# Patient Record
Sex: FEMALE | Race: WHITE | NOT HISPANIC OR LATINO
[De-identification: names, ages, dates, MRNs, and addresses within clinical notes are randomized per-mention and may not be internally consistent; named-entity substitution may affect disease eponyms.]

---

## 2021-03-16 ENCOUNTER — APPOINTMENT (OUTPATIENT)
Dept: FAMILY MEDICINE | Facility: CLINIC | Age: 71
End: 2021-03-16
Payer: MEDICARE

## 2021-03-16 ENCOUNTER — NON-APPOINTMENT (OUTPATIENT)
Age: 71
End: 2021-03-16

## 2021-03-16 VITALS
TEMPERATURE: 97.6 F | HEART RATE: 83 BPM | WEIGHT: 223 LBS | DIASTOLIC BLOOD PRESSURE: 72 MMHG | SYSTOLIC BLOOD PRESSURE: 130 MMHG | RESPIRATION RATE: 16 BRPM | BODY MASS INDEX: 38.07 KG/M2 | OXYGEN SATURATION: 98 % | HEIGHT: 64 IN

## 2021-03-16 PROCEDURE — 36415 COLL VENOUS BLD VENIPUNCTURE: CPT

## 2021-03-16 PROCEDURE — G0439: CPT

## 2021-03-16 PROCEDURE — G0438: CPT

## 2021-03-16 RX ORDER — CETIRIZINE HYDROCHLORIDE 10 MG/1
10 CAPSULE, LIQUID FILLED ORAL
Refills: 0 | Status: ACTIVE | COMMUNITY
Start: 2021-03-16

## 2021-03-17 ENCOUNTER — TRANSCRIPTION ENCOUNTER (OUTPATIENT)
Age: 71
End: 2021-03-17

## 2021-03-17 LAB
25(OH)D3 SERPL-MCNC: 38.6 NG/ML
ALBUMIN SERPL ELPH-MCNC: 4.2 G/DL
ALP BLD-CCNC: 149 U/L
ALT SERPL-CCNC: 15 U/L
ANION GAP SERPL CALC-SCNC: 12 MMOL/L
AST SERPL-CCNC: 19 U/L
BASOPHILS # BLD AUTO: 0.04 K/UL
BASOPHILS NFR BLD AUTO: 0.6 %
BILIRUB SERPL-MCNC: <0.2 MG/DL
BUN SERPL-MCNC: 51 MG/DL
CALCIUM SERPL-MCNC: 8.5 MG/DL
CHLORIDE SERPL-SCNC: 115 MMOL/L
CHOLEST SERPL-MCNC: 267 MG/DL
CO2 SERPL-SCNC: 16 MMOL/L
CREAT SERPL-MCNC: 2.37 MG/DL
CREAT SPEC-SCNC: 63 MG/DL
EOSINOPHIL # BLD AUTO: 0.23 K/UL
EOSINOPHIL NFR BLD AUTO: 3.4 %
ESTIMATED AVERAGE GLUCOSE: 137 MG/DL
GLUCOSE SERPL-MCNC: 190 MG/DL
HBA1C MFR BLD HPLC: 6.4 %
HCT VFR BLD CALC: 30.4 %
HDLC SERPL-MCNC: 43 MG/DL
HGB BLD-MCNC: 9.5 G/DL
IMM GRANULOCYTES NFR BLD AUTO: 0.6 %
LDLC SERPL CALC-MCNC: 171 MG/DL
LYMPHOCYTES # BLD AUTO: 1.69 K/UL
LYMPHOCYTES NFR BLD AUTO: 24.9 %
MAN DIFF?: NORMAL
MCHC RBC-ENTMCNC: 31.3 GM/DL
MCHC RBC-ENTMCNC: 31.8 PG
MCV RBC AUTO: 101.7 FL
MICROALBUMIN 24H UR DL<=1MG/L-MCNC: 74.2 MG/DL
MICROALBUMIN/CREAT 24H UR-RTO: 1181 MG/G
MONOCYTES # BLD AUTO: 0.49 K/UL
MONOCYTES NFR BLD AUTO: 7.2 %
NEUTROPHILS # BLD AUTO: 4.29 K/UL
NEUTROPHILS NFR BLD AUTO: 63.3 %
NONHDLC SERPL-MCNC: 224 MG/DL
PLATELET # BLD AUTO: 276 K/UL
POTASSIUM SERPL-SCNC: 5.2 MMOL/L
PROT SERPL-MCNC: 6.5 G/DL
RBC # BLD: 2.99 M/UL
RBC # FLD: 14.1 %
SODIUM SERPL-SCNC: 143 MMOL/L
TRIGL SERPL-MCNC: 265 MG/DL
TSH SERPL-ACNC: 2.97 UIU/ML
WBC # FLD AUTO: 6.78 K/UL

## 2021-04-07 ENCOUNTER — RX RENEWAL (OUTPATIENT)
Age: 71
End: 2021-04-07

## 2021-04-19 ENCOUNTER — TRANSCRIPTION ENCOUNTER (OUTPATIENT)
Age: 71
End: 2021-04-19

## 2021-06-09 ENCOUNTER — TRANSCRIPTION ENCOUNTER (OUTPATIENT)
Age: 71
End: 2021-06-09

## 2021-07-08 ENCOUNTER — APPOINTMENT (OUTPATIENT)
Dept: FAMILY MEDICINE | Facility: CLINIC | Age: 71
End: 2021-07-08
Payer: MEDICARE

## 2021-07-08 VITALS
SYSTOLIC BLOOD PRESSURE: 134 MMHG | OXYGEN SATURATION: 97 % | WEIGHT: 224 LBS | HEIGHT: 64 IN | BODY MASS INDEX: 38.24 KG/M2 | DIASTOLIC BLOOD PRESSURE: 70 MMHG | HEART RATE: 71 BPM

## 2021-07-08 PROCEDURE — 36415 COLL VENOUS BLD VENIPUNCTURE: CPT

## 2021-07-08 PROCEDURE — 99214 OFFICE O/P EST MOD 30 MIN: CPT | Mod: 25

## 2021-07-09 LAB
ALBUMIN SERPL ELPH-MCNC: 4.2 G/DL
ALP BLD-CCNC: 150 U/L
ALT SERPL-CCNC: 14 U/L
ANION GAP SERPL CALC-SCNC: 16 MMOL/L
AST SERPL-CCNC: 22 U/L
BASOPHILS # BLD AUTO: 0.04 K/UL
BASOPHILS NFR BLD AUTO: 0.6 %
BILIRUB SERPL-MCNC: <0.2 MG/DL
BUN SERPL-MCNC: 58 MG/DL
CALCIUM SERPL-MCNC: 8.9 MG/DL
CHLORIDE SERPL-SCNC: 110 MMOL/L
CHOLEST SERPL-MCNC: 271 MG/DL
CO2 SERPL-SCNC: 13 MMOL/L
CREAT SERPL-MCNC: 2.71 MG/DL
EOSINOPHIL # BLD AUTO: 0.19 K/UL
EOSINOPHIL NFR BLD AUTO: 2.6 %
ESTIMATED AVERAGE GLUCOSE: 146 MG/DL
GLUCOSE SERPL-MCNC: 200 MG/DL
HBA1C MFR BLD HPLC: 6.7 %
HCT VFR BLD CALC: 28.5 %
HDLC SERPL-MCNC: 36 MG/DL
HGB BLD-MCNC: 8.8 G/DL
IMM GRANULOCYTES NFR BLD AUTO: 0.7 %
LDLC SERPL CALC-MCNC: NORMAL MG/DL
LYMPHOCYTES # BLD AUTO: 1.94 K/UL
LYMPHOCYTES NFR BLD AUTO: 27.1 %
MAN DIFF?: NORMAL
MCHC RBC-ENTMCNC: 30.9 GM/DL
MCHC RBC-ENTMCNC: 31.4 PG
MCV RBC AUTO: 101.8 FL
MONOCYTES # BLD AUTO: 0.51 K/UL
MONOCYTES NFR BLD AUTO: 7.1 %
NEUTROPHILS # BLD AUTO: 4.44 K/UL
NEUTROPHILS NFR BLD AUTO: 61.9 %
NONHDLC SERPL-MCNC: 236 MG/DL
PLATELET # BLD AUTO: 281 K/UL
POTASSIUM SERPL-SCNC: 5 MMOL/L
PROT SERPL-MCNC: 6.4 G/DL
RBC # BLD: 2.8 M/UL
RBC # FLD: 14.6 %
SODIUM SERPL-SCNC: 139 MMOL/L
TRIGL SERPL-MCNC: 421 MG/DL
TSH SERPL-ACNC: 1.33 UIU/ML
WBC # FLD AUTO: 7.17 K/UL

## 2021-07-18 ENCOUNTER — RX RENEWAL (OUTPATIENT)
Age: 71
End: 2021-07-18

## 2021-08-02 ENCOUNTER — TRANSCRIPTION ENCOUNTER (OUTPATIENT)
Age: 71
End: 2021-08-02

## 2021-09-11 ENCOUNTER — TRANSCRIPTION ENCOUNTER (OUTPATIENT)
Age: 71
End: 2021-09-11

## 2021-09-23 ENCOUNTER — TRANSCRIPTION ENCOUNTER (OUTPATIENT)
Age: 71
End: 2021-09-23

## 2021-10-14 ENCOUNTER — APPOINTMENT (OUTPATIENT)
Dept: FAMILY MEDICINE | Facility: CLINIC | Age: 71
End: 2021-10-14
Payer: MEDICARE

## 2021-10-14 VITALS
OXYGEN SATURATION: 98 % | SYSTOLIC BLOOD PRESSURE: 128 MMHG | WEIGHT: 220 LBS | DIASTOLIC BLOOD PRESSURE: 80 MMHG | HEART RATE: 75 BPM | HEIGHT: 64 IN | TEMPERATURE: 98 F | BODY MASS INDEX: 37.56 KG/M2

## 2021-10-14 PROCEDURE — 99214 OFFICE O/P EST MOD 30 MIN: CPT | Mod: 25

## 2021-11-22 ENCOUNTER — APPOINTMENT (OUTPATIENT)
Dept: FAMILY MEDICINE | Facility: CLINIC | Age: 71
End: 2021-11-22
Payer: MEDICARE

## 2021-11-22 VITALS
HEART RATE: 86 BPM | OXYGEN SATURATION: 93 % | WEIGHT: 220 LBS | BODY MASS INDEX: 37.56 KG/M2 | TEMPERATURE: 98.3 F | HEIGHT: 64 IN | DIASTOLIC BLOOD PRESSURE: 80 MMHG | SYSTOLIC BLOOD PRESSURE: 132 MMHG

## 2021-11-22 DIAGNOSIS — L89.90 PRESSURE ULCER OF UNSPECIFIED SITE, UNSPECIFIED STAGE: ICD-10-CM

## 2021-11-22 DIAGNOSIS — Z23 ENCOUNTER FOR IMMUNIZATION: ICD-10-CM

## 2021-11-22 DIAGNOSIS — Z09 ENCOUNTER FOR FOLLOW-UP EXAMINATION AFTER COMPLETED TREATMENT FOR CONDITIONS OTHER THAN MALIGNANT NEOPLASM: ICD-10-CM

## 2021-11-22 PROCEDURE — 99495 TRANSJ CARE MGMT MOD F2F 14D: CPT | Mod: 25

## 2021-11-22 PROCEDURE — G0009: CPT

## 2021-11-22 PROCEDURE — 90732 PPSV23 VACC 2 YRS+ SUBQ/IM: CPT

## 2021-11-30 ENCOUNTER — TRANSCRIPTION ENCOUNTER (OUTPATIENT)
Age: 71
End: 2021-11-30

## 2021-12-02 ENCOUNTER — RX RENEWAL (OUTPATIENT)
Age: 71
End: 2021-12-02

## 2022-02-15 ENCOUNTER — APPOINTMENT (OUTPATIENT)
Dept: FAMILY MEDICINE | Facility: CLINIC | Age: 72
End: 2022-02-15
Payer: MEDICARE

## 2022-02-15 VITALS
TEMPERATURE: 98.1 F | OXYGEN SATURATION: 97 % | DIASTOLIC BLOOD PRESSURE: 80 MMHG | WEIGHT: 220 LBS | BODY MASS INDEX: 37.56 KG/M2 | SYSTOLIC BLOOD PRESSURE: 130 MMHG | HEIGHT: 64 IN | HEART RATE: 71 BPM

## 2022-02-15 PROCEDURE — 99214 OFFICE O/P EST MOD 30 MIN: CPT | Mod: 25

## 2022-02-15 PROCEDURE — 36415 COLL VENOUS BLD VENIPUNCTURE: CPT

## 2022-02-16 LAB
ALBUMIN SERPL ELPH-MCNC: 4.6 G/DL
ALP BLD-CCNC: 105 U/L
ALT SERPL-CCNC: 11 U/L
ANION GAP SERPL CALC-SCNC: 17 MMOL/L
AST SERPL-CCNC: 14 U/L
BASOPHILS # BLD AUTO: 0.05 K/UL
BASOPHILS NFR BLD AUTO: 0.6 %
BILIRUB SERPL-MCNC: 0.2 MG/DL
BUN SERPL-MCNC: 71 MG/DL
CALCIUM SERPL-MCNC: 10 MG/DL
CHLORIDE SERPL-SCNC: 113 MMOL/L
CHOLEST SERPL-MCNC: 332 MG/DL
CO2 SERPL-SCNC: 13 MMOL/L
CREAT SERPL-MCNC: 3.31 MG/DL
EOSINOPHIL # BLD AUTO: 0.27 K/UL
EOSINOPHIL NFR BLD AUTO: 3.5 %
ESTIMATED AVERAGE GLUCOSE: 134 MG/DL
GLUCOSE SERPL-MCNC: 145 MG/DL
HBA1C MFR BLD HPLC: 6.3 %
HCT VFR BLD CALC: 29.3 %
HDLC SERPL-MCNC: 41 MG/DL
HGB BLD-MCNC: 9 G/DL
IMM GRANULOCYTES NFR BLD AUTO: 1.2 %
LDLC SERPL CALC-MCNC: 239 MG/DL
LYMPHOCYTES # BLD AUTO: 1.91 K/UL
LYMPHOCYTES NFR BLD AUTO: 24.6 %
MAN DIFF?: NORMAL
MCHC RBC-ENTMCNC: 30.7 GM/DL
MCHC RBC-ENTMCNC: 31 PG
MCV RBC AUTO: 101 FL
MONOCYTES # BLD AUTO: 0.65 K/UL
MONOCYTES NFR BLD AUTO: 8.4 %
NEUTROPHILS # BLD AUTO: 4.8 K/UL
NEUTROPHILS NFR BLD AUTO: 61.7 %
NONHDLC SERPL-MCNC: 292 MG/DL
PLATELET # BLD AUTO: 307 K/UL
POTASSIUM SERPL-SCNC: 5.2 MMOL/L
PROT SERPL-MCNC: 7.2 G/DL
RBC # BLD: 2.9 M/UL
RBC # FLD: 16.9 %
SODIUM SERPL-SCNC: 143 MMOL/L
TRIGL SERPL-MCNC: 262 MG/DL
TSH SERPL-ACNC: 1.53 UIU/ML
WBC # FLD AUTO: 7.77 K/UL

## 2022-05-01 ENCOUNTER — APPOINTMENT (OUTPATIENT)
Dept: FAMILY MEDICINE | Facility: CLINIC | Age: 72
End: 2022-05-01
Payer: MEDICARE

## 2022-05-01 VITALS
WEIGHT: 220 LBS | HEART RATE: 94 BPM | DIASTOLIC BLOOD PRESSURE: 80 MMHG | BODY MASS INDEX: 37.56 KG/M2 | HEIGHT: 64 IN | TEMPERATURE: 98 F | OXYGEN SATURATION: 96 % | RESPIRATION RATE: 16 BRPM | SYSTOLIC BLOOD PRESSURE: 118 MMHG

## 2022-05-01 PROCEDURE — 99213 OFFICE O/P EST LOW 20 MIN: CPT

## 2022-05-24 ENCOUNTER — APPOINTMENT (OUTPATIENT)
Dept: FAMILY MEDICINE | Facility: CLINIC | Age: 72
End: 2022-05-24
Payer: MEDICARE

## 2022-05-24 VITALS
SYSTOLIC BLOOD PRESSURE: 115 MMHG | HEART RATE: 83 BPM | OXYGEN SATURATION: 97 % | BODY MASS INDEX: 41.54 KG/M2 | DIASTOLIC BLOOD PRESSURE: 72 MMHG | WEIGHT: 220 LBS | HEIGHT: 61 IN | TEMPERATURE: 97.8 F

## 2022-05-24 PROCEDURE — 99214 OFFICE O/P EST MOD 30 MIN: CPT | Mod: 25

## 2022-05-24 PROCEDURE — 36415 COLL VENOUS BLD VENIPUNCTURE: CPT

## 2022-05-25 LAB
ALBUMIN SERPL ELPH-MCNC: 4.1 G/DL
ALP BLD-CCNC: 102 U/L
ALT SERPL-CCNC: 10 U/L
ANION GAP SERPL CALC-SCNC: 18 MMOL/L
AST SERPL-CCNC: 13 U/L
BASOPHILS # BLD AUTO: 0.05 K/UL
BASOPHILS NFR BLD AUTO: 0.7 %
BILIRUB SERPL-MCNC: <0.2 MG/DL
BUN SERPL-MCNC: 57 MG/DL
CALCIUM SERPL-MCNC: 8.4 MG/DL
CHLORIDE SERPL-SCNC: 115 MMOL/L
CHOLEST SERPL-MCNC: 285 MG/DL
CO2 SERPL-SCNC: 12 MMOL/L
CREAT SERPL-MCNC: 2.98 MG/DL
EGFR: 16 ML/MIN/1.73M2
EOSINOPHIL # BLD AUTO: 0.32 K/UL
EOSINOPHIL NFR BLD AUTO: 4.3 %
ESTIMATED AVERAGE GLUCOSE: 94 MG/DL
GLUCOSE SERPL-MCNC: 144 MG/DL
HBA1C MFR BLD HPLC: 4.9 %
HCT VFR BLD CALC: 26.4 %
HDLC SERPL-MCNC: 32 MG/DL
HGB BLD-MCNC: 7.5 G/DL
IMM GRANULOCYTES NFR BLD AUTO: 1.1 %
LDLC SERPL CALC-MCNC: 210 MG/DL
LYMPHOCYTES # BLD AUTO: 2.14 K/UL
LYMPHOCYTES NFR BLD AUTO: 28.7 %
MAN DIFF?: NORMAL
MCHC RBC-ENTMCNC: 28.4 GM/DL
MCHC RBC-ENTMCNC: 32.1 PG
MCV RBC AUTO: 112.8 FL
MONOCYTES # BLD AUTO: 0.66 K/UL
MONOCYTES NFR BLD AUTO: 8.9 %
NEUTROPHILS # BLD AUTO: 4.2 K/UL
NEUTROPHILS NFR BLD AUTO: 56.3 %
NONHDLC SERPL-MCNC: 252 MG/DL
PLATELET # BLD AUTO: 326 K/UL
POTASSIUM SERPL-SCNC: 5.3 MMOL/L
PROT SERPL-MCNC: 6.4 G/DL
RBC # BLD: 2.34 M/UL
RBC # FLD: 15.8 %
SODIUM SERPL-SCNC: 145 MMOL/L
TRIGL SERPL-MCNC: 211 MG/DL
TSH SERPL-ACNC: 1.12 UIU/ML
WBC # FLD AUTO: 7.45 K/UL

## 2022-05-27 ENCOUNTER — RX RENEWAL (OUTPATIENT)
Age: 72
End: 2022-05-27

## 2022-06-09 ENCOUNTER — RX RENEWAL (OUTPATIENT)
Age: 72
End: 2022-06-09

## 2022-06-25 ENCOUNTER — TRANSCRIPTION ENCOUNTER (OUTPATIENT)
Age: 72
End: 2022-06-25

## 2022-09-08 ENCOUNTER — RX RENEWAL (OUTPATIENT)
Age: 72
End: 2022-09-08

## 2022-09-23 ENCOUNTER — RX RENEWAL (OUTPATIENT)
Age: 72
End: 2022-09-23

## 2022-10-06 ENCOUNTER — LABORATORY RESULT (OUTPATIENT)
Age: 72
End: 2022-10-06

## 2022-10-06 ENCOUNTER — APPOINTMENT (OUTPATIENT)
Dept: FAMILY MEDICINE | Facility: CLINIC | Age: 72
End: 2022-10-06

## 2022-10-06 ENCOUNTER — TRANSCRIPTION ENCOUNTER (OUTPATIENT)
Age: 72
End: 2022-10-06

## 2022-10-06 VITALS
DIASTOLIC BLOOD PRESSURE: 70 MMHG | HEIGHT: 61 IN | WEIGHT: 200 LBS | HEART RATE: 77 BPM | OXYGEN SATURATION: 96 % | BODY MASS INDEX: 37.76 KG/M2 | TEMPERATURE: 98.2 F | SYSTOLIC BLOOD PRESSURE: 122 MMHG

## 2022-10-06 PROCEDURE — 36415 COLL VENOUS BLD VENIPUNCTURE: CPT

## 2022-10-06 PROCEDURE — 99214 OFFICE O/P EST MOD 30 MIN: CPT | Mod: 25

## 2022-10-07 LAB
ALBUMIN SERPL ELPH-MCNC: 4.1 G/DL
ALP BLD-CCNC: 122 U/L
ALT SERPL-CCNC: 16 U/L
ANION GAP SERPL CALC-SCNC: 21 MMOL/L
AST SERPL-CCNC: 18 U/L
BASOPHILS # BLD AUTO: 0.03 K/UL
BASOPHILS NFR BLD AUTO: 0.4 %
BILIRUB SERPL-MCNC: <0.2 MG/DL
BUN SERPL-MCNC: 77 MG/DL
CALCIUM SERPL-MCNC: 8.4 MG/DL
CHLORIDE SERPL-SCNC: 111 MMOL/L
CHOLEST SERPL-MCNC: 206 MG/DL
CO2 SERPL-SCNC: 9 MMOL/L
CREAT SERPL-MCNC: 3.53 MG/DL
EGFR: 13 ML/MIN/1.73M2
EOSINOPHIL # BLD AUTO: 0.03 K/UL
EOSINOPHIL NFR BLD AUTO: 0.4 %
ESTIMATED AVERAGE GLUCOSE: 114 MG/DL
GLUCOSE SERPL-MCNC: 156 MG/DL
HBA1C MFR BLD HPLC: 5.6 %
HCT VFR BLD CALC: 30.9 %
HDLC SERPL-MCNC: 27 MG/DL
HGB BLD-MCNC: 9.4 G/DL
IMM GRANULOCYTES NFR BLD AUTO: 0.5 %
LDLC SERPL CALC-MCNC: NORMAL MG/DL
LYMPHOCYTES # BLD AUTO: 1.55 K/UL
LYMPHOCYTES NFR BLD AUTO: 18.7 %
MAN DIFF?: NORMAL
MCHC RBC-ENTMCNC: 30.4 GM/DL
MCHC RBC-ENTMCNC: 32.9 PG
MCV RBC AUTO: 108 FL
MONOCYTES # BLD AUTO: 0.74 K/UL
MONOCYTES NFR BLD AUTO: 8.9 %
NEUTROPHILS # BLD AUTO: 5.91 K/UL
NEUTROPHILS NFR BLD AUTO: 71.1 %
NONHDLC SERPL-MCNC: 180 MG/DL
PLATELET # BLD AUTO: 193 K/UL
POTASSIUM SERPL-SCNC: 4.8 MMOL/L
PROT SERPL-MCNC: 6.4 G/DL
RBC # BLD: 2.86 M/UL
RBC # FLD: 13.7 %
SODIUM SERPL-SCNC: 141 MMOL/L
TRIGL SERPL-MCNC: 464 MG/DL
TSH SERPL-ACNC: 0.95 UIU/ML
WBC # FLD AUTO: 8.3 K/UL

## 2022-10-09 DIAGNOSIS — J98.01 ACUTE BRONCHOSPASM: ICD-10-CM

## 2022-10-14 ENCOUNTER — INPATIENT (INPATIENT)
Facility: HOSPITAL | Age: 72
LOS: 4 days | Discharge: ANOTHER HOSPITAL | End: 2022-10-19
Attending: HOSPITALIST | Admitting: HOSPITALIST

## 2022-10-14 VITALS
TEMPERATURE: 98 F | DIASTOLIC BLOOD PRESSURE: 83 MMHG | RESPIRATION RATE: 17 BRPM | HEART RATE: 79 BPM | WEIGHT: 214.95 LBS | HEIGHT: 62 IN | OXYGEN SATURATION: 99 % | SYSTOLIC BLOOD PRESSURE: 143 MMHG

## 2022-10-14 LAB
ALBUMIN SERPL ELPH-MCNC: 2.5 G/DL — LOW (ref 3.3–5)
ALP SERPL-CCNC: 103 U/L — SIGNIFICANT CHANGE UP (ref 40–120)
ALT FLD-CCNC: 17 U/L — SIGNIFICANT CHANGE UP (ref 12–78)
ANION GAP SERPL CALC-SCNC: 20 MMOL/L — HIGH (ref 5–17)
ANION GAP SERPL CALC-SCNC: 22 MMOL/L — HIGH (ref 5–17)
ANISOCYTOSIS BLD QL: SLIGHT — SIGNIFICANT CHANGE UP
APPEARANCE UR: CLEAR — SIGNIFICANT CHANGE UP
APTT BLD: 27.8 SEC — SIGNIFICANT CHANGE UP (ref 27.5–35.5)
AST SERPL-CCNC: 15 U/L — SIGNIFICANT CHANGE UP (ref 15–37)
BACTERIA # UR AUTO: ABNORMAL
BASOPHILS # BLD AUTO: 0 K/UL — SIGNIFICANT CHANGE UP (ref 0–0.2)
BASOPHILS NFR BLD AUTO: 0 % — SIGNIFICANT CHANGE UP (ref 0–2)
BILIRUB SERPL-MCNC: 0.6 MG/DL — SIGNIFICANT CHANGE UP (ref 0.2–1.2)
BILIRUB UR-MCNC: NEGATIVE — SIGNIFICANT CHANGE UP
BUN SERPL-MCNC: 162 MG/DL — HIGH (ref 7–23)
BUN SERPL-MCNC: 164 MG/DL — HIGH (ref 7–23)
CALCIUM SERPL-MCNC: 6.8 MG/DL — LOW (ref 8.5–10.1)
CALCIUM SERPL-MCNC: 7.7 MG/DL — LOW (ref 8.5–10.1)
CHLORIDE SERPL-SCNC: 109 MMOL/L — HIGH (ref 96–108)
CHLORIDE SERPL-SCNC: 109 MMOL/L — HIGH (ref 96–108)
CO2 SERPL-SCNC: 6 MMOL/L — CRITICAL LOW (ref 22–31)
CO2 SERPL-SCNC: 9 MMOL/L — CRITICAL LOW (ref 22–31)
COLOR SPEC: YELLOW — SIGNIFICANT CHANGE UP
CREAT SERPL-MCNC: 7.94 MG/DL — HIGH (ref 0.5–1.3)
CREAT SERPL-MCNC: 8.17 MG/DL — HIGH (ref 0.5–1.3)
D DIMER BLD IA.RAPID-MCNC: 2492 NG/ML DDU — HIGH
DIFF PNL FLD: ABNORMAL
EGFR: 5 ML/MIN/1.73M2 — LOW
EGFR: 5 ML/MIN/1.73M2 — LOW
EOSINOPHIL # BLD AUTO: 0 K/UL — SIGNIFICANT CHANGE UP (ref 0–0.5)
EOSINOPHIL NFR BLD AUTO: 0 % — SIGNIFICANT CHANGE UP (ref 0–6)
EPI CELLS # UR: SIGNIFICANT CHANGE UP
GAS PNL BLDA: SIGNIFICANT CHANGE UP
GLUCOSE BLDC GLUCOMTR-MCNC: 202 MG/DL — HIGH (ref 70–99)
GLUCOSE SERPL-MCNC: 117 MG/DL — HIGH (ref 70–99)
GLUCOSE SERPL-MCNC: 162 MG/DL — HIGH (ref 70–99)
GLUCOSE UR QL: NEGATIVE MG/DL — SIGNIFICANT CHANGE UP
HCT VFR BLD CALC: 28 % — LOW (ref 34.5–45)
HGB BLD-MCNC: 8.8 G/DL — LOW (ref 11.5–15.5)
INR BLD: 1.15 RATIO — SIGNIFICANT CHANGE UP (ref 0.88–1.16)
KETONES UR-MCNC: NEGATIVE — SIGNIFICANT CHANGE UP
LEUKOCYTE ESTERASE UR-ACNC: ABNORMAL
LYMPHOCYTES # BLD AUTO: 1.76 K/UL — SIGNIFICANT CHANGE UP (ref 1–3.3)
LYMPHOCYTES # BLD AUTO: 9 % — LOW (ref 13–44)
MACROCYTES BLD QL: SLIGHT — SIGNIFICANT CHANGE UP
MANUAL SMEAR VERIFICATION: SIGNIFICANT CHANGE UP
MCHC RBC-ENTMCNC: 31.4 G/DL — LOW (ref 32–36)
MCHC RBC-ENTMCNC: 32.5 PG — SIGNIFICANT CHANGE UP (ref 27–34)
MCV RBC AUTO: 103.3 FL — HIGH (ref 80–100)
METAMYELOCYTES # FLD: 1 % — HIGH (ref 0–0)
MONOCYTES # BLD AUTO: 1.56 K/UL — HIGH (ref 0–0.9)
MONOCYTES NFR BLD AUTO: 8 % — SIGNIFICANT CHANGE UP (ref 2–14)
NEUTROPHILS # BLD AUTO: 15.99 K/UL — HIGH (ref 1.8–7.4)
NEUTROPHILS NFR BLD AUTO: 76 % — SIGNIFICANT CHANGE UP (ref 43–77)
NEUTS BAND # BLD: 6 % — SIGNIFICANT CHANGE UP (ref 0–8)
NITRITE UR-MCNC: NEGATIVE — SIGNIFICANT CHANGE UP
NRBC # BLD: 0 /100 — SIGNIFICANT CHANGE UP (ref 0–0)
NRBC # BLD: SIGNIFICANT CHANGE UP /100 WBCS (ref 0–0)
NT-PROBNP SERPL-SCNC: HIGH PG/ML (ref 0–125)
PH UR: 5 — SIGNIFICANT CHANGE UP (ref 5–8)
PLAT MORPH BLD: NORMAL — SIGNIFICANT CHANGE UP
PLATELET # BLD AUTO: 327 K/UL — SIGNIFICANT CHANGE UP (ref 150–400)
PLATELET CLUMP BLD QL SMEAR: ABNORMAL
POTASSIUM SERPL-MCNC: 6.2 MMOL/L — CRITICAL HIGH (ref 3.5–5.3)
POTASSIUM SERPL-MCNC: 6.5 MMOL/L — CRITICAL HIGH (ref 3.5–5.3)
POTASSIUM SERPL-SCNC: 6.2 MMOL/L — CRITICAL HIGH (ref 3.5–5.3)
POTASSIUM SERPL-SCNC: 6.5 MMOL/L — CRITICAL HIGH (ref 3.5–5.3)
PROT SERPL-MCNC: 7 GM/DL — SIGNIFICANT CHANGE UP (ref 6–8.3)
PROT UR-MCNC: 100 MG/DL
PROTHROM AB SERPL-ACNC: 13.7 SEC — HIGH (ref 10.5–13.4)
RAPID RVP RESULT: DETECTED
RBC # BLD: 2.71 M/UL — LOW (ref 3.8–5.2)
RBC # FLD: 13.8 % — SIGNIFICANT CHANGE UP (ref 10.3–14.5)
RBC BLD AUTO: SIGNIFICANT CHANGE UP
RBC CASTS # UR COMP ASSIST: ABNORMAL /HPF (ref 0–4)
SARS-COV-2 RNA SPEC QL NAA+PROBE: DETECTED
SODIUM SERPL-SCNC: 137 MMOL/L — SIGNIFICANT CHANGE UP (ref 135–145)
SODIUM SERPL-SCNC: 138 MMOL/L — SIGNIFICANT CHANGE UP (ref 135–145)
SP GR SPEC: 1.01 — SIGNIFICANT CHANGE UP (ref 1.01–1.02)
TROPONIN I, HIGH SENSITIVITY RESULT: 36.3 NG/L — SIGNIFICANT CHANGE UP
UROBILINOGEN FLD QL: NEGATIVE MG/DL — SIGNIFICANT CHANGE UP
WBC # BLD: 19.5 K/UL — HIGH (ref 3.8–10.5)
WBC # FLD AUTO: 19.5 K/UL — HIGH (ref 3.8–10.5)
WBC UR QL: SIGNIFICANT CHANGE UP

## 2022-10-14 PROCEDURE — 74176 CT ABD & PELVIS W/O CONTRAST: CPT | Mod: 26,MG

## 2022-10-14 PROCEDURE — 93010 ELECTROCARDIOGRAM REPORT: CPT

## 2022-10-14 PROCEDURE — 71045 X-RAY EXAM CHEST 1 VIEW: CPT | Mod: 26

## 2022-10-14 PROCEDURE — 70450 CT HEAD/BRAIN W/O DYE: CPT | Mod: 26,MA

## 2022-10-14 PROCEDURE — 71275 CT ANGIOGRAPHY CHEST: CPT | Mod: 26,MA

## 2022-10-14 PROCEDURE — 99285 EMERGENCY DEPT VISIT HI MDM: CPT | Mod: CS

## 2022-10-14 PROCEDURE — G1004: CPT

## 2022-10-14 RX ORDER — DESMOPRESSIN ACETATE 0.1 MG/1
30 TABLET ORAL ONCE
Refills: 0 | Status: DISCONTINUED | OUTPATIENT
Start: 2022-10-14 | End: 2022-10-14

## 2022-10-14 RX ORDER — SODIUM CHLORIDE 9 MG/ML
1000 INJECTION INTRAMUSCULAR; INTRAVENOUS; SUBCUTANEOUS ONCE
Refills: 0 | Status: DISCONTINUED | OUTPATIENT
Start: 2022-10-14 | End: 2022-10-14

## 2022-10-14 RX ORDER — HEPARIN SODIUM 5000 [USP'U]/ML
5000 INJECTION INTRAVENOUS; SUBCUTANEOUS EVERY 12 HOURS
Refills: 0 | Status: DISCONTINUED | OUTPATIENT
Start: 2022-10-15 | End: 2022-10-15

## 2022-10-14 RX ORDER — DESMOPRESSIN ACETATE 0.1 MG/1
30 TABLET ORAL ONCE
Refills: 0 | Status: COMPLETED | OUTPATIENT
Start: 2022-10-14 | End: 2022-10-14

## 2022-10-14 RX ORDER — SODIUM ZIRCONIUM CYCLOSILICATE 10 G/10G
10 POWDER, FOR SUSPENSION ORAL ONCE
Refills: 0 | Status: COMPLETED | OUTPATIENT
Start: 2022-10-14 | End: 2022-10-14

## 2022-10-14 RX ORDER — SODIUM CHLORIDE 9 MG/ML
1000 INJECTION, SOLUTION INTRAVENOUS ONCE
Refills: 0 | Status: COMPLETED | OUTPATIENT
Start: 2022-10-14 | End: 2022-10-14

## 2022-10-14 RX ORDER — SODIUM BICARBONATE 1 MEQ/ML
0.23 SYRINGE (ML) INTRAVENOUS
Qty: 150 | Refills: 0 | Status: DISCONTINUED | OUTPATIENT
Start: 2022-10-14 | End: 2022-10-15

## 2022-10-14 RX ORDER — DEXTROSE 50 % IN WATER 50 %
50 SYRINGE (ML) INTRAVENOUS ONCE
Refills: 0 | Status: COMPLETED | OUTPATIENT
Start: 2022-10-14 | End: 2022-10-14

## 2022-10-14 RX ORDER — CHLORHEXIDINE GLUCONATE 213 G/1000ML
1 SOLUTION TOPICAL
Refills: 0 | Status: DISCONTINUED | OUTPATIENT
Start: 2022-10-14 | End: 2022-10-19

## 2022-10-14 RX ORDER — CALCIUM GLUCONATE 100 MG/ML
2 VIAL (ML) INTRAVENOUS ONCE
Refills: 0 | Status: COMPLETED | OUTPATIENT
Start: 2022-10-14 | End: 2022-10-14

## 2022-10-14 RX ORDER — SODIUM CHLORIDE 9 MG/ML
500 INJECTION, SOLUTION INTRAVENOUS ONCE
Refills: 0 | Status: COMPLETED | OUTPATIENT
Start: 2022-10-14 | End: 2022-10-14

## 2022-10-14 RX ORDER — INSULIN HUMAN 100 [IU]/ML
10 INJECTION, SOLUTION SUBCUTANEOUS ONCE
Refills: 0 | Status: COMPLETED | OUTPATIENT
Start: 2022-10-14 | End: 2022-10-14

## 2022-10-14 RX ORDER — FUROSEMIDE 40 MG
40 TABLET ORAL ONCE
Refills: 0 | Status: COMPLETED | OUTPATIENT
Start: 2022-10-14 | End: 2022-10-14

## 2022-10-14 RX ORDER — SODIUM BICARBONATE 1 MEQ/ML
50 SYRINGE (ML) INTRAVENOUS ONCE
Refills: 0 | Status: COMPLETED | OUTPATIENT
Start: 2022-10-14 | End: 2022-10-14

## 2022-10-14 RX ADMIN — Medication 200 GRAM(S): at 19:30

## 2022-10-14 RX ADMIN — Medication 50 MILLIEQUIVALENT(S): at 21:09

## 2022-10-14 RX ADMIN — Medication 75 MEQ/KG/HR: at 22:05

## 2022-10-14 RX ADMIN — SODIUM CHLORIDE 1000 MILLILITER(S): 9 INJECTION, SOLUTION INTRAVENOUS at 21:09

## 2022-10-14 RX ADMIN — SODIUM ZIRCONIUM CYCLOSILICATE 10 GRAM(S): 10 POWDER, FOR SUSPENSION ORAL at 19:31

## 2022-10-14 RX ADMIN — INSULIN HUMAN 10 UNIT(S): 100 INJECTION, SOLUTION SUBCUTANEOUS at 19:09

## 2022-10-14 RX ADMIN — DESMOPRESSIN ACETATE 230 MICROGRAM(S): 0.1 TABLET ORAL at 22:29

## 2022-10-14 RX ADMIN — CHLORHEXIDINE GLUCONATE 1 APPLICATION(S): 213 SOLUTION TOPICAL at 22:10

## 2022-10-14 RX ADMIN — Medication 40 MILLIGRAM(S): at 18:05

## 2022-10-14 RX ADMIN — Medication 50 MILLILITER(S): at 19:09

## 2022-10-14 RX ADMIN — SODIUM CHLORIDE 1000 MILLILITER(S): 9 INJECTION, SOLUTION INTRAVENOUS at 23:41

## 2022-10-14 RX ADMIN — Medication 75 MEQ/KG/HR: at 21:51

## 2022-10-14 NOTE — PATIENT PROFILE ADULT - FALL HARM RISK - HARM RISK INTERVENTIONS

## 2022-10-14 NOTE — ED PROVIDER NOTE - OBJECTIVE STATEMENT
72-year-old female with history of hypertension hyperlipidemia diabetes presenting to the ED with multiple medical complaints.  Patient is poor historian, history provided by  at bedside.   states patient has been short of breath intermittently for the past year, denies any associated chest pain.  Reports that patient was weak earlier today uses walker at baseline and stumbled to the ground.  Denies hitting head, no anticoagulation no history of PE or DVT.  Patient also reports intermittent nosebleeds throughout the year, no recent nosebleed noted.

## 2022-10-14 NOTE — ED ADULT NURSE NOTE - ED STAT RN HANDOFF DETAILS
handoff report given to SILVERIO French in ICU. RN made aware of pts current condition/lab values/reason for admission. pt is AOx3 with periods of confusion, pt is resting in stretcher at this time. pts IVs are patent and intact, no redness/swelling/warmth noted at the site. pt has IV fluids running as ordered. pt is on cardiac monitor. pt has ritter catheter in place. rounding and safety checks completed. any issues endorsed to oncoming RN for followup.

## 2022-10-14 NOTE — ED PROVIDER NOTE - PHYSICAL EXAMINATION
VITAL SIGNS: I have reviewed nursing notes and confirm.  CONSTITUTIONAL: well-appearing, non-toxic  SKIN: Warm dry, normal skin turgor  HEAD: NCAT  EYES: EOMI, PERRLA, no scleral icterus  ENT: Moist mucous membranes, normal pharynx   NECK: Supple; non tender. Full ROM.   CARD: RRR, no murmurs, rubs or gallops  RESP: clear to ausculation b/l.  No rales, rhonchi, or wheezing.  ABD: soft, + BS, non-tender, non-distended, no rebound or guarding. No CVA tenderness  EXT: Full ROM, no bony tenderness, no pedal edema, no calf tenderness  NEURO: normal motor. normal sensory. CN II-XII intact. Cerebellar testing normal.   PSYCH: Cooperative, appropriate.

## 2022-10-14 NOTE — CONSULT NOTE ADULT - SUBJECTIVE AND OBJECTIVE BOX
HPI:  Patient is a 72y old  Female who presents with a chief complaint of progressive dyspnea over past week, hemoptysis since yest. Found to be in severe MAR associated with hyperK, AGMA.   Has not felt well since Dx of PNA a year ago. Has seen a nephrologist in [facility] but does not recall his name; nor has she been informed about etiology and severity of her renal dz.   No recent NSAIDs, angiography.   BP stable at presentation.   Bah inserted producing modest amount of straw yellow urine.   Of note, had CT angio of chest at 17:29.   Tested POS for COVID 19.         PAST MEDICAL & SURGICAL HISTORY:  HTN, DM, ? CKD        Allergies:  No Known Allergies        Daily Height in cm: 157.48 (14 Oct 2022 12:27)    Daily     Drug Dosing Weight  Height (cm): 157.5 (14 Oct 2022 12:27)  Weight (kg): 97.5 (14 Oct 2022 12:27)  BMI (kg/m2): 39.3 (14 Oct 2022 12:27)  BSA (m2): 1.97 (14 Oct 2022 12:27)      REVIEW OF SYSTEMS:    SOB  Cough  MAR  HyperK  Acidosis.           I&O's Detail        PHYSICAL EXAM:    GENERAL: tachypneic at rest.   CHEST/LUNG: dec BS  HEART: Regular rate and rhythm. No murmurs, rubs, or gallops  ABDOMEN: Soft, Nontender, Nondistended. POS BS  EXTREMITIES:  no edema  LABS:  CBC Full  -  ( 14 Oct 2022 12:45 )  WBC Count : 19.50 K/uL  RBC Count : 2.71 M/uL  Hemoglobin : 8.8 g/dL  Hematocrit : 28.0 %  Platelet Count - Automated : 327 K/uL  Mean Cell Volume : 103.3 fl  Mean Cell Hemoglobin : 32.5 pg  Mean Cell Hemoglobin Concentration : 31.4 g/dL  Auto Neutrophil # : 15.99 K/uL  Auto Lymphocyte # : 1.76 K/uL  Auto Monocyte # : 1.56 K/uL  Auto Eosinophil # : 0.00 K/uL  Auto Basophil # : 0.00 K/uL  Auto Neutrophil % : 76.0 %  Auto Lymphocyte % : 9.0 %  Auto Monocyte % : 8.0 %  Auto Eosinophil % : 0.0 %  Auto Basophil % : 0.0 %    10-14    137  |  109<H>  |  164<H>  ----------------------------<  162<H>  6.5<HH>   |  6<LL>  |  7.94<H>    Ca    6.8<L>      14 Oct 2022 07:20    TPro  7.0  /  Alb  2.5<L>  /  TBili  0.6  /  DBili  x   /  AST  15  /  ALT  17  /  AlkPhos  103  10-14    CAPILLARY BLOOD GLUCOSE      POCT Blood Glucose.: 202 mg/dL (14 Oct 2022 12:45)    PT/INR - ( 14 Oct 2022 12:45 )   PT: 13.7 sec;   INR: 1.15 ratio         PTT - ( 14 Oct 2022 12:45 )  PTT:27.8 sec        Impression:  * MAR -- broad differential including pulm-renal vasculitis, COVID ATN, obstruction.    * Mod to severe hyperK, severe AGMA due to above  * COVID 19  * S/p CT angio of chest on 10/14. At high risk of contrast ATN    Recommendations:   Obtain CT A/P without contrast to exclude obstruction  STAT HD tonight to Rxed to control gross metabolic derangements.   Extensive GN w/u requested  SPEP, UPEP    Thank you!

## 2022-10-14 NOTE — ED PROVIDER NOTE - NS ED ROS FT
Constitutional: See HPI.  Eyes: No visual changes, eye pain or discharge. No Photophobia  ENMT: No neck pain or stiffness. No limited ROM  Cardiac: see hpi  Respiratory: No cough or respiratory distress. .  GI: No nausea, vomiting, diarrhea or abdominal pain.  : No dysuria, frequency or burning. No Discharge  MS: No myalgia, muscle weakness, joint pain or back pain.  Neuro: No headache or weakness. No LOC.  Skin: No skin rash.  Except as documented in the HPI, all other systems are negative.

## 2022-10-14 NOTE — ED ADULT TRIAGE NOTE - CHIEF COMPLAINT QUOTE
BIBA from home, patient had episode of epistaxis to b/l nares x today. as per standing and held herself with walking and fell to her bottom, did not strike head as per . b/l nares crusted, no active bleeding in triage. AAOX3 in triage

## 2022-10-14 NOTE — H&P ADULT - NSHPLABSRESULTS_GEN_ALL_CORE
8.8    19.50 )-----------( 327      ( 14 Oct 2022 12:45 )             28.0     10-14    137  |  109<H>  |  164<H>  ----------------------------<  162<H>  6.5<HH>   |  6<LL>  |  7.94<H>    Ca    6.8<L>      14 Oct 2022 07:20    TPro  7.0  /  Alb  2.5<L>  /  TBili  0.6  /  DBili  x   /  AST  15  /  ALT  17  /  AlkPhos  103  10-14    Blood Gas Profile - Arterial (10.14.22 @ 20:50)    pH, Arterial: 7.18    pCO2, Arterial: 18    pO2, Arterial: 116 mmHg    HCO3, Arterial: 7    Base Excess, Arterial: -19.7 mmol/L    Oxygen Saturation, Arterial: 96.0 %    Total CO2, Arterial: 7 mmol/L    FIO2, Arterial: .21

## 2022-10-14 NOTE — H&P ADULT - HISTORY OF PRESENT ILLNESS
71yo F with PMH of DM, HTN, HLD, presents to ED with worsening dyspnea. Patient is poor historian, history provided by  at bedside.   states patient has been short of breath intermittently for the past year, denies any associated chest pain.  Reports that patient was weak earlier today uses walker at baseline and stumbled to the ground.  Denies hitting head, no anticoagulation no history of PE or DVT.  Patient also reports intermittent nosebleeds throughout the year, no recent nosebleed noted.  In ED pt found to be in severe MAR with hyperkalemia to 6.5 and AGMA with pH 7.18, bicarb 7.   ICU consulted for urgent HD.

## 2022-10-14 NOTE — H&P ADULT - ASSESSMENT
71yo F with PMH of DM, HTN, HLD, presents to ED with progressively worsening dyspnea. In ED pt found to be in severe MAR with hyperkalemia to 6.5 and AGMA with pH 7.18, bicarb 7.  Also found to be Covid+. Will admit to ICU for urgent hemodialysis.     -Neuro: Alert & oriented to person, place and situation.    -Cardio:  -Resp:  -GI:   -Renal:  -ID:  -Heme:  -Endocrine:  -Derm:  -:  -Lines/Drains:  -Dispo: 73yo F with PMH of DM, HTN, HLD, presents to ED with progressively worsening dyspnea. In ED pt found to be in severe MAR with hyperkalemia to 6.5 and AGMA with pH 7.18, bicarb 7.  Also found to be Covid+. Will admit to ICU for urgent hemodialysis.     -Neuro: Alert & oriented to person, place and situation.    -Cardio: Normotensive, tachycardic. Appears to be dehydrated. Maintain MAP>65.   -Resp: Dyspnea likely respiratory compensation for metabolic acidosis in setting of renal failure. Covid+, no  hypoxia; saturating well on RA, paO2 116, pCO2 18. Should improve with bicarb gtt and correction of acidosis, plan for HD. Will hold off on dexamethasone for now in the absence of hypoxia. Monitor pulse ox.   -GI: No active issues.  -Renal: Severe MAR with hyperkalemia and AGMA. Unclear etiology, ? related to covid. No obstruction/hydro seen on CT imaging. Given temporizing agents for hyperK in ED; nephrology consulted, plan for urgent HD tonight once access is placed. F/u labs post-HD.   -ID: Covid+. Afebrile, elevated WBC 19k. No hypoxia. Will monitor off abx for now. Hold off on dexamethasone given no hypoxia; will hold off on remdesivir given unstable renal function. Monitor temp curve, WBC count. F/u blood cx, ferritin, LDH, procalcitonin.   -Heme: Recent episodes of epistaxis. Possibly uremic bleeding. Will give one dose DDAVP prior to shiley insertion. Trend H/H  -Endocrine: Reported hx of DM. F/u A1C, monitor FS qac & hs.   -Dispo: Admit to ICU for urgent HD    Seen and plan discussed with ICU attending Dr. Mejia.  71yo F with PMH of DM, HTN, HLD, presents to ED with progressively worsening dyspnea. In ED pt found to be in severe MAR with hyperkalemia to 6.5 and AGMA with pH 7.18, bicarb 7.  Also found to be Covid+. Will admit to ICU for urgent hemodialysis.     -Neuro: Confused, oriented to person. Likely uremic encephalopathy, should improve with HD.   -Cardio: Normotensive, tachycardic. Appears to be dehydrated. Maintain MAP>65.   -Resp: Dyspnea likely respiratory compensation for metabolic acidosis in setting of renal failure. Covid+, no  hypoxia; saturating well on RA, paO2 116, pCO2 18. Should improve with bicarb gtt and correction of acidosis, plan for HD. Will hold off on dexamethasone for now in the absence of hypoxia. Monitor pulse ox.   -GI: No active issues.  -Renal: Severe MAR with hyperkalemia and AGMA. Unclear etiology, ? related to covid. No obstruction/hydro seen on CT imaging. Given temporizing agents for hyperK in ED; nephrology consulted, plan for urgent HD tonight once access is placed. F/u labs post-HD.   -ID: Covid+. Afebrile, elevated WBC 19k. No hypoxia. Will monitor off abx for now. Hold off on dexamethasone given no hypoxia; will hold off on remdesivir given unstable renal function. Monitor temp curve, WBC count. F/u blood cx, ferritin, LDH, procalcitonin.   -Heme: Recent episodes of epistaxis. Possibly uremic bleeding. Will give one dose DDAVP prior to shiley insertion. Trend H/H  -Endocrine: Reported hx of DM. F/u A1C, monitor FS qac & hs.   -Dispo: Admit to ICU for urgent HD    Seen and plan discussed with ICU attending Dr. Mejia.

## 2022-10-14 NOTE — H&P ADULT - NSHPPHYSICALEXAM_GEN_ALL_CORE
PHYSICAL EXAM  GENERAL: Pt lying in stretcher in mild respiratory distress, obese.  HEENT: NCAT, PERRL, EOMI, dry mucous membranes. Neck supple, FROM, no JVD.   NERVOUS SYSTEM:  AAOx3, good concentration, moving all four extremities, no focal neuro deficits; strength & sensation intact in b/l upper & lower extremities.  CHEST/LUNG: Increased respiratory rate and WOB. CTABL, no w/r/r.  HEART: +S1S2, tachycardic, regular rhythm, no m/r/g  ABDOMEN: Soft, nontender, nondistended; +BS  EXTREMITIES:  faint b/l radial pulses, 2+ b/l DP/PT pulses; no c/c/e  SKIN: Dry, tenting, no rashes or lesions

## 2022-10-14 NOTE — H&P ADULT - CRITICAL CARE ATTENDING COMMENT
71yo F with PMH of DM, HTN, HLD, presents to ED with progressively worsening dyspnea. In ED pt found to be in severe MAR with hyperkalemia to 6.5 and AGMA with pH 7.18, bicarb 7.  Also found to be Covid+. Will admit to ICU for urgent hemodialysis.   Unclear etiology of renal failure but very dry on exam will give bicarb amp and drip as weel as 1.5 L bolus of LR pending HD.    Will ask about remdasavir in the setting of this oliguric renal failure.  NO Steroids given Normal respiratory function.   Nose bleeding and multiple bruises 2/2 likeley urimic platlet dysfunction.   Pocus with normal RV/LV size, completely efacing of LV cavity with each beat and IVC of 1.2 cm suggests benefit to volume resuscitation.    Monitor lytes and ABG to monitor response.   nephrology consult apreciated.

## 2022-10-15 LAB
A1C WITH ESTIMATED AVERAGE GLUCOSE RESULT: 6 % — HIGH (ref 4–5.6)
ALBUMIN SERPL ELPH-MCNC: 2.2 G/DL — LOW (ref 3.3–5)
ALP SERPL-CCNC: 92 U/L — SIGNIFICANT CHANGE UP (ref 40–120)
ALT FLD-CCNC: 19 U/L — SIGNIFICANT CHANGE UP (ref 12–78)
AMMONIA BLD-MCNC: 66 UMOL/L — HIGH (ref 11–32)
ANION GAP SERPL CALC-SCNC: 16 MMOL/L — SIGNIFICANT CHANGE UP (ref 5–17)
ANION GAP SERPL CALC-SCNC: 19 MMOL/L — HIGH (ref 5–17)
ANION GAP SERPL CALC-SCNC: 19 MMOL/L — HIGH (ref 5–17)
APTT BLD: 102.4 SEC — HIGH (ref 27.5–35.5)
AST SERPL-CCNC: 18 U/L — SIGNIFICANT CHANGE UP (ref 15–37)
BASOPHILS # BLD AUTO: 0.01 K/UL — SIGNIFICANT CHANGE UP (ref 0–0.2)
BASOPHILS NFR BLD AUTO: 0.1 % — SIGNIFICANT CHANGE UP (ref 0–2)
BILIRUB DIRECT SERPL-MCNC: 0.2 MG/DL — SIGNIFICANT CHANGE UP (ref 0–0.3)
BILIRUB INDIRECT FLD-MCNC: 0.5 MG/DL — SIGNIFICANT CHANGE UP (ref 0.2–1)
BILIRUB SERPL-MCNC: 0.7 MG/DL — SIGNIFICANT CHANGE UP (ref 0.2–1.2)
BUN SERPL-MCNC: 81 MG/DL — HIGH (ref 7–23)
BUN SERPL-MCNC: 87 MG/DL — HIGH (ref 7–23)
BUN SERPL-MCNC: 94 MG/DL — HIGH (ref 7–23)
C3 SERPL-MCNC: 146 MG/DL — SIGNIFICANT CHANGE UP (ref 81–157)
C4 SERPL-MCNC: 45 MG/DL — HIGH (ref 13–39)
CALCIUM SERPL-MCNC: 7.2 MG/DL — LOW (ref 8.5–10.1)
CALCIUM SERPL-MCNC: 7.4 MG/DL — LOW (ref 8.5–10.1)
CALCIUM SERPL-MCNC: 7.5 MG/DL — LOW (ref 8.5–10.1)
CHLORIDE SERPL-SCNC: 102 MMOL/L — SIGNIFICANT CHANGE UP (ref 96–108)
CHLORIDE SERPL-SCNC: 102 MMOL/L — SIGNIFICANT CHANGE UP (ref 96–108)
CHLORIDE SERPL-SCNC: 105 MMOL/L — SIGNIFICANT CHANGE UP (ref 96–108)
CK MB BLD-MCNC: 3.9 % — HIGH (ref 0–3.5)
CK MB CFR SERPL CALC: 4.7 NG/ML — HIGH (ref 0.5–3.6)
CK SERPL-CCNC: 119 U/L — SIGNIFICANT CHANGE UP (ref 26–192)
CO2 SERPL-SCNC: 18 MMOL/L — LOW (ref 22–31)
CO2 SERPL-SCNC: 18 MMOL/L — LOW (ref 22–31)
CO2 SERPL-SCNC: 19 MMOL/L — LOW (ref 22–31)
CREAT ?TM UR-MCNC: 74 MG/DL — SIGNIFICANT CHANGE UP
CREAT SERPL-MCNC: 4.39 MG/DL — HIGH (ref 0.5–1.3)
CREAT SERPL-MCNC: 4.81 MG/DL — HIGH (ref 0.5–1.3)
CREAT SERPL-MCNC: 5.06 MG/DL — HIGH (ref 0.5–1.3)
CREATININE, URINE RESULT: 74 MG/DL — SIGNIFICANT CHANGE UP
EGFR: 10 ML/MIN/1.73M2 — LOW
EGFR: 9 ML/MIN/1.73M2 — LOW
EGFR: 9 ML/MIN/1.73M2 — LOW
EOSINOPHIL # BLD AUTO: 0 K/UL — SIGNIFICANT CHANGE UP (ref 0–0.5)
EOSINOPHIL NFR BLD AUTO: 0 % — SIGNIFICANT CHANGE UP (ref 0–6)
ESTIMATED AVERAGE GLUCOSE: 126 MG/DL — HIGH (ref 68–114)
FERRITIN SERPL-MCNC: 1124 NG/ML — HIGH (ref 15–150)
GAS PNL BLDA: SIGNIFICANT CHANGE UP
GLUCOSE BLDC GLUCOMTR-MCNC: 177 MG/DL — HIGH (ref 70–99)
GLUCOSE BLDC GLUCOMTR-MCNC: 180 MG/DL — HIGH (ref 70–99)
GLUCOSE BLDC GLUCOMTR-MCNC: 188 MG/DL — HIGH (ref 70–99)
GLUCOSE BLDC GLUCOMTR-MCNC: 209 MG/DL — HIGH (ref 70–99)
GLUCOSE SERPL-MCNC: 158 MG/DL — HIGH (ref 70–99)
GLUCOSE SERPL-MCNC: 163 MG/DL — HIGH (ref 70–99)
GLUCOSE SERPL-MCNC: 170 MG/DL — HIGH (ref 70–99)
HAV IGM SER-ACNC: SIGNIFICANT CHANGE UP
HBV CORE IGM SER-ACNC: SIGNIFICANT CHANGE UP
HBV SURFACE AB SER-ACNC: SIGNIFICANT CHANGE UP
HBV SURFACE AG SER-ACNC: SIGNIFICANT CHANGE UP
HCT VFR BLD CALC: 21.5 % — LOW (ref 34.5–45)
HCT VFR BLD CALC: 23.1 % — LOW (ref 34.5–45)
HCT VFR BLD CALC: 23.5 % — LOW (ref 34.5–45)
HCV AB S/CO SERPL IA: 0.06 S/CO — SIGNIFICANT CHANGE UP (ref 0–0.99)
HCV AB SERPL-IMP: SIGNIFICANT CHANGE UP
HGB BLD-MCNC: 7.1 G/DL — LOW (ref 11.5–15.5)
HGB BLD-MCNC: 7.6 G/DL — LOW (ref 11.5–15.5)
HGB BLD-MCNC: 7.6 G/DL — LOW (ref 11.5–15.5)
IMM GRANULOCYTES NFR BLD AUTO: 1.9 % — HIGH (ref 0–0.9)
LACTATE SERPL-SCNC: 0.6 MMOL/L — LOW (ref 0.7–2)
LDH SERPL L TO P-CCNC: 365 U/L — HIGH (ref 50–242)
LYMPHOCYTES # BLD AUTO: 1.33 K/UL — SIGNIFICANT CHANGE UP (ref 1–3.3)
LYMPHOCYTES # BLD AUTO: 10.1 % — LOW (ref 13–44)
MAGNESIUM SERPL-MCNC: 2 MG/DL — SIGNIFICANT CHANGE UP (ref 1.6–2.6)
MCHC RBC-ENTMCNC: 32.3 G/DL — SIGNIFICANT CHANGE UP (ref 32–36)
MCHC RBC-ENTMCNC: 32.3 PG — SIGNIFICANT CHANGE UP (ref 27–34)
MCHC RBC-ENTMCNC: 32.3 PG — SIGNIFICANT CHANGE UP (ref 27–34)
MCHC RBC-ENTMCNC: 32.5 PG — SIGNIFICANT CHANGE UP (ref 27–34)
MCHC RBC-ENTMCNC: 32.9 G/DL — SIGNIFICANT CHANGE UP (ref 32–36)
MCHC RBC-ENTMCNC: 33 G/DL — SIGNIFICANT CHANGE UP (ref 32–36)
MCV RBC AUTO: 100.4 FL — HIGH (ref 80–100)
MCV RBC AUTO: 97.7 FL — SIGNIFICANT CHANGE UP (ref 80–100)
MCV RBC AUTO: 98.3 FL — SIGNIFICANT CHANGE UP (ref 80–100)
MONOCYTES # BLD AUTO: 0.44 K/UL — SIGNIFICANT CHANGE UP (ref 0–0.9)
MONOCYTES NFR BLD AUTO: 3.3 % — SIGNIFICANT CHANGE UP (ref 2–14)
NEUTROPHILS # BLD AUTO: 11.2 K/UL — HIGH (ref 1.8–7.4)
NEUTROPHILS NFR BLD AUTO: 84.6 % — HIGH (ref 43–77)
NRBC # BLD: 0 /100 WBCS — SIGNIFICANT CHANGE UP (ref 0–0)
NT-PROBNP SERPL-SCNC: 9355 PG/ML — HIGH (ref 0–125)
PHOSPHATE SERPL-MCNC: 5.9 MG/DL — HIGH (ref 2.5–4.5)
PLATELET # BLD AUTO: 249 K/UL — SIGNIFICANT CHANGE UP (ref 150–400)
PLATELET # BLD AUTO: 251 K/UL — SIGNIFICANT CHANGE UP (ref 150–400)
PLATELET # BLD AUTO: 286 K/UL — SIGNIFICANT CHANGE UP (ref 150–400)
POTASSIUM SERPL-MCNC: 3.3 MMOL/L — LOW (ref 3.5–5.3)
POTASSIUM SERPL-MCNC: 3.8 MMOL/L — SIGNIFICANT CHANGE UP (ref 3.5–5.3)
POTASSIUM SERPL-MCNC: 4.1 MMOL/L — SIGNIFICANT CHANGE UP (ref 3.5–5.3)
POTASSIUM SERPL-SCNC: 3.3 MMOL/L — LOW (ref 3.5–5.3)
POTASSIUM SERPL-SCNC: 3.8 MMOL/L — SIGNIFICANT CHANGE UP (ref 3.5–5.3)
POTASSIUM SERPL-SCNC: 4.1 MMOL/L — SIGNIFICANT CHANGE UP (ref 3.5–5.3)
PROCALCITONIN SERPL-MCNC: 0.39 NG/ML — HIGH (ref 0.02–0.1)
PROT ?TM UR-MCNC: 80 MG/DL — HIGH (ref 0–12)
PROT ?TM UR-MCNC: 84 MG/DL — HIGH (ref 0–12)
PROT SERPL-MCNC: 6.3 G/DL — SIGNIFICANT CHANGE UP (ref 6–8.3)
PROT SERPL-MCNC: 6.3 G/DL — SIGNIFICANT CHANGE UP (ref 6–8.3)
PROT SERPL-MCNC: 6.3 GM/DL — SIGNIFICANT CHANGE UP (ref 6–8.3)
PROT/CREAT UR-RTO: 1.1 RATIO — HIGH (ref 0–0.2)
RBC # BLD: 2.2 M/UL — LOW (ref 3.8–5.2)
RBC # BLD: 2.34 M/UL — LOW (ref 3.8–5.2)
RBC # BLD: 2.35 M/UL — LOW (ref 3.8–5.2)
RBC # FLD: 13.3 % — SIGNIFICANT CHANGE UP (ref 10.3–14.5)
RBC # FLD: 13.3 % — SIGNIFICANT CHANGE UP (ref 10.3–14.5)
RBC # FLD: 13.6 % — SIGNIFICANT CHANGE UP (ref 10.3–14.5)
SODIUM SERPL-SCNC: 139 MMOL/L — SIGNIFICANT CHANGE UP (ref 135–145)
SODIUM SERPL-SCNC: 139 MMOL/L — SIGNIFICANT CHANGE UP (ref 135–145)
SODIUM SERPL-SCNC: 140 MMOL/L — SIGNIFICANT CHANGE UP (ref 135–145)
SODIUM UR-SCNC: 25 MMOL/L — SIGNIFICANT CHANGE UP
TROPONIN I, HIGH SENSITIVITY RESULT: 1204.4 NG/L — HIGH
TROPONIN I, HIGH SENSITIVITY RESULT: 1275.2 NG/L — HIGH
TROPONIN I, HIGH SENSITIVITY RESULT: 433.2 NG/L — HIGH
WBC # BLD: 13.23 K/UL — HIGH (ref 3.8–10.5)
WBC # BLD: 16.39 K/UL — HIGH (ref 3.8–10.5)
WBC # BLD: 16.51 K/UL — HIGH (ref 3.8–10.5)
WBC # FLD AUTO: 13.23 K/UL — HIGH (ref 3.8–10.5)
WBC # FLD AUTO: 16.39 K/UL — HIGH (ref 3.8–10.5)
WBC # FLD AUTO: 16.51 K/UL — HIGH (ref 3.8–10.5)

## 2022-10-15 PROCEDURE — 93970 EXTREMITY STUDY: CPT | Mod: 26

## 2022-10-15 PROCEDURE — 93010 ELECTROCARDIOGRAM REPORT: CPT

## 2022-10-15 PROCEDURE — 76937 US GUIDE VASCULAR ACCESS: CPT | Mod: 26,59

## 2022-10-15 PROCEDURE — 71045 X-RAY EXAM CHEST 1 VIEW: CPT | Mod: 26

## 2022-10-15 PROCEDURE — 36556 INSERT NON-TUNNEL CV CATH: CPT | Mod: 59

## 2022-10-15 PROCEDURE — 99291 CRITICAL CARE FIRST HOUR: CPT

## 2022-10-15 PROCEDURE — 36000 PLACE NEEDLE IN VEIN: CPT | Mod: 59

## 2022-10-15 RX ORDER — METOPROLOL TARTRATE 50 MG
5 TABLET ORAL ONCE
Refills: 0 | Status: COMPLETED | OUTPATIENT
Start: 2022-10-15 | End: 2022-10-15

## 2022-10-15 RX ORDER — INSULIN LISPRO 100/ML
VIAL (ML) SUBCUTANEOUS
Refills: 0 | Status: DISCONTINUED | OUTPATIENT
Start: 2022-10-15 | End: 2022-10-15

## 2022-10-15 RX ORDER — ASPIRIN/CALCIUM CARB/MAGNESIUM 324 MG
325 TABLET ORAL DAILY
Refills: 0 | Status: DISCONTINUED | OUTPATIENT
Start: 2022-10-15 | End: 2022-10-16

## 2022-10-15 RX ORDER — DILTIAZEM HCL 120 MG
10 CAPSULE, EXT RELEASE 24 HR ORAL
Qty: 125 | Refills: 0 | Status: DISCONTINUED | OUTPATIENT
Start: 2022-10-15 | End: 2022-10-16

## 2022-10-15 RX ORDER — HEPARIN SODIUM 5000 [USP'U]/ML
8000 INJECTION INTRAVENOUS; SUBCUTANEOUS EVERY 6 HOURS
Refills: 0 | Status: DISCONTINUED | OUTPATIENT
Start: 2022-10-15 | End: 2022-10-16

## 2022-10-15 RX ORDER — LEVALBUTEROL 1.25 MG/.5ML
1.25 SOLUTION, CONCENTRATE RESPIRATORY (INHALATION) ONCE
Refills: 0 | Status: COMPLETED | OUTPATIENT
Start: 2022-10-15 | End: 2022-10-15

## 2022-10-15 RX ORDER — SODIUM CHLORIDE 9 MG/ML
1000 INJECTION, SOLUTION INTRAVENOUS
Refills: 0 | Status: DISCONTINUED | OUTPATIENT
Start: 2022-10-15 | End: 2022-10-18

## 2022-10-15 RX ORDER — HEPARIN SODIUM 5000 [USP'U]/ML
INJECTION INTRAVENOUS; SUBCUTANEOUS
Qty: 25000 | Refills: 0 | Status: DISCONTINUED | OUTPATIENT
Start: 2022-10-15 | End: 2022-10-16

## 2022-10-15 RX ORDER — BEBTELOVIMAB 87.5 MG/ML
175 INJECTION, SOLUTION INTRAVENOUS ONCE
Refills: 0 | Status: COMPLETED | OUTPATIENT
Start: 2022-10-15 | End: 2022-10-15

## 2022-10-15 RX ORDER — INSULIN LISPRO 100/ML
VIAL (ML) SUBCUTANEOUS EVERY 6 HOURS
Refills: 0 | Status: DISCONTINUED | OUTPATIENT
Start: 2022-10-15 | End: 2022-10-18

## 2022-10-15 RX ORDER — SODIUM CHLORIDE 9 MG/ML
500 INJECTION, SOLUTION INTRAVENOUS ONCE
Refills: 0 | Status: COMPLETED | OUTPATIENT
Start: 2022-10-15 | End: 2022-10-15

## 2022-10-15 RX ORDER — HEPARIN SODIUM 5000 [USP'U]/ML
4000 INJECTION INTRAVENOUS; SUBCUTANEOUS EVERY 6 HOURS
Refills: 0 | Status: DISCONTINUED | OUTPATIENT
Start: 2022-10-15 | End: 2022-10-16

## 2022-10-15 RX ORDER — SODIUM CHLORIDE 9 MG/ML
1000 INJECTION, SOLUTION INTRAVENOUS ONCE
Refills: 0 | Status: DISCONTINUED | OUTPATIENT
Start: 2022-10-15 | End: 2022-10-15

## 2022-10-15 RX ORDER — MONTELUKAST 4 MG/1
10 TABLET, CHEWABLE ORAL DAILY
Refills: 0 | Status: DISCONTINUED | OUTPATIENT
Start: 2022-10-15 | End: 2022-10-19

## 2022-10-15 RX ORDER — DILTIAZEM HCL 120 MG
20 CAPSULE, EXT RELEASE 24 HR ORAL ONCE
Refills: 0 | Status: COMPLETED | OUTPATIENT
Start: 2022-10-15 | End: 2022-10-15

## 2022-10-15 RX ORDER — ATORVASTATIN CALCIUM 80 MG/1
80 TABLET, FILM COATED ORAL AT BEDTIME
Refills: 0 | Status: DISCONTINUED | OUTPATIENT
Start: 2022-10-15 | End: 2022-10-19

## 2022-10-15 RX ORDER — GEMFIBROZIL 600 MG
600 TABLET ORAL
Refills: 0 | Status: DISCONTINUED | OUTPATIENT
Start: 2022-10-15 | End: 2022-10-15

## 2022-10-15 RX ORDER — AMIODARONE HYDROCHLORIDE 400 MG/1
150 TABLET ORAL ONCE
Refills: 0 | Status: COMPLETED | OUTPATIENT
Start: 2022-10-15 | End: 2022-10-15

## 2022-10-15 RX ADMIN — LEVALBUTEROL 1.25 MILLIGRAM(S): 1.25 SOLUTION, CONCENTRATE RESPIRATORY (INHALATION) at 06:06

## 2022-10-15 RX ADMIN — Medication 1: at 17:32

## 2022-10-15 RX ADMIN — Medication 10 MG/HR: at 19:37

## 2022-10-15 RX ADMIN — SODIUM CHLORIDE 1000 MILLILITER(S): 9 INJECTION, SOLUTION INTRAVENOUS at 02:05

## 2022-10-15 RX ADMIN — Medication 5 MILLIGRAM(S): at 03:04

## 2022-10-15 RX ADMIN — ATORVASTATIN CALCIUM 80 MILLIGRAM(S): 80 TABLET, FILM COATED ORAL at 21:15

## 2022-10-15 RX ADMIN — Medication 5 MG/HR: at 04:55

## 2022-10-15 RX ADMIN — BEBTELOVIMAB 175 MILLIGRAM(S): 87.5 INJECTION, SOLUTION INTRAVENOUS at 12:37

## 2022-10-15 RX ADMIN — Medication 20 MILLIGRAM(S): at 03:35

## 2022-10-15 RX ADMIN — CHLORHEXIDINE GLUCONATE 1 APPLICATION(S): 213 SOLUTION TOPICAL at 09:31

## 2022-10-15 RX ADMIN — MONTELUKAST 10 MILLIGRAM(S): 4 TABLET, CHEWABLE ORAL at 12:36

## 2022-10-15 RX ADMIN — Medication 150 MEQ/KG/HR: at 04:55

## 2022-10-15 RX ADMIN — Medication 1: at 12:50

## 2022-10-15 RX ADMIN — Medication 10 MG/HR: at 17:37

## 2022-10-15 RX ADMIN — Medication 600 MILLIGRAM(S): at 17:38

## 2022-10-15 RX ADMIN — HEPARIN SODIUM 1600 UNIT(S)/HR: 5000 INJECTION INTRAVENOUS; SUBCUTANEOUS at 17:01

## 2022-10-15 RX ADMIN — AMIODARONE HYDROCHLORIDE 618 MILLIGRAM(S): 400 TABLET ORAL at 02:17

## 2022-10-15 RX ADMIN — HEPARIN SODIUM 1600 UNIT(S)/HR: 5000 INJECTION INTRAVENOUS; SUBCUTANEOUS at 20:04

## 2022-10-15 RX ADMIN — Medication 325 MILLIGRAM(S): at 17:31

## 2022-10-15 RX ADMIN — HEPARIN SODIUM 1800 UNIT(S)/HR: 5000 INJECTION INTRAVENOUS; SUBCUTANEOUS at 09:30

## 2022-10-15 RX ADMIN — HEPARIN SODIUM 1600 UNIT(S)/HR: 5000 INJECTION INTRAVENOUS; SUBCUTANEOUS at 19:19

## 2022-10-15 RX ADMIN — HEPARIN SODIUM 5000 UNIT(S): 5000 INJECTION INTRAVENOUS; SUBCUTANEOUS at 05:30

## 2022-10-15 RX ADMIN — SODIUM CHLORIDE 1000 MILLILITER(S): 9 INJECTION, SOLUTION INTRAVENOUS at 02:20

## 2022-10-15 NOTE — PROCEDURE NOTE - NSPROCDETAILS_GEN_ALL_CORE
under ultrasound guidance/location identified, draped/prepped, sterile technique used/blood seen on insertion/dressing applied/flushes easily/secured in place/sterile technique, catheter placed
guidewire recovered/lumen(s) aspirated and flushed/sterile dressing applied/sterile technique, catheter placed/ultrasound guidance with use of sterile gel and probe cove

## 2022-10-15 NOTE — PROGRESS NOTE ADULT - SUBJECTIVE AND OBJECTIVE BOX
Seen in ICU, comfortable on RA, awake, alert, denies CP, SOB, states she feels much better    Vital Signs Last 24 Hrs  T(C): 37.3 (10-15-22 @ 23:15), Max: 37.3 (10-15-22 @ 23:15)  T(F): 99.1 (10-15-22 @ 23:15), Max: 99.1 (10-15-22 @ 23:15)  HR: 88 (10-15-22 @ 22:00) (80 - 147)  BP: 111/60 (10-15-22 @ 22:00) (72/51 - 152/129)  BP(mean): 71 (10-15-22 @ 22:00) (56 - 134)  RR: 22 (10-15-22 @ 22:00) (18 - 32)  SpO2: 96% (10-15-22 @ 22:00) (95% - 100%)    I&O's Detail    14 Oct 2022 07:01  -  15 Oct 2022 07:00  --------------------------------------------------------  IN:    Diltiazem: 5 mL    Diltiazem: 7.5 mL    Lactated Ringers Bolus: 2000 mL    Sodium Bicarbonate: 900 mL    Sodium Bicarbonate: 75 mL  Total IN: 2987.5 mL    OUT:    Indwelling Catheter - Urethral (mL): 630 mL    Other (mL): 1000 mL  Total OUT: 1630 mL    Total NET: 1357.5 mL    15 Oct 2022 07:01  -  15 Oct 2022 23:33  --------------------------------------------------------  IN:    Diltiazem: 140 mL    Heparin Infusion: 224 mL  Total IN: 364 mL    OUT:    Indwelling Catheter - Urethral (mL): 315 mL  Total OUT: 315 mL    Total NET: 49 mL    CHEST/LUNG: b/l air entry  HEART: Regular rate and rhythm. No murmurs, rubs, or gallops  ABDOMEN: Soft, Nontender, Nondistended. POS BS  EXTREMITIES:  no edema                        7.1    16.39 )-----------( 251      ( 15 Oct 2022 15:30 )             21.5     15 Oct 2022 21:30    140    |  105    |  94     ----------------------------<  170    4.1     |  19     |  5.06     Ca    7.2        15 Oct 2022 21:30  Phos  5.9       15 Oct 2022 08:00  Mg     2.0       15 Oct 2022 08:00    TPro  6.3    /  Alb  2.2    /  TBili  0.7    /  DBili  0.2    /  AST  18     /  ALT  19     /  AlkPhos  92     15 Oct 2022 08:00    LIVER FUNCTIONS - ( 15 Oct 2022 08:00 )  Alb: 2.2 g/dL / Pro: 6.3 gm/dL / ALK PHOS: 92 U/L / ALT: 19 U/L / AST: 18 U/L / GGT: x           PT/INR - ( 14 Oct 2022 12:45 )   PT: 13.7 sec;   INR: 1.15 ratio      CARDIAC MARKERS ( 15 Oct 2022 08:00 )  x     / x     / 119 U/L / x     / 4.7 ng/mL    Urinalysis Basic - ( 14 Oct 2022 21:00 )    Color: Yellow / Appearance: Clear / S.015 / pH: x  Gluc: x / Ketone: Negative  / Bili: Negative / Urobili: Negative mg/dL   Blood: x / Protein: 100 mg/dL / Nitrite: Negative   Leuk Esterase: Trace / RBC: 6-10 /HPF / WBC 0-2   Sq Epi: x / Non Sq Epi: Few / Bacteria: Many    aspirin 325 milliGRAM(s) Oral daily  atorvastatin 80 milliGRAM(s) Oral at bedtime  chlorhexidine 2% Cloths 1 Application(s) Topical <User Schedule>  diltiazem Infusion 10 mG/Hr IV Continuous <Continuous>  gemfibrozil 600 milliGRAM(s) Oral two times a day  heparin   Injectable 8000 Unit(s) IV Push every 6 hours PRN  heparin   Injectable 4000 Unit(s) IV Push every 6 hours PRN  heparin  Infusion.  Unit(s)/Hr IV Continuous <Continuous>  insulin lispro (ADMELOG) corrective regimen sliding scale   SubCutaneous every 6 hours  montelukast 10 milliGRAM(s) Oral daily    Impression/Plan:    Known CKD 4 (Cr 3.53 - 10/6/22)  COVID positive  S/p CT w/IV dye 10/14/22  No hydro on CT A/P  Elevated troponin noted  MAR/CKD 4 in setting of above  S/p HD yesterday  Will f/u renal serologies, SPEP  Will f/u BMP, UO  Avoid nephrotoxins  Avoid hypotension  Will eval for HD needs daily  Gentle IVF while NPO    [phone]

## 2022-10-15 NOTE — PROGRESS NOTE ADULT - SUBJECTIVE AND OBJECTIVE BOX
CHIEF COMPLAINT:    Interval Events:    REVIEW OF SYSTEMS:  Constitutional: [ ] fevers [ ] chills [ ] weight loss [ ] weight gain  HEENT: [ ] dry eyes [ ] eye irritation [ ] postnasal drip [ ] nasal congestion  CV: [ ] chest pain [ ] orthopnea [ ] palpitations [ ] murmur  Resp: [ ] cough [ ] shortness of breath [ ] dyspnea [ ] wheezing [ ] sputum [ ] hemoptysis  GI: [ ] nausea [ ] vomiting [ ] diarrhea [ ] constipation [ ] abd pain [ ] dysphagia   : [ ] dysuria [ ] nocturia [ ] hematuria [ ] increased urinary frequency  Musculoskeletal: [ ] back pain [ ] myalgias [ ] arthralgias [ ] fracture  Skin: [ ] rash [ ] itch  Neurological: [ ] headache [ ] dizziness [ ] syncope [ ] weakness [ ] numbness  Hematologic/Lymphatic: [ ] anemia [ ] bleeding problem  Allergic/Immunologic: [ ] itchy eyes [ ] nasal discharge [ ] hives [ ] angioedema  [ ] All other systems negative  [ ] Unable to assess ROS because ________    OBJECTIVE:  ICU Vital Signs Last 24 Hrs  T(C): 36.9 (15 Oct 2022 04:30), Max: 37.1 (14 Oct 2022 22:05)  T(F): 98.5 (15 Oct 2022 04:30), Max: 98.7 (14 Oct 2022 22:05)  HR: 132 (15 Oct 2022 07:30) (79 - 147)  BP: 126/99 (15 Oct 2022 07:30) (72/51 - 182/155)  BP(mean): 106 (15 Oct 2022 07:30) (56 - 162)  ABP: --  ABP(mean): --  RR: 28 (15 Oct 2022 07:30) (17 - 47)  SpO2: 99% (15 Oct 2022 07:30) (98% - 100%)    O2 Parameters below as of 15 Oct 2022 06:06  Patient On (Oxygen Delivery Method): nasal cannula              10-14 @ 07:01  -  10-15 @ 07:00  --------------------------------------------------------  IN: 2987.5 mL / OUT: 1600 mL / NET: 1387.5 mL    10-15 @ 07:01  -  10-15 @ 07:48  --------------------------------------------------------  IN: 10 mL / OUT: 0 mL / NET: 10 mL      CAPILLARY BLOOD GLUCOSE      POCT Blood Glucose.: 177 mg/dL (15 Oct 2022 05:31)      PHYSICAL EXAM:  General:   HEENT:   Neck:   Respiratory:   Cardiovascular:   Abdomen:   Extremities:   Skin:   Neurological:  Psychiatry:    LINES:    HOSPITAL MEDICATIONS:  MEDICATIONS  (STANDING):  atorvastatin 80 milliGRAM(s) Oral at bedtime  chlorhexidine 2% Cloths 1 Application(s) Topical <User Schedule>  diltiazem Infusion 10 mG/Hr (10 mL/Hr) IV Continuous <Continuous>  gemfibrozil 600 milliGRAM(s) Oral two times a day  heparin   Injectable 5000 Unit(s) SubCutaneous every 12 hours  insulin lispro (ADMELOG) corrective regimen sliding scale   SubCutaneous every 6 hours  montelukast 10 milliGRAM(s) Oral daily    MEDICATIONS  (PRN):      LABS:                        7.6    13.23 )-----------( 286      ( 15 Oct 2022 02:24 )             23.5     Hgb Trend: 7.6<--, 8.8<--  10-14    138  |  109<H>  |  162<H>  ----------------------------<  117<H>  6.2<HH>   |  9<LL>  |  8.17<H>    Ca    7.7<L>      14 Oct 2022 21:00    TPro  7.0  /  Alb  2.5<L>  /  TBili  0.6  /  DBili  x   /  AST  15  /  ALT  17  /  AlkPhos  103  10-14    PT/INR - ( 14 Oct 2022 12:45 )   PT: 13.7 sec;   INR: 1.15 ratio         PTT - ( 14 Oct 2022 12:45 )  PTT:27.8 sec  Urinalysis Basic - ( 14 Oct 2022 21:00 )    Color: Yellow / Appearance: Clear / S.015 / pH: x  Gluc: x / Ketone: Negative  / Bili: Negative / Urobili: Negative mg/dL   Blood: x / Protein: 100 mg/dL / Nitrite: Negative   Leuk Esterase: Trace / RBC: 6-10 /HPF / WBC 0-2   Sq Epi: x / Non Sq Epi: Few / Bacteria: Many      Arterial Blood Gas:  10-15 @ 04:47  7.55/20/110/18/96.8/-4.0  ABG lactate: --  Arterial Blood Gas:  10-14 @ 20:50  7.18/18/116/7/96.0/-19.7  ABG lactate: --        MICROBIOLOGY:     RADIOLOGY:  [ ] Reviewed and interpreted by me CHIEF COMPLAINT:    Interval Events:  s/p HD x1  developed new onset rapid afib during HD, initially received amio and then started on cardizem gtt with improvement in HR  mental status remains poor    REVIEW OF SYSTEMS:  [x ] Unable to assess ROS because encephalopathy    OBJECTIVE:  ICU Vital Signs Last 24 Hrs  T(C): 36.9 (15 Oct 2022 04:30), Max: 37.1 (14 Oct 2022 22:05)  T(F): 98.5 (15 Oct 2022 04:30), Max: 98.7 (14 Oct 2022 22:05)  HR: 132 (15 Oct 2022 07:30) (79 - 147)  BP: 126/99 (15 Oct 2022 07:30) (72/51 - 182/155)  BP(mean): 106 (15 Oct 2022 07:30) (56 - 162)  ABP: --  ABP(mean): --  RR: 28 (15 Oct 2022 07:30) (17 - 47)  SpO2: 99% (15 Oct 2022 07:30) (98% - 100%)    O2 Parameters below as of 15 Oct 2022 06:06  Patient On (Oxygen Delivery Method): nasal cannula              10-14 @ 07:  -  10-15 @ 07:00  --------------------------------------------------------  IN: 2987.5 mL / OUT: 1600 mL / NET: 1387.5 mL    10-15 @ 07:01  -  10-15 @ 07:48  --------------------------------------------------------  IN: 10 mL / OUT: 0 mL / NET: 10 mL      CAPILLARY BLOOD GLUCOSE      POCT Blood Glucose.: 177 mg/dL (15 Oct 2022 05:31)      PHYSICAL EXAM:  General: NAD, non toxic appearing  HEENT: dry MM, EOMI  Neck: supple  Respiratory: poor inspiratory effort  Cardiovascular: s1s2 RRR  Abdomen: soft, non tender, non distended  Extremities: warm, no edema or clubbing   Skin: intact  Neurological: opens eyes to name or touch but does not focus   Psychiatry: unable to assess    LINES:  RIJ HD catheter 10/15  [facility] MEDICATIONS:  MEDICATIONS  (STANDING):  atorvastatin 80 milliGRAM(s) Oral at bedtime  chlorhexidine 2% Cloths 1 Application(s) Topical <User Schedule>  diltiazem Infusion 10 mG/Hr (10 mL/Hr) IV Continuous <Continuous>  gemfibrozil 600 milliGRAM(s) Oral two times a day  heparin   Injectable 5000 Unit(s) SubCutaneous every 12 hours  insulin lispro (ADMELOG) corrective regimen sliding scale   SubCutaneous every 6 hours  montelukast 10 milliGRAM(s) Oral daily    MEDICATIONS  (PRN):      LABS:                        7.6    13.23 )-----------( 286      ( 15 Oct 2022 02:24 )             23.5     Hgb Trend: 7.6<--, 8.8<--  1014    138  |  109<H>  |  162<H>  ----------------------------<  117<H>  6.2<HH>   |  9<LL>  |  8.17<H>    Ca    7.7<L>      14 Oct 2022 21:00    TPro  7.0  /  Alb  2.5<L>  /  TBili  0.6  /  DBili  x   /  AST  15  /  ALT  17  /  AlkPhos  103  10-14    PT/INR - ( 14 Oct 2022 12:45 )   PT: 13.7 sec;   INR: 1.15 ratio         PTT - ( 14 Oct 2022 12:45 )  PTT:27.8 sec  Urinalysis Basic - ( 14 Oct 2022 21:00 )    Color: Yellow / Appearance: Clear / S.015 / pH: x  Gluc: x / Ketone: Negative  / Bili: Negative / Urobili: Negative mg/dL   Blood: x / Protein: 100 mg/dL / Nitrite: Negative   Leuk Esterase: Trace / RBC: 6-10 /HPF / WBC 0-2   Sq Epi: x / Non Sq Epi: Few / Bacteria: Many      Arterial Blood Gas:  10-15 @ 04:47  7.55/20/110/18/96.8/-4.0  ABG lactate: --  Arterial Blood Gas:  10-14 @ 20:50  7.18/18/116/7/96.0/-19.7  AB lactate: --        MICROBIOLOGY:     RADIOLOGY:  [ ] Reviewed and interpreted by me

## 2022-10-15 NOTE — PROGRESS NOTE ADULT - ASSESSMENT
72F w/ DM, HTN, HLD, CKD. Presents w/ SOB. Found to have MAR on CKD w/ AGMA and hyperkalemia requiring urgent HD. COVID19+. Developed new onset Afib during HD.     #Neuro - remains lethargic but arousable, monitor neuro status  #CV - HD stable, BP meds on hold for now; get baseline TTE; on cardizem gtt for new onset rapid afib w/ improving HR, will convert to PO cardizem once mental status improves; start heparin gtt  #Pulm - initially was on NC but now on RA satting %  #ID- COVID19+ but suspect incidental finding, no indication to treat for now; will touch base with ID re: paxlovid vs monoclonal ab; noted leukocytosis but afebrile, will monitor off abx for now, cx pending  #Renal/metabolic - MAR on CKD, etiology unclear; s/p HD x1; renal following; monitor I/Os  #GI- keep NPO while mental status remains poor   #Heme - chronic anemia, remains at baseline; start heparin gtt for afib; get LE dopplers for elevated Ddimer and COVID19  #Endo - monitor FS w/ ISS coverage  #PPx - heparin gtt  #Dispo- remains in ICU with poor mental status s/p 1st HD session; prognosis guarded; full code for now        72F w/ DM, HTN, HLD, CKD. Presents w/ SOB. Found to have MAR on CKD w/ AGMA and hyperkalemia requiring urgent HD. COVID19+. Developed new onset Afib during HD.     #Neuro - remains lethargic but arousable, monitor neuro status  #CV - HD stable, BP meds on hold for now; get baseline TTE; on cardizem gtt for new onset rapid afib w/ improving HR, will convert to PO cardizem once mental status improves; start heparin gtt  #Pulm - initially was on NC but now on RA satting %  #ID- COVID19+ but suspect incidental finding, no indication to treat for now; will touch base with ID re: paxlovid vs monoclonal ab; noted leukocytosis but afebrile, will monitor off abx for now, cx pending  #Renal/metabolic - MAR on CKD, etiology unclear; s/p HD x1; renal following; monitor I/Os  #GI- keep NPO while mental status remains poor   #Heme - chronic anemia, remains at baseline; start heparin gtt for afib; get LE dopplers for elevated Ddimer and COVID19  #Endo - monitor FS w/ ISS coverage  #PPx - heparin gtt  #Dispo- remains in ICU with poor mental status s/p 1st HD session; prognosis guarded; full code for now       ADDENDUM 1135AM  Spoke w/ Dr Sanchez ID attending, who recommended use of monoclonal antibodies for asymptomatic COVID19. Order placed with his approval.

## 2022-10-16 LAB
ABO RH CONFIRMATION: SIGNIFICANT CHANGE UP
ANION GAP SERPL CALC-SCNC: 14 MMOL/L — SIGNIFICANT CHANGE UP (ref 5–17)
ANION GAP SERPL CALC-SCNC: 17 MMOL/L — SIGNIFICANT CHANGE UP (ref 5–17)
APTT BLD: 157.3 SEC — CRITICAL HIGH (ref 27.5–35.5)
BLD GP AB SCN SERPL QL: SIGNIFICANT CHANGE UP
BUN SERPL-MCNC: 101 MG/DL — HIGH (ref 7–23)
BUN SERPL-MCNC: 101 MG/DL — HIGH (ref 7–23)
CALCIUM SERPL-MCNC: 7 MG/DL — LOW (ref 8.5–10.1)
CALCIUM SERPL-MCNC: 7.1 MG/DL — LOW (ref 8.5–10.1)
CHLORIDE SERPL-SCNC: 104 MMOL/L — SIGNIFICANT CHANGE UP (ref 96–108)
CHLORIDE SERPL-SCNC: 105 MMOL/L — SIGNIFICANT CHANGE UP (ref 96–108)
CO2 SERPL-SCNC: 16 MMOL/L — LOW (ref 22–31)
CO2 SERPL-SCNC: 20 MMOL/L — LOW (ref 22–31)
CREAT SERPL-MCNC: 5.33 MG/DL — HIGH (ref 0.5–1.3)
CREAT SERPL-MCNC: 5.36 MG/DL — HIGH (ref 0.5–1.3)
EGFR: 8 ML/MIN/1.73M2 — LOW
EGFR: 8 ML/MIN/1.73M2 — LOW
GLUCOSE BLDC GLUCOMTR-MCNC: 147 MG/DL — HIGH (ref 70–99)
GLUCOSE BLDC GLUCOMTR-MCNC: 163 MG/DL — HIGH (ref 70–99)
GLUCOSE BLDC GLUCOMTR-MCNC: 170 MG/DL — HIGH (ref 70–99)
GLUCOSE BLDC GLUCOMTR-MCNC: 210 MG/DL — HIGH (ref 70–99)
GLUCOSE SERPL-MCNC: 154 MG/DL — HIGH (ref 70–99)
GLUCOSE SERPL-MCNC: 162 MG/DL — HIGH (ref 70–99)
HCT VFR BLD CALC: 21.7 % — LOW (ref 34.5–45)
HCT VFR BLD CALC: 25.6 % — LOW (ref 34.5–45)
HCT VFR BLD CALC: 26.2 % — LOW (ref 34.5–45)
HGB BLD-MCNC: 7.1 G/DL — LOW (ref 11.5–15.5)
HGB BLD-MCNC: 8.5 G/DL — LOW (ref 11.5–15.5)
HGB BLD-MCNC: 9 G/DL — LOW (ref 11.5–15.5)
MAGNESIUM SERPL-MCNC: 2.1 MG/DL — SIGNIFICANT CHANGE UP (ref 1.6–2.6)
MAGNESIUM SERPL-MCNC: 2.1 MG/DL — SIGNIFICANT CHANGE UP (ref 1.6–2.6)
MCHC RBC-ENTMCNC: 30.7 PG — SIGNIFICANT CHANGE UP (ref 27–34)
MCHC RBC-ENTMCNC: 30.9 PG — SIGNIFICANT CHANGE UP (ref 27–34)
MCHC RBC-ENTMCNC: 31.8 PG — SIGNIFICANT CHANGE UP (ref 27–34)
MCHC RBC-ENTMCNC: 32.7 G/DL — SIGNIFICANT CHANGE UP (ref 32–36)
MCHC RBC-ENTMCNC: 33.2 G/DL — SIGNIFICANT CHANGE UP (ref 32–36)
MCHC RBC-ENTMCNC: 34.4 G/DL — SIGNIFICANT CHANGE UP (ref 32–36)
MCV RBC AUTO: 90 FL — SIGNIFICANT CHANGE UP (ref 80–100)
MCV RBC AUTO: 92.4 FL — SIGNIFICANT CHANGE UP (ref 80–100)
MCV RBC AUTO: 97.3 FL — SIGNIFICANT CHANGE UP (ref 80–100)
NRBC # BLD: 0 /100 WBCS — SIGNIFICANT CHANGE UP (ref 0–0)
OB PNL STL: POSITIVE
PHOSPHATE SERPL-MCNC: 6.9 MG/DL — HIGH (ref 2.5–4.5)
PHOSPHATE SERPL-MCNC: 7.8 MG/DL — HIGH (ref 2.5–4.5)
PLATELET # BLD AUTO: 199 K/UL — SIGNIFICANT CHANGE UP (ref 150–400)
PLATELET # BLD AUTO: 222 K/UL — SIGNIFICANT CHANGE UP (ref 150–400)
PLATELET # BLD AUTO: 246 K/UL — SIGNIFICANT CHANGE UP (ref 150–400)
POTASSIUM SERPL-MCNC: 4.1 MMOL/L — SIGNIFICANT CHANGE UP (ref 3.5–5.3)
POTASSIUM SERPL-MCNC: 4.3 MMOL/L — SIGNIFICANT CHANGE UP (ref 3.5–5.3)
POTASSIUM SERPL-SCNC: 4.1 MMOL/L — SIGNIFICANT CHANGE UP (ref 3.5–5.3)
POTASSIUM SERPL-SCNC: 4.3 MMOL/L — SIGNIFICANT CHANGE UP (ref 3.5–5.3)
RBC # BLD: 2.23 M/UL — LOW (ref 3.8–5.2)
RBC # BLD: 2.77 M/UL — LOW (ref 3.8–5.2)
RBC # BLD: 2.91 M/UL — LOW (ref 3.8–5.2)
RBC # FLD: 13.2 % — SIGNIFICANT CHANGE UP (ref 10.3–14.5)
RBC # FLD: 17.2 % — HIGH (ref 10.3–14.5)
RBC # FLD: 19.6 % — HIGH (ref 10.3–14.5)
SODIUM SERPL-SCNC: 138 MMOL/L — SIGNIFICANT CHANGE UP (ref 135–145)
SODIUM SERPL-SCNC: 138 MMOL/L — SIGNIFICANT CHANGE UP (ref 135–145)
T3 SERPL-MCNC: 37 NG/DL — LOW (ref 80–200)
T4 AB SER-ACNC: 3.1 UG/DL — LOW (ref 4.6–12)
TSH SERPL-MCNC: 0.07 UU/ML — LOW (ref 0.36–3.74)
TSH SERPL-MCNC: 0.08 UIU/ML — LOW (ref 0.36–3.74)
TSH SERPL-MCNC: 0.09 UIU/ML — LOW (ref 0.36–3.74)
WBC # BLD: 11.4 K/UL — HIGH (ref 3.8–10.5)
WBC # BLD: 12.99 K/UL — HIGH (ref 3.8–10.5)
WBC # BLD: 14.18 K/UL — HIGH (ref 3.8–10.5)
WBC # FLD AUTO: 11.4 K/UL — HIGH (ref 3.8–10.5)
WBC # FLD AUTO: 12.99 K/UL — HIGH (ref 3.8–10.5)
WBC # FLD AUTO: 14.18 K/UL — HIGH (ref 3.8–10.5)

## 2022-10-16 PROCEDURE — 71045 X-RAY EXAM CHEST 1 VIEW: CPT | Mod: 26

## 2022-10-16 PROCEDURE — 99291 CRITICAL CARE FIRST HOUR: CPT

## 2022-10-16 RX ORDER — AMIODARONE HYDROCHLORIDE 400 MG/1
0.5 TABLET ORAL
Qty: 900 | Refills: 0 | Status: DISCONTINUED | OUTPATIENT
Start: 2022-10-16 | End: 2022-10-18

## 2022-10-16 RX ORDER — ALBUMIN HUMAN 25 %
250 VIAL (ML) INTRAVENOUS ONCE
Refills: 0 | Status: COMPLETED | OUTPATIENT
Start: 2022-10-16 | End: 2022-10-16

## 2022-10-16 RX ORDER — AMIODARONE HYDROCHLORIDE 400 MG/1
150 TABLET ORAL ONCE
Refills: 0 | Status: COMPLETED | OUTPATIENT
Start: 2022-10-16 | End: 2022-10-16

## 2022-10-16 RX ORDER — NYSTATIN CREAM 100000 [USP'U]/G
1 CREAM TOPICAL EVERY 12 HOURS
Refills: 0 | Status: DISCONTINUED | OUTPATIENT
Start: 2022-10-16 | End: 2022-10-19

## 2022-10-16 RX ORDER — ACETAMINOPHEN 500 MG
650 TABLET ORAL EVERY 6 HOURS
Refills: 0 | Status: DISCONTINUED | OUTPATIENT
Start: 2022-10-16 | End: 2022-10-19

## 2022-10-16 RX ORDER — ACETAMINOPHEN 500 MG
650 TABLET ORAL ONCE
Refills: 0 | Status: COMPLETED | OUTPATIENT
Start: 2022-10-16 | End: 2022-10-16

## 2022-10-16 RX ORDER — AMIODARONE HYDROCHLORIDE 400 MG/1
1 TABLET ORAL
Qty: 900 | Refills: 0 | Status: DISCONTINUED | OUTPATIENT
Start: 2022-10-16 | End: 2022-10-18

## 2022-10-16 RX ORDER — SODIUM CHLORIDE 9 MG/ML
1000 INJECTION, SOLUTION INTRAVENOUS ONCE
Refills: 0 | Status: COMPLETED | OUTPATIENT
Start: 2022-10-16 | End: 2022-10-16

## 2022-10-16 RX ORDER — PANTOPRAZOLE SODIUM 20 MG/1
40 TABLET, DELAYED RELEASE ORAL
Refills: 0 | Status: DISCONTINUED | OUTPATIENT
Start: 2022-10-16 | End: 2022-10-19

## 2022-10-16 RX ADMIN — MONTELUKAST 10 MILLIGRAM(S): 4 TABLET, CHEWABLE ORAL at 12:01

## 2022-10-16 RX ADMIN — AMIODARONE HYDROCHLORIDE 600 MILLIGRAM(S): 400 TABLET ORAL at 09:59

## 2022-10-16 RX ADMIN — Medication 650 MILLIGRAM(S): at 19:34

## 2022-10-16 RX ADMIN — SODIUM CHLORIDE 40 MILLILITER(S): 9 INJECTION, SOLUTION INTRAVENOUS at 00:00

## 2022-10-16 RX ADMIN — AMIODARONE HYDROCHLORIDE 33.3 MG/MIN: 400 TABLET ORAL at 19:34

## 2022-10-16 RX ADMIN — HEPARIN SODIUM 0 UNIT(S)/HR: 5000 INJECTION INTRAVENOUS; SUBCUTANEOUS at 00:10

## 2022-10-16 RX ADMIN — ATORVASTATIN CALCIUM 80 MILLIGRAM(S): 80 TABLET, FILM COATED ORAL at 21:31

## 2022-10-16 RX ADMIN — Medication 650 MILLIGRAM(S): at 20:00

## 2022-10-16 RX ADMIN — CHLORHEXIDINE GLUCONATE 1 APPLICATION(S): 213 SOLUTION TOPICAL at 04:00

## 2022-10-16 RX ADMIN — Medication 1: at 12:01

## 2022-10-16 RX ADMIN — SODIUM CHLORIDE 2000 MILLILITER(S): 9 INJECTION, SOLUTION INTRAVENOUS at 02:51

## 2022-10-16 RX ADMIN — PANTOPRAZOLE SODIUM 40 MILLIGRAM(S): 20 TABLET, DELAYED RELEASE ORAL at 17:27

## 2022-10-16 RX ADMIN — Medication 2: at 06:00

## 2022-10-16 RX ADMIN — AMIODARONE HYDROCHLORIDE 16.7 MG/MIN: 400 TABLET ORAL at 23:56

## 2022-10-16 RX ADMIN — NYSTATIN CREAM 1 APPLICATION(S): 100000 CREAM TOPICAL at 17:27

## 2022-10-16 RX ADMIN — Medication 2: at 00:00

## 2022-10-16 RX ADMIN — Medication 125 MILLILITER(S): at 05:59

## 2022-10-16 RX ADMIN — AMIODARONE HYDROCHLORIDE 33.3 MG/MIN: 400 TABLET ORAL at 17:26

## 2022-10-16 RX ADMIN — SODIUM CHLORIDE 40 MILLILITER(S): 9 INJECTION, SOLUTION INTRAVENOUS at 19:34

## 2022-10-16 RX ADMIN — Medication 10 MG/HR: at 05:59

## 2022-10-16 RX ADMIN — PANTOPRAZOLE SODIUM 40 MILLIGRAM(S): 20 TABLET, DELAYED RELEASE ORAL at 09:58

## 2022-10-16 NOTE — DIETITIAN INITIAL EVALUATION ADULT - PERTINENT LABORATORY DATA
10-16    138  |  104  |  101<H>  ----------------------------<  162<H>  4.1   |  20<L>  |  5.33<H>    Ca    7.1<L>      16 Oct 2022 02:20  Phos  6.9     10-16  Mg     2.1     10-16    TPro  6.3  /  Alb  2.2<L>  /  TBili  0.7  /  DBili  0.2  /  AST  18  /  ALT  19  /  AlkPhos  92  10-15  POCT Blood Glucose.: 170 mg/dL (10-16-22 @ 11:14)  A1C with Estimated Average Glucose Result: 6.0 % (10-15-22 @ 02:24)

## 2022-10-16 NOTE — PROGRESS NOTE ADULT - SUBJECTIVE AND OBJECTIVE BOX
CHIEF COMPLAINT:    Interval Events:    REVIEW OF SYSTEMS:  Constitutional: [ ] fevers [ ] chills [ ] weight loss [ ] weight gain  HEENT: [ ] dry eyes [ ] eye irritation [ ] postnasal drip [ ] nasal congestion  CV: [ ] chest pain [ ] orthopnea [ ] palpitations [ ] murmur  Resp: [ ] cough [ ] shortness of breath [ ] dyspnea [ ] wheezing [ ] sputum [ ] hemoptysis  GI: [ ] nausea [ ] vomiting [ ] diarrhea [ ] constipation [ ] abd pain [ ] dysphagia   : [ ] dysuria [ ] nocturia [ ] hematuria [ ] increased urinary frequency  Musculoskeletal: [ ] back pain [ ] myalgias [ ] arthralgias [ ] fracture  Skin: [ ] rash [ ] itch  Neurological: [ ] headache [ ] dizziness [ ] syncope [ ] weakness [ ] numbness  Hematologic/Lymphatic: [ ] anemia [ ] bleeding problem  Allergic/Immunologic: [ ] itchy eyes [ ] nasal discharge [ ] hives [ ] angioedema  [ ] All other systems negative  [ ] Unable to assess ROS because ________    OBJECTIVE:  ICU Vital Signs Last 24 Hrs  T(C): 37.1 (16 Oct 2022 06:00), Max: 38.3 (16 Oct 2022 00:00)  T(F): 98.7 (16 Oct 2022 06:00), Max: 100.9 (16 Oct 2022 00:00)  HR: 120 (16 Oct 2022 07:00) (80 - 133)  BP: 114/52 (16 Oct 2022 07:00) (83/53 - 152/129)  BP(mean): 60 (16 Oct 2022 07:00) (52 - 134)  ABP: --  ABP(mean): --  RR: 20 (16 Oct 2022 07:00) (18 - 39)  SpO2: 98% (16 Oct 2022 07:00) (93% - 100%)          10-15 @ 07:01  -  10-16 @ 07:00  --------------------------------------------------------  IN: 1288 mL / OUT: 600 mL / NET: 688 mL      CAPILLARY BLOOD GLUCOSE      POCT Blood Glucose.: 210 mg/dL (16 Oct 2022 05:06)      PHYSICAL EXAM:  General:   HEENT:   Neck:   Respiratory:   Cardiovascular:   Abdomen:   Extremities:   Skin:   Neurological:  Psychiatry:    LINES:    HOSPITAL MEDICATIONS:  MEDICATIONS  (STANDING):  atorvastatin 80 milliGRAM(s) Oral at bedtime  chlorhexidine 2% Cloths 1 Application(s) Topical <User Schedule>  insulin lispro (ADMELOG) corrective regimen sliding scale   SubCutaneous every 6 hours  montelukast 10 milliGRAM(s) Oral daily  nystatin Powder 1 Application(s) Topical every 12 hours  sodium chloride 0.45%. 1000 milliLiter(s) (40 mL/Hr) IV Continuous <Continuous>    MEDICATIONS  (PRN):  acetaminophen     Tablet .. 650 milliGRAM(s) Oral every 6 hours PRN Temp greater or equal to 38.5C (101.3F)      LABS:                        7.1    12.99 )-----------( 246      ( 16 Oct 2022 02:20 )             21.7     Hgb Trend: 7.1<--, 7.1<--, 7.6<--, 7.6<--, 8.8<--  10-16    138  |  104  |  101<H>  ----------------------------<  162<H>  4.1   |  20<L>  |  5.33<H>    Ca    7.1<L>      16 Oct 2022 02:20  Phos  6.9     10-16  Mg     2.1     10-16    TPro  6.3  /  Alb  2.2<L>  /  TBili  0.7  /  DBili  0.2  /  AST  18  /  ALT  19  /  AlkPhos  92  10-15    PT/INR - ( 14 Oct 2022 12:45 )   PT: 13.7 sec;   INR: 1.15 ratio         PTT - ( 15 Oct 2022 22:40 )  PTT:157.3 sec  Urinalysis Basic - ( 14 Oct 2022 21:00 )    Color: Yellow / Appearance: Clear / S.015 / pH: x  Gluc: x / Ketone: Negative  / Bili: Negative / Urobili: Negative mg/dL   Blood: x / Protein: 100 mg/dL / Nitrite: Negative   Leuk Esterase: Trace / RBC: 6-10 /HPF / WBC 0-2   Sq Epi: x / Non Sq Epi: Few / Bacteria: Many      Arterial Blood Gas:  10-15 @ 04:47  7.55/20/110/18/96.8/-4.0  ABG lactate: --  Arterial Blood Gas:  10-14 @ 20:50  7.18/18/116/7/96.0/-19.7  ABG lactate: --        MICROBIOLOGY:     RADIOLOGY:  [ ] Reviewed and interpreted by me CHIEF COMPLAINT:    Interval Events:  received mAB yesterday   Tmax 100.9  had 2 episodes of dark red BM, w/ decreased BP - heparin and cardizem stopped  2 pRBCs ordered    REVIEW OF SYSTEMS:  Constitutional: [ -] fevers [ -] chills [ ] weight loss [ ] weight gain  HEENT: [ ] dry eyes [ ] eye irritation [ ] postnasal drip [ ] nasal congestion  CV: [- ] chest pain [ -] orthopnea [ ] palpitations [ ] murmur  Resp: [- ] cough [- ] shortness of breath [- ] dyspnea [- ] wheezing [- ] sputum [ ] hemoptysis  GI: [- ] nausea [ -] vomiting [ -] diarrhea [ ] constipation [ ] abd pain [ ] dysphagia   : [ ] dysuria [ ] nocturia [ ] hematuria [ ] increased urinary frequency  Musculoskeletal: [ -] back pain [ ] myalgias [ ] arthralgias [ ] fracture  Skin: [ ] rash [ ] itch  Neurological: [- ] headache [ -] dizziness [- ] syncope [ -] weakness [ ] numbness  Hematologic/Lymphatic: [ ] anemia [ ] bleeding problem  Allergic/Immunologic: [ ] itchy eyes [ ] nasal discharge [ ] hives [ ] angioedema  [x] All other systems negative    OBJECTIVE:  ICU Vital Signs Last 24 Hrs  T(C): 37.1 (16 Oct 2022 06:00), Max: 38.3 (16 Oct 2022 00:00)  T(F): 98.7 (16 Oct 2022 06:00), Max: 100.9 (16 Oct 2022 00:00)  HR: 120 (16 Oct 2022 07:00) (80 - 133)  BP: 114/52 (16 Oct 2022 07:00) (83/53 - 152/129)  BP(mean): 60 (16 Oct 2022 07:00) (52 - 134)  ABP: --  ABP(mean): --  RR: 20 (16 Oct 2022 07:00) (18 - 39)  SpO2: 98% (16 Oct 2022 07:00) (93% - 100%)          10-15 @ 07:01  -  10-16 @ 07:00  --------------------------------------------------------  IN: 1288 mL / OUT: 600 mL / NET: 688 mL      CAPILLARY BLOOD GLUCOSE      POCT Blood Glucose.: 210 mg/dL (16 Oct 2022 05:06)      PHYSICAL EXAM:  General: NAD, non toxic appearing  HEENT: dry MM, EOMI  Neck: supple  Respiratory: poor inspiratory effort  Cardiovascular: s1s2 RRR  Abdomen: soft, non tender, non distended  Extremities: warm, no edema or clubbing   Skin: intact  Neurological: moves all extremities    Psychiatry: awake and alert today, pleasant    LINES:  RIJ HD catheter 10/15  [facility] MEDICATIONS:  MEDICATIONS  (STANDING):  atorvastatin 80 milliGRAM(s) Oral at bedtime  chlorhexidine 2% Cloths 1 Application(s) Topical <User Schedule>  insulin lispro (ADMELOG) corrective regimen sliding scale   SubCutaneous every 6 hours  montelukast 10 milliGRAM(s) Oral daily  nystatin Powder 1 Application(s) Topical every 12 hours  sodium chloride 0.45%. 1000 milliLiter(s) (40 mL/Hr) IV Continuous <Continuous>    MEDICATIONS  (PRN):  acetaminophen     Tablet .. 650 milliGRAM(s) Oral every 6 hours PRN Temp greater or equal to 38.5C (101.3F)      LABS:                        7.1    12.99 )-----------( 246      ( 16 Oct 2022 02:20 )             21.7     Hgb Trend: 7.1<--, 7.1<--, 7.6<--, 7.6<--, 8.8<--  10-16    138  |  104  |  101<H>  ----------------------------<  162<H>  4.1   |  20<L>  |  5.33<H>    Ca    7.1<L>      16 Oct 2022 02:20  Phos  6.9     10-16  Mg     2.1     10-16    TPro  6.3  /  Alb  2.2<L>  /  TBili  0.7  /  DBili  0.2  /  AST  18  /  ALT  19  /  AlkPhos  92  10-15    PT/INR - ( 14 Oct 2022 12:45 )   PT: 13.7 sec;   INR: 1.15 ratio         PTT - ( 15 Oct 2022 22:40 )  PTT:157.3 sec  Urinalysis Basic - ( 14 Oct 2022 21:00 )    Color: Yellow / Appearance: Clear / S.015 / pH: x  Gluc: x / Ketone: Negative  / Bili: Negative / Urobili: Negative mg/dL   Blood: x / Protein: 100 mg/dL / Nitrite: Negative   Leuk Esterase: Trace / RBC: 6-10 /HPF / WBC 0-2   Sq Epi: x / Non Sq Epi: Few / Bacteria: Many      Arterial Blood Gas:  10-15 @ 04:47  7.55/20/110/18/96.8/-4.0  ABG lactate: --  Arterial Blood Gas:  10-14 @ 20:50  7.18/18/116/7/96.0/-19.7  ABG lactate: --        MICROBIOLOGY:     RADIOLOGY:  [ ] Reviewed and interpreted by me

## 2022-10-16 NOTE — CHART NOTE - NSCHARTNOTEFT_GEN_A_CORE
Patient had 2 large dark red bowel movements. Hgb 7.1. Patient also now hypotensive.   Spoke with  and updated him on her condition. Blood transfusion consent obtained.   The  mentioned she has had a blood transfusion about a year ago in November.   Also about a month ago pt had a colonoscopy. He will bring in more info regarding the colonoscopy in the am.     Plan:   Heparin drip stopped.   Patient will be transfused RBC.

## 2022-10-16 NOTE — DIETITIAN INITIAL EVALUATION ADULT - DIET TYPE
If medically feasible for po diet, recommend Consistent Carbohydrate Renal (with evening snack) + food/fluid consistency as per SLP recommendations

## 2022-10-16 NOTE — DIETITIAN INITIAL EVALUATION ADULT - PERTINENT MEDS FT
MEDICATIONS  (STANDING):  atorvastatin 80 milliGRAM(s) Oral at bedtime  chlorhexidine 2% Cloths 1 Application(s) Topical <User Schedule>  insulin lispro (ADMELOG) corrective regimen sliding scale   SubCutaneous every 6 hours  montelukast 10 milliGRAM(s) Oral daily  nystatin Powder 1 Application(s) Topical every 12 hours  pantoprazole  Injectable 40 milliGRAM(s) IV Push two times a day  sodium chloride 0.45%. 1000 milliLiter(s) (40 mL/Hr) IV Continuous <Continuous>    MEDICATIONS  (PRN):  acetaminophen     Tablet .. 650 milliGRAM(s) Oral every 6 hours PRN Temp greater or equal to 38.5C (101.3F)

## 2022-10-16 NOTE — DIETITIAN INITIAL EVALUATION ADULT - ETIOLOGY
Decreased ability to consume sufficient energy related to current medical situation Increased physiological demand for protein

## 2022-10-16 NOTE — PROGRESS NOTE ADULT - ASSESSMENT
72F w/ DM, HTN, HLD, CKD. Presents w/ SOB. Found to have MAR on CKD w/ AGMA and hyperkalemia requiring urgent HD. COVID19+. Developed new onset Afib during HD - started on cardizem and heparin. Now w/ GIB.     #Neuro - more awake and alert today   #CV - relative hypotension in setting of GIB, cardizem on hold, all BP meds on hold; can give amiodarone if HR rises persistently; elevated troponins likely demand ischemia, now peaked; get baseline TTE  #Pulm - satting well on RA  #ID- COVID19+ but suspect incidental finding, no indication to treat for now s/p mAB yesterday; noted leukocytosis improving, low grade fever, continue to monitor off abx, cx pending  #Renal/metabolic - MAR on CKD, etiology unclear; s/p HD x1; may require HD today since receiving blood products vs lasix, awaiting renal for final decision; monitor I/Os and electrolytes; continue w/ gentle IVF  #GI- NPO while GIB, start protonix bid   #Heme - chronic anemia, now w/ GIB while on heparin gtt so heparin gtt stopped; 2 pRBCs to be given and will check post-transfusion CBC; LE dopplers negative  #Endo - monitor FS w/ ISS coverage; noted low TSH, will check T3, T4  #PPx - SCDs since having GIB  #Dispo- remains in ICU GIB and relative hypotension and rapid afib; prognosis guarded; full code for now

## 2022-10-16 NOTE — PROVIDER CONTACT NOTE (EICU) - SITUATION
Provider Location[facility].   Patient Location: [facility]

## 2022-10-16 NOTE — DIETITIAN INITIAL EVALUATION ADULT - OTHER INFO
Pt with PMHx DM (GmvS8s=6.0%), HTN, HLD, CKD. Presented with SOB; found to have MAR on CKD with AGMA and hyperkalemia requiring urgent HD (s/p HD 10/14 & 10/15; Nephrology evaluating need for HD daily). Pt incidentally found to be COVID19+. Also developed new onset A.fib during HD; now with GIB, for blood transfusion today (10/16). Pt confused, however more alert and oriented today, mental status appears to be improving with HD. Currently NPO x 2 days.  Prognosis guarded; full code for now.

## 2022-10-16 NOTE — PROGRESS NOTE ADULT - SUBJECTIVE AND OBJECTIVE BOX
Seen in ICU, comfortable on NC O2, awake, alert, denies CP, SOB, events noted, GIB, s/p PRBCs    Vital Signs Last 24 Hrs  T(C): 37 (10-16-22 @ 19:25), Max: 38.3 (10-16-22 @ 00:00)  T(F): 98.6 (10-16-22 @ 19:25), Max: 100.9 (10-16-22 @ 00:00)  HR: 129 (10-16-22 @ 22:30) (79 - 144)  BP: 127/75 (10-16-22 @ 22:30) (83/53 - 136/101)  BP(mean): 84 (10-16-22 @ 22:30) (52 - 110)  RR: 27 (10-16-22 @ 22:30) (19 - 39)  SpO2: 99% (10-16-22 @ 22:30) (93% - 100%)    I&O's Detail    15 Oct 2022 07:01  -  16 Oct 2022 07:00  --------------------------------------------------------  IN:    Diltiazem: 185 mL    Heparin Infusion: 240 mL    IV PiggyBack: 250 mL    PRBCs (Packed Red Blood Cells): 333 mL    sodium chloride 0.45%: 280 mL  Total IN: 1288 mL    OUT:    Indwelling Catheter - Urethral (mL): 600 mL  Total OUT: 600 mL    Total NET: 688 mL    16 Oct 2022 07:01  -  16 Oct 2022 22:49  --------------------------------------------------------  IN:    Amiodarone: 166.6 mL    PRBCs (Packed Red Blood Cells): 350 mL    sodium chloride 0.45%: 480 mL  Total IN: 996.6 mL    OUT:    Indwelling Catheter - Urethral (mL): 235 mL  Total OUT: 235 mL    Total NET: 761.6 mL    CHEST/LUNG: b/l air entry  HEART: s1s2  ABDOMEN: soft, ND, NT  EXTREMITIES: tr edema                                9.0    14.18 )-----------( 222      ( 16 Oct 2022 20:30 )             26.2     16 Oct 2022 20:30    138    |  105    |  101    ----------------------------<  154    4.3     |  16     |  5.36     Ca    7.0        16 Oct 2022 20:30  Phos  7.8       16 Oct 2022 20:30  Mg     2.1       16 Oct 2022 20:30    TPro  6.3    /  Alb  2.2    /  TBili  0.7    /  DBili  0.2    /  AST  18     /  ALT  19     /  AlkPhos  92     15 Oct 2022 08:00    LIVER FUNCTIONS - ( 15 Oct 2022 08:00 )  Alb: 2.2 g/dL / Pro: 6.3 gm/dL / ALK PHOS: 92 U/L / ALT: 19 U/L / AST: 18 U/L / GGT: x           PTT - ( 15 Oct 2022 22:40 )  PTT:157.3 sec  CAPILLARY BLOOD GLUCOSE    Culture - Blood (collected 15 Oct 2022 02:24)  Source: .Blood Blood-Peripheral  Preliminary Report (16 Oct 2022 11:00):    No growth to date.    Culture - Blood (collected 15 Oct 2022 02:24)  Source: .Blood Blood-Peripheral  Preliminary Report (16 Oct 2022 11:00):    No growth to date.    acetaminophen     Tablet .. 650 milliGRAM(s) Oral every 6 hours PRN  aMIOdarone Infusion 1 mG/Min IV Continuous <Continuous>  aMIOdarone Infusion 0.5 mG/Min IV Continuous <Continuous>  atorvastatin 80 milliGRAM(s) Oral at bedtime  chlorhexidine 2% Cloths 1 Application(s) Topical <User Schedule>  insulin lispro (ADMELOG) corrective regimen sliding scale   SubCutaneous every 6 hours  montelukast 10 milliGRAM(s) Oral daily  nystatin Powder 1 Application(s) Topical every 12 hours  pantoprazole  Injectable 40 milliGRAM(s) IV Push two times a day  sodium chloride 0.45%. 1000 milliLiter(s) IV Continuous <Continuous>    Impression/Plan:    Known CKD 4 (Cr 3.53 - 10/6/22)  COVID positive  S/p CT w/IV dye 10/14/22  No hydro on CT A/P  Elevated troponin noted  MAR/CKD 4 in setting of above  S/p HD overnight 10/14-10/15/22  Will f/u renal serologies, SPEP  Will f/u BMP, UO  GIB, s/p PRBCs  Avoid nephrotoxins  Avoid hypotension  Goal SBP > 90  Will hold off on HD today  Will eval for HD in am  Gentle IVF while NPO  D/w ICU team    [phone]

## 2022-10-17 LAB
ALBUMIN SERPL ELPH-MCNC: 2.2 G/DL — LOW (ref 3.3–5)
ALP SERPL-CCNC: 87 U/L — SIGNIFICANT CHANGE UP (ref 40–120)
ALT FLD-CCNC: 17 U/L — SIGNIFICANT CHANGE UP (ref 12–78)
ANION GAP SERPL CALC-SCNC: 17 MMOL/L — SIGNIFICANT CHANGE UP (ref 5–17)
APTT BLD: 28.7 SEC — SIGNIFICANT CHANGE UP (ref 27.5–35.5)
APTT BLD: 57.9 SEC — HIGH (ref 27.5–35.5)
AST SERPL-CCNC: 17 U/L — SIGNIFICANT CHANGE UP (ref 15–37)
BILIRUB SERPL-MCNC: 0.5 MG/DL — SIGNIFICANT CHANGE UP (ref 0.2–1.2)
BUN SERPL-MCNC: 104 MG/DL — HIGH (ref 7–23)
CALCIUM SERPL-MCNC: 7 MG/DL — LOW (ref 8.5–10.1)
CHLORIDE SERPL-SCNC: 105 MMOL/L — SIGNIFICANT CHANGE UP (ref 96–108)
CO2 SERPL-SCNC: 18 MMOL/L — LOW (ref 22–31)
CREAT SERPL-MCNC: 5.52 MG/DL — HIGH (ref 0.5–1.3)
EGFR: 8 ML/MIN/1.73M2 — LOW
GLUCOSE BLDC GLUCOMTR-MCNC: 106 MG/DL — HIGH (ref 70–99)
GLUCOSE BLDC GLUCOMTR-MCNC: 254 MG/DL — HIGH (ref 70–99)
GLUCOSE SERPL-MCNC: 121 MG/DL — HIGH (ref 70–99)
HCT VFR BLD CALC: 26 % — LOW (ref 34.5–45)
HCT VFR BLD CALC: 27.2 % — LOW (ref 34.5–45)
HGB BLD-MCNC: 8.8 G/DL — LOW (ref 11.5–15.5)
HGB BLD-MCNC: 9 G/DL — LOW (ref 11.5–15.5)
MAGNESIUM SERPL-MCNC: 2.2 MG/DL — SIGNIFICANT CHANGE UP (ref 1.6–2.6)
MCHC RBC-ENTMCNC: 30.5 PG — SIGNIFICANT CHANGE UP (ref 27–34)
MCHC RBC-ENTMCNC: 30.9 PG — SIGNIFICANT CHANGE UP (ref 27–34)
MCHC RBC-ENTMCNC: 33.1 G/DL — SIGNIFICANT CHANGE UP (ref 32–36)
MCHC RBC-ENTMCNC: 33.8 G/DL — SIGNIFICANT CHANGE UP (ref 32–36)
MCV RBC AUTO: 91.2 FL — SIGNIFICANT CHANGE UP (ref 80–100)
MCV RBC AUTO: 92.2 FL — SIGNIFICANT CHANGE UP (ref 80–100)
NRBC # BLD: 0 /100 WBCS — SIGNIFICANT CHANGE UP (ref 0–0)
NRBC # BLD: 0 /100 WBCS — SIGNIFICANT CHANGE UP (ref 0–0)
PHOSPHATE SERPL-MCNC: 7.9 MG/DL — HIGH (ref 2.5–4.5)
PLATELET # BLD AUTO: 225 K/UL — SIGNIFICANT CHANGE UP (ref 150–400)
PLATELET # BLD AUTO: 241 K/UL — SIGNIFICANT CHANGE UP (ref 150–400)
POTASSIUM SERPL-MCNC: 4.2 MMOL/L — SIGNIFICANT CHANGE UP (ref 3.5–5.3)
POTASSIUM SERPL-SCNC: 4.2 MMOL/L — SIGNIFICANT CHANGE UP (ref 3.5–5.3)
PROT SERPL-MCNC: 6 GM/DL — SIGNIFICANT CHANGE UP (ref 6–8.3)
RBC # BLD: 2.85 M/UL — LOW (ref 3.8–5.2)
RBC # BLD: 2.95 M/UL — LOW (ref 3.8–5.2)
RBC # FLD: 19.7 % — HIGH (ref 10.3–14.5)
RBC # FLD: 19.8 % — HIGH (ref 10.3–14.5)
SODIUM SERPL-SCNC: 140 MMOL/L — SIGNIFICANT CHANGE UP (ref 135–145)
WBC # BLD: 14.96 K/UL — HIGH (ref 3.8–10.5)
WBC # BLD: 15.91 K/UL — HIGH (ref 3.8–10.5)
WBC # FLD AUTO: 14.96 K/UL — HIGH (ref 3.8–10.5)
WBC # FLD AUTO: 15.91 K/UL — HIGH (ref 3.8–10.5)

## 2022-10-17 PROCEDURE — 99223 1ST HOSP IP/OBS HIGH 75: CPT | Mod: FS

## 2022-10-17 PROCEDURE — 99233 SBSQ HOSP IP/OBS HIGH 50: CPT

## 2022-10-17 RX ORDER — AMIODARONE HYDROCHLORIDE 400 MG/1
400 TABLET ORAL EVERY 8 HOURS
Refills: 0 | Status: DISCONTINUED | OUTPATIENT
Start: 2022-10-17 | End: 2022-10-19

## 2022-10-17 RX ORDER — HEPARIN SODIUM 5000 [USP'U]/ML
1400 INJECTION INTRAVENOUS; SUBCUTANEOUS
Qty: 25000 | Refills: 0 | Status: DISCONTINUED | OUTPATIENT
Start: 2022-10-17 | End: 2022-10-19

## 2022-10-17 RX ORDER — AMIODARONE HYDROCHLORIDE 400 MG/1
200 TABLET ORAL DAILY
Refills: 0 | Status: CANCELLED | OUTPATIENT
Start: 2022-10-21 | End: 2022-10-19

## 2022-10-17 RX ORDER — AMIODARONE HYDROCHLORIDE 400 MG/1
TABLET ORAL
Refills: 0 | Status: DISCONTINUED | OUTPATIENT
Start: 2022-10-17 | End: 2022-10-19

## 2022-10-17 RX ADMIN — NYSTATIN CREAM 1 APPLICATION(S): 100000 CREAM TOPICAL at 17:04

## 2022-10-17 RX ADMIN — Medication 1: at 00:04

## 2022-10-17 RX ADMIN — SODIUM CHLORIDE 40 MILLILITER(S): 9 INJECTION, SOLUTION INTRAVENOUS at 04:06

## 2022-10-17 RX ADMIN — Medication 3: at 12:52

## 2022-10-17 RX ADMIN — NYSTATIN CREAM 1 APPLICATION(S): 100000 CREAM TOPICAL at 06:06

## 2022-10-17 RX ADMIN — HEPARIN SODIUM 14 UNIT(S)/HR: 5000 INJECTION INTRAVENOUS; SUBCUTANEOUS at 19:30

## 2022-10-17 RX ADMIN — PANTOPRAZOLE SODIUM 40 MILLIGRAM(S): 20 TABLET, DELAYED RELEASE ORAL at 17:03

## 2022-10-17 RX ADMIN — HEPARIN SODIUM 14 UNIT(S)/HR: 5000 INJECTION INTRAVENOUS; SUBCUTANEOUS at 17:04

## 2022-10-17 RX ADMIN — PANTOPRAZOLE SODIUM 40 MILLIGRAM(S): 20 TABLET, DELAYED RELEASE ORAL at 05:55

## 2022-10-17 RX ADMIN — SODIUM CHLORIDE 40 MILLILITER(S): 9 INJECTION, SOLUTION INTRAVENOUS at 19:54

## 2022-10-17 RX ADMIN — MONTELUKAST 10 MILLIGRAM(S): 4 TABLET, CHEWABLE ORAL at 14:57

## 2022-10-17 RX ADMIN — AMIODARONE HYDROCHLORIDE 400 MILLIGRAM(S): 400 TABLET ORAL at 17:03

## 2022-10-17 RX ADMIN — ATORVASTATIN CALCIUM 80 MILLIGRAM(S): 80 TABLET, FILM COATED ORAL at 21:49

## 2022-10-17 RX ADMIN — CHLORHEXIDINE GLUCONATE 1 APPLICATION(S): 213 SOLUTION TOPICAL at 05:55

## 2022-10-17 NOTE — PROGRESS NOTE ADULT - SUBJECTIVE AND OBJECTIVE BOX
INTERVAL HPI/OVERNIGHT EVENTS:      CENTRAL LINE: [ ] YES [ ] NO  LOCATION:       MARTINEZ: [ ] YES [ ] NO        A-LINE:  [ ] YES [ ] NO  LOCATION:       GLOBAL ISSUE/BEST PRACTICE:  Analgesia:   Sedation:  HOB elevation: yes  Stress ulcer prophylaxis: pantoprazole  Injectable    VTE prophylaxis:   Oral Care: Chlorhexidine  Glycemic control:   Nutrition:Diet, NPO:   Except Medications (10-15-22 @ 12:37) [Active]          REVIEW OF SYSTEMS: [] Unable to obtain because:    CONSTITUTIONAL: No fever, weight loss, or fatigue  EYES: No eye pain, visual disturbances, or discharge  ENMT:  No difficulty hearing, tinnitus, vertigo; No sinus or throat pain  NECK: No pain or stiffness  RESPIRATORY: No cough, wheezing, chills or hemoptysis; No shortness of breath  CARDIOVASCULAR: No chest pain, palpitations, dizziness, or leg swelling  GASTROINTESTINAL: No abdominal or epigastric pain. No nausea, vomiting, or hematemesis; No diarrhea or constipation. No melena or hematochezia.  GENITOURINARY: No dysuria, frequency, hematuria, or incontinence  NEUROLOGICAL: No headaches, memory loss, loss of strength, numbness, or tremors  SKIN: No itching, burning, rashes, or lesions     PHYSICAL EXAM:    GENERAL: NAD, well-groomed, well-developed  HEAD:  Atraumatic, Normocephalic  EYES: EOMI, PERRLA, conjunctiva and sclera clear  ENMT: No tonsillar erythema, exudates, or enlargement; Moist mucous membranes, No lesions  NECK: Supple, No JVD, Normal thyroid  CHEST/LUNG: Clear to auscultation bilaterally; No rales, rhonchi, wheezing, or rubs  HEART: Regular rate and rhythm; No murmurs, rubs, or gallops  ABDOMEN: Soft, Nontender, Nondistended; Bowel sounds present  EXTREMITIES:  2+ Peripheral Pulses, No clubbing, cyanosis, or edema  LYMPH: No lymphadenopathy noted  SKIN: No rashes or lesions  NERVOUS SYSTEM:  Alert & Oriented X3, Good concentration; Motor Strength 5/5 B/L upper and lower extremities; DTRs 2+ intact and symmetric    ICU Vital Signs Last 24 Hrs  T(C): 36.8 (17 Oct 2022 03:39), Max: 37.6 (16 Oct 2022 15:54)  T(F): 98.2 (17 Oct 2022 03:39), Max: 99.7 (16 Oct 2022 15:54)  HR: 106 (17 Oct 2022 07:00) (79 - 144)  BP: 127/71 (17 Oct 2022 07:00) (93/52 - 136/101)  BP(mean): 86 (17 Oct 2022 07:00) (62 - 110)  ABP: --  ABP(mean): --  RR: 24 (17 Oct 2022 07:00) (18 - 28)  SpO2: 100% (17 Oct 2022 07:00) (96% - 100%)    O2 Parameters below as of 17 Oct 2022 07:00  Patient On (Oxygen Delivery Method): nasal cannula  O2 Flow (L/min): 2        I&O's Detail    16 Oct 2022 07:01  -  17 Oct 2022 07:00  --------------------------------------------------------  IN:    Amiodarone: 166.6 mL    Amiodarone: 117.4 mL    PRBCs (Packed Red Blood Cells): 350 mL    sodium chloride 0.45%: 760 mL  Total IN: 1394 mL    OUT:    Indwelling Catheter - Urethral (mL): 585 mL  Total OUT: 585 mL    Total NET: 809 mL        MEDICATIONS  NEURO  Meds: acetaminophen     Tablet .. 650 milliGRAM(s) Oral every 6 hours PRN Temp greater or equal to 38.5C (101.3F)    RESPIRATORY    Meds: montelukast 10 milliGRAM(s) Oral daily    CARDIOVASCULAR  Meds: aMIOdarone Infusion 1 mG/Min (33.3 mL/Hr) IV Continuous <Continuous>  aMIOdarone Infusion 0.5 mG/Min (16.7 mL/Hr) IV Continuous <Continuous>    GI/NUTRITION  Meds: pantoprazole  Injectable 40 milliGRAM(s) IV Push two times a day    GENITOURINARY  Meds: sodium chloride 0.45%. 1000 milliLiter(s) IV Continuous <Continuous>    HEMATOLOGIC  Meds:   [x] VTE Prophylaxis  INFECTIOUS DISEASES  Meds:   ENDOCRINE  CAPILLARY BLOOD GLUCOSE      POCT Blood Glucose.: 163 mg/dL (16 Oct 2022 23:48)  POCT Blood Glucose.: 147 mg/dL (16 Oct 2022 17:25)  POCT Blood Glucose.: 170 mg/dL (16 Oct 2022 11:14)    Meds: insulin lispro (ADMELOG) corrective regimen sliding scale   SubCutaneous every 6 hours    OTHER MEDICATIONS:  chlorhexidine 2% Cloths 1 Application(s) Topical <User Schedule>  nystatin Powder 1 Application(s) Topical every 12 hours  :    LABS:                        8.8    14.96 )-----------( 225      ( 17 Oct 2022 03:30 )             26.0      10-17    140  |  105  |  104<H>  ----------------------------<  121<H>  4.2   |  18<L>  |  5.52<H>    Ca    7.0<L>      17 Oct 2022 03:30  Phos  7.9     10-17  Mg     2.2     10-17    TPro  6.0  /  Alb  2.2<L>  /  TBili  0.5  /  DBili  x   /  AST  17  /  ALT  17  /  AlkPhos  87  10-17    PTT - ( 15 Oct 2022 22:40 )  PTT:157.3 sec    Culture Results:   No growth to date. (10-15 @ 02:24)  Culture Results:   No growth to date. (10-15 @ 02:24)            RADIOLOGY & ADDITIONAL STUDIES:     INTERVAL HPI/OVERNIGHT EVENTS:    On 2 L NC, off heparin gtt 2/2 GI bleed, now stable.     CENTRAL LINE: [x ] YES [ ] NO  LOCATION:   [facility] 10/15/22    MARTINEZ: [x] YES [ ] NO        A-LINE:  [ ] YES [ x] NO  LOCATION:       GLOBAL ISSUE/BEST PRACTICE:  Analgesia:   Sedation:  HOB elevation: yes  Stress ulcer prophylaxis: pantoprazole  Injectable  VTE prophylaxis:   Oral Care: Chlorhexidine  Glycemic control:   Nutrition:Diet, Clear liquid  Except Medications (10-15-22 @ 12:37) [Active]    REVIEW OF SYSTEMS:     CONSTITUTIONAL: No fever, weight loss, or fatigue  EYES: No eye pain, visual disturbances, or discharge  ENMT:  No difficulty hearing, tinnitus, vertigo; No sinus or throat pain  NECK: No pain or stiffness  RESPIRATORY: No cough, wheezing, chills or hemoptysis; No shortness of breath  CARDIOVASCULAR: No chest pain, palpitations, dizziness, or leg swelling  GASTROINTESTINAL: +HUNGRY, No abdominal or epigastric pain. No nausea, vomiting, or hematemesis; No diarrhea or constipation. No melena or hematochezia.  GENITOURINARY: No dysuria, frequency, hematuria, or incontinence  NEUROLOGICAL: No headaches, memory loss, loss of strength, numbness, or tremors  SKIN: No itching, burning, rashes, or lesions     PHYSICAL EXAM:    GENERAL: NAD, well-groomed, well-developed  HEAD:  Atraumatic, Normocephalic  EYES: EOMI, PERRLA, conjunctiva and sclera clear  ENMT: No tonsillar erythema, exudates, or enlargement; Moist mucous membranes, No lesions  NECK: Supple, No JVD, Normal thyroid  CHEST/LUNG: Clear to auscultation bilaterally; No rales, rhonchi, wheezing, or rubs  HEART: Regular rate and rhythm; No murmurs, rubs, or gallops  ABDOMEN: Soft, Nontender, Nondistended; Bowel sounds present  EXTREMITIES:  2+ Peripheral Pulses, No clubbing, cyanosis, or edema  NERVOUS SYSTEM:  Alert & Oriented X3, Good concentration; Motor Strength 5/5 B/L upper and lower extremities; DTRs 2+ intact and symmetric    ICU Vital Signs Last 24 Hrs  T(C): 36.8 (17 Oct 2022 03:39), Max: 37.6 (16 Oct 2022 15:54)  T(F): 98.2 (17 Oct 2022 03:39), Max: 99.7 (16 Oct 2022 15:54)  HR: 106 (17 Oct 2022 07:00) (79 - 144)  BP: 127/71 (17 Oct 2022 07:00) (93/52 - 136/101)  BP(mean): 86 (17 Oct 2022 07:00) (62 - 110)  ABP: --  ABP(mean): --  RR: 24 (17 Oct 2022 07:00) (18 - 28)  SpO2: 100% (17 Oct 2022 07:00) (96% - 100%)    O2 Parameters below as of 17 Oct 2022 07:00  Patient On (Oxygen Delivery Method): nasal cannula  O2 Flow (L/min): 2        I&O's Detail    16 Oct 2022 07:01  -  17 Oct 2022 07:00  --------------------------------------------------------  IN:    Amiodarone: 166.6 mL    Amiodarone: 117.4 mL    PRBCs (Packed Red Blood Cells): 350 mL    sodium chloride 0.45%: 760 mL  Total IN: 1394 mL    OUT:    Indwelling Catheter - Urethral (mL): 585 mL  Total OUT: 585 mL    Total NET: 809 mL        MEDICATIONS  NEURO  Meds: acetaminophen     Tablet .. 650 milliGRAM(s) Oral every 6 hours PRN Temp greater or equal to 38.5C (101.3F)    RESPIRATORY    Meds: montelukast 10 milliGRAM(s) Oral daily    CARDIOVASCULAR  Meds: aMIOdarone Infusion 1 mG/Min (33.3 mL/Hr) IV Continuous <Continuous>  aMIOdarone Infusion 0.5 mG/Min (16.7 mL/Hr) IV Continuous <Continuous>    GI/NUTRITION  Meds: pantoprazole  Injectable 40 milliGRAM(s) IV Push two times a day    GENITOURINARY  Meds: sodium chloride 0.45%. 1000 milliLiter(s) IV Continuous <Continuous>    HEMATOLOGIC  Meds:   [x] VTE Prophylaxis  INFECTIOUS DISEASES  Meds:   ENDOCRINE  CAPILLARY BLOOD GLUCOSE  POCT Blood Glucose.: 163 mg/dL (16 Oct 2022 23:48)  POCT Blood Glucose.: 147 mg/dL (16 Oct 2022 17:25)  POCT Blood Glucose.: 170 mg/dL (16 Oct 2022 11:14)    Meds: insulin lispro (ADMELOG) corrective regimen sliding scale   SubCutaneous every 6 hours    OTHER MEDICATIONS:  chlorhexidine 2% Cloths 1 Application(s) Topical <User Schedule>  nystatin Powder 1 Application(s) Topical every 12 hours  :    LABS:                        8.8    14.96 )-----------( 225      ( 17 Oct 2022 03:30 )             26.0      10-17    140  |  105  |  104<H>  ----------------------------<  121<H>  4.2   |  18<L>  |  5.52<H>    Ca    7.0<L>      17 Oct 2022 03:30  Phos  7.9     10-17  Mg     2.2     10-17    TPro  6.0  /  Alb  2.2<L>  /  TBili  0.5  /  DBili  x   /  AST  17  /  ALT  17  /  AlkPhos  87  10-17    PTT - ( 15 Oct 2022 22:40 )  PTT:157.3 sec    Culture Results:   No growth to date. (10-15 @ 02:24)  Culture Results:   No growth to date. (10-15 @ 02:24)    RADIOLOGY & ADDITIONAL STUDIES:     INTERVAL HPI/OVERNIGHT EVENTS:    On 2 L NC, off heparin gtt 2/2 GI bleed, now stable. Noted to have 2 normal bowel movements since.  Patient requesting food    CENTRAL LINE:  [x] YES [ ] NO  LOCATION:   [facility] 10/15/22    MARTINEZ: [x] YES [ ] NO        A-LINE:  [ ] YES [ x] NO  LOCATION:       GLOBAL ISSUE/BEST PRACTICE:  Analgesia:   Sedation:  HOB elevation: yes  Stress ulcer prophylaxis: pantoprazole  Injectable  VTE prophylaxis:   Oral Care: Chlorhexidine  Glycemic control:   Nutrition:Diet, Clear liquid  Except Medications (10-15-22 @ 12:37) [Active]    REVIEW OF SYSTEMS:     CONSTITUTIONAL: No fever, weight loss, or fatigue  EYES: No eye pain, visual disturbances, or discharge  ENMT:  No difficulty hearing, tinnitus, vertigo; No sinus or throat pain  NECK: No pain or stiffness  RESPIRATORY: No cough, wheezing, chills or hemoptysis; No shortness of breath  CARDIOVASCULAR: No chest pain, palpitations, dizziness, or leg swelling  GASTROINTESTINAL: +HUNGRY, No abdominal or epigastric pain. No nausea, vomiting, or hematemesis; No diarrhea or constipation. No melena or hematochezia.  GENITOURINARY: No dysuria, frequency, hematuria, or incontinence  NEUROLOGICAL: No headaches, memory loss, loss of strength, numbness, or tremors  SKIN: No itching, burning, rashes, or lesions     PHYSICAL EXAM:    GENERAL: NAD, well-groomed, well-developed  HEAD:  Atraumatic, Normocephalic  EYES: EOMI, PERRLA, conjunctiva and sclera clear  ENMT: No tonsillar erythema, exudates, or enlargement; Moist mucous membranes, No lesions  NECK: Supple, No JVD, Normal thyroid  CHEST/LUNG: Clear to auscultation bilaterally; No rales, rhonchi, wheezing, or rubs  HEART: Regular rate and rhythm; No murmurs, rubs, or gallops  ABDOMEN: Soft, Nontender, Nondistended; Bowel sounds present  EXTREMITIES:  2+ Peripheral Pulses, No clubbing, cyanosis, or edema  NERVOUS SYSTEM:  Alert & Oriented X3, Good concentration; Motor Strength 5/5 B/L upper and lower extremities; DTRs 2+ intact and symmetric    ICU Vital Signs Last 24 Hrs  T(C): 36.8 (17 Oct 2022 03:39), Max: 37.6 (16 Oct 2022 15:54)  T(F): 98.2 (17 Oct 2022 03:39), Max: 99.7 (16 Oct 2022 15:54)  HR: 106 (17 Oct 2022 07:00) (79 - 144)  BP: 127/71 (17 Oct 2022 07:00) (93/52 - 136/101)  BP(mean): 86 (17 Oct 2022 07:00) (62 - 110)  ABP: --  ABP(mean): --  RR: 24 (17 Oct 2022 07:00) (18 - 28)  SpO2: 100% (17 Oct 2022 07:00) (96% - 100%)    O2 Parameters below as of 17 Oct 2022 07:00  Patient On (Oxygen Delivery Method): nasal cannula  O2 Flow (L/min): 2        I&O's Detail    16 Oct 2022 07:01  -  17 Oct 2022 07:00  --------------------------------------------------------  IN:    Amiodarone: 166.6 mL    Amiodarone: 117.4 mL    PRBCs (Packed Red Blood Cells): 350 mL    sodium chloride 0.45%: 760 mL  Total IN: 1394 mL    OUT:    Indwelling Catheter - Urethral (mL): 585 mL  Total OUT: 585 mL    Total NET: 809 mL        MEDICATIONS  NEURO  Meds: acetaminophen     Tablet .. 650 milliGRAM(s) Oral every 6 hours PRN Temp greater or equal to 38.5C (101.3F)    RESPIRATORY    Meds: montelukast 10 milliGRAM(s) Oral daily    CARDIOVASCULAR  Meds: aMIOdarone Infusion 1 mG/Min (33.3 mL/Hr) IV Continuous <Continuous>  aMIOdarone Infusion 0.5 mG/Min (16.7 mL/Hr) IV Continuous <Continuous>    GI/NUTRITION  Meds: pantoprazole  Injectable 40 milliGRAM(s) IV Push two times a day    GENITOURINARY  Meds: sodium chloride 0.45%. 1000 milliLiter(s) IV Continuous <Continuous>    HEMATOLOGIC  Meds:   [x] VTE Prophylaxis  INFECTIOUS DISEASES  Meds:   ENDOCRINE  CAPILLARY BLOOD GLUCOSE  POCT Blood Glucose.: 163 mg/dL (16 Oct 2022 23:48)  POCT Blood Glucose.: 147 mg/dL (16 Oct 2022 17:25)  POCT Blood Glucose.: 170 mg/dL (16 Oct 2022 11:14)    Meds: insulin lispro (ADMELOG) corrective regimen sliding scale   SubCutaneous every 6 hours    OTHER MEDICATIONS:  chlorhexidine 2% Cloths 1 Application(s) Topical <User Schedule>  nystatin Powder 1 Application(s) Topical every 12 hours  :    LABS:                        8.8    14.96 )-----------( 225      ( 17 Oct 2022 03:30 )             26.0      10-17    140  |  105  |  104<H>  ----------------------------<  121<H>  4.2   |  18<L>  |  5.52<H>    Ca    7.0<L>      17 Oct 2022 03:30  Phos  7.9     10-17  Mg     2.2     10-17    TPro  6.0  /  Alb  2.2<L>  /  TBili  0.5  /  DBili  x   /  AST  17  /  ALT  17  /  AlkPhos  87  10-17    PTT - ( 15 Oct 2022 22:40 )  PTT:157.3 sec    Culture Results:   No growth to date. (10-15 @ 02:24)  Culture Results:   No growth to date. (10-15 @ 02:24)    RADIOLOGY & ADDITIONAL STUDIES:

## 2022-10-17 NOTE — CONSULT NOTE ADULT - PROBLEM SELECTOR RECOMMENDATION 9
Patient's thyroid function test most likely point towards a thyroid syndrome.  She does not have any signs and symptoms of secondary hypothyroidism that is pituitary insufficiency.  Repeat thyroid function tests in a few days when patient is clinically better.  If these labs persist then we may need anterior pituitary work-up to rule out any insufficiency.  Also total T3 and T4 are slightly more decreased than in actuality at the albumin level is very low and correspondingly thyroxine binding globulin is also be low.  There is no need to change her thyroxine binding globulin currently.  Also reports standard test to rule out sick thyroid syndrome or confirm it is reverse T3 which currently will be a cost ineffective to do the patient has most likely clinically confirm sick thyroid syndrome  Thank you for the courtesy of this consultation

## 2022-10-17 NOTE — PROGRESS NOTE ADULT - SUBJECTIVE AND OBJECTIVE BOX
[facility] NEPHROLOGY SERVICES, [facility]  NEPHROLOGY AND HYPERTENSION  [address]  [address]  [place] [address]  [phone]    MD WINIFRED WEBB, MD SARAN TONY, MD HANK MOSES, MD DORENE QUINONES, MD RICHARD HYLTON, MD ALBERTA GARCIA MD            Patient in no distress; alert and awake;  at bedside;     MEDICATIONS  (STANDING):  aMIOdarone    Tablet   Oral   aMIOdarone    Tablet 400 milliGRAM(s) Oral every 8 hours  aMIOdarone Infusion 1 mG/Min (33.3 mL/Hr) IV Continuous <Continuous>  aMIOdarone Infusion 0.5 mG/Min (16.7 mL/Hr) IV Continuous <Continuous>  atorvastatin 80 milliGRAM(s) Oral at bedtime  chlorhexidine 2% Cloths 1 Application(s) Topical <User Schedule>  insulin lispro (ADMELOG) corrective regimen sliding scale   SubCutaneous every 6 hours  montelukast 10 milliGRAM(s) Oral daily  nystatin Powder 1 Application(s) Topical every 12 hours  pantoprazole  Injectable 40 milliGRAM(s) IV Push two times a day  sodium chloride 0.45%. 1000 milliLiter(s) (40 mL/Hr) IV Continuous <Continuous>    MEDICATIONS  (PRN):  acetaminophen     Tablet .. 650 milliGRAM(s) Oral every 6 hours PRN Temp greater or equal to 38.5C (101.3F)      10-16-22 @ 07:01  -  10-17-22 @ 07:00  --------------------------------------------------------  IN: 1394 mL / OUT: 585 mL / NET: 809 mL    10-17-22 @ 07:01  -  10-17-22 @ 12:05  --------------------------------------------------------  IN: 82.1 mL / OUT: 430 mL / NET: -347.9 mL      PHYSICAL EXAM:      T(C): 36.5 (10-17-22 @ 08:00), Max: 37.6 (10-16-22 @ 15:54)  HR: 105 (10-17-22 @ 11:00) (79 - 144)  BP: 135/61 (10-17-22 @ 11:00) (93/62 - 152/118)  RR: 20 (10-17-22 @ 11:00) (18 - 28)  SpO2: 98% (10-17-22 @ 11:00) (84% - 100%)  Wt(kg): --  Lungs decreased BS at bases  Heart S1S2  Abd soft NT ND  Extremities:   1-2 edema                                    8.8    14.96 )-----------( 225      ( 17 Oct 2022 03:30 )             26.0     10-17    140  |  105  |  104<H>  ----------------------------<  121<H>  4.2   |  18<L>  |  5.52<H>    Ca    7.0<L> corrected 8.44      17 Oct 2022 03:30  Phos  7.9     10-17  Mg     2.2     10-17    TPro  6.0  /  Alb  2.2<L>  /  TBili  0.5  /  DBili  x   /  AST  17  /  ALT  17  /  AlkPhos  87  10-17      LIVER FUNCTIONS - ( 17 Oct 2022 03:30 )  Alb: 2.2 g/dL / Pro: 6.0 gm/dL / ALK PHOS: 87 U/L / ALT: 17 U/L / AST: 17 U/L / GGT: x           Creatinine Trend: 5.52<--, 5.36<--, 5.33<--, 5.06<--, 4.81<--, 4.39<--     Feb 2022;  Cr3.3    Impression:  MAR CKD 4; COVID PNA; r/o related tubular injury; requiring HD support  Lower suspicion for pulm renal disease;   Mod to severe hyperK, severe AGMA due to above  S/p CT angio of chest on 10/14. At high risk of contrast ATN      Recommendations:   HD for today, 3 hours; UF as tolerated.  Non oliguric, will follow for renal recovery;   Discussed with  at bedside.    Carl Timmons MD

## 2022-10-17 NOTE — CONSULT NOTE ADULT - SUBJECTIVE AND OBJECTIVE BOX
72F DM, HTN, HL, CKD who came in for altered mental status and worsening SOB. Found to be uremic and with AGMA requiring emergent HD. Also found to be COVID+ now s/p remdesivir. Hospital course c/b AF with RVR requiring anticoagulation.    GI consulted for bleeding that occurred early Sunday morning. While on heparin gtt, patient had two dark red bm's. Heparin gtt was held. Patient subsequently had two normal brown bm's this morning. Patient denies abdominal pain, N/V, problems with stooling. Had an attempted colonoscopy 9/8/22 that was aborted in the sigmoid due to erythema, this was biopsied - mildly active acute colitis. Diverticulosis was also found in the sigmoid. Patient reports no prior endoscopy.       PMH/PSH--  DM  HTN  HL  CKD    Allergies--  No Known Allergies    Medications--  Other: acetaminophen     Tablet .. PRN  aMIOdarone    Tablet  aMIOdarone    Tablet  aMIOdarone Infusion  aMIOdarone Infusion  atorvastatin  chlorhexidine 2% Cloths  insulin lispro (ADMELOG) corrective regimen sliding scale  montelukast  nystatin Powder  pantoprazole  Injectable  sodium chloride 0.45%.    Social History--  EtOH: denies   Tobacco: denies   Drug Use: denies     Family/Marital History--noncontributory.    Review of Systems:  A >=10-point review of systems was obtained.     Pertinent positives and negatives--  Constitutional: No fevers. No Chills. No Rigors.   Eyes: No eye pain or vision problems.   ENMT: No hearing trouble or throat pain.  Cardiovascular: No chest pain. No palpitations.  Respiratory: +shortness of breath. No cough.  Gastrointestinal: No nausea or vomiting. No diarrhea or constipation. No abdominal pain. +GI bleed.  Musculoskeletal: No joint or muscle pain.  Skin: Denies rashes or lesions.   Neurologic: +disoriented and lethargic prior  Psychiatric: Pleasant. Appropriate affect.  Endocrine: No polyuria or polydipsia  Heme/Lymphatic: +nosebleeds. No known immunologic issues.    Review of systems otherwise negative except as previously noted.    Physical Exam--  Vital Signs: T(F): 98 (10-17-22 @ 13:00), Max: 99.7 (10-16-22 @ 15:54)  HR: 94 (10-17-22 @ 14:00)  BP: 149/79 (10-17-22 @ 14:00)  RR: 30 (10-17-22 @ 14:00)  SpO2: 100% (10-17-22 @ 14:00)    General: Nontoxic-appearing, NAD. +obese.   HEENT: AT/NC. PERRL. Anicteric. Conjunctiva pink and moist.   Neck: Not rigid. No sense of mass.  Nodes: None palpable.  Lungs: Clear bilaterally without rales, wheezing or rhonchi  Heart: Regular rate and rhythm. No Murmur. No rub. No gallop.   Abdomen: Bowel sounds present and normoactive. Soft. Nondistended. Nontender. Obese.  Back: No spinal tenderness. No costovertebral angle tenderness.   Extremities: No cyanosis or clubbing. No edema.   Skin: Warm. Dry. Good turgor. No rash.  Psychiatric: Appropriate affect and mood for situation.         Laboratory & Imaging Data--  CBC                        8.8    14.96 )-----------( 225      ( 17 Oct 2022 03:30 )             26.0     hgb donny 7.1    Chemistries  10-17    140  |  105  |  104<H>  ----------------------------<  121<H>  4.2   |  18<L>  |  5.52<H>    BUN peak 164    Ca    7.0<L>      17 Oct 2022 03:30  Phos  7.9     10-17  Mg     2.2     10-17    TPro  6.0  /  Alb  2.2<L>  /  TBili  0.5  /  DBili  x   /  AST  17  /  ALT  17  /  AlkPhos  87  10-17    CTs of head, chest, and A/P noncontrast - reports all reviewed.     Impression: Two episodes of dark red blood per rectum while on heparin gtt for AF. Now off drip, s/p transfusion and bleeding has resolved. Broad ddx - could be bleeding from uremia alone, or in conjunction with any GI tract lesion, such as ulcer, angioectasia, polyp, neoplasm, etc. If bleeding recurs, will need to consider endoscopic evaluation. At the moment, she is against endoscopy and colonoscopy, without clear reasons. Have advised her that we may need to reconsider if bleeding recurs.     Recommend:   - monitor for recurrent bleeding  - trend hgb  - IV PPI 40mg BID  - no absolute GI contraindication to AC - suggest starting with heparin gtt with close monitoring of PTTs  - would give DDAVP should bleeding recur  - will consider endoscopic evaluation if patient rebleeds    Case discussed with CCU attending

## 2022-10-17 NOTE — PROGRESS NOTE ADULT - ASSESSMENT
73 yo F with DM, HTN, HLD< CKD a/w shortness of breath found to have acute on chronic kidney disease with hyperkalemia requiring urgent HD.  Pt noted to develop afib during HD was subsequently started on Cardizem and heparin drip, both stopped 2/2 hypotension and GI bleed.  Hypotension now resolved; now transitioning on amiodarone.     Neuro: No acute issues; cont with acetaminophen     Tablet .. Oral PRN Temp greater or equal to 38.5C (101.3F)  CV: Relative hypotension now resolved, given no further melena or PRBPR; cardizem on hold;   - Continue with aMIOdarone load;   - Troponins likely 2/2 demand ischemia; follow up TTE  Pulm: montelukast Oral daily  GI: GI bleed - c/w pantoprazole  Injectable IV Push two times a day; consulted GI; discussed case with GI and will hold off on colonoscopy and retrial heparin gtt now that uremia has improved; Will monitor H/H and for acute signs of bleeding.    Renal/Metabolic:  MAR on CKD, etiology unclear; s/p HD x1; may require HD today since receiving blood products vs lasix, awaiting renal for final decision; monitor I/Os and electrolytes; continue w/ gentle IVF  ID: COVID 19 likely incidental findings; s/p mAB for asymptomatic; will continue to monitor, no signs of hypoxia to indicate treatment.  Heme: GIB while on heparin gtt so heparin gtt stopped; s/p 2 pRBCs ; LE dopplers negative  - Hgb stable post transfusion and no further acute bleeding episodes  - Will retrial heparin drip   Endo: insulin lispro (ADMELOG) corrective regimen sliding scale   SubCutaneous every 6 hours  - monitor FS w/ ISS coverage;  - TSH low, with low T3 and T4; will have endo follow in the setting of acute illness, requires repeat TFTs after acute illness resolves.   Skin: chlorhexidine 2% Cloths 1 Application(s) Topical <User Schedule>; nystatin Powder 1 Application(s) Topical every 12 hours  Dispo: Stable for transfer to telemetry.

## 2022-10-17 NOTE — CONSULT NOTE ADULT - SUBJECTIVE AND OBJECTIVE BOX
Patient is a 72y old  Female who presents with a chief complaint of Acute Renal Failure. Metabolic Acidosis (18 Oct 2022 12:33)      Reason For Consult: abnormal thyroid function tests     HPI:  73yo F with PMH of DM, HTN, HLD, presents to ED with worsening dyspnea. Patient is poor historian, history provided by  at bedside.   states patient has been short of breath intermittently for the past year, denies any associated chest pain.  Reports that patient was weak earlier today uses walker at baseline and stumbled to the ground.  Denies hitting head, no anticoagulation no history of PE or DVT.  Patient also reports intermittent nosebleeds throughout the year, no recent nosebleed noted.  In ED pt found to be in severe MAR with hyperkalemia to 6.5 and AGMA with pH 7.18, bicarb 7.   ICU consulted for urgent HD.  (14 Oct 2022 22:03)  Patient is not on any thyroid medication.  Thyroid function tests done during hospitalization 0.1 sick thyroid syndrome with decreased TSH, total T3 and total T4.  Patient although not sick enough while in the ICU setting    PAST MEDICAL & SURGICAL HISTORY:      FAMILY HISTORY:        Social History:    MEDICATIONS  (STANDING):  aMIOdarone    Tablet   Oral   aMIOdarone    Tablet 400 milliGRAM(s) Oral every 8 hours  atorvastatin 80 milliGRAM(s) Oral at bedtime  chlorhexidine 2% Cloths 1 Application(s) Topical <User Schedule>  dextrose 5%. 1000 milliLiter(s) (50 mL/Hr) IV Continuous <Continuous>  dextrose 5%. 1000 milliLiter(s) (100 mL/Hr) IV Continuous <Continuous>  dextrose 50% Injectable 25 Gram(s) IV Push once  dextrose 50% Injectable 12.5 Gram(s) IV Push once  dextrose 50% Injectable 25 Gram(s) IV Push once  glucagon  Injectable 1 milliGRAM(s) IntraMuscular once  heparin  Infusion 1400 Unit(s)/Hr (14 mL/Hr) IV Continuous <Continuous>  insulin lispro (ADMELOG) corrective regimen sliding scale   SubCutaneous three times a day before meals  insulin lispro (ADMELOG) corrective regimen sliding scale   SubCutaneous at bedtime  metoprolol tartrate 25 milliGRAM(s) Oral two times a day  montelukast 10 milliGRAM(s) Oral daily  nystatin Powder 1 Application(s) Topical every 12 hours  pantoprazole  Injectable 40 milliGRAM(s) IV Push two times a day  sodium chloride 0.45%. 1000 milliLiter(s) (40 mL/Hr) IV Continuous <Continuous>    MEDICATIONS  (PRN):  acetaminophen     Tablet .. 650 milliGRAM(s) Oral every 6 hours PRN Temp greater or equal to 38.5C (101.3F)  dextrose Oral Gel 15 Gram(s) Oral once PRN Blood Glucose LESS THAN 70 milliGRAM(s)/deciliter      REVIEW OF SYSTEMS:  CONSTITUTIONAL:  as per HPI  Could not be assessed   RR: 28 (10-18-22 @ 20:00) (20 - 31)  SpO2: 99% (10-18-22 @ 20:00) (95% - 100%)  Wt(kg): --    PHYSICAL EXAM:  GENERAL: NAD, well-groomed, well-developed  as per the progress notes of Primary Team     CAPILLARY BLOOD GLUCOSE      POCT Blood Glucose.: 170 mg/dL (18 Oct 2022 17:30)  POCT Blood Glucose.: 210 mg/dL (18 Oct 2022 10:39)  POCT Blood Glucose.: 185 mg/dL (18 Oct 2022 08:07)  POCT Blood Glucose.: 184 mg/dL (18 Oct 2022 05:17)  POCT Blood Glucose.: 146 mg/dL (18 Oct 2022 00:04)                            9.5    17.44 )-----------( 239      ( 18 Oct 2022 04:30 )             28.6       CMP:  10-18 @ 04:30  SGPT 17  Albumin 2.4   Alk Phos 95   Anion Gap 13   SGOT 18   Total Bili 0.4   BUN 58   Calcium Total 7.7   CO2 23   Chloride 101   Creatinine 3.68   eGFR if AA --   eGFR if non AA --   Glucose 142   Potassium 3.7   Protein 6.4   Sodium 137      Thyroid Function Tests:  10-16 @ 20:30 TSH 0.085 FreeT4 -- T3 37 Anti TPO -- Anti Thyroglobulin Ab -- TSI --      Diabetes Tests:     Parathyroids:     Adrenals:       Radiology:

## 2022-10-18 DIAGNOSIS — E07.81 SICK-EUTHYROID SYNDROME: ICD-10-CM

## 2022-10-18 LAB
ALBUMIN SERPL ELPH-MCNC: 2.4 G/DL — LOW (ref 3.3–5)
ALP SERPL-CCNC: 95 U/L — SIGNIFICANT CHANGE UP (ref 40–120)
ALT FLD-CCNC: 17 U/L — SIGNIFICANT CHANGE UP (ref 12–78)
ANION GAP SERPL CALC-SCNC: 13 MMOL/L — SIGNIFICANT CHANGE UP (ref 5–17)
APTT BLD: 79.2 SEC — HIGH (ref 27.5–35.5)
APTT BLD: 80.3 SEC — HIGH (ref 27.5–35.5)
AST SERPL-CCNC: 18 U/L — SIGNIFICANT CHANGE UP (ref 15–37)
BASOPHILS # BLD AUTO: 0.04 K/UL — SIGNIFICANT CHANGE UP (ref 0–0.2)
BASOPHILS NFR BLD AUTO: 0.2 % — SIGNIFICANT CHANGE UP (ref 0–2)
BILIRUB SERPL-MCNC: 0.4 MG/DL — SIGNIFICANT CHANGE UP (ref 0.2–1.2)
BUN SERPL-MCNC: 58 MG/DL — HIGH (ref 7–23)
CALCIUM SERPL-MCNC: 7.7 MG/DL — LOW (ref 8.5–10.1)
CHLORIDE SERPL-SCNC: 101 MMOL/L — SIGNIFICANT CHANGE UP (ref 96–108)
CO2 SERPL-SCNC: 23 MMOL/L — SIGNIFICANT CHANGE UP (ref 22–31)
CREAT SERPL-MCNC: 3.68 MG/DL — HIGH (ref 0.5–1.3)
EGFR: 13 ML/MIN/1.73M2 — LOW
EOSINOPHIL # BLD AUTO: 0.14 K/UL — SIGNIFICANT CHANGE UP (ref 0–0.5)
EOSINOPHIL NFR BLD AUTO: 0.8 % — SIGNIFICANT CHANGE UP (ref 0–6)
GBM IGG SER-ACNC: <0.2 — SIGNIFICANT CHANGE UP (ref 0–0.9)
GLUCOSE BLDC GLUCOMTR-MCNC: 146 MG/DL — HIGH (ref 70–99)
GLUCOSE BLDC GLUCOMTR-MCNC: 170 MG/DL — HIGH (ref 70–99)
GLUCOSE BLDC GLUCOMTR-MCNC: 184 MG/DL — HIGH (ref 70–99)
GLUCOSE BLDC GLUCOMTR-MCNC: 185 MG/DL — HIGH (ref 70–99)
GLUCOSE BLDC GLUCOMTR-MCNC: 210 MG/DL — HIGH (ref 70–99)
GLUCOSE BLDC GLUCOMTR-MCNC: 212 MG/DL — HIGH (ref 70–99)
GLUCOSE SERPL-MCNC: 142 MG/DL — HIGH (ref 70–99)
HCT VFR BLD CALC: 28.6 % — LOW (ref 34.5–45)
HGB BLD-MCNC: 9.5 G/DL — LOW (ref 11.5–15.5)
IMM GRANULOCYTES NFR BLD AUTO: 3.4 % — HIGH (ref 0–0.9)
LYMPHOCYTES # BLD AUTO: 14 % — SIGNIFICANT CHANGE UP (ref 13–44)
LYMPHOCYTES # BLD AUTO: 2.44 K/UL — SIGNIFICANT CHANGE UP (ref 1–3.3)
MAGNESIUM SERPL-MCNC: 2 MG/DL — SIGNIFICANT CHANGE UP (ref 1.6–2.6)
MCHC RBC-ENTMCNC: 30.8 PG — SIGNIFICANT CHANGE UP (ref 27–34)
MCHC RBC-ENTMCNC: 33.2 G/DL — SIGNIFICANT CHANGE UP (ref 32–36)
MCV RBC AUTO: 92.9 FL — SIGNIFICANT CHANGE UP (ref 80–100)
MONOCYTES # BLD AUTO: 1.26 K/UL — HIGH (ref 0–0.9)
MONOCYTES NFR BLD AUTO: 7.2 % — SIGNIFICANT CHANGE UP (ref 2–14)
MYELOPEROXIDASE AB SER-ACNC: <5 UNITS — SIGNIFICANT CHANGE UP
MYELOPEROXIDASE CELLS FLD QL: NEGATIVE — SIGNIFICANT CHANGE UP
NEUTROPHILS # BLD AUTO: 12.96 K/UL — HIGH (ref 1.8–7.4)
NEUTROPHILS NFR BLD AUTO: 74.4 % — SIGNIFICANT CHANGE UP (ref 43–77)
NRBC # BLD: 0 /100 WBCS — SIGNIFICANT CHANGE UP (ref 0–0)
PHOSPHATE SERPL-MCNC: 4.8 MG/DL — HIGH (ref 2.5–4.5)
PLATELET # BLD AUTO: 239 K/UL — SIGNIFICANT CHANGE UP (ref 150–400)
POTASSIUM SERPL-MCNC: 3.7 MMOL/L — SIGNIFICANT CHANGE UP (ref 3.5–5.3)
POTASSIUM SERPL-SCNC: 3.7 MMOL/L — SIGNIFICANT CHANGE UP (ref 3.5–5.3)
PROT SERPL-MCNC: 6.4 GM/DL — SIGNIFICANT CHANGE UP (ref 6–8.3)
PROTEINASE3 AB FLD-ACNC: <5 UNITS — SIGNIFICANT CHANGE UP
PROTEINASE3 AB SER-ACNC: NEGATIVE — SIGNIFICANT CHANGE UP
RBC # BLD: 3.08 M/UL — LOW (ref 3.8–5.2)
RBC # FLD: 19.2 % — HIGH (ref 10.3–14.5)
SODIUM SERPL-SCNC: 137 MMOL/L — SIGNIFICANT CHANGE UP (ref 135–145)
WBC # BLD: 17.44 K/UL — HIGH (ref 3.8–10.5)
WBC # FLD AUTO: 17.44 K/UL — HIGH (ref 3.8–10.5)

## 2022-10-18 PROCEDURE — 99233 SBSQ HOSP IP/OBS HIGH 50: CPT

## 2022-10-18 PROCEDURE — 99231 SBSQ HOSP IP/OBS SF/LOW 25: CPT

## 2022-10-18 RX ORDER — DEXTROSE 50 % IN WATER 50 %
25 SYRINGE (ML) INTRAVENOUS ONCE
Refills: 0 | Status: DISCONTINUED | OUTPATIENT
Start: 2022-10-18 | End: 2022-10-19

## 2022-10-18 RX ORDER — DEXTROSE 50 % IN WATER 50 %
12.5 SYRINGE (ML) INTRAVENOUS ONCE
Refills: 0 | Status: DISCONTINUED | OUTPATIENT
Start: 2022-10-18 | End: 2022-10-19

## 2022-10-18 RX ORDER — INSULIN LISPRO 100/ML
VIAL (ML) SUBCUTANEOUS
Refills: 0 | Status: DISCONTINUED | OUTPATIENT
Start: 2022-10-18 | End: 2022-10-19

## 2022-10-18 RX ORDER — INSULIN LISPRO 100/ML
VIAL (ML) SUBCUTANEOUS AT BEDTIME
Refills: 0 | Status: DISCONTINUED | OUTPATIENT
Start: 2022-10-18 | End: 2022-10-19

## 2022-10-18 RX ORDER — METOPROLOL TARTRATE 50 MG
25 TABLET ORAL
Refills: 0 | Status: DISCONTINUED | OUTPATIENT
Start: 2022-10-18 | End: 2022-10-19

## 2022-10-18 RX ORDER — GLUCAGON INJECTION, SOLUTION 0.5 MG/.1ML
1 INJECTION, SOLUTION SUBCUTANEOUS ONCE
Refills: 0 | Status: DISCONTINUED | OUTPATIENT
Start: 2022-10-18 | End: 2022-10-19

## 2022-10-18 RX ORDER — SODIUM CHLORIDE 9 MG/ML
1000 INJECTION, SOLUTION INTRAVENOUS
Refills: 0 | Status: DISCONTINUED | OUTPATIENT
Start: 2022-10-18 | End: 2022-10-19

## 2022-10-18 RX ORDER — DEXTROSE 50 % IN WATER 50 %
15 SYRINGE (ML) INTRAVENOUS ONCE
Refills: 0 | Status: DISCONTINUED | OUTPATIENT
Start: 2022-10-18 | End: 2022-10-19

## 2022-10-18 RX ADMIN — SODIUM CHLORIDE 40 MILLILITER(S): 9 INJECTION, SOLUTION INTRAVENOUS at 02:53

## 2022-10-18 RX ADMIN — NYSTATIN CREAM 1 APPLICATION(S): 100000 CREAM TOPICAL at 05:29

## 2022-10-18 RX ADMIN — AMIODARONE HYDROCHLORIDE 400 MILLIGRAM(S): 400 TABLET ORAL at 17:33

## 2022-10-18 RX ADMIN — AMIODARONE HYDROCHLORIDE 400 MILLIGRAM(S): 400 TABLET ORAL at 11:37

## 2022-10-18 RX ADMIN — Medication 2: at 11:37

## 2022-10-18 RX ADMIN — Medication 1: at 08:18

## 2022-10-18 RX ADMIN — MONTELUKAST 10 MILLIGRAM(S): 4 TABLET, CHEWABLE ORAL at 15:06

## 2022-10-18 RX ADMIN — AMIODARONE HYDROCHLORIDE 400 MILLIGRAM(S): 400 TABLET ORAL at 02:20

## 2022-10-18 RX ADMIN — PANTOPRAZOLE SODIUM 40 MILLIGRAM(S): 20 TABLET, DELAYED RELEASE ORAL at 17:33

## 2022-10-18 RX ADMIN — PANTOPRAZOLE SODIUM 40 MILLIGRAM(S): 20 TABLET, DELAYED RELEASE ORAL at 05:33

## 2022-10-18 RX ADMIN — ATORVASTATIN CALCIUM 80 MILLIGRAM(S): 80 TABLET, FILM COATED ORAL at 22:00

## 2022-10-18 RX ADMIN — HEPARIN SODIUM 14 UNIT(S)/HR: 5000 INJECTION INTRAVENOUS; SUBCUTANEOUS at 19:09

## 2022-10-18 RX ADMIN — HEPARIN SODIUM 14 UNIT(S)/HR: 5000 INJECTION INTRAVENOUS; SUBCUTANEOUS at 22:06

## 2022-10-18 RX ADMIN — SODIUM CHLORIDE 40 MILLILITER(S): 9 INJECTION, SOLUTION INTRAVENOUS at 08:14

## 2022-10-18 RX ADMIN — Medication 1: at 17:34

## 2022-10-18 RX ADMIN — HEPARIN SODIUM 14 UNIT(S)/HR: 5000 INJECTION INTRAVENOUS; SUBCUTANEOUS at 08:15

## 2022-10-18 RX ADMIN — Medication 25 MILLIGRAM(S): at 17:33

## 2022-10-18 RX ADMIN — HEPARIN SODIUM 14 UNIT(S)/HR: 5000 INJECTION INTRAVENOUS; SUBCUTANEOUS at 07:05

## 2022-10-18 RX ADMIN — CHLORHEXIDINE GLUCONATE 1 APPLICATION(S): 213 SOLUTION TOPICAL at 05:32

## 2022-10-18 NOTE — PROGRESS NOTE ADULT - ASSESSMENT
73 yo F with DM, HTN, HLD< CKD a/w shortness of breath found to have acute on chronic kidney disease with hyperkalemia requiring urgent HD.  Pt noted to develop afib during HD was subsequently started on Cardizem and heparin drip, both stopped 2/2 hypotension and GI bleed.  Hypotension now resolved; now transitioning on amiodarone.     Neuro: No acute issues;     CV: afib-per ICu new, needs echo and cardiology if unable to get heart rate controlled will add beta blocker  as bp tolerates already on amiodarone per ICU team  .   and heparin drip resumed per GI and ICU   Pulm: montelukast Oral daily  GI: GI bleed - c/w pantoprazole  Injectable IV Push two times a day;  per GI will hold off on colonoscopy and was restarted on  heparin gtt   will monitor H/H and for acute signs of bleeding.      Renal/Metabolic:  MAR on CKD, etiology unclear; s/p HD x1 further plan of care per renal .  ID: COVID 19 likely incidental findings; s/p mAB for asymptomatic; will continue to monitor, no signs of hypoxia to indicate treatment.  Heme: GIB while on heparin gtt so heparin gtt stopped has nbeen restarted [per ICU team ; s/p 2 pRBCs ; LE dopplers negative, monitor for bleeding       Endo: insulin lispro (ADMELOG) corrective regimen sliding scale   SubCutaneous every 6 hours  - monitor FS w/ ISS coverage;  - TSH low, with low T3 and T4; will have endo follow in the setting of acute illness, requires repeat TFTs after acute illness resolves.   Skin: chlorhexidine 2% Cloths 1 Application(s) Topical <User Schedule>; nystatin Powder 1 Application(s) Topical every 12 hours    Dispo: pt eval f/u echo monitor hgb   73 yo F with DM, HTN, HLD< CKD a/w shortness of breath found to have acute on chronic kidney disease with hyperkalemia requiring urgent HD.  Pt noted to develop afib during HD was subsequently started on Cardizem and heparin drip, both stopped 2/2 hypotension and GI bleed.  Hypotension now resolved; now transitioning on amiodarone.     Neuro: No acute issues;     CV: afib-per ICu new, needs echo and cardiology if unable to get heart rate controlled will add beta blocker  as bp tolerates already on amiodarone per ICU team  .   and heparin drip resumed per GI and ICU   Pulm: montelukast Oral daily  GI: GI bleed/ acute blood loss anemia - c/w pantoprazole  Injectable IV Push two times a day;  per GI will hold off on colonoscopy and was restarted on  heparin gtt   will monitor H/H and for acute signs of bleeding.      Renal/Metabolic:  MAR on CKD, etiology unclear; s/p HD x1 further plan of care per renal .  ID: COVID 19 likely incidental findings; s/p mAB for asymptomatic; will continue to monitor, no signs of hypoxia to indicate treatment.  Heme: GIB while on heparin gtt so heparin gtt stopped has nbeen restarted [per ICU team ; s/p 2 pRBCs ; LE dopplers negative, monitor for bleeding       Endo: insulin lispro (ADMELOG) corrective regimen sliding scale   SubCutaneous every 6 hours  - monitor FS w/ ISS coverage;  - TSH low, with low T3 and T4; will have endo follow in the setting of acute illness, requires repeat TFTs after acute illness resolves.   Skin: chlorhexidine 2% Cloths 1 Application(s) Topical <User Schedule>; nystatin Powder 1 Application(s) Topical every 12 hours    Dispo: pt eval f/u echo monitor hgb

## 2022-10-18 NOTE — PROGRESS NOTE ADULT - SUBJECTIVE AND OBJECTIVE BOX
Patient is seen in the CCU  MAR on CKD-4  COVID-19 +    MEDICATIONS  (STANDING):  aMIOdarone    Tablet 400 milliGRAM(s) Oral every 8 hours  atorvastatin 80 milliGRAM(s) Oral at bedtime  heparin  Infusion 1400 Unit(s)/Hr (14 mL/Hr) IV Continuous <Continuous>  insulin lispro (ADMELOG) corrective regimen sliding scale   SubCutaneous three times a day before meals  insulin lispro (ADMELOG) corrective regimen sliding scale   SubCutaneous at bedtime  montelukast 10 milliGRAM(s) Oral daily  nystatin Powder 1 Application(s) Topical every 12 hours  pantoprazole  Injectable 40 milliGRAM(s) IV Push two times a day    MEDICATIONS  (PRN):  acetaminophen     Tablet .. 650 milliGRAM(s) Oral every 6 hours PRN Temp greater or equal to 38.5C (101.3F)  dextrose Oral Gel 15 Gram(s) Oral once PRN Blood Glucose LESS THAN 70 milliGRAM(s)/deciliter    PHYSICAL EXAM:    GENERAL: Alert and responsive  NECK: Supple, no JVD, VasCath in place  CHEST/LUNG: No rales, rhonchi, wheezing  HEART: RSR, S1/S2, no murmurs, rubs, or gallops  ABDOMEN: Soft, obese, nontender, BS+  EXTREMITIES:  no edema    LABS:                        9.5    17.44 )-----------( 239      ( 18 Oct 2022 04:30 )             28.6       10-18    137  |  101  |  58<H>  ----------------------------<  142<H>  3.7   |  23  |  3.68<H>    Ca    7.7<L>      18 Oct 2022 04:30  Phos  4.8     10-18  Mg     2.0     10-18    TPro  6.4  /  Alb  2.4<L>  /  TBili  0.4  /  DBili  x   /  AST  18  /  ALT  17  /  AlkPhos  95  10-18  Culture Results:   No growth to date. (10-15 @ 02:24)  Culture Results:   No growth to date. (10-15 @ 02:24)      Assessment and Plan:   · Assessment	  73 yo F with DM, HTN, HLD, CKD-4 with MAR, A-Fib on Amiodarone for A-Fib   Patient is hemodynamically stable  Echocardiogram is pending  Labs scheduled for the AM  Dialysis tomorrow    Ridge Greenfield MD  [facility] division of:  [facility]    (O) [phone]  (C) [phone]   Patient is seen in the CCU  MAR on CKD-4  COVID-19 +    MEDICATIONS  (STANDING):  aMIOdarone    Tablet 400 milliGRAM(s) Oral every 8 hours  atorvastatin 80 milliGRAM(s) Oral at bedtime  heparin  Infusion 1400 Unit(s)/Hr (14 mL/Hr) IV Continuous <Continuous>  insulin lispro (ADMELOG) corrective regimen sliding scale   SubCutaneous three times a day before meals  insulin lispro (ADMELOG) corrective regimen sliding scale   SubCutaneous at bedtime  montelukast 10 milliGRAM(s) Oral daily  nystatin Powder 1 Application(s) Topical every 12 hours  pantoprazole  Injectable 40 milliGRAM(s) IV Push two times a day    MEDICATIONS  (PRN):  acetaminophen     Tablet .. 650 milliGRAM(s) Oral every 6 hours PRN Temp greater or equal to 38.5C (101.3F)  dextrose Oral Gel 15 Gram(s) Oral once PRN Blood Glucose LESS THAN 70 milliGRAM(s)/deciliter    PHYSICAL EXAM:    GENERAL: Alert and responsive  NECK: Supple, no JVD, VasCath in place  CHEST/LUNG: No rales, rhonchi, wheezing  HEART: RSR, S1/S2, no murmurs, rubs, or gallops  ABDOMEN: Soft, obese, nontender, BS+  EXTREMITIES:  no edema    LABS:                        9.5    17.44 )-----------( 239      ( 18 Oct 2022 04:30 )             28.6       10-18    137  |  101  |  58<H>  ----------------------------<  142<H>  3.7   |  23  |  3.68<H>    Ca    7.7<L>      18 Oct 2022 04:30  Phos  4.8     10-18  Mg     2.0     10-18    TPro  6.4  /  Alb  2.4<L>  /  TBili  0.4  /  DBili  x   /  AST  18  /  ALT  17  /  AlkPhos  95  10-18  Culture Results:   No growth to date. (10-15 @ 02:24)  Culture Results:   No growth to date. (10-15 @ 02:24)      Assessment and Plan:   · Assessment	  73 yo F with DM, HTN, HLD, CKD-4 with MAR, A-Fib on Amiodarone for A-Fib   Patient is hemodynamically stable  Will discontinue IV fluids  Echocardiogram is pending  Labs scheduled for the AM  Dialysis tomorrow    Ridge Greenfield MD  [facility] division of:  [facility]    (O) [phone]  (C) [phone]   Patient is seen in the CCU, Nephrology care assumed  EMR reviewed, office records reviewed  MAR on CKD-4  COVID-19 +    MEDICATIONS  (STANDING):  aMIOdarone    Tablet 400 milliGRAM(s) Oral every 8 hours  atorvastatin 80 milliGRAM(s) Oral at bedtime  heparin  Infusion 1400 Unit(s)/Hr (14 mL/Hr) IV Continuous <Continuous>  insulin lispro (ADMELOG) corrective regimen sliding scale   SubCutaneous three times a day before meals  insulin lispro (ADMELOG) corrective regimen sliding scale   SubCutaneous at bedtime  montelukast 10 milliGRAM(s) Oral daily  nystatin Powder 1 Application(s) Topical every 12 hours  pantoprazole  Injectable 40 milliGRAM(s) IV Push two times a day    MEDICATIONS  (PRN):  acetaminophen     Tablet .. 650 milliGRAM(s) Oral every 6 hours PRN Temp greater or equal to 38.5C (101.3F)  dextrose Oral Gel 15 Gram(s) Oral once PRN Blood Glucose LESS THAN 70 milliGRAM(s)/deciliter    PHYSICAL EXAM:    GENERAL: Alert and responsive  NECK: Supple, no JVD, VasCath in place  CHEST/LUNG: No rales, rhonchi, wheezing  HEART: RSR, S1/S2, no murmurs, rubs, or gallops  ABDOMEN: Soft, obese, nontender, BS+  EXTREMITIES:  no edema    LABS:                        9.5    17.44 )-----------( 239      ( 18 Oct 2022 04:30 )             28.6       10-18    137  |  101  |  58<H>  ----------------------------<  142<H>  3.7   |  23  |  3.68<H>    Ca    7.7<L>      18 Oct 2022 04:30  Phos  4.8     10-18  Mg     2.0     10-18    TPro  6.4  /  Alb  2.4<L>  /  TBili  0.4  /  DBili  x   /  AST  18  /  ALT  17  /  AlkPhos  95  10-18  Culture Results:   No growth to date. (10-15 @ 02:24)  Culture Results:   No growth to date. (10-15 @ 02:24)      Assessment and Plan:   · Assessment	  71 yo F with DM, HTN, HLD, CKD-4 with MAR, A-Fib on Amiodarone for A-Fib   Patient is hemodynamically stable  Will discontinue IV fluids  Echocardiogram is pending  Labs scheduled for the AM  Dialysis tomorrow    Ridge Greenfield MD  [facility] division of:  [facility]    (O) [phone]  (C) [phone]

## 2022-10-18 NOTE — PROGRESS NOTE ADULT - SUBJECTIVE AND OBJECTIVE BOX
INTERVAL HPI/OVERNIGHT EVENTS:     xfer out of icu with gib-s/p 2 units, afib-    MEDICATIONS  (STANDING):  aMIOdarone    Tablet   Oral   aMIOdarone    Tablet 400 milliGRAM(s) Oral every 8 hours  atorvastatin 80 milliGRAM(s) Oral at bedtime  chlorhexidine 2% Cloths 1 Application(s) Topical <User Schedule>  dextrose 5%. 1000 milliLiter(s) (50 mL/Hr) IV Continuous <Continuous>  dextrose 5%. 1000 milliLiter(s) (100 mL/Hr) IV Continuous <Continuous>  dextrose 50% Injectable 25 Gram(s) IV Push once  dextrose 50% Injectable 12.5 Gram(s) IV Push once  dextrose 50% Injectable 25 Gram(s) IV Push once  glucagon  Injectable 1 milliGRAM(s) IntraMuscular once  heparin  Infusion 1400 Unit(s)/Hr (14 mL/Hr) IV Continuous <Continuous>  insulin lispro (ADMELOG) corrective regimen sliding scale   SubCutaneous three times a day before meals  insulin lispro (ADMELOG) corrective regimen sliding scale   SubCutaneous at bedtime  montelukast 10 milliGRAM(s) Oral daily  nystatin Powder 1 Application(s) Topical every 12 hours  pantoprazole  Injectable 40 milliGRAM(s) IV Push two times a day  sodium chloride 0.45%. 1000 milliLiter(s) (40 mL/Hr) IV Continuous <Continuous>    MEDICATIONS  (PRN):  acetaminophen     Tablet .. 650 milliGRAM(s) Oral every 6 hours PRN Temp greater or equal to 38.5C (101.3F)  dextrose Oral Gel 15 Gram(s) Oral once PRN Blood Glucose LESS THAN 70 milliGRAM(s)/deciliter    PHYSICAL EXAM:    GENERAL: NAD, well-groomed, well-developed  HEAD:  Atraumatic, Normocephalic  EYES: EOMI, PERRLA, conjunctiva and sclera clear  ENMT: No tonsillar erythema, exudates, or enlargement; Moist mucous membranes, No lesions  NECK: Supple,   CHEST/LUNG: Clear to auscultation bilaterally; No rales, rhonchi, wheezing, or rubs  HEART: irregular rate and rhythm; No murmurs, rubs, or gallops  ABDOMEN: Soft, Nontender, Nondistended; Bowel sounds present  EXTREMITIES:  2+ Peripheral Pulses, No clubbing, cyanosis, or edema  NERVOUS SYSTEM:  Alert & Oriented X3, Good concentration; Motor Strength 5/5 B/L upper and lower extremities; DTRs 2+ intact and symmetric    LABS:                                    9.5    17.44 )-----------( 239      ( 18 Oct 2022 04:30 )             28.6       10-18    137  |  101  |  58<H>  ----------------------------<  142<H>  3.7   |  23  |  3.68<H>    Ca    7.7<L>      18 Oct 2022 04:30  Phos  4.8     10-18  Mg     2.0     10-18    TPro  6.4  /  Alb  2.4<L>  /  TBili  0.4  /  DBili  x   /  AST  18  /  ALT  17  /  AlkPhos  95  10-18  Culture Results:   No growth to date. (10-15 @ 02:24)  Culture Results:   No growth to date. (10-15 @ 02:24)    RADIOLOGY & ADDITIONAL STUDIES:

## 2022-10-18 NOTE — PROGRESS NOTE ADULT - SUBJECTIVE AND OBJECTIVE BOX
Brief GI follow up note    Discussed with RN - patient had 3 brown bm yesterday.  Heparin gtt was resumed  No bm overnight or today thus far    T(C): 36.4 (10-18-22 @ 08:00), Max: 37.1 (10-17-22 @ 23:28)  HR: 105 (10-18-22 @ 08:00) (80 - 146)  BP: 111/85 (10-18-22 @ 08:00) (111/77 - 149/79)  RR: 28 (10-18-22 @ 08:00) (20 - 31)  SpO2: 99% (10-18-22 @ 08:00) (95% - 100%)                          9.5    17.44 )-----------( 239      ( 18 Oct 2022 04:30 )             28.6     hgb stable    10-18    137  |  101  |  58<H>  ----------------------------<  142<H>  3.7   |  23  |  3.68<H>    BUN much improved    Ca    7.7<L>      18 Oct 2022 04:30  Phos  4.8     10-18  Mg     2.0     10-18    TPro  6.4  /  Alb  2.4<L>  /  TBili  0.4  /  DBili  x   /  AST  18  /  ALT  17  /  AlkPhos  95  10-18    Impression: GI bleed in the setting of anticoagulation and uremia. Cautiously optimistic that it has resolved, as patient has resumed heparin gtt as of 5pm yesterday and has been hemodynamically stable and hgb stable, with lower BUN. Suspect that bleeding episode on Sunday early am was related to uremic platelets.     Recommend:  - advance diet to regular  - monitor bm  - trend hgb and BUN  - monitor AC carefully    Will sign off. Please call back with questions.

## 2022-10-19 ENCOUNTER — TRANSCRIPTION ENCOUNTER (OUTPATIENT)
Age: 72
End: 2022-10-19

## 2022-10-19 ENCOUNTER — INPATIENT (INPATIENT)
Facility: HOSPITAL | Age: 72
LOS: 33 days | Discharge: NURSING, REHAB OR LONG-TERM CARE | DRG: 233 | End: 2022-11-22
Attending: THORACIC SURGERY (CARDIOTHORACIC VASCULAR SURGERY) | Admitting: HOSPITALIST
Payer: MEDICARE

## 2022-10-19 VITALS
HEART RATE: 100 BPM | RESPIRATION RATE: 19 BRPM | DIASTOLIC BLOOD PRESSURE: 57 MMHG | TEMPERATURE: 98 F | SYSTOLIC BLOOD PRESSURE: 109 MMHG | OXYGEN SATURATION: 96 %

## 2022-10-19 DIAGNOSIS — I31.31 MALIGNANT PERICARDIAL EFFUSION IN DISEASES CLASSIFIED ELSEWHERE: ICD-10-CM

## 2022-10-19 LAB
ALBUMIN SERPL ELPH-MCNC: 2.3 G/DL — LOW (ref 3.3–5)
ALP SERPL-CCNC: 98 U/L — SIGNIFICANT CHANGE UP (ref 40–120)
ALT FLD-CCNC: 16 U/L — SIGNIFICANT CHANGE UP (ref 12–78)
ANION GAP SERPL CALC-SCNC: 14 MMOL/L — SIGNIFICANT CHANGE UP (ref 5–17)
APTT BLD: 93.7 SEC — HIGH (ref 27.5–35.5)
APTT BLD: 98 SEC — HIGH (ref 27.5–35.5)
AST SERPL-CCNC: 17 U/L — SIGNIFICANT CHANGE UP (ref 15–37)
BASOPHILS # BLD AUTO: 0.04 K/UL — SIGNIFICANT CHANGE UP (ref 0–0.2)
BASOPHILS NFR BLD AUTO: 0.2 % — SIGNIFICANT CHANGE UP (ref 0–2)
BILIRUB SERPL-MCNC: 0.4 MG/DL — SIGNIFICANT CHANGE UP (ref 0.2–1.2)
BUN SERPL-MCNC: 71 MG/DL — HIGH (ref 7–23)
CALCIUM SERPL-MCNC: 7.2 MG/DL — LOW (ref 8.4–10.5)
CALCIUM SERPL-MCNC: 7.3 MG/DL — LOW (ref 8.5–10.1)
CHLORIDE SERPL-SCNC: 98 MMOL/L — SIGNIFICANT CHANGE UP (ref 96–108)
CHOLEST SERPL-MCNC: 144 MG/DL — SIGNIFICANT CHANGE UP
CO2 SERPL-SCNC: 23 MMOL/L — SIGNIFICANT CHANGE UP (ref 22–31)
CREAT SERPL-MCNC: 4.89 MG/DL — HIGH (ref 0.5–1.3)
EGFR: 9 ML/MIN/1.73M2 — LOW
EOSINOPHIL # BLD AUTO: 0.15 K/UL — SIGNIFICANT CHANGE UP (ref 0–0.5)
EOSINOPHIL NFR BLD AUTO: 0.7 % — SIGNIFICANT CHANGE UP (ref 0–6)
GLUCOSE BLDC GLUCOMTR-MCNC: 168 MG/DL — HIGH (ref 70–99)
GLUCOSE BLDC GLUCOMTR-MCNC: 180 MG/DL — HIGH (ref 70–99)
GLUCOSE BLDC GLUCOMTR-MCNC: 196 MG/DL — HIGH (ref 70–99)
GLUCOSE SERPL-MCNC: 173 MG/DL — HIGH (ref 70–99)
HCT VFR BLD CALC: 26.2 % — LOW (ref 34.5–45)
HDLC SERPL-MCNC: 33 MG/DL — LOW
HGB BLD-MCNC: 8.5 G/DL — LOW (ref 11.5–15.5)
IMM GRANULOCYTES NFR BLD AUTO: 5.2 % — HIGH (ref 0–0.9)
LIPID PNL WITH DIRECT LDL SERPL: 57 MG/DL — SIGNIFICANT CHANGE UP
LYMPHOCYTES # BLD AUTO: 10.9 % — LOW (ref 13–44)
LYMPHOCYTES # BLD AUTO: 2.31 K/UL — SIGNIFICANT CHANGE UP (ref 1–3.3)
MAGNESIUM SERPL-MCNC: 2 MG/DL — SIGNIFICANT CHANGE UP (ref 1.6–2.6)
MCHC RBC-ENTMCNC: 31.1 PG — SIGNIFICANT CHANGE UP (ref 27–34)
MCHC RBC-ENTMCNC: 32.4 G/DL — SIGNIFICANT CHANGE UP (ref 32–36)
MCV RBC AUTO: 96 FL — SIGNIFICANT CHANGE UP (ref 80–100)
MONOCYTES # BLD AUTO: 1.5 K/UL — HIGH (ref 0–0.9)
MONOCYTES NFR BLD AUTO: 7.1 % — SIGNIFICANT CHANGE UP (ref 2–14)
NEUTROPHILS # BLD AUTO: 16.16 K/UL — HIGH (ref 1.8–7.4)
NEUTROPHILS NFR BLD AUTO: 75.9 % — SIGNIFICANT CHANGE UP (ref 43–77)
NON HDL CHOLESTEROL: 111 MG/DL — SIGNIFICANT CHANGE UP
NRBC # BLD: 0 /100 WBCS — SIGNIFICANT CHANGE UP (ref 0–0)
PHOSPHATE SERPL-MCNC: 6 MG/DL — HIGH (ref 2.5–4.5)
PLATELET # BLD AUTO: 251 K/UL — SIGNIFICANT CHANGE UP (ref 150–400)
POTASSIUM SERPL-MCNC: 4.3 MMOL/L — SIGNIFICANT CHANGE UP (ref 3.5–5.3)
POTASSIUM SERPL-SCNC: 4.3 MMOL/L — SIGNIFICANT CHANGE UP (ref 3.5–5.3)
PROT SERPL-MCNC: 6.4 GM/DL — SIGNIFICANT CHANGE UP (ref 6–8.3)
PTH-INTACT FLD-MCNC: 511 PG/ML — HIGH (ref 15–65)
RBC # BLD: 2.73 M/UL — LOW (ref 3.8–5.2)
RBC # FLD: 18.5 % — HIGH (ref 10.3–14.5)
SODIUM SERPL-SCNC: 135 MMOL/L — SIGNIFICANT CHANGE UP (ref 135–145)
TRIGL SERPL-MCNC: 271 MG/DL — HIGH
WBC # BLD: 21.26 K/UL — HIGH (ref 3.8–10.5)
WBC # FLD AUTO: 21.26 K/UL — HIGH (ref 3.8–10.5)

## 2022-10-19 PROCEDURE — 99223 1ST HOSP IP/OBS HIGH 75: CPT

## 2022-10-19 PROCEDURE — 99239 HOSP IP/OBS DSCHRG MGMT >30: CPT

## 2022-10-19 RX ORDER — ESOMEPRAZOLE MAGNESIUM 40 MG/1
1 CAPSULE, DELAYED RELEASE ORAL
Qty: 0 | Refills: 0 | DISCHARGE

## 2022-10-19 RX ORDER — LOSARTAN POTASSIUM 100 MG/1
1 TABLET, FILM COATED ORAL
Qty: 0 | Refills: 0 | DISCHARGE

## 2022-10-19 RX ORDER — METOPROLOL TARTRATE 50 MG
1 TABLET ORAL
Qty: 0 | Refills: 0 | DISCHARGE
Start: 2022-10-19

## 2022-10-19 RX ORDER — ZINC SULFATE TAB 220 MG (50 MG ZINC EQUIVALENT) 220 (50 ZN) MG
100 TAB ORAL
Qty: 0 | Refills: 0 | DISCHARGE

## 2022-10-19 RX ORDER — ATORVASTATIN CALCIUM 80 MG/1
1 TABLET, FILM COATED ORAL
Qty: 0 | Refills: 0 | DISCHARGE

## 2022-10-19 RX ORDER — LABETALOL HCL 100 MG
1 TABLET ORAL
Qty: 0 | Refills: 0 | DISCHARGE

## 2022-10-19 RX ORDER — CALCITRIOL 0.5 UG/1
1 CAPSULE ORAL
Qty: 0 | Refills: 0 | DISCHARGE

## 2022-10-19 RX ORDER — HEPARIN SODIUM 5000 [USP'U]/ML
1200 INJECTION INTRAVENOUS; SUBCUTANEOUS
Qty: 25000 | Refills: 0 | Status: DISCONTINUED | OUTPATIENT
Start: 2022-10-19 | End: 2022-10-19

## 2022-10-19 RX ORDER — IRON SUCROSE 20 MG/ML
100 INJECTION, SOLUTION INTRAVENOUS ONCE
Refills: 0 | Status: COMPLETED | OUTPATIENT
Start: 2022-10-19 | End: 2022-10-19

## 2022-10-19 RX ORDER — ERYTHROPOIETIN 10000 [IU]/ML
10000 INJECTION, SOLUTION INTRAVENOUS; SUBCUTANEOUS ONCE
Refills: 0 | Status: COMPLETED | OUTPATIENT
Start: 2022-10-19 | End: 2022-10-19

## 2022-10-19 RX ORDER — MELOXICAM 15 MG/1
1 TABLET ORAL
Qty: 0 | Refills: 0 | DISCHARGE

## 2022-10-19 RX ORDER — ATORVASTATIN CALCIUM 80 MG/1
1 TABLET, FILM COATED ORAL
Qty: 0 | Refills: 0 | DISCHARGE
Start: 2022-10-19

## 2022-10-19 RX ORDER — DOXERCALCIFEROL 2.5 UG/1
2 CAPSULE ORAL ONCE
Refills: 0 | Status: COMPLETED | OUTPATIENT
Start: 2022-10-19 | End: 2022-10-19

## 2022-10-19 RX ORDER — MONTELUKAST 4 MG/1
1 TABLET, CHEWABLE ORAL
Qty: 0 | Refills: 0 | DISCHARGE
Start: 2022-10-19

## 2022-10-19 RX ORDER — MONTELUKAST 4 MG/1
1 TABLET, CHEWABLE ORAL
Qty: 0 | Refills: 0 | DISCHARGE

## 2022-10-19 RX ADMIN — Medication 1: at 12:37

## 2022-10-19 RX ADMIN — PANTOPRAZOLE SODIUM 40 MILLIGRAM(S): 20 TABLET, DELAYED RELEASE ORAL at 06:11

## 2022-10-19 RX ADMIN — DOXERCALCIFEROL 2 MICROGRAM(S): 2.5 CAPSULE ORAL at 15:06

## 2022-10-19 RX ADMIN — Medication 25 MILLIGRAM(S): at 06:11

## 2022-10-19 RX ADMIN — IRON SUCROSE 100 MILLIGRAM(S): 20 INJECTION, SOLUTION INTRAVENOUS at 15:05

## 2022-10-19 RX ADMIN — Medication 1: at 08:17

## 2022-10-19 RX ADMIN — AMIODARONE HYDROCHLORIDE 400 MILLIGRAM(S): 400 TABLET ORAL at 02:15

## 2022-10-19 RX ADMIN — ERYTHROPOIETIN 10000 UNIT(S): 10000 INJECTION, SOLUTION INTRAVENOUS; SUBCUTANEOUS at 15:06

## 2022-10-19 RX ADMIN — CHLORHEXIDINE GLUCONATE 1 APPLICATION(S): 213 SOLUTION TOPICAL at 06:01

## 2022-10-19 RX ADMIN — NYSTATIN CREAM 1 APPLICATION(S): 100000 CREAM TOPICAL at 06:12

## 2022-10-19 RX ADMIN — ATORVASTATIN CALCIUM 80 MILLIGRAM(S): 80 TABLET, FILM COATED ORAL at 21:23

## 2022-10-19 RX ADMIN — HEPARIN SODIUM 14 UNIT(S)/HR: 5000 INJECTION INTRAVENOUS; SUBCUTANEOUS at 20:11

## 2022-10-19 RX ADMIN — AMIODARONE HYDROCHLORIDE 400 MILLIGRAM(S): 400 TABLET ORAL at 10:12

## 2022-10-19 RX ADMIN — HEPARIN SODIUM 14 UNIT(S)/HR: 5000 INJECTION INTRAVENOUS; SUBCUTANEOUS at 02:39

## 2022-10-19 RX ADMIN — HEPARIN SODIUM 14 UNIT(S)/HR: 5000 INJECTION INTRAVENOUS; SUBCUTANEOUS at 07:17

## 2022-10-19 RX ADMIN — HEPARIN SODIUM 14 UNIT(S)/HR: 5000 INJECTION INTRAVENOUS; SUBCUTANEOUS at 22:33

## 2022-10-19 NOTE — PROGRESS NOTE ADULT - ASSESSMENT
71 yo F with DM, HTN, HLD< CKD a/w shortness of breath found to have acute on chronic kidney disease with hyperkalemia requiring urgent HD.  Pt noted to develop afib during HD was subsequently started on Cardizem and heparin drip, both stopped 2/2 hypotension and GI bleed.  Hypotension now resolved; now transitioning on amiodarone.     Neuro: No acute issues;     CV: afib-per ICu new, needs echo and cardiology if unable to get heart rate controlled will add beta blocker  as bp tolerates already on amiodarone per ICU team  .   and heparin drip resumed per GI and ICU   Pulm: montelukast Oral daily  GI: GI bleed/ acute blood loss anemia - c/w pantoprazole  Injectable IV Push two times a day;  per GI will hold off on colonoscopy and was restarted on  heparin gtt   will monitor H/H and for acute signs of bleeding.      Renal/Metabolic:  MAR on CKD, etiology unclear; s/p HD x1 further plan of care per renal .  ID: COVID 19 likely incidental findings; s/p mAB for asymptomatic; will continue to monitor, no signs of hypoxia to indicate treatment.  Heme: GIB while on heparin gtt so heparin gtt stopped has nbeen restarted [per ICU team ; s/p 2 pRBCs ; LE dopplers negative, monitor for bleeding       Endo: insulin lispro (ADMELOG) corrective regimen sliding scale   SubCutaneous every 6 hours  - monitor FS w/ ISS coverage;  - TSH low, with low T3 and T4; will have endo follow in the setting of acute illness, requires repeat TFTs after acute illness resolves.   Skin: chlorhexidine 2% Cloths 1 Application(s) Topical <User Schedule>; nystatin Powder 1 Application(s) Topical every 12 hours    Dispo: pt eval f/u echo monitor hgb

## 2022-10-19 NOTE — PROGRESS NOTE ADULT - SUBJECTIVE AND OBJECTIVE BOX
Patient underwent dialysis today via [facility]  MAR on CKD-4  COVID-19 +  Pericardial effusion, see report below    MEDICATIONS  (STANDING):  aMIOdarone    Tablet 400 milliGRAM(s) Oral every 8 hours  atorvastatin 80 milliGRAM(s) Oral at bedtime  heparin  Infusion 1400 Unit(s)/Hr (14 mL/Hr) IV Continuous <Continuous>  insulin lispro (ADMELOG) corrective regimen sliding scale   SubCutaneous three times a day before meals  insulin lispro (ADMELOG) corrective regimen sliding scale   SubCutaneous at bedtime  montelukast 10 milliGRAM(s) Oral daily  nystatin Powder 1 Application(s) Topical every 12 hours  pantoprazole  Injectable 40 milliGRAM(s) IV Push two times a day    MEDICATIONS  (PRN):  acetaminophen     Tablet .. 650 milliGRAM(s) Oral every 6 hours PRN Temp greater or equal to 38.5C (101.3F)  dextrose Oral Gel 15 Gram(s) Oral once PRN Blood Glucose LESS THAN 70 milliGRAM(s)/deciliter    Vitals  T(C): 36.8 (10-19-22 @ 14:40), Max: 36.8 (10-19-22 @ 14:40)  HR: 65 (10-19-22 @ 14:40) (65 - 118)  BP: 122/73 (10-19-22 @ 14:40) (116/57 - 131/78)  RR: 18 (10-19-22 @ 14:40) (18 - 28)  SpO2: 96% (10-19-22 @ 14:40) (95% - 100%)    PHYSICAL EXAM:    GENERAL: Alert and responsive  NECK: Supple, no JVD, VasCath in place  CHEST/LUNG: No rales, rhonchi, wheezing  HEART: RSR, S1/S2, no murmurs, rubs, or gallops  ABDOMEN: Soft, obese, nontender, BS+  EXTREMITIES:  no edema    Labs:               8.5    21.26 )-----------( 251      ( 19 Oct 2022 11:00 )             26.2     10-19    135  |  98  |  71<H>  ----------------------------<  173<H>  4.3   |  23  |  4.89<H>    Ca    7.3<L>      19 Oct 2022 11:00  Phos  6.0     10-19  Mg     2.0     10-19    TPro  6.4  /  Alb  2.3<L>  /  TBili  0.4  /  DBili  x   /  AST  17  /  ALT  16  /  AlkPhos  98  10-19    LIVER FUNCTIONS - ( 19 Oct 2022 11:00 )  Alb: 2.3 g/dL / Pro: 6.4 gm/dL / ALK PHOS: 98 U/L / ALT: 16 U/L / AST: 17 U/L / GGT: x           PTT - ( 19 Oct 2022 11:00 )  PTT:93.7 sec    PREVIOUS DIAGNOSTIC TESTING:    [x] Echocardiogram: < from: TTE Echo Complete w/o Contrast w/ Doppler (10.18.22 @ 16:42) >  Summary:     1. Left ventricular ejection fraction, by visual estimation, is 55 to   60%.   2. Normal right ventricular size and function.   3. The left atrium is normal in size.   4. Moderate to large pericardial effusion with organized material. Early   diastolic invagination of the right atrium is present. No other   echocardiographic evidence of tamponade.   5. Moderate mitral annular calcification.   6. Aortic valve not well visualized. Mean gradient 4.0mmHg consistent   with a normally opening valve.   7. Increased relative wall thickness with normal mass index consistent   with left ventricular concentric remodeling.      Assessment and Plan:   · Assessment	  73 yo F with DM, HTN, HLD, CKD-4 with MAR, A-Fib on Amiodarone for A-Fib   s/p dialysis today, given Venofer, Retacrit and Hectorol IV  Labs reviewed, Hgb dropped to 8.5, PTH is 511 with corrected Calcium > 9 and Phos of 6.0  Patient is hemodynamically stable presently, but at high risk of tamponade on anti-coagulants  Case reviewed with Dr. Jade, patient to be transferred to [facility], Cardiothoracic service  [facility] to follow at [facility]      Ridge Greenfield MD  [facility] division of:  [facility]    (O) [phone]  (C) [phone]

## 2022-10-19 NOTE — DISCHARGE NOTE PROVIDER - ATTENDING ATTESTATION STATEMENT
06-May-2019 20:12 I have personally seen and examined the patient. I have collaborated with and supervised the

## 2022-10-19 NOTE — PROGRESS NOTE ADULT - SUBJECTIVE AND OBJECTIVE BOX
Patient is a 72y old  Female who presents with a chief complaint of Acute Renal Failure. Metabolic Acidosis (19 Oct 2022 20:13)      Interval History: thyroid function tests consistent with sick thyroid syndrome     MEDICATIONS  (STANDING):  aMIOdarone    Tablet   Oral   aMIOdarone    Tablet 400 milliGRAM(s) Oral every 8 hours  atorvastatin 80 milliGRAM(s) Oral at bedtime  chlorhexidine 2% Cloths 1 Application(s) Topical <User Schedule>  dextrose 5%. 1000 milliLiter(s) (50 mL/Hr) IV Continuous <Continuous>  dextrose 5%. 1000 milliLiter(s) (100 mL/Hr) IV Continuous <Continuous>  dextrose 50% Injectable 25 Gram(s) IV Push once  dextrose 50% Injectable 12.5 Gram(s) IV Push once  dextrose 50% Injectable 25 Gram(s) IV Push once  glucagon  Injectable 1 milliGRAM(s) IntraMuscular once  heparin  Infusion 1400 Unit(s)/Hr (14 mL/Hr) IV Continuous <Continuous>  insulin lispro (ADMELOG) corrective regimen sliding scale   SubCutaneous three times a day before meals  insulin lispro (ADMELOG) corrective regimen sliding scale   SubCutaneous at bedtime  metoprolol tartrate 25 milliGRAM(s) Oral two times a day  montelukast 10 milliGRAM(s) Oral daily  Nephro-yosef 1 Tablet(s) Oral daily  nystatin Powder 1 Application(s) Topical every 12 hours  pantoprazole  Injectable 40 milliGRAM(s) IV Push two times a day    MEDICATIONS  (PRN):  acetaminophen     Tablet .. 650 milliGRAM(s) Oral every 6 hours PRN Temp greater or equal to 38.5C (101.3F)  dextrose Oral Gel 15 Gram(s) Oral once PRN Blood Glucose LESS THAN 70 milliGRAM(s)/deciliter      Allergies    No Known Allergies    Intolerances        REVIEW OF SYSTEMS:  CONSTITUTIONAL: no changes      Vital Signs Last 24 Hrs  T(C): 36.5 (19 Oct 2022 18:10), Max: 36.8 (19 Oct 2022 14:40)  T(F): 97.7 (19 Oct 2022 18:10), Max: 98.3 (19 Oct 2022 14:40)  HR: 68 (19 Oct 2022 18:10) (65 - 118)  BP: 103/70 (19 Oct 2022 18:10) (103/70 - 131/74)  BP(mean): 72 (19 Oct 2022 00:00) (72 - 72)  RR: 16 (19 Oct 2022 18:10) (16 - 24)  SpO2: 99% (19 Oct 2022 18:10) (95% - 99%)    Parameters below as of 19 Oct 2022 18:10  Patient On (Oxygen Delivery Method): room air        PHYSICAL EXAM:  GENERAL: as per the progress notes of Primary Team   HEAD: Atraumatic, Normocephalic      LABS:    PTT - ( 19 Oct 2022 11:00 )  PTT:93.7 sec    CAPILLARY BLOOD GLUCOSE      POCT Blood Glucose.: 180 mg/dL (19 Oct 2022 12:35)  POCT Blood Glucose.: 196 mg/dL (19 Oct 2022 07:55)  POCT Blood Glucose.: 212 mg/dL (18 Oct 2022 21:32)    Lipid panel:           Thyroid:  Diabetes Tests:  Parathyroid Panel:10-19 @ 11:00 Calcium 7.2 Intact  PTH-RP --    Adrenals:  RADIOLOGY & ADDITIONAL TESTS:    Imaging Personally Reviewed:  [ ] YES  [ ] NO    Consultant(s) Notes Reviewed:  [ ] YES  [ ] NO    Care Discussed with Consultants/Other Providers [ ] YES  [ ] NO

## 2022-10-19 NOTE — DISCHARGE NOTE PROVIDER - NSCORESITESY/N_GEN_A_CORE_RD
Internal Medicine  Progress Note      Subjective  Hoarseness of voice: ENT Eval on progress  Cough with eating : Swallowing Eval appreciated         Review of Systems   Constitutional: Positive for fatigue. Negative for chills and fever.   HENT: Negative for congestion and sore throat.    Eyes: Negative for discharge and visual disturbance.   Respiratory: Positive for shortness of breath. Negative for wheezing.    Cardiovascular: Negative for chest pain.   Gastrointestinal: Negative for constipation, diarrhea, nausea and vomiting.   Genitourinary: Negative for dysuria and hematuria.   Musculoskeletal: Negative for arthralgias.        Leg pain     Skin: Negative for rash and wound.   Neurological: Negative for dizziness, weakness and headaches.   Psychiatric/Behavioral: Positive for dysphoric mood. Negative for agitation and suicidal ideas.        Objective    Current Meds  Current Facility-Administered Medications   Medication Dose Route Frequency Provider Last Rate Last Admin   • dextrose 50 % injection 25 g  25 g Intravenous PRN Marlene Novoa MD       • dextrose 50 % injection 12.5 g  12.5 g Intravenous PRN Marlene Novoa MD   12.5 g at 11/15/21 1232   • glucagon (GLUCAGEN) injection 1 mg  1 mg Intramuscular PRN Marlene Novoa MD       • dextrose (GLUTOSE) 40 % gel 15 g  15 g Oral PRN Marlene Novoa MD       • dextrose (GLUTOSE) 40 % gel 30 g  30 g Oral PRN Marlene Novoa MD       • ipratropium-albuterol (DUONEB) 0.5-2.5 (3) MG/3ML nebulizer solution 3 mL  3 mL Nebulization 4x Daily Resp Yasmin Juarez DO   3 mL at 11/17/21 0717   • calcium carbonate (TUMS) chewable tablet 500 mg  500 mg Oral Q4H PRN Julio Narayan MD   500 mg at 11/12/21 0141   • fentaNYL (SUBLIMAZE) injection 50 mcg  50 mcg Intravenous Q5 Min PRN Jeff Josue DO   50 mcg at 11/12/21 1939   • ondansetron (ZOFRAN) injection 4 mg  4 mg Intravenous BID PRN Jeff Josue DO       • prochlorperazine (COMPAZINE) injection 5 mg  5 mg Intravenous Q4H PRN Jeff  VALENTÍN Josue DO       • labetalol (NORMODYNE) injection 5 mg  5 mg Intravenous Q10 Min PRN Jeff Josue DO       • midodrine (PROAMATINE) tablet 10 mg  10 mg Oral Q8H PRN Marlene Novoa MD   10 mg at 11/17/21 0909   • sodium hypochlorite (DAKINS) 0.125 % (1/4 strength) irrigation solution   Topical BID Marlene Novoa MD   Given at 11/16/21 2140   • B complex-vitamin C-folic acid (NEPHRO-JUAN) tablet 0.8 mg  1 tablet Oral Daily Gary Cornell MD   0.8 mg at 11/17/21 0910   • sodium chloride (NORMAL SALINE) 0.9 % bolus 100-200 mL  100-200 mL Intravenous PRN Gary Cornell MD       • albumin human (SPA) 25 % injection 25 g  25 g Intravenous Once Gary Cornell MD   Held at 11/11/21 0820   • HYDROmorphone (DILAUDID) 2 MG tablet 1 mg  1 mg Oral Q6H PRN Marlene Novoa MD   1 mg at 11/16/21 2215   • acetaminophen (TYLENOL) tablet 325 mg  325 mg Oral Q6H PRN Marlene Novoa MD       • atorvastatin (LIPITOR) tablet 10 mg  10 mg Oral QHS Marlene Novoa MD   10 mg at 11/16/21 2139   • baclofen (LIORESAL) tablet 2.5 mg  2.5 mg Oral Q8H PRN Marlene Novoa MD       • buPROPion XL (WELLBUTRIN XL) tablet 150 mg  150 mg Oral Daily Marlene Novoa MD   150 mg at 11/17/21 0910   • carvedilol (COREG) tablet 3.125 mg  3.125 mg Oral Once per day on Mon Wed Fri Marlene Novoa MD   3.125 mg at 11/15/21 0939   • carvedilol (COREG) tablet 6.25 mg  6.25 mg Oral BID Marlene Novoa MD   6.25 mg at 11/16/21 2139   • docusate sodium (COLACE) capsule 100 mg  100 mg Oral BID PRN Marlene Novoa MD       • fluticasone (FLONASE) 50 MCG/ACT nasal spray 1 spray  1 spray Each Nare Daily Marlene Novoa MD   1 spray at 11/17/21 0911   • heparin (porcine) injection 5,000 Units  5,000 Units Subcutaneous Q12H Marlene Novoa MD   5,000 Units at 11/17/21 0618   • ipratropium-albuterol (DUONEB) 0.5-2.5 (3) MG/3ML nebulizer solution 3 mL  3 mL Nebulization Q4H PRN Marlene Novoa MD       • lidocaine (LIDOCARE) 4 % patch 2 patch  2 patch Transdermal QHS Marlene Novoa MD   2  Yes patch at 11/16/21 2138   • meclizine (ANTIVERT) tablet 12.5 mg  12.5 mg Oral Q8H PRN Marlene Novoa MD       • melatonin tablet 6 mg  6 mg Oral Nightly Marlene Novoa MD   6 mg at 11/16/21 2139   • midodrine (PROAMATINE) tablet 10 mg  10 mg Oral Once per day on Tue Thu Sat Marlene Novoa MD   10 mg at 11/16/21 1052   • nystatin (MYCOSTATIN) powder   Topical BID Marlene Novoa MD   Given at 11/17/21 0911   • polyethylene glycol (MIRALAX) packet 17 g  17 g Oral Daily Marlene Novoa MD   17 g at 11/17/21 0911   • polyvinyl alcohol (LIQUIFILM TEARS) 1.4 % ophthalmic solution 1 drop  1 drop Both Eyes PRN Marlene Novoa MD       • pregabalin (LYRICA) capsule 50 mg  50 mg Oral Q12H Marlene Novoa MD   50 mg at 11/17/21 0618   • senna (SENOKOT) 17.2 mg  2 tablet Oral BID Marlene Novoa MD   17.2 mg at 11/17/21 0910   • sertraline (ZOLOFT) tablet 25 mg  25 mg Oral QHS Marlene Novoa MD   25 mg at 11/16/21 2139   • sertraline (ZOLOFT) tablet 125 mg  125 mg Oral Daily Marlene Novoa MD   125 mg at 11/17/21 0910   • sevelamer carbonate (RENVELA) tablet 1,600 mg  1,600 mg Oral TID WC Marlene Novoa MD   1,600 mg at 11/17/21 0910   • zinc sulfate (ZINCATE) capsule 220 mg  220 mg Oral Daily Marlene Novoa MD   220 mg at 11/17/21 0909   • HYDROcodone-acetaminophen (NORCO)  MG per tablet 1 tablet  1 tablet Oral Q6H PRN Marlene Novoa MD   1 tablet at 11/10/21 1314   • albumin human (SPA) 25 % injection 25 g  25 g Intravenous Once Gary Cornell MD       • albumin human (SPA) 25 % injection 12.5 g  12.5 g Intravenous PRN Gary Cornell MD       • albumin human (SPA) 25 % injection 25 g  25 g Intravenous Once Gary Cornell MD            I/O's    Intake/Output Summary (Last 24 hours) at 11/17/2021 1029  Last data filed at 11/17/2021 0400  Gross per 24 hour   Intake 120 ml   Output 2002 ml   Net -1882 ml       Last Recorded Vitals    Vital Last Value 72 Hour Range   Temperature 98.1 °F (36.7 °C) (11/17/21 0856) Temp  Min: 96.6 °F (35.9 °C)   Max: 99.5 °F (37.5 °C)   Pulse 81 (11/17/21 1016) Pulse  Min: 46  Max: 89   Respiratory 16 (11/17/21 0856) Resp  Min: 16  Max: 22   Non-Invasive  Blood Pressure 90/55 (11/17/21 1016) BP  Min: 86/54  Max: 179/82   Pulse Oximetry 100 % (11/17/21 1016) SpO2  Min: 96 %  Max: 100 %   Arterial   Blood Pressure   No data recorded          Physical Exam   General: He is not in acute distress.  Appearance: He is obese. He is ill-appearing.   HENT:   Head: Normocephalic and atraumatic.   Nose: Nose normal. No congestion.   Comments: O2 NC  Mouth/Throat:   Mouth: Mucous membranes are moist.   Eyes:   Extraocular Movements: Extraocular movements intact.   Pupils: Pupils are equal, round, and reactive to light.   Cardiovascular:   Rate and Rhythm: Regular rhythm. Bradycardia present.   Pulses: Normal pulses.   Heart sounds: Normal heart sounds. No murmur heard.     Pulmonary:   Effort: No respiratory distress.   Breath sounds: Rales present. No wheezing.   Abdominal:   General: Bowel sounds are normal. There is no distension.   Palpations: Abdomen is soft. There is no mass.   Tenderness: There is no abdominal tenderness. There is no guarding.   Comments: Transverse colostomy  Suprapubic catheter   Musculoskeletal:   General: No swelling or tenderness. Normal range of motion.   Cervical back: Normal range of motion. No muscular tenderness.   Right lower leg: Edema present.   Left lower leg: Edema present.   Skin:   General: Skin is warm and dry.   Findings: No rash.   Comments: Skin breakdown to Sacral area stage 4 ulcer  Neurological:   Mental Status: He is alert and oriented to person, place, and time.   Cranial Nerves: No cranial nerve deficit.   Comments: Spina bifida   Psychiatric:   Comments: Depressed mood       Labs     Recent Labs   Lab 11/13/21  1016 11/12/21  0904 11/11/21  0450   WBC 9.7 7.2 8.2   RBC 3.82* 3.72* 3.79*   HGB 9.8* 9.5* 9.8*   HCT 34.1* 33.7* 34.5*    141 157      Recent Labs   Lab  11/17/21  0719 11/16/21  0422 11/15/21  0426   SODIUM 138 138 134*   POTASSIUM 4.3 4.9 4.7   CHLORIDE 104 102 100   CO2 28 25 28   BUN 45* 72* 62*   CREATININE 4.94* 7.26* 5.92*   CALCIUM 10.0 9.8 9.9   ALBUMIN 2.4* 2.3* 2.5*   GLUCOSE 100* 109* 112*        Microbiology Results     None          Imaging    FL VIDEO SWALLOW   Final Result   Aspiration of the thin and nectar thick barium.      Electronically Signed by: ALICE HERMAN M.D.    Signed on: 11/15/2021 11:04 AM          FL UROGRAM RETROGRADE   Final Result   1. Intraprocedure fluoroscopy provided for localization purposes. Correlate   with procedure note and intraprocedural finding.      Electronically Signed by: YUMIKO PEREZ M.D.    Signed on: 11/12/2021 7:46 PM          XR CHEST PA OR AP 1 VIEW   Final Result   Resolving pulmonary opacities      Electronically Signed by: ALIEC HERMAN M.D.    Signed on: 11/11/2021 6:05 PM          CT ABDOMEN PELVIS WO CONTRAST   Final Result       Increasing right-sided hydronephrosis with increasing stranding and fluid   density in the perinephric fat.  The RIGHT ureter is dilated to the level   of the bladder.       Circumferential thickening of the distal rectosigmoid may indicate some   colitis.       Decubitus ulcers.  Unchanged..      Electronically Signed by: MEL REYES M.D.    Signed on: 11/8/2021 11:06 PM          XR CHEST PA OR AP 1 VIEW   Final Result   1.   Limited exam.     2.   New cardiomediastinal silhouette enlargement.  Congestive heart   failure is possible.   3.   Small loculated right pleural effusion with underlying atelectasis,   also present previously.      Electronically Signed by: ANKIT TRINH MD    Signed on: 11/8/2021 3:43 PM                 Assessment and Plan  Hoarseness of voice  Cough with eating   ESRD   Hypotension with dialysis   HYPERKALEMIA  ACUTE PULMONARY EDEMA  SEPSIS : resolved , No further abx  CAUTI, POA  DECUBITUS ULCER : Coccyx pressure ulcer, POA; Bilateral  buttocks pressure ulcer, POA  Morbid obesity   Obstructive sleep apnea : pulmonary consulted  ?Urothelial tumor : Urology On consult, S/P Cystoscopy and biopsy.  Depression   Leg Pain :   PLAN   ENT eval: left vocal cord paralysis, ENT following  Speech and swallowing EVal.:   Recommend general diet with honey thick liquids.   -Take small bites and sips at a slow rate.   -Repeat Videofluoroscopic Swallow Study in ~1 week to determine safety for diet advancement due to silent aspiration noted durng this study.   -Speech therapy will follow-up to ensure diet tolerance.     HD   No further abx as per ID  Wound care   O2   Zoloft   Midodrine PRN   pain service  Type 2 NSTMI  Discussed with ID and urology and patient  S/P Cystoscopy and biopsy.Results   Urine cystoscopy:   -Negative for malignancy.  -Urothelial cells, squamous cells, lymphocytes and neutrophils      DVT Prophylaxis  heparin (porcine) - 5000 UNIT/ML, 5000 UNIT/ML     Code Status  Code Status Information       Code Status    full             PCP  Marlene Novoa MD    The  time of the note does not reflect exact time patient seen  Discussed with the patient and Urology  Marlene Novoa MD

## 2022-10-19 NOTE — CONSULT NOTE ADULT - ASSESSMENT
pericardial effusion with organized material in the setting of acute on chronic CKD, suspicious for possible coagulum in the pericardial space  clinically without tamponade and her bleeding diathesis is now controlled via HD pericardial effusion with organized material in the setting of acute on chronic CKD, suspicious for possible coagulum in the pericardial space  clinically without tamponade and her bleeding diathesis is now controlled via HD  She has a hx of uterine cancer s/p hysterectomy ~ 10 years ago. no chemo or radiation    In order to safely continue anticoagulation in the setting of atrial fibrillation and given he hx of cancer (though suspect her pericardial effusion is more likely 2/2 uremia), would recommend a diagnostic/therapeutic pericardiocentesis

## 2022-10-19 NOTE — CONSULT NOTE ADULT - REASON FOR ADMISSION
Acute Renal Failure. Metabolic Acidosis

## 2022-10-19 NOTE — CONSULT NOTE ADULT - SUBJECTIVE AND OBJECTIVE BOX
Cardiology Initial Consult    [facility]iology at [place]  [address] [address], [place] [address]  Office: [phone]  Fax: [phone]    CHIEF COMPLAINT:      HISTORY OF PRESENT ILLNESS:  72F HTN, HLD, DM, CKD who presented initiallu with MAR on CKD with acute uremia and metabolic derangements requiring urgent HD and was COVID-19 positive. During HD went into atrial fibrillation and started on HD, subsequently developing GIB thought 2/2 AC and acute uremia. Also on amiodarone for Afib.    On TTE noted to have a moderate to large pericardial effusion with early echocardiographic evidence of tamponade.  Clinically she is not hypotensive and she tolerates the fluid shifts related to HD.      Allergies  No Known Allergies  	    MEDICATIONS:  aMIOdarone    Tablet   Oral   aMIOdarone    Tablet 400 milliGRAM(s) Oral every 8 hours  heparin  Infusion 1400 Unit(s)/Hr IV Continuous <Continuous>  metoprolol tartrate 25 milliGRAM(s) Oral two times a day  montelukast 10 milliGRAM(s) Oral daily  acetaminophen     Tablet .. 650 milliGRAM(s) Oral every 6 hours PRN  pantoprazole  Injectable 40 milliGRAM(s) IV Push two times a day  atorvastatin 80 milliGRAM(s) Oral at bedtime  insulin lispro (ADMELOG) corrective regimen sliding scale   SubCutaneous three times a day before meals  insulin lispro (ADMELOG) corrective regimen sliding scale   SubCutaneous at bedtime  chlorhexidine 2% Cloths 1 Application(s) Topical <User Schedule>  Nephro-yosef 1 Tablet(s) Oral daily  nystatin Powder 1 Application(s) Topical every 12 hours      PAST MEDICAL & SURGICAL HISTORY:      FAMILY HISTORY:      SOCIAL HISTORY:    [ ] Non-smoker  [ ] Smoker  [ ] Alcohol    Review of Systems:  Constitutional: [ ] Fever [ ] Chills [ ] Fatigue [ ] Weight change   HEENT: [ ] Blurred vision [ ] Eye pain [ ] Headache [ ] Runny nose [ ] Sore throat   Respiratory: [ ] Cough [ ] Wheezing [ ] Shortness of breath  Cardiovascular: [ ] Chest Pain [ ] Palpitations [ ] MAIN [ ] PND [ ] Orthopnea  Gastrointestinal: [ ] Abdominal Pain [ ] Diarrhea [ ] Constipation [ ] Hemorrhoids [ ] Nausea [ ] Vomiting  Genitourinary: [ ] Nocturia [ ] Dysuria [ ] Incontinence  Extremities: [ ] Swelling [ ] Joint Pain  Neurologic: [ ] Focal deficit [ ] Paresthesias [ ] Syncope  Skin: [ ] Rash [ ] Ecchymoses [ ] Wounds [ ] Lesions  Psychiatry: [ ] Depression [ ] Suicidal/Homicidal ideation [ ] Anxiety [ ] Sleep disturbances  [ ] 10 point review of systems is otherwise negative except as mentioned above            [ ]Unable to obtain    PHYSICAL EXAM:  T(C): 36.8 (10-19-22 @ 14:40), Max: 36.8 (10-19-22 @ 14:40)  HR: 65 (10-19-22 @ 14:40) (65 - 118)  BP: 122/73 (10-19-22 @ 14:40) (116/57 - 131/78)  RR: 18 (10-19-22 @ 14:40) (18 - 28)  SpO2: 96% (10-19-22 @ 14:40) (95% - 100%)  Wt(kg): --  I&O's Summary    18 Oct 2022 07:01  -  19 Oct 2022 07:00  --------------------------------------------------------  IN: 1652 mL / OUT: 400 mL / NET: 1252 mL    19 Oct 2022 07:01  -  19 Oct 2022 15:10  --------------------------------------------------------  IN: 240 mL / OUT: 1 mL / NET: 239 mL      Appearance: No acute distress  HEENT:   Normal oral mucosa, PERRL  Cardiovascular: Normal S1 S2, no elevated JVP, no murmurs, no edema  Respiratory: Lungs clear to auscultation	, good air movement  Psychiatry: A & O x 3, Mood & affect appropriate  Gastrointestinal:  soft nt nd normoactive bs	  Skin: No rashes, no ecchymoses, no cyanosis	  Neurologic: grossly non-focal  Extremities: Normal range of motion, no clubbing, cyanosis or edema  Vascular: Peripheral pulses palpable bilaterally    LABS:	 	  CBC Full  -  ( 19 Oct 2022 11:00 )  WBC Count : 21.26 K/uL  Hemoglobin : 8.5 g/dL  Hematocrit : 26.2 %  Platelet Count - Automated : 251 K/uL    10-19  135  |  98  |  71<H>  ----------------------------<  173<H>  4.3   |  23  |  4.89<H>    10-18  137  |  101  |  58<H>  ----------------------------<  142<H>  3.7   |  23  |  3.68<H>    Ca    7.3<L>      19 Oct 2022 11:00  Ca    7.7<L>      18 Oct 2022 04:30  Phos  6.0     10-19  Phos  4.8     10-18  Mg     2.0     10-19  Mg     2.0     10-18    TPro  6.4  /  Alb  2.3<L>  /  TBili  0.4  /  DBili  x   /  AST  17  /  ALT  16  /  AlkPhos  98  10-19  TPro  6.4  /  Alb  2.4<L>  /  TBili  0.4  /  DBili  x   /  AST  18  /  ALT  17  /  AlkPhos  95  10-18    proBNP:   Lipid Profile:   HgA1c:   TSH:     CARDIAC MARKERS:    TELEMETRY: 	    ECG:  	  RADIOLOGY:  OTHER: 	    PREVIOUS DIAGNOSTIC TESTING:    [x] Echocardiogram: < from: TTE Echo Complete w/o Contrast w/ Doppler (10.18.22 @ 16:42) >  Summary:   1. Left ventricular ejection fraction, by visual estimation, is 55 to   60%.   2. Normal right ventricular size and function.   3. The left atrium is normal in size.   4. Moderate to large pericardial effusion with organized material. Early   diastolic invagination of the right atrium is present. No other   echocardiographic evidence of tamponade.   5. Moderate mitral annular calcification.   6. Aortic valve not well visualized. Mean gradient 4.0mmHg consistent   with a normally opening valve.   7. Increased relative wall thickness with normal mass index consistent   with left ventricular concentric remodeling.    < end of copied text >    [ ] Catheterization:  [ ] Stress Test:  	 Cardiology Initial Consult    [facility] - Cardiology at [place]  [address], [place] [address]  Office: [phone]  Fax: [phone]    CHIEF COMPLAINT:      HISTORY OF PRESENT ILLNESS:  72F HTN, HLD, DM, CKD who presented initiallu with MAR on CKD with acute uremia and metabolic derangements requiring urgent HD and was COVID-19 positive. During HD went into atrial fibrillation and started on HD, subsequently developing GIB thought 2/2 AC and acute uremia. Also on amiodarone for Afib.    On TTE noted to have a moderate to large pericardial effusion with early echocardiographic evidence of tamponade.  Clinically she is not hypotensive and she tolerates the fluid shifts related to HD.      Allergies  No Known Allergies  	    MEDICATIONS:  aMIOdarone    Tablet   Oral   aMIOdarone    Tablet 400 milliGRAM(s) Oral every 8 hours  heparin  Infusion 1400 Unit(s)/Hr IV Continuous <Continuous>  metoprolol tartrate 25 milliGRAM(s) Oral two times a day  montelukast 10 milliGRAM(s) Oral daily  acetaminophen     Tablet .. 650 milliGRAM(s) Oral every 6 hours PRN  pantoprazole  Injectable 40 milliGRAM(s) IV Push two times a day  atorvastatin 80 milliGRAM(s) Oral at bedtime  insulin lispro (ADMELOG) corrective regimen sliding scale   SubCutaneous three times a day before meals  insulin lispro (ADMELOG) corrective regimen sliding scale   SubCutaneous at bedtime  chlorhexidine 2% Cloths 1 Application(s) Topical <User Schedule>  Nephro-yosef 1 Tablet(s) Oral daily  nystatin Powder 1 Application(s) Topical every 12 hours      PAST MEDICAL & SURGICAL HISTORY:      FAMILY HISTORY:      SOCIAL HISTORY:    [x] Non-smoker  [ ] Smoker  [ ] Alcohol    Review of Systems:  Constitutional: [-] Fever [ ] Chills [ ] Fatigue [ ] Weight change   HEENT: [ ] Blurred vision [ ] Eye pain [-] Headache [ ] Runny nose [ ] Sore throat   Respiratory: [ ] Cough [ ] Wheezing [-] Shortness of breath  Cardiovascular: [-] Chest Pain [ ] Palpitations [ ] MAIN [ ] PND [-] Orthopnea  Gastrointestinal: [-] Abdominal Pain [ ] Diarrhea [ ] Constipation [ ] Hemorrhoids [ ] Nausea [ ] Vomiting  Genitourinary: [ ] Nocturia [-] Dysuria [ ] Incontinence  Extremities: [-] Swelling [ ] Joint Pain  Neurologic: [ ] Focal deficit [ ] Paresthesias [-] Syncope  Skin: [ ] Rash [ ] Ecchymoses [ ] Wounds [ ] Lesions  Psychiatry: [-] Depression [ ] Suicidal/Homicidal ideation [ ] Anxiety [ ] Sleep disturbances  [x] 10 point review of systems is otherwise negative except as mentioned above            [ ]Unable to obtain    PHYSICAL EXAM:  T(C): 36.8 (10-19-22 @ 14:40), Max: 36.8 (10-19-22 @ 14:40)  HR: 65 (10-19-22 @ 14:40) (65 - 118)  BP: 122/73 (10-19-22 @ 14:40) (116/57 - 131/78)  RR: 18 (10-19-22 @ 14:40) (18 - 28)  SpO2: 96% (10-19-22 @ 14:40) (95% - 100%)  Wt(kg): --  I&O's Summary    18 Oct 2022 07:01  -  19 Oct 2022 07:00  --------------------------------------------------------  IN: 1652 mL / OUT: 400 mL / NET: 1252 mL    19 Oct 2022 07:01  -  19 Oct 2022 15:10  --------------------------------------------------------  IN: 240 mL / OUT: 1 mL / NET: 239 mL      Appearance: No acute distress  HEENT:   Normal oral mucosa, PERRL  Cardiovascular: Normal S1 S2, no elevated JVP, no murmurs, no edema  Respiratory: Lungs clear to auscultation	, good air movement  Psychiatry: A & O x 3, Mood & affect appropriate  Gastrointestinal:  soft nt nd normoactive bs	  Skin: No rashes, no ecchymoses, no cyanosis	  Neurologic: grossly non-focal  Extremities: Normal range of motion, no clubbing, cyanosis or edema  Vascular: Peripheral pulses palpable bilaterally    LABS:	 	  CBC Full  -  ( 19 Oct 2022 11:00 )  WBC Count : 21.26 K/uL  Hemoglobin : 8.5 g/dL  Hematocrit : 26.2 %  Platelet Count - Automated : 251 K/uL    10-19  135  |  98  |  71<H>  ----------------------------<  173<H>  4.3   |  23  |  4.89<H>    10-18  137  |  101  |  58<H>  ----------------------------<  142<H>  3.7   |  23  |  3.68<H>    Ca    7.3<L>      19 Oct 2022 11:00  Ca    7.7<L>      18 Oct 2022 04:30  Phos  6.0     10-19  Phos  4.8     10-18  Mg     2.0     10-19  Mg     2.0     10-18    TPro  6.4  /  Alb  2.3<L>  /  TBili  0.4  /  DBili  x   /  AST  17  /  ALT  16  /  AlkPhos  98  10-19  TPro  6.4  /  Alb  2.4<L>  /  TBili  0.4  /  DBili  x   /  AST  18  /  ALT  17  /  AlkPhos  95  10-18    proBNP:   Lipid Profile:   HgA1c:   TSH:     CARDIAC MARKERS:    TELEMETRY: 	    ECG:  	  RADIOLOGY:  OTHER: 	    PREVIOUS DIAGNOSTIC TESTING:    [x] Echocardiogram: < from: TTE Echo Complete w/o Contrast w/ Doppler (10.18.22 @ 16:42) >  Summary:   1. Left ventricular ejection fraction, by visual estimation, is 55 to   60%.   2. Normal right ventricular size and function.   3. The left atrium is normal in size.   4. Moderate to large pericardial effusion with organized material. Early   diastolic invagination of the right atrium is present. No other   echocardiographic evidence of tamponade.   5. Moderate mitral annular calcification.   6. Aortic valve not well visualized. Mean gradient 4.0mmHg consistent   with a normally opening valve.   7. Increased relative wall thickness with normal mass index consistent   with left ventricular concentric remodeling.    < end of copied text >    [ ] Catheterization:  [ ] Stress Test:

## 2022-10-19 NOTE — PROGRESS NOTE ADULT - ASSESSMENT
Patient is a 72y old  Female who presents with a chief complaint of Acute Renal Failure. Metabolic Acidosis (19 Oct 2022 20:13)      Interval History:    MEDICATIONS  (STANDING):  aMIOdarone    Tablet   Oral   aMIOdarone    Tablet 400 milliGRAM(s) Oral every 8 hours  atorvastatin 80 milliGRAM(s) Oral at bedtime  chlorhexidine 2% Cloths 1 Application(s) Topical <User Schedule>  dextrose 5%. 1000 milliLiter(s) (50 mL/Hr) IV Continuous <Continuous>  dextrose 5%. 1000 milliLiter(s) (100 mL/Hr) IV Continuous <Continuous>  dextrose 50% Injectable 25 Gram(s) IV Push once  dextrose 50% Injectable 12.5 Gram(s) IV Push once  dextrose 50% Injectable 25 Gram(s) IV Push once  glucagon  Injectable 1 milliGRAM(s) IntraMuscular once  heparin  Infusion 1400 Unit(s)/Hr (14 mL/Hr) IV Continuous <Continuous>  insulin lispro (ADMELOG) corrective regimen sliding scale   SubCutaneous three times a day before meals  insulin lispro (ADMELOG) corrective regimen sliding scale   SubCutaneous at bedtime  metoprolol tartrate 25 milliGRAM(s) Oral two times a day  montelukast 10 milliGRAM(s) Oral daily  Nephro-yosef 1 Tablet(s) Oral daily  nystatin Powder 1 Application(s) Topical every 12 hours  pantoprazole  Injectable 40 milliGRAM(s) IV Push two times a day    MEDICATIONS  (PRN):  acetaminophen     Tablet .. 650 milliGRAM(s) Oral every 6 hours PRN Temp greater or equal to 38.5C (101.3F)  dextrose Oral Gel 15 Gram(s) Oral once PRN Blood Glucose LESS THAN 70 milliGRAM(s)/deciliter      Allergies    No Known Allergies    Intolerances        REVIEW OF SYSTEMS:  CONSTITUTIONAL: no changes  EYES: No eye pain, visual disturbances, or discharge  ENMT:  No difficulty hearing, No sinus or throat pain  NECK: No pain or stiffness  RESPIRATORY: No cough, wheezing, chills or hemoptysis; No shortness of breath  CARDIOVASCULAR: No chest pain, palpitations or leg swelling  GASTROINTESTINAL: No abdominal or epigastric pain. No nausea, vomiting, or hematemesis; No diarrhea or constipation. No melena or hematochezia.  GENITOURINARY: No dysuria, frequency, hematuria, or incontinence  NEUROLOGICAL: No headaches, memory loss, loss of strength, numbness, or tremors  SKIN: No itching, burning, rashes, or lesions   ENDOCRINE: No heat or cold intolerance; No hair loss  MUSCULOSKELETAL: No joint pain or swelling; No muscle, back, or extremity pain  PSYCHIATRIC: No depression, anxiety, mood swings, or difficulty sleeping  HEME/LYMPH: No easy bruising, or bleeding gums  ALLERY AND IMMUNOLOGIC: No hives or eczema    Vital Signs Last 24 Hrs  T(C): 36.5 (19 Oct 2022 18:10), Max: 36.8 (19 Oct 2022 14:40)  T(F): 97.7 (19 Oct 2022 18:10), Max: 98.3 (19 Oct 2022 14:40)  HR: 68 (19 Oct 2022 18:10) (65 - 118)  BP: 103/70 (19 Oct 2022 18:10) (103/70 - 131/74)  BP(mean): 72 (19 Oct 2022 00:00) (72 - 72)  RR: 16 (19 Oct 2022 18:10) (16 - 24)  SpO2: 99% (19 Oct 2022 18:10) (95% - 99%)    Parameters below as of 19 Oct 2022 18:10  Patient On (Oxygen Delivery Method): room air        PHYSICAL EXAM:  GENERAL:   HEAD: Atraumatic, Normocephalic  EYES: PERRLA, conjunctiva and sclera clear  ENMT: No  exudates,; Moist mucous membranes,, No lesions  NECK: Supple, No JVD, Normal thyroid  NERVOUS SYSTEM:  Alert & Oriented,   CHEST/LUNG: Clear to auscultation bilaterally; No rales, rhonchi, wheezing, or rubs  HEART: Regular rate and rhythm; No murmurs, rubs, or gallops  ABDOMEN: Soft, Nontender, Nondistended; Bowel sounds present  EXTREMITIES:  2+ Peripheral Pulses, no edema  SKIN: No rashes or lesions    LABS:    PTT - ( 19 Oct 2022 11:00 )  PTT:93.7 sec    CAPILLARY BLOOD GLUCOSE      POCT Blood Glucose.: 180 mg/dL (19 Oct 2022 12:35)  POCT Blood Glucose.: 196 mg/dL (19 Oct 2022 07:55)  POCT Blood Glucose.: 212 mg/dL (18 Oct 2022 21:32)    Lipid panel:           Thyroid:  Diabetes Tests:  Parathyroid Panel:10-19 @ 11:00 Calcium 7.2 Intact  PTH-RP --    Adrenals:  RADIOLOGY & ADDITIONAL TESTS:    Imaging Personally Reviewed:  [ ] YES  [ ] NO    Consultant(s) Notes Reviewed:  [ ] YES  [ ] NO    Care Discussed with Consultants/Other Providers [ ] YES  [ ] NO

## 2022-10-19 NOTE — DISCHARGE NOTE PROVIDER - HOSPITAL COURSE
71 yo F with DM, HTN, HLD< CKD a/w shortness of breath found to have acute on chronic kidney disease with hyperkalemia requiring urgent HD.  Pt noted to develop afib during HD was subsequently started on Cardizem and heparin drip, both stopped 2/2 hypotension and GI bleed.  Hypotension now resolved; now transitioning on amiodarone.     Neuro: No acute issues;     CV: afib-per ICu new, needs echo and cardiology if unable to get heart rate controlled will add beta blocker  as bp tolerates already on amiodarone per ICU team  .   and heparin drip resumed per GI and ICU   Pulm: montelukast Oral daily  GI: GI bleed/ acute blood loss anemia - c/w pantoprazole  Injectable IV Push two times a day;  per GI will hold off on colonoscopy and was restarted on  heparin gtt   will monitor H/H and for acute signs of bleeding.      Renal/Metabolic:  MAR on CKD, etiology unclear; s/p HD x1 further plan of care per renal .  ID: COVID 19 likely incidental findings; s/p mAB for asymptomatic; will continue to monitor, no signs of hypoxia to indicate treatment.  Heme: GIB while on heparin gtt so heparin gtt stopped has nbeen restarted [per ICU team ; s/p 2 pRBCs ; LE dopplers negative, monitor for bleeding       Endo: insulin lispro (ADMELOG) corrective regimen sliding scale   SubCutaneous every 6 hours  - monitor FS w/ ISS coverage;  - TSH low, with low T3 and T4; will have endo follow in the setting of acute illness, requires repeat TFTs after acute illness resolves.   Skin: chlorhexidine 2% Cloths 1 Application(s) Topical <User Schedule>; nystatin Powder 1 Application(s) Topical every 12 hours    Dispo: pt eval f/u echo monitor hgb           Additional Information:  Additional Information: transfer to [facility] under [facility]

## 2022-10-19 NOTE — DISCHARGE NOTE PROVIDER - ATTENDING DISCHARGE PHYSICAL EXAMINATION:
Objective:    Vitals:  T(C): 36.5 (10-19-22 @ 18:10), Max: 36.8 (10-19-22 @ 14:40)  HR: 68 (10-19-22 @ 18:10) (65 - 118)  BP: 103/70 (10-19-22 @ 18:10) (103/70 - 131/74)  RR: 16 (10-19-22 @ 18:10) (16 - 24)  SpO2: 99% (10-19-22 @ 18:10) (95% - 99%)    Physical Exam:  General: comfortable, no acute distress, well nourished  HEENT: Atraumatic, no LAD, trachea midline, PERRLA  Cardiovascular: normal s1s2, no murmurs, gallops or fricition rubs  Pulmonary: clear to ausculation Bilaterally, no wheezing , rhonchi  Gastrointestinal: soft non tender non distended, no masses felt, no organomegally  Muscloskeletal: no lower extremity edema, intact bilateral lower extremity pulses  Neurological: CN II-12 intact. No focal weakness  Psychiatrical: normal mood, cooperative  SKIN: no rash, lesions or ulcers

## 2022-10-19 NOTE — DISCHARGE NOTE PROVIDER - NSDCMRMEDTOKEN_GEN_ALL_CORE_FT
amLODIPine 10 mg oral tablet: 1 tab(s) orally once a day  atorvastatin 80 mg oral tablet: 1 tab(s) orally once a day (at bedtime)  busPIRone 10 mg oral tablet: 1 tab(s) orally 2 times a day  gemfibrozil 600 mg oral tablet: 1 tab(s) orally 2 times a day  Januvia 50 mg oral tablet: 1 tab(s) orally once a day  metoprolol tartrate 25 mg oral tablet: 1 tab(s) orally 2 times a day  montelukast 10 mg oral tablet: 1 tab(s) orally once a day  raloxifene 60 mg oral tablet: 1 tab(s) orally once a day

## 2022-10-19 NOTE — PROGRESS NOTE ADULT - SUBJECTIVE AND OBJECTIVE BOX
Review of Systems:  General:denies fever chills, headache, weakness  HEENT: denies blurry vision,diffculty swallowing, difficulty hearing, tinnitus  Cardiovascular: denies chest pain  ,palpitations  Pulmonary:denies shortness of breath, cough, wheezing, hemoptysis  Gastrointestinal: denies abdominal pain, constipation, diarrhea,nausea , vomiting, hematochezia  : denies hematuria, dysuria, or incontinence  Neurological: denies weakness, numbness , tingling, dizziness, tremors  MSK: denies muscle pain, difficulty ambulating, swelling, back pain  skin: denies skin rash, itching, burning, or  skin lesions  Psychiatrical: denies mood disturbances, anxierty, feeling depressed, depression , or difficulty sleeping    Objective:  Vitals  T(C): 36.8 (10-19-22 @ 14:40), Max: 36.8 (10-19-22 @ 14:40)  HR: 65 (10-19-22 @ 14:40) (65 - 118)  BP: 122/73 (10-19-22 @ 14:40) (116/57 - 131/78)  RR: 18 (10-19-22 @ 14:40) (18 - 28)  SpO2: 96% (10-19-22 @ 14:40) (95% - 100%)    Physical Exam:  General: comfortable, no acute distress, well nourished  HEENT: Atraumatic, no LAD, trachea midline, PERRLA  Cardiovascular: normal s1s2, no murmurs, gallops or fricition rubs  Pulmonary: clear to ausculation Bilaterally, no wheezing , rhonchi  Gastrointestinal: soft non tender non distended, no masses felt, no organomegally  Muscloskeletal: no lower extremity edema, intact bilateral lower extremity pulses  Neurological: CN II-12 intact. No focal weakness  Psychiatrical: normal mood, cooperative  SKIN: no rash, lesions or ulcers    Labs:                          8.5    21.26 )-----------( 251      ( 19 Oct 2022 11:00 )             26.2     10-19    135  |  98  |  71<H>  ----------------------------<  173<H>  4.3   |  23  |  4.89<H>    Ca    7.3<L>      19 Oct 2022 11:00  Phos  6.0     10-19  Mg     2.0     10-19    TPro  6.4  /  Alb  2.3<L>  /  TBili  0.4  /  DBili  x   /  AST  17  /  ALT  16  /  AlkPhos  98  10-19    LIVER FUNCTIONS - ( 19 Oct 2022 11:00 )  Alb: 2.3 g/dL / Pro: 6.4 gm/dL / ALK PHOS: 98 U/L / ALT: 16 U/L / AST: 17 U/L / GGT: x           PTT - ( 19 Oct 2022 11:00 )  PTT:93.7 sec      Active Medications  MEDICATIONS  (STANDING):  aMIOdarone    Tablet   Oral   aMIOdarone    Tablet 400 milliGRAM(s) Oral every 8 hours  atorvastatin 80 milliGRAM(s) Oral at bedtime  chlorhexidine 2% Cloths 1 Application(s) Topical <User Schedule>  dextrose 5%. 1000 milliLiter(s) (50 mL/Hr) IV Continuous <Continuous>  dextrose 5%. 1000 milliLiter(s) (100 mL/Hr) IV Continuous <Continuous>  dextrose 50% Injectable 25 Gram(s) IV Push once  dextrose 50% Injectable 12.5 Gram(s) IV Push once  dextrose 50% Injectable 25 Gram(s) IV Push once  glucagon  Injectable 1 milliGRAM(s) IntraMuscular once  heparin  Infusion 1400 Unit(s)/Hr (14 mL/Hr) IV Continuous <Continuous>  insulin lispro (ADMELOG) corrective regimen sliding scale   SubCutaneous three times a day before meals  insulin lispro (ADMELOG) corrective regimen sliding scale   SubCutaneous at bedtime  metoprolol tartrate 25 milliGRAM(s) Oral two times a day  montelukast 10 milliGRAM(s) Oral daily  Nephro-yosef 1 Tablet(s) Oral daily  nystatin Powder 1 Application(s) Topical every 12 hours  pantoprazole  Injectable 40 milliGRAM(s) IV Push two times a day    MEDICATIONS  (PRN):  acetaminophen     Tablet .. 650 milliGRAM(s) Oral every 6 hours PRN Temp greater or equal to 38.5C (101.3F)  dextrose Oral Gel 15 Gram(s) Oral once PRN Blood Glucose LESS THAN 70 milliGRAM(s)/deciliter     Patient seen and examined  feels well  off 02  echo ++ large pericardial effusion  cardio consulted    Review of Systems:  General:denies fever chills, headache, weakness  HEENT: denies blurry vision,diffculty swallowing, difficulty hearing, tinnitus  Cardiovascular: denies chest pain  ,palpitations  Pulmonary:denies shortness of breath, cough, wheezing, hemoptysis  Gastrointestinal: denies abdominal pain, constipation, diarrhea,nausea , vomiting, hematochezia  : denies hematuria, dysuria, or incontinence  Neurological: denies weakness, numbness , tingling, dizziness, tremors  MSK: denies muscle pain, difficulty ambulating, swelling, back pain  skin: denies skin rash, itching, burning, or  skin lesions  Psychiatrical: denies mood disturbances, anxierty, feeling depressed, depression , or difficulty sleeping    Objective:  Vitals  T(C): 36.8 (10-19-22 @ 14:40), Max: 36.8 (10-19-22 @ 14:40)  HR: 65 (10-19-22 @ 14:40) (65 - 118)  BP: 122/73 (10-19-22 @ 14:40) (116/57 - 131/78)  RR: 18 (10-19-22 @ 14:40) (18 - 28)  SpO2: 96% (10-19-22 @ 14:40) (95% - 100%)    Physical Exam:  General: comfortable, no acute distress, well nourished  HEENT: Atraumatic, no LAD, trachea midline, PERRLA  Cardiovascular: normal s1s2, no murmurs, gallops or fricition rubs  Pulmonary: clear to ausculation Bilaterally, no wheezing , rhonchi  Gastrointestinal: soft non tender non distended, no masses felt, no organomegally  Muscloskeletal: no lower extremity edema, intact bilateral lower extremity pulses  Neurological: CN II-12 intact. No focal weakness  Psychiatrical: normal mood, cooperative  SKIN: no rash, lesions or ulcers    Labs:                          8.5    21.26 )-----------( 251      ( 19 Oct 2022 11:00 )             26.2     10-19    135  |  98  |  71<H>  ----------------------------<  173<H>  4.3   |  23  |  4.89<H>    Ca    7.3<L>      19 Oct 2022 11:00  Phos  6.0     10-19  Mg     2.0     10-19    TPro  6.4  /  Alb  2.3<L>  /  TBili  0.4  /  DBili  x   /  AST  17  /  ALT  16  /  AlkPhos  98  10-19    LIVER FUNCTIONS - ( 19 Oct 2022 11:00 )  Alb: 2.3 g/dL / Pro: 6.4 gm/dL / ALK PHOS: 98 U/L / ALT: 16 U/L / AST: 17 U/L / GGT: x           PTT - ( 19 Oct 2022 11:00 )  PTT:93.7 sec      Active Medications  MEDICATIONS  (STANDING):  aMIOdarone    Tablet   Oral   aMIOdarone    Tablet 400 milliGRAM(s) Oral every 8 hours  atorvastatin 80 milliGRAM(s) Oral at bedtime  chlorhexidine 2% Cloths 1 Application(s) Topical <User Schedule>  dextrose 5%. 1000 milliLiter(s) (50 mL/Hr) IV Continuous <Continuous>  dextrose 5%. 1000 milliLiter(s) (100 mL/Hr) IV Continuous <Continuous>  dextrose 50% Injectable 25 Gram(s) IV Push once  dextrose 50% Injectable 12.5 Gram(s) IV Push once  dextrose 50% Injectable 25 Gram(s) IV Push once  glucagon  Injectable 1 milliGRAM(s) IntraMuscular once  heparin  Infusion 1400 Unit(s)/Hr (14 mL/Hr) IV Continuous <Continuous>  insulin lispro (ADMELOG) corrective regimen sliding scale   SubCutaneous three times a day before meals  insulin lispro (ADMELOG) corrective regimen sliding scale   SubCutaneous at bedtime  metoprolol tartrate 25 milliGRAM(s) Oral two times a day  montelukast 10 milliGRAM(s) Oral daily  Nephro-yosef 1 Tablet(s) Oral daily  nystatin Powder 1 Application(s) Topical every 12 hours  pantoprazole  Injectable 40 milliGRAM(s) IV Push two times a day    MEDICATIONS  (PRN):  acetaminophen     Tablet .. 650 milliGRAM(s) Oral every 6 hours PRN Temp greater or equal to 38.5C (101.3F)  dextrose Oral Gel 15 Gram(s) Oral once PRN Blood Glucose LESS THAN 70 milliGRAM(s)/deciliter

## 2022-10-20 ENCOUNTER — RESULT REVIEW (OUTPATIENT)
Age: 72
End: 2022-10-20

## 2022-10-20 VITALS
OXYGEN SATURATION: 95 % | RESPIRATION RATE: 18 BRPM | HEART RATE: 105 BPM | TEMPERATURE: 98 F | DIASTOLIC BLOOD PRESSURE: 70 MMHG | SYSTOLIC BLOOD PRESSURE: 103 MMHG

## 2022-10-20 DIAGNOSIS — I30.9 ACUTE PERICARDITIS, UNSPECIFIED: ICD-10-CM

## 2022-10-20 LAB
% ALBUMIN: 51.6 % — SIGNIFICANT CHANGE UP
% ALPHA 1: 6.6 % — SIGNIFICANT CHANGE UP
% ALPHA 2: 23.4 % — SIGNIFICANT CHANGE UP
% BETA: 9 % — SIGNIFICANT CHANGE UP
% GAMMA: 9.4 % — SIGNIFICANT CHANGE UP
A1C WITH ESTIMATED AVERAGE GLUCOSE RESULT: 6 % — HIGH (ref 4–5.6)
ALBUMIN FLD-MCNC: 2.8 G/DL — SIGNIFICANT CHANGE UP
ALBUMIN SERPL ELPH-MCNC: 2.5 G/DL — LOW (ref 3.3–5)
ALBUMIN SERPL ELPH-MCNC: 3.3 G/DL — LOW (ref 3.6–5.5)
ALBUMIN SERPL ELPH-MCNC: 3.5 G/DL — SIGNIFICANT CHANGE UP (ref 3.3–5)
ALBUMIN SERPL ELPH-MCNC: 3.6 G/DL — SIGNIFICANT CHANGE UP (ref 3.3–5)
ALBUMIN/GLOB SERPL ELPH: 1.1 RATIO — SIGNIFICANT CHANGE UP
ALP SERPL-CCNC: 140 U/L — HIGH (ref 40–120)
ALP SERPL-CCNC: 151 U/L — HIGH (ref 40–120)
ALP SERPL-CCNC: 187 U/L — HIGH (ref 40–120)
ALPHA1 GLOB SERPL ELPH-MCNC: 0.4 G/DL — SIGNIFICANT CHANGE UP (ref 0.1–0.4)
ALPHA2 GLOB SERPL ELPH-MCNC: 1.5 G/DL — HIGH (ref 0.5–1)
ALT FLD-CCNC: 127 U/L — HIGH (ref 10–45)
ALT FLD-CCNC: 1275 U/L — HIGH (ref 10–45)
ALT FLD-CCNC: 564 U/L — HIGH (ref 10–45)
ANION GAP SERPL CALC-SCNC: 25 MMOL/L — HIGH (ref 5–17)
ANION GAP SERPL CALC-SCNC: 26 MMOL/L — HIGH (ref 5–17)
ANION GAP SERPL CALC-SCNC: 32 MMOL/L — HIGH (ref 5–17)
APPEARANCE UR: ABNORMAL
APTT BLD: 28.9 SEC — SIGNIFICANT CHANGE UP (ref 27.5–35.5)
APTT BLD: 34.6 SEC — SIGNIFICANT CHANGE UP (ref 27.5–35.5)
APTT BLD: 36.7 SEC — HIGH (ref 27.5–35.5)
AST SERPL-CCNC: 201 U/L — HIGH (ref 10–40)
AST SERPL-CCNC: 2090 U/L — HIGH (ref 10–40)
AST SERPL-CCNC: 823 U/L — HIGH (ref 10–40)
B PERT IGG+IGM PNL SER: ABNORMAL
B-GLOBULIN SERPL ELPH-MCNC: 0.6 G/DL — SIGNIFICANT CHANGE UP (ref 0.5–1)
BACTERIA # UR AUTO: ABNORMAL
BASE EXCESS BLDV CALC-SCNC: -4.3 MMOL/L — LOW (ref -2–3)
BILIRUB SERPL-MCNC: 1.1 MG/DL — SIGNIFICANT CHANGE UP (ref 0.2–1.2)
BILIRUB SERPL-MCNC: 1.4 MG/DL — HIGH (ref 0.2–1.2)
BILIRUB SERPL-MCNC: 2 MG/DL — HIGH (ref 0.2–1.2)
BILIRUB UR-MCNC: NEGATIVE — SIGNIFICANT CHANGE UP
BLD GP AB SCN SERPL QL: NEGATIVE — SIGNIFICANT CHANGE UP
BUN SERPL-MCNC: 36 MG/DL — HIGH (ref 7–23)
BUN SERPL-MCNC: 44 MG/DL — HIGH (ref 7–23)
BUN SERPL-MCNC: 45 MG/DL — HIGH (ref 7–23)
CALCIUM SERPL-MCNC: 6.9 MG/DL — LOW (ref 8.4–10.5)
CALCIUM SERPL-MCNC: 8.2 MG/DL — LOW (ref 8.4–10.5)
CALCIUM SERPL-MCNC: 8.4 MG/DL — SIGNIFICANT CHANGE UP (ref 8.4–10.5)
CHLORIDE SERPL-SCNC: 94 MMOL/L — LOW (ref 96–108)
CHLORIDE SERPL-SCNC: 94 MMOL/L — LOW (ref 96–108)
CHLORIDE SERPL-SCNC: 98 MMOL/L — SIGNIFICANT CHANGE UP (ref 96–108)
CO2 BLDV-SCNC: 23 MMOL/L — SIGNIFICANT CHANGE UP (ref 22–26)
CO2 SERPL-SCNC: 11 MMOL/L — LOW (ref 22–31)
CO2 SERPL-SCNC: 18 MMOL/L — LOW (ref 22–31)
CO2 SERPL-SCNC: 18 MMOL/L — LOW (ref 22–31)
COLOR FLD: SIGNIFICANT CHANGE UP
COLOR SPEC: YELLOW — SIGNIFICANT CHANGE UP
CREAT SERPL-MCNC: 3.19 MG/DL — HIGH (ref 0.5–1.3)
CREAT SERPL-MCNC: 3.44 MG/DL — HIGH (ref 0.5–1.3)
CREAT SERPL-MCNC: 3.7 MG/DL — HIGH (ref 0.5–1.3)
CULTURE RESULTS: SIGNIFICANT CHANGE UP
CULTURE RESULTS: SIGNIFICANT CHANGE UP
DIFF PNL FLD: ABNORMAL
EGFR: 13 ML/MIN/1.73M2 — LOW
EGFR: 14 ML/MIN/1.73M2 — LOW
EGFR: 15 ML/MIN/1.73M2 — LOW
EPI CELLS # UR: 2 — SIGNIFICANT CHANGE UP
ESTIMATED AVERAGE GLUCOSE: 126 MG/DL — HIGH (ref 68–114)
FIBRINOGEN PPP-MCNC: 852 MG/DL — HIGH (ref 330–520)
FIBRINOGEN PPP-MCNC: 878 MG/DL — HIGH (ref 330–520)
FLUID INTAKE SUBSTANCE CLASS: SIGNIFICANT CHANGE UP
GAMMA GLOBULIN: 0.6 G/DL — SIGNIFICANT CHANGE UP (ref 0.6–1.6)
GAS PNL BLDA: SIGNIFICANT CHANGE UP
GLUCOSE BLDC GLUCOMTR-MCNC: 115 MG/DL — HIGH (ref 70–99)
GLUCOSE BLDC GLUCOMTR-MCNC: 129 MG/DL — HIGH (ref 70–99)
GLUCOSE BLDC GLUCOMTR-MCNC: 145 MG/DL — HIGH (ref 70–99)
GLUCOSE BLDC GLUCOMTR-MCNC: 231 MG/DL — HIGH (ref 70–99)
GLUCOSE BLDC GLUCOMTR-MCNC: 239 MG/DL — HIGH (ref 70–99)
GLUCOSE BLDC GLUCOMTR-MCNC: 30 MG/DL — CRITICAL LOW (ref 70–99)
GLUCOSE FLD-MCNC: 30 MG/DL — SIGNIFICANT CHANGE UP
GLUCOSE SERPL-MCNC: 188 MG/DL — HIGH (ref 70–99)
GLUCOSE SERPL-MCNC: 8 MG/DL — CRITICAL LOW (ref 70–99)
GLUCOSE SERPL-MCNC: 88 MG/DL — SIGNIFICANT CHANGE UP (ref 70–99)
GLUCOSE UR QL: ABNORMAL
GRAM STN FLD: SIGNIFICANT CHANGE UP
HCO3 BLDV-SCNC: 22 MMOL/L — SIGNIFICANT CHANGE UP (ref 22–29)
HCT VFR BLD CALC: 25.3 % — LOW (ref 34.5–45)
HCT VFR BLD CALC: 27.1 % — LOW (ref 34.5–45)
HCT VFR BLD CALC: 29.9 % — LOW (ref 34.5–45)
HGB BLD-MCNC: 8.3 G/DL — LOW (ref 11.5–15.5)
HGB BLD-MCNC: 8.5 G/DL — LOW (ref 11.5–15.5)
HGB BLD-MCNC: 9.3 G/DL — LOW (ref 11.5–15.5)
HGB FLD-MCNC: 7.5 G/DL — LOW (ref 11.7–16.5)
HOROWITZ INDEX BLDV+IHG-RTO: 50 — SIGNIFICANT CHANGE UP
INR BLD: 1.91 RATIO — HIGH (ref 0.88–1.16)
INR BLD: 2.22 RATIO — HIGH (ref 0.88–1.16)
INR BLD: 2.84 RATIO — HIGH (ref 0.88–1.16)
INTERPRETATION SERPL IFE-IMP: SIGNIFICANT CHANGE UP
KETONES UR-MCNC: SIGNIFICANT CHANGE UP
LDH SERPL L TO P-CCNC: 813 U/L — SIGNIFICANT CHANGE UP
LEUKOCYTE ESTERASE UR-ACNC: ABNORMAL
LYMPHOCYTES # FLD: 18 % — SIGNIFICANT CHANGE UP
MAGNESIUM SERPL-MCNC: 1.9 MG/DL — SIGNIFICANT CHANGE UP (ref 1.6–2.6)
MAGNESIUM SERPL-MCNC: 2.4 MG/DL — SIGNIFICANT CHANGE UP (ref 1.6–2.6)
MCHC RBC-ENTMCNC: 30.6 GM/DL — LOW (ref 32–36)
MCHC RBC-ENTMCNC: 30.7 PG — SIGNIFICANT CHANGE UP (ref 27–34)
MCHC RBC-ENTMCNC: 30.9 PG — SIGNIFICANT CHANGE UP (ref 27–34)
MCHC RBC-ENTMCNC: 31.1 GM/DL — LOW (ref 32–36)
MCHC RBC-ENTMCNC: 31.6 PG — SIGNIFICANT CHANGE UP (ref 27–34)
MCHC RBC-ENTMCNC: 33.6 GM/DL — SIGNIFICANT CHANGE UP (ref 32–36)
MCV RBC AUTO: 100.7 FL — HIGH (ref 80–100)
MCV RBC AUTO: 94.1 FL — SIGNIFICANT CHANGE UP (ref 80–100)
MCV RBC AUTO: 98.7 FL — SIGNIFICANT CHANGE UP (ref 80–100)
MONOS+MACROS # FLD: 7 % — SIGNIFICANT CHANGE UP
MRSA PCR RESULT.: DETECTED
NEUTROPHILS-BODY FLUID: 75 % — SIGNIFICANT CHANGE UP
NIGHT BLUE STAIN TISS: SIGNIFICANT CHANGE UP
NITRITE UR-MCNC: NEGATIVE — SIGNIFICANT CHANGE UP
NRBC # BLD: 0 /100 WBCS — SIGNIFICANT CHANGE UP (ref 0–0)
NT-PROBNP SERPL-SCNC: 5812 PG/ML — HIGH (ref 0–300)
OXYHGB MFR BLDMV: 23 % — LOW (ref 90–95)
PCO2 BLDV: 42 MMHG — SIGNIFICANT CHANGE UP (ref 39–42)
PH BLDV: 7.32 — SIGNIFICANT CHANGE UP (ref 7.32–7.43)
PH UR: 6 — SIGNIFICANT CHANGE UP (ref 5–8)
PHOSPHATE SERPL-MCNC: 7.8 MG/DL — HIGH (ref 2.5–4.5)
PHOSPHATE SERPL-MCNC: 9.9 MG/DL — HIGH (ref 2.5–4.5)
PLATELET # BLD AUTO: 205 K/UL — SIGNIFICANT CHANGE UP (ref 150–400)
PLATELET # BLD AUTO: 216 K/UL — SIGNIFICANT CHANGE UP (ref 150–400)
PLATELET # BLD AUTO: 292 K/UL — SIGNIFICANT CHANGE UP (ref 150–400)
PO2 BLDV: 51 MMHG — HIGH (ref 25–45)
POTASSIUM SERPL-MCNC: 4 MMOL/L — SIGNIFICANT CHANGE UP (ref 3.5–5.3)
POTASSIUM SERPL-MCNC: 4.9 MMOL/L — SIGNIFICANT CHANGE UP (ref 3.5–5.3)
POTASSIUM SERPL-MCNC: 6.6 MMOL/L — CRITICAL HIGH (ref 3.5–5.3)
POTASSIUM SERPL-SCNC: 4 MMOL/L — SIGNIFICANT CHANGE UP (ref 3.5–5.3)
POTASSIUM SERPL-SCNC: 4.9 MMOL/L — SIGNIFICANT CHANGE UP (ref 3.5–5.3)
POTASSIUM SERPL-SCNC: 6.6 MMOL/L — CRITICAL HIGH (ref 3.5–5.3)
PROT FLD-MCNC: 5.4 G/DL — SIGNIFICANT CHANGE UP
PROT PATTERN SERPL ELPH-IMP: SIGNIFICANT CHANGE UP
PROT SERPL-MCNC: 4.7 G/DL — LOW (ref 6–8.3)
PROT SERPL-MCNC: 6.5 G/DL — SIGNIFICANT CHANGE UP (ref 6–8.3)
PROT SERPL-MCNC: 6.9 G/DL — SIGNIFICANT CHANGE UP (ref 6–8.3)
PROT UR-MCNC: ABNORMAL
PROTHROM AB SERPL-ACNC: 22.3 SEC — HIGH (ref 10.5–13.4)
PROTHROM AB SERPL-ACNC: 25.7 SEC — HIGH (ref 10.5–13.4)
PROTHROM AB SERPL-ACNC: 33 SEC — HIGH (ref 10.5–13.4)
RBC # BLD: 2.69 M/UL — LOW (ref 3.8–5.2)
RBC # BLD: 2.69 M/UL — LOW (ref 3.8–5.2)
RBC # BLD: 3.03 M/UL — LOW (ref 3.8–5.2)
RBC # FLD: 17.2 % — HIGH (ref 10.3–14.5)
RBC # FLD: 18.2 % — HIGH (ref 10.3–14.5)
RBC # FLD: 18.5 % — HIGH (ref 10.3–14.5)
RBC CASTS # UR COMP ASSIST: >50 /HPF — SIGNIFICANT CHANGE UP (ref 0–4)
RCV VOL RI: HIGH /UL (ref 0–0)
RH IG SCN BLD-IMP: POSITIVE — SIGNIFICANT CHANGE UP
RH IG SCN BLD-IMP: POSITIVE — SIGNIFICANT CHANGE UP
S AUREUS DNA NOSE QL NAA+PROBE: DETECTED
SAO2 % BLD: 23.4 % — LOW (ref 60–90)
SAO2 % BLDV: 80.1 % — SIGNIFICANT CHANGE UP (ref 67–88)
SODIUM SERPL-SCNC: 137 MMOL/L — SIGNIFICANT CHANGE UP (ref 135–145)
SODIUM SERPL-SCNC: 138 MMOL/L — SIGNIFICANT CHANGE UP (ref 135–145)
SODIUM SERPL-SCNC: 141 MMOL/L — SIGNIFICANT CHANGE UP (ref 135–145)
SP GR SPEC: 1.02 — SIGNIFICANT CHANGE UP (ref 1.01–1.02)
SPECIMEN SOURCE FLD: SIGNIFICANT CHANGE UP
SPECIMEN SOURCE: SIGNIFICANT CHANGE UP
T4 FREE SERPL-MCNC: 1.2 NG/DL — SIGNIFICANT CHANGE UP (ref 0.9–1.8)
TOTAL NUCLEATED CELL COUNT, BODY FLUID: SIGNIFICANT CHANGE UP /UL
TSH SERPL-MCNC: 0.73 UIU/ML — SIGNIFICANT CHANGE UP (ref 0.27–4.2)
TUBE TYPE: SIGNIFICANT CHANGE UP
UROBILINOGEN FLD QL: ABNORMAL
WBC # BLD: 24.38 K/UL — HIGH (ref 3.8–10.5)
WBC # BLD: 27.75 K/UL — HIGH (ref 3.8–10.5)
WBC # BLD: 37.89 K/UL — HIGH (ref 3.8–10.5)
WBC # FLD AUTO: 24.38 K/UL — HIGH (ref 3.8–10.5)
WBC # FLD AUTO: 27.75 K/UL — HIGH (ref 3.8–10.5)
WBC # FLD AUTO: 37.89 K/UL — HIGH (ref 3.8–10.5)
WBC UR QL: >50 /HPF — SIGNIFICANT CHANGE UP (ref 0–5)

## 2022-10-20 PROCEDURE — 74174 CTA ABD&PLVS W/CONTRAST: CPT | Mod: 26

## 2022-10-20 PROCEDURE — 36620 INSERTION CATHETER ARTERY: CPT

## 2022-10-20 PROCEDURE — 93308 TTE F-UP OR LMTD: CPT | Mod: 26,59

## 2022-10-20 PROCEDURE — 33016 PERICARDIOCENTESIS W/IMAGING: CPT

## 2022-10-20 PROCEDURE — 93010 ELECTROCARDIOGRAM REPORT: CPT

## 2022-10-20 PROCEDURE — 71275 CT ANGIOGRAPHY CHEST: CPT | Mod: 26

## 2022-10-20 PROCEDURE — 36556 INSERT NON-TUNNEL CV CATH: CPT

## 2022-10-20 PROCEDURE — 99232 SBSQ HOSP IP/OBS MODERATE 35: CPT | Mod: 25

## 2022-10-20 PROCEDURE — 99291 CRITICAL CARE FIRST HOUR: CPT | Mod: 25

## 2022-10-20 PROCEDURE — 70450 CT HEAD/BRAIN W/O DYE: CPT | Mod: 26

## 2022-10-20 PROCEDURE — 71045 X-RAY EXAM CHEST 1 VIEW: CPT | Mod: 26

## 2022-10-20 PROCEDURE — 88305 TISSUE EXAM BY PATHOLOGIST: CPT | Mod: 26

## 2022-10-20 PROCEDURE — 88112 CYTOPATH CELL ENHANCE TECH: CPT | Mod: 26

## 2022-10-20 PROCEDURE — 93321 DOPPLER ECHO F-UP/LMTD STD: CPT | Mod: 26,59

## 2022-10-20 RX ORDER — PROPOFOL 10 MG/ML
30 INJECTION, EMULSION INTRAVENOUS
Qty: 1000 | Refills: 0 | Status: DISCONTINUED | OUTPATIENT
Start: 2022-10-20 | End: 2022-10-21

## 2022-10-20 RX ORDER — CEFEPIME 1 G/1
INJECTION, POWDER, FOR SOLUTION INTRAMUSCULAR; INTRAVENOUS
Refills: 0 | Status: DISCONTINUED | OUTPATIENT
Start: 2022-10-20 | End: 2022-10-21

## 2022-10-20 RX ORDER — MONTELUKAST 4 MG/1
10 TABLET, CHEWABLE ORAL DAILY
Refills: 0 | Status: DISCONTINUED | OUTPATIENT
Start: 2022-10-20 | End: 2022-10-21

## 2022-10-20 RX ORDER — SODIUM BICARBONATE 1 MEQ/ML
50 SYRINGE (ML) INTRAVENOUS
Refills: 0 | Status: COMPLETED | OUTPATIENT
Start: 2022-10-20 | End: 2022-10-20

## 2022-10-20 RX ORDER — PHENYLEPHRINE HYDROCHLORIDE 10 MG/ML
0.1 INJECTION INTRAVENOUS
Qty: 40 | Refills: 0 | Status: DISCONTINUED | OUTPATIENT
Start: 2022-10-20 | End: 2022-10-20

## 2022-10-20 RX ORDER — ERYTHROPOIETIN 10000 [IU]/ML
10000 INJECTION, SOLUTION INTRAVENOUS; SUBCUTANEOUS ONCE
Refills: 0 | Status: COMPLETED | OUTPATIENT
Start: 2022-10-20 | End: 2022-10-20

## 2022-10-20 RX ORDER — PROPOFOL 10 MG/ML
30 INJECTION, EMULSION INTRAVENOUS
Qty: 500 | Refills: 0 | Status: DISCONTINUED | OUTPATIENT
Start: 2022-10-20 | End: 2022-10-20

## 2022-10-20 RX ORDER — PANTOPRAZOLE SODIUM 20 MG/1
40 TABLET, DELAYED RELEASE ORAL DAILY
Refills: 0 | Status: DISCONTINUED | OUTPATIENT
Start: 2022-10-20 | End: 2022-10-22

## 2022-10-20 RX ORDER — CEFEPIME 1 G/1
500 INJECTION, POWDER, FOR SOLUTION INTRAMUSCULAR; INTRAVENOUS ONCE
Refills: 0 | Status: COMPLETED | OUTPATIENT
Start: 2022-10-20 | End: 2022-10-20

## 2022-10-20 RX ORDER — CALCIUM GLUCONATE 100 MG/ML
2 VIAL (ML) INTRAVENOUS ONCE
Refills: 0 | Status: COMPLETED | OUTPATIENT
Start: 2022-10-20 | End: 2022-10-20

## 2022-10-20 RX ORDER — AMIODARONE HYDROCHLORIDE 400 MG/1
TABLET ORAL
Refills: 0 | Status: DISCONTINUED | OUTPATIENT
Start: 2022-10-20 | End: 2022-10-21

## 2022-10-20 RX ORDER — ONDANSETRON 8 MG/1
4 TABLET, FILM COATED ORAL ONCE
Refills: 0 | Status: COMPLETED | OUTPATIENT
Start: 2022-10-20 | End: 2022-10-20

## 2022-10-20 RX ORDER — AMIODARONE HYDROCHLORIDE 400 MG/1
400 TABLET ORAL EVERY 8 HOURS
Refills: 0 | Status: DISCONTINUED | OUTPATIENT
Start: 2022-10-20 | End: 2022-10-21

## 2022-10-20 RX ORDER — SODIUM CHLORIDE 9 MG/ML
3 INJECTION INTRAMUSCULAR; INTRAVENOUS; SUBCUTANEOUS EVERY 8 HOURS
Refills: 0 | Status: DISCONTINUED | OUTPATIENT
Start: 2022-10-20 | End: 2022-11-09

## 2022-10-20 RX ORDER — CHLORHEXIDINE GLUCONATE 213 G/1000ML
1 SOLUTION TOPICAL
Refills: 0 | Status: DISCONTINUED | OUTPATIENT
Start: 2022-10-20 | End: 2022-11-09

## 2022-10-20 RX ORDER — GEMFIBROZIL 600 MG
600 TABLET ORAL
Refills: 0 | Status: DISCONTINUED | OUTPATIENT
Start: 2022-10-20 | End: 2022-10-21

## 2022-10-20 RX ORDER — HYDROMORPHONE HYDROCHLORIDE 2 MG/ML
0.5 INJECTION INTRAMUSCULAR; INTRAVENOUS; SUBCUTANEOUS ONCE
Refills: 0 | Status: DISCONTINUED | OUTPATIENT
Start: 2022-10-20 | End: 2022-10-20

## 2022-10-20 RX ORDER — CHLORHEXIDINE GLUCONATE 213 G/1000ML
15 SOLUTION TOPICAL EVERY 12 HOURS
Refills: 0 | Status: DISCONTINUED | OUTPATIENT
Start: 2022-10-20 | End: 2022-10-21

## 2022-10-20 RX ORDER — ATORVASTATIN CALCIUM 80 MG/1
80 TABLET, FILM COATED ORAL AT BEDTIME
Refills: 0 | Status: DISCONTINUED | OUTPATIENT
Start: 2022-10-20 | End: 2022-10-21

## 2022-10-20 RX ORDER — RALOXIFENE HYDROCHLORIDE 60 MG/1
60 TABLET, COATED ORAL DAILY
Refills: 0 | Status: DISCONTINUED | OUTPATIENT
Start: 2022-10-20 | End: 2022-10-20

## 2022-10-20 RX ORDER — CEFEPIME 1 G/1
500 INJECTION, POWDER, FOR SOLUTION INTRAMUSCULAR; INTRAVENOUS EVERY 24 HOURS
Refills: 0 | Status: DISCONTINUED | OUTPATIENT
Start: 2022-10-21 | End: 2022-10-21

## 2022-10-20 RX ADMIN — SODIUM CHLORIDE 3 MILLILITER(S): 9 INJECTION INTRAMUSCULAR; INTRAVENOUS; SUBCUTANEOUS at 14:00

## 2022-10-20 RX ADMIN — CHLORHEXIDINE GLUCONATE 15 MILLILITER(S): 213 SOLUTION TOPICAL at 19:28

## 2022-10-20 RX ADMIN — Medication 50 MILLIEQUIVALENT(S): at 13:35

## 2022-10-20 RX ADMIN — Medication 200 GRAM(S): at 21:05

## 2022-10-20 RX ADMIN — AMIODARONE HYDROCHLORIDE 400 MILLIGRAM(S): 400 TABLET ORAL at 03:22

## 2022-10-20 RX ADMIN — ERYTHROPOIETIN 10000 UNIT(S): 10000 INJECTION, SOLUTION INTRAVENOUS; SUBCUTANEOUS at 19:28

## 2022-10-20 RX ADMIN — AMIODARONE HYDROCHLORIDE 400 MILLIGRAM(S): 400 TABLET ORAL at 21:13

## 2022-10-20 RX ADMIN — ATORVASTATIN CALCIUM 80 MILLIGRAM(S): 80 TABLET, FILM COATED ORAL at 21:13

## 2022-10-20 RX ADMIN — Medication 50 MILLIEQUIVALENT(S): at 13:30

## 2022-10-20 RX ADMIN — HYDROMORPHONE HYDROCHLORIDE 0.5 MILLIGRAM(S): 2 INJECTION INTRAMUSCULAR; INTRAVENOUS; SUBCUTANEOUS at 21:15

## 2022-10-20 RX ADMIN — HYDROMORPHONE HYDROCHLORIDE 0.5 MILLIGRAM(S): 2 INJECTION INTRAMUSCULAR; INTRAVENOUS; SUBCUTANEOUS at 21:30

## 2022-10-20 RX ADMIN — SODIUM CHLORIDE 3 MILLILITER(S): 9 INJECTION INTRAMUSCULAR; INTRAVENOUS; SUBCUTANEOUS at 21:06

## 2022-10-20 RX ADMIN — ONDANSETRON 4 MILLIGRAM(S): 8 TABLET, FILM COATED ORAL at 04:10

## 2022-10-20 RX ADMIN — Medication 10 MILLIGRAM(S): at 08:05

## 2022-10-20 RX ADMIN — CHLORHEXIDINE GLUCONATE 1 APPLICATION(S): 213 SOLUTION TOPICAL at 13:00

## 2022-10-20 RX ADMIN — CEFEPIME 100 MILLIGRAM(S): 1 INJECTION, POWDER, FOR SOLUTION INTRAMUSCULAR; INTRAVENOUS at 21:55

## 2022-10-20 RX ADMIN — Medication 600 MILLIGRAM(S): at 08:05

## 2022-10-20 RX ADMIN — SODIUM CHLORIDE 3 MILLILITER(S): 9 INJECTION INTRAMUSCULAR; INTRAVENOUS; SUBCUTANEOUS at 05:16

## 2022-10-20 NOTE — PROGRESS NOTE ADULT - SUBJECTIVE AND OBJECTIVE BOX
CRITICAL CARE ATTENDING - CTICU    MEDICATIONS  (STANDING):  aMIOdarone    Tablet   Oral   aMIOdarone    Tablet 400 milliGRAM(s) Oral every 8 hours  atorvastatin 80 milliGRAM(s) Oral at bedtime  busPIRone 10 milliGRAM(s) Oral two times a day  gemfibrozil 600 milliGRAM(s) Oral two times a day  montelukast 10 milliGRAM(s) Oral daily  phenylephrine    Infusion 0.1 MICROgram(s)/kG/Min (3.85 mL/Hr) IV Continuous <Continuous>  sodium chloride 0.9% lock flush 3 milliLiter(s) IV Push every 8 hours                                    8.3    24.38 )-----------( 216      ( 20 Oct 2022 05:39 )             27.1       10-20    138  |  94<L>  |  36<H>  ----------------------------<  88  4.9   |  18<L>  |  3.19<H>    Ca    8.2<L>      20 Oct 2022 05:39  Phos  6.0     10-19  Mg     2.0     10-19    TPro  6.5  /  Alb  3.6  /  TBili  1.1  /  DBili  x   /  AST  201<H>  /  ALT  127<H>  /  AlkPhos  151<H>  10-20      PT/INR - ( 20 Oct 2022 05:39 )   PT: 22.3 sec;   INR: 1.91 ratio         PTT - ( 20 Oct 2022 05:39 )  PTT:36.7 sec        Daily Height in cm: 162.56 (20 Oct 2022 05:14)    Daily       10-20 @ 07:01  -  10-20 @ 11:03  --------------------------------------------------------  IN: 0 mL / OUT: 0 mL / NET: 0 mL        Critically Ill patient  : [ x] preoperative ,   [ ] post operative    Requires :  [x ] Arterial Line   [x ] Central Line  [ ] PA catheter  [ ] IABP  [ ] ECMO  [ ] LVAD  [ ] Ventilator  [ ] pacemaker [ ] Impella.                      [x ] ABG's     [x ] Pulse Oxymetry Monitoring  Bedside evaluation , monitoring , treatment of hemodynamics , fluids , IVP/ IVCD meds.        Diagnosis:   10/20 Transferred to [facility] for pericardial effusion     Hypolipidemia     Hypocalcemia     Hemodynamic lability,  instability. Requires IVCD [ ] vasopressors [ ] inotropes  [ ] vasodilator  [x]IVSS fluid  to maintain MAP, perfusion, C.I.     IVCD Phenylephrine        Requires chest PT, pulmonary toilet, suctioning to maintain SaO2,  patent airway and treat atelectasis.     Chronic Kidney Disease     Last HD yesterday    Statin    CHF- acute [ ]   chronic [ ]    systolic [ ]   diatolic [ ]          - Echo- EF -             [x ] RV dysfunction [x] Large Pericardial Effusion           - Cxr-cardiomegally, edema          - Clinical-  [ ]inotropes   [ ]pressors   [ ]diuresis   [ ]IABP   [ ]ECMO   [ ]LVAD   [ ]Respiratory Failure.               I, Bang Meneses, personally performed the services described in this documentation. All medical record entries made by the scribe were at my direction and in my presence. I have reviewed the chart and agree that the record reflects my personal performance and is accurate and complete.   Bang Meneses MD.       By signing my name below, I, Mohamud Juarez, attest that this documentation has been prepared under the direction and in the presence of Bang Meneses MD.   Electronically Signed: Bam Jacome 10-20-22 @ 11:03        Discussed with CT surgeon, Physician Assistant - Nurse Practitioner- Critical care medicine team.   Dicussed at  AM / PM rounds.   Chart, labs , films reviewed.    Total Time:

## 2022-10-20 NOTE — H&P ADULT - NSHPLABSRESULTS_GEN_ALL_CORE
< from: TTE Echo Complete w/o Contrast w/ Doppler (10.18.22 @ 16:42) >    Summary:   1. Left ventricular ejection fraction, by visual estimation, is 55 to   60%.   2. Normal right ventricular size and function.   3. The left atrium is normal in size.   4. Moderate to large pericardial effusion with organized material. Early   diastolic invagination of the right atrium is present. No other   echocardiographic evidence of tamponade.   5. Moderate mitral annular calcification.   6. Aortic valve not well visualized. Mean gradient 4.0mmHg consistent   with a normally opening valve.   7. Increased relative wall thickness with normal mass index consistent   with left ventricular concentric remodeling.      [id number] Steve Jade MD, FACC  Electronically signed on 10/19/2022 at 9:38:51 AM    < end of copied text >

## 2022-10-20 NOTE — H&P ADULT - NSHPREVIEWOFSYSTEMS_GEN_ALL_CORE
REVIEW OF SYSTEMS:  CONSTITUTIONAL: Denies fever, weight loss or fatigue  NECK: Denies pain or stiffness  RESPIRATORY: + shortness of breath, Denies cough, wheezing, chills, hemoptysis   CARDIOVASCULAR: Denies chest pain, palpitations, dizziness, or leg swelling  GASTROINTESTINAL: Denies abdominal or epigastric pain, nausea, vomiting, hematemesis, diarrhea or melena  GENITOURINARY: Denies dysuria, frequency, hematuria, or incontinence  NEUROLOGICAL: Denies headaches, memory loss, loss of strength, numbness or tremors  SKIN: Denies itching, burning, rashes, or lesions   LYMPH NODES: Denies enlarged glands  ENDOCRINE: Denies heat or cold intolerance or hair loss  MUSCULOSKELETAL: Denies joint pain or swelling, muscle, back or extremity pain  PSYCHIATRIC: Denies depression, anxiety, mood swings or difficulty sleeping  HEME/LYMPH: Denies easy bruising or bleeding gums  ALLERGY: Denies hives or eczema

## 2022-10-20 NOTE — PROGRESS NOTE ADULT - SUBJECTIVE AND OBJECTIVE BOX
VITAL SIGNS-Telemetry:  SR   Vital Signs Last 24 Hrs  T(C): 36.8 (10-20-22 @ 05:00), Max: 36.8 (10-19-22 @ 14:40)  T(F): 98.3 (10-20-22 @ 05:00), Max: 98.3 (10-19-22 @ 14:40)  HR: 104 (10-20-22 @ 05:00) (65 - 105)  BP: 116/78 (10-20-22 @ 05:00) (103/70 - 131/74)  RR: 18 (10-20-22 @ 05:00) (16 - 20)  SpO2: 97% (10-20-22 @ 05:00) (95% - 99%)         10-20 @ 07:01  -  10-20 @ 10:07  --------------------------------------------------------  IN: 0 mL / OUT: 0 mL / NET: 0 mL    Daily Height in cm: 162.56 (20 Oct 2022 05:14)    Daily     CAPILLARY BLOOD GLUCOSE  POCT Blood Glucose.: 168 mg/dL (19 Oct 2022 21:21)  POCT Blood Glucose.: 180 mg/dL (19 Oct 2022 12:35)           PHYSICAL EXAM:  Neurology: alert and oriented x 3, nonfocal, no gross deficits  CV : S1S2  Sternal Wound :  CDI , Stable  Lungs: cta  Abdomen: soft, nontender, nondistended, positive bowel sounds, last bowel movement         Extremities:   cool extremeties-no edema,       aMIOdarone    Tablet   Oral   aMIOdarone    Tablet 400 milliGRAM(s) Oral every 8 hours  atorvastatin 80 milliGRAM(s) Oral at bedtime  busPIRone 10 milliGRAM(s) Oral two times a day  gemfibrozil 600 milliGRAM(s) Oral two times a day  montelukast 10 milliGRAM(s) Oral daily  sodium chloride 0.9% lock flush 3 milliLiter(s) IV Push every 8 hours    Physical Therapy Rec:   Home  [  ]   Home w/ PT  [  ]  Rehab  [  ]  Discussed with Cardiothoracic Team at AM rounds.

## 2022-10-20 NOTE — PROGRESS NOTE ADULT - ASSESSMENT
72F HTN, HLD, DM, CKD who presented to [facility] initially with MAR on CKD with acute uremia and metabolic derangements requiring urgent HD and was COVID-19 positive. During HD went into atrial fibrillation and started on HD, subsequently developing GIB thought 2/2 AC and acute uremia. Also on amiodarone for Afib. On TTE noted to have a moderate to large pericardial effusion with early echocardiographic evidence of tamponade. Clinically she is not hypotensive and she tolerates the fluid shifts related to HD. Patient today was transferred to [facility] CTS Dr. Gabriel for evaluation of Pericardial Effusion.      10/20 Patient seen and examined at bedside. all labs and imaging to be reviewed by Dr. Gabriel. Patient states she has some SOB. + RIJ HD cath noted. Vitals Stable. Plan for CT chest non con for evaluation. Plan for IR drainage of effusion in AM, NPO after MN.       Plan:  Admit to CTS under Dr. Gabriel  Hold Heparin gtt 2/2 effusion  IR consult in AM for Drainage  NPO for procedure  Hold BB and CCB 2/2 Large effusion  c/w Amio Load for Afib   last HD 10/19; monitor Bun/Cr  Reconsult Nephrology in AM  ISS, Monitor finger sticks     10/20/22 - CT chest scheduled - cancelled d/t hypotension - sbp 72 - pt w/ increased sob.  +covid- IVfluids given- Dr. Meneses called to bedside - pt transferred to CTU for evacuation of pericardial effusion.  Dr anderson on call surgeon notified - Dr. Pb Siegel in CTU to manage pt.   Recent GIB  Monitor H/H   Follow up Thyroid studies per Elsie in [facility]

## 2022-10-20 NOTE — CONSULT NOTE ADULT - ASSESSMENT
72F HTN, HLD, DM, CKD who presented to [facility] initially with MAR on CKD with acute uremia and  urgent HD, COVID-19 positive c/b afib and now moderate to large pericardial effusion    72F HTN, HLD, DM, CKD who presented to [facility] initially with MAR on CKD with acute uremia and  urgent HD, COVID-19 positive c/b afib and now moderate to large pericardial effusion     1 Renal- SP HD yesterday;  After pericardiocentesis will start with fluid removal  Start Phoslo when able   2 EPS- Amio load started;  Off A/C till pericardiocentesis  3 ID-On isolation at present    Sayed [facility]   [phone]     Addendum-Was hypotensive;  SP IVF and xfered to CTICU

## 2022-10-20 NOTE — H&P ADULT - ASSESSMENT
72F HTN, HLD, DM, CKD who presented to [facility] initially with MAR on CKD with acute uremia and metabolic derangements requiring urgent HD and was COVID-19 positive. During HD went into atrial fibrillation and started on HD, subsequently developing GIB thought 2/2 AC and acute uremia. Also on amiodarone for Afib. On TTE noted to have a moderate to large pericardial effusion with early echocardiographic evidence of tamponade. Clinically she is not hypotensive and she tolerates the fluid shifts related to HD. Patient today was transferred to [facility] CTS Dr. Gabriel for evaluation of Pericardial Effusion.      10/20 Patient seen and examined at bedside. all labs and imaging to be reviewed by Dr. Gabriel. Patient states she has some SOB. + RIJ HD cath noted. Vitals Stable. Plan for CT chest non con for evaluation. Plan for IR drainage of effusion in AM, NPO after MN.       Plan:  Admit to CTS under Dr. Gabriel  Hold Heparin gtt 2/2 effusion  IR consult in AM for Drainage  NPO for procedure  Hold BB and CCB 2/2 Large effusion  c/w Amio Load for Afib   last HD 10/19; monitor Bun/Cr  Reconsult Nephrology in AM  ISS, Monitor finger sticks   Recent GIB  Monitor H/H   Follow up Thyroid studies per Endo in [facility]

## 2022-10-20 NOTE — PATIENT PROFILE ADULT - FALL HARM RISK - HARM RISK INTERVENTIONS

## 2022-10-20 NOTE — H&P ADULT - NSHPPHYSICALEXAM_GEN_ALL_CORE
General: NAD  HEENT:  NC/AT + RIJ HD cath  Neuro: A&Ox3, no focal deficits noted  Respiratory: BS CTA b/l, no wheezes, rales or rhonchi noted  Cardiovascular: (+) S1/S2, no murmur appreciated  GI: Abd soft, NT/ND, (+) BSx4Q (+) BM  Peripheral Vascular:  no LE edema b/l, 2+ peripheral pulses b/l, no varicosities/PVD noted  Musculoskeletal: B/L UE and LE 5/5 strength   Psychiatric: Normal mood, normal affect observed  Skin: Normal exam to inspection and palpation. no bleeding, no hematoma.

## 2022-10-20 NOTE — PROGRESS NOTE ADULT - REASON FOR ADMISSION
Acute Renal Failure. Metabolic Acidosis

## 2022-10-20 NOTE — H&P ADULT - HISTORY OF PRESENT ILLNESS
72F HTN, HLD, DM, CKD who presented to [facility] initially with MAR on CKD with acute uremia and metabolic derangements requiring urgent HD and was COVID-19 positive. During HD went into atrial fibrillation and started on HD, subsequently developing GIB thought 2/2 AC and acute uremia. Also on amiodarone for Afib. On TTE noted to have a moderate to large pericardial effusion with early echocardiographic evidence of tamponade. Clinically she is not hypotensive and she tolerates the fluid shifts related to HD. Patient today was transferred to [facility] CTS Dr. Gabriel for evaluation of Pericardial Effusion.

## 2022-10-20 NOTE — PROGRESS NOTE ADULT - SUBJECTIVE AND OBJECTIVE BOX
Patient is a 72y old  Female who presents with a chief complaint of Acute Renal Failure. Metabolic Acidosis (19 Oct 2022 20:30)      Interval History: finger sticks are stable   sick thyroid syndrome   transferred to CT unit     MEDICATIONS  (STANDING):    MEDICATIONS  (PRN):      Allergies    No Known Allergies    Intolerances        REVIEW OF SYSTEMS:  CONSTITUTIONAL: no changes  EYES: No eye pain, visual disturbances, or discharge  ENMT:  No difficulty hearing, No sinus or throat pain  NECK: No pain or stiffness  RESPIRATORY: No cough, wheezing, chills or hemoptysis; No shortness of breath  CARDIOVASCULAR: No chest pain, palpitations or leg swelling  GASTROINTESTINAL: No abdominal or epigastric pain. No nausea, vomiting, or hematemesis; No diarrhea or constipation. No melena or hematochezia.  GENITOURINARY: No dysuria, frequency, hematuria, or incontinence  NEUROLOGICAL: No headaches, memory loss, loss of strength, numbness, or tremors  SKIN: No itching, burning, rashes, or lesions   ENDOCRINE: No heat or cold intolerance; No hair loss  MUSCULOSKELETAL: No joint pain or swelling; No muscle, back, or extremity pain  PSYCHIATRIC: No depression, anxiety, mood swings, or difficulty sleeping  HEME/LYMPH: No easy bruising, or bleeding gums  ALLERY AND IMMUNOLOGIC: No hives or eczema    Vital Signs Last 24 Hrs  T(C): 35.4 (20 Oct 2022 20:00), Max: 36.8 (20 Oct 2022 05:00)  T(F): 95.7 (20 Oct 2022 20:00), Max: 98.3 (20 Oct 2022 05:00)  HR: 68 (20 Oct 2022 21:30) (60 - 105)  BP: 141/74 (20 Oct 2022 10:25) (103/70 - 141/74)  BP(mean): 97 (20 Oct 2022 10:25) (64 - 97)  RR: 12 (20 Oct 2022 21:30) (5 - 41)  SpO2: 98% (20 Oct 2022 21:30) (87% - 100%)    Parameters below as of 19 Oct 2022 23:25  Patient On (Oxygen Delivery Method): room air        PHYSICAL EXAM:  GENERAL:   HEAD: Atraumatic, Normocephalic  EYES: PERRLA, conjunctiva and sclera clear  ENMT: No  exudates,; Moist mucous membranes,, No lesions  NECK: Supple, No JVD, Normal thyroid  NERVOUS SYSTEM:  Alert & Oriented,   CHEST/LUNG: Clear to auscultation bilaterally; No rales, rhonchi, wheezing, or rubs  HEART: Regular rate and rhythm; No murmurs, rubs, or gallops  ABDOMEN: Soft, Nontender, Nondistended; Bowel sounds present  EXTREMITIES:  2+ Peripheral Pulses, no edema  SKIN: No rashes or lesions    LABS:    PT/INR - ( 20 Oct 2022 15:03 )   PT: 33.0 sec;   INR: 2.84 ratio         PTT - ( 20 Oct 2022 15:03 )  PTT:28.9 sec  Urinalysis Basic - ( 20 Oct 2022 15:09 )    Color: Yellow / Appearance: Turbid / S.019 / pH: x  Gluc: x / Ketone: Trace  / Bili: Negative / Urobili: 2 mg/dL   Blood: x / Protein: 300 mg/dL / Nitrite: Negative   Leuk Esterase: Large / RBC: >50 /hpf / WBC >50 /HPF   Sq Epi: x / Non Sq Epi: 2 / Bacteria: Many      CAPILLARY BLOOD GLUCOSE      POCT Blood Glucose.: 145 mg/dL (20 Oct 2022 18:33)  POCT Blood Glucose.: 239 mg/dL (20 Oct 2022 11:08)  POCT Blood Glucose.: 231 mg/dL (20 Oct 2022 10:52)  POCT Blood Glucose.: 30 mg/dL (20 Oct 2022 10:45)    Lipid panel:       ABG - ( 20 Oct 2022 16:43 )  pH, Arterial: 7.38  pH, Blood: x     /  pCO2: 39    /  pO2: 129   / HCO3: 23    / Base Excess: -1.8  /  SaO2: 96.8                Thyroid:10- @ 05:39 TSH 0.73 <<Free T4>> 1.2 <<T3>> -- <<TG Ab>> -- <<ATPOAB>> -- <<TBG>> -- <<TSI>> -- Reverse T3 --    Diabetes Tests:  Parathyroid Panel:  Adrenals:  RADIOLOGY & ADDITIONAL TESTS:    Imaging Personally Reviewed:  [ ] YES  [ ] NO    Consultant(s) Notes Reviewed:  [ ] YES  [ ] NO    Care Discussed with Consultants/Other Providers [ ] YES  [ ] NO

## 2022-10-20 NOTE — CHART NOTE - NSCHARTNOTEFT_GEN_A_CORE
IR consulted for pericardial effusion drainage. Went to evaluate at bedside, pt is currently in cardiac cath lab undergoing drainage.     Global care per team  reconsult IR as needed.

## 2022-10-20 NOTE — PROGRESS NOTE ADULT - PROBLEM SELECTOR PLAN 1
no treatment for now   repeat thyroid function tests in a few days if clinically improving   in improvement TSH may increase to 20 or so
sick thyroid syndrome present   Check thyroid function tests in a few days for any recovery   no indication of Pituitary insufficient   no treatment for now

## 2022-10-20 NOTE — PROGRESS NOTE ADULT - SUBJECTIVE AND OBJECTIVE BOX
Patient seen and examined at the bedside.    Remained critically ill on continuous ICU monitoring.    OBJECTIVE:  Vital Signs Last 24 Hrs  T(C): 35.4 (20 Oct 2022 20:00), Max: 36.8 (20 Oct 2022 05:00)  T(F): 95.7 (20 Oct 2022 20:00), Max: 98.3 (20 Oct 2022 05:00)  HR: 65 (20 Oct 2022 20:00) (60 - 105)  BP: 141/74 (20 Oct 2022 10:25) (103/70 - 141/74)  BP(mean): 97 (20 Oct 2022 10:25) (64 - 97)  RR: 19 (20 Oct 2022 20:00) (6 - 41)  SpO2: 98% (20 Oct 2022 20:00) (87% - 100%)    Parameters below as of 20 Oct 2022 20:00  Patient On (Oxygen Delivery Method): ventilator    O2 Concentration (%): 40      Physical Exam:   General: Intubated  Neurology: Sedated   Eyes: bilateral pupils equal and reactive   ENT/Neck: Neck supple, trachea midline, No JVD   Respiratory: Clear bilaterally   CV: S1S2, no murmurs        [x] Pericardial drain         [x] Sinus rhythm  Abdominal: Soft, NT, ND +BS   Extremities: 1-2+ pedal edema noted, + peripheral pulses   Skin: No Rashes, Hematoma, Ecchymosis                           Assessment:  72F HTN, HLD, DM, CKD who presented to [facility] initially with MAR on CKD with acute uremia and metabolic derangements requiring urgent HD and was COVID-19 positive. During HD went into atrial fibrillation and started on HD, subsequently developing GIB thought 2/2 AC and acute uremia. Also on amiodarone for Afib. On TTE noted to have a moderate to large pericardial effusion with early echocardiographic evidence of tamponade. Clinically she is not hypotensive and she tolerates the fluid shifts related to HD. Patient today was transferred to [facility] CTS Dr. Gabriel for evaluation of Pericardial Effusion.      Pericardial effusion s/p pericardial effusion drainage 10/20/22  Hypocalcemia   Hypovolemia     Plan:   ***Neuro***  [x] Sedated with [x] Diprivan  c/w Buspar   Post operative neuro assessment     ***Cardiovascular***  Invasive hemodynamic monitoring, assess perfusion indices   SR / MAP 80/ Hct 27.0%/ Lactate 1.3  Volume:  [x] 2U prbc   Reassessment of hemodynamics post resuscitation   Amiodarone for afib prophylaxis   Monitor chest tube outputs   [x] Statin   Serial EKG and cardiac enzymes     ***Pulmonary***  Post op vent management   Titration of FiO2 and PEEP, follow SpO2, CXR, blood gasses     Mode: AC/ CMV (Assist Control/ Continuous Mandatory Ventilation)  RR (machine): 12  TV (machine): 500  FiO2: 50  PEEP: 5  ITime: 1  MAP: 9  PIP: 25              ***GI***  [x] Diet: NPO, except medications   [x] Protonix     ***Renal***  GFR 14/ [x] MAR on CKD  Continue to monitor I/Os, BUN/Creatinine.   Replete lytes PRN  Bah present     ***ID***  Cefepime IVPB for UTI    ***Endocrine***  [x] DM1 : HbA1c 6.0%      - continue to monitor glucose for need to initiate sliding scale        Patient requires continuous monitoring with bedside rhythm monitoring, pulse oximetry monitoring, and continuous central venous and arterial pressure monitoring; and intermittent blood gas analysis. Care plan discussed with the ICU care team.   Patient remained critical, at risk for life threatening decompensation.    I have spent 45 minutes providing critical care management to this patient.    By signing my name below, I, Li Gramajo, attest that this documentation has been prepared under the direction and in the presence of Emma Mehta NP.  Electronically signed: Carlos Navarro, 10-20-22 @ 20:56    I, Emma Mehta, personally performed the services described in this documentation. all medical record entries made by the carlos were at my direction and in my presence. I have reviewed the chart and agree that the record reflects my personal performance and is accurate and complete  Electronically signed: Emma Mehta NP. Patient seen and examined at the bedside.    Remained critically ill on continuous ICU monitoring.    OBJECTIVE:  Vital Signs Last 24 Hrs  T(C): 35.4 (20 Oct 2022 20:00), Max: 36.8 (20 Oct 2022 05:00)  T(F): 95.7 (20 Oct 2022 20:00), Max: 98.3 (20 Oct 2022 05:00)  HR: 65 (20 Oct 2022 20:00) (60 - 105)  BP: 141/74 (20 Oct 2022 10:25) (103/70 - 141/74)  BP(mean): 97 (20 Oct 2022 10:25) (64 - 97)  RR: 19 (20 Oct 2022 20:00) (6 - 41)  SpO2: 98% (20 Oct 2022 20:00) (87% - 100%)    Parameters below as of 20 Oct 2022 20:00  Patient On (Oxygen Delivery Method): ventilator    O2 Concentration (%): 40      Physical Exam:   General: Intubated  Neurology: Sedated   Eyes: bilateral pupils equal and reactive   ENT/Neck: Neck supple, trachea midline, No JVD   Respiratory: Clear bilaterally   CV: S1S2, no murmurs        [x] Pericardial drain         [x] Sinus rhythm  Abdominal: Soft, NT, ND +BS   Extremities: 1-2+ pedal edema noted, + peripheral pulses   Skin: No Rashes, Hematoma, Ecchymosis                           Assessment:  72F HTN, HLD, DM, CKD who presented to [facility] initially with MAR on CKD with acute uremia and metabolic derangements requiring urgent HD and was COVID-19 positive. During HD went into atrial fibrillation and started on HD, subsequently developing GIB thought 2/2 AC and acute uremia. Also on amiodarone for Afib. On TTE noted to have a moderate to large pericardial effusion with early echocardiographic evidence of tamponade. Clinically she is not hypotensive and she tolerates the fluid shifts related to HD. Patient today was transferred to [facility] CTS Dr. Gabriel for evaluation of Pericardial Effusion.      Pericardial effusion s/p pericardial effusion drainage 10/20/22  Hypocalcemia   Hypovolemia     Plan:   ***Neuro***  [x] Sedated with [x] Diprivan  c/w Buspar   Post operative neuro assessment     ***Cardiovascular***  Invasive hemodynamic monitoring, assess perfusion indices   SR / MAP 80/ Hct 27.0%/ Lactate 1.3  Volume:  [x] 2U prbc   Reassessment of hemodynamics post resuscitation   Amiodarone for afib prophylaxis   Monitor chest tube outputs   [x] Statin   Serial EKG and cardiac enzymes     ***Pulmonary***  Post op vent management   Titration of FiO2 and PEEP, follow SpO2, CXR, blood gasses     Mode: AC/ CMV (Assist Control/ Continuous Mandatory Ventilation)  RR (machine): 12  TV (machine): 500  FiO2: 50  PEEP: 5  ITime: 1  MAP: 9  PIP: 25              ***GI***  [x] Diet: NPO, except medications   [x] Protonix   Dc all hepatotoxic meds, follow up ammonia level, hepatic profile  ***Renal***  GFR 14/ [x] MAR on CKD  Continue to monitor I/Os, BUN/Creatinine.   Replete lytes PRN  Olvin present     ***ID***  Cefepime IVPB for UTI    ***Endocrine***  [x] DM1 : HbA1c 6.0%      - continue to monitor glucose for need to initiate sliding scale        Patient requires continuous monitoring with bedside rhythm monitoring, pulse oximetry monitoring, and continuous central venous and arterial pressure monitoring; and intermittent blood gas analysis. Care plan discussed with the ICU care team.   Patient remained critical, at risk for life threatening decompensation.    I have spent 45 minutes providing critical care management to this patient.    By signing my name below, I, Li Gramajo, attest that this documentation has been prepared under the direction and in the presence of Emma Mehta NP.  Electronically signed: Bam Navarro, 10-20-22 @ 20:56    I, Emma Mehta, personally performed the services described in this documentation. all medical record entries made by the jorgeiblalo were at my direction and in my presence. I have reviewed the chart and agree that the record reflects my personal performance and is accurate and complete  Electronically signed: Emma Mehta NP.

## 2022-10-20 NOTE — CONSULT NOTE ADULT - SUBJECTIVE AND OBJECTIVE BOX
NEPHROLOGY - [facility]    Patient seen and examined.    HPI:  72F HTN, HLD, DM, CKD who presented to [facility] initially with MAR on CKD with acute uremia and metabolic derangements requiring urgent HD and was COVID-19 positive. During HD went into atrial fibrillation and started on HD, subsequently developing GIB thought 2/2 AC and acute uremia. Also on amiodarone for Afib. On TTE noted to have a moderate to large pericardial effusion with early echocardiographic evidence of tamponade. Clinically she is not hypotensive and she tolerates the fluid shifts related to HD. Patient today was transferred to [facility] CTS Dr. Gabriel for evaluation of Pericardial Effusion.   (20 Oct 2022 02:28)      PAST MEDICAL & SURGICAL HISTORY:  HTN (hypertension)      HLD (hyperlipidemia)      DM (diabetes mellitus)          MEDICATIONS  (STANDING):  aMIOdarone    Tablet   Oral   aMIOdarone    Tablet 400 milliGRAM(s) Oral every 8 hours  atorvastatin 80 milliGRAM(s) Oral at bedtime  busPIRone 10 milliGRAM(s) Oral two times a day  gemfibrozil 600 milliGRAM(s) Oral two times a day  montelukast 10 milliGRAM(s) Oral daily  sodium chloride 0.9% lock flush 3 milliLiter(s) IV Push every 8 hours      Allergies    No Known Allergies    Intolerances        SOCIAL HISTORY:  Denies alcohol abuse, drug abuse or tobacco usage.     FAMILY HISTORY:  FH: kidney disease        VITALS:  T(C): 36.8 (10-20-22 @ 05:00), Max: 36.8 (10-19-22 @ 14:40)  HR: 104 (10-20-22 @ 05:00) (65 - 105)  BP: 116/78 (10-20-22 @ 05:00) (103/70 - 131/74)  RR: 18 (10-20-22 @ 05:00) (16 - 20)  SpO2: 97% (10-20-22 @ 05:00) (95% - 99%)    REVIEW OF SYSTEMS:  Denies any nausea, vomiting, diarrhea, fever or chills. Denies chest pain, SOB, focal weakness, hematuria or dysuria. Good oral intake and denies fatigue or weakness. All other pertinent systems are reviewed and are negative.    PHYSICAL EXAM:  Constitutional: NAD  HEENT: EOMI  Neck:  No JVD, supple   Respiratory: CTA B/L  Cardiovascular: S1 and S2, RRR  Gastrointestinal: + BS, soft, NT, ND  Extremities: No peripheral edema, + peripheral pulses  Neurological: A/O x 3, CN2-12 intact  Psychiatric: Normal mood, normal affect  : No Bah  Skin: No rashes, C/D/I  Access: Not applicable    I and O's:    Height (cm): 162.6 (10-20 @ 05:14)  Weight (kg): 102.7 (10-20 @ 05:14)  BMI (kg/m2): 38.8 (10-20 @ 05:14)  BSA (m2): 2.06 (10-20 @ 05:14)    LABS:                        8.3    24.38 )-----------( 216      ( 20 Oct 2022 05:39 )             27.1     10-20    138  |  94<L>  |  36<H>  ----------------------------<  88  4.9   |  18<L>  |  3.19<H>    Ca    8.2<L>      20 Oct 2022 05:39  Phos  6.0     10-19  Mg     2.0     10-19    TPro  6.5  /  Alb  3.6  /  TBili  1.1  /  DBili  x   /  AST  201<H>  /  ALT  127<H>  /  AlkPhos  151<H>  10-20      URINE:      RADIOLOGY & ADDITIONAL STUDIES:     NEPHROLOGY - [facility]    Patient seen and examined.    HPI:  72F HTN, HLD, DM, CKD who presented to [facility] initially with MAR on CKD with acute uremia and metabolic derangements requiring urgent HD and was COVID-19 positive. During HD went into atrial fibrillation and started on HD, subsequently developing GIB thought 2/2 AC and acute uremia. Also on amiodarone for Afib. On TTE noted to have a moderate to large pericardial effusion with early echocardiographic evidence of tamponade. Clinically she is not hypotensive and she tolerates the fluid shifts related to HD. Patient today was transferred to [facility] CTS Dr. Gabriel for evaluation of Pericardial Effusion.    Pt is covered by [facility] at [facility]         PAST MEDICAL & SURGICAL HISTORY:  HTN (hypertension)      HLD (hyperlipidemia)      DM (diabetes mellitus)          MEDICATIONS  (STANDING):  aMIOdarone    Tablet   Oral   aMIOdarone    Tablet 400 milliGRAM(s) Oral every 8 hours  atorvastatin 80 milliGRAM(s) Oral at bedtime  busPIRone 10 milliGRAM(s) Oral two times a day  gemfibrozil 600 milliGRAM(s) Oral two times a day  montelukast 10 milliGRAM(s) Oral daily  sodium chloride 0.9% lock flush 3 milliLiter(s) IV Push every 8 hours      Allergies    No Known Allergies    Intolerances        SOCIAL HISTORY:  Denies alcohol abuse, drug abuse or tobacco usage.     FAMILY HISTORY:  FH: kidney disease        VITALS:  T(C): 36.8 (10-20-22 @ 05:00), Max: 36.8 (10-19-22 @ 14:40)  HR: 104 (10-20-22 @ 05:00) (65 - 105)  BP: 116/78 (10-20-22 @ 05:00) (103/70 - 131/74)  RR: 18 (10-20-22 @ 05:00) (16 - 20)  SpO2: 97% (10-20-22 @ 05:00) (95% - 99%)    REVIEW OF SYSTEMS:  Denies any nausea, vomiting, diarrhea, fever or chills. Denies chest pain, SOB, focal weakness, hematuria or dysuria. Good oral intake and denies fatigue or weakness. All other pertinent systems are reviewed and are negative.    PHYSICAL EXAM:  Constitutional: NAD  HEENT: EOMI  Neck:  No JVD, supple   Respiratory: CTA B/L  Cardiovascular: S1 and S2, RRR  Gastrointestinal: + BS, soft, NT, ND  Extremities: No peripheral edema, + peripheral pulses  Neurological: A/O x 3, CN2-12 intact  Psychiatric: Normal mood, normal affect  : No Bah  Skin: No rashes, C/D/I  Access: Not applicable    I and O's:    Height (cm): 162.6 (10-20 @ 05:14)  Weight (kg): 102.7 (10-20 @ 05:14)  BMI (kg/m2): 38.8 (10-20 @ 05:14)  BSA (m2): 2.06 (10-20 @ 05:14)    LABS:                        8.3    24.38 )-----------( 216      ( 20 Oct 2022 05:39 )             27.1     10-20    138  |  94<L>  |  36<H>  ----------------------------<  88  4.9   |  18<L>  |  3.19<H>    Ca    8.2<L>      20 Oct 2022 05:39  Phos  6.0     10-19  Mg     2.0     10-19    TPro  6.5  /  Alb  3.6  /  TBili  1.1  /  DBili  x   /  AST  201<H>  /  ALT  127<H>  /  AlkPhos  151<H>  10-20      URINE:      RADIOLOGY & ADDITIONAL STUDIES:     NEPHROLOGY - [facility]    Patient seen and examined.    HPI:  72F HTN, HLD, DM, CKD who presented to [facility] initially with MAR on CKD with acute uremia and metabolic derangements requiring urgent HD and was COVID-19 positive. During HD went into atrial fibrillation and started on HD, subsequently developing GIB thought 2/2 AC and acute uremia. Also on amiodarone for Afib. On TTE noted to have a moderate to large pericardial effusion with early echocardiographic evidence of tamponade. Clinically she is not hypotensive and she tolerates the fluid shifts related to HD. Patient today was transferred to [facility] CTS Dr. Gabriel for evaluation of Pericardial Effusion.    Pt is covered by [facility] at [facility]  SHe had HD yesterday with no fluid removal          PAST MEDICAL & SURGICAL HISTORY:  HTN (hypertension)      HLD (hyperlipidemia)      DM (diabetes mellitus)          MEDICATIONS  (STANDING):  aMIOdarone    Tablet   Oral   aMIOdarone    Tablet 400 milliGRAM(s) Oral every 8 hours  atorvastatin 80 milliGRAM(s) Oral at bedtime  busPIRone 10 milliGRAM(s) Oral two times a day  gemfibrozil 600 milliGRAM(s) Oral two times a day  montelukast 10 milliGRAM(s) Oral daily  sodium chloride 0.9% lock flush 3 milliLiter(s) IV Push every 8 hours      Allergies    No Known Allergies    Intolerances        SOCIAL HISTORY:  Denies alcohol abuse, drug abuse or tobacco usage.     FAMILY HISTORY:  FH: kidney disease        VITALS:  T(C): 36.8 (10-20-22 @ 05:00), Max: 36.8 (10-19-22 @ 14:40)  HR: 104 (10-20-22 @ 05:00) (65 - 105)  BP: 116/78 (10-20-22 @ 05:00) (103/70 - 131/74)  RR: 18 (10-20-22 @ 05:00) (16 - 20)  SpO2: 97% (10-20-22 @ 05:00) (95% - 99%)    REVIEW OF SYSTEMS:  +  SOB,   fatigue n  weakness. All other pertinent systems are reviewed and are negative.    PHYSICAL EXAM:  Constitutional: NAD  HEENT: EOMI  Neck:  No JVD, supple   Respiratory: Course   Cardiovascular: S1 and S2, RRR  Gastrointestinal: + BS, soft, NT, ND  Extremities: No peripheral edema, + peripheral pulses  Neurological: A/O x 3, CN2-12 intact  Psychiatric: Normal mood, normal affect  : No Bah  Skin: No rashes, C/D/I  Access: + shiley     I and O's:    Height (cm): 162.6 (10-20 @ 05:14)  Weight (kg): 102.7 (10-20 @ 05:14)  BMI (kg/m2): 38.8 (10-20 @ 05:14)  BSA (m2): 2.06 (10-20 @ 05:14)    LABS:                        8.3    24.38 )-----------( 216      ( 20 Oct 2022 05:39 )             27.1     10-20    138  |  94<L>  |  36<H>  ----------------------------<  88  4.9   |  18<L>  |  3.19<H>    Ca    8.2<L>      20 Oct 2022 05:39  Phos  6.0     10-19  Mg     2.0     10-19    TPro  6.5  /  Alb  3.6  /  TBili  1.1  /  DBili  x   /  AST  201<H>  /  ALT  127<H>  /  AlkPhos  151<H>  10-20      URINE:      RADIOLOGY & ADDITIONAL STUDIES:    < from: Xray Chest 1 View- PORTABLE-Routine (Xray Chest 1 View- PORTABLE-Routine .) (10.16.22 @ 13:21) >    ACC: [id number] EXAM:  XR CHEST PORTABLE ROUTINE 1V                        ACC: [id number] EXAM:  XR CHEST PORTABLE URGENT 1V                          PROCEDURE DATE:  10/15/2022          INTERPRETATION:  Portable chest radiograph    CLINICAL INFORMATION: Line placement    TECHNIQUE:  Portable  AP chest radiograph.    COMPARISON: 10/14/2022 chest .    FINDINGS:  CATHETERS AND TUBES:] RIGHT IJ catheter tip in SVC.    PULMONARY: The visualized lungs are clear of airspace consolidations or   effusions.   No pneumothorax.    HEART/VASCULAR: The  heart is enlarged in transverse diameter.    BONES: Visualized osseous structures are intact.    IMPRESSION:   RIGHT IJ catheter tip in SVC.  Cardiomegaly.  Lungs clear..  --------------------------------------------------  FOLLOW-UP AP PORTABLE CHEST RADIOGRAPH 10/16/2022 AT 12:31 PM:  Indication: RIGHT IJ catheter placement. Follow-up    FINDINGS:    Ill-defined multifocal RIGHT lung peripheral airspace disease. LEFT lung   parenchyma clear. Otherwiseno interval change.    --- End of Report ---            AUSTIN DUNN MD; Attending Radiologist  This document has been electronically signed. Oct 18 2022 12:46PM    < end of copied text >

## 2022-10-21 LAB
ALBUMIN SERPL ELPH-MCNC: 2.6 G/DL — LOW (ref 3.3–5)
ALBUMIN SERPL ELPH-MCNC: 2.9 G/DL — LOW (ref 3.3–5)
ALP SERPL-CCNC: 162 U/L — HIGH (ref 40–120)
ALP SERPL-CCNC: 186 U/L — HIGH (ref 40–120)
ALT FLD-CCNC: 1441 U/L — HIGH (ref 10–45)
ALT FLD-CCNC: 1518 U/L — HIGH (ref 10–45)
AMMONIA BLD-MCNC: 33 UMOL/L — SIGNIFICANT CHANGE UP (ref 11–55)
AMYLASE P1 CFR SERPL: 98 U/L — SIGNIFICANT CHANGE UP (ref 25–125)
ANION GAP SERPL CALC-SCNC: 15 MMOL/L — SIGNIFICANT CHANGE UP (ref 5–17)
ANION GAP SERPL CALC-SCNC: 15 MMOL/L — SIGNIFICANT CHANGE UP (ref 5–17)
APTT BLD: 27.5 SEC — SIGNIFICANT CHANGE UP (ref 27.5–35.5)
AST SERPL-CCNC: 3810 U/L — HIGH (ref 10–40)
AST SERPL-CCNC: 3851 U/L — HIGH (ref 10–40)
BASE EXCESS BLDV CALC-SCNC: 2.7 MMOL/L — SIGNIFICANT CHANGE UP (ref -2–3)
BILIRUB SERPL-MCNC: 1 MG/DL — SIGNIFICANT CHANGE UP (ref 0.2–1.2)
BILIRUB SERPL-MCNC: 1.1 MG/DL — SIGNIFICANT CHANGE UP (ref 0.2–1.2)
BUN SERPL-MCNC: 26 MG/DL — HIGH (ref 7–23)
BUN SERPL-MCNC: 31 MG/DL — HIGH (ref 7–23)
CALCIUM SERPL-MCNC: 8 MG/DL — LOW (ref 8.4–10.5)
CALCIUM SERPL-MCNC: 8 MG/DL — LOW (ref 8.4–10.5)
CHLORIDE SERPL-SCNC: 98 MMOL/L — SIGNIFICANT CHANGE UP (ref 96–108)
CHLORIDE SERPL-SCNC: 99 MMOL/L — SIGNIFICANT CHANGE UP (ref 96–108)
CO2 BLDV-SCNC: 29 MMOL/L — HIGH (ref 22–26)
CO2 SERPL-SCNC: 23 MMOL/L — SIGNIFICANT CHANGE UP (ref 22–31)
CO2 SERPL-SCNC: 24 MMOL/L — SIGNIFICANT CHANGE UP (ref 22–31)
CREAT SERPL-MCNC: 2.34 MG/DL — HIGH (ref 0.5–1.3)
CREAT SERPL-MCNC: 3.01 MG/DL — HIGH (ref 0.5–1.3)
EGFR: 16 ML/MIN/1.73M2 — LOW
EGFR: 22 ML/MIN/1.73M2 — LOW
FIBRINOGEN PPP-MCNC: 523 MG/DL — HIGH (ref 330–520)
GAS PNL BLDA: SIGNIFICANT CHANGE UP
GAS PNL BLDV: SIGNIFICANT CHANGE UP
GLUCOSE BLDC GLUCOMTR-MCNC: 110 MG/DL — HIGH (ref 70–99)
GLUCOSE BLDC GLUCOMTR-MCNC: 118 MG/DL — HIGH (ref 70–99)
GLUCOSE BLDC GLUCOMTR-MCNC: 80 MG/DL — SIGNIFICANT CHANGE UP (ref 70–99)
GLUCOSE BLDC GLUCOMTR-MCNC: 84 MG/DL — SIGNIFICANT CHANGE UP (ref 70–99)
GLUCOSE BLDC GLUCOMTR-MCNC: 97 MG/DL — SIGNIFICANT CHANGE UP (ref 70–99)
GLUCOSE BLDC GLUCOMTR-MCNC: 97 MG/DL — SIGNIFICANT CHANGE UP (ref 70–99)
GLUCOSE BLDC GLUCOMTR-MCNC: 98 MG/DL — SIGNIFICANT CHANGE UP (ref 70–99)
GLUCOSE BLDC GLUCOMTR-MCNC: 99 MG/DL — SIGNIFICANT CHANGE UP (ref 70–99)
GLUCOSE SERPL-MCNC: 103 MG/DL — HIGH (ref 70–99)
GLUCOSE SERPL-MCNC: 99 MG/DL — SIGNIFICANT CHANGE UP (ref 70–99)
HCO3 BLDV-SCNC: 28 MMOL/L — SIGNIFICANT CHANGE UP (ref 22–29)
HCT VFR BLD CALC: 26 % — LOW (ref 34.5–45)
HCV AB S/CO SERPL IA: 0.05 S/CO — SIGNIFICANT CHANGE UP (ref 0–0.99)
HCV AB SERPL-IMP: SIGNIFICANT CHANGE UP
HGB BLD-MCNC: 8.9 G/DL — LOW (ref 11.5–15.5)
HOROWITZ INDEX BLDV+IHG-RTO: 40 — SIGNIFICANT CHANGE UP
INR BLD: 2.52 RATIO — HIGH (ref 0.88–1.16)
LIDOCAIN IGE QN: 103 U/L — HIGH (ref 7–60)
MAGNESIUM SERPL-MCNC: 1.8 MG/DL — SIGNIFICANT CHANGE UP (ref 1.6–2.6)
MCHC RBC-ENTMCNC: 31.6 PG — SIGNIFICANT CHANGE UP (ref 27–34)
MCHC RBC-ENTMCNC: 34.2 GM/DL — SIGNIFICANT CHANGE UP (ref 32–36)
MCV RBC AUTO: 92.2 FL — SIGNIFICANT CHANGE UP (ref 80–100)
NRBC # BLD: 0 /100 WBCS — SIGNIFICANT CHANGE UP (ref 0–0)
PCO2 BLDV: 44 MMHG — HIGH (ref 39–42)
PH BLDV: 7.41 — SIGNIFICANT CHANGE UP (ref 7.32–7.43)
PHOSPHATE SERPL-MCNC: 5 MG/DL — HIGH (ref 2.5–4.5)
PLATELET # BLD AUTO: 201 K/UL — SIGNIFICANT CHANGE UP (ref 150–400)
PO2 BLDV: 56 MMHG — HIGH (ref 25–45)
POTASSIUM SERPL-MCNC: 3.4 MMOL/L — LOW (ref 3.5–5.3)
POTASSIUM SERPL-MCNC: 3.8 MMOL/L — SIGNIFICANT CHANGE UP (ref 3.5–5.3)
POTASSIUM SERPL-SCNC: 3.4 MMOL/L — LOW (ref 3.5–5.3)
POTASSIUM SERPL-SCNC: 3.8 MMOL/L — SIGNIFICANT CHANGE UP (ref 3.5–5.3)
PROT SERPL-MCNC: 5.1 G/DL — LOW (ref 6–8.3)
PROT SERPL-MCNC: 5.5 G/DL — LOW (ref 6–8.3)
PROTHROM AB SERPL-ACNC: 29.3 SEC — HIGH (ref 10.5–13.4)
RBC # BLD: 2.82 M/UL — LOW (ref 3.8–5.2)
RBC # FLD: 17.8 % — HIGH (ref 10.3–14.5)
SAO2 % BLDV: 85.7 % — SIGNIFICANT CHANGE UP (ref 67–88)
SODIUM SERPL-SCNC: 137 MMOL/L — SIGNIFICANT CHANGE UP (ref 135–145)
SODIUM SERPL-SCNC: 137 MMOL/L — SIGNIFICANT CHANGE UP (ref 135–145)
TRIGL SERPL-MCNC: 729 MG/DL — HIGH
WBC # BLD: 26.37 K/UL — HIGH (ref 3.8–10.5)
WBC # FLD AUTO: 26.37 K/UL — HIGH (ref 3.8–10.5)

## 2022-10-21 PROCEDURE — 93308 TTE F-UP OR LMTD: CPT | Mod: 26

## 2022-10-21 PROCEDURE — 99221 1ST HOSP IP/OBS SF/LOW 40: CPT

## 2022-10-21 PROCEDURE — 71045 X-RAY EXAM CHEST 1 VIEW: CPT | Mod: 26

## 2022-10-21 PROCEDURE — 93321 DOPPLER ECHO F-UP/LMTD STD: CPT | Mod: 26

## 2022-10-21 PROCEDURE — 99291 CRITICAL CARE FIRST HOUR: CPT

## 2022-10-21 PROCEDURE — 99223 1ST HOSP IP/OBS HIGH 75: CPT

## 2022-10-21 RX ORDER — DIGOXIN 250 MCG
250 TABLET ORAL ONCE
Refills: 0 | Status: COMPLETED | OUTPATIENT
Start: 2022-10-21 | End: 2022-10-21

## 2022-10-21 RX ORDER — MAGNESIUM SULFATE 500 MG/ML
2 VIAL (ML) INJECTION ONCE
Refills: 0 | Status: COMPLETED | OUTPATIENT
Start: 2022-10-21 | End: 2022-10-21

## 2022-10-21 RX ORDER — DEXTROSE 50 % IN WATER 50 %
25 SYRINGE (ML) INTRAVENOUS ONCE
Refills: 0 | Status: COMPLETED | OUTPATIENT
Start: 2022-10-21 | End: 2022-10-21

## 2022-10-21 RX ORDER — SODIUM CHLORIDE 9 MG/ML
1000 INJECTION, SOLUTION INTRAVENOUS
Refills: 0 | Status: DISCONTINUED | OUTPATIENT
Start: 2022-10-21 | End: 2022-10-21

## 2022-10-21 RX ORDER — ALBUMIN HUMAN 25 %
250 VIAL (ML) INTRAVENOUS ONCE
Refills: 0 | Status: COMPLETED | OUTPATIENT
Start: 2022-10-21 | End: 2022-10-21

## 2022-10-21 RX ORDER — POTASSIUM CHLORIDE 20 MEQ
10 PACKET (EA) ORAL
Refills: 0 | Status: COMPLETED | OUTPATIENT
Start: 2022-10-21 | End: 2022-10-21

## 2022-10-21 RX ORDER — CEFTRIAXONE 500 MG/1
1000 INJECTION, POWDER, FOR SOLUTION INTRAMUSCULAR; INTRAVENOUS EVERY 24 HOURS
Refills: 0 | Status: DISCONTINUED | OUTPATIENT
Start: 2022-10-22 | End: 2022-10-26

## 2022-10-21 RX ORDER — INSULIN LISPRO 100/ML
VIAL (ML) SUBCUTANEOUS AT BEDTIME
Refills: 0 | Status: DISCONTINUED | OUTPATIENT
Start: 2022-10-21 | End: 2022-11-09

## 2022-10-21 RX ORDER — INSULIN LISPRO 100/ML
VIAL (ML) SUBCUTANEOUS
Refills: 0 | Status: DISCONTINUED | OUTPATIENT
Start: 2022-10-21 | End: 2022-11-09

## 2022-10-21 RX ORDER — MUPIROCIN 20 MG/G
1 OINTMENT TOPICAL
Refills: 0 | Status: COMPLETED | OUTPATIENT
Start: 2022-10-21 | End: 2022-10-26

## 2022-10-21 RX ORDER — CALCIUM GLUCONATE 100 MG/ML
2 VIAL (ML) INTRAVENOUS ONCE
Refills: 0 | Status: COMPLETED | OUTPATIENT
Start: 2022-10-21 | End: 2022-10-21

## 2022-10-21 RX ADMIN — Medication 200 GRAM(S): at 00:50

## 2022-10-21 RX ADMIN — SODIUM CHLORIDE 3 MILLILITER(S): 9 INJECTION INTRAMUSCULAR; INTRAVENOUS; SUBCUTANEOUS at 14:26

## 2022-10-21 RX ADMIN — Medication 50 MILLIEQUIVALENT(S): at 00:45

## 2022-10-21 RX ADMIN — CEFEPIME 100 MILLIGRAM(S): 1 INJECTION, POWDER, FOR SOLUTION INTRAMUSCULAR; INTRAVENOUS at 18:01

## 2022-10-21 RX ADMIN — Medication 125 MILLILITER(S): at 06:34

## 2022-10-21 RX ADMIN — Medication 50 MILLIEQUIVALENT(S): at 01:16

## 2022-10-21 RX ADMIN — SODIUM CHLORIDE 3 MILLILITER(S): 9 INJECTION INTRAMUSCULAR; INTRAVENOUS; SUBCUTANEOUS at 05:42

## 2022-10-21 RX ADMIN — Medication 250 MICROGRAM(S): at 22:07

## 2022-10-21 RX ADMIN — Medication 25 GRAM(S): at 01:35

## 2022-10-21 RX ADMIN — PANTOPRAZOLE SODIUM 40 MILLIGRAM(S): 20 TABLET, DELAYED RELEASE ORAL at 12:10

## 2022-10-21 RX ADMIN — CHLORHEXIDINE GLUCONATE 1 APPLICATION(S): 213 SOLUTION TOPICAL at 05:42

## 2022-10-21 RX ADMIN — Medication 125 MILLILITER(S): at 06:35

## 2022-10-21 RX ADMIN — MUPIROCIN 1 APPLICATION(S): 20 OINTMENT TOPICAL at 18:01

## 2022-10-21 RX ADMIN — SODIUM CHLORIDE 3 MILLILITER(S): 9 INJECTION INTRAMUSCULAR; INTRAVENOUS; SUBCUTANEOUS at 21:11

## 2022-10-21 RX ADMIN — Medication 25 MILLILITER(S): at 06:33

## 2022-10-21 RX ADMIN — CHLORHEXIDINE GLUCONATE 15 MILLILITER(S): 213 SOLUTION TOPICAL at 05:41

## 2022-10-21 NOTE — DIETITIAN INITIAL EVALUATION ADULT - NUTRITIONGOAL OUTCOME2
Pt to meet >80% estimated nutrition needs via tolerated route, maintain  -180mg/dl; promote wound healing

## 2022-10-21 NOTE — DIETITIAN INITIAL EVALUATION ADULT - REASON
Unable to perform Nutrition Focused Physical Assessment in current setting; RD will reassess as appropriate.  Unable to perform Nutrition Focused Physical Assessment in current setting; RD will reassess as appropriate.   Pt appears appropriately developed (visual assessment only).

## 2022-10-21 NOTE — CONSULT NOTE ADULT - SUBJECTIVE AND OBJECTIVE BOX
Wound Surgery Consult Note:    HPI:  72F HTN, HLD, DM, CKD who presented to [facility] initially with MAR on CKD with acute uremia and metabolic derangements requiring urgent HD and was COVID-19 positive. During HD went into atrial fibrillation and started on HD, subsequently developing GIB thought 2/2 AC and acute uremia. Also on amiodarone for Afib. On TTE noted to have a moderate to large pericardial effusion with early echocardiographic evidence of tamponade. Clinically she is not hypotensive and she tolerates the fluid shifts related to HD. Patient today was transferred to [facility] CTS Dr. Gabriel for evaluation of Pericardial Effusion.   (20 Oct 2022 02:28)    Request for wound care consult for sacral/bilateral buttocks injury received from nursing. Mr. Mckinley was encountered on alternating air with low air loss surface. She was seen with her clinical nurse. She was very letahargic    PAST MEDICAL & SURGICAL HISTORY:  HTN (hypertension)  DM (diabetes mellitus)    REVIEW OF SYSTEMS  General:	no fevers or chills  Ophthalmologic: no vision changes  ENMT:	no sore throat or hoarseness  Respiratory and Thorax: no SOB or cough  Cardiovascular:	Afib, pericardial effusion with tamponade  Gastrointestinal:	no n/v/d  Genitourinary:	MAR on CKD, s/p HD  Musculoskeletal:	 ambulatory  Neurological:	no AMS  Psychiatric:	no h/o erratic or aggressive behavior  Hematology/Lymphatics:	 anemia  Endocrine:	DM  Skin: no h/o or chronic wounds  All other systems negative    MEDICATIONS  (STANDING):  cefepime   IVPB      cefepime   IVPB 500 milliGRAM(s) IV Intermittent every 24 hours  chlorhexidine 2% Cloths 1 Application(s) Topical <User Schedule>  insulin lispro (ADMELOG) corrective regimen sliding scale   SubCutaneous three times a day before meals  insulin lispro (ADMELOG) corrective regimen sliding scale   SubCutaneous at bedtime  mupirocin 2% Nasal 1 Application(s) Both Nostrils two times a day  pantoprazole  Injectable 40 milliGRAM(s) IV Push daily  sodium chloride 0.9% lock flush 3 milliLiter(s) IV Push every 8 hours    MEDICATIONS  (PRN):    Allergies    No Known Allergies    Intolerances    SOCIAL HISTORY:  , Denies smoking, ETOH, drugs    FAMILY HISTORY:  FH: kidney disease    Vital Signs Last 24 Hrs  T(C): 37.1 (21 Oct 2022 12:00), Max: 37.1 (21 Oct 2022 12:00)  T(F): 98.8 (21 Oct 2022 12:00), Max: 98.8 (21 Oct 2022 12:00)  HR: 72 (21 Oct 2022 14:00) (60 - 87)  BP: --  BP(mean): --  RR: 16 (21 Oct 2022 14:00) (5 - 29)  SpO2: 98% (21 Oct 2022 14:00) (94% - 100%)    Parameters below as of 21 Oct 2022 12:00  Patient On (Oxygen Delivery Method): nasal cannula  O2 Flow (L/min): 4    Physical Exam:  General: Alert, thin  Ophthamology: sclera clear  ENMT: moist mucous membranes, trachea midline  Respiratory: equal chest rise with respirations  Gastrointestinal: soft NT/ND  Neurology: verbal, following commands  Psych: calm, appropriate  Musculoskeletal: no contractures, no deformities  Vascular: BLE edema equal, BLE equally warm  Skin:  Sacral/bilateral buttocks with deep maroon discoloration (L12cm x W10cm) superficially denuded skin in and around the gluteal cleft L 4cm X W 4cm x D 0.1cm with pink wound bed, no necrotic tissue, and scant serosanguinous drainage  No odor, erythema, increased warmth, tenderness, induration, fluctuance    LABS:  10-21    137  |  99  |  31<H>  ----------------------------<  103<H>  3.8   |  23  |  3.01<H>    Ca    8.0<L>      21 Oct 2022 11:03  Phos  5.0     10-21  Mg     1.8     10-21    TPro  5.5<L>  /  Alb  2.9<L>  /  TBili  1.0  /  DBili  x   /  AST  3810<H>  /  ALT  1441<H>  /  AlkPhos  186<H>  10-21                          8.9    26.37 )-----------( 201      ( 21 Oct 2022 00:23 )             26.0     PT/INR - ( 21 Oct 2022 00:23 )   PT: 29.3 sec;   INR: 2.52 ratio         PTT - ( 21 Oct 2022 00:23 )  PTT:27.5 sec  Urinalysis Basic - ( 20 Oct 2022 15:09 )    Color: Yellow / Appearance: Turbid / S.019 / pH: x  Gluc: x / Ketone: Trace  / Bili: Negative / Urobili: 2 mg/dL   Blood: x / Protein: 300 mg/dL / Nitrite: Negative   Leuk Esterase: Large / RBC: >50 /hpf / WBC >50 /HPF   Sq Epi: x / Non Sq Epi: 2 / Bacteria: Many       Wound Surgery Consult Note:    HPI:  72F HTN, HLD, DM, CKD who presented to [facility] initially with MAR on CKD with acute uremia and metabolic derangements requiring urgent HD and was COVID-19 positive. During HD went into atrial fibrillation and started on HD, subsequently developing GIB thought 2/2 AC and acute uremia. Also on amiodarone for Afib. On TTE noted to have a moderate to large pericardial effusion with early echocardiographic evidence of tamponade. Clinically she is not hypotensive and she tolerates the fluid shifts related to HD. Patient today was transferred to [facility] CTS Dr. Gabriel for evaluation of Pericardial Effusion.   (20 Oct 2022 02:28)    Request for wound care consult for sacral/bilateral buttocks injury received from nursing. Mr. Mckinley was encountered on alternating air with low air loss surface. She was seen with her clinical nurse. She was very lethargic and incontinent of stool. Her extreme immobility, inactivity, gross incontinence of stool as well as poor nutritional status all contribute to her high risk for pressure injury development and hinder healing. Identification of the initial signs of deep tissue damage at the level of the skin within 72 hours of admission make this an injury that occurred prior to admission.    PAST MEDICAL & SURGICAL HISTORY:  HTN (hypertension)  DM (diabetes mellitus)    REVIEW OF SYSTEMS  General:	no fevers or chills  Ophthalmologic: no vision changes  ENMT:	no sore throat or hoarseness  Respiratory and Thorax: no SOB or cough  Cardiovascular:	Afib, pericardial effusion with tamponade  Gastrointestinal:	no n/v/d  Genitourinary:	MAR on CKD, s/p HD  Musculoskeletal:	 ambulatory  Neurological:	no AMS  Psychiatric:	no h/o erratic or aggressive behavior  Hematology/Lymphatics:	 anemia  Endocrine:	DM  Skin: no h/o or chronic wounds  All other systems negative    MEDICATIONS  (STANDING):  cefepime   IVPB      cefepime   IVPB 500 milliGRAM(s) IV Intermittent every 24 hours  chlorhexidine 2% Cloths 1 Application(s) Topical <User Schedule>  insulin lispro (ADMELOG) corrective regimen sliding scale   SubCutaneous three times a day before meals  insulin lispro (ADMELOG) corrective regimen sliding scale   SubCutaneous at bedtime  mupirocin 2% Nasal 1 Application(s) Both Nostrils two times a day  pantoprazole  Injectable 40 milliGRAM(s) IV Push daily  sodium chloride 0.9% lock flush 3 milliLiter(s) IV Push every 8 hours    MEDICATIONS  (PRN):    Allergies    No Known Allergies    Intolerances    SOCIAL HISTORY:  , Denies smoking, ETOH, drugs    FAMILY HISTORY:  FH: kidney disease    Vital Signs Last 24 Hrs  T(C): 37.1 (21 Oct 2022 12:00), Max: 37.1 (21 Oct 2022 12:00)  T(F): 98.8 (21 Oct 2022 12:00), Max: 98.8 (21 Oct 2022 12:00)  HR: 72 (21 Oct 2022 14:00) (60 - 87)  BP: --  BP(mean): --  RR: 16 (21 Oct 2022 14:00) (5 - 29)  SpO2: 98% (21 Oct 2022 14:00) (94% - 100%)    Parameters below as of 21 Oct 2022 12:00  Patient On (Oxygen Delivery Method): nasal cannula  O2 Flow (L/min): 4    Physical Exam:  General: Alert, thin  Ophthamology: sclera clear  ENMT: moist mucous membranes, trachea midline  Respiratory: equal chest rise with respirations  Gastrointestinal: soft NT/ND  Neurology: verbal, following commands  Psych: calm, appropriate  Musculoskeletal: no contractures, no deformities  Vascular: BLE edema equal, BLE equally warm  Skin:  Sacral/bilateral buttocks with deep maroon discoloration (L12cm x W10cm) superficially denuded skin in and around the gluteal cleft L 4cm X W 4cm x D 0.1cm with pink wound bed, no necrotic tissue, and scant serosanguinous drainage  No odor, erythema, increased warmth, tenderness, induration, fluctuance    LABS:  10-21    137  |  99  |  31<H>  ----------------------------<  103<H>  3.8   |  23  |  3.01<H>    Ca    8.0<L>      21 Oct 2022 11:03  Phos  5.0     10-21  Mg     1.8     10-21    TPro  5.5<L>  /  Alb  2.9<L>  /  TBili  1.0  /  DBili  x   /  AST  3810<H>  /  ALT  1441<H>  /  AlkPhos  186<H>  10-21                          8.9    26.37 )-----------( 201      ( 21 Oct 2022 00:23 )             26.0     PT/INR - ( 21 Oct 2022 00:23 )   PT: 29.3 sec;   INR: 2.52 ratio         PTT - ( 21 Oct 2022 00:23 )  PTT:27.5 sec  Urinalysis Basic - ( 20 Oct 2022 15:09 )    Color: Yellow / Appearance: Turbid / S.019 / pH: x  Gluc: x / Ketone: Trace  / Bili: Negative / Urobili: 2 mg/dL   Blood: x / Protein: 300 mg/dL / Nitrite: Negative   Leuk Esterase: Large / RBC: >50 /hpf / WBC >50 /HPF   Sq Epi: x / Non Sq Epi: 2 / Bacteria: Many

## 2022-10-21 NOTE — DIETITIAN INITIAL EVALUATION ADULT - OTHER INFO
Nutrition Status:  - Pt transferred to CTU for evacuation of pericardial effusion  - Renal: MAR on CKD; acute uremia; s/p urgent HD 10/20 (zero fluid removed).  - Hyperphosphatemia: per Nephrology, start PhosLo when able. Hypokalemia noted.  - Recent GI bleed noted  - Endocrine is following for sick thyroid syndrome. No current need for ISS; HbA1c 6% noted.

## 2022-10-21 NOTE — DIETITIAN INITIAL EVALUATION ADULT - NS FNS REASON FOR WEIGHT CHANG
s/p urgent HD; requiring pericardiocentesis and fluid removal on admission to [facility]/fluid loss/fluid retention

## 2022-10-21 NOTE — CONSULT NOTE ADULT - ASSESSMENT
Impression:    Sacral/bilateral Buttocks deep tissue injury present on admisison  Incontinence of bowel  Incontinence Dermatitis    Recommend:  1.) topical therapy: sacral/buttock injury - cleanse with incontinence cleanser, pat dry, apply allevyn foam dressing every other day and PRN for maceration  2.) Incontinence Management - incontinence cleanser, pads, pericare BID  3.) Maintain on an alternating air with low air loss surface  4.) Turn and reposition Q 2 hours  5.) Nutrition optimization - please add Gio  6.) Offload heels/feet with complete cair air fluidized boots; ensure that the soles of the feet are not resting on the foot board of the bed.  7.) Glycemic control - continue insulin SS    Care as per medicine. Will not actively follow but will remain available. Please recall for new issues or deterioration.  Upon discharge f[facility]u [facility] at [facility] [address] [place] [address] [phone]  Thank you for this consult  Seen and discussed with clinical nurse  Nilda Garcia, YVETTE-C, CWOCN [id number] Impression:    Sacral/bilateral Buttocks deep tissue injury present on admission  Incontinence of bowel  Incontinence Dermatitis    Recommend:  1.) topical therapy: sacral/buttock injury - cleanse with incontinence cleanser, pat dry, apply allevyn foam dressing every other day and PRN for maceration  2.) Incontinence Management - incontinence cleanser, pads, pericare BID  3.) Maintain on an alternating air with low air loss surface  4.) Turn and reposition Q 2 hours  5.) Nutrition optimization - please add Gio  6.) Offload heels/feet with complete cair air fluidized boots; ensure that the soles of the feet are not resting on the foot board of the bed.  7.) Glycemic control - continue insulin SS    Care as per medicine. Will not actively follow but will remain available. Please recall for new issues or deterioration.  Upon discharge f/u as outpatient at [facility] [address] [place] [address] [phone]  Thank you for this consult  Seen and discussed with clinical nurse  Nilda Garcia, YVETTE-C, CWOCN [id number]

## 2022-10-21 NOTE — DIETITIAN INITIAL EVALUATION ADULT - PERTINENT MEDS FT
MEDICATIONS  (STANDING):  cefepime   IVPB      cefepime   IVPB 500 milliGRAM(s) IV Intermittent every 24 hours  chlorhexidine 0.12% Liquid 15 milliLiter(s) Oral Mucosa every 12 hours  chlorhexidine 2% Cloths 1 Application(s) Topical <User Schedule>  dextrose 10% + sodium chloride 0.9%. 1000 milliLiter(s) (30 mL/Hr) IV Continuous <Continuous>  mupirocin 2% Nasal 1 Application(s) Both Nostrils two times a day  pantoprazole  Injectable 40 milliGRAM(s) IV Push daily  propofol Infusion 30 MICROgram(s)/kG/Min (18.5 mL/Hr) IV Continuous <Continuous>  sodium chloride 0.9% lock flush 3 milliLiter(s) IV Push every 8 hours

## 2022-10-21 NOTE — PROGRESS NOTE ADULT - SUBJECTIVE AND OBJECTIVE BOX
NEPHROLOGY[facility] [phone]        Patient seen and examined in bed. Not on pressors at present         MEDICATIONS  (STANDING):  cefepime   IVPB      cefepime   IVPB 500 milliGRAM(s) IV Intermittent every 24 hours  chlorhexidine 0.12% Liquid 15 milliLiter(s) Oral Mucosa every 12 hours  chlorhexidine 2% Cloths 1 Application(s) Topical <User Schedule>  dextrose 10% + sodium chloride 0.9%. 1000 milliLiter(s) (30 mL/Hr) IV Continuous <Continuous>  mupirocin 2% Nasal 1 Application(s) Both Nostrils two times a day  pantoprazole  Injectable 40 milliGRAM(s) IV Push daily  propofol Infusion 30 MICROgram(s)/kG/Min (18.5 mL/Hr) IV Continuous <Continuous>  sodium chloride 0.9% lock flush 3 milliLiter(s) IV Push every 8 hours      VITAL:  T(C): , Max: 37 (10-21-22 @ 04:00)  T(F): , Max: 98.6 (10-21-22 @ 04:00)  HR: 68 (10-21-22 @ 08:00)  BP: 141/74 (10-20-22 @ 10:25)  BP(mean): 97 (10-20-22 @ 10:25)  RR: 13 (10-21-22 @ 08:00)  SpO2: 99% (10-21-22 @ 08:00)  Wt(kg): --    I and O's:    10-20 @ 07:  -  10-21 @ 07:00  --------------------------------------------------------  IN: 2171.7 mL / OUT: 925 mL / NET: 1246.7 mL    10-21 @ :  -  10-21 @ 08:04  --------------------------------------------------------  IN: 92.3 mL / OUT: 0 mL / NET: 92.3 mL          PHYSICAL EXAM:    Constitutional: NAD  Neck:  No JVD  Respiratory: CTAB/L  Cardiovascular: S1 and S2  Gastrointestinal: BS+, soft, NT/ND  Extremities: No peripheral edema  Neurological: A/O x 3, no focal deficits  Psychiatric: Normal mood, normal affect  : No Bha  Skin: No rashes  Access: [facility]     LABS:                        8.9    26.37 )-----------( 201      ( 21 Oct 2022 00:23 )             26.0     10    137  |  98  |  26<H>  ----------------------------<  99  3.4<L>   |  24  |  2.34<H>    Ca    8.0<L>      21 Oct 2022 00:23  Phos  5.0     10-21  Mg     1.8     10-21    TPro  5.1<L>  /  Alb  2.6<L>  /  TBili  1.1  /  DBili  x   /  AST  3851<H>  /  ALT  1518<H>  /  AlkPhos  162<H>  10-21          Urine Studies:  Urinalysis Basic - ( 20 Oct 2022 15:09 )    Color: Yellow / Appearance: Turbid / S.019 / pH: x  Gluc: x / Ketone: Trace  / Bili: Negative / Urobili: 2 mg/dL   Blood: x / Protein: 300 mg/dL / Nitrite: Negative   Leuk Esterase: Large / RBC: >50 /hpf / WBC >50 /HPF   Sq Epi: x / Non Sq Epi: 2 / Bacteria: Many            RADIOLOGY & ADDITIONAL STUDIES:            < from: CT Angio Abdomen and Pelvis w/ IV Cont (10.20.22 @ 14:22) >    ACC: [id number] EXAM:  CT ANGIO ABD PELV (W)AW IC                          PROCEDURE DATE:  10/20/2022          INTERPRETATION:  CT ABDOMEN AND PELVIS: The abdomen and pelvis images did   not crossover during the initial scan. The CT images of the abdomen and   pelvis are reported below.    TECHNIQUE: Images were obtained after the uneventful administration of 1   25 cc of nonionic intravenous contrast (Omnipaque 350). Maximum intensity   projection images were generated.    Abdomen:    Liver, gallbladder and biliary system: The liver is unremarkable. No   intra or extrahepatic biliary ductal dilatation. The gallbladder wall is   thickened.    Pancreas: The pancreas is normal.    Spleen: The spleen is normal.    Adrenal glands and kidneys: The adrenal glands are normal.    The left kidney is normal. No hydronephrosis. No hydroureter.    The right kidney contains a hypodense subcentimeter lesion the midpole of   the kidney which are statistically benign and likely represents a cyst.   The right kidney is otherwise normal. No hydronephrosis or hydroureter.    Lymph nodes: There are no enlarged retroperitoneal upper abdominal lymph   nodes.    Bowel: Stomach is incompletely distended with an enteric catheter in   place. The small bowel is normal in caliber. The large bowel is normal in   caliber. No free air. Small amount of free fluid in the dependent pelvis.    Vascular: The abdominal aorta is normal in caliber. The celiac artery,   SMA, GIOVANNI and single bilateral renal arteries are proximally patent.   However, there is likely greater than 50% stenosis at the ostium of the   right renal artery.    The bilateral common, external, and internal iliac arteries are normal in   caliber.    Aortic calcifications.    Bladder and reproductive system: The bladder is incomplete distended with   a Bah catheter in place. The uterus and adnexa are not evaluated.    Bones And Soft Tissues: Spondylosis of the thoracic spine. Spondylosis of   the lumbar spine. Ankylosis of the L5 and S1 vertebral bodies.  Body wall   anasarca.      IMPRESSION:    ABDOMEN AND PELVIS:    1.  No aneurysm or dissection.    --- End of Report ---            HUYEN HUNT MD; Attending Radiologist  This document has been electronically signed. Oct 20 2022  4:34PM    < end of copied text >   NEPHROLOGY[facility] [phone]        Patient seen and examined in bed. Not on pressors at present   Had to be intubated p the paracentesis and CT scan     ros-unable        MEDICATIONS  (STANDING):  cefepime   IVPB      cefepime   IVPB 500 milliGRAM(s) IV Intermittent every 24 hours  chlorhexidine 0.12% Liquid 15 milliLiter(s) Oral Mucosa every 12 hours  chlorhexidine 2% Cloths 1 Application(s) Topical <User Schedule>  dextrose 10% + sodium chloride 0.9%. 1000 milliLiter(s) (30 mL/Hr) IV Continuous <Continuous>  mupirocin 2% Nasal 1 Application(s) Both Nostrils two times a day  pantoprazole  Injectable 40 milliGRAM(s) IV Push daily  propofol Infusion 30 MICROgram(s)/kG/Min (18.5 mL/Hr) IV Continuous <Continuous>  sodium chloride 0.9% lock flush 3 milliLiter(s) IV Push every 8 hours      VITAL:  T(C): , Max: 37 (10-21-22 @ 04:00)  T(F): , Max: 98.6 (10-21-22 @ 04:00)  HR: 68 (10-21-22 @ 08:00)  BP: 141/74 (10-20-22 @ 10:25)  BP(mean): 97 (10-20-22 @ 10:25)  RR: 13 (10-21-22 @ 08:00)  SpO2: 99% (10-21-22 @ 08:00)  Wt(kg): --    I and O's:    10-20 @ 07:  -  10-21 @ 07:00  --------------------------------------------------------  IN: 2171.7 mL / OUT: 925 mL / NET: 1246.7 mL    10-21 @ 07:01  -  10-21 @ 08:04  --------------------------------------------------------  IN: 92.3 mL / OUT: 0 mL / NET: 92.3 mL          PHYSICAL EXAM:    Constitutional: intubated   Neck:  No JVD  Respiratory: transmitted upper   Cardiovascular: S1 and S2  Gastrointestinal: BS+, soft, NT/ND  Extremities: No peripheral edema  Neurological: A/O x 3, no focal deficits  Psychiatric: Normal mood, normal affect  : No Bah  Skin: No rashes  Access: [facility]     LABS:                        8.9    26.37 )-----------( 201      ( 21 Oct 2022 00:23 )             26.0     10-    137  |  98  |  26<H>  ----------------------------<  99  3.4<L>   |  24  |  2.34<H>    Ca    8.0<L>      21 Oct 2022 00:23  Phos  5.0     10-  Mg     1.8     10-21    TPro  5.1<L>  /  Alb  2.6<L>  /  TBili  1.1  /  DBili  x   /  AST  3851<H>  /  ALT  1518<H>  /  AlkPhos  162<H>  10-          Urine Studies:  Urinalysis Basic - ( 20 Oct 2022 15:09 )    Color: Yellow / Appearance: Turbid / S.019 / pH: x  Gluc: x / Ketone: Trace  / Bili: Negative / Urobili: 2 mg/dL   Blood: x / Protein: 300 mg/dL / Nitrite: Negative   Leuk Esterase: Large / RBC: >50 /hpf / WBC >50 /HPF   Sq Epi: x / Non Sq Epi: 2 / Bacteria: Many            RADIOLOGY & ADDITIONAL STUDIES:            < from: CT Angio Abdomen and Pelvis w/ IV Cont (10.20.22 @ 14:22) >    ACC: [id number] EXAM:  CT ANGIO ABD PELV (W)AW IC                          PROCEDURE DATE:  10/20/2022          INTERPRETATION:  CT ABDOMEN AND PELVIS: The abdomen and pelvis images did   not crossover during the initial scan. The CT images of the abdomen and   pelvis are reported below.    TECHNIQUE: Images were obtained after the uneventful administration of 1   25 cc of nonionic intravenous contrast (Omnipaque 350). Maximum intensity   projection images were generated.    Abdomen:    Liver, gallbladder and biliary system: The liver is unremarkable. No   intra or extrahepatic biliary ductal dilatation. The gallbladder wall is   thickened.    Pancreas: The pancreas is normal.    Spleen: The spleen is normal.    Adrenal glands and kidneys: The adrenal glands are normal.    The left kidney is normal. No hydronephrosis. No hydroureter.    The right kidney contains a hypodense subcentimeter lesion the midpole of   the kidney which are statistically benign and likely represents a cyst.   The right kidney is otherwise normal. No hydronephrosis or hydroureter.    Lymph nodes: There are no enlarged retroperitoneal upper abdominal lymph   nodes.    Bowel: Stomach is incompletely distended with an enteric catheter in   place. The small bowel is normal in caliber. The large bowel is normal in   caliber. No free air. Small amount of free fluid in the dependent pelvis.    Vascular: The abdominal aorta is normal in caliber. The celiac artery,   SMA, GIOVANNI and single bilateral renal arteries are proximally patent.   However, there is likely greater than 50% stenosis at the ostium of the   right renal artery.    The bilateral common, external, and internal iliac arteries are normal in   caliber.    Aortic calcifications.    Bladder and reproductive system: The bladder is incomplete distended with   a Bah catheter in place. The uterus and adnexa are not evaluated.    Bones And Soft Tissues: Spondylosis of the thoracic spine. Spondylosis of   the lumbar spine. Ankylosis of the L5 and S1 vertebral bodies.  Body wall   anasarca.      IMPRESSION:    ABDOMEN AND PELVIS:    1.  No aneurysm or dissection.    --- End of Report ---            HUYEN HUNT MD; Attending Radiologist  This document has been electronically signed. Oct 20 2022  4:34PM    < end of copied text >

## 2022-10-21 NOTE — DIETITIAN INITIAL EVALUATION ADULT - REASON FOR ADMISSION
Malignant pericardial effusion    Per chart: "72F HTN, HLD, DM, CKD who presented to [facility] [place] initially with MAR on CKD with acute uremia and metabolic derangements requiring urgent HD and was COVID-19 positive. During HD went into atrial fibrillation and started on HD, subsequently developing GIB thought 2/2 AC and acute uremia. Also on amiodarone for Afib. On TTE noted to have a moderate to large pericardial effusion with early echocardiographic evidence of tamponade. Clinically she is not hypotensive and she tolerates the fluid shifts related to HD. Patient today was transferred to [facility] CTS Dr. Gabriel for evaluation of Pericardial Effusion."    Interim Events:  - s/p pericardial effusion drainage 10/20/22  - s/p urgent HD 10/20/22

## 2022-10-21 NOTE — DIETITIAN INITIAL EVALUATION ADULT - ENTERAL
If EN is warranted, initiate Nepro @ 10 ml/hr, advance as tolerated to GOAL 40 ml/hr x 24 hrs to provide 960ml formula, 1699 kcal (31 kcal/kg), 78 grams protein (1.4 g/kg), 698 ml free water; based on IBW 54.4kg, with consideration for BMI>30 and intubation.

## 2022-10-21 NOTE — DIETITIAN INITIAL EVALUATION ADULT - PERTINENT LABORATORY DATA
10-21    137  |  98  |  26<H>  ----------------------------<  99  3.4<L>   |  24  |  2.34<H>    Ca    8.0<L>      21 Oct 2022 00:23  Phos  5.0     10-21  Mg     1.8     10-21    TPro  5.1<L>  /  Alb  2.6<L>  /  TBili  1.1  /  DBili  x   /  AST  3851<H>  /  ALT  1518<H>  /  AlkPhos  162<H>  10-21  POCT Blood Glucose.: 97 mg/dL (10-21-22 @ 06:58)  A1C with Estimated Average Glucose Result: 6.0 % (10-20-22 @ 05:39)  A1C with Estimated Average Glucose Result: 6.0 % (10-15-22 @ 02:24)

## 2022-10-21 NOTE — CONSULT NOTE ADULT - ASSESSMENT
72F HTN, HLD, DM, CKD who presented to [facility] initially with MAR on CKD with acute uremia and metabolic derangements requiring urgent HD and was COVID-19 positive.    Overall leucocytosis, transaminitis, hypothermia. Abnormal U/A.       PLAN:  recommend switching cefepime to ceftriaxone 1 gm iv q24h   follow up urine cx  follow up blood cx.   pericardial fluid cx NTD.   trend cbc for leucocytosis, multifactorial at this time.   trend LFT's ? shock liver.   Recent CT with no source.       Plan discussed with CTS DIRK Chiang  Please contact through MS Teams   If no response or past 5 pm/weekend call [phone].

## 2022-10-21 NOTE — CONSULT NOTE ADULT - ASSESSMENT
Interventional Radiology    Evaluate for Procedure: tunneled HD Catheter placement     HPI:72F HTN, HLD, DM, CKD who presented to [facility] initially with MAR on CKD with acute uremia and metabolic derangements requiring urgent HD and was COVID-19 positive. s/p drainage of pericardial effusion. IR consulted for tunneled HD Catheter placement.  Per team, pt status is improving. Pt is extubated today and is planning to be transferred from ICU level of care.     Allergies:   Medications (Abx/Cardiac/Anticoagulation/Blood Products)    aMIOdarone    Tablet: 400 milliGRAM(s) Oral (10-20 @ 21:13)  cefepime   IVPB: 100 mL/Hr IV Intermittent (10-20 @ 21:55)  heparin  Infusion: 14 mL/Hr IV Continuous (10-19 @ 20:12)    Data:    T(C): 37.1  HR: 71  BP: --  RR: 27  SpO2: 96%    -WBC 26.37 / HgB 8.9 / Hct 26.0 / Plt 201  -Na 137 / Cl 99 / BUN 31 / Glucose 103  -K 3.8 / CO2 23 / Cr 3.01  -ALT 1441 / Alk Phos 186 / T.Bili 1.0  -INR 2.52 / PTT 27.5    Radiology:     Assessment/Plan: 72F HTN, HLD, DM, CKD who presented to [facility] initially with MAR on CKD with acute uremia and metabolic derangements requiring urgent HD and was COVID-19 positive. s/p drainage of pericardial effusion. IR consulted for tunneled HD Catheter placement.  Per team, pt status is improving. Pt is extubated today and is planning to be transferred from ICU level of care.      - Will plan for tunneled HD catheter on 10/26.  - Place order under Jovan.  - Leukocytosis noted. Please obtain ID clearance prior to planned procedure.   - COVID positive on 10/14. NEED COVID TEST WITHIN  5 DAYS OF PLANNED PROCEDURE.  - Pt needs to be NPO AMN.    - Needs STAT CBC, BMP, Coags in AM.  - D/w Mile (ICU ACP).

## 2022-10-21 NOTE — DIETITIAN INITIAL EVALUATION ADULT - CONTINUE CURRENT NUTRITION CARE PLAN
1. Initiate nutrition via tolerated route as medically feasible.  2. If diet is advanced, recommend No Concentrated Phosphorus. Monitor need for Consistent Carbohydrate diet restriction.  3. Add Nepro 1x/day when diet is advanced/yes

## 2022-10-21 NOTE — PROGRESS NOTE ADULT - ACP WITH SCRIBE
I personally performed the service described in the documentation  recorded by the scribe in my presence, and it accurately and completely records my words and actions.
I personally performed the service described in the documentation  recorded by the scribe in my presence, and it accurately and completely records my words and actions.

## 2022-10-21 NOTE — DIETITIAN INITIAL EVALUATION ADULT - REASON INDICATOR FOR ASSESSMENT
Nutrition Consult for Pressure Injury Stage II received and appreciated.   Information obtained from: medical record, communication with team.   Pt intubated, sedated; Covid-19 isolation.

## 2022-10-21 NOTE — DIETITIAN NUTRITION RISK NOTIFICATION - ADDITIONAL COMMENTS/DIETITIAN RECOMMENDATIONS
1. Initiate nutrition via tolerated route as medically feasible.  2. If diet is advanced, recommend No Concentrated Phosphorus. Monitor need for Consistent Carbohydrate diet restriction.  3. Add Nepro 1x/day when diet is advanced  4. If EN is warranted, initiate Nepro @ 10 ml/hr, advance as tolerated to GOAL 40 ml/hr x 24 hrs to provide 960ml formula, 1699 kcal (31 kcal/kg), 78 grams protein (1.4 g/kg), 698 ml free water; based on IBW 54.4kg, with consideration for BMI>30 and intubation.

## 2022-10-21 NOTE — DIETITIAN INITIAL EVALUATION ADULT - ORAL INTAKE PTA/DIET HISTORY
Unable to assess diet history PTA.  Pt transferred from OSH (S) where she was likely NPO since 10/14.  Allergies: NKFA

## 2022-10-21 NOTE — PROGRESS NOTE ADULT - SUBJECTIVE AND OBJECTIVE BOX
Patient seen and examined at the bedside.    Remained critically ill on continuous ICU monitoring.    OBJECTIVE:  Vital Signs Last 24 Hrs  T(C): 37 (21 Oct 2022 04:00), Max: 37 (21 Oct 2022 04:00)  T(F): 98.6 (21 Oct 2022 04:00), Max: 98.6 (21 Oct 2022 04:00)  HR: 68 (21 Oct 2022 07:00) (60 - 96)  BP: 141/74 (20 Oct 2022 10:25) (109/48 - 141/74)  BP(mean): 97 (20 Oct 2022 10:25) (64 - 97)  RR: 13 (21 Oct 2022 07:00) (5 - 41)  SpO2: 98% (21 Oct 2022 07:00) (87% - 100%)    Parameters below as of 21 Oct 2022 04:00  Patient On (Oxygen Delivery Method): ventilator    O2 Concentration (%): 40    Physical Exam:   General: Intubated  Neurology: Sedated   Eyes: bilateral pupils equal and reactive   ENT/Neck: Neck supple, trachea midline, No JVD   Respiratory: Clear bilaterally   CV: S1S2, no murmurs        [x] Pericardial drain         [x] Sinus rhythm  Abdominal: Soft, NT, ND +BS   Extremities: 1-2+ pedal edema noted, + peripheral pulses   Skin: No Rashes, Hematoma, Ecchymosis                Assessment:  72F HTN, HLD, DM, CKD who presented to [facility] initially with MAR on CKD with acute uremia and metabolic derangements requiring urgent HD and was COVID-19 positive. During HD went into atrial fibrillation and started on HD, subsequently developing GIB thought 2/2 AC and acute uremia. Also on amiodarone for Afib. On TTE noted to have a moderate to large pericardial effusion with early echocardiographic evidence of tamponade. Clinically she is not hypotensive and she tolerates the fluid shifts related to HD. Patient today was transferred to [facility] CTS Dr. Gabriel for evaluation of Pericardial Effusion.      Pericardial effusion s/p pericardial effusion drainage 10/20/22  Hypocalcemia   Hypovolemia     Plan:   ***Neuro***  [x] Sedated with [x] Diprivan  c/w Buspar   Post operative neuro assessment     ***Cardiovascular***  Invasive hemodynamic monitoring, assess perfusion indices   SR / CVP 12/MAP 71/ Hct 25.0%/ Lactate 0.8  Reassessment of hemodynamics post resuscitation   Serial EKG and cardiac enzymes     ***Pulmonary***  Post op vent management   Titration of FiO2 and PEEP, follow SpO2, CXR, blood gasses     Mode: AC/ CMV (Assist Control/ Continuous Mandatory Ventilation)  RR (machine): 12  TV (machine): 500  FiO2: 40  PEEP: 5  ITime: 1  MAP: 8  PIP: 23              ***GI***  [x] Diet: NPO, except medications   [x] Protonix     ***Renal***  [x] MAR on CKD  Continue to monitor I/Os, BUN/Creatinine.   Replete lytes PRN  Bah present     ***ID***  BC x2 pending will f/u  Covid (+) 10/14   Cefepime IVPB for UTI    ***Endocrine***  [x] DM1 : HbA1c 6.0%      - continue to monitor glucose for need to initiate sliding scale        Patient requires continuous monitoring with bedside rhythm monitoring, pulse oximetry monitoring, and continuous central venous and arterial pressure monitoring; and intermittent blood gas analysis. Care plan discussed with the ICU care team.   Patient remained critical, at risk for life threatening decompensation.    I have spent 30 minutes providing critical care management to this patient.    By signing my name below, I, Li Gramajo, attest that this documentation has been prepared under the direction and in the presence of Mile Whitaker NP.  Electronically signed: Bam Navarro, 10-21-22 @ 07:47    I, Mile Whitaker , personally performed the services described in this documentation. all medical record entries made by the scribe were at my direction and in my presence. I have reviewed the chart and agree that the record reflects my personal performance and is accurate and complete  Electronically signed: Mile Whitaker NP. Patient seen and examined at the bedside.    Remained critically ill on continuous ICU monitoring.    OBJECTIVE:  Vital Signs Last 24 Hrs  T(C): 37 (21 Oct 2022 04:00), Max: 37 (21 Oct 2022 04:00)  T(F): 98.6 (21 Oct 2022 04:00), Max: 98.6 (21 Oct 2022 04:00)  HR: 68 (21 Oct 2022 07:00) (60 - 96)  BP: 141/74 (20 Oct 2022 10:25) (109/48 - 141/74)  BP(mean): 97 (20 Oct 2022 10:25) (64 - 97)  RR: 13 (21 Oct 2022 07:00) (5 - 41)  SpO2: 98% (21 Oct 2022 07:00) (87% - 100%)    Parameters below as of 21 Oct 2022 04:00  Patient On (Oxygen Delivery Method): ventilator    O2 Concentration (%): 40    Physical Exam:   General: Intubated  Neurology: Sedated   Eyes: bilateral pupils equal and reactive   ENT/Neck: Neck supple, trachea midline, No JVD   Respiratory: Clear bilaterally   CV: S1S2, no murmurs        [x] Pericardial drain         [x] Sinus rhythm  Abdominal: Soft, NT, ND +BS   Extremities: 1-2+ pedal edema noted, + peripheral pulses   Skin: No Rashes, Hematoma, Ecchymosis                Assessment:  72F HTN, HLD, DM, CKD who presented to [facility] initially with MAR on CKD with acute uremia and metabolic derangements requiring urgent HD and was COVID-19 positive. During HD went into atrial fibrillation and started on HD, subsequently developing GIB thought 2/2 AC and acute uremia. Also on amiodarone for Afib. On TTE noted to have a moderate to large pericardial effusion with early echocardiographic evidence of tamponade. Clinically she is not hypotensive and she tolerates the fluid shifts related to HD. Patient today was transferred to [facility] CTS Dr. Gabriel for evaluation of Pericardial Effusion.      Pericardial effusion s/p pericardial effusion drainage 10/20/22  Hypocalcemia   Hypovolemia     Plan:   ***Neuro***  [x] Sedated with [x] Diprivan  wean propofol for extubation trial   Post operative neuro assessment     ***Cardiovascular***  Invasive hemodynamic monitoring, assess perfusion indices   SR / CVP 12/MAP 71/ Hct 25.0%/ Lactate 0.8  Reassessment of hemodynamics post resuscitation   Serial EKG and cardiac enzymes   pericardial drain remains in place w/ drainage  TTE today to re-eval effusion    ***Pulmonary***  Post op vent management   Titration of FiO2 and PEEP, follow SpO2, CXR, blood gasses   Plan for CPAP trials for extubation    Mode: AC/ CMV (Assist Control/ Continuous Mandatory Ventilation)  RR (machine): 12  TV (machine): 500  FiO2: 40  PEEP: 5  ITime: 1  MAP: 8  PIP: 23              ***GI***  [x] Diet: NPO, except medications  [x] Protonix     ***Renal***  [x] MAR on CKD  Continue to monitor I/Os, BUN/Creatinine.   Replete lytes PRN  Bah present   Will need permacath placement, will consult IR     ***ID***  BC x2 pending will f/u  Covid (+) 10/14   Cefepime IVPB for UTI  Leukocytosis probably reactive  ID consult for clearance per IR for permacath    ***Endocrine***  [x] DM1 : HbA1c 6.0%      - continue to monitor glucose for need to initiate sliding scale        Patient requires continuous monitoring with bedside rhythm monitoring, pulse oximetry monitoring, and continuous central venous and arterial pressure monitoring; and intermittent blood gas analysis. Care plan discussed with the ICU care team.   Patient remained critical, at risk for life threatening decompensation.    I have spent 40 minutes providing critical care management to this patient.    By signing my name below, I, Li Gramajo, attest that this documentation has been prepared under the direction and in the presence of Mile Whitaker NP.  Electronically signed: Bam Navarro, 10-21-22 @ 07:47    I, Mile Whitaker , personally performed the services described in this documentation. all medical record entries made by the jorgeiblalo were at my direction and in my presence. I have reviewed the chart and agree that the record reflects my personal performance and is accurate and complete  Electronically signed: Mile Whitaker NP.

## 2022-10-21 NOTE — DIETITIAN INITIAL EVALUATION ADULT - ENERGY INTAKE
Per RD note at OSH, pt was NPO at least 2 days (10/14 - 10/16)  Unknown diet order prior to transfer (10/17-10/18)  Pt now NPO day 3 at [facility] (10/19-10/21)  --> Suspect NPO x 8 days total    TOTAL KCAL: 570 kcal/day from lipid & dextrose  - Propofol: current rate 12.3 ml/hr will provide 325 kcal/day from lipid  - D10 NS @ 30 ml/hr will provide 245 kcal/day from dextrose

## 2022-10-21 NOTE — DIETITIAN INITIAL EVALUATION ADULT - ETIOLOGY
related to inability to meet nutrition needs secondary to malignant pericardial effusion, intubation, h/o GI bleed increased physiologic demand of stress factor and wound healing

## 2022-10-21 NOTE — CONSULT NOTE ADULT - SUBJECTIVE AND OBJECTIVE BOX
Patient is a 72y old  Female who presents with a chief complaint of Pericardial Eff (21 Oct 2022 14:15)      HPI:  72F HTN, HLD, DM, CKD who presented to [facility] initially with MAR on CKD with acute uremia and metabolic derangements requiring urgent HD and was COVID-19 positive. During HD went into atrial fibrillation and started on HD, subsequently developing GIB thought 2/2 AC and acute uremia. Also on amiodarone for Afib. On TTE noted to have a moderate to large pericardial effusion with early echocardiographic evidence of tamponade. Clinically she is not hypotensive and she tolerates the fluid shifts related to HD. Patient today was transferred to [facility] CTS Dr. Gabriel for evaluation of Pericardial Effusion. (20 Oct 2022 02:28)  Above reviewed:  pt denies any abdominal pain, no chest pain, some cough, no other complains.       PAST MEDICAL & SURGICAL HISTORY:  HTN (hypertension)      HLD (hyperlipidemia)      DM (diabetes mellitus)          REVIEW OF SYSTEMS    General: No Fevers, no chills     Skin: No rash  	  Ophthalmologic: Denies any discharge, redness.  	  ENT: No nasal congestion or throat pain.     Respiratory and Thorax: per hpi   	  Cardiovascular: No chest pain,     Gastrointestinal: No nausea, abdominal pain or diarrhea.    Genitourinary: No suprapubic tenderness, no CVA tenderness.     Musculoskeletal: No joint swelling     Neurological: No new extremity weakness.    Psychiatric: No hallucinations	    Extremities: No swelling     Endocrine: No abnormal heat or cold intolerance     Allergic/Immunologic: No hives        Social history:  Lives with family, no smoking.         FAMILY HISTORY:  FH: kidney disease        Allergies  No Known Allergies        Antimicrobials:      cefepime   IVPB 500 milliGRAM(s) IV Intermittent every 24 hours        Vital Signs Last 24 Hrs  T(C): 37.2 (21 Oct 2022 16:00), Max: 37.2 (21 Oct 2022 16:00)  T(F): 99 (21 Oct 2022 16:00), Max: 99 (21 Oct 2022 16:00)  HR: 72 (21 Oct 2022 16:00) (60 - 87)  BP: --  BP(mean): --  RR: 24 (21 Oct 2022 16:00) (5 - 29)  SpO2: 98% (21 Oct 2022 16:00) (96% - 100%)    Parameters below as of 21 Oct 2022 12:00  Patient On (Oxygen Delivery Method): nasal cannula  O2 Flow (L/min): 4      PHYSICAL EXAM: Patient in no acute distress.    Constitutional: Comfortable. Awake and alert, on supplemental oxygen.     Eyes: No discharge or conjunctival injection    ENT: No thrush. No pharyngeal erythema.    Neck: Supple, lt TLC, rt HD shiley     Respiratory:  + air entry bilaterally.    Cardiovascular: S1 S2 wnl,     Gastrointestinal: Soft BS(+) no tenderness, non distended.    Genitourinary: + ritter     Extremities: + edema.    Vascular: peripheral pulses felt    Neurological: No new gross focal deficits.    Skin: No rash     Musculoskeletal: No joint swelling.    Psychiatric: Affect normal.                              8.9    26.37 )-----------( 201      ( 21 Oct 2022 00:23 )             26.0       10-21    137  |  99  |  31<H>  ----------------------------<  103<H>  3.8   |  23  |  3.01<H>    Ca    8.0<L>      21 Oct 2022 11:03  Phos  5.0     10-21  Mg     1.8     10-21    TPro  5.5<L>  /  Alb  2.9<L>  /  TBili  1.0  /  DBili  x   /  AST  3810<H>  /  ALT  1441<H>  /  AlkPhos  186<H>  10-21        Culture - Acid Fast - Body Fluid w/Smear (collected 20 Oct 2022 12:20)  Source: .Body Fluid Other    Culture - Body Fluid with Gram Stain (collected 20 Oct 2022 12:20)  Source: .Body Fluid Other  Gram Stain (20 Oct 2022 23:12):    Numerous polymorphonuclear leukocytes seen per low power field    No organisms seen per oil power field  Preliminary Report (21 Oct 2022 16:15):    No growth    Culture - Fungal, Body Fluid (collected 20 Oct 2022 12:20)  Source: Pericardial Pericardial Fluid  Preliminary Report (21 Oct 2022 07:46):    Testing in progress          Radiology: Imaging reviewed and visualized personally [ x]      < from: Xray Chest 1 View- PORTABLE-Routine (Xray Chest 1 View- PORTABLE-Routine in AM.) (10.21.22 @ 03:21) >  IMPRESSION:  Similar stable enlargement of cardiomediastinal silhouette.  Improving pulmonary edema. Similar retrocardiac opacity which could   represent a pleural effusion and/or atelectasis.      < from: CT Head No Cont (10.20.22 @ 14:23) >  IMPRESSION:  Age-appropriate involutional change and microvascular   ischemic disease. No evidence of acute large vessel occlusion      < from: CT Angio Abdomen and Pelvis w/ IV Cont (10.20.22 @ 14:22) >  IMPRESSION:    ABDOMEN AND PELVIS:    1.  No aneurysm or dissection.      < from: CT Angio Chest Aorta w/wo IV Cont (10.20.22 @ 14:22) >    IMPRESSION:    1.  No acute intramural hematoma, aneurysm or dissection.  2.  Pericardial effusion of higher attenuation than simple fluid can be   proteinaceous or hemorrhagic.

## 2022-10-21 NOTE — DIETITIAN INITIAL EVALUATION ADULT - NSFNSGIIOFT_GEN_A_CORE
10-20-22 @ 07:01  -  10-21-22 @ 07:00  --------------------------------------------------------  OUT:    Nasogastric/Oral tube (mL): 100 mL  Total OUT: 100 mL    Pericardial drain output (10/20-10/21): 725 ml    Last BM: 10/20 (x2)  Bowel Regimen: none

## 2022-10-21 NOTE — DIETITIAN INITIAL EVALUATION ADULT - NS FNS WEIGHT CHANGE REASON
WEIGHT history per HIE:  2019: 99.8 kg  2020: 98.9 - 100.7 kg  2021: 101.6kg (7/9/21), 99.8kg (10/15/21)  2022: 99.8kg (5/2/22). 90.7kg (10/7/22), 97.5kg (10/15/22), 95.8 (10/16 at [facility])  Current dosing wt: 102.7kg (? accuracy)/unintentional

## 2022-10-21 NOTE — PROGRESS NOTE ADULT - ASSESSMENT
72F HTN, HLD, DM, CKD who presented to [facility] initially with MAR on CKD with acute uremia and  urgent HD, COVID-19 positive c/b afib and now moderate to large pericardial effusion     1 Renal- SP HD yesterday for clearance p the CT scan;  After pericardiocentesis will start with fluid removal  Start Phoslo when able   2 CTS-CT scan was done to rule out dissection as had renetta blood in the pericardial space    3 ID-On isolation at present  4 CVS-     Sayed [facility]   [phone]       72F HTN, HLD, DM, CKD who presented to [facility] initially with MAR on CKD with acute uremia and  urgent HD, COVID-19 positive c/b afib and now moderate to large pericardial effusion sp drainage   Respiratory failure sp mechanical intubation   New ESRD       1 Renal- SP HD yesterday for clearance p the CT scan;  Next HD will start fluid removal.  Possible HD on Monday or Sunday   Start Phoslo when able (p extubation)  2 CTS-CT scan was done to rule out dissection as had renetta blood in the pericardial space.  No evidence found  3 ID-On isolation at present  4 CVS- At present off A/C and off amio and lopressor  5 Pulm-Wean to extubate today     Critically sick but stable at present   DW CTICU in detail   >60 minutes spent on total encounter and more then 50% of the visit was spent counseling and/or coordinating care by the attending physician     [facility]ayed [facility]   [facility]   [phone]

## 2022-10-21 NOTE — DIETITIAN INITIAL EVALUATION ADULT - SIGNS/SYMPTOMS
stage II pressure injury present on admission pt meeting <50% of nutrition needs >5 days; mild/moderate fluid accumulation

## 2022-10-22 DIAGNOSIS — N39.0 URINARY TRACT INFECTION, SITE NOT SPECIFIED: ICD-10-CM

## 2022-10-22 DIAGNOSIS — N18.6 END STAGE RENAL DISEASE: ICD-10-CM

## 2022-10-22 DIAGNOSIS — I48.91 UNSPECIFIED ATRIAL FIBRILLATION: ICD-10-CM

## 2022-10-22 DIAGNOSIS — R74.01 ELEVATION OF LEVELS OF LIVER TRANSAMINASE LEVELS: ICD-10-CM

## 2022-10-22 DIAGNOSIS — Z29.9 ENCOUNTER FOR PROPHYLACTIC MEASURES, UNSPECIFIED: ICD-10-CM

## 2022-10-22 LAB
-  AMIKACIN: SIGNIFICANT CHANGE UP
-  AMOXICILLIN/CLAVULANIC ACID: SIGNIFICANT CHANGE UP
-  AMPICILLIN/SULBACTAM: SIGNIFICANT CHANGE UP
-  AMPICILLIN: SIGNIFICANT CHANGE UP
-  AZTREONAM: SIGNIFICANT CHANGE UP
-  CEFAZOLIN: SIGNIFICANT CHANGE UP
-  CEFEPIME: SIGNIFICANT CHANGE UP
-  CEFOXITIN: SIGNIFICANT CHANGE UP
-  CEFTRIAXONE: SIGNIFICANT CHANGE UP
-  CIPROFLOXACIN: SIGNIFICANT CHANGE UP
-  ERTAPENEM: SIGNIFICANT CHANGE UP
-  GENTAMICIN: SIGNIFICANT CHANGE UP
-  IMIPENEM: SIGNIFICANT CHANGE UP
-  LEVOFLOXACIN: SIGNIFICANT CHANGE UP
-  MEROPENEM: SIGNIFICANT CHANGE UP
-  NITROFURANTOIN: SIGNIFICANT CHANGE UP
-  PIPERACILLIN/TAZOBACTAM: SIGNIFICANT CHANGE UP
-  TIGECYCLINE: SIGNIFICANT CHANGE UP
-  TOBRAMYCIN: SIGNIFICANT CHANGE UP
-  TRIMETHOPRIM/SULFAMETHOXAZOLE: SIGNIFICANT CHANGE UP
ALBUMIN SERPL ELPH-MCNC: 3 G/DL — LOW (ref 3.3–5)
ALP SERPL-CCNC: 232 U/L — HIGH (ref 40–120)
ALT FLD-CCNC: 1122 U/L — HIGH (ref 10–45)
ANION GAP SERPL CALC-SCNC: 16 MMOL/L — SIGNIFICANT CHANGE UP (ref 5–17)
AST SERPL-CCNC: 1753 U/L — HIGH (ref 10–40)
BILIRUB DIRECT SERPL-MCNC: 0.5 MG/DL — HIGH (ref 0–0.3)
BILIRUB INDIRECT FLD-MCNC: 0.2 MG/DL — SIGNIFICANT CHANGE UP (ref 0.2–1)
BILIRUB SERPL-MCNC: 0.7 MG/DL — SIGNIFICANT CHANGE UP (ref 0.2–1.2)
BUN SERPL-MCNC: 36 MG/DL — HIGH (ref 7–23)
CALCIUM SERPL-MCNC: 7.6 MG/DL — LOW (ref 8.4–10.5)
CHLORIDE SERPL-SCNC: 99 MMOL/L — SIGNIFICANT CHANGE UP (ref 96–108)
CO2 SERPL-SCNC: 24 MMOL/L — SIGNIFICANT CHANGE UP (ref 22–31)
CREAT SERPL-MCNC: 3.78 MG/DL — HIGH (ref 0.5–1.3)
CULTURE RESULTS: SIGNIFICANT CHANGE UP
EGFR: 12 ML/MIN/1.73M2 — LOW
GLUCOSE BLDC GLUCOMTR-MCNC: 109 MG/DL — HIGH (ref 70–99)
GLUCOSE BLDC GLUCOMTR-MCNC: 120 MG/DL — HIGH (ref 70–99)
GLUCOSE BLDC GLUCOMTR-MCNC: 122 MG/DL — HIGH (ref 70–99)
GLUCOSE BLDC GLUCOMTR-MCNC: 95 MG/DL — SIGNIFICANT CHANGE UP (ref 70–99)
GLUCOSE SERPL-MCNC: 86 MG/DL — SIGNIFICANT CHANGE UP (ref 70–99)
HCT VFR BLD CALC: 28.8 % — LOW (ref 34.5–45)
HGB BLD-MCNC: 9 G/DL — LOW (ref 11.5–15.5)
MCHC RBC-ENTMCNC: 30.4 PG — SIGNIFICANT CHANGE UP (ref 27–34)
MCHC RBC-ENTMCNC: 31.3 GM/DL — LOW (ref 32–36)
MCV RBC AUTO: 97.3 FL — SIGNIFICANT CHANGE UP (ref 80–100)
METHOD TYPE: SIGNIFICANT CHANGE UP
NRBC # BLD: 0 /100 WBCS — SIGNIFICANT CHANGE UP (ref 0–0)
ORGANISM # SPEC MICROSCOPIC CNT: SIGNIFICANT CHANGE UP
ORGANISM # SPEC MICROSCOPIC CNT: SIGNIFICANT CHANGE UP
PLATELET # BLD AUTO: 176 K/UL — SIGNIFICANT CHANGE UP (ref 150–400)
POTASSIUM SERPL-MCNC: 3.5 MMOL/L — SIGNIFICANT CHANGE UP (ref 3.5–5.3)
POTASSIUM SERPL-SCNC: 3.5 MMOL/L — SIGNIFICANT CHANGE UP (ref 3.5–5.3)
PROT SERPL-MCNC: 5.3 G/DL — LOW (ref 6–8.3)
RBC # BLD: 2.96 M/UL — LOW (ref 3.8–5.2)
RBC # FLD: 18 % — HIGH (ref 10.3–14.5)
SODIUM SERPL-SCNC: 139 MMOL/L — SIGNIFICANT CHANGE UP (ref 135–145)
SPECIMEN SOURCE: SIGNIFICANT CHANGE UP
WBC # BLD: 18.66 K/UL — HIGH (ref 3.8–10.5)
WBC # FLD AUTO: 18.66 K/UL — HIGH (ref 3.8–10.5)

## 2022-10-22 PROCEDURE — 99232 SBSQ HOSP IP/OBS MODERATE 35: CPT

## 2022-10-22 PROCEDURE — 99223 1ST HOSP IP/OBS HIGH 75: CPT

## 2022-10-22 PROCEDURE — 71045 X-RAY EXAM CHEST 1 VIEW: CPT | Mod: 26

## 2022-10-22 RX ORDER — ATORVASTATIN CALCIUM 80 MG/1
80 TABLET, FILM COATED ORAL AT BEDTIME
Refills: 0 | Status: DISCONTINUED | OUTPATIENT
Start: 2022-10-22 | End: 2022-11-09

## 2022-10-22 RX ORDER — BENZOCAINE AND MENTHOL 5; 1 G/100ML; G/100ML
2 LIQUID ORAL EVERY 6 HOURS
Refills: 0 | Status: DISCONTINUED | OUTPATIENT
Start: 2022-10-22 | End: 2022-11-09

## 2022-10-22 RX ORDER — CALCIUM ACETATE 667 MG
667 TABLET ORAL
Refills: 0 | Status: DISCONTINUED | OUTPATIENT
Start: 2022-10-22 | End: 2022-11-09

## 2022-10-22 RX ORDER — PANTOPRAZOLE SODIUM 20 MG/1
40 TABLET, DELAYED RELEASE ORAL
Refills: 0 | Status: DISCONTINUED | OUTPATIENT
Start: 2022-10-22 | End: 2022-11-09

## 2022-10-22 RX ORDER — ACETAMINOPHEN 500 MG
650 TABLET ORAL EVERY 6 HOURS
Refills: 0 | Status: DISCONTINUED | OUTPATIENT
Start: 2022-10-22 | End: 2022-10-30

## 2022-10-22 RX ORDER — LANOLIN ALCOHOL/MO/W.PET/CERES
3 CREAM (GRAM) TOPICAL AT BEDTIME
Refills: 0 | Status: DISCONTINUED | OUTPATIENT
Start: 2022-10-22 | End: 2022-11-09

## 2022-10-22 RX ORDER — METOPROLOL TARTRATE 50 MG
25 TABLET ORAL
Refills: 0 | Status: DISCONTINUED | OUTPATIENT
Start: 2022-10-22 | End: 2022-11-04

## 2022-10-22 RX ORDER — RALOXIFENE HYDROCHLORIDE 60 MG/1
60 TABLET, COATED ORAL DAILY
Refills: 0 | Status: DISCONTINUED | OUTPATIENT
Start: 2022-10-22 | End: 2022-11-09

## 2022-10-22 RX ORDER — AMLODIPINE BESYLATE 2.5 MG/1
10 TABLET ORAL DAILY
Refills: 0 | Status: DISCONTINUED | OUTPATIENT
Start: 2022-10-22 | End: 2022-11-09

## 2022-10-22 RX ADMIN — Medication 667 MILLIGRAM(S): at 12:13

## 2022-10-22 RX ADMIN — Medication 25 MILLIGRAM(S): at 22:39

## 2022-10-22 RX ADMIN — CHLORHEXIDINE GLUCONATE 1 APPLICATION(S): 213 SOLUTION TOPICAL at 12:14

## 2022-10-22 RX ADMIN — Medication 100 MILLIGRAM(S): at 12:08

## 2022-10-22 RX ADMIN — AMLODIPINE BESYLATE 10 MILLIGRAM(S): 2.5 TABLET ORAL at 12:09

## 2022-10-22 RX ADMIN — Medication 667 MILLIGRAM(S): at 17:16

## 2022-10-22 RX ADMIN — BENZOCAINE AND MENTHOL 2 LOZENGE: 5; 1 LIQUID ORAL at 22:15

## 2022-10-22 RX ADMIN — MUPIROCIN 1 APPLICATION(S): 20 OINTMENT TOPICAL at 05:03

## 2022-10-22 RX ADMIN — SODIUM CHLORIDE 3 MILLILITER(S): 9 INJECTION INTRAMUSCULAR; INTRAVENOUS; SUBCUTANEOUS at 05:03

## 2022-10-22 RX ADMIN — Medication 3 MILLIGRAM(S): at 21:58

## 2022-10-22 RX ADMIN — Medication 100 MILLIGRAM(S): at 22:15

## 2022-10-22 RX ADMIN — SODIUM CHLORIDE 3 MILLILITER(S): 9 INJECTION INTRAMUSCULAR; INTRAVENOUS; SUBCUTANEOUS at 22:40

## 2022-10-22 RX ADMIN — SODIUM CHLORIDE 3 MILLILITER(S): 9 INJECTION INTRAMUSCULAR; INTRAVENOUS; SUBCUTANEOUS at 13:23

## 2022-10-22 RX ADMIN — ATORVASTATIN CALCIUM 80 MILLIGRAM(S): 80 TABLET, FILM COATED ORAL at 21:59

## 2022-10-22 RX ADMIN — MUPIROCIN 1 APPLICATION(S): 20 OINTMENT TOPICAL at 17:14

## 2022-10-22 RX ADMIN — CEFTRIAXONE 100 MILLIGRAM(S): 500 INJECTION, POWDER, FOR SOLUTION INTRAMUSCULAR; INTRAVENOUS at 21:58

## 2022-10-22 RX ADMIN — RALOXIFENE HYDROCHLORIDE 60 MILLIGRAM(S): 60 TABLET, COATED ORAL at 12:09

## 2022-10-22 RX ADMIN — Medication 25 MILLIGRAM(S): at 12:08

## 2022-10-22 NOTE — PROGRESS NOTE ADULT - SUBJECTIVE AND OBJECTIVE BOX
Overnight events noted      VITAL:  T(C): , Max: 37.2 (10-21-22 @ 16:00)  T(F): , Max: 99 (10-21-22 @ 16:00)  HR: 84 (10-22-22 @ 11:00)  BP: 146/67 (10-22-22 @ 10:43)  BP(mean): 90 (10-22-22 @ 10:43)  RR: 17 (10-22-22 @ 10:43)  SpO2: 97% (10-22-22 @ 10:43)  Wt(kg): --      PHYSICAL EXAM:  Constitutional: intubated   Neck:  No JVD  Respiratory: transmitted upper   Cardiovascular: S1 and S2; (+)pericardial drain  Gastrointestinal: BS+, soft, NT/ND  Extremities: No peripheral edema  Neurological: tone WNL  : No Bha  Skin: No rashes  Access: [facility]       LABS:                        9.0    18.66 )-----------( 176      ( 22 Oct 2022 07:08 )             28.8     Na(139)/K(3.5)/Cl(99)/HCO3(24)/BUN(36)/Cr(3.78)Glu(86)/Ca(7.6)/Mg(--)/PO4(--)    10-22 @ 07:05  Na(137)/K(3.8)/Cl(99)/HCO3(23)/BUN(31)/Cr(3.01)Glu(103)/Ca(8.0)/Mg(--)/PO4(--)    10-21 @ 11:03  Na(137)/K(3.4)/Cl(98)/HCO3(24)/BUN(26)/Cr(2.34)Glu(99)/Ca(8.0)/Mg(1.8)/PO4(5.0)    10-21 @ 00:23  Na(141)/K(4.0)/Cl(98)/HCO3(18)/BUN(45)/Cr(3.44)Glu(188)/Ca(6.9)/Mg(1.9)/PO4(7.8)    10-20 @ 15:01  Na(137)/K(6.6)/Cl(94)/HCO3(11)/BUN(44)/Cr(3.70)Glu(8)/Ca(8.4)/Mg(2.4)/PO4(9.9)    10-20 @ 11:19  Na(138)/K(4.9)/Cl(94)/HCO3(18)/BUN(36)/Cr(3.19)Glu(88)/Ca(8.2)/Mg(--)/PO4(--)    10-20 @ 05:39    Urinalysis Basic - ( 20 Oct 2022 15:09 )  Color: Yellow / Appearance: Turbid / S.019 / pH: x  Gluc: x / Ketone: Trace  / Bili: Negative / Urobili: 2 mg/dL   Blood: x / Protein: 300 mg/dL / Nitrite: Negative   Leuk Esterase: Large / RBC: >50 /hpf / WBC >50 /HPF   Sq Epi: x / Non Sq Epi: 2 / Bacteria: Many      IMPRESSION: 72F w/ HTN, HLD, DM, CKD, 10/20/22 from OSH with uremia, COVID-19, and pericardial effusion    (1)Renal - newly ESRD-HD. Last dialyzed Thursday 10/20; due for HD today  (2)Hypokalemia - mild  (3)CTS - s/p pericardial drain placement   (4)CV - now hemodynamically stable      RECOMMEND:  (1)HD today - 0.7kg UF as able, 4k bath              Angel Luis Bradshaw MD  [facility]  Office: [phone]  Cell: [phone]       no pain, no sob      VITAL:  T(C): , Max: 37.2 (10-21-22 @ 16:00)  T(F): , Max: 99 (10-21-22 @ 16:00)  HR: 84 (10-22-22 @ 11:00)  BP: 146/67 (10-22-22 @ 10:43)  BP(mean): 90 (10-22-22 @ 10:43)  RR: 17 (10-22-22 @ 10:43)  SpO2: 97% (10-22-22 @ 10:43)  Wt(kg): --      PHYSICAL EXAM:  Constitutional: obese, NAD  HEENT: DMM  Neck:  No JVD  Respiratory: coarse BS b/l   Cardiovascular: S1 and S2; (+)pericardial drain  Gastrointestinal: BS+, soft, NT/ND  Extremities: No peripheral edema  Neurological: tone WNL  : No Bah  Skin: No rashes  Access: [facility]       LABS:                        9.0    18.66 )-----------( 176      ( 22 Oct 2022 07:08 )             28.8     Na(139)/K(3.5)/Cl(99)/HCO3(24)/BUN(36)/Cr(3.78)Glu(86)/Ca(7.6)/Mg(--)/PO4(--)    10-22 @ 07:05  Na(137)/K(3.8)/Cl(99)/HCO3(23)/BUN(31)/Cr(3.01)Glu(103)/Ca(8.0)/Mg(--)/PO4(--)    10-21 @ 11:03  Na(137)/K(3.4)/Cl(98)/HCO3(24)/BUN(26)/Cr(2.34)Glu(99)/Ca(8.0)/Mg(1.8)/PO4(5.0)    10-21 @ 00:23  Na(141)/K(4.0)/Cl(98)/HCO3(18)/BUN(45)/Cr(3.44)Glu(188)/Ca(6.9)/Mg(1.9)/PO4(7.8)    10-20 @ 15:01  Na(137)/K(6.6)/Cl(94)/HCO3(11)/BUN(44)/Cr(3.70)Glu(8)/Ca(8.4)/Mg(2.4)/PO4(9.9)    10-20 @ 11:19  Na(138)/K(4.9)/Cl(94)/HCO3(18)/BUN(36)/Cr(3.19)Glu(88)/Ca(8.2)/Mg(--)/PO4(--)    10-20 @ 05:39    Urinalysis Basic - ( 20 Oct 2022 15:09 )  Color: Yellow / Appearance: Turbid / S.019 / pH: x  Gluc: x / Ketone: Trace  / Bili: Negative / Urobili: 2 mg/dL   Blood: x / Protein: 300 mg/dL / Nitrite: Negative   Leuk Esterase: Large / RBC: >50 /hpf / WBC >50 /HPF   Sq Epi: x / Non Sq Epi: 2 / Bacteria: Many      IMPRESSION: 72F w/ HTN, HLD, DM, CKD, 10/20/22 from OSH with uremia, COVID-19, and pericardial effusion    (1)Renal - newly ESRD-HD. Last dialyzed Thursday 10/20; due for HD today  (2)Hypokalemia - mild  (3)CTS - s/p pericardial drain placement   (4)CV - now hemodynamically stable      RECOMMEND:  (1)HD today - 0.7kg UF as able, 4k bath              Angel Luis Bradshaw MD  [facility]  Office: [phone]  Cell: [phone]

## 2022-10-22 NOTE — PHYSICAL THERAPY INITIAL EVALUATION ADULT - PERTINENT HX OF CURRENT PROBLEM, REHAB EVAL
72F HTN, HLD, DM, CKD presented to [facility] VS initially with MAR on CKD with acute uremia and metabolic derangements requiring urgent HD and COVID-19 positive, complicated with Afib with GIB 2/2 AC and acute uremia. Transferred to [facility] CTS for large pericardial effusion and developed cardiac tamponade requiring urgent percardiocentesis 10/20 s/p 700cc renetta blood, monitored in CTU. Downgraded to medicine for further management

## 2022-10-22 NOTE — PROGRESS NOTE ADULT - PROBLEM SELECTOR PLAN 2
Patient with significant elevation in LFT's likely 2/2 shock state    -Monitor and trend LFTs  -Currently no abdominal pain/nausea

## 2022-10-22 NOTE — PROGRESS NOTE ADULT - PROBLEM SELECTOR PLAN 3
ANDRZEJ Byrne in place    -Renal Dr. Cristobal following    -Next HD Sun/Mon for fluid removal    -Currently has ritter in and making some urine    -Daily BMP

## 2022-10-22 NOTE — PROGRESS NOTE ADULT - PROBLEM SELECTOR PLAN 1
Cardiac tamponade (hypotensive and SOB 10/20) s/p urgent pericardiocentesis by CTU. 700cc bloody fluid removed with pigtail in place. Repeat TTE post procedure 10/21, no pericardial tamponade.  -monitor output from pigtail   -f/u pericardial fluid culture  -Acid fast neg  -CT angio chest and ab/p neg for malignancy   -CTS recs appreciated

## 2022-10-22 NOTE — PROGRESS NOTE ADULT - PROBLEM SELECTOR PLAN 2
Presented to [facility] VS MAR and CKD with uremia, initiated on urgent HD. New ESRD  -plan for permacath by IR 10/26  -phoslo TID  -nephro recs appreciated   -next HD Sun vs Mon to start fluid removal, last dialysis 10/20   -renally dose and avoid nephrotoxic agents

## 2022-10-22 NOTE — PROGRESS NOTE ADULT - ASSESSMENT
72F HTN, HLD, DM, CKD who presented to [facility] initially with MAR on CKD with acute uremia and metabolic derangements requiring urgent HD and was COVID-19 positive.      #Leucocytosis  #Transaminitis,   #Hypothermia  #Asymptomatic bacteruria   *****    PLAN:  Continue ceftriaxone 1 gm iv q24h   follow up blood cx.   pericardial fluid cx NTD.   trend cbc for leucocytosis, multifactorial at this time.   trend LFT's ? shock liver.   Recent CT with no source.   ****          Danielito Rivero MD  Please contact through MS Teams   If no response or past 5 pm/weekend call [phone].    72F HTN, HLD, DM, CKD who presented to [facility] initially with MAR on CKD with acute uremia and metabolic derangements requiring urgent HD and was COVID-19 positive.      #Leukocytosis  Unclear source - may be multifactorial given ongoing clinical issues  CT without clear evidence for infection    #Asymptomatic bacteruria   Urine culture growing E.coli  Denies urinary symptoms    #Transaminitis    PLAN:  Continue ceftriaxone 1 gm iv q24h while awaiting blood cultures to finalize  Follow pending blood and pericardial fluid cultures  No specific COVID-19 therapies at this time  Follow fever curve and WBC count          Danielito Rivero MD  Please contact through MS Teams   If no response or past 5 pm/weekend call [phone].

## 2022-10-22 NOTE — PROGRESS NOTE ADULT - SUBJECTIVE AND OBJECTIVE BOX
RILEY MEJÍA  72y  Female      Patient is a 72y old  Female who presents with a chief complaint of Pericardial Eff (22 Oct 2022 01:53)    Hospital course: 72F HTN, HLD, DM, CKD  presented to [facility] initially with MAR on CKD with acute uremia and metabolic derangements requiring urgent HD and COVID-19 positive. During HD went into Afib on amio and AC, complicated with GIB 2/2 AC and acute uremia. Found to have moderate to large pericardial effusion with early echocardiographic evidence of tamponade but not hypotensive, tolearating HD. Transferred to CTS for pericardial effusion.  Clinically she is not hypotensive and she tolerates the fluid shifts related to HD. Patient today was transferred to [facility] CTS Dr. Gabriel for evaluation of Pericardial Effusion. Patient became hypotensive and sob 10/20 and transferred to CTU (intbated) for pericardial effusion drainage in which 700cc hemorraghic fluid removed and pigtail placed. CT chest neg for dissection. On CTX for E.coli uti. Successfuly extubated 10/21 and transferred to medicine for further management     Patient seen and examined at bedside       PAST MEDICAL/SURGICAL HISTORY  PAST MEDICAL & SURGICAL HISTORY:  HTN (hypertension)      HLD (hyperlipidemia)      DM (diabetes mellitus)          REVIEW OF SYSTEMS:  CONSTITUTIONAL: No fever, weight loss, or fatigue  EYES: No eye pain, visual disturbances, or discharge  ENMT:  No difficulty hearing, tinnitus, vertigo; No sinus or throat pain  NECK: No pain or stiffness  BREASTS: No pain, masses, or nipple discharge  RESPIRATORY: No cough, wheezing, chills or hemoptysis; No shortness of breath  CARDIOVASCULAR: No chest pain, palpitations, dizziness, or leg swelling  GASTROINTESTINAL: No abdominal or epigastric pain. No nausea, vomiting, or hematemesis; No diarrhea or constipation. No melena or hematochezia.  GENITOURINARY: No dysuria, frequency, hematuria, or incontinence  NEUROLOGICAL: No headaches, memory loss, loss of strength, numbness, or tremors  SKIN: No itching, burning, rashes, or lesions   LYMPH NODES: No enlarged glands  ENDOCRINE: No heat or cold intolerance; No hair loss  MUSCULOSKELETAL: No joint pain or swelling; No muscle, back, or extremity pain  PSYCHIATRIC: No depression, anxiety, mood swings, or difficulty sleeping  HEME/LYMPH: No easy bruising, or bleeding gums  ALLERY AND IMMUNOLOGIC: No hives or eczema    T(C): 36.7 (10-21-22 @ 23:38), Max: 37.2 (10-21-22 @ 16:00)  HR: 111 (10-21-22 @ 23:38) (69 - 114)  BP: 144/81 (10-21-22 @ 23:38) (137/67 - 144/81)  RR: 20 (10-21-22 @ 23:38) (16 - 31)  SpO2: 98% (10-21-22 @ 23:38) (96% - 99%)  Wt(kg): --Vital Signs Last 24 Hrs  T(C): 36.7 (21 Oct 2022 23:38), Max: 37.2 (21 Oct 2022 16:00)  T(F): 98 (21 Oct 2022 23:38), Max: 99 (21 Oct 2022 16:00)  HR: 111 (21 Oct 2022 23:38) (69 - 114)  BP: 144/81 (21 Oct 2022 23:38) (137/67 - 144/81)  BP(mean): 102 (21 Oct 2022 23:38) (88 - 102)  RR: 20 (21 Oct 2022 23:38) (16 - 31)  SpO2: 98% (21 Oct 2022 23:38) (96% - 99%)    Parameters below as of 21 Oct 2022 23:38  Patient On (Oxygen Delivery Method): nasal cannula  O2 Flow (L/min): 2      PHYSICAL EXAM:  GENERAL: NAD, well-groomed, well-developed  HEAD:  Atraumatic, Normocephalic  EYES: EOMI, PERRLA, conjunctiva and sclera clear  ENMT: No tonsillar erythema, exudates, or enlargement; Moist mucous membranes, Good dentition, No lesions  NECK: Supple, No JVD, Normal thyroid  NERVOUS SYSTEM:  Alert & Oriented X3, Good concentration; Motor Strength 5/5 B/L upper and lower extremities; DTRs 2+ intact and symmetric  CHEST/LUNG: Clear to percussion bilaterally; No rales, rhonchi, wheezing, or rubs  HEART: Regular rate and rhythm; No murmurs, rubs, or gallops  ABDOMEN: Soft, Nontender, Nondistended; Bowel sounds present  EXTREMITIES:  2+ Peripheral Pulses, No clubbing, cyanosis, or edema  LYMPH: No lymphadenopathy noted  SKIN: No rashes or lesions    Consultant(s) Notes Reviewed:  [x ] YES  [ ] NO  Care Discussed with Consultants/Other Providers [ x] YES  [ ] NO    LABS:  CBC   10-22-22 @ 07:08  Hematcorit 28.8  Hemoglobin 9.0  Mean Cell Hemoglobin 30.4  Platelet Count-Automated 176  RBC Count 2.96  Red Cell Distrib Width 18.0  Wbc Count 18.66      BMP  10-22-22 @ 07:05  Anion Gap. Serum 16  Blood Urea Nitrogen,Serm 36  Calcium, Total Serum 7.6  Carbon Dioxide, Serum 24  Chloride, Serum 99  Creatinine, Serum 3.78  eGFR in  --  eGFR in Non Afican American --  Gloucose, serum 86  Potassium, Serum 3.5  Sodium, Serum 139      10-21-22 @ 11:03  Anion Gap. Serum 15  Blood Urea Nitrogen,Serm 31  Calcium, Total Serum 8.0  Carbon Dioxide, Serum 23  Chloride, Serum 99  Creatinine, Serum 3.01  eGFR in  --  eGFR in Non Afican American --  Gloucose, serum 103  Potassium, Serum 3.8  Sodium, Serum 137        CMP  10-22-22 @ 07:05  Noemy Aminotransferase(ALT/SGPT)--  Albumin, Serum --  Alkaline Phosphatase, Serum --  Anion Gap, Serum 16  Aspartate Aminotransferase (AST/SGOT)--  Bilirubin Total, Serum --  Blood Urea Nitrogen, Serum 36  Calcium,Total Serum 7.6  Carbon Dioxide, Serum 24  Chloride, Serum 99  Creatinine, Serum 3.78  eGFR if  --  eGFR if Non African American --  Glucose, Serum 86  Potassium, Serum 3.5  Protein Total, Serum --  Sodium, Serum 139      PT/INR  10-21-22 @ 00:23  INR 2.52  Prothrombin Time Comment --  Prothrobin Time, [id number]    10-21-22 @ 00:23  Amylase, Serum Total 98  Lipase, Serum 103            RADIOLOGY & ADDITIONAL TESTS:  < from: CT Head No Cont (10.20.22 @ 14:23) >    IMPRESSION:  Age-appropriate involutional change and microvascular   ischemic disease. No evidence of acute large vessel occlusion    --- End of Report ---          < end of copied text >  < from: CT Angio Abdomen and Pelvis w/ IV Cont (10.20.22 @ 14:22) >  IMPRESSION:    ABDOMEN AND PELVIS:    1.  No aneurysm or dissection.    --- End of Report ---    < end of copied text >  < from: CT Angio Chest Aorta w/wo IV Cont (10.20.22 @ 14:22) >    IMPRESSION:    1.  No acute intramural hematoma, aneurysm or dissection.  2.  Pericardial effusion of higher attenuation than simple fluid can be   proteinaceous or hemorrhagic.    --- End of Report ---      < end of copied text >    Imaging Personally Reviewed:  [X ] YES  [ ] NO RILEY MEJÍA  72y  Female      Patient is a 72y old  Female who presents with a chief complaint of Pericardial Eff (22 Oct 2022 01:53)    Hospital course: 72F HTN, HLD, DM, CKD  presented to [facility] initially with MAR on CKD with acute uremia and metabolic derangements requiring urgent HD and COVID-19 positive. During HD went into Afib on amio and AC, complicated with GIB 2/2 AC and acute uremia. Found to have moderate to large pericardial effusion with early echocardiographic evidence of tamponade but not hypotensive, tolearating HD. Transferred to CTS for pericardial effusion.  Clinically she is not hypotensive and she tolerates the fluid shifts related to HD. Patient today was transferred to [facility] CTS Dr. Gabriel for evaluation of Pericardial Effusion. Patient became hypotensive and sob 10/20 and transferred to CTU (intbated) for pericardial effusion drainage in which 700cc hemorraghic fluid removed and pigtail placed. CT chest neg for dissection. On CTX for E.coli uti. Successfuly extubated 10/21 and transferred to medicine for further management     Patient seen and examined at bedside. No melena, hematochezia or hematuria. No fevers, chest pain, palpiations, or sob. No acute complaints.     Telemetry: Afib coverted to [facility] around 3:30am      PAST MEDICAL/SURGICAL HISTORY  PAST MEDICAL & SURGICAL HISTORY:  HTN (hypertension)      HLD (hyperlipidemia)      DM (diabetes mellitus)    Allergies: None    Social: nonsmoker. No substance use      REVIEW OF SYSTEMS:  CONSTITUTIONAL: No fever, weight loss, or fatigue  EYES: No visual disturbances, or discharge  RESPIRATORY: + cough, no wheezing, chills or hemoptysis; No shortness of breath  CARDIOVASCULAR: No chest pain, palpitations, dizziness, or leg swelling  GASTROINTESTINAL: No abdominal or epigastric pain. No nausea, vomiting, or hematemesis; No diarrhea or constipation. No melena or hematochezia.  GENITOURINARY: No dysuria, frequency, hematuria, or incontinence  NEUROLOGICAL: No headaches, memory loss, +generalized weakness, numbness, or tremors  SKIN: No itching, burning, rashes, or lesions   MUSCULOSKELETAL: No joint pain or swelling; No muscle, back, or extremity pain  PSYCHIATRIC: No depression, anxiety, mood swings    T(C): 36.7 (10-21-22 @ 23:38), Max: 37.2 (10-21-22 @ 16:00)  HR: 111 (10-21-22 @ 23:38) (69 - 114)  BP: 144/81 (10-21-22 @ 23:38) (137/67 - 144/81)  RR: 20 (10-21-22 @ 23:38) (16 - 31)  SpO2: 98% (10-21-22 @ 23:38) (96% - 99%)  Wt(kg): --Vital Signs Last 24 Hrs  T(C): 36.7 (21 Oct 2022 23:38), Max: 37.2 (21 Oct 2022 16:00)  T(F): 98 (21 Oct 2022 23:38), Max: 99 (21 Oct 2022 16:00)  HR: 111 (21 Oct 2022 23:38) (69 - 114)  BP: 144/81 (21 Oct 2022 23:38) (137/67 - 144/81)  BP(mean): 102 (21 Oct 2022 23:38) (88 - 102)  RR: 20 (21 Oct 2022 23:38) (16 - 31)  SpO2: 98% (21 Oct 2022 23:38) (96% - 99%)    Parameters below as of 21 Oct 2022 23:38  Patient On (Oxygen Delivery Method): nasal cannula  O2 Flow (L/min): 2      PHYSICAL EXAM:  GENERAL: NAD, well-groomed, well-developed on NC  HEAD:  Atraumatic, Normocephalic  EYES: EOMI, PERRLA, conjunctiva and sclera clear  ENMT: Moist mucous membranes, No lesions  NECK: Supple, No JVD, Normal thyroid  NERVOUS SYSTEM:  Alert & Oriented X3, Good concentration; Motor Strength 5/5 B/L upper and lower extremities; \  CHEST/LUNG: Clear to ausculation bilaterally; No rales, rhonchi, wheezing, or rubs  HEART: Regular rate and rhythm; No murmurs, rubs, or gallops  ABDOMEN: Soft, Nontender, Nondistended; Bowel sounds present. Bah draining clear urine  EXTREMITIES:  2+ Peripheral Pulses, No clubbing, cyanosis, or edema  LYMPH: No lymphadenopathy noted  SKIN: No rashes or lesions    Consultant(s) Notes Reviewed:  [x ] YES  [ ] NO  Care Discussed with Consultants/Other Providers [ x] YES  [ ] NO    LABS:  CBC   10-22-22 @ 07:08  Hematcorit 28.8  Hemoglobin 9.0  Mean Cell Hemoglobin 30.4  Platelet Count-Automated 176  RBC Count 2.96  Red Cell Distrib Width 18.0  Wbc Count 18.66      BMP  10-22-22 @ 07:05  Anion Gap. Serum 16  Blood Urea Nitrogen,Serm 36  Calcium, Total Serum 7.6  Carbon Dioxide, Serum 24  Chloride, Serum 99  Creatinine, Serum 3.78  eGFR in  --  eGFR in Non Afican American --  Gloucose, serum 86  Potassium, Serum 3.5  Sodium, Serum 139      10-21-22 @ 11:03  Anion Gap. Serum 15  Blood Urea Nitrogen,Serm 31  Calcium, Total Serum 8.0  Carbon Dioxide, Serum 23  Chloride, Serum 99  Creatinine, Serum 3.01  eGFR in  --  eGFR in Non Afican American --  Gloucose, serum 103  Potassium, Serum 3.8  Sodium, Serum 137        CMP  10-22-22 @ 07:05  Noemy Aminotransferase(ALT/SGPT)--  Albumin, Serum --  Alkaline Phosphatase, Serum --  Anion Gap, Serum 16  Aspartate Aminotransferase (AST/SGOT)--  Bilirubin Total, Serum --  Blood Urea Nitrogen, Serum 36  Calcium,Total Serum 7.6  Carbon Dioxide, Serum 24  Chloride, Serum 99  Creatinine, Serum 3.78  eGFR if  --  eGFR if Non African American --  Glucose, Serum 86  Potassium, Serum 3.5  Protein Total, Serum --  Sodium, Serum 139      PT/INR  10-21-22 @ 00:23  INR 2.52  Prothrombin Time Comment --  Prothrobin Time, [id number]    10-21-22 @ 00:23  Amylase, Serum Total 98  Lipase, Serum 103            RADIOLOGY & ADDITIONAL TESTS:  < from: CT Head No Cont (10.20.22 @ 14:23) >    IMPRESSION:  Age-appropriate involutional change and microvascular   ischemic disease. No evidence of acute large vessel occlusion    --- End of Report ---          < end of copied text >  < from: CT Angio Abdomen and Pelvis w/ IV Cont (10.20.22 @ 14:22) >  IMPRESSION:    ABDOMEN AND PELVIS:    1.  No aneurysm or dissection.    --- End of Report ---    < end of copied text >  < from: CT Angio Chest Aorta w/wo IV Cont (10.20.22 @ 14:22) >    IMPRESSION:    1.  No acute intramural hematoma, aneurysm or dissection.  2.  Pericardial effusion of higher attenuation than simple fluid can be   proteinaceous or hemorrhagic.    --- End of Report ---      < end of copied text >    Imaging Personally Reviewed:  [X ] YES  [ ] NO

## 2022-10-22 NOTE — PROGRESS NOTE ADULT - ASSESSMENT
72F HTN, HLD, DM, CKD presented to [facility] VS initially with MAR on CKD with acute uremia and metabolic derangements requiring urgent HD and COVID-19 positive, complicated with Afib with GIB 2/2 AC and acute uremia. Transferred to [facility] CTS for large pericardial effusion and developed cardiac tamponade requiring urgent percardiocentesis 10/20 s/p 700cc renetta blood, monitored in CTU. Downgraded to medicine for further management

## 2022-10-22 NOTE — PROGRESS NOTE ADULT - PROBLEM SELECTOR PLAN 4
new afib complicated with GIB at [facility] VS  -cw amiodarone  -monitor on telemetry  -CHADVasc new afib complicated with GIB at [facility] VS. Converted from afib -> NSR 10/22  -s/p amiodarone x2 and digoxin x1 in ctu  -monitor on telemetry  -start home metoprolol 25mg bid  -monitor hgb and hold off AC given converted to nsr  -CHADVasc at least 3

## 2022-10-22 NOTE — PROGRESS NOTE ADULT - ASSESSMENT
72F HTN, HLD, DM, CKD who presented to [facility] initially with MAR on CKD with acute uremia and metabolic derangements requiring urgent HD and was COVID-19 positive. During HD went into atrial fibrillation and started on HD, subsequently developing GIB thought 2/2 AC and acute uremia. Also on amiodarone for Afib. On TTE noted to have a moderate to large pericardial effusion with early echocardiographic evidence of tamponade. Clinically she is not hypotensive and she tolerates the fluid shifts related to HD. Patient today was transferred to [facility] CTS Dr. Gabriel for evaluation of Pericardial Effusion.        10/20/22 - CT chest scheduled - cancelled d/t hypotension - sbp 72 - pt w/ increased sob.  +covid- IVfluids given- Dr. Meneses called to bedside - pt transferred to CTU for evacuation of pericardial effusion. Multiple pressors/intubated. Underwent emergent pericardiocentesis with evacuation of 700cc bloody fluid with relief of tamponade.  CT Angio Aorta done to r/o dissection given blood in perciardial space--study negative for dissection. Renal consulted, new HD received clearance for contrast dye.    10/21 Transaminitis /w AST 3810/ALT 1441 likely from shock 2/2 tamponade.  IR consulted for placement of permacath ID consulted for clearance for permacath.  Found to have +UA--ceftriaxone 1gram QD ordered, follow up urine cx.  Afib 120's--digoxin 250mcg given.  Next HD session likely Sun/Mon for fluid removal per renal

## 2022-10-22 NOTE — PROGRESS NOTE ADULT - SUBJECTIVE AND OBJECTIVE BOX
Subjective: " "    TELEMETRY:    VITAL SIGNS    Vital Signs Last 24 Hrs  T(C): 36.7 (10-21-22 @ 23:38), Max: 37.2 (10-21-22 @ 16:00)  T(F): 98 (10-21-22 @ 23:38), Max: 99 (10-21-22 @ 16:00)  HR: 111 (10-21-22 @ 23:38) (66 - 114)  BP: 144/81 (10-21-22 @ 23:38) (137/67 - 144/81)  RR: 20 (10-21-22 @ 23:38) (13 - 31)  SpO2: 98% (10-21-22 @ 23:38) (96% - 100%)            10-20 @ 07:01  -  10-21 @ 07:00  --------------------------------------------------------  IN: 2171.7 mL / OUT: 925 mL / NET: 1246.7 mL    10-21 @ 07:01  -  10-22 @ 01:53  --------------------------------------------------------  IN: 322.3 mL / OUT: 290 mL / NET: 32.3 mL       Daily     Daily   Admit Wt: Drug Dosing Weight  Height (cm): 162.6 (20 Oct 2022 05:14)  Weight (kg): 102.7 (20 Oct 2022 05:14)  BMI (kg/m2): 38.8 (20 Oct 2022 05:14)  BSA (m2): 2.06 (20 Oct 2022 05:14)    Bilirubin Total, Serum: 1.0 mg/dL (10-21 @ 11:03)    CAPILLARY BLOOD GLUCOSE      POCT Blood Glucose.: 110 mg/dL (21 Oct 2022 21:36)  POCT Blood Glucose.: 99 mg/dL (21 Oct 2022 17:45)  POCT Blood Glucose.: 97 mg/dL (21 Oct 2022 06:58)  POCT Blood Glucose.: 98 mg/dL (21 Oct 2022 05:46)  POCT Blood Glucose.: 97 mg/dL (21 Oct 2022 04:15)  POCT Blood Glucose.: 118 mg/dL (21 Oct 2022 03:19)  POCT Blood Glucose.: 80 mg/dL (21 Oct 2022 02:27)              PHYSICAL EXAM    Neurology: alert and oriented x 3, nonfocal, no gross deficits  CV : tele:  RSR  Sternal Wound :  CDI with dressing , Stable  Lungs: clear. RR easy, unlabored   Abdomen: soft, nontender, nondistended, positive bowel sounds, bowel movement   Neg N/V/D   :  pt voiding without difficulty   Extremities:   RDORIGES; +1 upper and le edema, neg calf tenderness.   PPP bilaterally    Lines- RIJ Shiley for HD, LIJ Triple Lumen central line     Chest tubes: Pericardial drain in place.          cefTRIAXone   IVPB 1000 milliGRAM(s) IV Intermittent every 24 hours  chlorhexidine 2% Cloths 1 Application(s) Topical <User Schedule>  insulin lispro (ADMELOG) corrective regimen sliding scale   SubCutaneous three times a day before meals  insulin lispro (ADMELOG) corrective regimen sliding scale   SubCutaneous at bedtime  mupirocin 2% Nasal 1 Application(s) Both Nostrils two times a day  pantoprazole  Injectable 40 milliGRAM(s) IV Push daily  sodium chloride 0.9% lock flush 3 milliLiter(s) IV Push every 8 hours                         Subjective: "I feel a little better "  Patient denies chest pain/sob.  Intermittent cough/sore throat from ET tube    TELEMETRY: Afib 100s    VITAL SIGNS    Vital Signs Last 24 Hrs  T(C): 36.7 (10-21-22 @ 23:38), Max: 37.2 (10-21-22 @ 16:00)  T(F): 98 (10-21-22 @ 23:38), Max: 99 (10-21-22 @ 16:00)  HR: 111 (10-21-22 @ 23:38) (66 - 114)  BP: 144/81 (10-21-22 @ 23:38) (137/67 - 144/81)  RR: 20 (10-21-22 @ 23:38) (13 - 31)  SpO2: 98% (10-21-22 @ 23:38) (96% - 100%)            10-20 @ 07:01  -  10-21 @ 07:00  --------------------------------------------------------  IN: 2171.7 mL / OUT: 925 mL / NET: 1246.7 mL    10-21 @ 07:01  -  10-22 @ 01:53  --------------------------------------------------------  IN: 322.3 mL / OUT: 290 mL / NET: 32.3 mL       Daily     Daily   Admit Wt: Drug Dosing Weight  Height (cm): 162.6 (20 Oct 2022 05:14)  Weight (kg): 102.7 (20 Oct 2022 05:14)  BMI (kg/m2): 38.8 (20 Oct 2022 05:14)  BSA (m2): 2.06 (20 Oct 2022 05:14)    Bilirubin Total, Serum: 1.0 mg/dL (10-21 @ 11:03)    CAPILLARY BLOOD GLUCOSE      POCT Blood Glucose.: 110 mg/dL (21 Oct 2022 21:36)  POCT Blood Glucose.: 99 mg/dL (21 Oct 2022 17:45)  POCT Blood Glucose.: 97 mg/dL (21 Oct 2022 06:58)  POCT Blood Glucose.: 98 mg/dL (21 Oct 2022 05:46)  POCT Blood Glucose.: 97 mg/dL (21 Oct 2022 04:15)  POCT Blood Glucose.: 118 mg/dL (21 Oct 2022 03:19)  POCT Blood Glucose.: 80 mg/dL (21 Oct 2022 02:27)              PHYSICAL EXAM    Neurology: alert and oriented x 3, nonfocal, no gross deficits  CV : Irregularly irregular +s1/s2  Lungs: +bilateral rhonchi. unlabored   Abdomen: soft, nontender, nondistended, positive bowel sounds, bowel movement   Neg N/V/D   :  pt voiding without difficulty   Extremities:   RODRIGES; +1 upper and le edema, neg calf tenderness.   PPP bilaterally    Lines- RIJ Shiley for HD, LIJ Triple Lumen central line     Chest tubes: Pericardial drain in place.          cefTRIAXone   IVPB 1000 milliGRAM(s) IV Intermittent every 24 hours  chlorhexidine 2% Cloths 1 Application(s) Topical <User Schedule>  insulin lispro (ADMELOG) corrective regimen sliding scale   SubCutaneous three times a day before meals  insulin lispro (ADMELOG) corrective regimen sliding scale   SubCutaneous at bedtime  mupirocin 2% Nasal 1 Application(s) Both Nostrils two times a day  pantoprazole  Injectable 40 milliGRAM(s) IV Push daily  sodium chloride 0.9% lock flush 3 milliLiter(s) IV Push every 8 hours                         Subjective: "I feel a little better "  Patient denies chest pain/sob.  Intermittent cough/sore throat from ET tube    TELEMETRY: Afib 100s    VITAL SIGNS    Vital Signs Last 24 Hrs  T(C): 36.7 (10-21-22 @ 23:38), Max: 37.2 (10-21-22 @ 16:00)  T(F): 98 (10-21-22 @ 23:38), Max: 99 (10-21-22 @ 16:00)  HR: 111 (10-21-22 @ 23:38) (66 - 114)  BP: 144/81 (10-21-22 @ 23:38) (137/67 - 144/81)  RR: 20 (10-21-22 @ 23:38) (13 - 31)  SpO2: 98% (10-21-22 @ 23:38) (96% - 100%)            10-20 @ 07:01  -  10-21 @ 07:00  --------------------------------------------------------  IN: 2171.7 mL / OUT: 925 mL / NET: 1246.7 mL    10-21 @ 07:01  -  10-22 @ 01:53  --------------------------------------------------------  IN: 322.3 mL / OUT: 290 mL / NET: 32.3 mL       Daily     Daily   Admit Wt: Drug Dosing Weight  Height (cm): 162.6 (20 Oct 2022 05:14)  Weight (kg): 102.7 (20 Oct 2022 05:14)  BMI (kg/m2): 38.8 (20 Oct 2022 05:14)  BSA (m2): 2.06 (20 Oct 2022 05:14)    Bilirubin Total, Serum: 1.0 mg/dL (10-21 @ 11:03)    CAPILLARY BLOOD GLUCOSE      POCT Blood Glucose.: 110 mg/dL (21 Oct 2022 21:36)  POCT Blood Glucose.: 99 mg/dL (21 Oct 2022 17:45)  POCT Blood Glucose.: 97 mg/dL (21 Oct 2022 06:58)  POCT Blood Glucose.: 98 mg/dL (21 Oct 2022 05:46)  POCT Blood Glucose.: 97 mg/dL (21 Oct 2022 04:15)  POCT Blood Glucose.: 118 mg/dL (21 Oct 2022 03:19)  POCT Blood Glucose.: 80 mg/dL (21 Oct 2022 02:27)              PHYSICAL EXAM    Neurology: alert and oriented x 3, nonfocal, no gross deficits  CV : Irregularly irregular +s1/s2  Lungs: +bilateral rhonchi. unlabored   Abdomen: soft, nontender, nondistended, positive bowel sounds, bowel movement   Neg N/V/D   :  +ritter clear yellow urine   Extremities:   RODRIGES; +1 upper and le edema, neg calf tenderness.   PPP bilaterally    Lines- RIJ Shiley for HD, LIJ Triple Lumen central line     Chest tubes: Pericardial drain in place.  Scant amount of serosang drainage         cefTRIAXone   IVPB 1000 milliGRAM(s) IV Intermittent every 24 hours  chlorhexidine 2% Cloths 1 Application(s) Topical <User Schedule>  insulin lispro (ADMELOG) corrective regimen sliding scale   SubCutaneous three times a day before meals  insulin lispro (ADMELOG) corrective regimen sliding scale   SubCutaneous at bedtime  mupirocin 2% Nasal 1 Application(s) Both Nostrils two times a day  pantoprazole  Injectable 40 milliGRAM(s) IV Push daily  sodium chloride 0.9% lock flush 3 milliLiter(s) IV Push every 8 hours

## 2022-10-22 NOTE — PROGRESS NOTE ADULT - PROBLEM SELECTOR PLAN 1
Emergent pericardiocentesis performed 10/20 for tamponade/shock with evacuation of 700cc bloody fluid,cultures sent NGTD    CT Angio Aorta done to r/o dissection--scan negative for dissection    -Maintain pericardial drain for now

## 2022-10-22 NOTE — PHYSICAL THERAPY INITIAL EVALUATION ADULT - NSPTDISCHREC_GEN_A_CORE
TBD Upon functional eval If home pt will require home PT and assistance for all mobility and transfers. If home pt will require a transport w/c at this time./[facility]

## 2022-10-22 NOTE — PROGRESS NOTE ADULT - PROBLEM SELECTOR PLAN 3
UA+ with urine culture growing E. coli  -cefpeime (10/20-10/21) -> CTX (10/22-)  -f/u sensitivities  -ID recs appreciated  -blood cx ngtd UA+ with urine culture growing E. coli  -cefepime (10/20-10/21) -> CTX (10/22-)  -f/u sensitivities  -ID recs appreciated  -blood cx ngtd

## 2022-10-22 NOTE — PROGRESS NOTE ADULT - SUBJECTIVE AND OBJECTIVE BOX
Follow Up:  leukocytosis    Interval History/ROS:  Overnight: Patient is afebrile.  Latest labs show improved leukocytosis to 18.6, worsening creatinine to 3.7 and elevated LFTs which show , AST 1700 and ALT 1100.  Urine culture with E. coli, blood culture with no growth to date peritoneal fluid cultures with no growth to date.    Patient seen and examined today at bedside.  Denies any new pain or discomfort.    Allergies  No Known Allergies        ANTIMICROBIALS:  cefTRIAXone   IVPB 1000 every 24 hours      OTHER MEDS:  MEDICATIONS  (STANDING):  acetaminophen     Tablet .. 650 every 6 hours PRN  amLODIPine   Tablet 10 daily  atorvastatin 80 at bedtime  guaiFENesin Oral Liquid (Sugar-Free) 100 every 6 hours PRN  insulin lispro (ADMELOG) corrective regimen sliding scale  three times a day before meals  insulin lispro (ADMELOG) corrective regimen sliding scale  at bedtime  melatonin 3 at bedtime PRN  metoprolol tartrate 25 two times a day  pantoprazole    Tablet 40 before breakfast  raloxifene 60 daily      Vital Signs Last 24 Hrs  T(C): 36.9 (22 Oct 2022 11:00), Max: 37.2 (21 Oct 2022 16:00)  T(F): 98.4 (22 Oct 2022 11:00), Max: 99 (21 Oct 2022 16:00)  HR: 84 (22 Oct 2022 11:00) (71 - 114)  BP: 146/67 (22 Oct 2022 10:43) (137/67 - 146/67)  BP(mean): 90 (22 Oct 2022 10:43) (88 - 102)  RR: 17 (22 Oct 2022 10:43) (16 - 31)  SpO2: 97% (22 Oct 2022 10:43) (96% - 99%)    Parameters below as of 22 Oct 2022 10:43  Patient On (Oxygen Delivery Method): nasal cannula  O2 Flow (L/min): 2      PHYSICAL EXAM:  Constitutional: Comfortable. Awake and alert, NC in place  Neck:  shiley   Respiratory:  + air entry bilaterally.  Cardiovascular: S1 S2 wnl,   Gastrointestinal: Soft BS present no tenderness, non distended.  Genitourinary: + ritter   Extremities: + bilateral edema.  Vascular: peripheral pulses felt  Neurological: No new gross focal deficits.  Skin: No rash   Musculoskeletal: No joint swelling.  Psychiatric: Affect normal.               9.0    18.66 )-----------( 176      ( 22 Oct 2022 07:08 )             28.8       10-    139  |  99  |  36<H>  ----------------------------<  86  3.5   |  24  |  3.78<H>    Ca    7.6<L>      22 Oct 2022 07:05  Phos  5.0     10-  Mg     1.8     10-    TPro  5.3<L>  /  Alb  3.0<L>  /  TBili  0.7  /  DBili  0.5<H>  /  AST  1753<H>  /  ALT  1122<H>  /  AlkPhos  232<H>  10-      Urinalysis Basic - ( 20 Oct 2022 15:09 )    Color: Yellow / Appearance: Turbid / S.019 / pH: x  Gluc: x / Ketone: Trace  / Bili: Negative / Urobili: 2 mg/dL   Blood: x / Protein: 300 mg/dL / Nitrite: Negative   Leuk Esterase: Large / RBC: >50 /hpf / WBC >50 /HPF   Sq Epi: x / Non Sq Epi: 2 / Bacteria: Many        MICROBIOLOGY:  v    Culture - Urine (collected 20 Oct 2022 18:35)  Source: Catheterized Catheterized  Preliminary Report (21 Oct 2022 21:52):    >100,000 CFU/ml Escherichia coli    Culture - Blood (collected 20 Oct 2022 15:15)  Source: .Blood Blood  Preliminary Report (21 Oct 2022 22:01):    No growth to date.    Culture - Blood (collected 20 Oct 2022 13:30)  Source: .Blood Blood-Venous  Preliminary Report (21 Oct 2022 18:01):    No growth to date.    Culture - Acid Fast - Body Fluid w/Smear (collected 20 Oct 2022 12:20)  Source: .Body Fluid Other    Culture - Body Fluid with Gram Stain (collected 20 Oct 2022 12:20)  Source: .Body Fluid Other  Gram Stain (20 Oct 2022 23:12):    Numerous polymorphonuclear leukocytes seen per low power field    No organisms seen per oil power field  Preliminary Report (21 Oct 2022 16:15):    No growth    Culture - Fungal, Body Fluid (collected 20 Oct 2022 12:20)  Source: Pericardial Pericardial Fluid  Preliminary Report (21 Oct 2022 07:46):    Testing in progress                    RADIOLOGY:   Follow Up:  leukocytosis    Interval History/ROS:  Overnight: Patient is afebrile.  Latest labs show improved leukocytosis to 18.6, worsening creatinine to 3.7 and elevated LFTs which show , AST 1700 and ALT 1100.  Urine culture with E. coli, blood culture with no growth to date peritoneal fluid cultures with no growth to date.    Patient seen and examined today at bedside.  Denies any new pain or discomfort. Complains of some itchiness due to tape on skin.     Allergies  No Known Allergies        ANTIMICROBIALS:  cefTRIAXone   IVPB 1000 every 24 hours      OTHER MEDS:  MEDICATIONS  (STANDING):  acetaminophen     Tablet .. 650 every 6 hours PRN  amLODIPine   Tablet 10 daily  atorvastatin 80 at bedtime  guaiFENesin Oral Liquid (Sugar-Free) 100 every 6 hours PRN  insulin lispro (ADMELOG) corrective regimen sliding scale  three times a day before meals  insulin lispro (ADMELOG) corrective regimen sliding scale  at bedtime  melatonin 3 at bedtime PRN  metoprolol tartrate 25 two times a day  pantoprazole    Tablet 40 before breakfast  raloxifene 60 daily      Vital Signs Last 24 Hrs  T(C): 36.9 (22 Oct 2022 11:00), Max: 37.2 (21 Oct 2022 16:00)  T(F): 98.4 (22 Oct 2022 11:00), Max: 99 (21 Oct 2022 16:00)  HR: 84 (22 Oct 2022 11:00) (71 - 114)  BP: 146/67 (22 Oct 2022 10:43) (137/67 - 146/67)  BP(mean): 90 (22 Oct 2022 10:43) (88 - 102)  RR: 17 (22 Oct 2022 10:43) (16 - 31)  SpO2: 97% (22 Oct 2022 10:43) (96% - 99%)    Parameters below as of 22 Oct 2022 10:43  Patient On (Oxygen Delivery Method): nasal cannula  O2 Flow (L/min): 2      PHYSICAL EXAM:  Constitutional: Comfortable. alert oriented NC in place  Neck:  shiley   Respiratory:  CTA bilateral  Cardiovascular: Heartsounds WNL, no M/R/G   Gastrointestinal: Soft nontender, drain to abdomen  Skin: No rash   Musculoskeletal: no ROM limitations               9.0    18.66 )-----------( 176      ( 22 Oct 2022 07:08 )             28.8       10-22    139  |  99  |  36<H>  ----------------------------<  86  3.5   |  24  |  3.78<H>    Ca    7.6<L>      22 Oct 2022 07:05  Phos  5.0     10-  Mg     1.8     10    TPro  5.3<L>  /  Alb  3.0<L>  /  TBili  0.7  /  DBili  0.5<H>  /  AST  1753<H>  /  ALT  1122<H>  /  AlkPhos  232<H>  10      Urinalysis Basic - ( 20 Oct 2022 15:09 )    Color: Yellow / Appearance: Turbid / S.019 / pH: x  Gluc: x / Ketone: Trace  / Bili: Negative / Urobili: 2 mg/dL   Blood: x / Protein: 300 mg/dL / Nitrite: Negative   Leuk Esterase: Large / RBC: >50 /hpf / WBC >50 /HPF   Sq Epi: x / Non Sq Epi: 2 / Bacteria: Many        MICROBIOLOGY:  v    Culture - Urine (collected 20 Oct 2022 18:35)  Source: Catheterized Catheterized  Preliminary Report (21 Oct 2022 21:52):    >100,000 CFU/ml Escherichia coli    Culture - Blood (collected 20 Oct 2022 15:15)  Source: .Blood Blood  Preliminary Report (21 Oct 2022 22:01):    No growth to date.    Culture - Blood (collected 20 Oct 2022 13:30)  Source: .Blood Blood-Venous  Preliminary Report (21 Oct 2022 18:01):    No growth to date.    Culture - Acid Fast - Body Fluid w/Smear (collected 20 Oct 2022 12:20)  Source: .Body Fluid Other    Culture - Body Fluid with Gram Stain (collected 20 Oct 2022 12:20)  Source: .Body Fluid Other  Gram Stain (20 Oct 2022 23:12):    Numerous polymorphonuclear leukocytes seen per low power field    No organisms seen per oil power field  Preliminary Report (21 Oct 2022 16:15):    No growth    Culture - Fungal, Body Fluid (collected 20 Oct 2022 12:20)  Source: Pericardial Pericardial Fluid  Preliminary Report (21 Oct 2022 07:46):    Testing in progress                    RADIOLOGY:

## 2022-10-23 DIAGNOSIS — K92.2 GASTROINTESTINAL HEMORRHAGE, UNSPECIFIED: ICD-10-CM

## 2022-10-23 DIAGNOSIS — U07.1 COVID-19: ICD-10-CM

## 2022-10-23 DIAGNOSIS — E11.9 TYPE 2 DIABETES MELLITUS WITHOUT COMPLICATIONS: ICD-10-CM

## 2022-10-23 LAB
ALBUMIN SERPL ELPH-MCNC: 2.8 G/DL — LOW (ref 3.3–5)
ALP SERPL-CCNC: 199 U/L — HIGH (ref 40–120)
ALT FLD-CCNC: 739 U/L — HIGH (ref 10–45)
ANION GAP SERPL CALC-SCNC: 14 MMOL/L — SIGNIFICANT CHANGE UP (ref 5–17)
APTT BLD: 25.7 SEC — LOW (ref 27.5–35.5)
AST SERPL-CCNC: 549 U/L — HIGH (ref 10–40)
BILIRUB DIRECT SERPL-MCNC: 0.4 MG/DL — HIGH (ref 0–0.3)
BILIRUB INDIRECT FLD-MCNC: 0.2 MG/DL — SIGNIFICANT CHANGE UP (ref 0.2–1)
BILIRUB SERPL-MCNC: 0.6 MG/DL — SIGNIFICANT CHANGE UP (ref 0.2–1.2)
BUN SERPL-MCNC: 26 MG/DL — HIGH (ref 7–23)
CALCIUM SERPL-MCNC: 7.8 MG/DL — LOW (ref 8.4–10.5)
CHLORIDE SERPL-SCNC: 100 MMOL/L — SIGNIFICANT CHANGE UP (ref 96–108)
CO2 SERPL-SCNC: 25 MMOL/L — SIGNIFICANT CHANGE UP (ref 22–31)
CREAT SERPL-MCNC: 2.9 MG/DL — HIGH (ref 0.5–1.3)
EGFR: 17 ML/MIN/1.73M2 — LOW
FERRITIN SERPL-MCNC: HIGH NG/ML (ref 15–150)
GLUCOSE BLDC GLUCOMTR-MCNC: 109 MG/DL — HIGH (ref 70–99)
GLUCOSE BLDC GLUCOMTR-MCNC: 141 MG/DL — HIGH (ref 70–99)
GLUCOSE BLDC GLUCOMTR-MCNC: 155 MG/DL — HIGH (ref 70–99)
GLUCOSE BLDC GLUCOMTR-MCNC: 159 MG/DL — HIGH (ref 70–99)
GLUCOSE SERPL-MCNC: 97 MG/DL — SIGNIFICANT CHANGE UP (ref 70–99)
HAV IGM SER-ACNC: SIGNIFICANT CHANGE UP
HBV CORE IGM SER-ACNC: SIGNIFICANT CHANGE UP
HBV SURFACE AG SER-ACNC: SIGNIFICANT CHANGE UP
HCT VFR BLD CALC: 29.4 % — LOW (ref 34.5–45)
HCV AB S/CO SERPL IA: 0.07 S/CO — SIGNIFICANT CHANGE UP (ref 0–0.99)
HCV AB SERPL-IMP: SIGNIFICANT CHANGE UP
HGB BLD-MCNC: 9.3 G/DL — LOW (ref 11.5–15.5)
INR BLD: 1.22 RATIO — HIGH (ref 0.88–1.16)
IRON SATN MFR SERPL: 22 % — SIGNIFICANT CHANGE UP (ref 14–50)
IRON SATN MFR SERPL: 45 UG/DL — SIGNIFICANT CHANGE UP (ref 30–160)
MCHC RBC-ENTMCNC: 30.4 PG — SIGNIFICANT CHANGE UP (ref 27–34)
MCHC RBC-ENTMCNC: 31.6 GM/DL — LOW (ref 32–36)
MCV RBC AUTO: 96.1 FL — SIGNIFICANT CHANGE UP (ref 80–100)
NRBC # BLD: 0 /100 WBCS — SIGNIFICANT CHANGE UP (ref 0–0)
PLATELET # BLD AUTO: 149 K/UL — LOW (ref 150–400)
POTASSIUM SERPL-MCNC: 3.4 MMOL/L — LOW (ref 3.5–5.3)
POTASSIUM SERPL-SCNC: 3.4 MMOL/L — LOW (ref 3.5–5.3)
PROT SERPL-MCNC: 5.6 G/DL — LOW (ref 6–8.3)
PROTHROM AB SERPL-ACNC: 14.1 SEC — HIGH (ref 10.5–13.4)
RBC # BLD: 3.06 M/UL — LOW (ref 3.8–5.2)
RBC # BLD: 3.06 M/UL — LOW (ref 3.8–5.2)
RBC # FLD: 17.6 % — HIGH (ref 10.3–14.5)
RETICS #: 92.1 K/UL — SIGNIFICANT CHANGE UP (ref 25–125)
RETICS/RBC NFR: 3 % — HIGH (ref 0.5–2.5)
SODIUM SERPL-SCNC: 139 MMOL/L — SIGNIFICANT CHANGE UP (ref 135–145)
T4 FREE SERPL-MCNC: 0.9 NG/DL — SIGNIFICANT CHANGE UP (ref 0.9–1.8)
TIBC SERPL-MCNC: 204 UG/DL — LOW (ref 220–430)
TRANSFERRIN SERPL-MCNC: 140 MG/DL — LOW (ref 200–360)
TSH SERPL-MCNC: 1.55 UIU/ML — SIGNIFICANT CHANGE UP (ref 0.27–4.2)
UIBC SERPL-MCNC: 159 UG/DL — SIGNIFICANT CHANGE UP (ref 110–370)
WBC # BLD: 12.15 K/UL — HIGH (ref 3.8–10.5)
WBC # FLD AUTO: 12.15 K/UL — HIGH (ref 3.8–10.5)

## 2022-10-23 PROCEDURE — 99233 SBSQ HOSP IP/OBS HIGH 50: CPT

## 2022-10-23 RX ORDER — POTASSIUM CHLORIDE 20 MEQ
40 PACKET (EA) ORAL ONCE
Refills: 0 | Status: COMPLETED | OUTPATIENT
Start: 2022-10-23 | End: 2022-10-23

## 2022-10-23 RX ADMIN — Medication 100 MILLIGRAM(S): at 14:16

## 2022-10-23 RX ADMIN — Medication 667 MILLIGRAM(S): at 14:18

## 2022-10-23 RX ADMIN — Medication 40 MILLIEQUIVALENT(S): at 14:23

## 2022-10-23 RX ADMIN — Medication 3 MILLIGRAM(S): at 22:13

## 2022-10-23 RX ADMIN — Medication 100 MILLIGRAM(S): at 22:15

## 2022-10-23 RX ADMIN — MUPIROCIN 1 APPLICATION(S): 20 OINTMENT TOPICAL at 22:28

## 2022-10-23 RX ADMIN — Medication 667 MILLIGRAM(S): at 08:45

## 2022-10-23 RX ADMIN — Medication 25 MILLIGRAM(S): at 18:31

## 2022-10-23 RX ADMIN — Medication 100 MILLIGRAM(S): at 22:13

## 2022-10-23 RX ADMIN — SODIUM CHLORIDE 3 MILLILITER(S): 9 INJECTION INTRAMUSCULAR; INTRAVENOUS; SUBCUTANEOUS at 21:33

## 2022-10-23 RX ADMIN — SODIUM CHLORIDE 3 MILLILITER(S): 9 INJECTION INTRAMUSCULAR; INTRAVENOUS; SUBCUTANEOUS at 12:28

## 2022-10-23 RX ADMIN — ATORVASTATIN CALCIUM 80 MILLIGRAM(S): 80 TABLET, FILM COATED ORAL at 22:14

## 2022-10-23 RX ADMIN — CHLORHEXIDINE GLUCONATE 1 APPLICATION(S): 213 SOLUTION TOPICAL at 05:50

## 2022-10-23 RX ADMIN — Medication 25 MILLIGRAM(S): at 04:45

## 2022-10-23 RX ADMIN — SODIUM CHLORIDE 3 MILLILITER(S): 9 INJECTION INTRAMUSCULAR; INTRAVENOUS; SUBCUTANEOUS at 05:50

## 2022-10-23 RX ADMIN — MUPIROCIN 1 APPLICATION(S): 20 OINTMENT TOPICAL at 04:49

## 2022-10-23 RX ADMIN — AMLODIPINE BESYLATE 10 MILLIGRAM(S): 2.5 TABLET ORAL at 04:45

## 2022-10-23 RX ADMIN — CEFTRIAXONE 100 MILLIGRAM(S): 500 INJECTION, POWDER, FOR SOLUTION INTRAMUSCULAR; INTRAVENOUS at 22:21

## 2022-10-23 RX ADMIN — Medication 1: at 17:42

## 2022-10-23 RX ADMIN — PANTOPRAZOLE SODIUM 40 MILLIGRAM(S): 20 TABLET, DELAYED RELEASE ORAL at 04:45

## 2022-10-23 RX ADMIN — Medication 667 MILLIGRAM(S): at 18:31

## 2022-10-23 RX ADMIN — RALOXIFENE HYDROCHLORIDE 60 MILLIGRAM(S): 60 TABLET, COATED ORAL at 14:21

## 2022-10-23 NOTE — PROGRESS NOTE ADULT - PROBLEM SELECTOR PLAN 1
Incidental finding, s/p mAB  Asymptomatic  -Cont supportive care, continuous pulse ox  -Isolation precautions

## 2022-10-23 NOTE — PROGRESS NOTE ADULT - SUBJECTIVE AND OBJECTIVE BOX
Tolerated HD yesterday; achieved goal 0.7kg UF    VITAL:  T(C): , Max: 36.9 (10-22-22 @ 17:31)  T(F): , Max: 98.4 (10-22-22 @ 17:31)  HR: 76 (10-23-22 @ 04:41)  BP: 147/71 (10-23-22 @ 04:41)  BP(mean): --  RR: 17 (10-23-22 @ 04:41)  SpO2: 96% (10-23-22 @ 04:41)      PHYSICAL EXAM:  Constitutional: obese, NAD  HEENT: DMM  Neck:  No JVD  Respiratory: coarse BS b/l   Cardiovascular: S1 and S2; (+)pericardial drain  Gastrointestinal: BS+, soft, NT/ND  Extremities: No peripheral edema  Neurological: tone WNL  : No Bah  Skin: No rashes  Access: [facility]       LABS:                        9.3    12.15 )-----------( 149      ( 23 Oct 2022 06:14 )             29.4     Na(139)/K(3.4)/Cl(100)/HCO3(25)/BUN(26)/Cr(2.90)Glu(97)/Ca(7.8)/Mg(--)/PO4(--)    10-23 @ 06:14  Na(139)/K(3.5)/Cl(99)/HCO3(24)/BUN(36)/Cr(3.78)Glu(86)/Ca(7.6)/Mg(--)/PO4(--)    10-22 @ 07:05  Na(137)/K(3.8)/Cl(99)/HCO3(23)/BUN(31)/Cr(3.01)Glu(103)/Ca(8.0)/Mg(--)/PO4(--)    10-21 @ 11:03  Na(137)/K(3.4)/Cl(98)/HCO3(24)/BUN(26)/Cr(2.34)Glu(99)/Ca(8.0)/Mg(1.8)/PO4(5.0)    10-21 @ 00:23  Na(141)/K(4.0)/Cl(98)/HCO3(18)/BUN(45)/Cr(3.44)Glu(188)/Ca(6.9)/Mg(1.9)/PO4(7.8)    10-20 @ 15:01      IMPRESSION: 72F w/ HTN, HLD, DM, CKD, 10/20/22 from OSH with uremia, COVID-19, and pericardial effusion    (1)Renal - newly ESRD-HD. Last dialyzed Thursday 10/20; due for HD today  (2)Hypokalemia - mild  (3)CTS - s/p pericardial drain placement   (4)CV - now hemodynamically stable      RECOMMEND:  (1)Next HD tomorrow - can attempt 1kg UF, 4k bath            Angel Luis Bradshaw MD  [facility]  Office: [phone]  Cell: [phone]       Tolerated HD yesterday; achieved goal 0.7kg UF  No pain, no sob    VITAL:  T(C): , Max: 36.9 (10-22-22 @ 17:31)  T(F): , Max: 98.4 (10-22-22 @ 17:31)  HR: 76 (10-23-22 @ 04:41)  BP: 147/71 (10-23-22 @ 04:41)  BP(mean): --  RR: 17 (10-23-22 @ 04:41)  SpO2: 96% (10-23-22 @ 04:41)      PHYSICAL EXAM:  Constitutional: obese, NAD  HEENT: DMM  Neck:  No JVD  Respiratory: coarse BS b/l   Cardiovascular: S1 and S2; (+)pericardial drain  Gastrointestinal: BS+, soft, NT/ND  Extremities: No peripheral edema  Neurological: tone WNL  : No Bah  Skin: No rashes  Access: [facility]       LABS:                        9.3    12.15 )-----------( 149      ( 23 Oct 2022 06:14 )             29.4     Na(139)/K(3.4)/Cl(100)/HCO3(25)/BUN(26)/Cr(2.90)Glu(97)/Ca(7.8)/Mg(--)/PO4(--)    10-23 @ 06:14  Na(139)/K(3.5)/Cl(99)/HCO3(24)/BUN(36)/Cr(3.78)Glu(86)/Ca(7.6)/Mg(--)/PO4(--)    10-22 @ 07:05  Na(137)/K(3.8)/Cl(99)/HCO3(23)/BUN(31)/Cr(3.01)Glu(103)/Ca(8.0)/Mg(--)/PO4(--)    10-21 @ 11:03  Na(137)/K(3.4)/Cl(98)/HCO3(24)/BUN(26)/Cr(2.34)Glu(99)/Ca(8.0)/Mg(1.8)/PO4(5.0)    10-21 @ 00:23  Na(141)/K(4.0)/Cl(98)/HCO3(18)/BUN(45)/Cr(3.44)Glu(188)/Ca(6.9)/Mg(1.9)/PO4(7.8)    10-20 @ 15:01      IMPRESSION: 72F w/ HTN, HLD, DM, CKD, 10/20/22 from OSH with uremia, COVID-19, and pericardial effusion    (1)Renal - newly ESRD-HD. Last dialyzed Thursday 10/20; due for HD today  (2)Hypokalemia - mild  (3)CTS - s/p pericardial drain placement   (4)CV - now hemodynamically stable      RECOMMEND:  (1)Next HD tomorrow - can attempt 1kg UF, 4k bath            Angel Luis Bradshaw MD  [facility]  Office: [phone]  Cell: [phone]

## 2022-10-23 NOTE — PROGRESS NOTE ADULT - PROBLEM SELECTOR PLAN 2
Cardiac tamponade (hypotensive and SOB 10/20) s/p urgent pericardiocentesis by CTU. 700cc bloody fluid removed with pigtail in place. Repeat TTE post procedure 10/21, no pericardial tamponade.  -monitor output from pigtail   -f/u pericardial fluid culture, so far NGTD  -Acid fast neg  -CT angio chest and ab/p neg for malignancy   -CTS recs appreciated, f/u Re: plan for catheter removal

## 2022-10-23 NOTE — PROGRESS NOTE ADULT - ASSESSMENT
72F HTN, HLD, DM, CKD presented to [facility] VS initially with MAR on CKD with acute uremia and metabolic derangements requiring ICU stay, urgent HD and COVID-19 positive, complicated with Afib with GIB 2/2 AC and acute uremia. Transferred to [facility] CTS for large pericardial effusion and developed cardiac tamponade requiring urgent percardiocentesis 10/20 s/p 700cc renetta blood, monitored in CTU. Downgraded to medicine for further management

## 2022-10-23 NOTE — PROGRESS NOTE ADULT - PROBLEM SELECTOR PLAN 4
new paroxysmal afib complicated with GIB at [facility] VS. Converted from afib -> NSR 10/22  -s/p amiodarone x2 and digoxin x1 in ctu  -monitor on telemetry  -start home metoprolol 25mg bid  -monitor hgb and hold off AC given converted to nsr  -CHADVasc at least 3

## 2022-10-23 NOTE — PROGRESS NOTE ADULT - PROBLEM SELECTOR PLAN 7
Home AST 3810 with ALT 1441. Likely shock liver, improving  -check hepatitis panel   -trend lfts  -RUQ US

## 2022-10-23 NOTE — PROGRESS NOTE ADULT - SUBJECTIVE AND OBJECTIVE BOX
RILEY MEJÍA  72y  Female      Patient is a 72y old  Female who presents with a chief complaint of Pericardial Eff (22 Oct 2022 11:50)      SUBJECTIVE / OVERNIGHT EVENTS: No acute events  ADDITIONAL REVIEW OF SYSTEMS: Negative    MEDICATIONS  (STANDING):  amLODIPine   Tablet 10 milliGRAM(s) Oral daily  atorvastatin 80 milliGRAM(s) Oral at bedtime  calcium acetate 667 milliGRAM(s) Oral three times a day with meals  cefTRIAXone   IVPB 1000 milliGRAM(s) IV Intermittent every 24 hours  chlorhexidine 2% Cloths 1 Application(s) Topical <User Schedule>  insulin lispro (ADMELOG) corrective regimen sliding scale   SubCutaneous three times a day before meals  insulin lispro (ADMELOG) corrective regimen sliding scale   SubCutaneous at bedtime  metoprolol tartrate 25 milliGRAM(s) Oral two times a day  mupirocin 2% Nasal 1 Application(s) Both Nostrils two times a day  pantoprazole    Tablet 40 milliGRAM(s) Oral before breakfast  potassium chloride    Tablet ER 40 milliEquivalent(s) Oral once  raloxifene 60 milliGRAM(s) Oral daily  sodium chloride 0.9% lock flush 3 milliLiter(s) IV Push every 8 hours    MEDICATIONS  (PRN):  acetaminophen     Tablet .. 650 milliGRAM(s) Oral every 6 hours PRN Temp greater or equal to 38C (100.4F), Mild Pain (1 - 3)  benzocaine 15 mG/menthol 3.6 mG Lozenge 2 Lozenge Oral every 6 hours PRN Sore Throat  guaiFENesin Oral Liquid (Sugar-Free) 100 milliGRAM(s) Oral every 6 hours PRN Cough  melatonin 3 milliGRAM(s) Oral at bedtime PRN Insomnia      CAPILLARY BLOOD GLUCOSE      POCT Blood Glucose.: 141 mg/dL (23 Oct 2022 11:28)  POCT Blood Glucose.: 109 mg/dL (23 Oct 2022 07:45)  POCT Blood Glucose.: 109 mg/dL (22 Oct 2022 21:59)  POCT Blood Glucose.: 120 mg/dL (22 Oct 2022 17:08)    I&O's Summary    22 Oct 2022 07:01  -  23 Oct 2022 07:00  --------------------------------------------------------  IN: 1100 mL / OUT: 2600 mL / NET: -1500 mL    23 Oct 2022 07:01  -  23 Oct 2022 11:35  --------------------------------------------------------  IN: 177 mL / OUT: 600 mL / NET: -423 mL        PHYSICAL EXAM:  Vital Signs Last 24 Hrs  T(C): 36.8 (23 Oct 2022 04:41), Max: 36.9 (22 Oct 2022 17:31)  T(F): 98.2 (23 Oct 2022 04:41), Max: 98.4 (22 Oct 2022 17:31)  HR: 76 (23 Oct 2022 04:41) (76 - 83)  BP: 147/71 (23 Oct 2022 04:41) (147/71 - 171/81)  BP(mean): --  RR: 17 (23 Oct 2022 04:41) (17 - 20)  SpO2: 96% (23 Oct 2022 04:41) (96% - 97%)    Parameters below as of 23 Oct 2022 04:41  Patient On (Oxygen Delivery Method): nasal cannula  O2 Flow (L/min): 2    CONSTITUTIONAL: NAD, well-developed, well-groomed  EYES: PERRLA; conjunctiva and sclera clear  ENMT: Moist oral mucosa, no pharyngeal injection or exudate  NECK: Supple, RIJ central line, +shiley  RESPIRATORY: Normal respiratory effort; lungs are clear to auscultation bilaterally  CARDIOVASCULAR: S1S2 inatct, no murmur; 1+ lower extremity edema  Pericardial drain in place with minimal bloody fluid, site intact  ABDOMEN: Nontender to palpation, normoactive bowel sounds, no rebound/guarding; No hepatosplenomegaly  MUSCULOSKELETAL:  No clubbing or cyanosis of digits; no joint swelling or tenderness to palpation  PSYCH: A+O to person, place, and time; affect appropriate  NEUROLOGY: CN 2-12 are intact and symmetric; no gross sensory deficits   SKIN: No rashes; no palpable lesions  : ritter in place draining yellow urine    LABS: reviewed                        9.3    12.15 )-----------( 149      ( 23 Oct 2022 06:14 )             29.4     10-23    139  |  100  |  26<H>  ----------------------------<  97  3.4<L>   |  25  |  2.90<H>    Ca    7.8<L>      23 Oct 2022 06:14    TPro  5.6<L>  /  Alb  2.8<L>  /  TBili  0.6  /  DBili  0.4<H>  /  AST  549<H>  /  ALT  739<H>  /  AlkPhos  199<H>  10-23    PT/INR - ( 23 Oct 2022 06:14 )   PT: 14.1 sec;   INR: 1.22 ratio         PTT - ( 23 Oct 2022 06:14 )  PTT:25.7 sec          Culture - Urine (collected 20 Oct 2022 18:35)  Source: Catheterized Catheterized  Final Report (22 Oct 2022 18:04):    >100,000 CFU/ml Escherichia coli  Organism: Escherichia coli (22 Oct 2022 18:04)  Organism: Escherichia coli (22 Oct 2022 18:04)    Culture - Blood (collected 20 Oct 2022 15:15)  Source: .Blood Blood  Preliminary Report (21 Oct 2022 22:01):    No growth to date.    Culture - Blood (collected 20 Oct 2022 13:30)  Source: .Blood Blood-Venous  Preliminary Report (21 Oct 2022 18:01):    No growth to date.    Culture - Acid Fast - Body Fluid w/Smear (collected 20 Oct 2022 12:20)  Source: .Body Fluid Other  Preliminary Report (22 Oct 2022 15:05):    Culture is being performed.    Culture - Body Fluid with Gram Stain (collected 20 Oct 2022 12:20)  Source: .Body Fluid Other  Gram Stain (20 Oct 2022 23:12):    Numerous polymorphonuclear leukocytes seen per low power field    No organisms seen per oil power field  Preliminary Report (21 Oct 2022 16:15):    No growth    Culture - Fungal, Body Fluid (collected 20 Oct 2022 12:20)  Source: Pericardial Pericardial Fluid  Preliminary Report (21 Oct 2022 07:46):    Testing in progress        RADIOLOGY & ADDITIONAL TESTS:  Results Reviewed:   Imaging Personally Reviewed:  Electrocardiogram Personally Reviewed:    COORDINATION OF CARE:  Care Discussed with Consultants/Other Providers [Y/N]:  Prior or Outpatient Records Reviewed [Y/N]:

## 2022-10-24 ENCOUNTER — NON-APPOINTMENT (OUTPATIENT)
Age: 72
End: 2022-10-24

## 2022-10-24 LAB
% GAMMA, URINE: 9.1 % — SIGNIFICANT CHANGE UP
ALBUMIN 24H MFR UR ELPH: 48.1 % — SIGNIFICANT CHANGE UP
ALBUMIN SERPL ELPH-MCNC: 2.8 G/DL — LOW (ref 3.3–5)
ALP SERPL-CCNC: 177 U/L — HIGH (ref 40–120)
ALPHA1 GLOB 24H MFR UR ELPH: 19.7 % — SIGNIFICANT CHANGE UP
ALPHA2 GLOB 24H MFR UR ELPH: 11.3 % — SIGNIFICANT CHANGE UP
ALT FLD-CCNC: 476 U/L — HIGH (ref 10–45)
ANION GAP SERPL CALC-SCNC: 13 MMOL/L — SIGNIFICANT CHANGE UP (ref 5–17)
AST SERPL-CCNC: 211 U/L — HIGH (ref 10–40)
B-GLOBULIN 24H MFR UR ELPH: 11.8 % — SIGNIFICANT CHANGE UP
BILIRUB SERPL-MCNC: 0.6 MG/DL — SIGNIFICANT CHANGE UP (ref 0.2–1.2)
BUN SERPL-MCNC: 32 MG/DL — HIGH (ref 7–23)
CALCIUM SERPL-MCNC: 7.5 MG/DL — LOW (ref 8.4–10.5)
CHLORIDE SERPL-SCNC: 102 MMOL/L — SIGNIFICANT CHANGE UP (ref 96–108)
CO2 SERPL-SCNC: 26 MMOL/L — SIGNIFICANT CHANGE UP (ref 22–31)
CREAT SERPL-MCNC: 3.27 MG/DL — HIGH (ref 0.5–1.3)
EGFR: 15 ML/MIN/1.73M2 — LOW
GLUCOSE BLDC GLUCOMTR-MCNC: 122 MG/DL — HIGH (ref 70–99)
GLUCOSE BLDC GLUCOMTR-MCNC: 135 MG/DL — HIGH (ref 70–99)
GLUCOSE BLDC GLUCOMTR-MCNC: 139 MG/DL — HIGH (ref 70–99)
GLUCOSE BLDC GLUCOMTR-MCNC: 183 MG/DL — HIGH (ref 70–99)
GLUCOSE SERPL-MCNC: 115 MG/DL — HIGH (ref 70–99)
HCT VFR BLD CALC: 32.1 % — LOW (ref 34.5–45)
HGB BLD-MCNC: 9.9 G/DL — LOW (ref 11.5–15.5)
INR BLD: 1.13 RATIO — SIGNIFICANT CHANGE UP (ref 0.88–1.16)
INTERPRETATION 24H UR IFE-IMP: SIGNIFICANT CHANGE UP
M PROTEIN 24H UR ELPH-MRATE: SIGNIFICANT CHANGE UP
MCHC RBC-ENTMCNC: 30.2 PG — SIGNIFICANT CHANGE UP (ref 27–34)
MCHC RBC-ENTMCNC: 30.8 GM/DL — LOW (ref 32–36)
MCV RBC AUTO: 97.9 FL — SIGNIFICANT CHANGE UP (ref 80–100)
NRBC # BLD: 0 /100 WBCS — SIGNIFICANT CHANGE UP (ref 0–0)
PLATELET # BLD AUTO: 125 K/UL — LOW (ref 150–400)
POTASSIUM SERPL-MCNC: 3.6 MMOL/L — SIGNIFICANT CHANGE UP (ref 3.5–5.3)
POTASSIUM SERPL-SCNC: 3.6 MMOL/L — SIGNIFICANT CHANGE UP (ref 3.5–5.3)
PROT ?TM UR-MCNC: 84 MG/DL — HIGH (ref 0–12)
PROT PATTERN 24H UR ELPH-IMP: SIGNIFICANT CHANGE UP
PROT SERPL-MCNC: 5.6 G/DL — LOW (ref 6–8.3)
PROTHROM AB SERPL-ACNC: 13.1 SEC — SIGNIFICANT CHANGE UP (ref 10.5–13.4)
RBC # BLD: 3.28 M/UL — LOW (ref 3.8–5.2)
RBC # FLD: 17.3 % — HIGH (ref 10.3–14.5)
SODIUM SERPL-SCNC: 141 MMOL/L — SIGNIFICANT CHANGE UP (ref 135–145)
TOTAL VOLUME - 24 HOUR: SIGNIFICANT CHANGE UP ML
URINE CREATININE CALCULATION: SIGNIFICANT CHANGE UP G/24 H (ref 0.8–1.8)
WBC # BLD: 9.61 K/UL — SIGNIFICANT CHANGE UP (ref 3.8–10.5)
WBC # FLD AUTO: 9.61 K/UL — SIGNIFICANT CHANGE UP (ref 3.8–10.5)

## 2022-10-24 PROCEDURE — 99232 SBSQ HOSP IP/OBS MODERATE 35: CPT

## 2022-10-24 PROCEDURE — 99233 SBSQ HOSP IP/OBS HIGH 50: CPT

## 2022-10-24 RX ADMIN — SODIUM CHLORIDE 3 MILLILITER(S): 9 INJECTION INTRAMUSCULAR; INTRAVENOUS; SUBCUTANEOUS at 05:25

## 2022-10-24 RX ADMIN — Medication 667 MILLIGRAM(S): at 12:00

## 2022-10-24 RX ADMIN — Medication 100 MILLIGRAM(S): at 05:31

## 2022-10-24 RX ADMIN — Medication 100 MILLIGRAM(S): at 14:16

## 2022-10-24 RX ADMIN — CEFTRIAXONE 100 MILLIGRAM(S): 500 INJECTION, POWDER, FOR SOLUTION INTRAMUSCULAR; INTRAVENOUS at 22:33

## 2022-10-24 RX ADMIN — Medication 25 MILLIGRAM(S): at 05:30

## 2022-10-24 RX ADMIN — RALOXIFENE HYDROCHLORIDE 60 MILLIGRAM(S): 60 TABLET, COATED ORAL at 12:24

## 2022-10-24 RX ADMIN — Medication 667 MILLIGRAM(S): at 08:20

## 2022-10-24 RX ADMIN — Medication 25 MILLIGRAM(S): at 18:10

## 2022-10-24 RX ADMIN — Medication 100 MILLIGRAM(S): at 22:32

## 2022-10-24 RX ADMIN — MUPIROCIN 1 APPLICATION(S): 20 OINTMENT TOPICAL at 18:19

## 2022-10-24 RX ADMIN — ATORVASTATIN CALCIUM 80 MILLIGRAM(S): 80 TABLET, FILM COATED ORAL at 22:32

## 2022-10-24 RX ADMIN — SODIUM CHLORIDE 3 MILLILITER(S): 9 INJECTION INTRAMUSCULAR; INTRAVENOUS; SUBCUTANEOUS at 13:27

## 2022-10-24 RX ADMIN — Medication 667 MILLIGRAM(S): at 16:36

## 2022-10-24 RX ADMIN — PANTOPRAZOLE SODIUM 40 MILLIGRAM(S): 20 TABLET, DELAYED RELEASE ORAL at 05:28

## 2022-10-24 RX ADMIN — MUPIROCIN 1 APPLICATION(S): 20 OINTMENT TOPICAL at 05:54

## 2022-10-24 RX ADMIN — AMLODIPINE BESYLATE 10 MILLIGRAM(S): 2.5 TABLET ORAL at 05:29

## 2022-10-24 NOTE — PROGRESS NOTE ADULT - PROBLEM SELECTOR PLAN 3
UA+ with urine culture growing E. coli  -cefepime (10/20-10/21) -> CTX (10/22- )  -f/u sensitivities  -ID recs appreciated, c/w abx for now  -blood cx ngtd

## 2022-10-24 NOTE — PROGRESS NOTE ADULT - ASSESSMENT
72F w/ HTN, HLD, DM, CKD, 10/20/22 from OSH with uremia, COVID-19, and pericardial effusion    (1)Renal - newly ESRD-HD. Last dialyzed Thursday 10/20;   (2)Hypokalemia - mild  (3)CTS - s/p pericardial drain placement   (4)CV - now hemodynamically stable      RECOMMEND:  (1)Next HD tomorrow - can attempt 1kg UF, 4k bath.  She was slated for HD today but she refused  (2)Needs perm cath.  She is not coming off HD   (3)Chest tube per CTICU     Sayed [facility]   [phone]

## 2022-10-24 NOTE — PROGRESS NOTE ADULT - SUBJECTIVE AND OBJECTIVE BOX
Kiran Horn MD  Division of Hospital Medicine  Available via MS teams  If no response or off hours page: [phone]  ---------------------------------------------------------    RILEY MEJÍA  72y  Female      Patient is a 72y old  Female who presents with a chief complaint of Pericardial Eff (24 Oct 2022 09:02)      INTERVAL HPI/OVERNIGHT EVENTS:  Seen at bedside. Denies pain, major complaint is loss of voice and cough post extubation      REVIEW OF SYSTEMS: 10 point ROS negative unless listed above    T(C): 36.7 (10-24-22 @ 05:34), Max: 36.8 (10-23-22 @ 13:31)  HR: 100 (10-24-22 @ 05:34) (96 - 100)  BP: 168/86 (10-24-22 @ 05:34) (151/83 - 168/86)  RR: 18 (10-24-22 @ 05:34) (18 - 18)  SpO2: 94% (10-24-22 @ 05:34) (94% - 95%)  Wt(kg): --Vital Signs Last 24 Hrs  T(C): 36.7 (24 Oct 2022 05:34), Max: 36.8 (23 Oct 2022 13:31)  T(F): 98.1 (24 Oct 2022 05:34), Max: 98.3 (23 Oct 2022 13:31)  HR: 100 (24 Oct 2022 05:34) (96 - 100)  BP: 168/86 (24 Oct 2022 05:34) (151/83 - 168/86)  BP(mean): --  RR: 18 (24 Oct 2022 05:34) (18 - 18)  SpO2: 94% (24 Oct 2022 05:34) (94% - 95%)    Parameters below as of 24 Oct 2022 05:34  Patient On (Oxygen Delivery Method): nasal cannula        PHYSICAL EXAM:  CONSTITUTIONAL: NAD, well-developed, well-groomed  NECK: Supple, RIJ central line, +shiley  RESPIRATORY: Normal respiratory effort; lungs are clear to auscultation bilaterally  CARDIOVASCULAR: S1S2 intact, no murmur; 1+ lower extremity edema  Pericardial drain in place with minimal bloody fluid, site intact  ABDOMEN: Nontender to palpation, normoactive bowel sounds, no rebound/guarding; No hepatosplenomegaly  MUSCULOSKELETAL:  No clubbing or cyanosis of digits; no joint swelling or tenderness to palpation  PSYCH: A+O to person, place, and time; affect appropriate  NEUROLOGY: CN 2-12 are intact and symmetric; no gross sensory deficits   : ritter in place draining yellow urine    Consultant(s) Notes Reviewed:  [x ] YES  [ ] NO  Care Discussed with Consultants/Other Providers [ x] YES  [ ] NO    LABS:                        9.9    9.61  )-----------( 125      ( 24 Oct 2022 06:57 )             32.1     10-24    141  |  102  |  32<H>  ----------------------------<  115<H>  3.6   |  26  |  3.27<H>    Ca    7.5<L>      24 Oct 2022 06:59    TPro  5.6<L>  /  Alb  2.8<L>  /  TBili  0.6  /  DBili  x   /  AST  211<H>  /  ALT  476<H>  /  AlkPhos  177<H>  10-24    PT/INR - ( 24 Oct 2022 07:00 )   PT: 13.1 sec;   INR: 1.13 ratio         PTT - ( 23 Oct 2022 06:14 )  PTT:25.7 sec    CAPILLARY BLOOD GLUCOSE      POCT Blood Glucose.: 122 mg/dL (24 Oct 2022 07:59)  POCT Blood Glucose.: 159 mg/dL (23 Oct 2022 21:30)  POCT Blood Glucose.: 155 mg/dL (23 Oct 2022 16:42)  POCT Blood Glucose.: 141 mg/dL (23 Oct 2022 11:28)            RADIOLOGY & ADDITIONAL TESTS:    Imaging Personally Reviewed:  [ ] YES  [ ] NO

## 2022-10-24 NOTE — PROGRESS NOTE ADULT - SUBJECTIVE AND OBJECTIVE BOX
[facility] [phone]        Patient seen and examined         MEDICATIONS  (STANDING):  amLODIPine   Tablet 10 milliGRAM(s) Oral daily  atorvastatin 80 milliGRAM(s) Oral at bedtime  benzonatate 100 milliGRAM(s) Oral three times a day  calcium acetate 667 milliGRAM(s) Oral three times a day with meals  cefTRIAXone   IVPB 1000 milliGRAM(s) IV Intermittent every 24 hours  chlorhexidine 2% Cloths 1 Application(s) Topical <User Schedule>  insulin lispro (ADMELOG) corrective regimen sliding scale   SubCutaneous three times a day before meals  insulin lispro (ADMELOG) corrective regimen sliding scale   SubCutaneous at bedtime  metoprolol tartrate 25 milliGRAM(s) Oral two times a day  mupirocin 2% Nasal 1 Application(s) Both Nostrils two times a day  pantoprazole    Tablet 40 milliGRAM(s) Oral before breakfast  raloxifene 60 milliGRAM(s) Oral daily  sodium chloride 0.9% lock flush 3 milliLiter(s) IV Push every 8 hours      VITAL:  T(C): , Max: 36.7 (10-24-22 @ 05:34)  T(F): , Max: 98.1 (10-24-22 @ 05:34)  HR: 81 (10-24-22 @ 11:31)  BP: 164/83 (10-24-22 @ 11:31)  BP(mean): --  RR: 18 (10-24-22 @ 11:31)  SpO2: 93% (10-24-22 @ 11:31)  Wt(kg): --    I and O's:    10-23 @ 07:01  -  10-24 @ 07:00  --------------------------------------------------------  IN: 684 mL / OUT: 2025 mL / NET: -1341 mL    10-24 @ 07:01  -  10-24 @ 14:31  --------------------------------------------------------  IN: 120 mL / OUT: 0 mL / NET: 120 mL          PHYSICAL EXAM:    Constitutional: NAD  Neck:  No JVD  Respiratory: CTAB/L  Cardiovascular: S1 and S2  Gastrointestinal: BS+, soft, NT/ND  Extremities: No peripheral edema  Neurological: A/O x 3, no focal deficits  Psychiatric: Normal mood, normal affect  : No Bah  Skin: No rashes  Access: Not applicable    LABS:                        9.9    9.61  )-----------( 125      ( 24 Oct 2022 06:57 )             32.1     10-24    141  |  102  |  32<H>  ----------------------------<  115<H>  3.6   |  26  |  3.27<H>    Ca    7.5<L>      24 Oct 2022 06:59    TPro  5.6<L>  /  Alb  2.8<L>  /  TBili  0.6  /  DBili  x   /  AST  211<H>  /  ALT  476<H>  /  AlkPhos  177<H>  10-24          Urine Studies:          RADIOLOGY & ADDITIONAL STUDIES:

## 2022-10-24 NOTE — PROGRESS NOTE ADULT - ASSESSMENT
72F HTN, HLD, DM, CKD presented to [facility] VS initially with MAR on CKD with acute uremia and metabolic derangements requiring ICU stay, urgent HD and COVID-19 positive, complicated with Afib with GIB 2/2 AC and acute uremia. Transferred to [facility] CTS for large pericardial effusion and developed cardiac tamponade requiring urgent percardiocentesis 10/20 s/p 700cc renetta blood, monitored in CTU. Downgraded to medicine for further management

## 2022-10-24 NOTE — PROGRESS NOTE ADULT - PROBLEM SELECTOR PLAN 4
Incidental finding, s/p mAB  Asymptomatic  -Cont supportive care, continuous pulse ox  -Isolation precautions discontinued today as per ID recommendations

## 2022-10-24 NOTE — PROGRESS NOTE ADULT - SUBJECTIVE AND OBJECTIVE BOX
infectious diseases progress note:    Patient is a 72y old  Female who presents with a chief complaint of Pericardial Eff (23 Oct 2022 13:00)        Malignant pericardial effusion               Allergies    No Known Allergies    Intolerances        ANTIBIOTICS/RELEVANT:  antimicrobials  cefTRIAXone   IVPB 1000 milliGRAM(s) IV Intermittent every 24 hours    immunologic:    OTHER:  acetaminophen     Tablet .. 650 milliGRAM(s) Oral every 6 hours PRN  amLODIPine   Tablet 10 milliGRAM(s) Oral daily  atorvastatin 80 milliGRAM(s) Oral at bedtime  benzocaine 15 mG/menthol 3.6 mG Lozenge 2 Lozenge Oral every 6 hours PRN  benzonatate 100 milliGRAM(s) Oral three times a day  calcium acetate 667 milliGRAM(s) Oral three times a day with meals  chlorhexidine 2% Cloths 1 Application(s) Topical <User Schedule>  guaiFENesin Oral Liquid (Sugar-Free) 100 milliGRAM(s) Oral every 6 hours PRN  insulin lispro (ADMELOG) corrective regimen sliding scale   SubCutaneous three times a day before meals  insulin lispro (ADMELOG) corrective regimen sliding scale   SubCutaneous at bedtime  melatonin 3 milliGRAM(s) Oral at bedtime PRN  metoprolol tartrate 25 milliGRAM(s) Oral two times a day  mupirocin 2% Nasal 1 Application(s) Both Nostrils two times a day  pantoprazole    Tablet 40 milliGRAM(s) Oral before breakfast  raloxifene 60 milliGRAM(s) Oral daily  sodium chloride 0.9% lock flush 3 milliLiter(s) IV Push every 8 hours      Objective:  Vital Signs Last 24 Hrs  T(C): 36.7 (24 Oct 2022 05:34), Max: 36.8 (23 Oct 2022 13:31)  T(F): 98.1 (24 Oct 2022 05:34), Max: 98.3 (23 Oct 2022 13:31)  HR: 100 (24 Oct 2022 05:34) (96 - 100)  BP: 168/86 (24 Oct 2022 05:34) (151/83 - 168/86)  BP(mean): --  RR: 18 (24 Oct 2022 05:34) (18 - 18)  SpO2: 94% (24 Oct 2022 05:34) (94% - 95%)    Parameters below as of 24 Oct 2022 05:34  Patient On (Oxygen Delivery Method): nasal cannula           Eyes:BOBBI, EOMI  Ear/Nose/Throat: no oral lesion, no sinus tenderness on percussion	  Neck:no JVD, no lymphadenopathy, supple  Respiratory: CTA olamide  Cardiovascular: S1S2 RRR, no murmurs  Gastrointestinal:soft, (+) BS, no HSM  Extremities:no e/e/c        LABS:                        9.9    9.61  )-----------( 125      ( 24 Oct 2022 06:57 )             32.1     10-24    141  |  102  |  32<H>  ----------------------------<  115<H>  3.6   |  26  |  3.27<H>    Ca    7.5<L>      24 Oct 2022 06:59    TPro  5.6<L>  /  Alb  2.8<L>  /  TBili  0.6  /  DBili  x   /  AST  211<H>  /  ALT  476<H>  /  AlkPhos  177<H>  10-24    PT/INR - ( 24 Oct 2022 07:00 )   PT: 13.1 sec;   INR: 1.13 ratio         PTT - ( 23 Oct 2022 06:14 )  PTT:25.7 sec        MICROBIOLOGY:    RECENT CULTURES:  10-20 @ 18:35 Catheterized Catheterized   FAYE      Escherichia coli  Escherichia coli     >100,000 CFU/ml Escherichia coli    10-20 @ 15:15 .Blood Blood                No growth to date.    10-20 @ 13:30 .Blood Blood-Venous                No growth to date.    10-20 @ 12:20 Pericardial Pericardial Fluid       Numerous polymorphonuclear leukocytes seen per low power field  No organisms seen per oil power field           Testing in progress          RESPIRATORY CULTURES:              RADIOLOGY & ADDITIONAL STUDIES:        Pager [phone]  After 5 pm/weekends or if no response :[phone]

## 2022-10-24 NOTE — PROGRESS NOTE ADULT - PROBLEM SELECTOR PLAN 2
Presented to [facility] VS MAR and CKD with uremia, initiated on urgent HD. New ESRD  -plan for permacath by IR 10/26  -phoslo TID  -nephro recs appreciated   -next HD as per nephro, last dialysis 10/20   -renally dose and avoid nephrotoxic agents

## 2022-10-24 NOTE — PROGRESS NOTE ADULT - ASSESSMENT
72F HTN, HLD, DM, CKD who presented to [facility] initially with MAR on CKD with acute uremia and metabolic derangements requiring urgent HD and was COVID-19 positive.      #Leukocytosis  Unclear source - may be multifactorial given ongoing clinical issues  CT without clear evidence for infection    #Asymptomatic bacteruria   Urine culture growing E.coli  Denies urinary symptoms    #Transaminitis    PLAN:  Continue ceftriaxone 1 gm iv q24h while awaiting blood cultures to finalize    blood and pericardial fluid cultures are negative   can dc isolation today   No specific COVID-19 therapies at this time  Follow fever curve and WBC count          Danielito Rivero MD  Please contact through MS Teams   If no response or past 5 pm/weekend call [phone].

## 2022-10-25 LAB
ALBUMIN SERPL ELPH-MCNC: 3.1 G/DL — LOW (ref 3.3–5)
ALP SERPL-CCNC: 170 U/L — HIGH (ref 40–120)
ALT FLD-CCNC: 334 U/L — HIGH (ref 10–45)
ANION GAP SERPL CALC-SCNC: 14 MMOL/L — SIGNIFICANT CHANGE UP (ref 5–17)
AST SERPL-CCNC: 113 U/L — HIGH (ref 10–40)
BILIRUB SERPL-MCNC: 0.5 MG/DL — SIGNIFICANT CHANGE UP (ref 0.2–1.2)
BUN SERPL-MCNC: 41 MG/DL — HIGH (ref 7–23)
CALCIUM SERPL-MCNC: 7.6 MG/DL — LOW (ref 8.4–10.5)
CHLORIDE SERPL-SCNC: 101 MMOL/L — SIGNIFICANT CHANGE UP (ref 96–108)
CO2 SERPL-SCNC: 26 MMOL/L — SIGNIFICANT CHANGE UP (ref 22–31)
CREAT SERPL-MCNC: 3.24 MG/DL — HIGH (ref 0.5–1.3)
CULTURE RESULTS: SIGNIFICANT CHANGE UP
EGFR: 15 ML/MIN/1.73M2 — LOW
GLUCOSE BLDC GLUCOMTR-MCNC: 117 MG/DL — HIGH (ref 70–99)
GLUCOSE BLDC GLUCOMTR-MCNC: 156 MG/DL — HIGH (ref 70–99)
GLUCOSE BLDC GLUCOMTR-MCNC: 158 MG/DL — HIGH (ref 70–99)
GLUCOSE BLDC GLUCOMTR-MCNC: 186 MG/DL — HIGH (ref 70–99)
GLUCOSE SERPL-MCNC: 126 MG/DL — HIGH (ref 70–99)
MAGNESIUM SERPL-MCNC: 1.5 MG/DL — LOW (ref 1.6–2.6)
NON-GYNECOLOGICAL CYTOLOGY STUDY: SIGNIFICANT CHANGE UP
POTASSIUM SERPL-MCNC: 3.7 MMOL/L — SIGNIFICANT CHANGE UP (ref 3.5–5.3)
POTASSIUM SERPL-SCNC: 3.7 MMOL/L — SIGNIFICANT CHANGE UP (ref 3.5–5.3)
PROT SERPL-MCNC: 5.8 G/DL — LOW (ref 6–8.3)
SODIUM SERPL-SCNC: 141 MMOL/L — SIGNIFICANT CHANGE UP (ref 135–145)
SPECIMEN SOURCE: SIGNIFICANT CHANGE UP

## 2022-10-25 PROCEDURE — 99233 SBSQ HOSP IP/OBS HIGH 50: CPT

## 2022-10-25 PROCEDURE — 76705 ECHO EXAM OF ABDOMEN: CPT | Mod: 26

## 2022-10-25 PROCEDURE — 93321 DOPPLER ECHO F-UP/LMTD STD: CPT | Mod: 26

## 2022-10-25 PROCEDURE — 93308 TTE F-UP OR LMTD: CPT | Mod: 26

## 2022-10-25 PROCEDURE — 99223 1ST HOSP IP/OBS HIGH 75: CPT

## 2022-10-25 RX ADMIN — MUPIROCIN 1 APPLICATION(S): 20 OINTMENT TOPICAL at 05:47

## 2022-10-25 RX ADMIN — SODIUM CHLORIDE 3 MILLILITER(S): 9 INJECTION INTRAMUSCULAR; INTRAVENOUS; SUBCUTANEOUS at 13:00

## 2022-10-25 RX ADMIN — Medication 25 MILLIGRAM(S): at 05:47

## 2022-10-25 RX ADMIN — Medication 100 MILLIGRAM(S): at 16:01

## 2022-10-25 RX ADMIN — Medication 100 MILLIGRAM(S): at 21:43

## 2022-10-25 RX ADMIN — PANTOPRAZOLE SODIUM 40 MILLIGRAM(S): 20 TABLET, DELAYED RELEASE ORAL at 05:47

## 2022-10-25 RX ADMIN — AMLODIPINE BESYLATE 10 MILLIGRAM(S): 2.5 TABLET ORAL at 05:46

## 2022-10-25 RX ADMIN — SODIUM CHLORIDE 3 MILLILITER(S): 9 INJECTION INTRAMUSCULAR; INTRAVENOUS; SUBCUTANEOUS at 19:44

## 2022-10-25 RX ADMIN — CHLORHEXIDINE GLUCONATE 1 APPLICATION(S): 213 SOLUTION TOPICAL at 16:28

## 2022-10-25 RX ADMIN — RALOXIFENE HYDROCHLORIDE 60 MILLIGRAM(S): 60 TABLET, COATED ORAL at 16:02

## 2022-10-25 RX ADMIN — CEFTRIAXONE 100 MILLIGRAM(S): 500 INJECTION, POWDER, FOR SOLUTION INTRAMUSCULAR; INTRAVENOUS at 21:50

## 2022-10-25 RX ADMIN — SODIUM CHLORIDE 3 MILLILITER(S): 9 INJECTION INTRAMUSCULAR; INTRAVENOUS; SUBCUTANEOUS at 05:24

## 2022-10-25 RX ADMIN — Medication 667 MILLIGRAM(S): at 08:14

## 2022-10-25 RX ADMIN — ATORVASTATIN CALCIUM 80 MILLIGRAM(S): 80 TABLET, FILM COATED ORAL at 21:43

## 2022-10-25 RX ADMIN — Medication 1: at 08:14

## 2022-10-25 RX ADMIN — Medication 100 MILLIGRAM(S): at 05:46

## 2022-10-25 RX ADMIN — Medication 650 MILLIGRAM(S): at 15:17

## 2022-10-25 RX ADMIN — Medication 650 MILLIGRAM(S): at 16:00

## 2022-10-25 RX ADMIN — Medication 25 MILLIGRAM(S): at 17:28

## 2022-10-25 RX ADMIN — Medication 667 MILLIGRAM(S): at 17:28

## 2022-10-25 RX ADMIN — MUPIROCIN 1 APPLICATION(S): 20 OINTMENT TOPICAL at 17:32

## 2022-10-25 RX ADMIN — Medication 1: at 17:21

## 2022-10-25 NOTE — PROGRESS NOTE ADULT - PROBLEM SELECTOR PLAN 7
Likely shock liver, improving  -trend lfts Likely shock liver, improving  -trend lfts  - RUQ sono pending

## 2022-10-25 NOTE — PROGRESS NOTE ADULT - PROBLEM SELECTOR PLAN 2
Presented to [facility] VS MAR and CKD with uremia, initiated on urgent HD. New ESRD  -plan for permacath by IR 10/26  -phoslo TID  -nephro recs appreciated   -next HD as per nephro, last dialysis 10/20   -renally dose and avoid nephrotoxic agents Presented to [facility] VS MAR and CKD with uremia, initiated on urgent HD. New ESRD  -plan for permacath by IR 10/26  -phoslo TID  -nephro recs appreciated, vascular surgery consulted today for AVF creation prior to d/c. Will need arm precautions and vessel mapping  -next HD as per nephro, last dialysis 10/20   -renally dose and avoid nephrotoxic agents

## 2022-10-25 NOTE — PROGRESS NOTE ADULT - ASSESSMENT
72F w/ HTN, HLD, DM, CKD, 10/20/22 from OSH with uremia, COVID-19, and pericardial effusion    (1)Renal - newly ESRD-HD.   (2)Hypokalemia - mild  (3)CTS - s/p pericardial drain placement   (4)CV - now hemodynamically stable      RECOMMEND:  (1)Next HD today- can attempt 1kg UF, 4k bath.    (2)Needs perm cath.  She is not coming off HD;  Needs vasc as well for creation of AVF which will need to be in placed for acceptance to an outpt HD unit   (3)Chest tube per CTICU   (4)LYNSEY ritter       Sayed [facility]   [phone]

## 2022-10-25 NOTE — PROGRESS NOTE ADULT - ASSESSMENT
72F HTN, HLD, DM, CKD presented to [facility] VS initially with MAR on CKD with acute uremia and metabolic derangements requiring ICU stay, urgent HD and COVID-19 positive, complicated with Afib with GIB 2/2 AC and acute uremia. Transferred to [facility] CTS for large pericardial effusion and developed cardiac tamponade requiring urgent percardiocentesis 10/20 s/p 700cc renetta blood, monitored in CTU. Downgraded to medicine for further management

## 2022-10-25 NOTE — CONSULT NOTE ADULT - SUBJECTIVE AND OBJECTIVE BOX
GENERAL SURGERY CONSULT    72F HTN, HLD, DM, CKD presented to [facility] VS initially with MAR on CKD with acute uremia and metabolic derangements requiring ICU stay, urgent HD and COVID-19 positive, complicated with Afib with GIB 2/2 AC and acute uremia. Transferred to [facility] CTS for large pericardial effusion and developed cardiac tamponade requiring urgent percardiocentesis 10/20 s/p 700cc renetta blood, monitored in CTU. Downgraded to medicine for further management.     Vascular surgery consulted for AVF creation prior to discharge. Pt denies pacemaker or defibrillator placement. No UE surgeries.      PAST MEDICAL & SURGICAL HISTORY:  HTN (hypertension)      HLD (hyperlipidemia)      DM (diabetes mellitus)          MEDICATIONS  (STANDING):  amLODIPine   Tablet 10 milliGRAM(s) Oral daily  atorvastatin 80 milliGRAM(s) Oral at bedtime  benzonatate 100 milliGRAM(s) Oral three times a day  calcium acetate 667 milliGRAM(s) Oral three times a day with meals  cefTRIAXone   IVPB 1000 milliGRAM(s) IV Intermittent every 24 hours  chlorhexidine 2% Cloths 1 Application(s) Topical <User Schedule>  insulin lispro (ADMELOG) corrective regimen sliding scale   SubCutaneous three times a day before meals  insulin lispro (ADMELOG) corrective regimen sliding scale   SubCutaneous at bedtime  metoprolol tartrate 25 milliGRAM(s) Oral two times a day  mupirocin 2% Nasal 1 Application(s) Both Nostrils two times a day  pantoprazole    Tablet 40 milliGRAM(s) Oral before breakfast  raloxifene 60 milliGRAM(s) Oral daily  sodium chloride 0.9% lock flush 3 milliLiter(s) IV Push every 8 hours    MEDICATIONS  (PRN):  acetaminophen     Tablet .. 650 milliGRAM(s) Oral every 6 hours PRN Temp greater or equal to 38C (100.4F), Mild Pain (1 - 3)  benzocaine 15 mG/menthol 3.6 mG Lozenge 2 Lozenge Oral every 6 hours PRN Sore Throat  guaiFENesin Oral Liquid (Sugar-Free) 100 milliGRAM(s) Oral every 6 hours PRN Cough  hydrocodone/homatropine Syrup 5 milliLiter(s) Oral every 6 hours PRN Cough  melatonin 3 milliGRAM(s) Oral at bedtime PRN Insomnia      Allergies    No Known Allergies    Intolerances        SOCIAL HISTORY:    FAMILY HISTORY:  FH: kidney disease      Physical Exam:  General: NAD, fatigued  HEENT: NC/AT, EOMI, normal hearing, shiley in R neck, central line in L neck  Pulmonary: normal resp effort  Chest: pleurocentesis catheter  Extremities: radial and ulnar pulses b/l, no IVs  Neuro: A/O x 3, CNs II-XII grossly intact    Vital Signs Last 24 Hrs  T(C): 36.1 (25 Oct 2022 13:00), Max: 36.9 (25 Oct 2022 05:05)  T(F): 97 (25 Oct 2022 13:00), Max: 98.4 (25 Oct 2022 05:05)  HR: 86 (25 Oct 2022 13:00) (83 - 92)  BP: 119/54 (25 Oct 2022 13:00) (119/54 - 152/81)  BP(mean): --  RR: 20 (25 Oct 2022 13:00) (18 - 20)  SpO2: 93% (25 Oct 2022 13:00) (92% - 97%)    Parameters below as of 25 Oct 2022 13:00  Patient On (Oxygen Delivery Method): room air        I&O's Summary    24 Oct 2022 07:01  -  25 Oct 2022 07:00  --------------------------------------------------------  IN: 610 mL / OUT: 1900 mL / NET: -1290 mL    25 Oct 2022 07:01  -  25 Oct 2022 14:26  --------------------------------------------------------  IN: 0 mL / OUT: 1350 mL / NET: -1350 mL            LABS:                        9.9    9.61  )-----------( 125      ( 24 Oct 2022 06:57 )             32.1     10-25    141  |  101  |  41<H>  ----------------------------<  126<H>  3.7   |  26  |  3.24<H>    Ca    7.6<L>      25 Oct 2022 06:08  Mg     1.5     10-25    TPro  5.8<L>  /  Alb  3.1<L>  /  TBili  0.5  /  DBili  x   /  AST  113<H>  /  ALT  334<H>  /  AlkPhos  170<H>  10-25    PT/INR - ( 24 Oct 2022 07:00 )   PT: 13.1 sec;   INR: 1.13 ratio             CAPILLARY BLOOD GLUCOSE      POCT Blood Glucose.: 117 mg/dL (25 Oct 2022 12:31)  POCT Blood Glucose.: 158 mg/dL (25 Oct 2022 08:06)  POCT Blood Glucose.: 183 mg/dL (24 Oct 2022 21:18)  POCT Blood Glucose.: 139 mg/dL (24 Oct 2022 16:29)    LIVER FUNCTIONS - ( 25 Oct 2022 06:08 )  Alb: 3.1 g/dL / Pro: 5.8 g/dL / ALK PHOS: 170 U/L / ALT: 334 U/L / AST: 113 U/L / GGT: x

## 2022-10-25 NOTE — PROGRESS NOTE ADULT - SUBJECTIVE AND OBJECTIVE BOX
NE[facility]ROLOGY[facility] [phone]        Patient seen and examined in bed.  She was the same         MEDICATIONS  (STANDING):  amLODIPine   Tablet 10 milliGRAM(s) Oral daily  atorvastatin 80 milliGRAM(s) Oral at bedtime  benzonatate 100 milliGRAM(s) Oral three times a day  calcium acetate 667 milliGRAM(s) Oral three times a day with meals  cefTRIAXone   IVPB 1000 milliGRAM(s) IV Intermittent every 24 hours  chlorhexidine 2% Cloths 1 Application(s) Topical <User Schedule>  insulin lispro (ADMELOG) corrective regimen sliding scale   SubCutaneous three times a day before meals  insulin lispro (ADMELOG) corrective regimen sliding scale   SubCutaneous at bedtime  metoprolol tartrate 25 milliGRAM(s) Oral two times a day  mupirocin 2% Nasal 1 Application(s) Both Nostrils two times a day  pantoprazole    Tablet 40 milliGRAM(s) Oral before breakfast  raloxifene 60 milliGRAM(s) Oral daily  sodium chloride 0.9% lock flush 3 milliLiter(s) IV Push every 8 hours      VITAL:  T(C): , Max: 36.9 (10-25-22 @ 05:05)  T(F): , Max: 98.4 (10-25-22 @ 05:05)  HR: 88 (10-25-22 @ 09:45)  BP: 150/88 (10-25-22 @ 09:45)  BP(mean): --  RR: 20 (10-25-22 @ 09:45)  SpO2: 94% (10-25-22 @ 09:45)  Wt(kg): --    I and O's:    10-24 @ 07:01  -  10-25 @ 07:00  --------------------------------------------------------  IN: 610 mL / OUT: 1900 mL / NET: -1290 mL    10-25 @ 07:01  -  10-25 @ 11:51  --------------------------------------------------------  IN: 0 mL / OUT: 0 mL / NET: 0 mL          PHYSICAL EXAM:    Constitutional: NAD  Neck:  No JVD  Respiratory: CTAB/L  Cardiovascular: S1 and S2  Gastrointestinal: BS+, soft, NT/ND  Extremities: No peripheral edema  Neurological: A/O x 3, no focal deficits  Psychiatric: Normal mood, normal affect  : +  Bah  Skin: No rashes  Access: [facility]     LABS:                        9.9    9.61  )-----------( 125      ( 24 Oct 2022 06:57 )             32.1     10-25    141  |  101  |  41<H>  ----------------------------<  126<H>  3.7   |  26  |  3.24<H>    Ca    7.6<L>      25 Oct 2022 06:08  Mg     1.5     10-25    TPro  5.8<L>  /  Alb  3.1<L>  /  TBili  0.5  /  DBili  x   /  AST  113<H>  /  ALT  334<H>  /  AlkPhos  170<H>  10-25          Urine Studies:          RADIOLOGY & ADDITIONAL STUDIES:

## 2022-10-25 NOTE — PROGRESS NOTE ADULT - SUBJECTIVE AND OBJECTIVE BOX
Kiran Horn MD  Division of Hospital Medicine  Available via MS teams  If no response or off hours page: [phone]  ---------------------------------------------------------    RILEY MEJÍA  72y  Female      Patient is a 72y old  Female who presents with a chief complaint of Pericardial Eff (24 Oct 2022 14:31)      INTERVAL HPI/OVERNIGHT EVENTS:  Seen at bedside. Still with cough. Voice getting better      REVIEW OF SYSTEMS: 10 point ROS negative unless listed above    T(C): 36.2 (10-25-22 @ 09:45), Max: 36.9 (10-25-22 @ 05:05)  HR: 88 (10-25-22 @ 09:45) (81 - 92)  BP: 150/88 (10-25-22 @ 09:45) (141/79 - 164/83)  RR: 20 (10-25-22 @ 09:45) (18 - 20)  SpO2: 94% (10-25-22 @ 09:45) (92% - 97%)  Wt(kg): --Vital Signs Last 24 Hrs  T(C): 36.2 (25 Oct 2022 09:45), Max: 36.9 (25 Oct 2022 05:05)  T(F): 97.2 (25 Oct 2022 09:45), Max: 98.4 (25 Oct 2022 05:05)  HR: 88 (25 Oct 2022 09:45) (81 - 92)  BP: 150/88 (25 Oct 2022 09:45) (141/79 - 164/83)  BP(mean): --  RR: 20 (25 Oct 2022 09:45) (18 - 20)  SpO2: 94% (25 Oct 2022 09:45) (92% - 97%)    Parameters below as of 25 Oct 2022 09:45  Patient On (Oxygen Delivery Method): room air        PHYSICAL EXAM:  CONSTITUTIONAL: NAD, well-developed, well-groomed  NECK: Supple, RIJ central line, +shiley  RESPIRATORY: Normal respiratory effort; lungs are clear to auscultation bilaterally  CARDIOVASCULAR: S1S2 intact, no murmur; 1+ lower extremity edema  Pericardial drain in place with minimal bloody fluid, site intact  ABDOMEN: Nontender to palpation, normoactive bowel sounds, no rebound/guarding; No hepatosplenomegaly  MUSCULOSKELETAL:  No clubbing or cyanosis of digits; no joint swelling or tenderness to palpation  PSYCH: A+O to person, place, and time; affect appropriate  NEUROLOGY: CN 2-12 are intact and symmetric; no gross sensory deficits   : ritter in place draining yellow urine    Consultant(s) Notes Reviewed:  [x ] YES  [ ] NO  Care Discussed with Consultants/Other Providers [ x] YES  [ ] NO    LABS:                        9.9    9.61  )-----------( 125      ( 24 Oct 2022 06:57 )             32.1     10-25    141  |  101  |  41<H>  ----------------------------<  126<H>  3.7   |  26  |  3.24<H>    Ca    7.6<L>      25 Oct 2022 06:08  Mg     1.5     10-25    TPro  5.8<L>  /  Alb  3.1<L>  /  TBili  0.5  /  DBili  x   /  AST  113<H>  /  ALT  334<H>  /  AlkPhos  170<H>  10-25    PT/INR - ( 24 Oct 2022 07:00 )   PT: 13.1 sec;   INR: 1.13 ratio             CAPILLARY BLOOD GLUCOSE      POCT Blood Glucose.: 158 mg/dL (25 Oct 2022 08:06)  POCT Blood Glucose.: 183 mg/dL (24 Oct 2022 21:18)  POCT Blood Glucose.: 139 mg/dL (24 Oct 2022 16:29)  POCT Blood Glucose.: 135 mg/dL (24 Oct 2022 11:28)            RADIOLOGY & ADDITIONAL TESTS:    Imaging Personally Reviewed:  [ ] YES  [ ] NO

## 2022-10-25 NOTE — CONSULT NOTE ADULT - ASSESSMENT
72F with MAR on CKD. HD initiated on 10/20 via shiley. Vascular surgery consulted for AVF placement.    PLAN  - Please document meds & cards clearance for OR  - Protect LUE - no blood draws, IVs, bp readings - please remove L IJ central line  - Vein mapping  - Plan for LUE AVF before discharge    Discussed with Dr. Tran on behalf of Dr. Galeano    Vascular Surgery  [id number]   72F with MAR on CKD. HD initiated on 10/20 via shiley. Vascular surgery consulted for AVF placement.    PLAN  - Please document meds, cards, and GI clearance for OR  - Protect LUE - no blood draws, IVs, bp readings - please remove L IJ central line  - Vein mapping  - Plan for LUE AVF before discharge    Discussed with Dr. Tran on behalf of Dr. Galeano    Vascular Surgery  [phone]

## 2022-10-25 NOTE — PROGRESS NOTE ADULT - PROBLEM SELECTOR PLAN 1
Cardiac tamponade (hypotensive and SOB 10/20) s/p urgent pericardiocentesis by CTU. 700cc bloody fluid removed with pigtail in place. Repeat TTE post procedure 10/21, no pericardial tamponade.  -monitor output from pigtail  - d/w Cards today, plan for repeat limited TTE today to assess effusion. Possible removal if TTE okay  -f/u pericardial fluid culture, so far NGTD  -Acid fast neg  -CT angio chest and ab/p neg for malignancy   -CTS recs appreciated, f/u Re: plan for catheter removal

## 2022-10-26 DIAGNOSIS — N17.0 ACUTE KIDNEY FAILURE WITH TUBULAR NECROSIS: ICD-10-CM

## 2022-10-26 DIAGNOSIS — I31.4 CARDIAC TAMPONADE: ICD-10-CM

## 2022-10-26 DIAGNOSIS — E87.5 HYPERKALEMIA: ICD-10-CM

## 2022-10-26 DIAGNOSIS — N18.4 CHRONIC KIDNEY DISEASE, STAGE 4 (SEVERE): ICD-10-CM

## 2022-10-26 DIAGNOSIS — D62 ACUTE POSTHEMORRHAGIC ANEMIA: ICD-10-CM

## 2022-10-26 DIAGNOSIS — Z79.4 LONG TERM (CURRENT) USE OF INSULIN: ICD-10-CM

## 2022-10-26 DIAGNOSIS — K57.30 DIVERTICULOSIS OF LARGE INTESTINE WITHOUT PERFORATION OR ABSCESS WITHOUT BLEEDING: ICD-10-CM

## 2022-10-26 DIAGNOSIS — I24.8 OTHER FORMS OF ACUTE ISCHEMIC HEART DISEASE: ICD-10-CM

## 2022-10-26 DIAGNOSIS — R06.02 SHORTNESS OF BREATH: ICD-10-CM

## 2022-10-26 DIAGNOSIS — U07.1 COVID-19: ICD-10-CM

## 2022-10-26 DIAGNOSIS — E87.20 ACIDOSIS, UNSPECIFIED: ICD-10-CM

## 2022-10-26 DIAGNOSIS — J12.82 PNEUMONIA DUE TO CORONAVIRUS DISEASE 2019: ICD-10-CM

## 2022-10-26 DIAGNOSIS — I48.91 UNSPECIFIED ATRIAL FIBRILLATION: ICD-10-CM

## 2022-10-26 DIAGNOSIS — I12.9 HYPERTENSIVE CHRONIC KIDNEY DISEASE WITH STAGE 1 THROUGH STAGE 4 CHRONIC KIDNEY DISEASE, OR UNSPECIFIED CHRONIC KIDNEY DISEASE: ICD-10-CM

## 2022-10-26 DIAGNOSIS — E07.81 SICK-EUTHYROID SYNDROME: ICD-10-CM

## 2022-10-26 DIAGNOSIS — K52.9 NONINFECTIVE GASTROENTERITIS AND COLITIS, UNSPECIFIED: ICD-10-CM

## 2022-10-26 DIAGNOSIS — E11.9 TYPE 2 DIABETES MELLITUS WITHOUT COMPLICATIONS: ICD-10-CM

## 2022-10-26 DIAGNOSIS — E78.5 HYPERLIPIDEMIA, UNSPECIFIED: ICD-10-CM

## 2022-10-26 LAB
ANION GAP SERPL CALC-SCNC: 15 MMOL/L — SIGNIFICANT CHANGE UP (ref 5–17)
BUN SERPL-MCNC: 30 MG/DL — HIGH (ref 7–23)
CALCIUM SERPL-MCNC: 7.3 MG/DL — LOW (ref 8.4–10.5)
CHLORIDE SERPL-SCNC: 100 MMOL/L — SIGNIFICANT CHANGE UP (ref 96–108)
CO2 SERPL-SCNC: 24 MMOL/L — SIGNIFICANT CHANGE UP (ref 22–31)
CREAT SERPL-MCNC: 2.49 MG/DL — HIGH (ref 0.5–1.3)
EGFR: 20 ML/MIN/1.73M2 — LOW
GLUCOSE BLDC GLUCOMTR-MCNC: 108 MG/DL — HIGH (ref 70–99)
GLUCOSE BLDC GLUCOMTR-MCNC: 111 MG/DL — HIGH (ref 70–99)
GLUCOSE BLDC GLUCOMTR-MCNC: 112 MG/DL — HIGH (ref 70–99)
GLUCOSE BLDC GLUCOMTR-MCNC: 271 MG/DL — HIGH (ref 70–99)
GLUCOSE SERPL-MCNC: 103 MG/DL — HIGH (ref 70–99)
MAGNESIUM SERPL-MCNC: 1.5 MG/DL — LOW (ref 1.6–2.6)
POTASSIUM SERPL-MCNC: 4.1 MMOL/L — SIGNIFICANT CHANGE UP (ref 3.5–5.3)
POTASSIUM SERPL-SCNC: 4.1 MMOL/L — SIGNIFICANT CHANGE UP (ref 3.5–5.3)
SODIUM SERPL-SCNC: 139 MMOL/L — SIGNIFICANT CHANGE UP (ref 135–145)

## 2022-10-26 PROCEDURE — 99222 1ST HOSP IP/OBS MODERATE 55: CPT | Mod: GC

## 2022-10-26 PROCEDURE — 76937 US GUIDE VASCULAR ACCESS: CPT | Mod: 26

## 2022-10-26 PROCEDURE — 99232 SBSQ HOSP IP/OBS MODERATE 35: CPT

## 2022-10-26 PROCEDURE — 99233 SBSQ HOSP IP/OBS HIGH 50: CPT

## 2022-10-26 PROCEDURE — 36558 INSERT TUNNELED CV CATH: CPT

## 2022-10-26 PROCEDURE — 77001 FLUOROGUIDE FOR VEIN DEVICE: CPT | Mod: 26

## 2022-10-26 RX ORDER — MAGNESIUM OXIDE 400 MG ORAL TABLET 241.3 MG
400 TABLET ORAL EVERY 8 HOURS
Refills: 0 | Status: COMPLETED | OUTPATIENT
Start: 2022-10-26 | End: 2022-10-27

## 2022-10-26 RX ORDER — SODIUM CHLORIDE 9 MG/ML
1000 INJECTION, SOLUTION INTRAVENOUS
Refills: 0 | Status: DISCONTINUED | OUTPATIENT
Start: 2022-10-26 | End: 2022-10-26

## 2022-10-26 RX ORDER — LIDOCAINE 4 G/100G
2 CREAM TOPICAL DAILY
Refills: 0 | Status: DISCONTINUED | OUTPATIENT
Start: 2022-10-26 | End: 2022-11-09

## 2022-10-26 RX ORDER — CHLORHEXIDINE GLUCONATE 213 G/1000ML
1 SOLUTION TOPICAL
Refills: 0 | Status: DISCONTINUED | OUTPATIENT
Start: 2022-10-26 | End: 2022-11-09

## 2022-10-26 RX ORDER — POLYETHYLENE GLYCOL 3350 17 G/17G
17 POWDER, FOR SOLUTION ORAL DAILY
Refills: 0 | Status: DISCONTINUED | OUTPATIENT
Start: 2022-10-26 | End: 2022-10-30

## 2022-10-26 RX ORDER — SENNA PLUS 8.6 MG/1
2 TABLET ORAL AT BEDTIME
Refills: 0 | Status: DISCONTINUED | OUTPATIENT
Start: 2022-10-26 | End: 2022-11-09

## 2022-10-26 RX ADMIN — Medication 25 MILLIGRAM(S): at 17:33

## 2022-10-26 RX ADMIN — POLYETHYLENE GLYCOL 3350 17 GRAM(S): 17 POWDER, FOR SOLUTION ORAL at 17:33

## 2022-10-26 RX ADMIN — SODIUM CHLORIDE 3 MILLILITER(S): 9 INJECTION INTRAMUSCULAR; INTRAVENOUS; SUBCUTANEOUS at 22:21

## 2022-10-26 RX ADMIN — Medication 100 MILLIGRAM(S): at 05:16

## 2022-10-26 RX ADMIN — LIDOCAINE 2 PATCH: 4 CREAM TOPICAL at 14:03

## 2022-10-26 RX ADMIN — AMLODIPINE BESYLATE 10 MILLIGRAM(S): 2.5 TABLET ORAL at 05:17

## 2022-10-26 RX ADMIN — PANTOPRAZOLE SODIUM 40 MILLIGRAM(S): 20 TABLET, DELAYED RELEASE ORAL at 06:16

## 2022-10-26 RX ADMIN — ATORVASTATIN CALCIUM 80 MILLIGRAM(S): 80 TABLET, FILM COATED ORAL at 22:14

## 2022-10-26 RX ADMIN — Medication 650 MILLIGRAM(S): at 03:23

## 2022-10-26 RX ADMIN — RALOXIFENE HYDROCHLORIDE 60 MILLIGRAM(S): 60 TABLET, COATED ORAL at 11:43

## 2022-10-26 RX ADMIN — MAGNESIUM OXIDE 400 MG ORAL TABLET 400 MILLIGRAM(S): 241.3 TABLET ORAL at 22:13

## 2022-10-26 RX ADMIN — Medication 667 MILLIGRAM(S): at 17:33

## 2022-10-26 RX ADMIN — MUPIROCIN 1 APPLICATION(S): 20 OINTMENT TOPICAL at 05:21

## 2022-10-26 RX ADMIN — LIDOCAINE 2 PATCH: 4 CREAM TOPICAL at 18:35

## 2022-10-26 RX ADMIN — SENNA PLUS 2 TABLET(S): 8.6 TABLET ORAL at 22:14

## 2022-10-26 RX ADMIN — Medication 100 MILLIGRAM(S): at 22:14

## 2022-10-26 RX ADMIN — SODIUM CHLORIDE 3 MILLILITER(S): 9 INJECTION INTRAMUSCULAR; INTRAVENOUS; SUBCUTANEOUS at 05:13

## 2022-10-26 RX ADMIN — Medication 2: at 22:14

## 2022-10-26 RX ADMIN — BENZOCAINE AND MENTHOL 2 LOZENGE: 5; 1 LIQUID ORAL at 14:04

## 2022-10-26 RX ADMIN — Medication 25 MILLIGRAM(S): at 05:16

## 2022-10-26 RX ADMIN — SODIUM CHLORIDE 3 MILLILITER(S): 9 INJECTION INTRAMUSCULAR; INTRAVENOUS; SUBCUTANEOUS at 13:38

## 2022-10-26 RX ADMIN — Medication 650 MILLIGRAM(S): at 02:53

## 2022-10-26 RX ADMIN — Medication 100 MILLIGRAM(S): at 14:04

## 2022-10-26 RX ADMIN — CHLORHEXIDINE GLUCONATE 1 APPLICATION(S): 213 SOLUTION TOPICAL at 11:45

## 2022-10-26 NOTE — PROGRESS NOTE ADULT - PROBLEM SELECTOR PLAN 2
Presented to [facility] VS MAR and CKD with uremia, initiated on urgent HD. New ESRD  -plan for permacath by IR 10/26, called today, is on schedule  -phoslo TID  -nephro recs appreciated, vascular surgery for AVF creation prior to d/c. c/w arm precautions. Plan for vessel mapping. Vascular requesting GI eval in case AC is needed  - HD as per nephro,   -renally dose and avoid nephrotoxic agents

## 2022-10-26 NOTE — PROGRESS NOTE ADULT - SUBJECTIVE AND OBJECTIVE BOX
infectious diseases progress note:    Patient is a 72y old  Female who presents with a chief complaint of Pericardial Eff (26 Oct 2022 11:30)        Malignant pericardial effusion             Allergies    No Known Allergies    Intolerances        ANTIBIOTICS/RELEVANT:  antimicrobials  cefTRIAXone   IVPB 1000 milliGRAM(s) IV Intermittent every 24 hours    immunologic:    OTHER:  acetaminophen     Tablet .. 650 milliGRAM(s) Oral every 6 hours PRN  amLODIPine   Tablet 10 milliGRAM(s) Oral daily  atorvastatin 80 milliGRAM(s) Oral at bedtime  benzocaine 15 mG/menthol 3.6 mG Lozenge 2 Lozenge Oral every 6 hours PRN  benzonatate 100 milliGRAM(s) Oral three times a day  calcium acetate 667 milliGRAM(s) Oral three times a day with meals  chlorhexidine 2% Cloths 1 Application(s) Topical <User Schedule>  guaiFENesin Oral Liquid (Sugar-Free) 100 milliGRAM(s) Oral every 6 hours PRN  hydrocodone/homatropine Syrup 5 milliLiter(s) Oral every 6 hours PRN  insulin lispro (ADMELOG) corrective regimen sliding scale   SubCutaneous three times a day before meals  insulin lispro (ADMELOG) corrective regimen sliding scale   SubCutaneous at bedtime  lidocaine   4% Patch 2 Patch Transdermal daily  melatonin 3 milliGRAM(s) Oral at bedtime PRN  metoprolol tartrate 25 milliGRAM(s) Oral two times a day  pantoprazole    Tablet 40 milliGRAM(s) Oral before breakfast  raloxifene 60 milliGRAM(s) Oral daily  sodium chloride 0.9% lock flush 3 milliLiter(s) IV Push every 8 hours      Objective:  Vital Signs Last 24 Hrs  T(C): 36.8 (26 Oct 2022 12:01), Max: 36.9 (25 Oct 2022 19:45)  T(F): 98.2 (26 Oct 2022 12:01), Max: 98.4 (25 Oct 2022 19:45)  HR: 92 (26 Oct 2022 12:01) (82 - 93)  BP: 172/67 (26 Oct 2022 12:01) (119/54 - 172/67)  BP(mean): 99 (26 Oct 2022 05:15) (90 - 99)  RR: 18 (26 Oct 2022 12:01) (18 - 20)  SpO2: 95% (26 Oct 2022 12:01) (93% - 98%)    Parameters below as of 26 Oct 2022 12:01  Patient On (Oxygen Delivery Method): room air        PHYSICAL EXAM:  Constitutional:Well-developed, well nourished--no acute distress  Eyes:BOBBI, EOMI  Ear/Nose/Throat: no oral lesion, no sinus tenderness on percussion	  Neck:no JVD, no lymphadenopathy, supple  Respiratory: CTA olamide  Cardiovascular: S1S2 RRR, no murmurs  Gastrointestinal:soft, (+) BS, no HSM  Extremities:no e/e/c        LABS:    10-26    139  |  100  |  30<H>  ----------------------------<  103<H>  4.1   |  24  |  2.49<H>    Ca    7.3<L>      26 Oct 2022 06:31  Mg     1.5     10-26    TPro  5.8<L>  /  Alb  3.1<L>  /  TBili  0.5  /  DBili  x   /  AST  113<H>  /  ALT  334<H>  /  AlkPhos  170<H>  10-25            MICROBIOLOGY:    RECENT CULTURES:  10-20 @ 18:35 Catheterized Catheterized   FAYE      Escherichia coli  Escherichia coli     >100,000 CFU/ml Escherichia coli    10-20 @ 15:15 .Blood Blood                No Growth Final    10-20 @ 13:30 .Blood Blood-Venous                No Growth Final    10-20 @ 12:20 Pericardial Pericardial Fluid       Numerous polymorphonuclear leukocytes seen per low power field  No organisms seen per oil power field           Testing in progress          RESPIRATORY CULTURES:              RADIOLOGY & ADDITIONAL STUDIES:        Pager [phone]  After 5 pm/weekends or if no response :[phone]

## 2022-10-26 NOTE — PROGRESS NOTE ADULT - SUBJECTIVE AND OBJECTIVE BOX
[facility]PHROLOGY[facility] [phone]        Patient seen and examined in bed.  She was in good spirits         MEDICATIONS  (STANDING):  amLODIPine   Tablet 10 milliGRAM(s) Oral daily  atorvastatin 80 milliGRAM(s) Oral at bedtime  benzonatate 100 milliGRAM(s) Oral three times a day  calcium acetate 667 milliGRAM(s) Oral three times a day with meals  cefTRIAXone   IVPB 1000 milliGRAM(s) IV Intermittent every 24 hours  chlorhexidine 2% Cloths 1 Application(s) Topical <User Schedule>  insulin lispro (ADMELOG) corrective regimen sliding scale   SubCutaneous three times a day before meals  insulin lispro (ADMELOG) corrective regimen sliding scale   SubCutaneous at bedtime  metoprolol tartrate 25 milliGRAM(s) Oral two times a day  pantoprazole    Tablet 40 milliGRAM(s) Oral before breakfast  raloxifene 60 milliGRAM(s) Oral daily  sodium chloride 0.9% lock flush 3 milliLiter(s) IV Push every 8 hours      VITAL:  T(C): , Max: 36.9 (10-25-22 @ 19:45)  T(F): , Max: 98.4 (10-25-22 @ 19:45)  HR: 87 (10-26-22 @ 05:15)  BP: 146/76 (10-26-22 @ 05:15)  BP(mean): 99 (10-26-22 @ 05:15)  RR: 18 (10-26-22 @ 05:15)  SpO2: 98% (10-26-22 @ 05:15)  Wt(kg): --    I and O's:    10-25 @ 07:01  -  10-26 @ 07:00  --------------------------------------------------------  IN: 170 mL / OUT: 2200 mL / NET: -2030 mL    10-26 @ 07:01  -  10-26 @ 09:57  --------------------------------------------------------  IN: 0 mL / OUT: 0 mL / NET: 0 mL          PHYSICAL EXAM:    Constitutional: NAD  Neck:  No JVD  Respiratory: CTAB/L  Cardiovascular: S1 and S2  Gastrointestinal: BS+, soft, NT/ND  Extremities: No peripheral edema  Neurological: A/O x 3, no focal deficits  Psychiatric: Normal mood, normal affect  : No Bah  Skin: No rashes  Access: [facility]     LABS:    10-26    139  |  100  |  30<H>  ----------------------------<  103<H>  4.1   |  24  |  2.49<H>    Ca    7.3<L>      26 Oct 2022 06:31  Mg     1.5     10-26    TPro  5.8<L>  /  Alb  3.1<L>  /  TBili  0.5  /  DBili  x   /  AST  113<H>  /  ALT  334<H>  /  AlkPhos  170<H>  10-25          Urine Studies:          RADIOLOGY & ADDITIONAL STUDIES:

## 2022-10-26 NOTE — PROCEDURE NOTE - NSPOSTPRCRAD_GEN_A_CORE
central line located in the superior vena cava/no pneumothorax
central line located in the superior vena cava/post-procedure radiography performed

## 2022-10-26 NOTE — PROGRESS NOTE ADULT - PROBLEM SELECTOR PLAN 4
Incidental finding, s/p mAB  Asymptomatic  -Cont supportive care, continuous pulse ox  -Isolation precautions discontinued as per ID recommendations

## 2022-10-26 NOTE — CONSULT NOTE ADULT - ATTENDING COMMENTS
ESRD on HD via right sided shiley  right handed female  palpable brachial bilaterally. right radial nonpalpable. left radial 2+. left ashutosh's negative.    - left arm precautions - no venipuncture or IVs  - bilateral upper extremity vein mapping  - cardiac risk stratification for OR  - AVF/AVG creation generally does require use of small dose of anticoagulation.
Agree with above. pt with signs of active bleeding, recent CT shows no bowel abnormality. No absolute objection to A/C. She reportedly had ?colitis or diverticulitis vs SCAD on recent colonoscopy, but she has no diarrhea/pain/bleeding now and absolutely declines any repeat colonoscopy, she is aware that the colonoscopy was incomplete and thus lesions could be missed. Would administer A/C as clinically necessary with careful monitoring.

## 2022-10-26 NOTE — CONSULT NOTE ADULT - ASSESSMENT
71yo F with PMH of HTN, HLD, DM2, CKD who presented to [facility] initially with COVID-19 PNA complicated by MAR on CKD with acute uremia and metabolic derangements. GI consulted for "clearance" prior to AVF creation.     # GI clearance - patient with previous history of rectal bleeding in the setting of uremia and heparin, with no recurrence on re-challenge with heparin. Noted recent aborted colonoscopy with mild colitis and diverticulosis. DDx includes hemorrhoidal bleed vs. diverticulosis vs. colitis (ischemic/infectious/inflammatory) vs. malignancy/polyps. Patient is adamant that she is not interested in repeat colonoscopy or EGD at this time, despite never completing a colonoscopy. Patient is at a somewhat higher risk of GI bleed given history and unclear etiology, however tolerated AC after the bleeding with no further episodes. As such, can consider AC if indicated. GI is available in case of rebleeding.  # ESRD  # Pericardial effusion  # Anemia - likely AoCD given markedly high ferritin in the setting of CKD/ESRD  # COVID - resolved    Recommendations:  - No objections to AC if indicated, patient is at a higher risk of GI bleeding  - GI will sign off, please reconsult as needed    Please note that the recommendations are not final until attested by an attending.    Thank you for involving us in the care of this patient. Please reach out if any further questions.    Grant Abernathy, PGY-6  Gastroenterology/Hepatology Fellow    Available on [employer] Teams  After 5PM/Weekends, please contact the on-call GI fellow: [phone]         73yo F with PMH of HTN, HLD, DM2, CKD who presented to [facility] initially with COVID-19 PNA complicated by MAR on CKD with acute uremia and metabolic derangements. GI consulted for "clearance" prior to AVF creation.     # GI clearance - patient with previous history of rectal bleeding in the setting of uremia and heparin, with no recurrence on re-challenge with heparin. Noted recent aborted colonoscopy with mild colitis and diverticulosis. DDx includes hemorrhoidal bleed vs. diverticulosis vs. colitis (ischemic/infectious/inflammatory) vs. malignancy/polyps. Patient is adamant that she is not interested in repeat colonoscopy or EGD at this time, despite never completing a colonoscopy. Patient is at a somewhat higher risk of GI bleed given history and unclear etiology, however tolerated AC after the bleeding with no further episodes. As such, can consider AC if indicated. GI is available in case of rebleeding.  # ESRD  # Pericardial effusion  # Anemia - likely AoCD given markedly high ferritin in the setting of CKD/ESRD  # COVID - resolved    Recommendations:  - No objections to AC if indicated, patient is at elevated risk of GI bleeding but given no active bleeding/symptoms, no absolute contraindication to A/C  - Patient does not want any endoscopic evaluation under any circumstances  - GI will sign off, please reconsult as needed    Please note that the recommendations are not final until attested by an attending.    Thank you for involving us in the care of this patient. Please reach out if any further questions.    Grant Abernathy, PGY-6  Gastroenterology/Hepatology Fellow    Available on [employer] Teams  After 5PM/Weekends, please contact the on-call GI fellow: [phone]

## 2022-10-26 NOTE — PROGRESS NOTE ADULT - ASSESSMENT
72F HTN, HLD, DM, CKD presented to [facility] initially with MAR on CKD with acute uremia and metabolic derangements requiring ICU stay, urgent HD and COVID-19 positive, complicated with Afib with GIB 2/2 AC and acute uremia. Transferred to [facility] for large pericardial effusion and developed cardiac tamponade requiring urgent percardiocentesis 10/20 s/p 700cc renetta blood, monitored in CTU. Downgraded to medicine for further management

## 2022-10-26 NOTE — CHART NOTE - NSCHARTNOTEFT_GEN_A_CORE
Central line removed as pt has alternate IV access and no longer requires. Last INR, aPTT, Platelets reviewed, stable. Explained the procedure to patient. Placed in Trendelenburg. Catheter removed and tip intact. Pressure applied for greater than 10 mins, no hematoma or bleeding noted. Patient tolerated procedure well. A dry sterile dressing applied. RN aware.    Hina Ball, NANY-C  [id number]

## 2022-10-26 NOTE — PROGRESS NOTE ADULT - PROBLEM SELECTOR PLAN 3
UA+ with urine culture growing E. coli  -cefepime (10/20-10/21) -> CTX (10/22- )  -ID recs appreciated, c/w abx for now  -blood cx ngtd

## 2022-10-26 NOTE — PROCEDURE NOTE - ESTIMATED BLOOD LOSS
None
Minimal
Patient requests all Lab, Cardiology, and Radiology Results on their Discharge Instructions

## 2022-10-26 NOTE — CHART NOTE - NSCHARTNOTEFT_GEN_A_CORE
MEDICINE NP    ALFONZO RILEY  72y Female    Patient is a 72y old  Female who presents with a chief complaint of Pericardial Eff (26 Oct 2022 12:52)         > Event Summary:   Follow-up Magnesium 1.5  D/w Renal, Dr. Aarti Cristobal and recommends Magnesium Oxide 400mg PO Q8hrs x2 doses  -F/u Rpt Mag in AM  -Patient for HD in AM  -Will endorse to Day Provider in AM and Attending to follow        Magnesium, Serum (10.26.22 @ 06:31)    Magnesium, Serum: 1.5:        IZZY Olmedo-BC  Medicine Department  #[id number]

## 2022-10-26 NOTE — PROCEDURE NOTE - NSINDICATIONS_GEN_A_CORE
critical illness/hemodynamic monitoring/volume resuscitation
critical patient/monitoring purposes
dialysis/CRRT

## 2022-10-26 NOTE — PROGRESS NOTE ADULT - ASSESSMENT
72F HTN, HLD, DM, CKD who presented to [facility] initially with MAR on CKD with acute uremia and metabolic derangements requiring urgent HD and was COVID-19 positive.      #Leukocytosis  Unclear source - may be multifactorial given ongoing clinical issues  CT without clear evidence for infection    #Asymptomatic bacteruria   Urine culture growing E.coli  Denies urinary symptoms    #Transaminitis    PLAN:     no contraindication to IR Permacath   can dc ceftriaxone     remove Bah ASAP         Please contact through MS Teams   If no response or past 5 pm/weekend call [phone].

## 2022-10-26 NOTE — PROCEDURE NOTE - NSPROCDETAILS_GEN_ALL_CORE
guidewire recovered/lumen(s) aspirated and flushed/sterile dressing applied/sterile technique, catheter placed/ultrasound guidance with use of sterile gel and probe cove
location identified, draped/prepped, sterile technique used, needle inserted/introduced/positive blood return obtained via catheter/connected to a pressurized flush line/sutured in place/Seldinger technique
guidewire recovered/lumen(s) aspirated and flushed/sterile dressing applied/sterile technique, catheter placed/ultrasound guidance with use of sterile gel and probe cove

## 2022-10-26 NOTE — PROGRESS NOTE ADULT - PROBLEM SELECTOR PLAN 1
Cardiac tamponade (hypotensive and SOB 10/20) s/p urgent pericardiocentesis in the cath lab. 700cc bloody fluid removed with pigtail in place. Repeat TTE post procedure 10/21, no pericardial tamponade.  -monitor output from pigtail, currently minimal  - d/w Cards yesterday, s/p limited TTE with minimal effusion. f/u Cards regarding removal  - f/u pericardial fluid culture, so far NGTD  -Acid fast neg  -CT angio chest and ab/p neg for malignancy

## 2022-10-26 NOTE — CONSULT NOTE ADULT - SUBJECTIVE AND OBJECTIVE BOX
Chief Complaint:  Patient is a 72y old  Female who presents with a chief complaint of Pericardial Eff (26 Oct 2022 09:57)      HPI:  Ms. Mckinley is a 73yo F with PMH of HTN, HLD, DM2, CKD who presented to [facility] initially with COVID-19 PNA complicated by MAR on CKD with acute uremia and metabolic derangements. GI consulted for "clearance" prior to AVF creation.     Patient initially presented 10/14 to [facility] with AMS and worsening SOB, found to be in uremic with electrolyte derangements requiring urgent HD. Found to be COVID+. Course complicated by afib with RVR requiring AC. Patient was noted to have 2 episodes of "dark-red bowel movements" which resolved after heparin was held. Was seen by GI at [facility] - bleeding was presumed to be due to uremia and AC. No endoscopy was done. Patient was re-challenged with heparin with no further bleeding. Course also notable for large pericardial effusion with early echocardiographic evidence of tamponade, now s/p pericardiocentesis. Noted to have markedly elevated liver tests at that time, attributed to ischemic injury, now resolving.    In regards to her GI history, she had an attempted colonoscopy 22 that was aborted in the sigmoid due to erythema, this was biopsied - mildly active acute colitis. Diverticulosis was also found in the sigmoid. Patient reports no prior endoscopy. Patient denies any abdominal pain, dysphagia, odynophagia, nausea, vomiting, constipation or diarrhea. No early satiety, heartburn.       Allergies:  No Known Allergies      Home Medications:    Hospital Medications:  acetaminophen     Tablet .. 650 milliGRAM(s) Oral every 6 hours PRN  amLODIPine   Tablet 10 milliGRAM(s) Oral daily  atorvastatin 80 milliGRAM(s) Oral at bedtime  benzocaine 15 mG/menthol 3.6 mG Lozenge 2 Lozenge Oral every 6 hours PRN  benzonatate 100 milliGRAM(s) Oral three times a day  calcium acetate 667 milliGRAM(s) Oral three times a day with meals  cefTRIAXone   IVPB 1000 milliGRAM(s) IV Intermittent every 24 hours  chlorhexidine 2% Cloths 1 Application(s) Topical <User Schedule>  guaiFENesin Oral Liquid (Sugar-Free) 100 milliGRAM(s) Oral every 6 hours PRN  hydrocodone/homatropine Syrup 5 milliLiter(s) Oral every 6 hours PRN  insulin lispro (ADMELOG) corrective regimen sliding scale   SubCutaneous three times a day before meals  insulin lispro (ADMELOG) corrective regimen sliding scale   SubCutaneous at bedtime  melatonin 3 milliGRAM(s) Oral at bedtime PRN  metoprolol tartrate 25 milliGRAM(s) Oral two times a day  pantoprazole    Tablet 40 milliGRAM(s) Oral before breakfast  raloxifene 60 milliGRAM(s) Oral daily  sodium chloride 0.9% lock flush 3 milliLiter(s) IV Push every 8 hours      PMHX/PSHX:  HTN (hypertension)    HLD (hyperlipidemia)    DM (diabetes mellitus)        Family history:  FH: kidney disease        Denies family history of colon cancer/polyps, stomach cancer/polyps, pancreatic cancer/masses, liver cancer/disease, ovarian cancer and endometrial cancer.    Social History:     Tob: Denies  EtOH: As above  Illicit Drugs: Denies    ROS:   General:  No wt loss, fevers, chills, night sweats, fatigue  Eyes:  Good vision, no reported pain  ENT:  No sore throat, pain, runny nose, dysphagia  CV:  No pain, palpitations, hypo/hypertension  Pulm:  No dyspnea, cough, tachypnea, wheezing  GI:  As per HPI  :  No pain, bleeding, incontinence, nocturia  Muscle:  No pain, weakness  Neuro:  No weakness, tingling, memory problems  Psych:  No fatigue, insomnia, mood problems, depression  Endocrine:  No polyuria, polydipsia, cold/heat intolerance  Heme:  No petechiae, ecchymosis, easy bruisability  Skin:  No rash, tattoos, scars, edema    PHYSICAL EXAM:   GENERAL:  No acute distress  HEENT:  Normocephalic/atraumatic, no scleral icterus  CHEST:  No accessory muscle use  HEART:  Regular rate and rhythm  ABDOMEN:  Soft, non-tender, non-distended, normoactive bowel sounds,  no masses, no hepato-splenomegaly, no signs of chronic liver disease  EXTREMITIES: No cyanosis, clubbing, or edema  SKIN:  No rash  NEURO:  Alert and oriented x 3, no asterixis    Vital Signs:  Vital Signs Last 24 Hrs  T(C): 36.7 (26 Oct 2022 05:15), Max: 36.9 (25 Oct 2022 19:45)  T(F): 98 (26 Oct 2022 05:15), Max: 98.4 (25 Oct 2022 19:45)  HR: 87 (26 Oct 2022 05:15) (82 - 93)  BP: 146/76 (26 Oct 2022 05:15) (119/54 - 146/76)  BP(mean): 99 (26 Oct 2022 05:15) (90 - 99)  RR: 18 (26 Oct 2022 05:15) (18 - 20)  SpO2: 98% (26 Oct 2022 05:15) (93% - 98%)    Parameters below as of 26 Oct 2022 05:15  Patient On (Oxygen Delivery Method): room air      Daily     Daily Weight in k.5 (25 Oct 2022 13:00)    LABS:      10-26    139  |  100  |  30<H>  ----------------------------<  103<H>  4.1   |  24  |  2.49<H>    Ca    7.3<L>      26 Oct 2022 06:31  Mg     1.5     10-26    TPro  5.8<L>  /  Alb  3.1<L>  /  TBili  0.5  /  DBili  x   /  AST  113<H>  /  ALT  334<H>  /  AlkPhos  170<H>  10-25    LIVER FUNCTIONS - ( 25 Oct 2022 06:08 )  Alb: 3.1 g/dL / Pro: 5.8 g/dL / ALK PHOS: 170 U/L / ALT: 334 U/L / AST: 113 U/L / GGT: x                                       9.9    9.61  )-----------( 125      ( 24 Oct 2022 06:57 )             32.1       Imaging:           Chief Complaint:  Patient is a 72y old  Female who presents with a chief complaint of Pericardial Eff (26 Oct 2022 09:57)      HPI:  Ms. Mckinley is a 73yo F with PMH of HTN, HLD, DM2, CKD who presented to [facility] initially with COVID-19 PNA complicated by MAR on CKD with acute uremia and metabolic derangements. GI consulted for "clearance" prior to AVF creation.     Patient initially presented 10/14 to [facility] with AMS and worsening SOB, found to be in uremic with electrolyte derangements requiring urgent HD. Found to be COVID+. Course complicated by afib with RVR requiring AC. Patient was noted to have 2 episodes of "dark-red bowel movements" which resolved after heparin was held. Was seen by GI at [facility] - bleeding was presumed to be due to uremia and AC. No endoscopy was done. Patient was re-challenged with heparin with no further bleeding. Course also notable for large pericardial effusion with early echocardiographic evidence of tamponade, now s/p pericardiocentesis. Noted to have markedly elevated liver tests at that time, attributed to ischemic injury, now resolving.    In regards to her GI history, she had an attempted colonoscopy 22 that was aborted in the sigmoid due to erythema, this was biopsied - mildly active acute colitis. Diverticulosis was also found in the sigmoid. Patient reports no prior endoscopy. Patient denies any abdominal pain, dysphagia, odynophagia, nausea, vomiting, constipation or diarrhea. No early satiety, heartburn. No weight loss.      Allergies:  No Known Allergies      Home Medications:  amLODIPine 10 mg oral tablet: 1 tab(s) orally once a day (22 Oct 2022 10:10)  atorvastatin 80 mg oral tablet: 1 tab(s) orally once a day (at bedtime) (22 Oct 2022 10:10)  busPIRone 10 mg oral tablet: 1 tab(s) orally 2 times a day (22 Oct 2022 10:10)  gemfibrozil 600 mg oral tablet: 1 tab(s) orally 2 times a day (22 Oct 2022 10:10)  Januvia 50 mg oral tablet: 1 tab(s) orally once a day (22 Oct 2022 10:10)  losartan 25 mg oral tablet: 1 tab(s) orally once a day (22 Oct 2022 10:10)  metoprolol tartrate 25 mg oral tablet: 1 tab(s) orally 2 times a day (22 Oct 2022 10:10)  montelukast 10 mg oral tablet: 1 tab(s) orally once a day (22 Oct 2022 10:10)  raloxifene 60 mg oral tablet: 1 tab(s) orally once a day (22 Oct 2022 10:10)    Hospital Medications:  acetaminophen     Tablet .. 650 milliGRAM(s) Oral every 6 hours PRN  amLODIPine   Tablet 10 milliGRAM(s) Oral daily  atorvastatin 80 milliGRAM(s) Oral at bedtime  benzocaine 15 mG/menthol 3.6 mG Lozenge 2 Lozenge Oral every 6 hours PRN  benzonatate 100 milliGRAM(s) Oral three times a day  calcium acetate 667 milliGRAM(s) Oral three times a day with meals  cefTRIAXone   IVPB 1000 milliGRAM(s) IV Intermittent every 24 hours  chlorhexidine 2% Cloths 1 Application(s) Topical <User Schedule>  guaiFENesin Oral Liquid (Sugar-Free) 100 milliGRAM(s) Oral every 6 hours PRN  hydrocodone/homatropine Syrup 5 milliLiter(s) Oral every 6 hours PRN  insulin lispro (ADMELOG) corrective regimen sliding scale   SubCutaneous three times a day before meals  insulin lispro (ADMELOG) corrective regimen sliding scale   SubCutaneous at bedtime  melatonin 3 milliGRAM(s) Oral at bedtime PRN  metoprolol tartrate 25 milliGRAM(s) Oral two times a day  pantoprazole    Tablet 40 milliGRAM(s) Oral before breakfast  raloxifene 60 milliGRAM(s) Oral daily  sodium chloride 0.9% lock flush 3 milliLiter(s) IV Push every 8 hours      PMHX/PSHX:  HTN (hypertension)    HLD (hyperlipidemia)    DM (diabetes mellitus)        Family history:  FH: kidney disease        Denies family history of colon cancer/polyps, stomach cancer/polyps, pancreatic cancer/masses, liver cancer/disease, ovarian cancer and endometrial cancer.    Social History:     Tob: Denies  EtOH: As above  Illicit Drugs: Denies    ROS:   General:  No wt loss, fevers, chills, night sweats, fatigue  Eyes:  Good vision, no reported pain  ENT:  No sore throat, pain, runny nose, dysphagia  CV:  No pain, palpitations, hypo/hypertension  Pulm:  No dyspnea, cough, tachypnea, wheezing  GI:  As per HPI  :  No pain, bleeding, incontinence, nocturia  Muscle:  No pain, weakness  Neuro:  No weakness, tingling, memory problems  Psych:  No fatigue, insomnia, mood problems, depression  Endocrine:  No polyuria, polydipsia, cold/heat intolerance  Heme:  No petechiae, ecchymosis, easy bruisability  Skin:  No rash, tattoos, scars, edema    PHYSICAL EXAM:   GENERAL:  No acute distress  HEENT:  Normocephalic/atraumatic, no scleral icterus, right central line  CHEST:  No accessory muscle use, chest tube  HEART:  Regular rate and rhythm  ABDOMEN:  Soft, non-tender, non-distended  EXTREMITIES: No cyanosis, clubbing, or edema  SKIN:  No rash  NEURO:  Alert and oriented x 3, no asterixis    Vital Signs:  Vital Signs Last 24 Hrs  T(C): 36.7 (26 Oct 2022 05:15), Max: 36.9 (25 Oct 2022 19:45)  T(F): 98 (26 Oct 2022 05:15), Max: 98.4 (25 Oct 2022 19:45)  HR: 87 (26 Oct 2022 05:15) (82 - 93)  BP: 146/76 (26 Oct 2022 05:15) (119/54 - 146/76)  BP(mean): 99 (26 Oct 2022 05:15) (90 - 99)  RR: 18 (26 Oct 2022 05:15) (18 - 20)  SpO2: 98% (26 Oct 2022 05:15) (93% - 98%)    Parameters below as of 26 Oct 2022 05:15  Patient On (Oxygen Delivery Method): room air      Daily     Daily Weight in k.5 (25 Oct 2022 13:00)    LABS:      10-26    139  |  100  |  30<H>  ----------------------------<  103<H>  4.1   |  24  |  2.49<H>    Ca    7.3<L>      26 Oct 2022 06:31  Mg     1.5     10-26    TPro  5.8<L>  /  Alb  3.1<L>  /  TBili  0.5  /  DBili  x   /  AST  113<H>  /  ALT  334<H>  /  AlkPhos  170<H>  10-25    LIVER FUNCTIONS - ( 25 Oct 2022 06:08 )  Alb: 3.1 g/dL / Pro: 5.8 g/dL / ALK PHOS: 170 U/L / ALT: 334 U/L / AST: 113 U/L / GGT: x                                       9.9    9.61  )-----------( 125      ( 24 Oct 2022 06:57 )             32.1       Imaging:  < from: CT Angio Abdomen and Pelvis w/ IV Cont (10.20.22 @ 14:22) >    ACC: [id number] EXAM:  CT ANGIO ABD PELV (W)AW IC                          PROCEDURE DATE:  10/20/2022          INTERPRETATION:  CT ABDOMEN AND PELVIS: The abdomen and pelvis images did   not crossover during the initial scan. The CT images of the abdomen and   pelvis are reported below.    TECHNIQUE: Images were obtained after the uneventful administration of 1   25 cc of nonionic intravenous contrast (Omnipaque 350). Maximum intensity   projection images were generated.    Abdomen:    Liver, gallbladder and biliary system: The liver is unremarkable. No   intra or extrahepatic biliary ductal dilatation. The gallbladder wall is   thickened.    Pancreas: The pancreas is normal.    Spleen: The spleen is normal.    Adrenal glands and kidneys: The adrenal glands are normal.    The left kidney is normal. No hydronephrosis. No hydroureter.    The right kidney contains a hypodense subcentimeter lesion the midpole of   the kidney which are statistically benign and likely represents a cyst.   The right kidney is otherwise normal. No hydronephrosis or hydroureter.    Lymph nodes: There are no enlarged retroperitoneal upper abdominal lymph   nodes.    Bowel: Stomach is incompletely distended with an enteric catheter in   place. The small bowel is normal in caliber. The large bowel is normal in   caliber. No free air. Small amount of free fluid in the dependent pelvis.    Vascular: The abdominal aorta is normal in caliber. The celiac artery,   SMA, GIOVANNI and single bilateral renal arteries are proximally patent.   However, there is likely greater than 50% stenosis at the ostium of the   right renal artery.    The bilateral common, external, and internal iliac arteries are normal in   caliber.    Aortic calcifications.    Bladder and reproductive system: The bladder is incomplete distended with   a Bah catheter in place. The uterus and adnexa are not evaluated.    Bones And Soft Tissues: Spondylosis of the thoracic spine. Spondylosis of   the lumbar spine. Ankylosis of the L5 and S1 vertebral bodies.  Body wall   anasarca.      IMPRESSION:    ABDOMEN AND PELVIS:    1.  No aneurysm or dissection.    --- End of Report ---            HUYEN HUNT MD; Attending Radiologist  This document has been electronically signed. Oct 20 2022  4:34PM    < end of copied text >

## 2022-10-26 NOTE — PROGRESS NOTE ADULT - ASSESSMENT
72F w/ HTN, HLD, DM, CKD, 10/20/22 from OSH with uremia, COVID-19, and pericardial effusion    (1)Renal - newly ESRD-HD.   (2)Hypokalemia - mild  (3)CTS - s/p pericardial drain placement   (4)CV - now hemodynamically stable      RECOMMEND:  (1)Next HD in am .    (2)Perm cath today and vasc input noted    (3)Chest tube per CTICU     [facility] Medicine     Sayed [facility]   [phone]

## 2022-10-26 NOTE — PROGRESS NOTE ADULT - SUBJECTIVE AND OBJECTIVE BOX
Kiran Horn MD  Division of Hospital Medicine  Available via MS teams  If no response or off hours page: [phone]  ---------------------------------------------------------    RILYE MEJÍA  72y  Female      Patient is a 72y old  Female who presents with a chief complaint of Pericardial Eff (26 Oct 2022 10:56)      INTERVAL HPI/OVERNIGHT EVENTS:  Seen at bedside. Still with cough      REVIEW OF SYSTEMS: 10 point ROS negative unless listed above    T(C): 36.7 (10-26-22 @ 05:15), Max: 36.9 (10-25-22 @ 19:45)  HR: 87 (10-26-22 @ 05:15) (82 - 93)  BP: 146/76 (10-26-22 @ 05:15) (119/54 - 146/76)  RR: 18 (10-26-22 @ 05:15) (18 - 20)  SpO2: 98% (10-26-22 @ 05:15) (93% - 98%)  Wt(kg): --Vital Signs Last 24 Hrs  T(C): 36.7 (26 Oct 2022 05:15), Max: 36.9 (25 Oct 2022 19:45)  T(F): 98 (26 Oct 2022 05:15), Max: 98.4 (25 Oct 2022 19:45)  HR: 87 (26 Oct 2022 05:15) (82 - 93)  BP: 146/76 (26 Oct 2022 05:15) (119/54 - 146/76)  BP(mean): 99 (26 Oct 2022 05:15) (90 - 99)  RR: 18 (26 Oct 2022 05:15) (18 - 20)  SpO2: 98% (26 Oct 2022 05:15) (93% - 98%)    Parameters below as of 26 Oct 2022 05:15  Patient On (Oxygen Delivery Method): room air        PHYSICAL EXAM:  CONSTITUTIONAL: NAD, well-developed, well-groomed  NECK: Supple, RIJ central line, +shiley  RESPIRATORY: Normal respiratory effort; lungs are clear to auscultation bilaterally  CARDIOVASCULAR: S1S2 intact, no murmur; 1+ lower extremity edema  Pericardial drain in place with minimal bloody fluid, site intact  ABDOMEN: Nontender to palpation, normoactive bowel sounds, no rebound/guarding; No hepatosplenomegaly  MUSCULOSKELETAL:  No clubbing or cyanosis of digits; no joint swelling or tenderness to palpation  PSYCH: A+O to person, place, and time; affect appropriate  NEUROLOGY: CN 2-12 are intact and symmetric; no gross sensory deficits         Consultant(s) Notes Reviewed:  [x ] YES  [ ] NO  Care Discussed with Consultants/Other Providers [ x] YES  [ ] NO    LABS:    10-26    139  |  100  |  30<H>  ----------------------------<  103<H>  4.1   |  24  |  2.49<H>    Ca    7.3<L>      26 Oct 2022 06:31  Mg     1.5     10-26    TPro  5.8<L>  /  Alb  3.1<L>  /  TBili  0.5  /  DBili  x   /  AST  113<H>  /  ALT  334<H>  /  AlkPhos  170<H>  10-25        CAPILLARY BLOOD GLUCOSE      POCT Blood Glucose.: 112 mg/dL (26 Oct 2022 07:44)  POCT Blood Glucose.: 186 mg/dL (25 Oct 2022 21:17)  POCT Blood Glucose.: 156 mg/dL (25 Oct 2022 16:30)  POCT Blood Glucose.: 117 mg/dL (25 Oct 2022 12:31)            RADIOLOGY & ADDITIONAL TESTS:    Imaging Personally Reviewed:  [ ] YES  [ ] NO

## 2022-10-26 NOTE — PROCEDURE NOTE - ADDITIONAL PROCEDURE DETAILS
-monitor for bleeding  -head of bed >45 degrees to prevent oozing  - tip is at the SVC, catheter is OK to use.   - SQH may be resumed @ 6h post procedure if no sign of bleeding  - all other orders and diet may be resumed per primary team     - primary team may remove existing right neck IJ catheter (not placed by IR) at bedside

## 2022-10-27 LAB
ANION GAP SERPL CALC-SCNC: 14 MMOL/L — SIGNIFICANT CHANGE UP (ref 5–17)
BUN SERPL-MCNC: 34 MG/DL — HIGH (ref 7–23)
CALCIUM SERPL-MCNC: 7.8 MG/DL — LOW (ref 8.4–10.5)
CHLORIDE SERPL-SCNC: 102 MMOL/L — SIGNIFICANT CHANGE UP (ref 96–108)
CO2 SERPL-SCNC: 22 MMOL/L — SIGNIFICANT CHANGE UP (ref 22–31)
CREAT SERPL-MCNC: 2.75 MG/DL — HIGH (ref 0.5–1.3)
EGFR: 18 ML/MIN/1.73M2 — LOW
GLUCOSE BLDC GLUCOMTR-MCNC: 136 MG/DL — HIGH (ref 70–99)
GLUCOSE BLDC GLUCOMTR-MCNC: 164 MG/DL — HIGH (ref 70–99)
GLUCOSE BLDC GLUCOMTR-MCNC: 208 MG/DL — HIGH (ref 70–99)
GLUCOSE BLDC GLUCOMTR-MCNC: 98 MG/DL — SIGNIFICANT CHANGE UP (ref 70–99)
GLUCOSE SERPL-MCNC: 129 MG/DL — HIGH (ref 70–99)
MAGNESIUM SERPL-MCNC: 1.6 MG/DL — SIGNIFICANT CHANGE UP (ref 1.6–2.6)
PHOSPHATE SERPL-MCNC: 3.1 MG/DL — SIGNIFICANT CHANGE UP (ref 2.5–4.5)
POTASSIUM SERPL-MCNC: 4.7 MMOL/L — SIGNIFICANT CHANGE UP (ref 3.5–5.3)
POTASSIUM SERPL-SCNC: 4.7 MMOL/L — SIGNIFICANT CHANGE UP (ref 3.5–5.3)
SARS-COV-2 RNA SPEC QL NAA+PROBE: SIGNIFICANT CHANGE UP
SODIUM SERPL-SCNC: 138 MMOL/L — SIGNIFICANT CHANGE UP (ref 135–145)

## 2022-10-27 PROCEDURE — 99232 SBSQ HOSP IP/OBS MODERATE 35: CPT

## 2022-10-27 PROCEDURE — 99233 SBSQ HOSP IP/OBS HIGH 50: CPT

## 2022-10-27 PROCEDURE — 93970 EXTREMITY STUDY: CPT | Mod: 26

## 2022-10-27 PROCEDURE — 99231 SBSQ HOSP IP/OBS SF/LOW 25: CPT

## 2022-10-27 RX ORDER — ERYTHROPOIETIN 10000 [IU]/ML
10000 INJECTION, SOLUTION INTRAVENOUS; SUBCUTANEOUS ONCE
Refills: 0 | Status: COMPLETED | OUTPATIENT
Start: 2022-10-27 | End: 2022-10-27

## 2022-10-27 RX ORDER — TRAMADOL HYDROCHLORIDE 50 MG/1
25 TABLET ORAL EVERY 6 HOURS
Refills: 0 | Status: DISCONTINUED | OUTPATIENT
Start: 2022-10-27 | End: 2022-11-03

## 2022-10-27 RX ADMIN — ATORVASTATIN CALCIUM 80 MILLIGRAM(S): 80 TABLET, FILM COATED ORAL at 21:26

## 2022-10-27 RX ADMIN — TRAMADOL HYDROCHLORIDE 25 MILLIGRAM(S): 50 TABLET ORAL at 21:00

## 2022-10-27 RX ADMIN — CHLORHEXIDINE GLUCONATE 1 APPLICATION(S): 213 SOLUTION TOPICAL at 07:27

## 2022-10-27 RX ADMIN — TRAMADOL HYDROCHLORIDE 25 MILLIGRAM(S): 50 TABLET ORAL at 13:09

## 2022-10-27 RX ADMIN — Medication 100 MILLIGRAM(S): at 05:39

## 2022-10-27 RX ADMIN — SENNA PLUS 2 TABLET(S): 8.6 TABLET ORAL at 21:26

## 2022-10-27 RX ADMIN — ERYTHROPOIETIN 10000 UNIT(S): 10000 INJECTION, SOLUTION INTRAVENOUS; SUBCUTANEOUS at 13:08

## 2022-10-27 RX ADMIN — LIDOCAINE 2 PATCH: 4 CREAM TOPICAL at 19:49

## 2022-10-27 RX ADMIN — Medication 2: at 12:24

## 2022-10-27 RX ADMIN — Medication 650 MILLIGRAM(S): at 07:43

## 2022-10-27 RX ADMIN — SODIUM CHLORIDE 3 MILLILITER(S): 9 INJECTION INTRAMUSCULAR; INTRAVENOUS; SUBCUTANEOUS at 13:05

## 2022-10-27 RX ADMIN — PANTOPRAZOLE SODIUM 40 MILLIGRAM(S): 20 TABLET, DELAYED RELEASE ORAL at 05:39

## 2022-10-27 RX ADMIN — Medication 667 MILLIGRAM(S): at 07:43

## 2022-10-27 RX ADMIN — TRAMADOL HYDROCHLORIDE 25 MILLIGRAM(S): 50 TABLET ORAL at 20:15

## 2022-10-27 RX ADMIN — Medication 667 MILLIGRAM(S): at 12:26

## 2022-10-27 RX ADMIN — Medication 650 MILLIGRAM(S): at 17:18

## 2022-10-27 RX ADMIN — LIDOCAINE 2 PATCH: 4 CREAM TOPICAL at 23:37

## 2022-10-27 RX ADMIN — Medication 667 MILLIGRAM(S): at 16:48

## 2022-10-27 RX ADMIN — Medication 650 MILLIGRAM(S): at 16:48

## 2022-10-27 RX ADMIN — Medication 100 MILLIGRAM(S): at 21:26

## 2022-10-27 RX ADMIN — MAGNESIUM OXIDE 400 MG ORAL TABLET 400 MILLIGRAM(S): 241.3 TABLET ORAL at 05:42

## 2022-10-27 RX ADMIN — Medication 100 MILLIGRAM(S): at 13:09

## 2022-10-27 RX ADMIN — SODIUM CHLORIDE 3 MILLILITER(S): 9 INJECTION INTRAMUSCULAR; INTRAVENOUS; SUBCUTANEOUS at 06:24

## 2022-10-27 RX ADMIN — Medication 5 MILLIGRAM(S): at 12:26

## 2022-10-27 RX ADMIN — Medication 25 MILLIGRAM(S): at 16:47

## 2022-10-27 RX ADMIN — Medication 650 MILLIGRAM(S): at 08:13

## 2022-10-27 RX ADMIN — LIDOCAINE 2 PATCH: 4 CREAM TOPICAL at 02:14

## 2022-10-27 RX ADMIN — SODIUM CHLORIDE 3 MILLILITER(S): 9 INJECTION INTRAMUSCULAR; INTRAVENOUS; SUBCUTANEOUS at 21:30

## 2022-10-27 RX ADMIN — LIDOCAINE 2 PATCH: 4 CREAM TOPICAL at 11:00

## 2022-10-27 RX ADMIN — AMLODIPINE BESYLATE 10 MILLIGRAM(S): 2.5 TABLET ORAL at 05:38

## 2022-10-27 RX ADMIN — RALOXIFENE HYDROCHLORIDE 60 MILLIGRAM(S): 60 TABLET, COATED ORAL at 16:47

## 2022-10-27 RX ADMIN — Medication 25 MILLIGRAM(S): at 05:38

## 2022-10-27 RX ADMIN — TRAMADOL HYDROCHLORIDE 25 MILLIGRAM(S): 50 TABLET ORAL at 13:39

## 2022-10-27 NOTE — PROGRESS NOTE ADULT - SUBJECTIVE AND OBJECTIVE BOX
infectious diseases progress note:    Patient is a 72y old  Female who presents with a chief complaint of Pericardial Eff (27 Oct 2022 08:02)        Malignant pericardial effusion          ROS:  CONSTITUTIONAL:  Negative fever or chills, feels well, good appetite  EYES:  Negative  blurry vision or double vision  CARDIOVASCULAR:  Negative for chest pain or palpitations  RESPIRATORY:  Negative for cough, wheezing, or SOB   GASTROINTESTINAL:  Negative for nausea, vomiting, diarrhea, constipation, or abdominal pain  GENITOURINARY:  Negative frequency, urgency or dysuria  NEUROLOGIC:  No headache, confusion, dizziness, lightheadedness    Allergies    sulfa drugs (Rash)    Intolerances        ANTIBIOTICS/RELEVANT:  antimicrobials    immunologic:    OTHER:  acetaminophen     Tablet .. 650 milliGRAM(s) Oral every 6 hours PRN  amLODIPine   Tablet 10 milliGRAM(s) Oral daily  atorvastatin 80 milliGRAM(s) Oral at bedtime  benzocaine 15 mG/menthol 3.6 mG Lozenge 2 Lozenge Oral every 6 hours PRN  benzonatate 100 milliGRAM(s) Oral three times a day  bisacodyl 5 milliGRAM(s) Oral every 12 hours PRN  calcium acetate 667 milliGRAM(s) Oral three times a day with meals  chlorhexidine 2% Cloths 1 Application(s) Topical <User Schedule>  chlorhexidine 4% Liquid 1 Application(s) Topical <User Schedule>  guaiFENesin Oral Liquid (Sugar-Free) 100 milliGRAM(s) Oral every 6 hours PRN  hydrocodone/homatropine Syrup 5 milliLiter(s) Oral every 6 hours PRN  insulin lispro (ADMELOG) corrective regimen sliding scale   SubCutaneous three times a day before meals  insulin lispro (ADMELOG) corrective regimen sliding scale   SubCutaneous at bedtime  lidocaine   4% Patch 2 Patch Transdermal daily  melatonin 3 milliGRAM(s) Oral at bedtime PRN  metoprolol tartrate 25 milliGRAM(s) Oral two times a day  pantoprazole    Tablet 40 milliGRAM(s) Oral before breakfast  polyethylene glycol 3350 17 Gram(s) Oral daily  raloxifene 60 milliGRAM(s) Oral daily  senna 2 Tablet(s) Oral at bedtime  sodium chloride 0.9% lock flush 3 milliLiter(s) IV Push every 8 hours      Objective:  Vital Signs Last 24 Hrs  T(C): 37.2 (27 Oct 2022 04:34), Max: 37.6 (26 Oct 2022 19:35)  T(F): 98.9 (27 Oct 2022 04:34), Max: 99.6 (26 Oct 2022 19:35)  HR: 88 (27 Oct 2022 04:34) (80 - 100)  BP: 136/80 (27 Oct 2022 04:34) (131/70 - 172/79)  BP(mean): 110 (26 Oct 2022 19:35) (110 - 110)  RR: 18 (27 Oct 2022 04:34) (16 - 19)  SpO2: 98% (27 Oct 2022 04:34) (92% - 99%)    Parameters below as of 27 Oct 2022 04:34  Patient On (Oxygen Delivery Method): room air        PHYSICAL EXAM:  Constitutional:Well-developed, well nourished--no acute distress  Eyes:BOBBI, EOMI  Ear/Nose/Throat: no oral lesion, no sinus tenderness on percussion	  Neck:no JVD, no lymphadenopathy, supple  Respiratory: CTA olamide  Cardiovascular: S1S2 RRR, no murmurs  Gastrointestinal:soft, (+) BS, no HSM  Extremities:no e/e/c        LABS:    10-27    138  |  102  |  34<H>  ----------------------------<  129<H>  4.7   |  22  |  2.75<H>    Ca    7.8<L>      27 Oct 2022 04:54  Phos  3.1     10-27  Mg     1.6     10-27              MICROBIOLOGY:    RECENT CULTURES:  10-20 @ 18:35 Catheterized Catheterized   FAYE      Escherichia coli  Escherichia coli     >100,000 CFU/ml Escherichia coli    10-20 @ 15:15 .Blood Blood                No Growth Final    10-20 @ 13:30 .Blood Blood-Venous                No Growth Final    10-20 @ 12:20 Pericardial Pericardial Fluid       Numerous polymorphonuclear leukocytes seen per low power field  No organisms seen per oil power field           Testing in progress          RESPIRATORY CULTURES:              RADIOLOGY & ADDITIONAL STUDIES:        Pager [phone]  After 5 pm/weekends or if no response :[phone]

## 2022-10-27 NOTE — PROGRESS NOTE ADULT - PROBLEM SELECTOR PLAN 2
Presented to [facility] VS MAR and CKD with uremia, initiated on urgent HD. New ESRD  -s/p permacath by IR 10/26  -phoslo TID  -nephro recs appreciated, vascular surgery for AVF creation prior to d/c. c/w arm precautions. Plan for vessel mapping.  - HD as per nephro  -renally dose and avoid nephrotoxic agents

## 2022-10-27 NOTE — PROGRESS NOTE ADULT - PROBLEM SELECTOR PLAN 3
UA+ with urine culture growing E. coli  -completed course of CTX (10/22-10/26 )  -ID recs appreciated,   -blood cx ngtd

## 2022-10-27 NOTE — CHART NOTE - NSCHARTNOTEFT_GEN_A_CORE
Patient no longer w/ indication for Right IJ shiley. The procedure and process of removal was explained to patient, and patient agreed to proceed with the removal.  Shiley was removed without difficulties  or complications. Tip of catheter intact.  Firm pressure was held at site for 10 minutes with hemostasis achieved. Clean dressing applied, site remains clean, dry and intact.  No evidence of active bleeding or hematoma. RN notified to monitor site closely.     Hina Ball, JAMESC  [phone]

## 2022-10-27 NOTE — PROGRESS NOTE ADULT - ASSESSMENT
72F HTN, HLD, DM, CKD presented to [facility] initially with MAR on CKD with acute uremia and metabolic derangements requiring ICU stay, urgent HD and COVID-19 positive, complicated with Afib with GIB 2/2 AC and acute uremia. Transferred to [facility] for large pericardial effusion and developed cardiac tamponade requiring urgent percardiocentesis 10/20 s/p 700cc renetta blood, monitored in CTU. Downgraded to medicine for further management

## 2022-10-27 NOTE — PROGRESS NOTE ADULT - SUBJECTIVE AND OBJECTIVE BOX
[facility] [phone]        Patient seen and examined in bed.  She was in good spirits         MEDICATIONS  (STANDING):  amLODIPine   Tablet 10 milliGRAM(s) Oral daily  atorvastatin 80 milliGRAM(s) Oral at bedtime  benzonatate 100 milliGRAM(s) Oral three times a day  calcium acetate 667 milliGRAM(s) Oral three times a day with meals  chlorhexidine 2% Cloths 1 Application(s) Topical <User Schedule>  chlorhexidine 4% Liquid 1 Application(s) Topical <User Schedule>  insulin lispro (ADMELOG) corrective regimen sliding scale   SubCutaneous three times a day before meals  insulin lispro (ADMELOG) corrective regimen sliding scale   SubCutaneous at bedtime  lidocaine   4% Patch 2 Patch Transdermal daily  metoprolol tartrate 25 milliGRAM(s) Oral two times a day  pantoprazole    Tablet 40 milliGRAM(s) Oral before breakfast  polyethylene glycol 3350 17 Gram(s) Oral daily  raloxifene 60 milliGRAM(s) Oral daily  senna 2 Tablet(s) Oral at bedtime  sodium chloride 0.9% lock flush 3 milliLiter(s) IV Push every 8 hours      VITAL:  T(C): , Max: 37.6 (10-26-22 @ 19:35)  T(F): , Max: 99.6 (10-26-22 @ 19:35)  HR: 88 (10-27-22 @ 04:34)  BP: 136/80 (10-27-22 @ 04:34)  BP(mean): 110 (10-26-22 @ 19:35)  RR: 18 (10-27-22 @ 04:34)  SpO2: 98% (10-27-22 @ 04:34)  Wt(kg): --    I and O's:    10-26 @ 07:01  -  10-27 @ 07:00  --------------------------------------------------------  IN: 420 mL / OUT: 1795 mL / NET: -1375 mL    10-27 @ 07:01  -  10-27 @ 08:52  --------------------------------------------------------  IN: 120 mL / OUT: 2 mL / NET: 118 mL      Height (cm): 162.6 (10-26 @ 15:09)  Weight (kg): 102.7 (10-26 @ 15:09)  BMI (kg/m2): 38.8 (10-26 @ 15:09)  BSA (m2): 2.06 (10-26 @ 15:09)    PHYSICAL EXAM:    Constitutional: NAD  Neck:  No JVD  Respiratory: CTAB/L  Cardiovascular: S1 and S2  Gastrointestinal: BS+, soft, NT/ND  Extremities: No peripheral edema  Neurological: A/O x 3, no focal deficits  Psychiatric: Normal mood, normal affect  : No Bah  Skin: No rashes  Access: shiley and perm     LABS:    10-27    138  |  102  |  34<H>  ----------------------------<  129<H>  4.7   |  22  |  2.75<H>    Ca    7.8<L>      27 Oct 2022 04:54  Phos  3.1     10-27  Mg     1.6     10-27            Urine Studies:          RADIOLOGY & ADDITIONAL STUDIES:

## 2022-10-27 NOTE — PROGRESS NOTE ADULT - SUBJECTIVE AND OBJECTIVE BOX
Interventional Radiology Follow-Up Note.     Patient seen and examined @ bedside around 7:15AM.    This is a 72y Female s/p tunneled HD catheter placement on 10/26 in Interventional Radiology.        Medication:     amLODIPine   Tablet: (10-27)  cefTRIAXone   IVPB: (10-25)  metoprolol tartrate: (10-27)    Vitals:   T(F): 98.9, Max: 99.6 (19:35)  HR: 88  BP: 136/80  RR: 18  SpO2: 98%    Physical Exam:  General: Nontoxic, in NAD.  Neck: right neck HD catheter site dressing c/d/i. No hematoma noted. No ttp            LABS:  Na: 138 (10-27 @ 04:54), 139 (10-26 @ 06:31), 141 (10-25 @ 06:08)  K: 4.7 (10-27 @ 04:54), 4.1 (10-26 @ 06:31), 3.7 (10-25 @ 06:08)  Cl: 102 (10-27 @ 04:54), 100 (10-26 @ 06:31), 101 (10-25 @ 06:08)  CO2: 22 (10-27 @ 04:54), 24 (10-26 @ 06:31), 26 (10-25 @ 06:08)  BUN: 34 (10-27 @ 04:54), 30 (10-26 @ 06:31), 41 (10-25 @ 06:08)  Cr: 2.75 (10-27 @ 04:54), 2.49 (10-26 @ 06:31), 3.24 (10-25 @ 06:08)  Glu: 129(10-27 @ 04:54), 103(10-26 @ 06:31), 126(10-25 @ 06:08)       Assessment/Plan:  72y Female  s/p tunneled HD catheter.     - Primary team to remove the shiley ( not placed by IR )   - Okay to use catheter.  - IR will sign off.     Please call IR at  [phone] with any questions, concerns, or issues regarding above.    Also available on Teams.

## 2022-10-27 NOTE — PROGRESS NOTE ADULT - ASSESSMENT
72F HTN, HLD, DM, CKD who presented to [facility] initially with MAR on CKD with acute uremia and metabolic derangements requiring urgent HD and was COVID-19 positive.      #Leukocytosis  Unclear source - may be multifactorial given ongoing clinical issues  CT without clear evidence for infection    #Asymptomatic bacteruria   Urine culture growing E.coli  Denies urinary symptoms    #Transaminitis    PLAN:      to IR Permacath   stable off ab  reculture prn        remove Bah ASAP         Please contact through MS Teams   If no response or past 5 pm/weekend call [phone].

## 2022-10-27 NOTE — PROGRESS NOTE ADULT - PROBLEM SELECTOR PLAN 1
Cardiac tamponade (hypotensive and SOB 10/20) s/p urgent pericardiocentesis in the cath lab. 700cc bloody fluid removed with pigtail in place. Repeat TTE post procedure 10/21, no pericardial tamponade.  -monitor output from pigtail, currently minimal  - d/w Cards, s/p limited TTE with minimal effusion. f/u Cards regarding removal hopeful for removal today  - f/u pericardial fluid culture, so far NGTD  -Acid fast neg  -CT angio chest and ab/p neg for malignancy

## 2022-10-27 NOTE — PROGRESS NOTE ADULT - SUBJECTIVE AND OBJECTIVE BOX
Kiran Horn MD  Division of Hospital Medicine  Available via MS teams  If no response or off hours page: [phone]  ---------------------------------------------------------    RILEY MEJÍA  72y  Female      Patient is a 72y old  Female who presents with a chief complaint of Pericardial Eff (27 Oct 2022 08:52)      INTERVAL HPI/OVERNIGHT EVENTS:  Seen at bedside. Still no BM but feels as though she may be able to go.       REVIEW OF SYSTEMS: 10 point ROS negative unless listed above    T(C): 37.2 (10-27-22 @ 04:34), Max: 37.6 (10-26-22 @ 19:35)  HR: 88 (10-27-22 @ 04:34) (80 - 100)  BP: 136/80 (10-27-22 @ 04:34) (131/70 - 172/79)  RR: 18 (10-27-22 @ 04:34) (16 - 19)  SpO2: 98% (10-27-22 @ 04:34) (92% - 99%)  Wt(kg): --Vital Signs Last 24 Hrs  T(C): 37.2 (27 Oct 2022 04:34), Max: 37.6 (26 Oct 2022 19:35)  T(F): 98.9 (27 Oct 2022 04:34), Max: 99.6 (26 Oct 2022 19:35)  HR: 88 (27 Oct 2022 04:34) (80 - 100)  BP: 136/80 (27 Oct 2022 04:34) (131/70 - 172/79)  BP(mean): 110 (26 Oct 2022 19:35) (110 - 110)  RR: 18 (27 Oct 2022 04:34) (16 - 19)  SpO2: 98% (27 Oct 2022 04:34) (92% - 99%)    Parameters below as of 27 Oct 2022 04:34  Patient On (Oxygen Delivery Method): room air        PHYSICAL EXAM:  CONSTITUTIONAL: NAD, well-developed, well-groomed  NECK: Supple, +shiley  RESPIRATORY: Normal respiratory effort; lungs are clear to auscultation bilaterally  CARDIOVASCULAR: S1S2 intact, no murmur; 1+ lower extremity edema  Pericardial drain in place with minimal bloody fluid, site intact  ABDOMEN: Nontender to palpation, normoactive bowel sounds, no rebound/guarding; No hepatosplenomegaly  MUSCULOSKELETAL:  No clubbing or cyanosis of digits; no joint swelling or tenderness to palpation  PSYCH: A+O to person, place, and time; affect appropriate  NEUROLOGY: CN 2-12 are intact and symmetric; no gross sensory deficits    Consultant(s) Notes Reviewed:  [x ] YES  [ ] NO  Care Discussed with Consultants/Other Providers [ x] YES  [ ] NO    LABS:    10-27    138  |  102  |  34<H>  ----------------------------<  129<H>  4.7   |  22  |  2.75<H>    Ca    7.8<L>      27 Oct 2022 04:54  Phos  3.1     10-27  Mg     1.6     10-27          CAPILLARY BLOOD GLUCOSE      POCT Blood Glucose.: 208 mg/dL (27 Oct 2022 11:25)  POCT Blood Glucose.: 136 mg/dL (27 Oct 2022 07:38)  POCT Blood Glucose.: 271 mg/dL (26 Oct 2022 21:34)  POCT Blood Glucose.: 111 mg/dL (26 Oct 2022 17:27)  POCT Blood Glucose.: 108 mg/dL (26 Oct 2022 11:52)            RADIOLOGY & ADDITIONAL TESTS:    Imaging Personally Reviewed:  [ ] YES  [ ] NO

## 2022-10-27 NOTE — PROGRESS NOTE ADULT - ASSESSMENT
72F w/ HTN, HLD, DM, CKD, 10/20/22 from OSH with uremia, COVID-19, and pericardial effusion    (1)Renal - newly ESRD-HD.   (2)Hypokalemia - mild  (3)CTS - s/p pericardial drain placement   (4)CV - now hemodynamically stable      RECOMMEND:  (1)Next HD today  (2)+ Perm cath and the shiley needs to be removed.  Awaiting vasc input re date for AVF    (3)Chest tube per CTICU on water seal     Sayed [facility]   [phone]

## 2022-10-27 NOTE — CHART NOTE - NSCHARTNOTEFT_GEN_A_CORE
Patient has pericardial drain for tamponade physiology on admission. Drain placed by interventional radiology. Repeat TTE was obtained and showed trace effusion. 50cc output over the past few days.  Drain was pulled out successfully with no bleeding from site.   Endorsed to primary team.     Pema Leahy MD  Cardiology fellow

## 2022-10-28 ENCOUNTER — TRANSCRIPTION ENCOUNTER (OUTPATIENT)
Age: 72
End: 2022-10-28

## 2022-10-28 LAB
ANION GAP SERPL CALC-SCNC: 13 MMOL/L — SIGNIFICANT CHANGE UP (ref 5–17)
BUN SERPL-MCNC: 26 MG/DL — HIGH (ref 7–23)
CALCIUM SERPL-MCNC: 8.3 MG/DL — LOW (ref 8.4–10.5)
CHLORIDE SERPL-SCNC: 98 MMOL/L — SIGNIFICANT CHANGE UP (ref 96–108)
CO2 SERPL-SCNC: 26 MMOL/L — SIGNIFICANT CHANGE UP (ref 22–31)
CREAT SERPL-MCNC: 2.52 MG/DL — HIGH (ref 0.5–1.3)
EGFR: 20 ML/MIN/1.73M2 — LOW
GLUCOSE BLDC GLUCOMTR-MCNC: 131 MG/DL — HIGH (ref 70–99)
GLUCOSE BLDC GLUCOMTR-MCNC: 140 MG/DL — HIGH (ref 70–99)
GLUCOSE BLDC GLUCOMTR-MCNC: 147 MG/DL — HIGH (ref 70–99)
GLUCOSE BLDC GLUCOMTR-MCNC: 205 MG/DL — HIGH (ref 70–99)
GLUCOSE SERPL-MCNC: 171 MG/DL — HIGH (ref 70–99)
MAGNESIUM SERPL-MCNC: 1.5 MG/DL — LOW (ref 1.6–2.6)
PHOSPHATE SERPL-MCNC: 3 MG/DL — SIGNIFICANT CHANGE UP (ref 2.5–4.5)
POTASSIUM SERPL-MCNC: 4.3 MMOL/L — SIGNIFICANT CHANGE UP (ref 3.5–5.3)
POTASSIUM SERPL-SCNC: 4.3 MMOL/L — SIGNIFICANT CHANGE UP (ref 3.5–5.3)
SODIUM SERPL-SCNC: 137 MMOL/L — SIGNIFICANT CHANGE UP (ref 135–145)

## 2022-10-28 PROCEDURE — 93306 TTE W/DOPPLER COMPLETE: CPT | Mod: 26

## 2022-10-28 PROCEDURE — 99232 SBSQ HOSP IP/OBS MODERATE 35: CPT

## 2022-10-28 PROCEDURE — 99223 1ST HOSP IP/OBS HIGH 75: CPT

## 2022-10-28 RX ORDER — ERYTHROPOIETIN 10000 [IU]/ML
4000 INJECTION, SOLUTION INTRAVENOUS; SUBCUTANEOUS ONCE
Refills: 0 | Status: COMPLETED | OUTPATIENT
Start: 2022-10-29 | End: 2022-10-29

## 2022-10-28 RX ADMIN — Medication 2: at 11:30

## 2022-10-28 RX ADMIN — Medication 25 MILLIGRAM(S): at 16:55

## 2022-10-28 RX ADMIN — Medication 25 MILLIGRAM(S): at 05:31

## 2022-10-28 RX ADMIN — LIDOCAINE 2 PATCH: 4 CREAM TOPICAL at 11:29

## 2022-10-28 RX ADMIN — Medication 667 MILLIGRAM(S): at 11:29

## 2022-10-28 RX ADMIN — Medication 650 MILLIGRAM(S): at 14:36

## 2022-10-28 RX ADMIN — Medication 650 MILLIGRAM(S): at 14:06

## 2022-10-28 RX ADMIN — Medication 100 MILLIGRAM(S): at 05:31

## 2022-10-28 RX ADMIN — Medication 100 MILLIGRAM(S): at 21:13

## 2022-10-28 RX ADMIN — PANTOPRAZOLE SODIUM 40 MILLIGRAM(S): 20 TABLET, DELAYED RELEASE ORAL at 05:52

## 2022-10-28 RX ADMIN — Medication 5 MILLIGRAM(S): at 14:05

## 2022-10-28 RX ADMIN — Medication 100 MILLIGRAM(S): at 14:06

## 2022-10-28 RX ADMIN — CHLORHEXIDINE GLUCONATE 1 APPLICATION(S): 213 SOLUTION TOPICAL at 11:30

## 2022-10-28 RX ADMIN — SODIUM CHLORIDE 3 MILLILITER(S): 9 INJECTION INTRAMUSCULAR; INTRAVENOUS; SUBCUTANEOUS at 05:52

## 2022-10-28 RX ADMIN — LIDOCAINE 2 PATCH: 4 CREAM TOPICAL at 18:21

## 2022-10-28 RX ADMIN — SENNA PLUS 2 TABLET(S): 8.6 TABLET ORAL at 21:13

## 2022-10-28 RX ADMIN — RALOXIFENE HYDROCHLORIDE 60 MILLIGRAM(S): 60 TABLET, COATED ORAL at 11:29

## 2022-10-28 RX ADMIN — CHLORHEXIDINE GLUCONATE 1 APPLICATION(S): 213 SOLUTION TOPICAL at 11:31

## 2022-10-28 RX ADMIN — LIDOCAINE 2 PATCH: 4 CREAM TOPICAL at 23:00

## 2022-10-28 RX ADMIN — AMLODIPINE BESYLATE 10 MILLIGRAM(S): 2.5 TABLET ORAL at 05:31

## 2022-10-28 RX ADMIN — SODIUM CHLORIDE 3 MILLILITER(S): 9 INJECTION INTRAMUSCULAR; INTRAVENOUS; SUBCUTANEOUS at 14:12

## 2022-10-28 RX ADMIN — SODIUM CHLORIDE 3 MILLILITER(S): 9 INJECTION INTRAMUSCULAR; INTRAVENOUS; SUBCUTANEOUS at 22:47

## 2022-10-28 RX ADMIN — ATORVASTATIN CALCIUM 80 MILLIGRAM(S): 80 TABLET, FILM COATED ORAL at 21:13

## 2022-10-28 RX ADMIN — BENZOCAINE AND MENTHOL 2 LOZENGE: 5; 1 LIQUID ORAL at 11:35

## 2022-10-28 RX ADMIN — POLYETHYLENE GLYCOL 3350 17 GRAM(S): 17 POWDER, FOR SOLUTION ORAL at 11:30

## 2022-10-28 RX ADMIN — TRAMADOL HYDROCHLORIDE 25 MILLIGRAM(S): 50 TABLET ORAL at 11:35

## 2022-10-28 RX ADMIN — Medication 667 MILLIGRAM(S): at 16:55

## 2022-10-28 RX ADMIN — TRAMADOL HYDROCHLORIDE 25 MILLIGRAM(S): 50 TABLET ORAL at 12:05

## 2022-10-28 NOTE — PROGRESS NOTE ADULT - PROBLEM SELECTOR PLAN 1
Cardiac tamponade (hypotensive and SOB 10/20) s/p urgent pericardiocentesis in the cath lab. 700cc bloody fluid removed with pigtail now removed. Repeat TTE post procedure 10/21, no pericardial tamponade.  - d/w Cards, s/p limited TTE with minimal effusion thus pigtail removed  - Repeat TTE today to assess effusion post drain removal  - pericardial fluid culture, so far NGTD  -Acid fast neg  -CT angio chest and ab/p neg for malignancy

## 2022-10-28 NOTE — PROGRESS NOTE ADULT - SUBJECTIVE AND OBJECTIVE BOX
Kiran Horn MD  Division of Hospital Medicine  Available via MS teams  If no response or off hours page: [phone]  ---------------------------------------------------------    RILEY MEJÍA  72y  Female      Patient is a 72y old  Female who presents with a chief complaint of Pericardial Eff (28 Oct 2022 09:34)      INTERVAL HPI/OVERNIGHT EVENTS:  Seen at bedside. Still with cough. No BM as of yet.       REVIEW OF SYSTEMS: 10 point ROS negative unless listed above    T(C): 36.9 (10-28-22 @ 04:16), Max: 37.3 (10-27-22 @ 16:00)  HR: 83 (10-28-22 @ 04:16) (83 - 93)  BP: 100/66 (10-28-22 @ 04:16) (100/66 - 172/81)  RR: 18 (10-28-22 @ 04:16) (18 - 18)  SpO2: 98% (10-28-22 @ 04:16) (94% - 99%)  Wt(kg): --Vital Signs Last 24 Hrs  T(C): 36.9 (28 Oct 2022 04:16), Max: 37.3 (27 Oct 2022 16:00)  T(F): 98.4 (28 Oct 2022 04:16), Max: 99.1 (27 Oct 2022 16:00)  HR: 83 (28 Oct 2022 04:16) (83 - 93)  BP: 100/66 (28 Oct 2022 04:16) (100/66 - 172/81)  BP(mean): 78 (28 Oct 2022 04:16) (78 - 78)  RR: 18 (28 Oct 2022 04:16) (18 - 18)  SpO2: 98% (28 Oct 2022 04:16) (94% - 99%)    Parameters below as of 28 Oct 2022 04:16  Patient On (Oxygen Delivery Method): room air        PHYSICAL EXAM:  CONSTITUTIONAL: NAD, well-developed, well-groomed  NECK: Supple,+ permacath  RESPIRATORY: Normal respiratory effort; lungs are clear to auscultation bilaterally  CARDIOVASCULAR: S1S2 intact, no murmur; trace lower extremity edema  Pericardial drain in place with minimal bloody fluid, site intact  ABDOMEN: Nontender to palpation, normoactive bowel sounds, no rebound/guarding; No hepatosplenomegaly  PSYCH: A+O to person, place, and time; affect appropriate      Consultant(s) Notes Reviewed:  [x ] YES  [ ] NO  Care Discussed with Consultants/Other Providers [ x] YES  [ ] NO    LABS:    10-28    137  |  98  |  26<H>  ----------------------------<  171<H>  4.3   |  26  |  2.52<H>    Ca    8.3<L>      28 Oct 2022 09:06  Phos  3.0     10-28  Mg     1.5     10-28          CAPILLARY BLOOD GLUCOSE      POCT Blood Glucose.: 205 mg/dL (28 Oct 2022 11:18)  POCT Blood Glucose.: 147 mg/dL (28 Oct 2022 07:47)  POCT Blood Glucose.: 164 mg/dL (27 Oct 2022 21:06)  POCT Blood Glucose.: 98 mg/dL (27 Oct 2022 16:32)            RADIOLOGY & ADDITIONAL TESTS:    Imaging Personally Reviewed:  [ ] YES  [ ] NO

## 2022-10-28 NOTE — PROGRESS NOTE ADULT - PROBLEM SELECTOR PLAN 2
Presented to [facility] VS MAR and CKD with uremia, initiated on urgent HD. New ESRD  -s/p permacath by IR 10/26  -phoslo TILATOSHA  -nephro recs appreciated, vascular surgery for AVF creation prior to d/c. c/w arm precautions.   - HD as per nephro  -renally dose and avoid nephrotoxic agents

## 2022-10-28 NOTE — PROGRESS NOTE ADULT - ASSESSMENT
72F w/ HTN, HLD, DM, CKD, 10/20/22 from OSH with uremia, COVID-19, and pericardial effusion    (1)Renal - newly ESRD-HD.   (2)CTS - s/p pericardial drain placement and now removal   (3)CV - now hemodynamically stable      RECOMMEND:  (1)Next HD in am and minimal fluid removal   (2)+ Perm cath and awaiting vasc input re date for AVF    (3)Care coordination is working on outpt unit  (4)LYNSEY ritter        Sayed [facility]   [phone]

## 2022-10-28 NOTE — PROGRESS NOTE ADULT - ASSESSMENT
72F HTN, HLD, DM, CKD who presented to [facility] initially with MAR on CKD with acute uremia and metabolic derangements requiring urgent HD and was COVID-19 positive. Pt has leukocytosis and asymptomatic bacteriuria. Shiley was removed 10/27 and patient is currently being evaluated for possible AV fistula.    - Awaiting cardiac clearance - requested repeat echocardiogram to confirm no fluid reaccumulation post pericardial drain removal - echo ordered  72F HTN, HLD, DM, CKD who presented to [facility] initially with MAR on CKD with acute uremia and metabolic derangements requiring urgent HD and was COVID-19 positive. Pt has leukocytosis and asymptomatic bacteriuria. Shiley was removed 10/27 and patient is currently being evaluated for possible AV fistula.    - Awaiting cardiac clearance - requested repeat echocardiogram to confirm no fluid reaccumulation post pericardial drain removal - echo ordered   --If cleared by cardiology plan for OR ngoc. 9/29/22

## 2022-10-28 NOTE — PROGRESS NOTE ADULT - SUBJECTIVE AND OBJECTIVE BOX
[facility] [phone]        Patient seen and examined in bed.  She was in good spirits         MEDICATIONS  (STANDING):  amLODIPine   Tablet 10 milliGRAM(s) Oral daily  atorvastatin 80 milliGRAM(s) Oral at bedtime  benzonatate 100 milliGRAM(s) Oral three times a day  calcium acetate 667 milliGRAM(s) Oral three times a day with meals  chlorhexidine 2% Cloths 1 Application(s) Topical <User Schedule>  chlorhexidine 4% Liquid 1 Application(s) Topical <User Schedule>  insulin lispro (ADMELOG) corrective regimen sliding scale   SubCutaneous three times a day before meals  insulin lispro (ADMELOG) corrective regimen sliding scale   SubCutaneous at bedtime  lidocaine   4% Patch 2 Patch Transdermal daily  metoprolol tartrate 25 milliGRAM(s) Oral two times a day  pantoprazole    Tablet 40 milliGRAM(s) Oral before breakfast  polyethylene glycol 3350 17 Gram(s) Oral daily  raloxifene 60 milliGRAM(s) Oral daily  senna 2 Tablet(s) Oral at bedtime  sodium chloride 0.9% lock flush 3 milliLiter(s) IV Push every 8 hours      VITAL:  T(C): , Max: 37.3 (10-27-22 @ 16:00)  T(F): , Max: 99.1 (10-27-22 @ 16:00)  HR: 83 (10-28-22 @ 04:16)  BP: 100/66 (10-28-22 @ 04:16)  BP(mean): 78 (10-28-22 @ 04:16)  RR: 18 (10-28-22 @ 04:16)  SpO2: 98% (10-28-22 @ 04:16)  Wt(kg): --    I and O's:    10-27 @ 07:01  -  10-28 @ 07:00  --------------------------------------------------------  IN: 800 mL / OUT: 2052 mL / NET: -1252 mL          PHYSICAL EXAM:    Constitutional: NAD  Neck:  No JVD  Respiratory: CTAB/L  Cardiovascular: S1 and S2  Gastrointestinal: BS+, soft, NT/ND  Extremities: No peripheral edema  Neurological: A/O x 3, no focal deficits  Psychiatric: Normal mood, normal affect  : +  Bah  Skin: No rashes  Access: perm cath     LABS:    10-27    138  |  102  |  34<H>  ----------------------------<  129<H>  4.7   |  22  |  2.75<H>    Ca    7.8<L>      27 Oct 2022 04:54  Phos  3.1     10-27  Mg     1.6     10-27            Urine Studies:          RADIOLOGY & ADDITIONAL STUDIES:

## 2022-10-28 NOTE — CONSULT NOTE ADULT - SUBJECTIVE AND OBJECTIVE BOX
[facility] - Lucio Madrid, Tyrlel Garrison Savella, [place]  Office Number: [phone]    Initial Consult Note    CHIEF COMPLAINT: Patient is a 72y old  Female who presents with a chief complaint of Pericardial Eff        HPI:  72F HTN, HLD, DM, CKD who presented to [facility] initially with MAR on CKD with acute uremia and metabolic derangements requiring urgent HD and was COVID-19 positive. During HD went into atrial fibrillation and started on HD, subsequently developing GIB thought 2/2 AC and acute uremia. Also on amiodarone for Afib. On TTE noted to have a moderate to large pericardial effusion with early echocardiographic evidence of tamponade. Clinically she is not hypotensive and she tolerates the fluid shifts related to HD. Patient today was transferred to [facility] CTS Dr. Gabriel for evaluation of Pericardial Effusion.    ON 10/20 had pericardiocentesis in the cath lab with 700 cc bloody fluid removed.  Had pericardial drain which was draining minimally, and removed yesterday.  NO chest pain, increased dyspnea, PND, orthopnea, or LE swelling this AM.  Feels well.  Cardiology consulted for clearance for possible AVF for vascular surgery.  Due to bacteremia, permacath placed, shiley removed, and for AVF eventually.  NO follow up echo done yet post pericardial drain removal.     PAST MEDICAL & SURGICAL HISTORY:  HTN (hypertension)      HLD (hyperlipidemia)      DM (diabetes mellitus)          SOCIAL HISTORY:  No tobacco, ethanol, or drug abuse.    FAMILY HISTORY:  FH: kidney disease      No family history of acute MI or sudden cardiac death.    MEDICATIONS  (STANDING):  amLODIPine   Tablet 10 milliGRAM(s) Oral daily  atorvastatin 80 milliGRAM(s) Oral at bedtime  benzonatate 100 milliGRAM(s) Oral three times a day  calcium acetate 667 milliGRAM(s) Oral three times a day with meals  chlorhexidine 2% Cloths 1 Application(s) Topical <User Schedule>  chlorhexidine 4% Liquid 1 Application(s) Topical <User Schedule>  insulin lispro (ADMELOG) corrective regimen sliding scale   SubCutaneous three times a day before meals  insulin lispro (ADMELOG) corrective regimen sliding scale   SubCutaneous at bedtime  lidocaine   4% Patch 2 Patch Transdermal daily  metoprolol tartrate 25 milliGRAM(s) Oral two times a day  pantoprazole    Tablet 40 milliGRAM(s) Oral before breakfast  polyethylene glycol 3350 17 Gram(s) Oral daily  raloxifene 60 milliGRAM(s) Oral daily  senna 2 Tablet(s) Oral at bedtime  sodium chloride 0.9% lock flush 3 milliLiter(s) IV Push every 8 hours    MEDICATIONS  (PRN):  acetaminophen     Tablet .. 650 milliGRAM(s) Oral every 6 hours PRN Temp greater or equal to 38C (100.4F), Mild Pain (1 - 3)  benzocaine 15 mG/menthol 3.6 mG Lozenge 2 Lozenge Oral every 6 hours PRN Sore Throat  bisacodyl 5 milliGRAM(s) Oral every 12 hours PRN Constipation  guaiFENesin Oral Liquid (Sugar-Free) 100 milliGRAM(s) Oral every 6 hours PRN Cough  hydrocodone/homatropine Syrup 5 milliLiter(s) Oral every 6 hours PRN Cough  melatonin 3 milliGRAM(s) Oral at bedtime PRN Insomnia  traMADol 25 milliGRAM(s) Oral every 6 hours PRN Severe Pain (7 - 10)      Allergies    sulfa drugs (Rash)    Intolerances        REVIEW OF SYSTEMS:    CONSTITUTIONAL: No weakness, fevers or chills  EYES/ENT: No visual changes;  No vertigo or throat pain   NECK: No pain or stiffness  RESPIRATORY: No cough, wheezing, hemoptysis; No shortness of breath  CARDIOVASCULAR: No chest pain or palpitations  GASTROINTESTINAL: No abdominal pain. No nausea, vomiting, or hematemesis; No diarrhea or constipation. No melena or hematochezia.  GENITOURINARY: No dysuria, frequency or hematuria  NEUROLOGICAL: No numbness or weakness  SKIN: No itching or rash  All other review of systems is negative unless indicated above    VITAL SIGNS:   Vital Signs Last 24 Hrs  T(C): 36.9 (28 Oct 2022 04:16), Max: 37.3 (27 Oct 2022 16:00)  T(F): 98.4 (28 Oct 2022 04:16), Max: 99.1 (27 Oct 2022 16:00)  HR: 83 (28 Oct 2022 04:16) (83 - 93)  BP: 100/66 (28 Oct 2022 04:16) (100/66 - 172/81)  BP(mean): 78 (28 Oct 2022 04:16) (78 - 78)  RR: 18 (28 Oct 2022 04:16) (18 - 18)  SpO2: 98% (28 Oct 2022 04:16) (94% - 99%)    Parameters below as of 28 Oct 2022 04:16  Patient On (Oxygen Delivery Method): room air        I&O's Summary    27 Oct 2022 07:01  -  28 Oct 2022 07:00  --------------------------------------------------------  IN: 800 mL / OUT: 2052 mL / NET: -1252 mL        On Exam:    Constitutional: NAD, alert and oriented x 3  Lungs:  Non-labored, breath sounds are clear bilaterally, No wheezing, rales or rhonchi  Cardiovascular: RRR.  S1 and S2 positive.  No murmurs, rubs, gallops or clicks  Gastrointestinal: Bowel Sounds present, soft, nontender.   Lymph: No peripheral edema. No cervical lymphadenopathy.  Neurological: Alert, no focal deficits  Skin: No rashes or ulcers   Psych:  Mood & affect appropriate.    LABS: All Labs Reviewed:    27 Oct 2022 04:54    138    |  102    |  34     ----------------------------<  129    4.7     |  22     |  2.75   26 Oct 2022 06:31    139    |  100    |  30     ----------------------------<  103    4.1     |  24     |  2.49     Ca    7.8        27 Oct 2022 04:54  Ca    7.3        26 Oct 2022 06:31  Phos  3.1       27 Oct 2022 04:54  Mg     1.6       27 Oct 2022 04:54  Mg     1.5       26 Oct 2022 06:31            Blood Culture:         RADIOLOGY:    EKG:

## 2022-10-28 NOTE — PROGRESS NOTE ADULT - SUBJECTIVE AND OBJECTIVE BOX
Pt is s/p shiley removal 10/27 and is being evaluated for possible AV fistula. Vein mapping done 10/27. Cardiology consulted for clearance & they requested follow-up echo post-pericardial drain removal - echo ordered.

## 2022-10-28 NOTE — PROGRESS NOTE ADULT - ASSESSMENT
72F HTN, HLD, DM, CKD presented to [facility] VS initially with MAR on CKD with acute uremia and metabolic derangements requiring ICU stay, urgent HD and COVID-19 positive, complicated with Afib with GIB 2/2 AC and acute uremia. Transferred to [facility] CTS for large pericardial effusion and developed cardiac tamponade requiring urgent percardiocentesis 10/20 s/p 700cc renetta blood, monitored in CTU. Downgraded to medicine for further management

## 2022-10-28 NOTE — CONSULT NOTE ADULT - ASSESSMENT
72F HTN, HLD, DM, CKD who presented to [facility] initially with MAR on CKD with acute uremia and metabolic derangements requiring urgent HD and was COVID-19 positive. During HD went into atrial fibrillation and started on HD, subsequently developing GIB thought 2/2 AC and acute uremia.  On TTE noted to have a moderate to large pericardial effusion with early echocardiographic evidence of tamponade.  ON 10/20 had pericardiocentesis in the cath lab with 700 cc bloody fluid removed.  Had pericardial drain which was draining minimally, and removed 10/27.  Cardiology consulted for clearance for possible AVF for vascular surgery.  Due to bacteremia, permacath placed, shiley removed, and for AVF eventually.  NO follow up echo done yet post pericardial drain removal.     - Needs repeat echocardiogram today to confirm no fluid reaccumuation  - Provided no significant pericardial fluid reaccumulation, should be ok for AVF  - Continue off of AC for now secondary to bleeding issues  - Sinus tachycardia has been reactive  - Continue metoprolol 25 bid  - Continue amlodipine 10 QD  - Continue statin drug  - Monitor and replete lytes  - No evidence of active ischemia, vol OL, or uncontrolled arrhythmia  - To follow closely while admitted

## 2022-10-28 NOTE — CHART NOTE - NSCHARTNOTEFT_GEN_A_CORE
Nutrition Follow Up Note  Patient seen for: Malnutrition Follow Up. Chart reviewed, events noted.     Per chart, pt is a "72F HTN, HLD, DM, CKD who presented to [facility] initially with MAR on CKD with acute uremia and metabolic derangements requiring urgent HD and was COVID-19 positive. During HD went into atrial fibrillation and started on HD, subsequently developing GIB thought 2/2 AC and acute uremia. Also on amiodarone for Afib. On TTE noted to have a moderate to large pericardial effusion with early echocardiographic evidence of tamponade. Clinically she is not hypotensive and she tolerates the fluid shifts related to HD. Patient today was transferred to [facility] CTS Dr. Gabriel for evaluation of Pericardial Effusion."    Source: [x] Patient       [x] Medical Record        [] RN        [] Family at bedside       [] Other:    Diet Order:   Diet, Renal Restrictions:   For patients receiving Renal Replacement - No Protein Restr, No Conc K, No Conc Phos, Low Sodium (10-26-22)    - Is current order appropriate/adequate? [] Yes  []  No: Recommend providing Nepro 2x/day to promote intake.     - PO intake :   [x] >75%  Adequate    [] 50-75%  Fair       [] <50%  Poor - In house, pt reports fair PO intake at baseline. Flowsheets indicate adequate (%) PO intake.     - Nutrition-related concerns:      - Pt prescribed diet order 10/26 s/p NPO x 8 days. Pt reports appetite/PO intake is fair at baseline. Amenable to trying Nepro. Pt denies any issues chewing/swallowing.       - Nephrology: Last HD 10/27.       - Nutritionally Pertinent Meds: SSI, atorvastatin, phoslo, protonix      - Nutritionally Pertinent Labs: elevated BG - being addressed with insulin regimen; a1c 6.0% (10/20) - indicates good glycemic control; Phos WNL today - pt prescribed Phoslo.     GI: Pt denies N/V/D, reports constipation with last BM x last week, per flowsheets, last documented BM 10/20.   Bowel Regimen? [x] Yes: dulcolax, miralax, senna.    Weights:   Daily Weight in k.4 (10-), Weight in k.4 (10-), Weight in k.5 (10-), Weight in k.5 (10-), Weight in k.3 (10-), Weight in k.9 (10-22), Weight in k.6 (10-22)  Dosing wt: 102.7 kg (10/26)  Bed wt obtained by RD (10/28): 96.9 kg  RD to continue to monitor weight trends as able.     Pertinent Labs: 10-28 @ 09:06: Na 137, BUN 26<H>, Cr 2.52<H>, <H>, K+ 4.3, Phos 3.0, Mg 1.5<L>.    A1C with Estimated Average Glucose Result: 6.0 % (10-20-22 @ 05:39)  A1C with Estimated Average Glucose Result: 6.0 % (10-15-22 @ 02:24)    Finger Sticks:  POCT Blood Glucose.: 205 mg/dL (10-28 @ 11:18)  POCT Blood Glucose.: 147 mg/dL (10-28 @ 07:47)  POCT Blood Glucose.: 164 mg/dL (10-27 @ 21:06)  POCT Blood Glucose.: 98 mg/dL (10-27 @ 16:32)      Skin per nursing documentation: Right buttocks/sacrum DTI, Stage 2 pressure injury to left sacral spine/buttocks.  Edema per nursing documentation: +1 generalized, +2 right ankle edema.    Estimated Needs:   [x] no change since previous assessment  Energy: 5625-9991 kcal/day (30-35 kcal/kg)  Protein: 70.72-81.6 g pro/day (1.3-1.5 g pro/kg)  Fluid needs deferred to team.   Wt used for estimating needs: 120 pounds (IBW)    Previous Nutrition Diagnosis: 1) Malnutrition 2) Increased Nutrient Needs  Nutrition Diagnosis is: [x] ongoing  [] resolved [] not applicable - being addressed with PO diet, assistance/encouragement with meals, oral nutrition supplementation.     Nutrition Care Plan:  [x] In Progress  [] Achieved  [] Not applicable    New Nutrition Diagnosis: [x] Not applicable    Nutrition Interventions:     Education Provided   [x] Yes: Encouraged adequate consumption of meals/supplements to optimize protein-energy intake. Pt not amenable to education for HD at time of visit. [facility] Packet left at bedside. Pt made aware RD remains available PRN.     Recommendations:      1) Continue Renal diet as tolerated. Defer texture/consistency to SLP/team.   2) Recommend providing Nepro 2x/day to promote adequate PO intake.  3) Recommend providing Nephro-yosef daily to promote wound healing pending no medical contraindications.  4) Continue to monitor PO intake, weight, labs, skin, GI status, and diet.  5) Provide assistance with meals as needed to promote adequate PO intake   6) RD remains available for diet education PRN.    Monitoring and Evaluation:   Continue to monitor nutritional intake, tolerance to diet prescription, weights, labs, skin integrity    RD remains available upon request and will follow up per protocol  Hallie Hansen, MELYN #[phone]

## 2022-10-29 LAB
ANION GAP SERPL CALC-SCNC: 11 MMOL/L — SIGNIFICANT CHANGE UP (ref 5–17)
APTT BLD: 27.9 SEC — SIGNIFICANT CHANGE UP (ref 27.5–35.5)
BLD GP AB SCN SERPL QL: NEGATIVE — SIGNIFICANT CHANGE UP
BUN SERPL-MCNC: 36 MG/DL — HIGH (ref 7–23)
CALCIUM SERPL-MCNC: 8.3 MG/DL — LOW (ref 8.4–10.5)
CHLORIDE SERPL-SCNC: 101 MMOL/L — SIGNIFICANT CHANGE UP (ref 96–108)
CO2 SERPL-SCNC: 27 MMOL/L — SIGNIFICANT CHANGE UP (ref 22–31)
CREAT SERPL-MCNC: 2.84 MG/DL — HIGH (ref 0.5–1.3)
EGFR: 17 ML/MIN/1.73M2 — LOW
GLUCOSE BLDC GLUCOMTR-MCNC: 108 MG/DL — HIGH (ref 70–99)
GLUCOSE BLDC GLUCOMTR-MCNC: 118 MG/DL — HIGH (ref 70–99)
GLUCOSE BLDC GLUCOMTR-MCNC: 134 MG/DL — HIGH (ref 70–99)
GLUCOSE BLDC GLUCOMTR-MCNC: 158 MG/DL — HIGH (ref 70–99)
GLUCOSE BLDC GLUCOMTR-MCNC: 91 MG/DL — SIGNIFICANT CHANGE UP (ref 70–99)
GLUCOSE SERPL-MCNC: 107 MG/DL — HIGH (ref 70–99)
HCT VFR BLD CALC: 27.9 % — LOW (ref 34.5–45)
HGB BLD-MCNC: 8.8 G/DL — LOW (ref 11.5–15.5)
INR BLD: 1.32 RATIO — HIGH (ref 0.88–1.16)
MAGNESIUM SERPL-MCNC: 1.7 MG/DL — SIGNIFICANT CHANGE UP (ref 1.6–2.6)
MCHC RBC-ENTMCNC: 30.3 PG — SIGNIFICANT CHANGE UP (ref 27–34)
MCHC RBC-ENTMCNC: 31.5 GM/DL — LOW (ref 32–36)
MCV RBC AUTO: 96.2 FL — SIGNIFICANT CHANGE UP (ref 80–100)
NRBC # BLD: 0 /100 WBCS — SIGNIFICANT CHANGE UP (ref 0–0)
PHOSPHATE SERPL-MCNC: 3.3 MG/DL — SIGNIFICANT CHANGE UP (ref 2.5–4.5)
PLATELET # BLD AUTO: 179 K/UL — SIGNIFICANT CHANGE UP (ref 150–400)
POTASSIUM SERPL-MCNC: 3.8 MMOL/L — SIGNIFICANT CHANGE UP (ref 3.5–5.3)
POTASSIUM SERPL-SCNC: 3.8 MMOL/L — SIGNIFICANT CHANGE UP (ref 3.5–5.3)
PROTHROM AB SERPL-ACNC: 15.2 SEC — HIGH (ref 10.5–13.4)
RBC # BLD: 2.9 M/UL — LOW (ref 3.8–5.2)
RBC # FLD: 16.3 % — HIGH (ref 10.3–14.5)
RH IG SCN BLD-IMP: POSITIVE — SIGNIFICANT CHANGE UP
SODIUM SERPL-SCNC: 139 MMOL/L — SIGNIFICANT CHANGE UP (ref 135–145)
WBC # BLD: 8.7 K/UL — SIGNIFICANT CHANGE UP (ref 3.8–10.5)
WBC # FLD AUTO: 8.7 K/UL — SIGNIFICANT CHANGE UP (ref 3.8–10.5)

## 2022-10-29 PROCEDURE — 36821 AV FUSION DIRECT ANY SITE: CPT | Mod: LT

## 2022-10-29 PROCEDURE — 99232 SBSQ HOSP IP/OBS MODERATE 35: CPT

## 2022-10-29 PROCEDURE — 71045 X-RAY EXAM CHEST 1 VIEW: CPT | Mod: 26,76

## 2022-10-29 DEVICE — SURGIFOAM PAD 8CM X 12.5CM X 10MM (100): Type: IMPLANTABLE DEVICE | Site: LEFT | Status: FUNCTIONAL

## 2022-10-29 DEVICE — CLIP APPLIER COVIDIEN SURGICLIP III 9" SM: Type: IMPLANTABLE DEVICE | Site: LEFT | Status: FUNCTIONAL

## 2022-10-29 RX ORDER — ALTEPLASE 100 MG
2 KIT INTRAVENOUS ONCE
Refills: 0 | Status: COMPLETED | OUTPATIENT
Start: 2022-10-29 | End: 2022-10-29

## 2022-10-29 RX ORDER — HYDROMORPHONE HYDROCHLORIDE 2 MG/ML
0.5 INJECTION INTRAMUSCULAR; INTRAVENOUS; SUBCUTANEOUS
Refills: 0 | Status: DISCONTINUED | OUTPATIENT
Start: 2022-10-29 | End: 2022-10-29

## 2022-10-29 RX ORDER — ONDANSETRON 8 MG/1
4 TABLET, FILM COATED ORAL ONCE
Refills: 0 | Status: DISCONTINUED | OUTPATIENT
Start: 2022-10-29 | End: 2022-10-29

## 2022-10-29 RX ADMIN — Medication 25 MILLIGRAM(S): at 21:45

## 2022-10-29 RX ADMIN — SODIUM CHLORIDE 3 MILLILITER(S): 9 INJECTION INTRAMUSCULAR; INTRAVENOUS; SUBCUTANEOUS at 23:39

## 2022-10-29 RX ADMIN — ERYTHROPOIETIN 4000 UNIT(S): 10000 INJECTION, SOLUTION INTRAVENOUS; SUBCUTANEOUS at 19:48

## 2022-10-29 RX ADMIN — SODIUM CHLORIDE 3 MILLILITER(S): 9 INJECTION INTRAMUSCULAR; INTRAVENOUS; SUBCUTANEOUS at 14:10

## 2022-10-29 RX ADMIN — Medication 100 MILLIGRAM(S): at 21:46

## 2022-10-29 RX ADMIN — CHLORHEXIDINE GLUCONATE 1 APPLICATION(S): 213 SOLUTION TOPICAL at 07:31

## 2022-10-29 RX ADMIN — BENZOCAINE AND MENTHOL 2 LOZENGE: 5; 1 LIQUID ORAL at 20:00

## 2022-10-29 RX ADMIN — HYDROMORPHONE HYDROCHLORIDE 0.5 MILLIGRAM(S): 2 INJECTION INTRAMUSCULAR; INTRAVENOUS; SUBCUTANEOUS at 15:23

## 2022-10-29 RX ADMIN — ATORVASTATIN CALCIUM 80 MILLIGRAM(S): 80 TABLET, FILM COATED ORAL at 21:45

## 2022-10-29 RX ADMIN — HYDROMORPHONE HYDROCHLORIDE 0.5 MILLIGRAM(S): 2 INJECTION INTRAMUSCULAR; INTRAVENOUS; SUBCUTANEOUS at 15:34

## 2022-10-29 RX ADMIN — PANTOPRAZOLE SODIUM 40 MILLIGRAM(S): 20 TABLET, DELAYED RELEASE ORAL at 05:20

## 2022-10-29 RX ADMIN — RALOXIFENE HYDROCHLORIDE 60 MILLIGRAM(S): 60 TABLET, COATED ORAL at 21:45

## 2022-10-29 RX ADMIN — SENNA PLUS 2 TABLET(S): 8.6 TABLET ORAL at 21:45

## 2022-10-29 RX ADMIN — Medication 5 MILLIGRAM(S): at 05:22

## 2022-10-29 RX ADMIN — Medication 25 MILLIGRAM(S): at 05:20

## 2022-10-29 RX ADMIN — SODIUM CHLORIDE 3 MILLILITER(S): 9 INJECTION INTRAMUSCULAR; INTRAVENOUS; SUBCUTANEOUS at 06:00

## 2022-10-29 RX ADMIN — AMLODIPINE BESYLATE 10 MILLIGRAM(S): 2.5 TABLET ORAL at 05:20

## 2022-10-29 RX ADMIN — Medication 667 MILLIGRAM(S): at 21:45

## 2022-10-29 RX ADMIN — ALTEPLASE 2 MILLIGRAM(S): KIT at 21:01

## 2022-10-29 RX ADMIN — ALTEPLASE 2 MILLIGRAM(S): KIT at 21:00

## 2022-10-29 RX ADMIN — Medication 100 MILLIGRAM(S): at 05:20

## 2022-10-29 RX ADMIN — LIDOCAINE 2 PATCH: 4 CREAM TOPICAL at 21:44

## 2022-10-29 NOTE — PROGRESS NOTE ADULT - ASSESSMENT
72F HTN, HLD, DM, CKD who presented to [facility] initially with MAR on CKD with acute uremia and metabolic derangements requiring urgent HD and was COVID-19 positive. During HD went into atrial fibrillation and started on HD, subsequently developing GIB thought 2/2 AC and acute uremia.  On TTE noted to have a moderate to large pericardial effusion with early echocardiographic evidence of tamponade.  ON 10/20 had pericardiocentesis in the cath lab with 700 cc bloody fluid removed.  Had pericardial drain which was draining minimally, and removed 10/27.  Cardiology consulted for clearance for possible AVF for vascular surgery.  Due to bacteremia, permacath placed, shiley removed, and for AVF     #Pericardial Effusion  s/p drainage 10/20  Repeat TTE-Trace effusion EF63%    #ESRD  Scheduled for AV access creation  Patient's Revised Cardiac Risk Index (RCRI) score is 1 (6 % risk) and Moya score is 0.3 % risk. Patient is at intermediate  risk of  adverse perioperative cardiac events undergoing intermediate  risk surgery. No further cardiac testing is needed prior to patient's surgery.      #Atrial Fibrillation  -Paroxysmal  Not on AC 2/2 GIB  Remains in Sinus Rhythm  Continue Metoprolol

## 2022-10-29 NOTE — PROGRESS NOTE ADULT - SUBJECTIVE AND OBJECTIVE BOX
Kiran Horn MD  Division of Hospital Medicine  Available via MS teams  If no response or off hours page: [phone]  ---------------------------------------------------------    RILEY MEJÍA  72y  Female      Patient is a 72y old  Female who presents with a chief complaint of Pericardial Eff (29 Oct 2022 10:24)      INTERVAL HPI/OVERNIGHT EVENTS:  Seen at bedside. Still with cough. Small BM this am       REVIEW OF SYSTEMS: 10 point ROS negative unless listed above    T(C): 36.2 (10-29-22 @ 12:00), Max: 37.3 (10-28-22 @ 13:13)  HR: 100 (10-29-22 @ 12:15) (85 - 100)  BP: 139/65 (10-29-22 @ 12:15) (119/76 - 150/80)  RR: 16 (10-29-22 @ 12:15) (16 - 18)  SpO2: 95% (10-29-22 @ 12:15) (92% - 99%)  Wt(kg): --Vital Signs Last 24 Hrs  T(C): 36.2 (29 Oct 2022 12:00), Max: 37.3 (28 Oct 2022 13:13)  T(F): 97.2 (29 Oct 2022 12:00), Max: 99.1 (28 Oct 2022 13:13)  HR: 100 (29 Oct 2022 12:15) (85 - 100)  BP: 139/65 (29 Oct 2022 12:15) (119/76 - 150/80)  BP(mean): 94 (29 Oct 2022 12:15) (80 - 96)  RR: 16 (29 Oct 2022 12:15) (16 - 18)  SpO2: 95% (29 Oct 2022 12:15) (92% - 99%)    Parameters below as of 29 Oct 2022 12:15  Patient On (Oxygen Delivery Method): nasal cannula  O2 Flow (L/min): 3      PHYSICAL EXAM:  CONSTITUTIONAL: NAD, well-developed, well-groomed  NECK: Supple,+ permacath  RESPIRATORY: Normal respiratory effort; lungs are clear to auscultation bilaterally  CARDIOVASCULAR: S1S2 intact, no murmur; trace lower extremity edema  Pericardial drain in place with minimal bloody fluid, site intact  ABDOMEN: Nontender to palpation, normoactive bowel sounds, no rebound/guarding; No hepatosplenomegaly  PSYCH: A+O to person, place, and time; affect appropriate    Consultant(s) Notes Reviewed:  [x ] YES  [ ] NO  Care Discussed with Consultants/Other Providers [ x] YES  [ ] NO    LABS:                        8.8    8.70  )-----------( 179      ( 29 Oct 2022 02:40 )             27.9     10-29    139  |  101  |  36<H>  ----------------------------<  107<H>  3.8   |  27  |  2.84<H>    Ca    8.3<L>      29 Oct 2022 02:40  Phos  3.3     10-29  Mg     1.7     10-29      PT/INR - ( 29 Oct 2022 02:40 )   PT: 15.2 sec;   INR: 1.32 ratio         PTT - ( 29 Oct 2022 02:40 )  PTT:27.9 sec    CAPILLARY BLOOD GLUCOSE      POCT Blood Glucose.: 108 mg/dL (29 Oct 2022 12:02)  POCT Blood Glucose.: 118 mg/dL (29 Oct 2022 07:31)  POCT Blood Glucose.: 131 mg/dL (28 Oct 2022 21:29)  POCT Blood Glucose.: 140 mg/dL (28 Oct 2022 16:30)            RADIOLOGY & ADDITIONAL TESTS:    Imaging Personally Reviewed:  [ ] YES  [ ] NO

## 2022-10-29 NOTE — PROGRESS NOTE ADULT - SUBJECTIVE AND OBJECTIVE BOX
DATE OF SERVICE: 10-29-22 @ 10:25  Patient was seen and examined on    10-29-22 @ 10:25 .Interim events noted.Consultant notes ,Labs,Telemetry reviewed by me       HOSPITAL COURSE: 72F HTN, HLD, T2DM, CKD who presented to [facility] initially with MAR on CKD with acute uremia and metabolic derangements requiring urgent HD and was COVID-19 positive. During HD went into atrial fibrillation and started on HD, subsequently developing GIB thought 2/2 AC and acute uremia. Also on amiodarone for Afib. On TTE noted to have a moderate to large pericardial effusion with early echocardiographic evidence of tamponade. Clinically she is not hypotensive and she tolerates the fluid shifts related to HD. Patient  was transferred to [facility] CTS Dr. Gabriel for evaluation of Pericardial Effusion.     Had pericardial drainage 10/20/2022    INTERIM EVENTS:Patient seen at bedside ,interim events noted.Awake is scheduled for LEFT arm AV access creation.  No dyspnea cough is less      PMH -reviewed admission note, no change since admission  HEART FAILURE: Acute[ ]Chronic[ ] Systolic[ ] Diastolic[ ] Combined Systolic and Diastolic[ ]  CAD[ ] CABG[ ] PCI[ ]  DEVICES[ ] PPM[ ] ICD[ ] ILR[ ]  ATRIAL FIBRILLATION[ ] Paroxysmal[ ] Permanent[ ] CHADS2-[  ]  MAR[ ] CKD1[ ] CKD2[ ] CKD3[ ] CKD4[ ] ESRD[x ]  COPD[ ] HTN[ ]   DM[ ] Type1[ ] Type 2[ ]   CVA[ ] Paresis[ ]    AMBULATION: Assisted[ ] Cane/walker[ ] Independent[x ]    MEDICATIONS  (STANDING):  amLODIPine   Tablet 10 milliGRAM(s) Oral daily  atorvastatin 80 milliGRAM(s) Oral at bedtime  benzonatate 100 milliGRAM(s) Oral three times a day  calcium acetate 667 milliGRAM(s) Oral three times a day with meals  chlorhexidine 2% Cloths 1 Application(s) Topical <User Schedule>  chlorhexidine 4% Liquid 1 Application(s) Topical <User Schedule>  epoetin beatris-epbx (RETACRIT) Injectable 4000 Unit(s) IV Push once  insulin lispro (ADMELOG) corrective regimen sliding scale   SubCutaneous three times a day before meals  insulin lispro (ADMELOG) corrective regimen sliding scale   SubCutaneous at bedtime  lidocaine   4% Patch 2 Patch Transdermal daily  metoprolol tartrate 25 milliGRAM(s) Oral two times a day  pantoprazole    Tablet 40 milliGRAM(s) Oral before breakfast  polyethylene glycol 3350 17 Gram(s) Oral daily  raloxifene 60 milliGRAM(s) Oral daily  senna 2 Tablet(s) Oral at bedtime  sodium chloride 0.9% lock flush 3 milliLiter(s) IV Push every 8 hours    MEDICATIONS  (PRN):  acetaminophen     Tablet .. 650 milliGRAM(s) Oral every 6 hours PRN Temp greater or equal to 38C (100.4F), Mild Pain (1 - 3)  benzocaine 15 mG/menthol 3.6 mG Lozenge 2 Lozenge Oral every 6 hours PRN Sore Throat  bisacodyl 5 milliGRAM(s) Oral every 12 hours PRN Constipation  guaiFENesin Oral Liquid (Sugar-Free) 100 milliGRAM(s) Oral every 6 hours PRN Cough  hydrocodone/homatropine Syrup 5 milliLiter(s) Oral every 6 hours PRN Cough  melatonin 3 milliGRAM(s) Oral at bedtime PRN Insomnia  traMADol 25 milliGRAM(s) Oral every 6 hours PRN Severe Pain (7 - 10)            REVIEW OF SYSTEMS:  Constitutional: [ ] fever, [ ]weight loss,  [ ]fatigue [ ]weight gain  Eyes: [ ] visual changes  Respiratory: [ ]shortness of breath;  [ ] cough, [ ]wheezing, [ ]chills, [ ]hemoptysis  Cardiovascular: [ ] chest pain, [ ]palpitations, [ ]dizziness,  [ ]leg swelling[ ]orthopnea[ ]PND  Gastrointestinal: [ ] abdominal pain, [ ]nausea, [ ]vomiting,  [ ]diarrhea [ ]Constipation [ ]Melena  Genitourinary: [ ] dysuria, [ ] hematuria [ ]Bah  Neurologic: [ ] headaches [ ] tremors[ ]weakness [ ]Paralysis Right[ ] Left[ ]  Skin: [ ] itching, [ ]burning, [ ] rashes  Endocrine: [ ] heat or cold intolerance  Musculoskeletal: [ ] joint pain or swelling; [ ] muscle, back, or extremity pain  Psychiatric: [ ] depression, [ ]anxiety, [ ]mood swings, or [ ]difficulty sleeping  Hematologic: [ ] easy bruising, [ ] bleeding gums    [ ] All remaining systems negative except as per above.   [ ]Unable to obtain.  [x] No change in ROS since admission      Vital Signs Last 24 Hrs  T(C): 37.1 (29 Oct 2022 09:13), Max: 37.3 (28 Oct 2022 13:13)  T(F): 98.8 (29 Oct 2022 09:13), Max: 99.1 (28 Oct 2022 13:13)  HR: 93 (29 Oct 2022 09:13) (85 - 104)  BP: 119/76 (29 Oct 2022 09:13) (119/76 - 150/80)  BP(mean): 93 (28 Oct 2022 19:25) (93 - 93)  RR: 18 (29 Oct 2022 09:13) (18 - 18)  SpO2: 99% (29 Oct 2022 09:13) (95% - 99%)    Parameters below as of 29 Oct 2022 05:17  Patient On (Oxygen Delivery Method): room air      I&O's Summary    28 Oct 2022 07:01  -  29 Oct 2022 07:00  --------------------------------------------------------  IN: 920 mL / OUT: 900 mL / NET: 20 mL    29 Oct 2022 07:01  -  29 Oct 2022 10:25  --------------------------------------------------------  IN: 0 mL / OUT: 0 mL / NET: 0 mL        PHYSICAL EXAM:  General: No acute distress BMI-28  HEENT: EOMI, PERRL  Neck: Supple, [ ] JVD  Lungs: Equal air entry bilaterally; [ ] rales [ ] wheezing [ ] rhonchi  Heart: Regular rate and rhythm; [x ] murmur   2/6 [ x] systolic [ ] diastolic [ ] radiation[ ] rubs [ ]  gallops  Abdomen: Nontender, bowel sounds present  Extremities: No clubbing, cyanosis, [ ] edema [ ]Pulses  equal and intact  Nervous system:  Alert & Oriented X3, no focal deficits  Psychiatric: Normal affect  Skin: No rashes or lesions    LABS:  10-29    139  |  101  |  36<H>  ----------------------------<  107<H>  3.8   |  27  |  2.84<H>    Ca    8.3<L>      29 Oct 2022 02:40  Phos  3.3     10-29  Mg     1.7     10-29      Creatinine Trend: 2.84<--, 2.52<--, 2.75<--, 2.49<--, 3.24<--, 3.27<--                        8.8    8.70  )-----------( 179      ( 29 Oct 2022 02:40 )             27.9     PT/INR - ( 29 Oct 2022 02:40 )   PT: 15.2 sec;   INR: 1.32 ratio         PTT - ( 29 Oct 2022 02:40 )  PTT:27.9 sec       TTE with Doppler (w/Cont) (10.28.22 @ 09:08) >  Study Date: 10/28/2022Conclusions:  1. Mitral annular calcification, otherwise normal mitral valve. Minimal mitral regurgitation.  2. Calcified trileaflet aortic valve with decreased opening. No aortic valve regurgitation seen.  3. Endocardial visualization enhanced with intravenous injection of Ultrasonic Enhancing Agent (Definity).    Normal left ventricular systolic function. No left ventricular thrombus.EF 63%  4. Mild diastolic dysfunction (Stage I).  5. Normal right ventricular size and function.  6. Normal pericardium with trace pericardial effusion.  *** Compared with echocardiogram of 10/25/2022, no significant changes noted.

## 2022-10-29 NOTE — PROGRESS NOTE ADULT - PROBLEM SELECTOR PLAN 1
Cardiac tamponade (hypotensive and SOB 10/20) s/p urgent pericardiocentesis in the cath lab. 700cc bloody fluid removed with pigtail now removed. Repeat TTE post procedure 10/21, no pericardial tamponade.  - s/p limited TTE with minimal effusion thus pigtail removed  - Repeat TTE post drain unchanged from prior  - pericardial fluid culture NGTD  -Acid fast neg  -CT angio chest and ab/p neg for malignancy

## 2022-10-29 NOTE — PROGRESS NOTE ADULT - ASSESSMENT
72F HTN, HLD, DM, CKD presented to [facility] initially with MAR on CKD with acute uremia and metabolic derangements requiring ICU stay, urgent HD and COVID-19 positive, complicated with Afib with GIB 2/2 AC and acute uremia. Transferred to [facility] CTS for large pericardial effusion and developed cardiac tamponade requiring urgent percardiocentesis 10/20 s/p 700cc renetta blood, monitored in CTU. Downgraded to medicine for further management

## 2022-10-29 NOTE — PROGRESS NOTE ADULT - PROBLEM SELECTOR PLAN 2
Presented to [facility] MAR and CKD with uremia, initiated on urgent HD. New ESRD  -s/p permacath by IR 10/26  -phoslo TID  -nephro recs appreciated, vascular surgery for AVF creation prior to d/c. c/w arm precautions. Plan for fistula placement today  - HD as per nephro  -renally dose and avoid nephrotoxic agents

## 2022-10-29 NOTE — PROGRESS NOTE ADULT - ASSESSMENT
ESRD on HD via right sided permcath  Plan for left arm AV access creation.  Risks, benefits, and alternatives of the procedure were discussed with the patient. All questions were answered. She agrees to proceed with the procedure.

## 2022-10-29 NOTE — CHART NOTE - NSCHARTNOTEFT_GEN_A_CORE
Post Operative Note  Patient: RILEY MEJÍA 72y (1950) Female   MRN: [id number]  Location: [facility] 4MON 417 W1  Visit: 10-19-22 Inpatient  Date: 10-30-22 @ 03:32    Procedure: S/P AVF creation for dialysis    Subjective: Patient seen and examined post operatively. Post-operative course complicated by respiratory distress requiring high-flow oxygen. Successfully weaned to 3L NC, CXR at this time showed white-out of L lung. Pt received 500mL IVF in OR. Was put on BiPAP per anesthesia recommendation, which resulted in clinical and radiologic improvement on repeat scan 2 hours later. At the time of post-op exam, reports pain as controlled. Denies nausea, vomiting, fever, chills, chest pain. Still moderately short of breath but satting appropriately.       Objective:  Vitals: T(F): 98.8 (10-29-22 @ 21:00), Max: 99.5 (10-29-22 @ 17:30)  HR: 115 (10-29-22 @ 21:00)  BP: 137/68 (10-29-22 @ 21:00) (113/59 - 166/68)  RR: 18 (10-29-22 @ 21:00)  SpO2: 96% (10-29-22 @ 21:00)  Vent Settings:     In:   10-28-22 @ 07:01  -  10-29-22 @ 07:00  --------------------------------------------------------  IN: 920 mL    10-29-22 @ 07:01  -  10-30-22 @ 03:32  --------------------------------------------------------  IN: 240 mL      IV Fluids: calcium acetate 667 milliGRAM(s) Oral three times a day with meals  sodium chloride 0.9% lock flush 3 milliLiter(s) IV Push every 8 hours      Out:   10-28-22 @ 07:01  -  10-29-22 @ 07:00  --------------------------------------------------------  OUT: 900 mL    10-29-22 @ 07:01  -  10-30-22 @ 03:32  --------------------------------------------------------  OUT: 0 mL      EBL:     Voided Urine:   10-28-22 @ 07:01  -  10-29-22 @ 07:00  --------------------------------------------------------  OUT: 900 mL    10-29-22 @ 07:01  -  10-30-22 @ 03:32  --------------------------------------------------------  OUT: 0 mL          Physical Examination:  General: NAD  HEENT: Normocephalic atraumatic  Respiratory: tachypneic with shallow respirations  Cardio: S1S2, regular rate and rhythm.  Abdomen: soft, nondistended  Vascular: BUE radial and ulnar signals present.     Imaging:  < from: Xray Chest 1 View- PORTABLE-Urgent (Xray Chest 1 View- PORTABLE-Urgent .) (10.29.22 @ 12:20) >    IMPRESSION: Prior internal jugular line is removed. There is a new right   internal jugular catheter tip in the right atrium. Left heart border   excluded by total opacification left hemithorax which likely represents a   combination of  increase in pleural fluid and atelectasis. Right lung   clear. No pneumothorax.    < end of copied text >    < from: Xray Chest 1 View- PORTABLE-Urgent (Xray Chest 1 View- PORTABLE-Urgent .) (10.29.22 @ 14:58) >    IMPRESSION: Right internal jugular line reidentified in position. Low   lung volumes. No change heart mediastinum. Marked decrease in the left   pleural effusion and left lung atelectasis with improved aeration left   lung. Small left pleural effusion and left basilar atelectasis persists.   No pneumothorax or other interval change    Assessment:  72yFemale patient S/P AVF for dialysis access    Plan:  - Pain control PRN  - Diet: regular  - dialysis tonight  - Monitor respiratory status, begin nightly BiPAP  - Care per primary team      Date/Time: 10-30-22 @ 03:32

## 2022-10-29 NOTE — PRE-ANESTHESIA EVALUATION ADULT - NSANTHTOTALSCORECAL_ENT_A_CORE
5 [Parents] : parents [Normal] : Normal [In Pre-K] : In Pre-K [No] : No cigarette smoke exposure [Water heater temperature set at <120 degrees F] : Water heater temperature set at <120 degrees F [Car seat in back seat] : Car seat in back seat [Carbon Monoxide Detectors] : Carbon monoxide detectors [Smoke Detectors] : Smoke detectors [Supervised outdoor play] : Supervised outdoor play [Up to date] : Up to date [Gun in Home] : No gun in home [de-identified] : has appt this months [FreeTextEntry1] : ANNUAL CHECK UP FOR 4 YEARS

## 2022-10-30 DIAGNOSIS — J96.01 ACUTE RESPIRATORY FAILURE WITH HYPOXIA: ICD-10-CM

## 2022-10-30 LAB
GLUCOSE BLDC GLUCOMTR-MCNC: 125 MG/DL — HIGH (ref 70–99)
GLUCOSE BLDC GLUCOMTR-MCNC: 126 MG/DL — HIGH (ref 70–99)
GLUCOSE BLDC GLUCOMTR-MCNC: 130 MG/DL — HIGH (ref 70–99)
GLUCOSE BLDC GLUCOMTR-MCNC: 147 MG/DL — HIGH (ref 70–99)

## 2022-10-30 PROCEDURE — 99233 SBSQ HOSP IP/OBS HIGH 50: CPT

## 2022-10-30 RX ORDER — CYCLOBENZAPRINE HYDROCHLORIDE 10 MG/1
5 TABLET, FILM COATED ORAL ONCE
Refills: 0 | Status: COMPLETED | OUTPATIENT
Start: 2022-10-30 | End: 2022-10-30

## 2022-10-30 RX ORDER — POLYETHYLENE GLYCOL 3350 17 G/17G
17 POWDER, FOR SOLUTION ORAL
Refills: 0 | Status: DISCONTINUED | OUTPATIENT
Start: 2022-10-30 | End: 2022-11-05

## 2022-10-30 RX ORDER — ACETAMINOPHEN 500 MG
650 TABLET ORAL EVERY 6 HOURS
Refills: 0 | Status: COMPLETED | OUTPATIENT
Start: 2022-10-30 | End: 2022-11-04

## 2022-10-30 RX ADMIN — Medication 650 MILLIGRAM(S): at 16:00

## 2022-10-30 RX ADMIN — ATORVASTATIN CALCIUM 80 MILLIGRAM(S): 80 TABLET, FILM COATED ORAL at 21:55

## 2022-10-30 RX ADMIN — Medication 100 MILLIGRAM(S): at 06:06

## 2022-10-30 RX ADMIN — AMLODIPINE BESYLATE 10 MILLIGRAM(S): 2.5 TABLET ORAL at 06:06

## 2022-10-30 RX ADMIN — TRAMADOL HYDROCHLORIDE 25 MILLIGRAM(S): 50 TABLET ORAL at 18:59

## 2022-10-30 RX ADMIN — BENZOCAINE AND MENTHOL 2 LOZENGE: 5; 1 LIQUID ORAL at 21:58

## 2022-10-30 RX ADMIN — Medication 650 MILLIGRAM(S): at 11:10

## 2022-10-30 RX ADMIN — Medication 100 MILLIGRAM(S): at 13:44

## 2022-10-30 RX ADMIN — CHLORHEXIDINE GLUCONATE 1 APPLICATION(S): 213 SOLUTION TOPICAL at 06:09

## 2022-10-30 RX ADMIN — TRAMADOL HYDROCHLORIDE 25 MILLIGRAM(S): 50 TABLET ORAL at 02:09

## 2022-10-30 RX ADMIN — Medication 100 MILLIGRAM(S): at 21:55

## 2022-10-30 RX ADMIN — TRAMADOL HYDROCHLORIDE 25 MILLIGRAM(S): 50 TABLET ORAL at 11:05

## 2022-10-30 RX ADMIN — Medication 650 MILLIGRAM(S): at 10:10

## 2022-10-30 RX ADMIN — Medication 667 MILLIGRAM(S): at 18:24

## 2022-10-30 RX ADMIN — CHLORHEXIDINE GLUCONATE 1 APPLICATION(S): 213 SOLUTION TOPICAL at 06:11

## 2022-10-30 RX ADMIN — LIDOCAINE 2 PATCH: 4 CREAM TOPICAL at 22:26

## 2022-10-30 RX ADMIN — Medication 650 MILLIGRAM(S): at 19:00

## 2022-10-30 RX ADMIN — LIDOCAINE 2 PATCH: 4 CREAM TOPICAL at 10:11

## 2022-10-30 RX ADMIN — SODIUM CHLORIDE 3 MILLILITER(S): 9 INJECTION INTRAMUSCULAR; INTRAVENOUS; SUBCUTANEOUS at 07:04

## 2022-10-30 RX ADMIN — TRAMADOL HYDROCHLORIDE 25 MILLIGRAM(S): 50 TABLET ORAL at 18:33

## 2022-10-30 RX ADMIN — CYCLOBENZAPRINE HYDROCHLORIDE 5 MILLIGRAM(S): 10 TABLET, FILM COATED ORAL at 12:46

## 2022-10-30 RX ADMIN — Medication 650 MILLIGRAM(S): at 15:00

## 2022-10-30 RX ADMIN — Medication 25 MILLIGRAM(S): at 06:06

## 2022-10-30 RX ADMIN — Medication 667 MILLIGRAM(S): at 09:23

## 2022-10-30 RX ADMIN — LIDOCAINE 2 PATCH: 4 CREAM TOPICAL at 10:12

## 2022-10-30 RX ADMIN — LIDOCAINE 2 PATCH: 4 CREAM TOPICAL at 12:48

## 2022-10-30 RX ADMIN — TRAMADOL HYDROCHLORIDE 25 MILLIGRAM(S): 50 TABLET ORAL at 10:08

## 2022-10-30 RX ADMIN — PANTOPRAZOLE SODIUM 40 MILLIGRAM(S): 20 TABLET, DELAYED RELEASE ORAL at 06:07

## 2022-10-30 RX ADMIN — RALOXIFENE HYDROCHLORIDE 60 MILLIGRAM(S): 60 TABLET, COATED ORAL at 12:49

## 2022-10-30 RX ADMIN — Medication 650 MILLIGRAM(S): at 19:30

## 2022-10-30 RX ADMIN — TRAMADOL HYDROCHLORIDE 25 MILLIGRAM(S): 50 TABLET ORAL at 04:04

## 2022-10-30 RX ADMIN — Medication 25 MILLIGRAM(S): at 18:26

## 2022-10-30 RX ADMIN — Medication 650 MILLIGRAM(S): at 08:00

## 2022-10-30 RX ADMIN — Medication 650 MILLIGRAM(S): at 06:07

## 2022-10-30 RX ADMIN — SODIUM CHLORIDE 3 MILLILITER(S): 9 INJECTION INTRAMUSCULAR; INTRAVENOUS; SUBCUTANEOUS at 22:27

## 2022-10-30 RX ADMIN — SODIUM CHLORIDE 3 MILLILITER(S): 9 INJECTION INTRAMUSCULAR; INTRAVENOUS; SUBCUTANEOUS at 12:56

## 2022-10-30 RX ADMIN — Medication 667 MILLIGRAM(S): at 12:47

## 2022-10-30 NOTE — PROGRESS NOTE ADULT - ASSESSMENT
72F HTN, HLD, DM, CKD who presented to [facility] initially with MAR on CKD with acute uremia and metabolic derangements requiring urgent HD and was COVID-19 positive. During HD went into atrial fibrillation and started on HD, subsequently developing GIB thought 2/2 AC and acute uremia.  On TTE noted to have a moderate to large pericardial effusion with early echocardiographic evidence of tamponade.  ON 10/20 had pericardiocentesis in the cath lab with 700 cc bloody fluid removed.  Had pericardial drain which was draining minimally, and removed 10/27.  Cardiology consulted for clearance for possible AVF for vascular surgery.  Due to bacteremia, permacath placed, shiley removed, and for AVF     #Pericardial Effusion  s/p drainage 10/20  Repeat TTE-Trace effusion EF63%    #ESRD  Had AV access creation  Continue HD       #Atrial Fibrillation  -Paroxysmal  Not on AC 2/2 GIB  Remains in Sinus Rhythm  Continue Metoprolol

## 2022-10-30 NOTE — PROGRESS NOTE ADULT - SUBJECTIVE AND OBJECTIVE BOX
Vascular Surgery Daily Progress Note  =====================================================    SUBJECTIVE: Patient seen and examined at bedside on AM rounds. Patient reports that they're feeling well.     PMH:   AVF on 10/29    ALLERGIES:  sulfa drugs (Rash)      --------------------------------------------------------------------------------------    MEDICATIONS:    Neurologic Medications  acetaminophen     Tablet .. 650 milliGRAM(s) Oral every 6 hours  melatonin 3 milliGRAM(s) Oral at bedtime PRN Insomnia  traMADol 25 milliGRAM(s) Oral every 6 hours PRN Severe Pain (7 - 10)    Respiratory Medications  benzonatate 100 milliGRAM(s) Oral three times a day  guaiFENesin Oral Liquid (Sugar-Free) 100 milliGRAM(s) Oral every 6 hours PRN Cough  hydrocodone/homatropine Syrup 5 milliLiter(s) Oral every 6 hours PRN Cough    Cardiovascular Medications  amLODIPine   Tablet 10 milliGRAM(s) Oral daily  metoprolol tartrate 25 milliGRAM(s) Oral two times a day    Gastrointestinal Medications  bisacodyl 5 milliGRAM(s) Oral every 12 hours PRN Constipation  calcium acetate 667 milliGRAM(s) Oral three times a day with meals  pantoprazole    Tablet 40 milliGRAM(s) Oral before breakfast  polyethylene glycol 3350 17 Gram(s) Oral two times a day  senna 2 Tablet(s) Oral at bedtime  sodium chloride 0.9% lock flush 3 milliLiter(s) IV Push every 8 hours    Genitourinary Medications    Hematologic/Oncologic Medications  raloxifene 60 milliGRAM(s) Oral daily    Antimicrobial/Immunologic Medications    Endocrine/Metabolic Medications  atorvastatin 80 milliGRAM(s) Oral at bedtime  insulin lispro (ADMELOG) corrective regimen sliding scale   SubCutaneous three times a day before meals  insulin lispro (ADMELOG) corrective regimen sliding scale   SubCutaneous at bedtime    Topical/Other Medications  benzocaine 15 mG/menthol 3.6 mG Lozenge 2 Lozenge Oral every 6 hours PRN Sore Throat  chlorhexidine 2% Cloths 1 Application(s) Topical <User Schedule>  chlorhexidine 4% Liquid 1 Application(s) Topical <User Schedule>  lidocaine   4% Patch 2 Patch Transdermal daily    --------------------------------------------------------------------------------------    VITAL SIGNS:  Vital Signs Last 24 Hrs  T(C): 37.1 (30 Oct 2022 10:46), Max: 37.6 (30 Oct 2022 03:43)  T(F): 98.7 (30 Oct 2022 10:46), Max: 99.7 (30 Oct 2022 03:43)  HR: 99 (30 Oct 2022 10:46) (99 - 115)  BP: 157/80 (30 Oct 2022 10:46) (137/68 - 168/70)  BP(mean): --  ABP: --  ABP(mean): --  RR: 18 (30 Oct 2022 10:46) (17 - 18)  SpO2: 97% (30 Oct 2022 10:46) (95% - 97%)    O2 Parameters below as of 30 Oct 2022 10:46  Patient On (Oxygen Delivery Method): nasal cannula  O2 Flow (L/min): 2    --------------------------------------------------------------------------------------    EXAM    General: NAD, resting in bed comfortably.  Extremities: normal strength, LUE with dressing, cdi    --------------------------------------------------------------------------------------    LABS                          8.8    8.70  )-----------( 179      ( 29 Oct 2022 02:40 )             27.9   10-29    139  |  101  |  36<H>  ----------------------------<  107<H>  3.8   |  27  |  2.84<H>    Ca    8.3<L>      29 Oct 2022 02:40  Phos  3.3     10-29  Mg     1.7     10-29  PT/INR - ( 29 Oct 2022 02:40 )   PT: 15.2 sec;   INR: 1.32 ratio         PTT - ( 29 Oct 2022 02:40 )  PTT:27.9 sec    --------------------------------------------------------------------------------------    INS AND OUTS:    I&O's Detail    29 Oct 2022 07:01  -  30 Oct 2022 07:00  --------------------------------------------------------  IN:    Oral Fluid: 240 mL  Total IN: 240 mL    OUT:    Other (mL): 0 mL  Total OUT: 0 mL    Total NET: 240 mL      30 Oct 2022 07:01  -  30 Oct 2022 16:54  --------------------------------------------------------  IN:    Oral Fluid: 240 mL  Total IN: 240 mL    OUT:    Intermittent Catheterization - Urethral (mL): 900 mL  Total OUT: 900 mL    Total NET: -660 mL    --------------------------------------------------------------------------------------

## 2022-10-30 NOTE — PROGRESS NOTE ADULT - SUBJECTIVE AND OBJECTIVE BOX
DATE OF SERVICE: 10-30-22 @ 11:03  Patient was seen and examined on    10-30-22 @ 11:03 .Interim events noted.Consultant notes ,Labs,Telemetry reviewed by me       HOSPITAL COURSE: HPI:  72F HTN, HLD, DM, CKD who presented to [facility] initially with MAR on CKD with acute uremia and metabolic derangements requiring urgent HD and was COVID-19 positive. During HD went into atrial fibrillation and started on HD, subsequently developing GIB thought 2/2 AC and acute uremia. Also on amiodarone for Afib. On TTE noted to have a moderate to large pericardial effusion with early echocardiographic evidence of tamponade. Clinically she is not hypotensive and she tolerates the fluid shifts related to HD. Patient today was transferred to [facility] CTS Dr. Gabriel for evaluation of Pericardial Effusion.          INTERIM EVENTS:Patient seen at bedside ,interim events noted.Awake alert less dyspneac       PMH -reviewed admission note, no change since admission  HEART FAILURE: Acute[ ]Chronic[ ] Systolic[ ] Diastolic[ ] Combined Systolic and Diastolic[ ]  CAD[ ] CABG[ ] PCI[ ]  DEVICES[ ] PPM[ ] ICD[ ] ILR[ ]  ATRIAL FIBRILLATION[ ] Paroxysmal[ ] Permanent[ ] CHADS2-[  ]  MAR[ ] CKD1[ ] CKD2[ ] CKD3[ ] CKD4[ ] ESRD[ ]  COPD[ ] HTN[ ]   DM[ ] Type1[ ] Type 2[ ]   CVA[ ] Paresis[ ]    AMBULATION: Assisted[ ] Cane/walker[ ] Independent[ ]    MEDICATIONS  (STANDING):  acetaminophen     Tablet .. 650 milliGRAM(s) Oral every 6 hours  amLODIPine   Tablet 10 milliGRAM(s) Oral daily  atorvastatin 80 milliGRAM(s) Oral at bedtime  benzonatate 100 milliGRAM(s) Oral three times a day  calcium acetate 667 milliGRAM(s) Oral three times a day with meals  chlorhexidine 2% Cloths 1 Application(s) Topical <User Schedule>  chlorhexidine 4% Liquid 1 Application(s) Topical <User Schedule>  cyclobenzaprine 5 milliGRAM(s) Oral once  insulin lispro (ADMELOG) corrective regimen sliding scale   SubCutaneous three times a day before meals  insulin lispro (ADMELOG) corrective regimen sliding scale   SubCutaneous at bedtime  lidocaine   4% Patch 2 Patch Transdermal daily  metoprolol tartrate 25 milliGRAM(s) Oral two times a day  pantoprazole    Tablet 40 milliGRAM(s) Oral before breakfast  polyethylene glycol 3350 17 Gram(s) Oral two times a day  raloxifene 60 milliGRAM(s) Oral daily  senna 2 Tablet(s) Oral at bedtime  sodium chloride 0.9% lock flush 3 milliLiter(s) IV Push every 8 hours    MEDICATIONS  (PRN):  benzocaine 15 mG/menthol 3.6 mG Lozenge 2 Lozenge Oral every 6 hours PRN Sore Throat  bisacodyl 5 milliGRAM(s) Oral every 12 hours PRN Constipation  guaiFENesin Oral Liquid (Sugar-Free) 100 milliGRAM(s) Oral every 6 hours PRN Cough  hydrocodone/homatropine Syrup 5 milliLiter(s) Oral every 6 hours PRN Cough  melatonin 3 milliGRAM(s) Oral at bedtime PRN Insomnia  traMADol 25 milliGRAM(s) Oral every 6 hours PRN Severe Pain (7 - 10)            REVIEW OF SYSTEMS:  Constitutional: [ ] fever, [ ]weight loss,  [ ]fatigue [ ]weight gain  Eyes: [ ] visual changes  Respiratory: [ ]shortness of breath;  [ ] cough, [ ]wheezing, [ ]chills, [ ]hemoptysis  Cardiovascular: [ ] chest pain, [ ]palpitations, [ ]dizziness,  [ ]leg swelling[ ]orthopnea[ ]PND  Gastrointestinal: [ ] abdominal pain, [ ]nausea, [ ]vomiting,  [ ]diarrhea [ ]Constipation [ ]Melena  Genitourinary: [ ] dysuria, [ ] hematuria [ ]Bah  Neurologic: [ ] headaches [ ] tremors[ ]weakness [ ]Paralysis Right[ ] Left[ ]  Skin: [ ] itching, [ ]burning, [ ] rashes  Endocrine: [ ] heat or cold intolerance  Musculoskeletal: [ ] joint pain or swelling; [ ] muscle, back, or extremity pain  Psychiatric: [ ] depression, [ ]anxiety, [ ]mood swings, or [ ]difficulty sleeping  Hematologic: [ ] easy bruising, [ ] bleeding gums    [ ] All remaining systems negative except as per above.   [ ]Unable to obtain.  [x] No change in ROS since admission      Vital Signs Last 24 Hrs  T(C): 37.1 (30 Oct 2022 10:46), Max: 37.6 (30 Oct 2022 03:43)  T(F): 98.7 (30 Oct 2022 10:46), Max: 99.7 (30 Oct 2022 03:43)  HR: 99 (30 Oct 2022 10:46) (96 - 115)  BP: 157/80 (30 Oct 2022 10:46) (113/59 - 168/70)  BP(mean): 104 (29 Oct 2022 16:00) (80 - 104)  RR: 18 (30 Oct 2022 10:46) (16 - 28)  SpO2: 97% (30 Oct 2022 10:46) (92% - 100%)    Parameters below as of 30 Oct 2022 10:46  Patient On (Oxygen Delivery Method): nasal cannula  O2 Flow (L/min): 2    I&O's Summary    29 Oct 2022 07:01  -  30 Oct 2022 07:00  --------------------------------------------------------  IN: 240 mL / OUT: 0 mL / NET: 240 mL    30 Oct 2022 07:01  -  30 Oct 2022 11:03  --------------------------------------------------------  IN: 240 mL / OUT: 0 mL / NET: 240 mL        PHYSICAL EXAM:  General: No acute distress BMI-28  HEENT: EOMI, PERRL  Neck: Supple, [ ] JVD  Lungs: Equal air entry bilaterally; [ ] rales [ ] wheezing [ ] rhonchi  Heart: Regular rate and rhythm; [x ] murmur   2/6 [ x] systolic [ ] diastolic [ ] radiation[ ] rubs [ ]  gallops  Abdomen: Nontender, bowel sounds present  Extremities: No clubbing, cyanosis, [ ] edema [ ]Pulses  equal and intact  Nervous system:  Alert & Oriented X3, no focal deficits  Psychiatric: Normal affect  Skin: No rashes or lesions    LABS:  10-29    139  |  101  |  36<H>  ----------------------------<  107<H>  3.8   |  27  |  2.84<H>    Ca    8.3<L>      29 Oct 2022 02:40  Phos  3.3     10-29  Mg     1.7     10-29      Creatinine Trend: 2.84<--, 2.52<--, 2.75<--, 2.49<--, 3.24<--, 3.27<--                        8.8    8.70  )-----------( 179      ( 29 Oct 2022 02:40 )             27.9     PT/INR - ( 29 Oct 2022 02:40 )   PT: 15.2 sec;   INR: 1.32 ratio         PTT - ( 29 Oct 2022 02:40 )  PTT:27.9 sec       TTE with Doppler (w/Cont) (10.28.22 @ 09:08) >  Study Date: 10/28/2022Conclusions:  1. Mitral annular calcification, otherwise normal mitral valve. Minimal mitral regurgitation.  2. Calcified trileaflet aortic valve with decreased opening. No aortic valve regurgitation seen.  3. Endocardial visualization enhanced with intravenous injection of Ultrasonic Enhancing Agent (Definity).    Normal left ventricular systolic function. No left ventricular thrombus.EF 63%  4. Mild diastolic dysfunction (Stage I).  5. Normal right ventricular size and function.  6. Normal pericardium with trace pericardial effusion.  *** Compared with echocardiogram of 10/25/2022, no significant changes noted.

## 2022-10-30 NOTE — PROGRESS NOTE ADULT - PROBLEM SELECTOR PLAN 1
Developed in PACU after OR. Possibly 2/2 fluid overload as pt given IVF intraop.   - s/p BIPA, now on 3L NC  - Wean O2 as tolerated  - Incentive spirometry  - HD as per nephro

## 2022-10-30 NOTE — PROGRESS NOTE ADULT - PROBLEM SELECTOR PLAN 3
Presented to [facility] VS MAR and CKD with uremia, initiated on urgent HD. New ESRD  -s/p permacath by IR 10/26  -phoslo TID  -s/p fistula placement 10/29  - HD as per nephro  -renally dose and avoid nephrotoxic agents

## 2022-10-30 NOTE — PROGRESS NOTE ADULT - ATTENDING COMMENTS
s/p right RCF, doing well. hand sensory motor intact  Please have the patient follow up with me upon discharge.   [phone]

## 2022-10-30 NOTE — PROGRESS NOTE ADULT - ASSESSMENT
A: 72F HTN, HLD, DM, CKD who presented to [facility] initially with MAR on CKD with acute uremia and metabolic derangements requiring urgent HD and was COVID-19 positive. Pt has leukocytosis and asymptomatic bacteriuria. Shiley was removed 10/27 and patient is now s/p AVF on 10/19    Plan:  Patient is healing well. No issues with graft site. Vascular surgery will sign off at this time. Patient can follow up with Dr. Galeano outpatient. Please reconsult with any future needs.     Plan discussed with fellow Dr. Tran    Vascular Surgery  [id number]

## 2022-10-30 NOTE — PROGRESS NOTE ADULT - PROBLEM SELECTOR PLAN 7
in the setting of anticoagulation and uremia.   Per GI, no interventions planned  Now resolved  Monitor H/H daily

## 2022-10-30 NOTE — PROVIDER CONTACT NOTE (OTHER) - RECOMMENDATIONS
Discontinue covid isolation. Continue to monitor
Ok to distribute. . Please advise patient and/or person on HIPPA list that they need to call 5-7 days before running out of medication for refill with answers to the 4 A's. We need this time and information to process the refill and if not followed could result in a delay in their medication.
insert ritter cath

## 2022-10-30 NOTE — PROGRESS NOTE ADULT - SUBJECTIVE AND OBJECTIVE BOX
Kiran Horn MD  Division of Hospital Medicine  Available via MS teams  If no response or off hours page: [phone]  ---------------------------------------------------------    RILEY MEJÍA  72y  Female      Patient is a 72y old  Female who presents with a chief complaint of Pericardial Eff (30 Oct 2022 11:02)      INTERVAL HPI/OVERNIGHT EVENTS:  Seen at bedside. In PACU after fistula creation, pt became SOB, was placed on BIPAP and subsequently went to HD with improvement in symptoms. Complaining of low back pain this am      REVIEW OF SYSTEMS: 10 point ROS negative unless listed above    T(C): 37.1 (10-30-22 @ 10:46), Max: 37.6 (10-30-22 @ 03:43)  HR: 99 (10-30-22 @ 10:46) (96 - 115)  BP: 157/80 (10-30-22 @ 10:46) (113/59 - 168/70)  RR: 18 (10-30-22 @ 10:46) (16 - 28)  SpO2: 97% (10-30-22 @ 10:46) (95% - 100%)  Wt(kg): --Vital Signs Last 24 Hrs  T(C): 37.1 (30 Oct 2022 10:46), Max: 37.6 (30 Oct 2022 03:43)  T(F): 98.7 (30 Oct 2022 10:46), Max: 99.7 (30 Oct 2022 03:43)  HR: 99 (30 Oct 2022 10:46) (96 - 115)  BP: 157/80 (30 Oct 2022 10:46) (113/59 - 168/70)  BP(mean): 104 (29 Oct 2022 16:00) (82 - 104)  RR: 18 (30 Oct 2022 10:46) (16 - 28)  SpO2: 97% (30 Oct 2022 10:46) (95% - 100%)    Parameters below as of 30 Oct 2022 10:46  Patient On (Oxygen Delivery Method): nasal cannula  O2 Flow (L/min): 2      PHYSICAL EXAM:  GENERAL: NAD, well-groomed, well-developed  HEAD:  Atraumatic, Normocephalic  EYES: EOMI, PERRLA, conjunctiva and sclera clear  ENMT: No tonsillar erythema, exudates; Moist mucous membranes. No lesions  NECK: Supple, No JVD  CHEST/LUNG: Clear to auscultation bilaterally; No rales, rhonchi, wheezing, or rubs  HEART: Regular rate and rhythm; No murmurs, rubs, or gallops  ABDOMEN: Soft, Nontender, Nondistended; Bowel sounds present.    EXTREMITIES:  2+ Peripheral Pulses, No clubbing, cyanosis, or edema  SKIN: No rashes or lesions  PSYCH: Alert & Oriented x3  NERVOUS SYSTEM: Motor Strength 5/5 B/L upper and lower extremities.  Sensory intact    Consultant(s) Notes Reviewed:  [x ] YES  [ ] NO  Care Discussed with Consultants/Other Providers [ x] YES  [ ] NO    LABS:                        8.8    8.70  )-----------( 179      ( 29 Oct 2022 02:40 )             27.9     10-29    139  |  101  |  36<H>  ----------------------------<  107<H>  3.8   |  27  |  2.84<H>    Ca    8.3<L>      29 Oct 2022 02:40  Phos  3.3     10-29  Mg     1.7     10-29      PT/INR - ( 29 Oct 2022 02:40 )   PT: 15.2 sec;   INR: 1.32 ratio         PTT - ( 29 Oct 2022 02:40 )  PTT:27.9 sec    CAPILLARY BLOOD GLUCOSE      POCT Blood Glucose.: 130 mg/dL (30 Oct 2022 12:01)  POCT Blood Glucose.: 126 mg/dL (30 Oct 2022 08:11)  POCT Blood Glucose.: 158 mg/dL (29 Oct 2022 21:30)  POCT Blood Glucose.: 134 mg/dL (29 Oct 2022 17:10)  POCT Blood Glucose.: 91 mg/dL (29 Oct 2022 16:31)            RADIOLOGY & ADDITIONAL TESTS:    Imaging Personally Reviewed:  [ ] YES  [ ] NO

## 2022-10-30 NOTE — PROGRESS NOTE ADULT - ASSESSMENT
72F HTN, HLD, DM, CKD presented to [facility] initially with MAR on CKD with acute uremia and metabolic derangements requiring ICU stay, urgent HD and COVID-19 positive, complicated with Afib with GIB 2/2 AC and acute uremia. Transferred to [facility] CTS for large pericardial effusion and developed cardiac tamponade requiring urgent percardiocentesis 10/20 s/p 700cc renetta blood, monitored in CTU. Downgraded to medicine for further management

## 2022-10-31 LAB
ANION GAP SERPL CALC-SCNC: 13 MMOL/L — SIGNIFICANT CHANGE UP (ref 5–17)
BUN SERPL-MCNC: 38 MG/DL — HIGH (ref 7–23)
CALCIUM SERPL-MCNC: 9.1 MG/DL — SIGNIFICANT CHANGE UP (ref 8.4–10.5)
CHLORIDE SERPL-SCNC: 99 MMOL/L — SIGNIFICANT CHANGE UP (ref 96–108)
CO2 SERPL-SCNC: 26 MMOL/L — SIGNIFICANT CHANGE UP (ref 22–31)
CREAT SERPL-MCNC: 2.91 MG/DL — HIGH (ref 0.5–1.3)
EGFR: 17 ML/MIN/1.73M2 — LOW
GLUCOSE BLDC GLUCOMTR-MCNC: 105 MG/DL — HIGH (ref 70–99)
GLUCOSE BLDC GLUCOMTR-MCNC: 109 MG/DL — HIGH (ref 70–99)
GLUCOSE BLDC GLUCOMTR-MCNC: 141 MG/DL — HIGH (ref 70–99)
GLUCOSE BLDC GLUCOMTR-MCNC: 147 MG/DL — HIGH (ref 70–99)
GLUCOSE SERPL-MCNC: 108 MG/DL — HIGH (ref 70–99)
HAV IGM SER-ACNC: SIGNIFICANT CHANGE UP
HBV CORE AB SER-ACNC: SIGNIFICANT CHANGE UP
HBV CORE IGM SER-ACNC: SIGNIFICANT CHANGE UP
HBV SURFACE AB SER-ACNC: 3.5 MIU/ML — LOW
HBV SURFACE AB SER-ACNC: SIGNIFICANT CHANGE UP
HCT VFR BLD CALC: 31 % — LOW (ref 34.5–45)
HCV AB S/CO SERPL IA: 0.07 S/CO — SIGNIFICANT CHANGE UP (ref 0–0.99)
HCV AB SERPL-IMP: SIGNIFICANT CHANGE UP
HGB BLD-MCNC: 9.1 G/DL — LOW (ref 11.5–15.5)
MAGNESIUM SERPL-MCNC: 1.8 MG/DL — SIGNIFICANT CHANGE UP (ref 1.6–2.6)
MCHC RBC-ENTMCNC: 29.2 PG — SIGNIFICANT CHANGE UP (ref 27–34)
MCHC RBC-ENTMCNC: 29.4 GM/DL — LOW (ref 32–36)
MCV RBC AUTO: 99.4 FL — SIGNIFICANT CHANGE UP (ref 80–100)
NRBC # BLD: 0 /100 WBCS — SIGNIFICANT CHANGE UP (ref 0–0)
PLATELET # BLD AUTO: 216 K/UL — SIGNIFICANT CHANGE UP (ref 150–400)
POTASSIUM SERPL-MCNC: 4.5 MMOL/L — SIGNIFICANT CHANGE UP (ref 3.5–5.3)
POTASSIUM SERPL-SCNC: 4.5 MMOL/L — SIGNIFICANT CHANGE UP (ref 3.5–5.3)
RBC # BLD: 3.12 M/UL — LOW (ref 3.8–5.2)
RBC # FLD: 16.3 % — HIGH (ref 10.3–14.5)
SODIUM SERPL-SCNC: 138 MMOL/L — SIGNIFICANT CHANGE UP (ref 135–145)
VIRUS SPEC CULT: SIGNIFICANT CHANGE UP
WBC # BLD: 8.24 K/UL — SIGNIFICANT CHANGE UP (ref 3.8–10.5)
WBC # FLD AUTO: 8.24 K/UL — SIGNIFICANT CHANGE UP (ref 3.8–10.5)

## 2022-10-31 PROCEDURE — 99233 SBSQ HOSP IP/OBS HIGH 50: CPT

## 2022-10-31 PROCEDURE — 99232 SBSQ HOSP IP/OBS MODERATE 35: CPT

## 2022-10-31 RX ORDER — HEPARIN SODIUM 5000 [USP'U]/ML
1000 INJECTION INTRAVENOUS; SUBCUTANEOUS ONCE
Refills: 0 | Status: COMPLETED | OUTPATIENT
Start: 2022-10-31 | End: 2022-10-31

## 2022-10-31 RX ADMIN — Medication 25 MILLIGRAM(S): at 18:11

## 2022-10-31 RX ADMIN — SODIUM CHLORIDE 3 MILLILITER(S): 9 INJECTION INTRAMUSCULAR; INTRAVENOUS; SUBCUTANEOUS at 23:23

## 2022-10-31 RX ADMIN — RALOXIFENE HYDROCHLORIDE 60 MILLIGRAM(S): 60 TABLET, COATED ORAL at 13:08

## 2022-10-31 RX ADMIN — HEPARIN SODIUM 1000 UNIT(S): 5000 INJECTION INTRAVENOUS; SUBCUTANEOUS at 14:36

## 2022-10-31 RX ADMIN — PANTOPRAZOLE SODIUM 40 MILLIGRAM(S): 20 TABLET, DELAYED RELEASE ORAL at 05:22

## 2022-10-31 RX ADMIN — Medication 650 MILLIGRAM(S): at 05:24

## 2022-10-31 RX ADMIN — SODIUM CHLORIDE 3 MILLILITER(S): 9 INJECTION INTRAMUSCULAR; INTRAVENOUS; SUBCUTANEOUS at 13:15

## 2022-10-31 RX ADMIN — ATORVASTATIN CALCIUM 80 MILLIGRAM(S): 80 TABLET, FILM COATED ORAL at 22:29

## 2022-10-31 RX ADMIN — Medication 667 MILLIGRAM(S): at 08:29

## 2022-10-31 RX ADMIN — BENZOCAINE AND MENTHOL 2 LOZENGE: 5; 1 LIQUID ORAL at 05:27

## 2022-10-31 RX ADMIN — CHLORHEXIDINE GLUCONATE 1 APPLICATION(S): 213 SOLUTION TOPICAL at 05:28

## 2022-10-31 RX ADMIN — Medication 650 MILLIGRAM(S): at 14:00

## 2022-10-31 RX ADMIN — Medication 25 MILLIGRAM(S): at 05:23

## 2022-10-31 RX ADMIN — SODIUM CHLORIDE 3 MILLILITER(S): 9 INJECTION INTRAMUSCULAR; INTRAVENOUS; SUBCUTANEOUS at 06:43

## 2022-10-31 RX ADMIN — LIDOCAINE 2 PATCH: 4 CREAM TOPICAL at 15:30

## 2022-10-31 RX ADMIN — Medication 650 MILLIGRAM(S): at 06:42

## 2022-10-31 RX ADMIN — POLYETHYLENE GLYCOL 3350 17 GRAM(S): 17 POWDER, FOR SOLUTION ORAL at 05:25

## 2022-10-31 RX ADMIN — Medication 650 MILLIGRAM(S): at 13:06

## 2022-10-31 RX ADMIN — Medication 650 MILLIGRAM(S): at 23:50

## 2022-10-31 RX ADMIN — Medication 650 MILLIGRAM(S): at 18:50

## 2022-10-31 RX ADMIN — AMLODIPINE BESYLATE 10 MILLIGRAM(S): 2.5 TABLET ORAL at 05:22

## 2022-10-31 RX ADMIN — Medication 667 MILLIGRAM(S): at 18:11

## 2022-10-31 RX ADMIN — SENNA PLUS 2 TABLET(S): 8.6 TABLET ORAL at 22:30

## 2022-10-31 RX ADMIN — LIDOCAINE 2 PATCH: 4 CREAM TOPICAL at 01:45

## 2022-10-31 RX ADMIN — Medication 100 MILLIGRAM(S): at 13:19

## 2022-10-31 RX ADMIN — Medication 100 MILLIGRAM(S): at 22:30

## 2022-10-31 RX ADMIN — BENZOCAINE AND MENTHOL 2 LOZENGE: 5; 1 LIQUID ORAL at 13:09

## 2022-10-31 RX ADMIN — CHLORHEXIDINE GLUCONATE 1 APPLICATION(S): 213 SOLUTION TOPICAL at 05:27

## 2022-10-31 RX ADMIN — Medication 650 MILLIGRAM(S): at 18:11

## 2022-10-31 RX ADMIN — Medication 667 MILLIGRAM(S): at 13:07

## 2022-10-31 RX ADMIN — Medication 100 MILLIGRAM(S): at 05:22

## 2022-10-31 NOTE — PROGRESS NOTE ADULT - ASSESSMENT
72F HTN, HLD, DM, CKD presented to [facility] VS initially with MAR on CKD with acute uremia and metabolic derangements requiring ICU stay, urgent HD and COVID-19 positive, complicated with Afib with GIB 2/2 AC and acute uremia. Transferred to [facility] CTS for large pericardial effusion and developed cardiac tamponade requiring urgent percardiocentesis 10/20 s/p 700cc renetta blood, monitored in CTU. Downgraded to medicine for further management

## 2022-10-31 NOTE — PROGRESS NOTE ADULT - ASSESSMENT
72F w/ HTN, HLD, DM, CKD, 10/20/22 from OSH with uremia, COVID-19, and pericardial effusion    (1)Renal - newly ESRD-HD  (2)CTS - s/p pericardial drain placement and now removal   (3)CV - now hemodynamically stable      RECOMMEND:  (1)Next HD this am and to use the perm cath.  It has tpa infusing;  If this fails then will change the perm cath    (2)+ Perm cath and sp AVF    (3)Care coordination is working on outpt unit         Sayed [facility]   [phone]

## 2022-10-31 NOTE — PROGRESS NOTE ADULT - ASSESSMENT
72F HTN, HLD, DM, CKD who presented to [facility] initially with MAR on CKD with acute uremia and metabolic derangements requiring urgent HD and was COVID-19 positive. During HD went into atrial fibrillation and started on HD, subsequently developing GIB thought 2/2 AC and acute uremia.  On TTE noted to have a moderate to large pericardial effusion with early echocardiographic evidence of tamponade.  ON 10/20 had pericardiocentesis in the cath lab with 700 cc bloody fluid removed.  Had pericardial drain which was draining minimally, and removed 10/27.  Cardiology consulted for clearance for possible AVF for vascular surgery.  Due to bacteremia, permacath placed, shiley removed, and for AVF eventually.  NO follow up echo done yet post pericardial drain removal.     - Repeat echo with no significant re- accumulation of effusion.    - Tolerated AVF with no issues.    - Continue off of AC for now secondary to bleeding issues  - Sinus tachycardia has been reactive  - Continue metoprolol 25 bid  - Continue amlodipine 10 QD  - Continue statin drug  - Monitor and replete lytes  - No evidence of active ischemia, vol OL, or uncontrolled arrhythmia  - To follow closely while admitted

## 2022-10-31 NOTE — PROGRESS NOTE ADULT - PROBLEM SELECTOR PLAN 3
Presented to [facility] VS MAR and CKD with uremia, initiated on urgent HD. New ESRD  -s/p permacath by IR 10/26  -phoslo TID  -s/p fistula placement 10/29  - HD as per nephro  -renally dose and avoid nephrotoxic agents Presented to [facility] VS MAR and CKD with uremia, initiated on urgent HD. New ESRD  -s/p permacath by IR 10/26 - HD poor flow over weekend s/p TPA plan for repeat HD today.   -phoslo TID  -s/p fistula placement 10/29  - HD as per nephro  -renally dose and avoid nephrotoxic agents

## 2022-10-31 NOTE — PROGRESS NOTE ADULT - SUBJECTIVE AND OBJECTIVE BOX
NEPHROLOGY-[facility] [phone]        Patient seen and examined in bed.  She was the same         MEDICATIONS  (STANDING):  acetaminophen     Tablet .. 650 milliGRAM(s) Oral every 6 hours  amLODIPine   Tablet 10 milliGRAM(s) Oral daily  atorvastatin 80 milliGRAM(s) Oral at bedtime  benzonatate 100 milliGRAM(s) Oral three times a day  calcium acetate 667 milliGRAM(s) Oral three times a day with meals  chlorhexidine 2% Cloths 1 Application(s) Topical <User Schedule>  chlorhexidine 4% Liquid 1 Application(s) Topical <User Schedule>  insulin lispro (ADMELOG) corrective regimen sliding scale   SubCutaneous three times a day before meals  insulin lispro (ADMELOG) corrective regimen sliding scale   SubCutaneous at bedtime  lidocaine   4% Patch 2 Patch Transdermal daily  metoprolol tartrate 25 milliGRAM(s) Oral two times a day  pantoprazole    Tablet 40 milliGRAM(s) Oral before breakfast  polyethylene glycol 3350 17 Gram(s) Oral two times a day  raloxifene 60 milliGRAM(s) Oral daily  senna 2 Tablet(s) Oral at bedtime  sodium chloride 0.9% lock flush 3 milliLiter(s) IV Push every 8 hours      VITAL:  T(C): , Max: 37.1 (10-30-22 @ 10:46)  T(F): , Max: 98.7 (10-30-22 @ 10:46)  HR: 89 (10-31-22 @ 04:08)  BP: 148/84 (10-31-22 @ 04:08)  BP(mean): --  RR: 18 (10-31-22 @ 04:08)  SpO2: 95% (10-31-22 @ 04:08)  Wt(kg): --    I and O's:    10-30 @ 07:01  -  10-31 @ 07:00  --------------------------------------------------------  IN: 1020 mL / OUT: 1400 mL / NET: -380 mL          PHYSICAL EXAM:    Constitutional: NAD  Neck:  No JVD  Respiratory: CTAB/L  Cardiovascular: S1 and S2  Gastrointestinal: BS+, soft, NT/ND  Extremities: No peripheral edema  Neurological: A/O x 3, no focal deficits  Psychiatric: Normal mood, normal affect  : No Bah  Skin: No rashes  Access: avf and perm cath     LABS:                        9.1    8.24  )-----------( 216      ( 31 Oct 2022 06:19 )             31.0     10-31    138  |  99  |  38<H>  ----------------------------<  108<H>  4.5   |  26  |  2.91<H>    Ca    9.1      31 Oct 2022 06:18  Mg     1.8     10-31            Urine Studies:          RADIOLOGY & ADDITIONAL STUDIES:        < from: Xray Chest 1 View- PORTABLE-Urgent (Xray Chest 1 View- PORTABLE-Urgent .) (10.29.22 @ 14:58) >    ACC: [id number] EXAM:  XR CHEST PORTABLE URGENT 1V                          PROCEDURE DATE:  10/29/2022          INTERPRETATION:  Status post AVF placement    AP chest 10/29/2022 2:43 PM. Comparison earlier 10/29/2022 at 12:10 PM.    IMPRESSION: Right internal jugular line reidentified in position. Low   lung volumes. No change heart mediastinum. Marked decrease in the left   pleural effusion and left lung atelectasis with improved aeration left   lung. Small left pleural effusion and left basilar atelectasis persists.   No pneumothorax or other interval change    --- End of Report ---            JUNITO PALOMINO MD; Attending Radiologist  This document has been electronically signed. Oct 29 2022  3:15PM    < end of copied text >

## 2022-10-31 NOTE — PROGRESS NOTE ADULT - PROBLEM SELECTOR PLAN 10
DVT: heparin subq  Diet: Renal  Dispo: PT recommending [facility]. Pending HD seat DVT: heparin subq  Diet: Renal  Dispo: PT recommending [facility] but pt wants to go home with outpt HD.

## 2022-10-31 NOTE — PROGRESS NOTE ADULT - SUBJECTIVE AND OBJECTIVE BOX
[facility] - Lucio Madrid, Tyrell Garrison [place], [place]  Office Number:  [phone]    Patient resting comfortably in bed in NAD.  Laying flat with no respiratory distress.  No complaints of chest pain, dyspnea, palpitations, PND, or orthopnea.    F/U for:  Pericardial effusion      MEDICATIONS  (STANDING):  acetaminophen     Tablet .. 650 milliGRAM(s) Oral every 6 hours  amLODIPine   Tablet 10 milliGRAM(s) Oral daily  atorvastatin 80 milliGRAM(s) Oral at bedtime  benzonatate 100 milliGRAM(s) Oral three times a day  calcium acetate 667 milliGRAM(s) Oral three times a day with meals  chlorhexidine 2% Cloths 1 Application(s) Topical <User Schedule>  chlorhexidine 4% Liquid 1 Application(s) Topical <User Schedule>  insulin lispro (ADMELOG) corrective regimen sliding scale   SubCutaneous three times a day before meals  insulin lispro (ADMELOG) corrective regimen sliding scale   SubCutaneous at bedtime  lidocaine   4% Patch 2 Patch Transdermal daily  metoprolol tartrate 25 milliGRAM(s) Oral two times a day  pantoprazole    Tablet 40 milliGRAM(s) Oral before breakfast  polyethylene glycol 3350 17 Gram(s) Oral two times a day  raloxifene 60 milliGRAM(s) Oral daily  senna 2 Tablet(s) Oral at bedtime  sodium chloride 0.9% lock flush 3 milliLiter(s) IV Push every 8 hours    MEDICATIONS  (PRN):  benzocaine 15 mG/menthol 3.6 mG Lozenge 2 Lozenge Oral every 6 hours PRN Sore Throat  bisacodyl 5 milliGRAM(s) Oral every 12 hours PRN Constipation  guaiFENesin Oral Liquid (Sugar-Free) 100 milliGRAM(s) Oral every 6 hours PRN Cough  hydrocodone/homatropine Syrup 5 milliLiter(s) Oral every 6 hours PRN Cough  melatonin 3 milliGRAM(s) Oral at bedtime PRN Insomnia  traMADol 25 milliGRAM(s) Oral every 6 hours PRN Severe Pain (7 - 10)      Allergies    sulfa drugs (Rash)    Intolerances        Vital Signs Last 24 Hrs  T(C): 36.8 (31 Oct 2022 04:08), Max: 37.1 (30 Oct 2022 10:46)  T(F): 98.3 (31 Oct 2022 04:08), Max: 98.7 (30 Oct 2022 10:46)  HR: 89 (31 Oct 2022 04:08) (81 - 99)  BP: 148/84 (31 Oct 2022 04:08) (148/84 - 160/77)  BP(mean): --  RR: 18 (31 Oct 2022 04:08) (18 - 18)  SpO2: 95% (31 Oct 2022 04:08) (95% - 97%)    Parameters below as of 31 Oct 2022 04:08  Patient On (Oxygen Delivery Method): nasal cannula  O2 Flow (L/min): 2      I&O's Summary    30 Oct 2022 07:01  -  31 Oct 2022 07:00  --------------------------------------------------------  IN: 1020 mL / OUT: 1400 mL / NET: -380 mL        ON EXAM:    Constitutional: NAD, alert and oriented x 3  Lungs:  Non-labored, breath sounds are clear bilaterally, No wheezing, rales or rhonchi  Cardiovascular: RRR.  S1 and S2 positive.  No murmurs, rubs, gallops or clicks  Gastrointestinal: Bowel Sounds present, soft, nontender.   Lymph: No peripheral edema. No cervical lymphadenopathy.  Neurological: Alert, no focal deficits  Skin: No rashes or ulcers   Psych:  Mood & affect appropriate.      LABS: All Labs Reviewed:                        9.1    8.24  )-----------( 216      ( 31 Oct 2022 06:19 )             31.0                         8.8    8.70  )-----------( 179      ( 29 Oct 2022 02:40 )             27.9     31 Oct 2022 06:18    138    |  99     |  38     ----------------------------<  108    4.5     |  26     |  2.91   29 Oct 2022 02:40    139    |  101    |  36     ----------------------------<  107    3.8     |  27     |  2.84   28 Oct 2022 09:06    137    |  98     |  26     ----------------------------<  171    4.3     |  26     |  2.52     Ca    9.1        31 Oct 2022 06:18  Ca    8.3        29 Oct 2022 02:40  Ca    8.3        28 Oct 2022 09:06  Phos  3.3       29 Oct 2022 02:40  Phos  3.0       28 Oct 2022 09:06  Mg     1.8       31 Oct 2022 06:18  Mg     1.7       29 Oct 2022 02:40  Mg     1.5       28 Oct 2022 09:06

## 2022-10-31 NOTE — PROGRESS NOTE ADULT - PROBLEM SELECTOR PLAN 1
Developed in PACU after OR. Possibly 2/2 fluid overload as pt given IVF intraop.   - s/p BIPA, now on 3L NC  - Wean O2 as tolerated  - Incentive spirometry  - HD as per nephro Developed in PACU after OR. Possibly 2/2 fluid overload as pt given IVF intraop.   - s/p BIPAP, now on 3L NC  - Wean O2 as tolerated  - Incentive spirometry  - HD as per nephro

## 2022-10-31 NOTE — PROGRESS NOTE ADULT - SUBJECTIVE AND OBJECTIVE BOX
[facility] Division of Hospital Medicine  Barbara Anderson MD  Pager (M-F, 7B-5A): [phone]  Other Times:  [phone]    Patient is a 72y old  Female who presents with a chief complaint of Pericardial Eff (31 Oct 2022 10:11)      SUBJECTIVE / OVERNIGHT EVENTS:  c/o abd discomfort after breakfast this am. denies any significant dyspnea.     ADDITIONAL REVIEW OF SYSTEMS: otherwise neg     MEDICATIONS  (STANDING):  acetaminophen     Tablet .. 650 milliGRAM(s) Oral every 6 hours  amLODIPine   Tablet 10 milliGRAM(s) Oral daily  atorvastatin 80 milliGRAM(s) Oral at bedtime  benzonatate 100 milliGRAM(s) Oral three times a day  calcium acetate 667 milliGRAM(s) Oral three times a day with meals  chlorhexidine 2% Cloths 1 Application(s) Topical <User Schedule>  chlorhexidine 4% Liquid 1 Application(s) Topical <User Schedule>  insulin lispro (ADMELOG) corrective regimen sliding scale   SubCutaneous three times a day before meals  insulin lispro (ADMELOG) corrective regimen sliding scale   SubCutaneous at bedtime  lidocaine   4% Patch 2 Patch Transdermal daily  metoprolol tartrate 25 milliGRAM(s) Oral two times a day  pantoprazole    Tablet 40 milliGRAM(s) Oral before breakfast  polyethylene glycol 3350 17 Gram(s) Oral two times a day  raloxifene 60 milliGRAM(s) Oral daily  senna 2 Tablet(s) Oral at bedtime  sodium chloride 0.9% lock flush 3 milliLiter(s) IV Push every 8 hours    MEDICATIONS  (PRN):  benzocaine 15 mG/menthol 3.6 mG Lozenge 2 Lozenge Oral every 6 hours PRN Sore Throat  bisacodyl 5 milliGRAM(s) Oral every 12 hours PRN Constipation  guaiFENesin Oral Liquid (Sugar-Free) 100 milliGRAM(s) Oral every 6 hours PRN Cough  hydrocodone/homatropine Syrup 5 milliLiter(s) Oral every 6 hours PRN Cough  melatonin 3 milliGRAM(s) Oral at bedtime PRN Insomnia  traMADol 25 milliGRAM(s) Oral every 6 hours PRN Severe Pain (7 - 10)      CAPILLARY BLOOD GLUCOSE      POCT Blood Glucose.: 147 mg/dL (31 Oct 2022 11:45)  POCT Blood Glucose.: 109 mg/dL (31 Oct 2022 07:23)  POCT Blood Glucose.: 125 mg/dL (30 Oct 2022 21:40)  POCT Blood Glucose.: 147 mg/dL (30 Oct 2022 16:48)    I&O's Summary    30 Oct 2022 07:01  -  31 Oct 2022 07:00  --------------------------------------------------------  IN: 1020 mL / OUT: 1400 mL / NET: -380 mL    31 Oct 2022 07:01  -  31 Oct 2022 15:27  --------------------------------------------------------  IN: 0 mL / OUT: 900 mL / NET: -900 mL        PHYSICAL EXAM:  Vital Signs Last 24 Hrs  T(C): 36.4 (31 Oct 2022 15:06), Max: 36.9 (31 Oct 2022 11:08)  T(F): 97.5 (31 Oct 2022 15:06), Max: 98.5 (31 Oct 2022 11:08)  HR: 103 (31 Oct 2022 15:06) (81 - 103)  BP: 151/78 (31 Oct 2022 15:06) (144/82 - 160/77)  BP(mean): --  RR: 18 (31 Oct 2022 15:06) (18 - 18)  SpO2: 95% (31 Oct 2022 15:06) (95% - 100%)    Parameters below as of 31 Oct 2022 15:06  Patient On (Oxygen Delivery Method): nasal cannula  O2 Flow (L/min): 2      CONSTITUTIONAL: NAD, well-groomed  EYES:  conjunctiva and sclera clear  ENMT: Moist oral mucosa  NECK: Supple, no palpable masses; no JVD  RESPIRATORY: Normal respiratory effort; lungs are clear to auscultation bilaterally  CARDIOVASCULAR: Regular rate and rhythm, normal S1 and S2, no murmur/rub/gallop; No lower extremity edema  ABDOMEN: Nontender to palpation, normoactive bowel sounds, no rebound/guarding  MUSCULOSKELETAL:  no clubbing or cyanosis of digits; no joint swelling or tenderness to palpation  PSYCH: A+O to person, place, and time; affect appropriate  SKIN: No rashes; no palpable lesions    LABS:                        9.1    8.24  )-----------( 216      ( 31 Oct 2022 06:19 )             31.0     10-31    138  |  99  |  38<H>  ----------------------------<  108<H>  4.5   |  26  |  2.91<H>    Ca    9.1      31 Oct 2022 06:18  Mg     1.8     10-31                  RADIOLOGY & ADDITIONAL TESTS:  Results Reviewed:   Imaging Personally Reviewed:  Electrocardiogram Personally Reviewed:    COORDINATION OF CARE:  Care Discussed with Consultants/Other Providers [Y/N]:  Prior or Outpatient Records Reviewed [Y/N]:   [facility] Division of Hospital Medicine  Barbara Anderson MD  Pager (M-F, 2X-9I): [phone]  Other Times:  [phone]    Patient is a 72y old  Female who presents with a chief complaint of Pericardial Eff (31 Oct 2022 10:11)      SUBJECTIVE / OVERNIGHT EVENTS:  c/o abd discomfort after breakfast this am. denies any significant dyspnea.     ADDITIONAL REVIEW OF SYSTEMS: otherwise neg     MEDICATIONS  (STANDING):  acetaminophen     Tablet .. 650 milliGRAM(s) Oral every 6 hours  amLODIPine   Tablet 10 milliGRAM(s) Oral daily  atorvastatin 80 milliGRAM(s) Oral at bedtime  benzonatate 100 milliGRAM(s) Oral three times a day  calcium acetate 667 milliGRAM(s) Oral three times a day with meals  chlorhexidine 2% Cloths 1 Application(s) Topical <User Schedule>  chlorhexidine 4% Liquid 1 Application(s) Topical <User Schedule>  insulin lispro (ADMELOG) corrective regimen sliding scale   SubCutaneous three times a day before meals  insulin lispro (ADMELOG) corrective regimen sliding scale   SubCutaneous at bedtime  lidocaine   4% Patch 2 Patch Transdermal daily  metoprolol tartrate 25 milliGRAM(s) Oral two times a day  pantoprazole    Tablet 40 milliGRAM(s) Oral before breakfast  polyethylene glycol 3350 17 Gram(s) Oral two times a day  raloxifene 60 milliGRAM(s) Oral daily  senna 2 Tablet(s) Oral at bedtime  sodium chloride 0.9% lock flush 3 milliLiter(s) IV Push every 8 hours    MEDICATIONS  (PRN):  benzocaine 15 mG/menthol 3.6 mG Lozenge 2 Lozenge Oral every 6 hours PRN Sore Throat  bisacodyl 5 milliGRAM(s) Oral every 12 hours PRN Constipation  guaiFENesin Oral Liquid (Sugar-Free) 100 milliGRAM(s) Oral every 6 hours PRN Cough  hydrocodone/homatropine Syrup 5 milliLiter(s) Oral every 6 hours PRN Cough  melatonin 3 milliGRAM(s) Oral at bedtime PRN Insomnia  traMADol 25 milliGRAM(s) Oral every 6 hours PRN Severe Pain (7 - 10)      CAPILLARY BLOOD GLUCOSE      POCT Blood Glucose.: 147 mg/dL (31 Oct 2022 11:45)  POCT Blood Glucose.: 109 mg/dL (31 Oct 2022 07:23)  POCT Blood Glucose.: 125 mg/dL (30 Oct 2022 21:40)  POCT Blood Glucose.: 147 mg/dL (30 Oct 2022 16:48)    I&O's Summary    30 Oct 2022 07:01  -  31 Oct 2022 07:00  --------------------------------------------------------  IN: 1020 mL / OUT: 1400 mL / NET: -380 mL    31 Oct 2022 07:01  -  31 Oct 2022 15:27  --------------------------------------------------------  IN: 0 mL / OUT: 900 mL / NET: -900 mL        PHYSICAL EXAM:  Vital Signs Last 24 Hrs  T(C): 36.4 (31 Oct 2022 15:06), Max: 36.9 (31 Oct 2022 11:08)  T(F): 97.5 (31 Oct 2022 15:06), Max: 98.5 (31 Oct 2022 11:08)  HR: 103 (31 Oct 2022 15:06) (81 - 103)  BP: 151/78 (31 Oct 2022 15:06) (144/82 - 160/77)  BP(mean): --  RR: 18 (31 Oct 2022 15:06) (18 - 18)  SpO2: 95% (31 Oct 2022 15:06) (95% - 100%)    Parameters below as of 31 Oct 2022 15:06  Patient On (Oxygen Delivery Method): nasal cannula  O2 Flow (L/min): 2      CONSTITUTIONAL: NAD  EYES:  conjunctiva and sclera clear  ENMT: Moist oral mucosa  NECK: Supple, no palpable masses; no JVD  RESPIRATORY: Normal respiratory effort; lungs are clear to auscultation bilaterally  CARDIOVASCULAR: Regular rate and rhythm, normal S1 and S2, no murmur/rub/gallop; +lower extremity edema  ABDOMEN: Nontender to palpation, normoactive bowel sounds, no rebound/guarding  MUSCULOSKELETAL:  no clubbing or cyanosis of digits; no joint swelling or tenderness to palpation  PSYCH: A+O to person, place, and time; affect appropriate  SKIN: No rashes; no palpable lesions    LABS:                        9.1    8.24  )-----------( 216      ( 31 Oct 2022 06:19 )             31.0     10-31    138  |  99  |  38<H>  ----------------------------<  108<H>  4.5   |  26  |  2.91<H>    Ca    9.1      31 Oct 2022 06:18  Mg     1.8     10-31                  RADIOLOGY & ADDITIONAL TESTS:  Results Reviewed:   Imaging Personally Reviewed:  Electrocardiogram Personally Reviewed:    COORDINATION OF CARE:  Care Discussed with Consultants/Other Providers [Y/N]:  Prior or Outpatient Records Reviewed [Y/N]:

## 2022-11-01 LAB
GLUCOSE BLDC GLUCOMTR-MCNC: 115 MG/DL — HIGH (ref 70–99)
GLUCOSE BLDC GLUCOMTR-MCNC: 148 MG/DL — HIGH (ref 70–99)
GLUCOSE BLDC GLUCOMTR-MCNC: 158 MG/DL — HIGH (ref 70–99)
GLUCOSE BLDC GLUCOMTR-MCNC: 95 MG/DL — SIGNIFICANT CHANGE UP (ref 70–99)

## 2022-11-01 PROCEDURE — 99233 SBSQ HOSP IP/OBS HIGH 50: CPT

## 2022-11-01 PROCEDURE — 99232 SBSQ HOSP IP/OBS MODERATE 35: CPT

## 2022-11-01 RX ADMIN — Medication 100 MILLIGRAM(S): at 06:12

## 2022-11-01 RX ADMIN — Medication 650 MILLIGRAM(S): at 12:36

## 2022-11-01 RX ADMIN — PANTOPRAZOLE SODIUM 40 MILLIGRAM(S): 20 TABLET, DELAYED RELEASE ORAL at 06:12

## 2022-11-01 RX ADMIN — Medication 650 MILLIGRAM(S): at 06:12

## 2022-11-01 RX ADMIN — SENNA PLUS 2 TABLET(S): 8.6 TABLET ORAL at 21:04

## 2022-11-01 RX ADMIN — Medication 667 MILLIGRAM(S): at 12:06

## 2022-11-01 RX ADMIN — POLYETHYLENE GLYCOL 3350 17 GRAM(S): 17 POWDER, FOR SOLUTION ORAL at 06:13

## 2022-11-01 RX ADMIN — Medication 100 MILLIGRAM(S): at 14:00

## 2022-11-01 RX ADMIN — LIDOCAINE 2 PATCH: 4 CREAM TOPICAL at 04:04

## 2022-11-01 RX ADMIN — Medication 667 MILLIGRAM(S): at 08:58

## 2022-11-01 RX ADMIN — Medication 650 MILLIGRAM(S): at 23:15

## 2022-11-01 RX ADMIN — Medication 1: at 12:07

## 2022-11-01 RX ADMIN — SODIUM CHLORIDE 3 MILLILITER(S): 9 INJECTION INTRAMUSCULAR; INTRAVENOUS; SUBCUTANEOUS at 06:20

## 2022-11-01 RX ADMIN — CHLORHEXIDINE GLUCONATE 1 APPLICATION(S): 213 SOLUTION TOPICAL at 06:14

## 2022-11-01 RX ADMIN — RALOXIFENE HYDROCHLORIDE 60 MILLIGRAM(S): 60 TABLET, COATED ORAL at 12:06

## 2022-11-01 RX ADMIN — SODIUM CHLORIDE 3 MILLILITER(S): 9 INJECTION INTRAMUSCULAR; INTRAVENOUS; SUBCUTANEOUS at 21:28

## 2022-11-01 RX ADMIN — LIDOCAINE 2 PATCH: 4 CREAM TOPICAL at 23:25

## 2022-11-01 RX ADMIN — Medication 650 MILLIGRAM(S): at 17:38

## 2022-11-01 RX ADMIN — ATORVASTATIN CALCIUM 80 MILLIGRAM(S): 80 TABLET, FILM COATED ORAL at 21:03

## 2022-11-01 RX ADMIN — Medication 25 MILLIGRAM(S): at 17:08

## 2022-11-01 RX ADMIN — AMLODIPINE BESYLATE 10 MILLIGRAM(S): 2.5 TABLET ORAL at 06:14

## 2022-11-01 RX ADMIN — Medication 650 MILLIGRAM(S): at 23:43

## 2022-11-01 RX ADMIN — Medication 650 MILLIGRAM(S): at 12:06

## 2022-11-01 RX ADMIN — Medication 650 MILLIGRAM(S): at 06:45

## 2022-11-01 RX ADMIN — Medication 650 MILLIGRAM(S): at 17:08

## 2022-11-01 RX ADMIN — LIDOCAINE 2 PATCH: 4 CREAM TOPICAL at 12:07

## 2022-11-01 RX ADMIN — Medication 25 MILLIGRAM(S): at 06:13

## 2022-11-01 RX ADMIN — Medication 650 MILLIGRAM(S): at 00:00

## 2022-11-01 RX ADMIN — Medication 100 MILLIGRAM(S): at 21:04

## 2022-11-01 RX ADMIN — LIDOCAINE 2 PATCH: 4 CREAM TOPICAL at 19:25

## 2022-11-01 RX ADMIN — SODIUM CHLORIDE 3 MILLILITER(S): 9 INJECTION INTRAMUSCULAR; INTRAVENOUS; SUBCUTANEOUS at 16:28

## 2022-11-01 RX ADMIN — CHLORHEXIDINE GLUCONATE 1 APPLICATION(S): 213 SOLUTION TOPICAL at 06:13

## 2022-11-01 RX ADMIN — Medication 667 MILLIGRAM(S): at 17:08

## 2022-11-01 NOTE — PROGRESS NOTE ADULT - SUBJECTIVE AND OBJECTIVE BOX
NEPHROLOGY-[facility] [phone]        Patient seen and examined in bed.  SHe was the same         MEDICATIONS  (STANDING):  acetaminophen     Tablet .. 650 milliGRAM(s) Oral every 6 hours  amLODIPine   Tablet 10 milliGRAM(s) Oral daily  atorvastatin 80 milliGRAM(s) Oral at bedtime  benzonatate 100 milliGRAM(s) Oral three times a day  calcium acetate 667 milliGRAM(s) Oral three times a day with meals  chlorhexidine 2% Cloths 1 Application(s) Topical <User Schedule>  chlorhexidine 4% Liquid 1 Application(s) Topical <User Schedule>  insulin lispro (ADMELOG) corrective regimen sliding scale   SubCutaneous three times a day before meals  insulin lispro (ADMELOG) corrective regimen sliding scale   SubCutaneous at bedtime  lidocaine   4% Patch 2 Patch Transdermal daily  metoprolol tartrate 25 milliGRAM(s) Oral two times a day  pantoprazole    Tablet 40 milliGRAM(s) Oral before breakfast  polyethylene glycol 3350 17 Gram(s) Oral two times a day  raloxifene 60 milliGRAM(s) Oral daily  senna 2 Tablet(s) Oral at bedtime  sodium chloride 0.9% lock flush 3 milliLiter(s) IV Push every 8 hours      VITAL:  T(C): , Max: 37.1 (10-31-22 @ 21:02)  T(F): , Max: 98.7 (10-31-22 @ 21:02)  HR: 89 (11-01-22 @ 06:11)  BP: 125/71 (11-01-22 @ 06:11)  BP(mean): --  RR: 20 (11-01-22 @ 04:15)  SpO2: 100% (11-01-22 @ 09:55)  Wt(kg): --    I and O's:    10-31 @ 07:01  -  11-01 @ 07:00  --------------------------------------------------------  IN: 480 mL / OUT: 1150 mL / NET: -670 mL          PHYSICAL EXAM:    Constitutional: NAD  Neck:  No JVD  Respiratory: CTAB/L  Cardiovascular: S1 and S2  Gastrointestinal: BS+, soft, NT/ND  Extremities: No peripheral edema  Neurological: A/O x 3, no focal deficits  Psychiatric: Normal mood, normal affect  : No Bah  Skin: No rashes  Access: avf and perm cath     LABS:                        9.1    8.24  )-----------( 216      ( 31 Oct 2022 06:19 )             31.0     10-31    138  |  99  |  38<H>  ----------------------------<  108<H>  4.5   |  26  |  2.91<H>    Ca    9.1      31 Oct 2022 06:18  Mg     1.8     10-31            Urine Studies:          RADIOLOGY & ADDITIONAL STUDIES:

## 2022-11-01 NOTE — PROGRESS NOTE ADULT - ASSESSMENT
72F w/ HTN, HLD, DM, CKD, 10/20/22 from OSH with uremia, COVID-19, and pericardial effusion    (1)Renal - newly ESRD-HD  (2)CTS - s/p pericardial drain placement and now removal   (3)CV - now hemodynamically stable      RECOMMEND:  (1)Next HD in am  and the perm cath is functional.    (2)+ Perm cath and sp AVF and she will need outpt follow up with vasc  (3)Care coordination is working on outpt unit     Pt is refusing rehab and quite weak     Sayed [facility]   [phone]

## 2022-11-01 NOTE — PROGRESS NOTE ADULT - PROBLEM SELECTOR PLAN 3
Presented to [facility] VS MAR and CKD with uremia, initiated on urgent HD. New ESRD  -s/p permacath by IR 10/26 - HD poor flow over weekend s/p TPA now functional   -phoslo TID  -s/p fistula placement 10/29  - HD as per nephro  -renally dose and avoid nephrotoxic agents

## 2022-11-01 NOTE — PROGRESS NOTE ADULT - PROBLEM SELECTOR PLAN 1
Developed in PACU after OR. Possibly 2/2 fluid overload as pt given IVF intraop.   - s/p BIPAP, now on 2-3L NC  - Wean O2 as tolerated  - Incentive spirometry  - HD as per nephro

## 2022-11-01 NOTE — PROGRESS NOTE ADULT - SUBJECTIVE AND OBJECTIVE BOX
[facility] Division of Hospital Medicine  Barbara Anderson MD  Pager (M-F, 0S-1W): [phone]  Other Times:  [phone]    Patient is a 72y old  Female who presents with a chief complaint of Pericardial Eff (01 Nov 2022 10:14)      SUBJECTIVE / OVERNIGHT EVENTS:  feeling beter today. still very weak difficulty getting up. afebrile.   no acute overnight issues    ADDITIONAL REVIEW OF SYSTEMS: otherwise neg    MEDICATIONS  (STANDING):  acetaminophen     Tablet .. 650 milliGRAM(s) Oral every 6 hours  amLODIPine   Tablet 10 milliGRAM(s) Oral daily  atorvastatin 80 milliGRAM(s) Oral at bedtime  benzonatate 100 milliGRAM(s) Oral three times a day  calcium acetate 667 milliGRAM(s) Oral three times a day with meals  chlorhexidine 2% Cloths 1 Application(s) Topical <User Schedule>  chlorhexidine 4% Liquid 1 Application(s) Topical <User Schedule>  insulin lispro (ADMELOG) corrective regimen sliding scale   SubCutaneous three times a day before meals  insulin lispro (ADMELOG) corrective regimen sliding scale   SubCutaneous at bedtime  lidocaine   4% Patch 2 Patch Transdermal daily  metoprolol tartrate 25 milliGRAM(s) Oral two times a day  pantoprazole    Tablet 40 milliGRAM(s) Oral before breakfast  polyethylene glycol 3350 17 Gram(s) Oral two times a day  raloxifene 60 milliGRAM(s) Oral daily  senna 2 Tablet(s) Oral at bedtime  sodium chloride 0.9% lock flush 3 milliLiter(s) IV Push every 8 hours    MEDICATIONS  (PRN):  benzocaine 15 mG/menthol 3.6 mG Lozenge 2 Lozenge Oral every 6 hours PRN Sore Throat  bisacodyl 5 milliGRAM(s) Oral every 12 hours PRN Constipation  guaiFENesin Oral Liquid (Sugar-Free) 100 milliGRAM(s) Oral every 6 hours PRN Cough  melatonin 3 milliGRAM(s) Oral at bedtime PRN Insomnia  traMADol 25 milliGRAM(s) Oral every 6 hours PRN Severe Pain (7 - 10)      CAPILLARY BLOOD GLUCOSE      POCT Blood Glucose.: 158 mg/dL (01 Nov 2022 11:58)  POCT Blood Glucose.: 115 mg/dL (01 Nov 2022 07:50)  POCT Blood Glucose.: 141 mg/dL (31 Oct 2022 21:36)  POCT Blood Glucose.: 105 mg/dL (31 Oct 2022 16:39)    I&O's Summary    31 Oct 2022 07:01  -  01 Nov 2022 07:00  --------------------------------------------------------  IN: 480 mL / OUT: 1150 mL / NET: -670 mL    01 Nov 2022 07:01  -  01 Nov 2022 13:34  --------------------------------------------------------  IN: 0 mL / OUT: 400 mL / NET: -400 mL        PHYSICAL EXAM:  Vital Signs Last 24 Hrs  T(C): 36.7 (01 Nov 2022 11:00), Max: 37.1 (31 Oct 2022 21:02)  T(F): 98.1 (01 Nov 2022 11:00), Max: 98.7 (31 Oct 2022 21:02)  HR: 68 (01 Nov 2022 11:00) (68 - 103)  BP: 153/78 (01 Nov 2022 11:00) (120/57 - 153/78)  BP(mean): --  RR: 18 (01 Nov 2022 11:00) (18 - 20)  SpO2: 100% (01 Nov 2022 11:00) (95% - 100%)    Parameters below as of 01 Nov 2022 11:00  Patient On (Oxygen Delivery Method): nasal cannula  O2 Flow (L/min): 1    CONSTITUTIONAL: NAD  EYES:  conjunctiva and sclera clear  ENMT: Moist oral mucosa  NECK: Supple, no palpable masses; no JVD  RESPIRATORY: Normal respiratory effort; lungs are clear to auscultation bilaterally  CARDIOVASCULAR: Regular rate and rhythm, normal S1 and S2, no murmur/rub/gallop; +lower extremity edema  ABDOMEN: Nontender to palpation, normoactive bowel sounds, no rebound/guarding  MUSCULOSKELETAL:  no clubbing or cyanosis of digits; no joint swelling or tenderness to palpation  PSYCH: A+O to person, place, and time; affect appropriate  SKIN: No rashes; no palpable lesions    LABS:                        9.1    8.24  )-----------( 216      ( 31 Oct 2022 06:19 )             31.0     10-31    138  |  99  |  38<H>  ----------------------------<  108<H>  4.5   |  26  |  2.91<H>    Ca    9.1      31 Oct 2022 06:18  Mg     1.8     10-31                  RADIOLOGY & ADDITIONAL TESTS:  Results Reviewed:   Imaging Personally Reviewed:  Electrocardiogram Personally Reviewed:    COORDINATION OF CARE:  Care Discussed with Consultants/Other Providers [Y/N]:  Prior or Outpatient Records Reviewed [Y/N]:

## 2022-11-01 NOTE — PROGRESS NOTE ADULT - ASSESSMENT
72F HTN, HLD, DM, CKD who presented to [facility] initially with MAR on CKD with acute uremia and metabolic derangements requiring urgent HD and was COVID-19 positive. During HD went into atrial fibrillation and started on HD, subsequently developing GIB thought 2/2 AC and acute uremia.  On TTE noted to have a moderate to large pericardial effusion with early echocardiographic evidence of tamponade.  ON 10/20 had pericardiocentesis in the cath lab with 700 cc bloody fluid removed.  Had pericardial drain which was draining minimally, and removed 10/27.  Cardiology consulted for clearance for possible AVF for vascular surgery.  Due to bacteremia, permacath placed, shiley removed, and for AVF eventually.  NO follow up echo done yet post pericardial drain removal.     - Repeat echo with no significant re- accumulation of effusion.    - Tolerated AVF with no issues.    - Continue off of AC for now secondary to bleeding issues  - Sinus tachycardia has been reactive  - Continue metoprolol 25 bid  - Continue amlodipine 10 QD  - Continue statin drug  - Monitor and replete lytes  - No evidence of active ischemia, vol OL, or uncontrolled arrhythmia  - To follow closely while admitted

## 2022-11-01 NOTE — PROGRESS NOTE ADULT - ASSESSMENT
72F HTN, HLD, DM, CKD presented to [facility] VS initially with MAR on CKD with acute uremia and metabolic derangements requiring ICU stay, urgent HD and COVID-19 positive, complicated with Afib with GIB 2/2 AC and acute uremia. Transferred to [facility] CTS for large pericardial effusion and developed cardiac tamponade requiring urgent percardiocentesis 10/20 s/p 700cc renetta blood, monitored in CTU. Downgraded to medicine for further management

## 2022-11-01 NOTE — PROGRESS NOTE ADULT - SUBJECTIVE AND OBJECTIVE BOX
[facility]    Lucio Madrid, Bladimir, Autumn Santillan      [phone]    CHIEF COMPLAINT: Patient is a 72y old  Female who presents with a chief complaint of Pericardial Eff (31 Oct 2022 15:27)      Follow Up:  peric eff/tamponade    Interim history: The patient reports no new symptoms.  Denies chest discomfort and shortness of breath.  No abdominal pain.  No new neurologic symptoms.      MEDICATIONS  (STANDING):  acetaminophen     Tablet .. 650 milliGRAM(s) Oral every 6 hours  amLODIPine   Tablet 10 milliGRAM(s) Oral daily  atorvastatin 80 milliGRAM(s) Oral at bedtime  benzonatate 100 milliGRAM(s) Oral three times a day  calcium acetate 667 milliGRAM(s) Oral three times a day with meals  chlorhexidine 2% Cloths 1 Application(s) Topical <User Schedule>  chlorhexidine 4% Liquid 1 Application(s) Topical <User Schedule>  insulin lispro (ADMELOG) corrective regimen sliding scale   SubCutaneous three times a day before meals  insulin lispro (ADMELOG) corrective regimen sliding scale   SubCutaneous at bedtime  lidocaine   4% Patch 2 Patch Transdermal daily  metoprolol tartrate 25 milliGRAM(s) Oral two times a day  pantoprazole    Tablet 40 milliGRAM(s) Oral before breakfast  polyethylene glycol 3350 17 Gram(s) Oral two times a day  raloxifene 60 milliGRAM(s) Oral daily  senna 2 Tablet(s) Oral at bedtime  sodium chloride 0.9% lock flush 3 milliLiter(s) IV Push every 8 hours    MEDICATIONS  (PRN):  benzocaine 15 mG/menthol 3.6 mG Lozenge 2 Lozenge Oral every 6 hours PRN Sore Throat  bisacodyl 5 milliGRAM(s) Oral every 12 hours PRN Constipation  guaiFENesin Oral Liquid (Sugar-Free) 100 milliGRAM(s) Oral every 6 hours PRN Cough  melatonin 3 milliGRAM(s) Oral at bedtime PRN Insomnia  traMADol 25 milliGRAM(s) Oral every 6 hours PRN Severe Pain (7 - 10)      REVIEW OF SYSTEMS:  eye, ent, GI, , allergic, dermatologic, musculoskeletal and neurologic are negative except as described above    Vital Signs Last 24 Hrs  T(C): 36.9 (01 Nov 2022 04:15), Max: 37.1 (31 Oct 2022 21:02)  T(F): 98.4 (01 Nov 2022 04:15), Max: 98.7 (31 Oct 2022 21:02)  HR: 89 (01 Nov 2022 06:11) (82 - 103)  BP: 125/71 (01 Nov 2022 06:11) (120/57 - 156/66)  BP(mean): --  RR: 20 (01 Nov 2022 04:15) (18 - 20)  SpO2: 100% (01 Nov 2022 09:55) (95% - 100%)    Parameters below as of 01 Nov 2022 09:55  Patient On (Oxygen Delivery Method): nasal cannula  O2 Flow (L/min): 2      I&O's Summary    31 Oct 2022 07:01  -  01 Nov 2022 07:00  --------------------------------------------------------  IN: 480 mL / OUT: 1150 mL / NET: -670 mL        Telemetry past 24h: sr    PHYSICAL EXAM:    Constitutional: well-nourished, well-developed, NAD   HEENT:  MMM, sclerae anicteric, conjunctivae clear, no oral cyanosis.  Pulmonary: Non-labored, breath sounds are clear bilaterally, No wheezing, rales or rhonchi  Cardiovascular: Regular, S1 and S2.  No murmur.  No rubs, gallops or clicks  Gastrointestinal: Bowel Sounds present, soft, nontender.   Lymph: No peripheral edema.   Neurological: Alert, no focal deficits  Skin: No rashes.  Psych:  Mood & affect appropriate    LABS: All Labs Reviewed:                        9.1    8.24  )-----------( 216      ( 31 Oct 2022 06:19 )             31.0     31 Oct 2022 06:18    138    |  99     |  38     ----------------------------<  108    4.5     |  26     |  2.91     Ca    9.1        31 Oct 2022 06:18  Mg     1.8       31 Oct 2022 06:18            Blood Culture:         RADIOLOGY:    EKG:    Echo:    < from: TTE with Doppler (w/Cont) (10.28.22 @ 09:08) >    Patient name: RILEY MEJÍA  YOB: 1950   Age: 72 (F)   MR#: [id number]  Study Date: 10/28/2022  Location: [id number]: [facility]  Study quality: Technically difficult  Referring Physician: Leonardo Horn MD  Blood Pressure: 100/66 mmHg  Height: 163 cm  Weight: 103 kg  BSA: 2.1 m2  Heart Rate: 102 mmHg  ------------------------------------------------------------------------  PROCEDURE: Transthoracic echocardiogram with 2-D, M-Mode  and complete spectral and color flow Doppler. Verbal  consent was obtained for injection of  Ultrasonic Enhancing  Agent following a discussion of risks and benefits.  Following intravenous injection of Ultrasonic Enhancing  Agent, harmonic imaging was performed.  INDICATION: Chest pain, unspecified (R07.9)  ------------------------------------------------------------------------  Dimensions:    Normal Values:  LA:     3.7    2.0 - 4.0 cm  Ao:     3.2    2.0 - 3.8 cm  SEPTUM: 1.3    0.6 - 1.2 cm  PWT:    0.8    0.6 -1.1 cm  LVIDd:  3.9    3.0 - 5.6 cm  LVIDs:  2.9    1.8 - 4.0 cm  Derived variables:  LVMI: 64 g/m2  RWT: 0.41  Fractional short: 26 %  EF (Modified Carcamo Rule): 63 %Doppler Peak Velocity  (m/sec): AoV=2.3  ------------------------------------------------------------------------  Observations:  Mitral Valve: Mitral annular calcification, otherwise  normal mitral valve. Minimal mitral regurgitation.  Aortic Valve/Aorta: Calcified trileaflet aortic valve with  decreased opening. Peak transaortic valve gradient equals  21 mm Hg, estimated aortic valve area equals 1.7 sqcm (by  continuity equation), consistent with mild aortic stenosis.  No aortic valve regurgitation seen. Peak left ventricular  outflow tract gradient equals 5 mm Hg, mean gradient is  equal to 3 mm Hg, LVOT velocity time integral equals 18 cm.  Aortic Root: 3.2 cm.  Left Atrium: Mildly dilated left atrium.  LA volume index =  37 cc/m2.  Left Ventricle: Endocardial visualization enhanced with  intravenous injection of Ultrasonic Enhancing Agent  (Definity).  Normal left ventricular systolic function. No  left ventricular thrombus. Normal left ventricular internal  dimensions and wall thicknesses. Mild diastolic dysfunction  (Stage I).  Right Heart: Normal right atrium. Normal right ventricular  size and function. Normal tricuspid valve. Minimal  tricuspid regurgitation. Normal pulmonic valve. No pulmonic  regurgitation.  Pericardium/Pleura: Normal pericardium with trace  pericardial effusion.  Hemodynamic: Estimated right atrial pressure is 8 mm Hg.  Color Doppler demonstrates no evidence of a patent foramen  ovale.  ------------------------------------------------------------------------  Conclusions:  1. Mitral annular calcification, otherwise normal mitral  valve. Minimal mitral regurgitation.  2. Calcified trileaflet aortic valve with decreased  opening. No aortic valve regurgitation seen.  3. Endocardial visualization enhanced with intravenous  injection of Ultrasonic Enhancing Agent (Definity).  Normal  left ventricular systolic function. No left ventricular  thrombus.  4. Mild diastolic dysfunction (Stage I).  5. Normal right ventricular size and function.  6. Normal pericardium with trace pericardial effusion.  *** Compared with echocardiogram of 10/25/2022, no  significant changes noted.  ------------------------------------------------------------------------  Confirmed on  10/28/2022 - 11:52:08 by Acosta Carty M.D.  ------------------------------------------------------------------------    < end of copied text >

## 2022-11-01 NOTE — PROGRESS NOTE ADULT - PROBLEM SELECTOR PLAN 10
DVT: heparin subq  Diet: Renal  Dispo: PT recommending [facility] but pt wants to go home with outpt HD.

## 2022-11-02 DIAGNOSIS — I47.29 OTHER VENTRICULAR TACHYCARDIA: ICD-10-CM

## 2022-11-02 LAB
ANION GAP SERPL CALC-SCNC: 14 MMOL/L — SIGNIFICANT CHANGE UP (ref 5–17)
BUN SERPL-MCNC: 40 MG/DL — HIGH (ref 7–23)
CALCIUM SERPL-MCNC: 8.5 MG/DL — SIGNIFICANT CHANGE UP (ref 8.4–10.5)
CHLORIDE SERPL-SCNC: 99 MMOL/L — SIGNIFICANT CHANGE UP (ref 96–108)
CK MB BLD-MCNC: 6.7 % — HIGH (ref 0–3.5)
CK MB CFR SERPL CALC: 1.8 NG/ML — SIGNIFICANT CHANGE UP (ref 0–3.8)
CK SERPL-CCNC: 27 U/L — SIGNIFICANT CHANGE UP (ref 25–170)
CO2 SERPL-SCNC: 26 MMOL/L — SIGNIFICANT CHANGE UP (ref 22–31)
CREAT SERPL-MCNC: 2.57 MG/DL — HIGH (ref 0.5–1.3)
EGFR: 19 ML/MIN/1.73M2 — LOW
GLUCOSE BLDC GLUCOMTR-MCNC: 117 MG/DL — HIGH (ref 70–99)
GLUCOSE BLDC GLUCOMTR-MCNC: 157 MG/DL — HIGH (ref 70–99)
GLUCOSE BLDC GLUCOMTR-MCNC: 96 MG/DL — SIGNIFICANT CHANGE UP (ref 70–99)
GLUCOSE SERPL-MCNC: 119 MG/DL — HIGH (ref 70–99)
MAGNESIUM SERPL-MCNC: 1.8 MG/DL — SIGNIFICANT CHANGE UP (ref 1.6–2.6)
PHOSPHATE SERPL-MCNC: 3.3 MG/DL — SIGNIFICANT CHANGE UP (ref 2.5–4.5)
POTASSIUM SERPL-MCNC: 3.9 MMOL/L — SIGNIFICANT CHANGE UP (ref 3.5–5.3)
POTASSIUM SERPL-SCNC: 3.9 MMOL/L — SIGNIFICANT CHANGE UP (ref 3.5–5.3)
SARS-COV-2 RNA SPEC QL NAA+PROBE: SIGNIFICANT CHANGE UP
SODIUM SERPL-SCNC: 139 MMOL/L — SIGNIFICANT CHANGE UP (ref 135–145)
TROPONIN T, HIGH SENSITIVITY RESULT: 98 NG/L — HIGH (ref 0–51)

## 2022-11-02 PROCEDURE — 99233 SBSQ HOSP IP/OBS HIGH 50: CPT

## 2022-11-02 PROCEDURE — 93454 CORONARY ARTERY ANGIO S&I: CPT | Mod: 26

## 2022-11-02 PROCEDURE — 93010 ELECTROCARDIOGRAM REPORT: CPT

## 2022-11-02 PROCEDURE — 99152 MOD SED SAME PHYS/QHP 5/>YRS: CPT

## 2022-11-02 RX ORDER — ERYTHROPOIETIN 10000 [IU]/ML
10000 INJECTION, SOLUTION INTRAVENOUS; SUBCUTANEOUS ONCE
Refills: 0 | Status: COMPLETED | OUTPATIENT
Start: 2022-11-02 | End: 2022-11-02

## 2022-11-02 RX ADMIN — Medication 650 MILLIGRAM(S): at 06:37

## 2022-11-02 RX ADMIN — Medication 650 MILLIGRAM(S): at 06:46

## 2022-11-02 RX ADMIN — PANTOPRAZOLE SODIUM 40 MILLIGRAM(S): 20 TABLET, DELAYED RELEASE ORAL at 06:19

## 2022-11-02 RX ADMIN — ERYTHROPOIETIN 10000 UNIT(S): 10000 INJECTION, SOLUTION INTRAVENOUS; SUBCUTANEOUS at 22:47

## 2022-11-02 RX ADMIN — Medication 667 MILLIGRAM(S): at 18:41

## 2022-11-02 RX ADMIN — SODIUM CHLORIDE 3 MILLILITER(S): 9 INJECTION INTRAMUSCULAR; INTRAVENOUS; SUBCUTANEOUS at 22:54

## 2022-11-02 RX ADMIN — SODIUM CHLORIDE 3 MILLILITER(S): 9 INJECTION INTRAMUSCULAR; INTRAVENOUS; SUBCUTANEOUS at 06:35

## 2022-11-02 RX ADMIN — Medication 667 MILLIGRAM(S): at 08:48

## 2022-11-02 RX ADMIN — Medication 650 MILLIGRAM(S): at 18:42

## 2022-11-02 RX ADMIN — RALOXIFENE HYDROCHLORIDE 60 MILLIGRAM(S): 60 TABLET, COATED ORAL at 18:43

## 2022-11-02 RX ADMIN — CHLORHEXIDINE GLUCONATE 1 APPLICATION(S): 213 SOLUTION TOPICAL at 06:37

## 2022-11-02 RX ADMIN — Medication 100 MILLIGRAM(S): at 21:28

## 2022-11-02 RX ADMIN — Medication 650 MILLIGRAM(S): at 19:12

## 2022-11-02 RX ADMIN — Medication 25 MILLIGRAM(S): at 06:04

## 2022-11-02 RX ADMIN — Medication 100 MILLIGRAM(S): at 06:03

## 2022-11-02 RX ADMIN — SODIUM CHLORIDE 3 MILLILITER(S): 9 INJECTION INTRAMUSCULAR; INTRAVENOUS; SUBCUTANEOUS at 18:43

## 2022-11-02 RX ADMIN — Medication 100 MILLIGRAM(S): at 18:42

## 2022-11-02 RX ADMIN — AMLODIPINE BESYLATE 10 MILLIGRAM(S): 2.5 TABLET ORAL at 06:03

## 2022-11-02 RX ADMIN — CHLORHEXIDINE GLUCONATE 1 APPLICATION(S): 213 SOLUTION TOPICAL at 06:05

## 2022-11-02 NOTE — PROGRESS NOTE ADULT - PROBLEM SELECTOR PLAN 7
new paroxysmal afib complicated with GIB at [facility] VS. Converted from afib -> NSR 10/22  -s/p amiodarone x2 and digoxin x1 in ctu  - Cardiology following  -monitor on telemetry  -c/w metoprolol 25mg bid  -monitor hgb and hold off AC given converted to nsr, pericardial effusion and GIB  -CHADVasc at least 3

## 2022-11-02 NOTE — PROVIDER CONTACT NOTE (OTHER) - ASSESSMENT
bladder scan >460, no s/s of distress s/p ritter 10/28
Patient denies any chest pain, palpitations, SOB, and remains asymptomatic. /83, HR 90, 96% O2. No hx of WCT/ectopy noted on remote telemetry.

## 2022-11-02 NOTE — PROGRESS NOTE ADULT - SUBJECTIVE AND OBJECTIVE BOX
CHESTER BADILLO  : 1955  ACCOUNT:  [id number]  [phone]  PCP: Dr. Yann Storey     TODAY'S DATE: 2019  DICTATED BY:  [Julie Frommelt, APN/Adrienne PATTERSON ]      CHIEF COMPLAINT: [Hospital discharge.]    HPI:    [On 2019, Chester Badillo, a 63 year old male, presented with no interim cardiac complaints.]    Patient presents with his wife today for hospital discharge. Patient underwent coronary revascularization x3, LIMA graft to LAD, and saphenous vein graft to diagonal, and left posterior descending coronary artery. He has normal LV function. Hospitalization was unremarkable with exception of pain control and right upper thigh seroma.     Since being discharged, denies shortness of breath, palpitations, dizziness, fevers, chills, or night sweats.  He does report difficulty sleeping at night which was an issue prior to surgery to a lesser extent.  He does report being compliant with his CPAP at night. Has been doing home health, and has a tentative cardiac rehab start date of .     Overall, he feels tired and taking Tylenol extra strength every 6 hours for pain. He reports receiving a dose of Solu-Medrol in hospital specifically for pain since Norco is making him nauseous.     Seroma surrounding the right thigh incision, has  improved per the patient and his wife. There has not been any redness or drainage from this.     RISK FACTORS:  CAD - Hypertension    REVIEW OF SYSTEMS:    CONS: difficulty sleeping and fatigue. EYES: denies significant visual changes. ENMT: denies difficulties with hearing, otherwise negative. CV: Denies chest pain, dizziness, palpitations. RESP: sleep apnea and snoring. GI: heartburn. : no hematuria. INTEG: no new rashes, lesions. MS: arthritis. NEURO: no localized deficits. HEM/LYMPH: easy bruising. ALL: no new food or enviornmental allergies.      PAST HISTORY: abdominal aortic aneurysm    PAST CV HISTORY: 2018, CABG, CAD, dyslipidemia and  NEPHROLOGY-NSN [phone]        Patient seen and examined in bed.  She has 50 beats of wide complex         MEDICATIONS  (STANDING):  acetaminophen     Tablet .. 650 milliGRAM(s) Oral every 6 hours  amLODIPine   Tablet 10 milliGRAM(s) Oral daily  atorvastatin 80 milliGRAM(s) Oral at bedtime  benzonatate 100 milliGRAM(s) Oral three times a day  calcium acetate 667 milliGRAM(s) Oral three times a day with meals  chlorhexidine 2% Cloths 1 Application(s) Topical <User Schedule>  chlorhexidine 4% Liquid 1 Application(s) Topical <User Schedule>  insulin lispro (ADMELOG) corrective regimen sliding scale   SubCutaneous three times a day before meals  insulin lispro (ADMELOG) corrective regimen sliding scale   SubCutaneous at bedtime  lidocaine   4% Patch 2 Patch Transdermal daily  metoprolol tartrate 25 milliGRAM(s) Oral two times a day  pantoprazole    Tablet 40 milliGRAM(s) Oral before breakfast  polyethylene glycol 3350 17 Gram(s) Oral two times a day  raloxifene 60 milliGRAM(s) Oral daily  senna 2 Tablet(s) Oral at bedtime  sodium chloride 0.9% lock flush 3 milliLiter(s) IV Push every 8 hours      VITAL:  T(C): , Max: 36.9 (11-01-22 @ 20:22)  T(F): , Max: 98.5 (11-01-22 @ 20:22)  HR: 90 (11-02-22 @ 06:05)  BP: 158/83 (11-02-22 @ 06:05)  BP(mean): --  RR: 18 (11-02-22 @ 06:05)  SpO2: 96% (11-02-22 @ 06:05)  Wt(kg): --    I and O's:    11-01 @ 07:01  -  11-02 @ 07:00  --------------------------------------------------------  IN: 240 mL / OUT: 1800 mL / NET: -1560 mL          PHYSICAL EXAM:    Constitutional: NAD; obese   Neck:  No JVD  Respiratory: reduced   Cardiovascular: S1 and S2  Gastrointestinal: BS+, soft, NT/ND  Extremities: No peripheral edema  Neurological: A/O x 3, no focal deficits  Psychiatric: Normal mood, normal affect  : No Bha  Skin: No rashes  Access: perm cath and avf     LABS:    11-02    139  |  99  |  40<H>  ----------------------------<  119<H>  3.9   |  26  |  2.57<H>    Ca    8.5      02 Nov 2022 07:23  Phos  3.3     11-02  Mg     1.8     11-02            Urine Studies:          RADIOLOGY & ADDITIONAL STUDIES:             hypertension    FAMILY HISTORY: Negative for premature CAD. Negative for AAA.  SOCIAL HISTORY: SMOKING: Former tobacco use. quit . CAFFEINE: rare. ALCOHOL: 8 per week. EXERCISE: no regular exercise. DIET: no special diet. MARITAL STATUS: . OCCUPATION: sales.     ALLERGIES: No Known Allergies    MEDICATIONS: Selected prescriptions see below    VITAL SIGNS: [B/P - 116/76 , Pulse - 90, Weight -  204, Height -   71 , BMI - 28.4 ]    CONS: well developed, well nourished. WEIGHT: BMI parameters reviewed and discussed. HEAD/FACE: no trauma and normocephalic. EYES: conjunctivae not injected and no xanthelasma. ENT: mucosa pink and moist. NECK: jugular venous pressure not elevated. RESP: respirations with normal rate and rhythm, clear to auscultation. CHEST/BREASTS: healing sternotomy. GI: no masses, tenderness or hepatosplenomegaly, rectal deferred. MS: adequate gait for exercise/testing. EXT: bilateral thigh wounds healing and Right upper thigh seroma around surgical incision ~5 cm x 10-15 cm. No redness SKIN: no rashes, lesions, ulcers.  NEURO/PSYCH: alert and oriented to time, place and person and normal affect.      CV: PALP: PMI not displaced, no lifts and thrills or rub. AUSC:  regular rhythm, normal S1, S2 without S3; no pathologic murmurs. CAROTIDS: carotid pulses normal. PEDAL: pedal pulses intact. EXT: no peripheral edema.     LABORATORY DATA: 2018  CBC: WBC 11.1, RBC 3.90, hemoglobin 11.5, hematocrit 34.2, platelets 179  BMP: Glucose 154, sodium 136, potassium 3.7, chloride 98, BUN 18, creatinine 0.88, GFR 92    DECISION MAKIN. CAD-s/p CABG x 3   -Phase 2 cardiac rehab , will have stress test   -Follow-up with Dr. Pulido next week  -Lipid at goal of 71 on 18: tolerating atorvastatin  -Chest x-ray pending from today  -We will check sed rate based on his insomnia and overall fatigue  -Return in 6 weeks to see Dr Vaz    2.Sleep apnea  -Continue to use nightly CPAP    3.  Abdominal aneurysm- noted on CT in October 2018  -aneurysmal dilation of the infrarenal abdominal aorta, measuring up to 3.8 x 3.9 cm.  -repeat u/s in 1 year    4. Hypertension- controlled  -continue on metoprolol succinate 50 mg BID  -Monitors home blood pressures     ASSESSMENT:  1. CAD, of native vessels  2. History of CABG, 12/2018  3. Hypercholesterolemia, pure  4. Sleep apnea  5. Aneurysm, abdominal  6. Chest pain, precordial  7. Hypertension, benign      PLAN:  [1.  Continue all current medications  2.  Have sed rate drawn today  3. See Dr. Pulido next week for follow-up  4.  Phase 2 cardiac rehab, tentative start date January 24  5.  Complete stress test prior to starting cardiac rehab  6.  Return in 6 weeks to see Dr. Vaz.  7.  Call the office for any questions or concerns    Julie Frommelt APN/ Adrienne PATTERSON]    PRESCRIPTIONS:   10/16/18 Aspirin Adult Low Onax89PP      1 tablet daily                           10/16/18 Atorvastatin Calcium  40MG      1 tablet daily                           10/16/18 Celecoxib             200MG     1 capsule daily                          10/16/18 Fish Oil              1000MG    1 capsule daily                          10/16/18 Metoprolol Succinate E50MG      1 tablet twice daily                     10/16/18 Multivitamin Adults             1 tablet daily

## 2022-11-02 NOTE — PROGRESS NOTE ADULT - PROBLEM SELECTOR PLAN 1
overnight WCT 50 beats to 190s. asymptomatic.  [facility] today planned now.   Will follow up card   cont with COURTNEY

## 2022-11-02 NOTE — PROGRESS NOTE ADULT - ASSESSMENT
72F HTN, HLD, DM, CKD who presented to [facility] initially with MAR on CKD with acute uremia and metabolic derangements requiring urgent HD and was COVID-19 positive. During HD went into atrial fibrillation and started on HD, subsequently developing GIB thought 2/2 AC and acute uremia.  On TTE noted to have a moderate to large pericardial effusion with early echocardiographic evidence of tamponade.  ON 10/20 had pericardiocentesis in the cath lab with 700 cc bloody fluid removed.  Had pericardial drain which was draining minimally, and removed 10/27.     NSVT overnight    - Repeat echo with no significant re- accumulation of effusion.    - Tolerated AVF with no issues.    - Continue off of AC for now secondary to bleeding issues  - Sinus tachycardia has been reactive  - increase metoprolol tartrate to 25mg bid  - Continue amlodipine 10 QD  - Continue statin drug  -for [facility] today to evaluate for coronary ischemia

## 2022-11-02 NOTE — PROGRESS NOTE ADULT - PROBLEM SELECTOR PLAN 10
A1C 6.0  Appreciate endocrine recs  Cont current regimen  - TSH low, with low T3 and T4; repeat TFTs in 4 weeks as outpatient

## 2022-11-02 NOTE — PROGRESS NOTE ADULT - SUBJECTIVE AND OBJECTIVE BOX
[facility] Division of Hospital Medicine  Barbara Anderson MD  Pager (M-F, 5J-6H): [phone]  Other Times:  [phone]    Patient is a 72y old  Female who presents with a chief complaint of Pericardial Eff (02 Nov 2022 10:47)      SUBJECTIVE / OVERNIGHT EVENTS:  overnight events noted with WCT 50 beats to 190s. asymptomatic at the time.   denies any new issues .     ADDITIONAL REVIEW OF SYSTEMS: otherwise neg    MEDICATIONS  (STANDING):  acetaminophen     Tablet .. 650 milliGRAM(s) Oral every 6 hours  amLODIPine   Tablet 10 milliGRAM(s) Oral daily  atorvastatin 80 milliGRAM(s) Oral at bedtime  benzonatate 100 milliGRAM(s) Oral three times a day  calcium acetate 667 milliGRAM(s) Oral three times a day with meals  chlorhexidine 2% Cloths 1 Application(s) Topical <User Schedule>  chlorhexidine 4% Liquid 1 Application(s) Topical <User Schedule>  epoetin beatris-epbx (RETACRIT) Injectable 63909 Unit(s) IV Push once  insulin lispro (ADMELOG) corrective regimen sliding scale   SubCutaneous three times a day before meals  insulin lispro (ADMELOG) corrective regimen sliding scale   SubCutaneous at bedtime  lidocaine   4% Patch 2 Patch Transdermal daily  metoprolol tartrate 25 milliGRAM(s) Oral two times a day  pantoprazole    Tablet 40 milliGRAM(s) Oral before breakfast  polyethylene glycol 3350 17 Gram(s) Oral two times a day  raloxifene 60 milliGRAM(s) Oral daily  senna 2 Tablet(s) Oral at bedtime  sodium chloride 0.9% lock flush 3 milliLiter(s) IV Push every 8 hours    MEDICATIONS  (PRN):  benzocaine 15 mG/menthol 3.6 mG Lozenge 2 Lozenge Oral every 6 hours PRN Sore Throat  bisacodyl 5 milliGRAM(s) Oral every 12 hours PRN Constipation  guaiFENesin Oral Liquid (Sugar-Free) 100 milliGRAM(s) Oral every 6 hours PRN Cough  melatonin 3 milliGRAM(s) Oral at bedtime PRN Insomnia  traMADol 25 milliGRAM(s) Oral every 6 hours PRN Severe Pain (7 - 10)      CAPILLARY BLOOD GLUCOSE      POCT Blood Glucose.: 117 mg/dL (02 Nov 2022 08:10)  POCT Blood Glucose.: 148 mg/dL (01 Nov 2022 21:20)  POCT Blood Glucose.: 95 mg/dL (01 Nov 2022 16:39)    I&O's Summary    01 Nov 2022 07:01  -  02 Nov 2022 07:00  --------------------------------------------------------  IN: 240 mL / OUT: 1800 mL / NET: -1560 mL    02 Nov 2022 07:01  -  02 Nov 2022 15:17  --------------------------------------------------------  IN: 240 mL / OUT: 0 mL / NET: 240 mL        PHYSICAL EXAM:  Vital Signs Last 24 Hrs  T(C): 36.8 (02 Nov 2022 11:05), Max: 36.9 (01 Nov 2022 20:22)  T(F): 98.3 (02 Nov 2022 11:05), Max: 98.5 (01 Nov 2022 20:22)  HR: 90 (02 Nov 2022 11:05) (90 - 100)  BP: 173/76 (02 Nov 2022 11:05) (120/69 - 173/76)  BP(mean): --  RR: 16 (02 Nov 2022 11:05) (16 - 18)  SpO2: 96% (02 Nov 2022 11:01) (93% - 97%)    Parameters below as of 02 Nov 2022 06:05  Patient On (Oxygen Delivery Method): room air    CONSTITUTIONAL: NAD, obese  EYES:  conjunctiva and sclera clear  ENMT: Moist oral mucosa  NECK: Supple, no palpable masses; no JVD  RESPIRATORY: Normal respiratory effort; lungs are clear to auscultation bilaterally  CARDIOVASCULAR: Regular rate and rhythm, normal S1 and S2, no murmur/rub/gallop; +lower extremity edema  ABDOMEN: Nontender to palpation, normoactive bowel sounds, no rebound/guarding  MUSCULOSKELETAL:  no clubbing or cyanosis of digits; no joint swelling or tenderness to palpation  PSYCH: A+O to person, place, and time; affect appropriate  SKIN: No rashes; no palpable lesions    LABS:    11-02    139  |  99  |  40<H>  ----------------------------<  119<H>  3.9   |  26  |  2.57<H>    Ca    8.5      02 Nov 2022 07:23  Phos  3.3     11-02  Mg     1.8     11-02        CARDIAC MARKERS ( 02 Nov 2022 07:23 )  x     / x     / 27 U/L / x     / 1.8 ng/mL            RADIOLOGY & ADDITIONAL TESTS:  Results Reviewed:   Imaging Personally Reviewed:  Electrocardiogram Personally Reviewed:    COORDINATION OF CARE:  Care Discussed with Consultants/Other Providers [Y/N]:  Prior or Outpatient Records Reviewed [Y/N]:

## 2022-11-02 NOTE — PROVIDER CONTACT NOTE (OTHER) - BACKGROUND
malignant pericardial effusion
Patient admitted for malignant pericardial effusion with hx of DM, HLD, ARF with hypoxia, GI bleed, Afib, and on dialysis.

## 2022-11-02 NOTE — CHART NOTE - NSCHARTNOTEFT_GEN_A_CORE
Medicine PA Note     RILEY MEJÍA  MRN-[id number]  Allergies    sulfa drugs (Rash)    Intolerances       Called to evaluate patient for event on tele. Patient had 50 beats of WCT which lasted up to 20 seconds. This is patent's first  WCT ectopic event on tele this admission. Patient seen and evaluated at bedside. Patient states she did not feel anything during the time of this episode as she was asleep. Currently chest pain free, no shortness of breath, dizziness, headache, N/V/D. Of not pt, was admitted w/ pericardial effusion w/ tamponade which has since improved s/p pericardial drain.     Vital Signs Last 24 Hrs  T(C): 36.8 (11-02-22 @ 04:03), Max: 36.9 (11-01-22 @ 20:22)  T(F): 98.3 (11-02-22 @ 04:03), Max: 98.5 (11-01-22 @ 20:22)  HR: 91 (11-02-22 @ 04:03) (68 - 100)  BP: 120/69 (11-02-22 @ 04:03) (120/69 - 164/80)  BP(mean): --  RR: 18 (11-02-22 @ 04:03) (18 - 18)  SpO2: 97% (11-02-22 @ 04:03) (93% - 100%)  Daily     Daily   I&O's Summary    31 Oct 2022 07:01  -  01 Nov 2022 07:00  --------------------------------------------------------  IN: 480 mL / OUT: 1150 mL / NET: -670 mL    01 Nov 2022 07:01  -  02 Nov 2022 06:58  --------------------------------------------------------  IN: 240 mL / OUT: 1800 mL / NET: -1560 mL      CAPILLARY BLOOD GLUCOSE                      Radiology:    PHYSICAL EXAM:  GENERAL: NAD, well-developed  HEAD:  Atraumatic, Normocephalic  EYES: EOMI, PERRLA, conjunctiva and sclera clear  NECK: Supple, No JVD  CHEST/LUNG: Clear to auscultation bilaterally; No wheeze  HEART: Regular rate and rhythm; No murmurs, rubs, or gallops  ABDOMEN: Soft, Nontender, Nondistended; Bowel sounds present  EXTREMITIES:  2+ Peripheral Pulses, No clubbing, cyanosis, or edema  PSYCH: AAOx3  NEUROLOGY: non-focal  SKIN: No rashes or lesions    Assessment/Plan: HPI:  72F HTN, HLD, DM, CKD who presented to [facility] initially with MAR on CKD with acute uremia and metabolic derangements requiring urgent HD and was COVID-19 positive. During HD went into atrial fibrillation and started on HD, subsequently developing GIB thought 2/2 AC and acute uremia. Also on amiodarone for Afib. On TTE noted to have a moderate to large pericardial effusion with early echocardiographic evidence of tamponade. Clinically she is not hypotensive and she tolerates the fluid shifts related to HD. Patient today was transferred to [facility] CTS Dr. Gabriel for evaluation of Pericardial Effusion.   (20 Oct 2022 02:28) Medicine PA Note     RILEY MEJÍA  MRN-[id number]  Allergies    sulfa drugs (Rash)    Intolerances       Called to evaluate patient for event on tele. Patient had 50 beats of WCT which lasted up to 20 seconds. This is patent's first  WCT ectopic event on tele this admission. Patient seen and evaluated at bedside. Patient states she did not feel anything during the time of this episode as she was asleep. Currently chest pain free, no shortness of breath, dizziness, headache, N/V/D. Of not pt, was admitted w/ pericardial effusion w/ tamponade which has since improved s/p pericardial drain.     Vital Signs Last 24 Hrs  T(C): 36.8 (11-02-22 @ 04:03), Max: 36.9 (11-01-22 @ 20:22)  T(F): 98.3 (11-02-22 @ 04:03), Max: 98.5 (11-01-22 @ 20:22)  HR: 91 (11-02-22 @ 04:03) (68 - 100)  BP: 120/69 (11-02-22 @ 04:03) (120/69 - 164/80)  BP(mean): --  RR: 18 (11-02-22 @ 04:03) (18 - 18)  SpO2: 97% (11-02-22 @ 04:03) (93% - 100%)  Daily     Daily   I&O's Summary    31 Oct 2022 07:01  -  01 Nov 2022 07:00  --------------------------------------------------------  IN: 480 mL / OUT: 1150 mL / NET: -670 mL    01 Nov 2022 07:01  -  02 Nov 2022 06:58  --------------------------------------------------------  IN: 240 mL / OUT: 1800 mL / NET: -1560 mL      CAPILLARY BLOOD GLUCOSE      PHYSICAL EXAM:  GENERAL: NAD,   CHEST/LUNG: Clear to auscultation bilaterally  HEART: Regular rate and rhythm;   ABDOMEN: Soft, Nontender, Nondistended; Bowel sounds present  EXTREMITIES:  2+ Peripheral Pulses, No clubbing, cyanosis, or edema      Assessment/Plan: HPI:  72F HTN, HLD, DM, CKD presented to [facility] VS initially with MAR on CKD with acute uremia and metabolic derangements requiring ICU stay, urgent HD and COVID-19 positive, complicated with Afib with GIB 2/2 AC and acute uremia. Transferred to [facility] CTS for large pericardial effusion and developed cardiac tamponade requiring urgent percardiocentesis 10/20 s/p 700cc renetta blood, monitored in CTU. Downgraded to medicine for further management.    Now with 50 beats of WCT on tele.    #WCT  -Stat EKG performed. NSR, compared to previous EKG, noted TWI in V4,V Medicine PA Note     RILEY MEJÍA  MRN-[id number]  Allergies    sulfa drugs (Rash)    Intolerances       Called to evaluate patient for event on tele. Patient had 50 beats of WCT which lasted up to 20 seconds. This is patent's first  WCT ectopic event on tele this admission. Patient seen and evaluated at bedside. Patient states she did not feel anything during the time of this episode as she was asleep. Currently chest pain free, no shortness of breath, dizziness, headache, N/V/D. Of not pt, was admitted w/ pericardial effusion w/ tamponade which has since improved s/p pericardial drain.     Vital Signs Last 24 Hrs  T(C): 36.8 (11-02-22 @ 04:03), Max: 36.9 (11-01-22 @ 20:22)  T(F): 98.3 (11-02-22 @ 04:03), Max: 98.5 (11-01-22 @ 20:22)  HR: 91 (11-02-22 @ 04:03) (68 - 100)  BP: 120/69 (11-02-22 @ 04:03) (120/69 - 164/80)  BP(mean): --  RR: 18 (11-02-22 @ 04:03) (18 - 18)  SpO2: 97% (11-02-22 @ 04:03) (93% - 100%)  Daily     Daily   I&O's Summary    31 Oct 2022 07:01  -  01 Nov 2022 07:00  --------------------------------------------------------  IN: 480 mL / OUT: 1150 mL / NET: -670 mL    01 Nov 2022 07:01  -  02 Nov 2022 06:58  --------------------------------------------------------  IN: 240 mL / OUT: 1800 mL / NET: -1560 mL      CAPILLARY BLOOD GLUCOSE      PHYSICAL EXAM:  GENERAL: NAD,   CHEST/LUNG: Clear to auscultation bilaterally  HEART: Regular rate and rhythm;   ABDOMEN: Soft, Nontender, Nondistended; Bowel sounds present  EXTREMITIES:  2+ Peripheral Pulses, No clubbing, cyanosis, or edema      Assessment/Plan: HPI:  72F HTN, HLD, DM, CKD presented to [facility] VS initially with MAR on CKD with acute uremia and metabolic derangements requiring ICU stay, urgent HD and COVID-19 positive, complicated with Afib with GIB 2/2 AC and acute uremia. Transferred to [facility] CTS for large pericardial effusion and developed cardiac tamponade requiring urgent percardiocentesis 10/20 s/p 700cc renetta blood, monitored in CTU. Downgraded to medicine for further management.    Now with 50 beats of WCT on tele.    #WCT  -Stat EKG performed. NSR, compared to previous EKG, noted TWI in V4,V5, V6.   -Stat cardiac enzymes, BMP, Mg, Phos sent. Will replete electrolytes as warranted.  -Currently on lopressor 25mg BID, Consider uptitration for ectopy supression.  -Case discussed w/ maximus Mcneill. Will follow up with further recs w/ AM team.  -Also discussed w/ overnight HIC.  -Continue telemetry monitoring.  -Day team and attending to follow.    -Jyothi Fraser PA-C, [id number], Dept of Medicine Medicine PA Note     RILEY MEJÍA  MRN-[id number]  Allergies    sulfa drugs (Rash)    Intolerances       Called to evaluate patient for event on tele. Patient had 50 beats of WCT which lasted up to 20 seconds. This is patent's first  WCT ectopic event on tele this admission. Patient seen and evaluated at bedside. Patient states she did not feel anything during the time of this episode as she was asleep. Currently chest pain free, no shortness of breath, dizziness, headache, N/V/D. Of note pt, was admitted w/ pericardial effusion w/ tamponade which has since improved s/p pericardial drain.     Vital Signs Last 24 Hrs  T(C): 36.8 (11-02-22 @ 04:03), Max: 36.9 (11-01-22 @ 20:22)  T(F): 98.3 (11-02-22 @ 04:03), Max: 98.5 (11-01-22 @ 20:22)  HR: 91 (11-02-22 @ 04:03) (68 - 100)  BP: 120/69 (11-02-22 @ 04:03) (120/69 - 164/80)  BP(mean): --  RR: 18 (11-02-22 @ 04:03) (18 - 18)  SpO2: 97% (11-02-22 @ 04:03) (93% - 100%)  Daily     Daily   I&O's Summary    31 Oct 2022 07:01  -  01 Nov 2022 07:00  --------------------------------------------------------  IN: 480 mL / OUT: 1150 mL / NET: -670 mL    01 Nov 2022 07:01  -  02 Nov 2022 06:58  --------------------------------------------------------  IN: 240 mL / OUT: 1800 mL / NET: -1560 mL      CAPILLARY BLOOD GLUCOSE      PHYSICAL EXAM:  GENERAL: NAD,   CHEST/LUNG: Clear to auscultation bilaterally  HEART: Regular rate and rhythm;   ABDOMEN: Soft, Nontender, Nondistended; Bowel sounds present  EXTREMITIES:  2+ Peripheral Pulses, No clubbing, cyanosis, or edema      Assessment/Plan: HPI:  72F HTN, HLD, DM, CKD presented to [facility] VS initially with MAR on CKD with acute uremia and metabolic derangements requiring ICU stay, urgent HD and COVID-19 positive, complicated with Afib with GIB 2/2 AC and acute uremia. Transferred to [facility] CTS for large pericardial effusion and developed cardiac tamponade requiring urgent percardiocentesis 10/20 s/p 700cc renetta blood, monitored in CTU. Downgraded to medicine for further management.    Now with 50 beats of WCT on tele. Otherwise HD stable at this time.    #WCT  -Stat EKG performed. NSR, compared to previous EKG, noted TWI in avL, V4,V5, V6.   -Stat cardiac enzymes, BMP, Mg, Phos sent. Will replete electrolytes as warranted.  -Currently on lopressor 25mg BID, Consider uptitration for ectopy suppression.  -Case discussed w/ maximus Mcneill. Will follow up with further recs w/ AM team.  -Also discussed w/ overnight HIC.  -Continue telemetry monitoring.  -Day team and attending to follow.    -Jyothi Fraser PA-C, [id number], Dept of Medicine

## 2022-11-02 NOTE — PROGRESS NOTE ADULT - ASSESSMENT
72F w/ HTN, HLD, DM, CKD, 10/20/22 from OSH with uremia, COVID-19, and pericardial effusion    (1)Renal - newly ESRD-HD  (2)CTS - s/p pericardial drain placement and now removal   (3)CV - 50 beats of wide complex tachycardia - V tach       RECOMMEND:  (1)Next HD in am but awaiting decision from cards re cath given the V tach   3 K bath due to arrythmia   (2)+ Perm cath and sp AVF and she will need outpt follow up with vasc  (3)Cont Lopressor        [facility]   [phone]            72F w/ HTN, HLD, DM, CKD, 10/20/22 from OSH with uremia, COVID-19, and pericardial effusion    (1)Renal - newly ESRD-HD  (2)CTS - s/p pericardial drain placement and now removal   (3)CV - 50 beats of wide complex tachycardia - V tach - New       RECOMMEND:  (1)Next HD in am but awaiting decision from cards re cath given the V tach   3 K bath due to arrythmia   (2)+ Perm cath and sp AVF and she will need outpt follow up with vasc  (3)Cont Lopressor for now      DW Cards attd and Hospitalist   >60 minutes spent on total encounter and more then 50% of the visit was spent counseling and/or coordinating care by the attending physician       [facility]   [phone]

## 2022-11-02 NOTE — PROGRESS NOTE ADULT - SUBJECTIVE AND OBJECTIVE BOX
[facility] - Julius Castañeda, Lucio, Tyrell Garrison Savella, Goodger  Office Number:  [phone]    Patient resting comfortably in bed in NAD.  Laying flat with no respiratory distress.  No complaints of chest pain, dyspnea, palpitations, PND, or orthopnea.  20seconds of NSVT overnight    ROS: negative unless otherwise mentioned.    Telemetry:  SR, 20 NSVT    MEDICATIONS  (STANDING):  acetaminophen     Tablet .. 650 milliGRAM(s) Oral every 6 hours  amLODIPine   Tablet 10 milliGRAM(s) Oral daily  atorvastatin 80 milliGRAM(s) Oral at bedtime  benzonatate 100 milliGRAM(s) Oral three times a day  calcium acetate 667 milliGRAM(s) Oral three times a day with meals  chlorhexidine 2% Cloths 1 Application(s) Topical <User Schedule>  chlorhexidine 4% Liquid 1 Application(s) Topical <User Schedule>  epoetin beatris-epbx (RETACRIT) Injectable 16086 Unit(s) IV Push once  insulin lispro (ADMELOG) corrective regimen sliding scale   SubCutaneous three times a day before meals  insulin lispro (ADMELOG) corrective regimen sliding scale   SubCutaneous at bedtime  lidocaine   4% Patch 2 Patch Transdermal daily  metoprolol tartrate 25 milliGRAM(s) Oral two times a day  pantoprazole    Tablet 40 milliGRAM(s) Oral before breakfast  polyethylene glycol 3350 17 Gram(s) Oral two times a day  raloxifene 60 milliGRAM(s) Oral daily  senna 2 Tablet(s) Oral at bedtime  sodium chloride 0.9% lock flush 3 milliLiter(s) IV Push every 8 hours    MEDICATIONS  (PRN):  benzocaine 15 mG/menthol 3.6 mG Lozenge 2 Lozenge Oral every 6 hours PRN Sore Throat  bisacodyl 5 milliGRAM(s) Oral every 12 hours PRN Constipation  guaiFENesin Oral Liquid (Sugar-Free) 100 milliGRAM(s) Oral every 6 hours PRN Cough  melatonin 3 milliGRAM(s) Oral at bedtime PRN Insomnia  traMADol 25 milliGRAM(s) Oral every 6 hours PRN Severe Pain (7 - 10)      Allergies  sulfa drugs (Rash)  Intolerances      Vital Signs Last 24 Hrs  T(C): 36.8 (02 Nov 2022 04:03), Max: 36.9 (01 Nov 2022 20:22)  T(F): 98.3 (02 Nov 2022 04:03), Max: 98.5 (01 Nov 2022 20:22)  HR: 90 (02 Nov 2022 06:05) (68 - 100)  BP: 158/83 (02 Nov 2022 06:05) (120/69 - 164/80)  BP(mean): --  RR: 18 (02 Nov 2022 06:05) (18 - 18)  SpO2: 96% (02 Nov 2022 06:05) (93% - 100%)    Parameters below as of 02 Nov 2022 06:05  Patient On (Oxygen Delivery Method): room air      I&O's Summary    01 Nov 2022 07:01  -  02 Nov 2022 07:00  --------------------------------------------------------  IN: 240 mL / OUT: 1800 mL / NET: -1560 mL      ON EXAM:  General: NAD, awake and alert  HEENT: Mucous membranes are moist, anicteric  Lungs: Non-labored, breath sounds are clear bilaterally, No wheezing, rales or rhonchi  Cardiovascular: Regular, S1 and S2, no murmurs, rubs, or gallops  Gastrointestinal: soft, nontender.   Lymph: No peripheral edema  Skin: No rashes or ulcers  Psych:  Mood & affect appropriate    LABS: All Labs Reviewed:                        9.1    8.24  )-----------( 216      ( 31 Oct 2022 06:19 )             31.0     02 Nov 2022 07:23    139    |  99     |  40     ----------------------------<  119    3.9     |  26     |  2.57   31 Oct 2022 06:18    138    |  99     |  38     ----------------------------<  108    4.5     |  26     |  2.91     Ca    8.5        02 Nov 2022 07:23  Ca    9.1        31 Oct 2022 06:18  Phos  3.3       02 Nov 2022 07:23  Mg     1.8       02 Nov 2022 07:23  Mg     1.8       31 Oct 2022 06:18    CARDIAC MARKERS ( 02 Nov 2022 07:23 )  x     / x     / 27 U/L / x     / 1.8 ng/mL

## 2022-11-02 NOTE — PROGRESS NOTE ADULT - ASSESSMENT
72F HTN, HLD, DM, CKD presented to [facility] VS initially with MAR on CKD with acute uremia and metabolic derangements requiring ICU stay, urgent HD and COVID-19 positive, complicated with Afib with GIB 2/2 AC and acute uremia. Transferred to [facility] CTS for large pericardial effusion and developed cardiac tamponade requiring urgent percardiocentesis 10/20 s/p 700cc renetta blood, monitored in CTU. Downgraded to medicine for further management

## 2022-11-02 NOTE — PROGRESS NOTE ADULT - PROBLEM SELECTOR PLAN 2
Developed in PACU after OR. Possibly 2/2 fluid overload as pt given IVF intraop.   - s/p BIPAP, now on RA sating well.  - Wean O2 as tolerated  - Incentive spirometry  - HD as per nephro

## 2022-11-03 DIAGNOSIS — I25.10 ATHEROSCLEROTIC HEART DISEASE OF NATIVE CORONARY ARTERY WITHOUT ANGINA PECTORIS: ICD-10-CM

## 2022-11-03 LAB
ANION GAP SERPL CALC-SCNC: 11 MMOL/L — SIGNIFICANT CHANGE UP (ref 5–17)
BUN SERPL-MCNC: 24 MG/DL — HIGH (ref 7–23)
CALCIUM SERPL-MCNC: 8.5 MG/DL — SIGNIFICANT CHANGE UP (ref 8.4–10.5)
CHLORIDE SERPL-SCNC: 100 MMOL/L — SIGNIFICANT CHANGE UP (ref 96–108)
CO2 SERPL-SCNC: 28 MMOL/L — SIGNIFICANT CHANGE UP (ref 22–31)
CREAT SERPL-MCNC: 1.99 MG/DL — HIGH (ref 0.5–1.3)
EGFR: 26 ML/MIN/1.73M2 — LOW
GLUCOSE BLDC GLUCOMTR-MCNC: 112 MG/DL — HIGH (ref 70–99)
GLUCOSE BLDC GLUCOMTR-MCNC: 124 MG/DL — HIGH (ref 70–99)
GLUCOSE BLDC GLUCOMTR-MCNC: 125 MG/DL — HIGH (ref 70–99)
GLUCOSE BLDC GLUCOMTR-MCNC: 153 MG/DL — HIGH (ref 70–99)
GLUCOSE SERPL-MCNC: 117 MG/DL — HIGH (ref 70–99)
HCT VFR BLD CALC: 27.8 % — LOW (ref 34.5–45)
HGB BLD-MCNC: 8.6 G/DL — LOW (ref 11.5–15.5)
MCHC RBC-ENTMCNC: 29.3 PG — SIGNIFICANT CHANGE UP (ref 27–34)
MCHC RBC-ENTMCNC: 30.9 GM/DL — LOW (ref 32–36)
MCV RBC AUTO: 94.6 FL — SIGNIFICANT CHANGE UP (ref 80–100)
NRBC # BLD: 0 /100 WBCS — SIGNIFICANT CHANGE UP (ref 0–0)
PLATELET # BLD AUTO: 238 K/UL — SIGNIFICANT CHANGE UP (ref 150–400)
POTASSIUM SERPL-MCNC: 3.7 MMOL/L — SIGNIFICANT CHANGE UP (ref 3.5–5.3)
POTASSIUM SERPL-SCNC: 3.7 MMOL/L — SIGNIFICANT CHANGE UP (ref 3.5–5.3)
RBC # BLD: 2.94 M/UL — LOW (ref 3.8–5.2)
RBC # FLD: 16 % — HIGH (ref 10.3–14.5)
SODIUM SERPL-SCNC: 139 MMOL/L — SIGNIFICANT CHANGE UP (ref 135–145)
WBC # BLD: 6.88 K/UL — SIGNIFICANT CHANGE UP (ref 3.8–10.5)
WBC # FLD AUTO: 6.88 K/UL — SIGNIFICANT CHANGE UP (ref 3.8–10.5)

## 2022-11-03 PROCEDURE — 99233 SBSQ HOSP IP/OBS HIGH 50: CPT

## 2022-11-03 RX ORDER — TRAMADOL HYDROCHLORIDE 50 MG/1
25 TABLET ORAL EVERY 6 HOURS
Refills: 0 | Status: DISCONTINUED | OUTPATIENT
Start: 2022-11-03 | End: 2022-11-09

## 2022-11-03 RX ADMIN — Medication 25 MILLIGRAM(S): at 05:40

## 2022-11-03 RX ADMIN — RALOXIFENE HYDROCHLORIDE 60 MILLIGRAM(S): 60 TABLET, COATED ORAL at 12:42

## 2022-11-03 RX ADMIN — PANTOPRAZOLE SODIUM 40 MILLIGRAM(S): 20 TABLET, DELAYED RELEASE ORAL at 06:47

## 2022-11-03 RX ADMIN — Medication 100 MILLIGRAM(S): at 05:39

## 2022-11-03 RX ADMIN — AMLODIPINE BESYLATE 10 MILLIGRAM(S): 2.5 TABLET ORAL at 05:40

## 2022-11-03 RX ADMIN — SODIUM CHLORIDE 3 MILLILITER(S): 9 INJECTION INTRAMUSCULAR; INTRAVENOUS; SUBCUTANEOUS at 21:20

## 2022-11-03 RX ADMIN — Medication 667 MILLIGRAM(S): at 17:12

## 2022-11-03 RX ADMIN — Medication 667 MILLIGRAM(S): at 12:41

## 2022-11-03 RX ADMIN — Medication 1: at 12:39

## 2022-11-03 RX ADMIN — Medication 650 MILLIGRAM(S): at 22:51

## 2022-11-03 RX ADMIN — Medication 650 MILLIGRAM(S): at 00:55

## 2022-11-03 RX ADMIN — Medication 650 MILLIGRAM(S): at 23:21

## 2022-11-03 RX ADMIN — Medication 100 MILLIGRAM(S): at 12:40

## 2022-11-03 RX ADMIN — Medication 650 MILLIGRAM(S): at 05:40

## 2022-11-03 RX ADMIN — Medication 100 MILLIGRAM(S): at 21:03

## 2022-11-03 RX ADMIN — CHLORHEXIDINE GLUCONATE 1 APPLICATION(S): 213 SOLUTION TOPICAL at 05:40

## 2022-11-03 RX ADMIN — ATORVASTATIN CALCIUM 80 MILLIGRAM(S): 80 TABLET, FILM COATED ORAL at 21:02

## 2022-11-03 RX ADMIN — Medication 650 MILLIGRAM(S): at 05:49

## 2022-11-03 RX ADMIN — ATORVASTATIN CALCIUM 80 MILLIGRAM(S): 80 TABLET, FILM COATED ORAL at 00:42

## 2022-11-03 RX ADMIN — Medication 25 MILLIGRAM(S): at 00:42

## 2022-11-03 RX ADMIN — Medication 25 MILLIGRAM(S): at 17:12

## 2022-11-03 RX ADMIN — POLYETHYLENE GLYCOL 3350 17 GRAM(S): 17 POWDER, FOR SOLUTION ORAL at 05:41

## 2022-11-03 RX ADMIN — Medication 650 MILLIGRAM(S): at 17:11

## 2022-11-03 RX ADMIN — Medication 650 MILLIGRAM(S): at 12:41

## 2022-11-03 RX ADMIN — SODIUM CHLORIDE 3 MILLILITER(S): 9 INJECTION INTRAMUSCULAR; INTRAVENOUS; SUBCUTANEOUS at 05:50

## 2022-11-03 RX ADMIN — SODIUM CHLORIDE 3 MILLILITER(S): 9 INJECTION INTRAMUSCULAR; INTRAVENOUS; SUBCUTANEOUS at 14:24

## 2022-11-03 RX ADMIN — Medication 650 MILLIGRAM(S): at 00:42

## 2022-11-03 NOTE — CONSULT NOTE ADULT - CONSULT REASON
"GI clearance"
Latent TB
Pericardial effusion
sacral/bilateral buttocks skin injury
AVF creation
3 VCAD
ESRD

## 2022-11-03 NOTE — PROGRESS NOTE ADULT - SUBJECTIVE AND OBJECTIVE BOX
NEPHROLOGY-[facility] [phone]        Patient seen and examined in bed.  She was in good spirits         MEDICATIONS  (STANDING):  acetaminophen     Tablet .. 650 milliGRAM(s) Oral every 6 hours  amLODIPine   Tablet 10 milliGRAM(s) Oral daily  atorvastatin 80 milliGRAM(s) Oral at bedtime  benzonatate 100 milliGRAM(s) Oral three times a day  calcium acetate 667 milliGRAM(s) Oral three times a day with meals  chlorhexidine 2% Cloths 1 Application(s) Topical <User Schedule>  chlorhexidine 4% Liquid 1 Application(s) Topical <User Schedule>  insulin lispro (ADMELOG) corrective regimen sliding scale   SubCutaneous three times a day before meals  insulin lispro (ADMELOG) corrective regimen sliding scale   SubCutaneous at bedtime  lidocaine   4% Patch 2 Patch Transdermal daily  metoprolol tartrate 25 milliGRAM(s) Oral two times a day  pantoprazole    Tablet 40 milliGRAM(s) Oral before breakfast  polyethylene glycol 3350 17 Gram(s) Oral two times a day  raloxifene 60 milliGRAM(s) Oral daily  senna 2 Tablet(s) Oral at bedtime  sodium chloride 0.9% lock flush 3 milliLiter(s) IV Push every 8 hours      VITAL:  T(C): , Max: 37.2 (11-02-22 @ 21:55)  T(F): , Max: 99 (11-02-22 @ 21:55)  HR: 83 (11-03-22 @ 04:06)  BP: 134/85 (11-03-22 @ 04:06)  BP(mean): --  RR: 18 (11-03-22 @ 04:06)  SpO2: 97% (11-03-22 @ 04:06)  Wt(kg): --    I and O's:    11-02 @ 07:01  -  11-03 @ 07:00  --------------------------------------------------------  IN: 480 mL / OUT: 1900 mL / NET: -1420 mL          PHYSICAL EXAM:    Constitutional: NAD; obese   Neck:  No JVD  Respiratory: CTAB/L  Cardiovascular: S1 and S2  Gastrointestinal: BS+, soft, NT/ND  Extremities: No peripheral edema  Neurological: A/O x 3, no focal deficits  Psychiatric: Normal mood, normal affect  : No Bah  Skin: No rashes  Access: AVF and perm cath     LABS:                        8.6    6.88  )-----------( 238      ( 03 Nov 2022 06:27 )             27.8     11-03    139  |  100  |  24<H>  ----------------------------<  117<H>  3.7   |  28  |  1.99<H>    Ca    8.5      03 Nov 2022 06:27  Phos  3.3     11-02  Mg     1.8     11-02            Urine Studies:          RADIOLOGY & ADDITIONAL STUDIES:

## 2022-11-03 NOTE — CONSULT NOTE ADULT - REASON FOR ADMISSION
Pericardial Eff

## 2022-11-03 NOTE — CONSULT NOTE ADULT - PROVIDER SPECIALTY LIST ADULT
Intervent Radiology
Vascular Surgery
Nephrology
Gastroenterology
Wound Care
Cardiology
Infectious Disease
CT Surgery

## 2022-11-03 NOTE — CONSULT NOTE ADULT - CONSULT REQUESTED DATE/TIME
03-Nov-2022
20-Oct-2022
25-Oct-2022 14:26
26-Oct-2022 10:56
21-Oct-2022 14:16
21-Oct-2022 16:36
28-Oct-2022 07:48

## 2022-11-03 NOTE — CONSULT NOTE ADULT - CONVERSATION DETAILS
the attending surgeon has  discussed the surgery and post op course in detail including risks, benefits and follow up care

## 2022-11-03 NOTE — PROGRESS NOTE ADULT - PROBLEM SELECTOR PLAN 2
Developed in PACU after OR. Possibly 2/2 fluid overload as pt given IVF intraop.   - s/p BIPAP, now on RA sating well.   - nocturnal desat suspect pt may have MATTY  - Incentive spirometry  - HD as per nephro

## 2022-11-03 NOTE — CONSULT NOTE ADULT - SUBJECTIVE AND OBJECTIVE BOX
History of Present Illness:   72F HTN, HLD, DM, CKD who presented to Health system initially with MAR on CKD with acute uremia and metabolic derangements requiring urgent HD and was COVID-19 positive. During HD went into atrial fibrillation and started on HD, subsequently developing GIB thought 2/2 AC and acute uremia.  On TTE noted to have a moderate to large pericardial effusion with early echocardiographic evidence of tamponade.  ON 10/20 had pericardiocentesis in the cath lab with 700 cc bloody fluid removed.  Had pericardial drain which was draining minimally, and removed 10/27. cATH YESTERDAY REVEALED TRIPLE VESSEL CAD referred for CABG evaluation      Past Medical History  HTN (hypertension)    HLD (hyperlipidemia)    DM (diabetes mellitus)        Past Surgical History    CSect      MEDICATIONS  (STANDING):  acetaminophen     Tablet .. 650 milliGRAM(s) Oral every 6 hours  amLODIPine   Tablet 10 milliGRAM(s) Oral daily  atorvastatin 80 milliGRAM(s) Oral at bedtime  benzonatate 100 milliGRAM(s) Oral three times a day  calcium acetate 667 milliGRAM(s) Oral three times a day with meals  chlorhexidine 2% Cloths 1 Application(s) Topical <User Schedule>  chlorhexidine 4% Liquid 1 Application(s) Topical <User Schedule>  insulin lispro (ADMELOG) corrective regimen sliding scale   SubCutaneous three times a day before meals  insulin lispro (ADMELOG) corrective regimen sliding scale   SubCutaneous at bedtime  lidocaine   4% Patch 2 Patch Transdermal daily  metoprolol tartrate 25 milliGRAM(s) Oral two times a day  pantoprazole    Tablet 40 milliGRAM(s) Oral before breakfast  polyethylene glycol 3350 17 Gram(s) Oral two times a day  raloxifene 60 milliGRAM(s) Oral daily  senna 2 Tablet(s) Oral at bedtime  sodium chloride 0.9% lock flush 3 milliLiter(s) IV Push every 8 hours    MEDICATIONS  (PRN):  benzocaine 15 mG/menthol 3.6 mG Lozenge 2 Lozenge Oral every 6 hours PRN Sore Throat  bisacodyl 5 milliGRAM(s) Oral every 12 hours PRN Constipation  guaiFENesin Oral Liquid (Sugar-Free) 100 milliGRAM(s) Oral every 6 hours PRN Cough  melatonin 3 milliGRAM(s) Oral at bedtime PRN Insomnia  traMADol 25 milliGRAM(s) Oral every 6 hours PRN Severe Pain (7 - 10)      Allergies: sulfa drugs (Rash)      SOCIAL HISTORY:  Smoker: [ ] Yes  [x ] No        PACK YEARS:                         WHEN QUIT?  ETOH use: [ ] Yes  [ x] No              FREQUENCY / QUANTITY:  Ilicit Drug use:  [ ] Yes  [ x] No  Occupation: retired   Live with: Spouse / son  Assist device use: rolling walker    Relevant Family History  FAMILY HISTORY:  FH: kidney disease        Review of Systems  GENERAL:  Fevers[] chills[] sweats[] fatigue[x] weight loss[] weight gain []                                        NEURO:  parathesias[] seizures []  syncope []  confusion []                                                                                  EYES: glasses[]  blurry vision[]  discharge[] pain[] glaucoma []                                                                            ENMT:  difficulty hearing []  vertigo[]  dysphagia[] epistaxis[] recent dental work []                                      CV:  chest pain[] palpitations[] MAIN [] diaphoresis [] edema[]                                                                                             RESPIRATORY:  wheezing[] SOBx[x] cough [] sputum[] hemoptysis[]                                                                    GI:  nausea[]  vomiting []  diarrhea[] constipation [] melena []                                                                        : hematuria[ ]  dysuria[ ] urgency[] incontinence[x]                                                                                              MUSCULOSKELETAL:  arthritis[ ]  joint swelling [ ] muscle weakness [x ]                                                                  SKIN/BREAST:  rash[ ] itching [ ]  hair loss[ ] masses[ ]                                                                                                PSYCH:  dementia [ ] depression [ ] anxiety[ ]                                                                                                                  HEME/LYMPH:  bruises easily[ ] enlarged lymph nodes[ ] tender lymph nodes[ ]                                                 ENDOCRINE:  cold intolerance[ ] heat intolerance[ ] polydipsia[ ]                                                                              PHYSICAL EXAM  Vital Signs Last 24 Hrs  T(C): 36.9 (03 Nov 2022 11:03), Max: 37.2 (02 Nov 2022 21:55)  T(F): 98.4 (03 Nov 2022 11:03), Max: 99 (02 Nov 2022 21:55)  HR: 87 (03 Nov 2022 11:03) (83 - 102)  BP: 149/77 (03 Nov 2022 11:03) (127/67 - 154/84)  BP(mean): --  RR: 18 (03 Nov 2022 11:03) (18 - 18)  SpO2: 97% (03 Nov 2022 11:03) (95% - 99%)    Parameters below as of 03 Nov 2022 11:03  Patient On (Oxygen Delivery Method): room air        General: Well nourished, well developed, no acute distress.                                                         Neuro: Normal exam oriented to person/place & time with no focal motor or sensory  deficits.                    Eyes: Normal exam of conjunctiva & lids, pupils equally reactive.   ENT: Normal exam of nasal/oral mucosa with absence of cyanosis.   Neck: Normal exam of jugular veins, trachea & thyroid.   Chest: Normal lung exam with good air movement absence of wheezes, rales, or rhonchi:                                                                          CV:  Auscultation: normal [x ] S3[ ] S4[ ] Irregular [ ] Rub[ ] Clicks[ ]  Murmurs none:[x ]systolic [ ]  diastolic [ ] holosystolic [ ]  Carotids: No Bruits[x ] Other____________ Abdominal Aorta: normal [x ] nonpalpable[ ]                                                                         GI: Normal exam of abdomen, liver & spleen with no noted masses or tenderness.                                                                                              Extremities: Normal no evidence of cyanosis or deformity Edema: none[ ]trace[x ]1+[ ]2+[ ]3+[ ]4+[ ]  Lower Extremity Pulses: Right[ ] Left[ ]Varicosities[ ]  SKIN : Normal exam to inspection & palpation.                                                           LABS:                        8.6    6.88  )-----------( 238      ( 03 Nov 2022 06:27 )             27.8     11-03    139  |  100  |  24<H>  ----------------------------<  117<H>  3.7   |  28  |  1.99<H>    Ca    8.5      03 Nov 2022 06:27  Phos  3.3     11-02  Mg     1.8     11-02          CARDIAC MARKERS ( 02 Nov 2022 07:23 )  x     / x     / 27 U/L / x     / 1.8 ng/mL          Cardiac Cath:  < from: Cardiac Catheterization (11.02.22 @ 14:36) >  LM   Left main artery: Angiography shows no disease.      LAD   Proximal left anterior descending: There is a 50 % stenosis. Mid left  anterior descending: There is an 80 % stenosis. First  diagonal: There is a 90 % stenosis.      CX   Ostial circumflex: There is a 90 % stenosis. Distal circumflex:  Angiography shows moderate atherosclerosis.    RCA   Mid right coronary artery: There is a 90 % stenosis.         TTE / SHELDON: < from: TTE with Doppler (w/Cont) (10.28.22 @ 09:08) >  Conclusions:  1. Mitral annular calcification, otherwise normal mitral  valve. Minimal mitral regurgitation.  2. Calcified trileaflet aortic valve with decreased  opening. No aortic valve regurgitation seen.  3. Endocardial visualization enhanced with intravenous  injection of Ultrasonic Enhancing Agent (Definity).  Normal  left ventricular systolic function. No left ventricular  thrombus.  4. Mild diastolic dysfunction (Stage I).  5. Normal right ventricular size and function.  6. Normal pericardium with trace pericardial effusion.          Assessment:   72F HTN, HLD, DM, CKD who presented to Health system initially with MAR on CKD with acute uremia and metabolic derangements requiring urgent HD and was COVID-19 positive. During HD went into atrial fibrillation and started on HD, subsequently developing GIB thought 2/2 AC and acute uremia.  On TTE noted to have a moderate to large pericardial effusion >  10/20 had pericardiocentesis pericardial drain which was draining minimally, and removed 10/27.  Cath 11/2 revealed triple vessel CAD referred for CABG eval      Plan:    ASA statin betablocker  Dr Mann to review images and discuss surgical options with pt

## 2022-11-03 NOTE — PROGRESS NOTE ADULT - PROBLEM SELECTOR PLAN 1
11/1 WCT 50 beats to 190s overnight. asymptomatic.  Now S/p LHC on 11/2 showing multivessel dz. CTS eval now pending   cont with tele monitor  cont with BB and high intensity statin   will follow up CTS eval first before starting AC.

## 2022-11-03 NOTE — CONSULT NOTE ADULT - NS ATTEND AMEND GEN_ALL_CORE FT
Pt seen and examined,  Non Sustained VT, Severe TVD.  Risks/benefits CABG d/w pt and .  They are undecided at this time

## 2022-11-03 NOTE — PROGRESS NOTE ADULT - ASSESSMENT
72F HTN, HLD, DM, CKD presented to [facility] VS initially with MAR on CKD with acute uremia and metabolic derangements requiring ICU stay, urgent HD and COVID-19 positive, complicated with Afib with GIB 2/2 AC and acute uremia. Transferred to [facility] CTS for large pericardial effusion and developed cardiac tamponade requiring urgent percardiocentesis 10/20 s/p 700cc renetta blood, monitored in CTU. Downgraded to medicine for further management

## 2022-11-03 NOTE — PROGRESS NOTE ADULT - SUBJECTIVE AND OBJECTIVE BOX
[facility] - Julius Castañeda, Lucio, Tyrell Garrison Savella, Goodger  Office Number:  [phone]    Patient resting comfortably in bed in NAD.  LHC with multivessel disease.    ROS: negative unless otherwise mentioned.    Telemetry:  SR, no further NSVT    MEDICATIONS  (STANDING):  acetaminophen     Tablet .. 650 milliGRAM(s) Oral every 6 hours  amLODIPine   Tablet 10 milliGRAM(s) Oral daily  atorvastatin 80 milliGRAM(s) Oral at bedtime  benzonatate 100 milliGRAM(s) Oral three times a day  calcium acetate 667 milliGRAM(s) Oral three times a day with meals  chlorhexidine 2% Cloths 1 Application(s) Topical <User Schedule>  chlorhexidine 4% Liquid 1 Application(s) Topical <User Schedule>  insulin lispro (ADMELOG) corrective regimen sliding scale   SubCutaneous three times a day before meals  insulin lispro (ADMELOG) corrective regimen sliding scale   SubCutaneous at bedtime  lidocaine   4% Patch 2 Patch Transdermal daily  metoprolol tartrate 25 milliGRAM(s) Oral two times a day  pantoprazole    Tablet 40 milliGRAM(s) Oral before breakfast  polyethylene glycol 3350 17 Gram(s) Oral two times a day  raloxifene 60 milliGRAM(s) Oral daily  senna 2 Tablet(s) Oral at bedtime  sodium chloride 0.9% lock flush 3 milliLiter(s) IV Push every 8 hours    MEDICATIONS  (PRN):  benzocaine 15 mG/menthol 3.6 mG Lozenge 2 Lozenge Oral every 6 hours PRN Sore Throat  bisacodyl 5 milliGRAM(s) Oral every 12 hours PRN Constipation  guaiFENesin Oral Liquid (Sugar-Free) 100 milliGRAM(s) Oral every 6 hours PRN Cough  melatonin 3 milliGRAM(s) Oral at bedtime PRN Insomnia  traMADol 25 milliGRAM(s) Oral every 6 hours PRN Severe Pain (7 - 10)      Allergies    sulfa drugs (Rash)    Intolerances        Vital Signs Last 24 Hrs  T(C): 36.9 (03 Nov 2022 11:03), Max: 37.2 (02 Nov 2022 21:55)  T(F): 98.4 (03 Nov 2022 11:03), Max: 99 (02 Nov 2022 21:55)  HR: 87 (03 Nov 2022 11:03) (83 - 105)  BP: 149/77 (03 Nov 2022 11:03) (126/86 - 211/81)  BP(mean): --  RR: 18 (03 Nov 2022 11:03) (14 - 18)  SpO2: 97% (03 Nov 2022 11:03) (95% - 99%)    Parameters below as of 03 Nov 2022 11:03  Patient On (Oxygen Delivery Method): room air        I&O's Summary    02 Nov 2022 07:01  -  03 Nov 2022 07:00  --------------------------------------------------------  IN: 480 mL / OUT: 1900 mL / NET: -1420 mL    03 Nov 2022 07:01  -  03 Nov 2022 14:04  --------------------------------------------------------  IN: 240 mL / OUT: 0 mL / NET: 240 mL        ON EXAM:  General: NAD, awake and alert  HEENT: Mucous membranes are moist, anicteric  Lungs: Non-labored, breath sounds are clear bilaterally, No wheezing, rales or rhonchi  Cardiovascular: Regular, S1 and S2, no murmurs, rubs, or gallops  Gastrointestinal: soft, nontender.   Lymph: No peripheral edema  Skin: No rashes or ulcers  Psych:  Mood & affect appropriate    LABS: All Labs Reviewed:                        8.6    6.88  )-----------( 238      ( 03 Nov 2022 06:27 )             27.8     03 Nov 2022 06:27    139    |  100    |  24     ----------------------------<  117    3.7     |  28     |  1.99   02 Nov 2022 07:23    139    |  99     |  40     ----------------------------<  119    3.9     |  26     |  2.57     Ca    8.5        03 Nov 2022 06:27  Ca    8.5        02 Nov 2022 07:23  Phos  3.3       02 Nov 2022 07:23  Mg     1.8       02 Nov 2022 07:23        CARDIAC MARKERS ( 02 Nov 2022 07:23 )  x     / x     / 27 U/L / x     / 1.8 ng/mL      Blood Culture:

## 2022-11-03 NOTE — PROGRESS NOTE ADULT - SUBJECTIVE AND OBJECTIVE BOX
[facility] Division of Hospital Medicine  Barbara Anderson MD  Pager (M-F, 6C-1D): [phone]  Other Times:  [phone]    Patient is a 72y old  Female who presents with a chief complaint of Pericardial Eff (03 Nov 2022 09:55)      SUBJECTIVE / OVERNIGHT EVENTS:  afebrile . feeling ok. no acute overnight issues or tele events     ADDITIONAL REVIEW OF SYSTEMS: otherwise neg    MEDICATIONS  (STANDING):  acetaminophen     Tablet .. 650 milliGRAM(s) Oral every 6 hours  amLODIPine   Tablet 10 milliGRAM(s) Oral daily  atorvastatin 80 milliGRAM(s) Oral at bedtime  benzonatate 100 milliGRAM(s) Oral three times a day  calcium acetate 667 milliGRAM(s) Oral three times a day with meals  chlorhexidine 2% Cloths 1 Application(s) Topical <User Schedule>  chlorhexidine 4% Liquid 1 Application(s) Topical <User Schedule>  insulin lispro (ADMELOG) corrective regimen sliding scale   SubCutaneous three times a day before meals  insulin lispro (ADMELOG) corrective regimen sliding scale   SubCutaneous at bedtime  lidocaine   4% Patch 2 Patch Transdermal daily  metoprolol tartrate 25 milliGRAM(s) Oral two times a day  pantoprazole    Tablet 40 milliGRAM(s) Oral before breakfast  polyethylene glycol 3350 17 Gram(s) Oral two times a day  raloxifene 60 milliGRAM(s) Oral daily  senna 2 Tablet(s) Oral at bedtime  sodium chloride 0.9% lock flush 3 milliLiter(s) IV Push every 8 hours    MEDICATIONS  (PRN):  benzocaine 15 mG/menthol 3.6 mG Lozenge 2 Lozenge Oral every 6 hours PRN Sore Throat  bisacodyl 5 milliGRAM(s) Oral every 12 hours PRN Constipation  guaiFENesin Oral Liquid (Sugar-Free) 100 milliGRAM(s) Oral every 6 hours PRN Cough  melatonin 3 milliGRAM(s) Oral at bedtime PRN Insomnia  traMADol 25 milliGRAM(s) Oral every 6 hours PRN Severe Pain (7 - 10)      CAPILLARY BLOOD GLUCOSE      POCT Blood Glucose.: 153 mg/dL (03 Nov 2022 12:22)  POCT Blood Glucose.: 124 mg/dL (03 Nov 2022 07:59)  POCT Blood Glucose.: 157 mg/dL (02 Nov 2022 21:09)  POCT Blood Glucose.: 96 mg/dL (02 Nov 2022 16:45)    I&O's Summary    02 Nov 2022 07:01  -  03 Nov 2022 07:00  --------------------------------------------------------  IN: 480 mL / OUT: 1900 mL / NET: -1420 mL    03 Nov 2022 07:01  -  03 Nov 2022 13:24  --------------------------------------------------------  IN: 240 mL / OUT: 0 mL / NET: 240 mL        PHYSICAL EXAM:  Vital Signs Last 24 Hrs  T(C): 36.9 (03 Nov 2022 11:03), Max: 37.2 (02 Nov 2022 21:55)  T(F): 98.4 (03 Nov 2022 11:03), Max: 99 (02 Nov 2022 21:55)  HR: 87 (03 Nov 2022 11:03) (83 - 105)  BP: 149/77 (03 Nov 2022 11:03) (126/86 - 211/81)  BP(mean): --  RR: 18 (03 Nov 2022 11:03) (14 - 18)  SpO2: 97% (03 Nov 2022 11:03) (95% - 99%)    Parameters below as of 03 Nov 2022 11:03  Patient On (Oxygen Delivery Method): room air    CONSTITUTIONAL: NAD, obese  EYES:  conjunctiva and sclera clear  ENMT: Moist oral mucosa  NECK: Supple, no palpable masses; no JVD  RESPIRATORY: Normal respiratory effort; lungs are clear to auscultation bilaterally  CARDIOVASCULAR: Regular rate and rhythm, normal S1 and S2, no murmur/rub/gallop; +lower extremity edema  ABDOMEN: Nontender to palpation, normoactive bowel sounds, no rebound/guarding  MUSCULOSKELETAL:  no clubbing or cyanosis of digits; no joint swelling or tenderness to palpation  PSYCH: A+O to person, place, and time; affect appropriate  SKIN: No rashes; no palpable lesions    LABS:                        8.6    6.88  )-----------( 238      ( 03 Nov 2022 06:27 )             27.8     11-03    139  |  100  |  24<H>  ----------------------------<  117<H>  3.7   |  28  |  1.99<H>    Ca    8.5      03 Nov 2022 06:27  Phos  3.3     11-02  Mg     1.8     11-02        CARDIAC MARKERS ( 02 Nov 2022 07:23 )  x     / x     / 27 U/L / x     / 1.8 ng/mL            RADIOLOGY & ADDITIONAL TESTS:  Results Reviewed:   Imaging Personally Reviewed:  Electrocardiogram Personally Reviewed:    COORDINATION OF CARE:  Care Discussed with Consultants/Other Providers [Y/N]:  Prior or Outpatient Records Reviewed [Y/N]:

## 2022-11-03 NOTE — PROGRESS NOTE ADULT - ASSESSMENT
72F HTN, HLD, DM, CKD who presented to [facility] initially with MAR on CKD with acute uremia and metabolic derangements requiring urgent HD and was COVID-19 positive. During HD went into atrial fibrillation and started on HD, subsequently developing GIB thought 2/2 AC and acute uremia.  On TTE noted to have a moderate to large pericardial effusion with early echocardiographic evidence of tamponade.  ON 10/20 had pericardiocentesis in the cath lab with 700 cc bloody fluid removed.  Had pericardial drain which was draining minimally, and removed 10/27.     - Repeat echo with no significant re- accumulation of effusion.    - Tolerated AVF with no issues.    - Continue off of AC for now secondary to bleeding issues  - Sinus tachycardia has been reactive  - cont metoprolol tartrate 25mg bid  - Continue amlodipine 10 QD  - Continue statin drug  -start asa 81mg daily for CAD  -pt now amenable to surgical eval for CABG  -cont to monitor on tele for additional ventricular ectopy

## 2022-11-03 NOTE — PROGRESS NOTE ADULT - ASSESSMENT
72F w/ HTN, HLD, DM, CKD, 10/20/22 from OSH with uremia, COVID-19, and pericardial effusion    (1)Renal - newly ESRD-HD  (2)CTS - s/p pericardial drain placement and now removal   (3)CV - 50 beats of wide complex tachycardia - V tach - New   Triple vessel disease       RECOMMEND:  (1)Next likely in am    3 K bath due to arrythmia   (2)+ Perm cath and sp AVF and she will need outpt follow up with vasc  (3)Cont Lopressor for now;  CTS to evaluate.  She is leaning towards medical management but wants surgical options as well before she makes a decision    Cards follow up       [facility]   [phone]

## 2022-11-04 LAB
ALBUMIN SERPL ELPH-MCNC: 3 G/DL — LOW (ref 3.3–5)
ALP SERPL-CCNC: 145 U/L — HIGH (ref 40–120)
ALT FLD-CCNC: 13 U/L — SIGNIFICANT CHANGE UP (ref 10–45)
ANION GAP SERPL CALC-SCNC: 10 MMOL/L — SIGNIFICANT CHANGE UP (ref 5–17)
AST SERPL-CCNC: 24 U/L — SIGNIFICANT CHANGE UP (ref 10–40)
BILIRUB SERPL-MCNC: 0.3 MG/DL — SIGNIFICANT CHANGE UP (ref 0.2–1.2)
BUN SERPL-MCNC: 30 MG/DL — HIGH (ref 7–23)
CALCIUM SERPL-MCNC: 8.5 MG/DL — SIGNIFICANT CHANGE UP (ref 8.4–10.5)
CHLORIDE SERPL-SCNC: 103 MMOL/L — SIGNIFICANT CHANGE UP (ref 96–108)
CO2 SERPL-SCNC: 29 MMOL/L — SIGNIFICANT CHANGE UP (ref 22–31)
CREAT SERPL-MCNC: 2.58 MG/DL — HIGH (ref 0.5–1.3)
EGFR: 19 ML/MIN/1.73M2 — LOW
GLUCOSE BLDC GLUCOMTR-MCNC: 111 MG/DL — HIGH (ref 70–99)
GLUCOSE BLDC GLUCOMTR-MCNC: 129 MG/DL — HIGH (ref 70–99)
GLUCOSE BLDC GLUCOMTR-MCNC: 137 MG/DL — HIGH (ref 70–99)
GLUCOSE BLDC GLUCOMTR-MCNC: 146 MG/DL — HIGH (ref 70–99)
GLUCOSE SERPL-MCNC: 131 MG/DL — HIGH (ref 70–99)
HCT VFR BLD CALC: 29.3 % — LOW (ref 34.5–45)
HGB BLD-MCNC: 8.8 G/DL — LOW (ref 11.5–15.5)
MCHC RBC-ENTMCNC: 29 PG — SIGNIFICANT CHANGE UP (ref 27–34)
MCHC RBC-ENTMCNC: 30 GM/DL — LOW (ref 32–36)
MCV RBC AUTO: 96.7 FL — SIGNIFICANT CHANGE UP (ref 80–100)
NRBC # BLD: 0 /100 WBCS — SIGNIFICANT CHANGE UP (ref 0–0)
PLATELET # BLD AUTO: 316 K/UL — SIGNIFICANT CHANGE UP (ref 150–400)
POTASSIUM SERPL-MCNC: 3.9 MMOL/L — SIGNIFICANT CHANGE UP (ref 3.5–5.3)
POTASSIUM SERPL-SCNC: 3.9 MMOL/L — SIGNIFICANT CHANGE UP (ref 3.5–5.3)
PROT SERPL-MCNC: 6.5 G/DL — SIGNIFICANT CHANGE UP (ref 6–8.3)
RBC # BLD: 3.03 M/UL — LOW (ref 3.8–5.2)
RBC # FLD: 15.9 % — HIGH (ref 10.3–14.5)
SODIUM SERPL-SCNC: 142 MMOL/L — SIGNIFICANT CHANGE UP (ref 135–145)
WBC # BLD: 7.64 K/UL — SIGNIFICANT CHANGE UP (ref 3.8–10.5)
WBC # FLD AUTO: 7.64 K/UL — SIGNIFICANT CHANGE UP (ref 3.8–10.5)

## 2022-11-04 PROCEDURE — 99232 SBSQ HOSP IP/OBS MODERATE 35: CPT

## 2022-11-04 PROCEDURE — 99233 SBSQ HOSP IP/OBS HIGH 50: CPT

## 2022-11-04 RX ORDER — METOPROLOL TARTRATE 50 MG
50 TABLET ORAL EVERY 12 HOURS
Refills: 0 | Status: DISCONTINUED | OUTPATIENT
Start: 2022-11-04 | End: 2022-11-09

## 2022-11-04 RX ORDER — ERYTHROPOIETIN 10000 [IU]/ML
10000 INJECTION, SOLUTION INTRAVENOUS; SUBCUTANEOUS ONCE
Refills: 0 | Status: COMPLETED | OUTPATIENT
Start: 2022-11-04 | End: 2022-11-04

## 2022-11-04 RX ORDER — ASPIRIN/CALCIUM CARB/MAGNESIUM 324 MG
81 TABLET ORAL DAILY
Refills: 0 | Status: DISCONTINUED | OUTPATIENT
Start: 2022-11-04 | End: 2022-11-09

## 2022-11-04 RX ORDER — HEPARIN SODIUM 5000 [USP'U]/ML
500 INJECTION INTRAVENOUS; SUBCUTANEOUS
Refills: 0 | Status: COMPLETED | OUTPATIENT
Start: 2022-11-04 | End: 2022-11-04

## 2022-11-04 RX ORDER — HEPARIN SODIUM 5000 [USP'U]/ML
1000 INJECTION INTRAVENOUS; SUBCUTANEOUS ONCE
Refills: 0 | Status: COMPLETED | OUTPATIENT
Start: 2022-11-04 | End: 2022-11-04

## 2022-11-04 RX ADMIN — HEPARIN SODIUM 1000 UNIT(S): 5000 INJECTION INTRAVENOUS; SUBCUTANEOUS at 11:09

## 2022-11-04 RX ADMIN — CHLORHEXIDINE GLUCONATE 1 APPLICATION(S): 213 SOLUTION TOPICAL at 06:45

## 2022-11-04 RX ADMIN — ERYTHROPOIETIN 10000 UNIT(S): 10000 INJECTION, SOLUTION INTRAVENOUS; SUBCUTANEOUS at 11:09

## 2022-11-04 RX ADMIN — LIDOCAINE 2 PATCH: 4 CREAM TOPICAL at 19:00

## 2022-11-04 RX ADMIN — Medication 100 MILLIGRAM(S): at 21:26

## 2022-11-04 RX ADMIN — SODIUM CHLORIDE 3 MILLILITER(S): 9 INJECTION INTRAMUSCULAR; INTRAVENOUS; SUBCUTANEOUS at 14:10

## 2022-11-04 RX ADMIN — Medication 81 MILLIGRAM(S): at 17:39

## 2022-11-04 RX ADMIN — AMLODIPINE BESYLATE 10 MILLIGRAM(S): 2.5 TABLET ORAL at 06:43

## 2022-11-04 RX ADMIN — Medication 100 MILLIGRAM(S): at 06:43

## 2022-11-04 RX ADMIN — Medication 25 MILLIGRAM(S): at 06:42

## 2022-11-04 RX ADMIN — Medication 100 MILLIGRAM(S): at 14:07

## 2022-11-04 RX ADMIN — SODIUM CHLORIDE 3 MILLILITER(S): 9 INJECTION INTRAMUSCULAR; INTRAVENOUS; SUBCUTANEOUS at 22:05

## 2022-11-04 RX ADMIN — Medication 667 MILLIGRAM(S): at 17:39

## 2022-11-04 RX ADMIN — CHLORHEXIDINE GLUCONATE 1 APPLICATION(S): 213 SOLUTION TOPICAL at 06:44

## 2022-11-04 RX ADMIN — RALOXIFENE HYDROCHLORIDE 60 MILLIGRAM(S): 60 TABLET, COATED ORAL at 14:08

## 2022-11-04 RX ADMIN — Medication 650 MILLIGRAM(S): at 07:13

## 2022-11-04 RX ADMIN — Medication 5 MILLIGRAM(S): at 17:39

## 2022-11-04 RX ADMIN — SENNA PLUS 2 TABLET(S): 8.6 TABLET ORAL at 21:27

## 2022-11-04 RX ADMIN — Medication 667 MILLIGRAM(S): at 14:07

## 2022-11-04 RX ADMIN — LIDOCAINE 2 PATCH: 4 CREAM TOPICAL at 14:08

## 2022-11-04 RX ADMIN — PANTOPRAZOLE SODIUM 40 MILLIGRAM(S): 20 TABLET, DELAYED RELEASE ORAL at 06:43

## 2022-11-04 RX ADMIN — Medication 667 MILLIGRAM(S): at 08:41

## 2022-11-04 RX ADMIN — ATORVASTATIN CALCIUM 80 MILLIGRAM(S): 80 TABLET, FILM COATED ORAL at 21:26

## 2022-11-04 RX ADMIN — HEPARIN SODIUM 500 UNIT(S): 5000 INJECTION INTRAVENOUS; SUBCUTANEOUS at 12:26

## 2022-11-04 RX ADMIN — Medication 650 MILLIGRAM(S): at 06:43

## 2022-11-04 RX ADMIN — Medication 25 MILLIGRAM(S): at 17:39

## 2022-11-04 NOTE — PROGRESS NOTE ADULT - ASSESSMENT
72F HTN, HLD, DM, CKD who presented to [facility] initially with MAR on CKD with acute uremia and metabolic derangements requiring urgent HD and was COVID-19 positive. During HD went into atrial fibrillation and started on HD, subsequently developing GIB thought 2/2 AC and acute uremia.  On TTE noted to have a moderate to large pericardial effusion with early echocardiographic evidence of tamponade.  ON 10/20 had pericardiocentesis in the cath lab with 700 cc bloody fluid removed.  Had pericardial drain which was draining minimally, and removed 10/27.     - Repeat echo with no significant re- accumulation of effusion.    - Tolerated AVF with no issues.    - Continue off of AC for now secondary to bleeding issues  - Sinus tachycardia has been reactive  - increase metoprolol to 50mg q12  - Continue amlodipine 10 QD  - Continue statin drug  - start asa 81mg daily for CAD  - now awaiting CTsx w/u for potential CABG this admission.

## 2022-11-04 NOTE — PROGRESS NOTE ADULT - ASSESSMENT
72F HTN, HLD, DM, CKD presented to [facility] initially with MAR on CKD with acute uremia and metabolic derangements requiring ICU stay, urgent HD and COVID-19 positive, complicated with Afib with GIB 2/2 AC and acute uremia. Transferred to [facility] CTS for large pericardial effusion and developed cardiac tamponade requiring urgent percardiocentesis 10/20 s/p 700cc renetta blood, monitored in CTU. Downgraded to medicine for further management

## 2022-11-04 NOTE — PROGRESS NOTE ADULT - SUBJECTIVE AND OBJECTIVE BOX
[facility] -- Julius Castañeda, Lucio Courtney, Tyrell Garrison Savella  Office # [phone]      Follow Up:  CAD     Subjective/Observations: Patient seen and examined. Events noted. Resting comfortably in bed. No complaints of chest pain, dyspnea, or palpitations reported. No signs of orthopnea or PND.       REVIEW OF SYSTEMS: All other review of systems is negative unless indicated above    PAST MEDICAL & SURGICAL HISTORY:  HTN (hypertension)      HLD (hyperlipidemia)      DM (diabetes mellitus)          MEDICATIONS  (STANDING):  amLODIPine   Tablet 10 milliGRAM(s) Oral daily  atorvastatin 80 milliGRAM(s) Oral at bedtime  benzonatate 100 milliGRAM(s) Oral three times a day  calcium acetate 667 milliGRAM(s) Oral three times a day with meals  chlorhexidine 2% Cloths 1 Application(s) Topical <User Schedule>  chlorhexidine 4% Liquid 1 Application(s) Topical <User Schedule>  insulin lispro (ADMELOG) corrective regimen sliding scale   SubCutaneous three times a day before meals  insulin lispro (ADMELOG) corrective regimen sliding scale   SubCutaneous at bedtime  lidocaine   4% Patch 2 Patch Transdermal daily  metoprolol tartrate 25 milliGRAM(s) Oral two times a day  pantoprazole    Tablet 40 milliGRAM(s) Oral before breakfast  polyethylene glycol 3350 17 Gram(s) Oral two times a day  raloxifene 60 milliGRAM(s) Oral daily  senna 2 Tablet(s) Oral at bedtime  sodium chloride 0.9% lock flush 3 milliLiter(s) IV Push every 8 hours    MEDICATIONS  (PRN):  benzocaine 15 mG/menthol 3.6 mG Lozenge 2 Lozenge Oral every 6 hours PRN Sore Throat  bisacodyl 5 milliGRAM(s) Oral every 12 hours PRN Constipation  guaiFENesin Oral Liquid (Sugar-Free) 100 milliGRAM(s) Oral every 6 hours PRN Cough  melatonin 3 milliGRAM(s) Oral at bedtime PRN Insomnia  traMADol 25 milliGRAM(s) Oral every 6 hours PRN Severe Pain (7 - 10)      Allergies    sulfa drugs (Rash)    Intolerances            Vital Signs Last 24 Hrs  T(C): 36.3 (04 Nov 2022 13:30), Max: 37.2 (03 Nov 2022 20:41)  T(F): 97.4 (04 Nov 2022 13:30), Max: 98.9 (03 Nov 2022 20:41)  HR: 94 (04 Nov 2022 13:30) (73 - 94)  BP: 149/71 (04 Nov 2022 13:30) (137/71 - 165/81)  BP(mean): --  RR: 18 (04 Nov 2022 13:30) (18 - 20)  SpO2: 99% (04 Nov 2022 13:30) (95% - 99%)    Parameters below as of 04 Nov 2022 13:30  Patient On (Oxygen Delivery Method): nasal cannula  O2 Flow (L/min): 2      I&O's Summary    03 Nov 2022 07:01  -  04 Nov 2022 07:00  --------------------------------------------------------  IN: 720 mL / OUT: 1450 mL / NET: -730 mL    04 Nov 2022 07:01  -  04 Nov 2022 13:48  --------------------------------------------------------  IN: 0 mL / OUT: 500 mL / NET: -500 mL          PHYSICAL EXAM:  [facility]:   Constitutional: NAD, awake    HEENT: Moist Mucous Membranes, Anicteric  Pulmonary: Decreased breath sounds b/l. No rales, crackles or wheeze appreciated.   Cardiovascular: Regular, S1 and S2, No murmurs, rubs, gallops or clicks  Gastrointestinal: Bowel Sounds present, soft, nontender.   Lymph: No peripheral edema. No lymphadenopathy.  Skin: No visible rashes or ulcers.  Psych:  Mood & affect appropriate for situation    LABS: All Labs Reviewed:                        8.8    7.64  )-----------( 316      ( 04 Nov 2022 05:59 )             29.3                         8.6    6.88  )-----------( 238      ( 03 Nov 2022 06:27 )             27.8     04 Nov 2022 05:59    142    |  103    |  30     ----------------------------<  131    3.9     |  29     |  2.58   03 Nov 2022 06:27    139    |  100    |  24     ----------------------------<  117    3.7     |  28     |  1.99   02 Nov 2022 07:23    139    |  99     |  40     ----------------------------<  119    3.9     |  26     |  2.57     Ca    8.5        04 Nov 2022 05:59  Ca    8.5        03 Nov 2022 06:27  Ca    8.5        02 Nov 2022 07:23  Phos  3.3       02 Nov 2022 07:23  Mg     1.8       02 Nov 2022 07:23    TPro  6.5    /  Alb  3.0    /  TBili  0.3    /  DBili  x      /  AST  24     /  ALT  13     /  AlkPhos  145    04 Nov 2022 05:59        - Troponin:

## 2022-11-04 NOTE — PROGRESS NOTE ADULT - SUBJECTIVE AND OBJECTIVE BOX
[facility] [phone]        Patient seen and examined in bed.  She was the same         MEDICATIONS  (STANDING):  amLODIPine   Tablet 10 milliGRAM(s) Oral daily  atorvastatin 80 milliGRAM(s) Oral at bedtime  benzonatate 100 milliGRAM(s) Oral three times a day  calcium acetate 667 milliGRAM(s) Oral three times a day with meals  chlorhexidine 2% Cloths 1 Application(s) Topical <User Schedule>  chlorhexidine 4% Liquid 1 Application(s) Topical <User Schedule>  epoetin beatris-epbx (RETACRIT) Injectable 04416 Unit(s) IV Push once  heparin   Injectable. 1000 Unit(s) Dialysis. once  heparin   Injectable. 500 Unit(s) Dialysis. every 1 hour  insulin lispro (ADMELOG) corrective regimen sliding scale   SubCutaneous three times a day before meals  insulin lispro (ADMELOG) corrective regimen sliding scale   SubCutaneous at bedtime  lidocaine   4% Patch 2 Patch Transdermal daily  metoprolol tartrate 25 milliGRAM(s) Oral two times a day  pantoprazole    Tablet 40 milliGRAM(s) Oral before breakfast  polyethylene glycol 3350 17 Gram(s) Oral two times a day  raloxifene 60 milliGRAM(s) Oral daily  senna 2 Tablet(s) Oral at bedtime  sodium chloride 0.9% lock flush 3 milliLiter(s) IV Push every 8 hours      VITAL:  T(C): , Max: 37.2 (11-03-22 @ 20:41)  T(F): , Max: 98.9 (11-03-22 @ 20:41)  HR: 91 (11-04-22 @ 05:26)  BP: 165/81 (11-04-22 @ 05:26)  BP(mean): --  RR: 20 (11-04-22 @ 05:26)  SpO2: 95% (11-04-22 @ 05:26)  Wt(kg): --    I and O's:    11-03 @ 07:01  -  11-04 @ 07:00  --------------------------------------------------------  IN: 720 mL / OUT: 1450 mL / NET: -730 mL          PHYSICAL EXAM:    Constitutional: NAD; obese   Neck:  No JVD  Respiratory: CTAB/L  Cardiovascular: S1 and S2  Gastrointestinal: BS+, soft, NT/ND  Extremities: No peripheral edema  Neurological: A/O x 3, no focal deficits  Psychiatric: Normal mood, normal affect  : No Bah  Skin: No rashes  Access: avf and perm cath     LABS:                        8.8    7.64  )-----------( 316      ( 04 Nov 2022 05:59 )             29.3     11-04    142  |  103  |  30<H>  ----------------------------<  131<H>  3.9   |  29  |  2.58<H>    Ca    8.5      04 Nov 2022 05:59    TPro  6.5  /  Alb  3.0<L>  /  TBili  0.3  /  DBili  x   /  AST  24  /  ALT  13  /  AlkPhos  145<H>  11-04          Urine Studies:          RADIOLOGY & ADDITIONAL STUDIES:

## 2022-11-04 NOTE — PROGRESS NOTE ADULT - ASSESSMENT
72F w/ HTN, HLD, DM, CKD, 10/20/22 from OSH with uremia, COVID-19, and pericardial effusion    (1)Renal - newly ESRD-HD  (2)CTS - s/p pericardial drain placement and now removal   (3)CV - 50 beats of wide complex tachycardia - V tach - New   Triple vessel disease       RECOMMEND:  (1)HD today with heparin on the machine   3 K bath due to arrythmia   (2)+ Perm cath and sp AVF and she will need outpt follow up with vasc  (3)Cont Lopressor for now;  CTS to evaluate.  She is leaning towards medical management but wants surgical options as well before she makes a decision    Cards follow up noted       [facility]   [phone]

## 2022-11-04 NOTE — PROGRESS NOTE ADULT - PROBLEM SELECTOR PLAN 1
11/1 overnight WCT 50 beats to 190s  asymptomatic.  Now S/p LHC on 11/2 showing multivessel dz. CTS eval with Dr. Mann pending   cont with tele monitor  cont with BB and high intensity statin   will follow up CTS eval first before starting AC.

## 2022-11-04 NOTE — PROGRESS NOTE ADULT - SUBJECTIVE AND OBJECTIVE BOX
[facility] Division of Hospital Medicine  Barbara Anderson MD  Pager (M-F, 6H-1N): [phone]  Other Times:  [phone]    Patient is a 72y old  Female who presents with a chief complaint of Pericardial Eff (04 Nov 2022 13:47)      SUBJECTIVE / OVERNIGHT EVENTS:  afebrile. feeling ok. no events on tele. no acute overnight issues    ADDITIONAL REVIEW OF SYSTEMS: no acute overnight issues     MEDICATIONS  (STANDING):  amLODIPine   Tablet 10 milliGRAM(s) Oral daily  atorvastatin 80 milliGRAM(s) Oral at bedtime  benzonatate 100 milliGRAM(s) Oral three times a day  calcium acetate 667 milliGRAM(s) Oral three times a day with meals  chlorhexidine 2% Cloths 1 Application(s) Topical <User Schedule>  chlorhexidine 4% Liquid 1 Application(s) Topical <User Schedule>  insulin lispro (ADMELOG) corrective regimen sliding scale   SubCutaneous three times a day before meals  insulin lispro (ADMELOG) corrective regimen sliding scale   SubCutaneous at bedtime  lidocaine   4% Patch 2 Patch Transdermal daily  metoprolol tartrate 25 milliGRAM(s) Oral two times a day  pantoprazole    Tablet 40 milliGRAM(s) Oral before breakfast  polyethylene glycol 3350 17 Gram(s) Oral two times a day  raloxifene 60 milliGRAM(s) Oral daily  senna 2 Tablet(s) Oral at bedtime  sodium chloride 0.9% lock flush 3 milliLiter(s) IV Push every 8 hours    MEDICATIONS  (PRN):  benzocaine 15 mG/menthol 3.6 mG Lozenge 2 Lozenge Oral every 6 hours PRN Sore Throat  bisacodyl 5 milliGRAM(s) Oral every 12 hours PRN Constipation  guaiFENesin Oral Liquid (Sugar-Free) 100 milliGRAM(s) Oral every 6 hours PRN Cough  melatonin 3 milliGRAM(s) Oral at bedtime PRN Insomnia  traMADol 25 milliGRAM(s) Oral every 6 hours PRN Severe Pain (7 - 10)      CAPILLARY BLOOD GLUCOSE      POCT Blood Glucose.: 111 mg/dL (04 Nov 2022 12:33)  POCT Blood Glucose.: 146 mg/dL (04 Nov 2022 08:29)  POCT Blood Glucose.: 125 mg/dL (03 Nov 2022 21:13)  POCT Blood Glucose.: 112 mg/dL (03 Nov 2022 16:34)    I&O's Summary    03 Nov 2022 07:01  -  04 Nov 2022 07:00  --------------------------------------------------------  IN: 720 mL / OUT: 1450 mL / NET: -730 mL    04 Nov 2022 07:01  -  04 Nov 2022 15:34  --------------------------------------------------------  IN: 240 mL / OUT: 800 mL / NET: -560 mL        PHYSICAL EXAM:  Vital Signs Last 24 Hrs  T(C): 36.3 (04 Nov 2022 13:30), Max: 37.2 (03 Nov 2022 20:41)  T(F): 97.4 (04 Nov 2022 13:30), Max: 98.9 (03 Nov 2022 20:41)  HR: 108 (04 Nov 2022 14:34) (73 - 108)  BP: 152/76 (04 Nov 2022 14:34) (137/71 - 165/81)  BP(mean): --  RR: 18 (04 Nov 2022 14:34) (18 - 20)  SpO2: 99% (04 Nov 2022 14:34) (95% - 99%)    Parameters below as of 04 Nov 2022 14:34  Patient On (Oxygen Delivery Method): nasal cannula  O2 Flow (L/min): 2    CONSTITUTIONAL: NAD, obese  EYES:  conjunctiva and sclera clear  ENMT: Moist oral mucosa  NECK: Supple, no palpable masses; no JVD  RESPIRATORY: Normal respiratory effort; lungs are clear to auscultation bilaterally  CARDIOVASCULAR: Regular rate and rhythm, normal S1 and S2, no murmur/rub/gallop; +lower extremity edema  ABDOMEN: Nontender to palpation, normoactive bowel sounds, no rebound/guarding  MUSCULOSKELETAL:  no clubbing or cyanosis of digits; no joint swelling or tenderness to palpation  PSYCH: A+O to person, place, and time; affect appropriate  SKIN: No rashes; no palpable lesions    LABS:                        8.8    7.64  )-----------( 316      ( 04 Nov 2022 05:59 )             29.3     11-04    142  |  103  |  30<H>  ----------------------------<  131<H>  3.9   |  29  |  2.58<H>    Ca    8.5      04 Nov 2022 05:59    TPro  6.5  /  Alb  3.0<L>  /  TBili  0.3  /  DBili  x   /  AST  24  /  ALT  13  /  AlkPhos  145<H>  11-04                RADIOLOGY & ADDITIONAL TESTS:  Results Reviewed:   Imaging Personally Reviewed:  Electrocardiogram Personally Reviewed:    COORDINATION OF CARE:  Care Discussed with Consultants/Other Providers [Y/N]:  Prior or Outpatient Records Reviewed [Y/N]:

## 2022-11-05 LAB
GLUCOSE BLDC GLUCOMTR-MCNC: 129 MG/DL — HIGH (ref 70–99)
GLUCOSE BLDC GLUCOMTR-MCNC: 143 MG/DL — HIGH (ref 70–99)
GLUCOSE BLDC GLUCOMTR-MCNC: 157 MG/DL — HIGH (ref 70–99)
GLUCOSE BLDC GLUCOMTR-MCNC: 164 MG/DL — HIGH (ref 70–99)
HCT VFR BLD CALC: 29.3 % — LOW (ref 34.5–45)
HGB BLD-MCNC: 8.8 G/DL — LOW (ref 11.5–15.5)
MCHC RBC-ENTMCNC: 29.5 PG — SIGNIFICANT CHANGE UP (ref 27–34)
MCHC RBC-ENTMCNC: 30 GM/DL — LOW (ref 32–36)
MCV RBC AUTO: 98.3 FL — SIGNIFICANT CHANGE UP (ref 80–100)
NRBC # BLD: 0 /100 WBCS — SIGNIFICANT CHANGE UP (ref 0–0)
PLATELET # BLD AUTO: 256 K/UL — SIGNIFICANT CHANGE UP (ref 150–400)
RBC # BLD: 2.98 M/UL — LOW (ref 3.8–5.2)
RBC # FLD: 16.2 % — HIGH (ref 10.3–14.5)
WBC # BLD: 10.21 K/UL — SIGNIFICANT CHANGE UP (ref 3.8–10.5)
WBC # FLD AUTO: 10.21 K/UL — SIGNIFICANT CHANGE UP (ref 3.8–10.5)

## 2022-11-05 PROCEDURE — 99232 SBSQ HOSP IP/OBS MODERATE 35: CPT

## 2022-11-05 RX ORDER — SIMETHICONE 80 MG/1
80 TABLET, CHEWABLE ORAL ONCE
Refills: 0 | Status: COMPLETED | OUTPATIENT
Start: 2022-11-05 | End: 2022-11-05

## 2022-11-05 RX ORDER — POLYETHYLENE GLYCOL 3350 17 G/17G
17 POWDER, FOR SOLUTION ORAL
Refills: 0 | Status: DISCONTINUED | OUTPATIENT
Start: 2022-11-05 | End: 2022-11-09

## 2022-11-05 RX ADMIN — SODIUM CHLORIDE 3 MILLILITER(S): 9 INJECTION INTRAMUSCULAR; INTRAVENOUS; SUBCUTANEOUS at 06:00

## 2022-11-05 RX ADMIN — AMLODIPINE BESYLATE 10 MILLIGRAM(S): 2.5 TABLET ORAL at 05:11

## 2022-11-05 RX ADMIN — LIDOCAINE 2 PATCH: 4 CREAM TOPICAL at 02:19

## 2022-11-05 RX ADMIN — Medication 1: at 11:34

## 2022-11-05 RX ADMIN — ATORVASTATIN CALCIUM 80 MILLIGRAM(S): 80 TABLET, FILM COATED ORAL at 21:59

## 2022-11-05 RX ADMIN — SODIUM CHLORIDE 3 MILLILITER(S): 9 INJECTION INTRAMUSCULAR; INTRAVENOUS; SUBCUTANEOUS at 15:32

## 2022-11-05 RX ADMIN — CHLORHEXIDINE GLUCONATE 1 APPLICATION(S): 213 SOLUTION TOPICAL at 05:13

## 2022-11-05 RX ADMIN — SODIUM CHLORIDE 3 MILLILITER(S): 9 INJECTION INTRAMUSCULAR; INTRAVENOUS; SUBCUTANEOUS at 22:00

## 2022-11-05 RX ADMIN — PANTOPRAZOLE SODIUM 40 MILLIGRAM(S): 20 TABLET, DELAYED RELEASE ORAL at 05:12

## 2022-11-05 RX ADMIN — Medication 50 MILLIGRAM(S): at 17:06

## 2022-11-05 RX ADMIN — TRAMADOL HYDROCHLORIDE 25 MILLIGRAM(S): 50 TABLET ORAL at 19:26

## 2022-11-05 RX ADMIN — Medication 667 MILLIGRAM(S): at 17:06

## 2022-11-05 RX ADMIN — TRAMADOL HYDROCHLORIDE 25 MILLIGRAM(S): 50 TABLET ORAL at 18:38

## 2022-11-05 RX ADMIN — RALOXIFENE HYDROCHLORIDE 60 MILLIGRAM(S): 60 TABLET, COATED ORAL at 11:31

## 2022-11-05 RX ADMIN — Medication 100 MILLIGRAM(S): at 05:12

## 2022-11-05 RX ADMIN — Medication 1: at 08:15

## 2022-11-05 RX ADMIN — POLYETHYLENE GLYCOL 3350 17 GRAM(S): 17 POWDER, FOR SOLUTION ORAL at 05:12

## 2022-11-05 RX ADMIN — SODIUM CHLORIDE 3 MILLILITER(S): 9 INJECTION INTRAMUSCULAR; INTRAVENOUS; SUBCUTANEOUS at 13:10

## 2022-11-05 RX ADMIN — Medication 81 MILLIGRAM(S): at 11:31

## 2022-11-05 RX ADMIN — Medication 100 MILLIGRAM(S): at 21:59

## 2022-11-05 RX ADMIN — Medication 50 MILLIGRAM(S): at 05:12

## 2022-11-05 RX ADMIN — Medication 100 MILLIGRAM(S): at 13:10

## 2022-11-05 RX ADMIN — SIMETHICONE 80 MILLIGRAM(S): 80 TABLET, CHEWABLE ORAL at 21:59

## 2022-11-05 RX ADMIN — Medication 667 MILLIGRAM(S): at 12:04

## 2022-11-05 RX ADMIN — Medication 667 MILLIGRAM(S): at 08:15

## 2022-11-05 NOTE — PROGRESS NOTE ADULT - PROBLEM SELECTOR PLAN 3
Cardiac tamponade (hypotensive and SOB 10/20) s/p urgent pericardiocentesis in the cath lab. 700cc bloody fluid removed with pigtail now removed. Repeat TTE post procedure 10/21, no pericardial tamponade.  - S/p limited TTE with minimal effusion thus pigtail removed  - Repeat TTE post drain unchanged from prior  - Pericardial fluid culture NGTD  - Acid fast neg  - CT angio chest and ab/p neg for malignancy

## 2022-11-05 NOTE — PROGRESS NOTE ADULT - SUBJECTIVE AND OBJECTIVE BOX
NEPHROLOGY     Patient seen and examined resting comfortably, no new complaints, denies sob, in no acute distress.     MEDICATIONS  (STANDING):  amLODIPine   Tablet 10 milliGRAM(s) Oral daily  aspirin enteric coated 81 milliGRAM(s) Oral daily  atorvastatin 80 milliGRAM(s) Oral at bedtime  benzonatate 100 milliGRAM(s) Oral three times a day  calcium acetate 667 milliGRAM(s) Oral three times a day with meals  chlorhexidine 2% Cloths 1 Application(s) Topical <User Schedule>  chlorhexidine 4% Liquid 1 Application(s) Topical <User Schedule>  insulin lispro (ADMELOG) corrective regimen sliding scale   SubCutaneous three times a day before meals  insulin lispro (ADMELOG) corrective regimen sliding scale   SubCutaneous at bedtime  lidocaine   4% Patch 2 Patch Transdermal daily  metoprolol tartrate 50 milliGRAM(s) Oral every 12 hours  pantoprazole    Tablet 40 milliGRAM(s) Oral before breakfast  polyethylene glycol 3350 17 Gram(s) Oral two times a day  raloxifene 60 milliGRAM(s) Oral daily  senna 2 Tablet(s) Oral at bedtime  sodium chloride 0.9% lock flush 3 milliLiter(s) IV Push every 8 hours    VITALS:  T(C): , Max: 37.2 (11-05-22 @ 04:14)  T(F): , Max: 98.9 (11-05-22 @ 04:14)  HR: 94 (11-05-22 @ 11:07)  BP: 119/73 (11-05-22 @ 11:07)  RR: 18 (11-05-22 @ 11:07)  SpO2: 98% (11-05-22 @ 11:07)    I and O's:    11-04 @ 07:01  -  11-05 @ 07:00  --------------------------------------------------------  IN: 480 mL / OUT: 1100 mL / NET: -620 mL    11-05 @ 08:01  -  11-05 @ 13:17  --------------------------------------------------------  IN: 440 mL / OUT: 0 mL / NET: 440 mL    PHYSICAL EXAM:  Constitutional: NAD; obese   Neck:  No JVD  Respiratory: CTAB/L  Cardiovascular: S1 and S2  Gastrointestinal: BS+, soft, NT/ND  Extremities: No peripheral edema  Neurological: A/O x 3, no focal deficits  Psychiatric: Normal mood, normal affect  : No Bah  Skin: No rashes  Access: avf and perm cath     LABS:                        8.8    10.21 )-----------( 256      ( 05 Nov 2022 06:52 )             29.3     11-04    142  |  103  |  30<H>  ----------------------------<  131<H>  3.9   |  29  |  2.58<H>    Ca    8.5      04 Nov 2022 05:59    TPro  6.5  /  Alb  3.0<L>  /  TBili  0.3  /  DBili  x   /  AST  24  /  ALT  13  /  AlkPhos  145<H>  11-04    ASSESSMENT:  72F w/ HTN, HLD, DM, CKD, 10/20/22 from OSH with uremia, COVID-19, and pericardial effusion    (1)Renal - newly ESRD-HD - s/p HD yesterday  (2)CTS - s/p pericardial drain placement and now removal   (3)CV - 50 beats of wide complex tachycardia - V tach - New   Triple vessel disease     RECOMMEND:  (1)Will plan for next HD, likely Monday  3 K bath due to arrythmia   (2)+ Perm cath and sp AVF and she will need outpt follow up with vasc  (3)Cont Lopressor for now;  CTS to evaluate. She is deciding between medical management vs surgical options  Cards follow up noted     KARL FelizC  [facility]  [phone]

## 2022-11-05 NOTE — PROGRESS NOTE ADULT - PROBLEM SELECTOR PLAN 1
11/1 overnight WCT 50 beats to 190s  asymptomatic.  Now S/p LHC on 11/2 showing multivessel dz. CTS eval with Dr. Mann pending   Cont with tele monitoring  Cont with BB and high intensity statin   Will follow up CTS eval first before starting DAPT/AC

## 2022-11-05 NOTE — PROGRESS NOTE ADULT - PROBLEM SELECTOR PLAN 4
Presented to [facility] VS MAR and CKD with uremia, initiated on urgent HD. New ESRD  - S/p permacath by IR 10/26 - HD poor flow over weekend s/p TPA now functional   - Phoslo TID  - S/p fistula placement 10/29  - HD as per nephro -- new, will need O/P set up  - Renally dose and avoid nephrotoxic agents

## 2022-11-05 NOTE — PROGRESS NOTE ADULT - ASSESSMENT
72yoF w/ PMHx of HTN, HLD, DM, CKD presented to [facility] initially with MAR on CKD with acute uremia and metabolic derangements requiring ICU stay, urgent HD and COVID-19 positive, complicated with Afib with GIB 2/2 AC and acute uremia. Transferred to [facility] CTS for large pericardial effusion and developed cardiac tamponade requiring urgent percardiocentesis 10/20 s/p 700cc renetta blood, monitored in CTU. Downgraded to medicine for further management -- pending CTS discussion re: CABG vs. PCI vs. med mgmt,

## 2022-11-05 NOTE — PROGRESS NOTE ADULT - SUBJECTIVE AND OBJECTIVE BOX
INCOMPLETE NOTE    Emily Fernandez M.D.  Division of Hospital Medicine  Available on TEAMS    Patient is a 72y old  Female who presents with a chief complaint of Pericardial Eff (04 Nov 2022 15:30)    SUBJECTIVE / OVERNIGHT EVENTS: Patient seen and examined. No acute overnight events, no subjective complaints.    OBJECTIVE:  Vital Signs Last 24 Hrs  T(F): 98.6 (05 Nov 2022 04:17), Max: 98.9 (05 Nov 2022 04:14)  HR: 88 (05 Nov 2022 05:10) (80 - 108)  BP: 130/79 (05 Nov 2022 05:10) (102/62 - 152/76)  RR: 18 (05 Nov 2022 05:10) (18 - 18)  SpO2: 97% (05 Nov 2022 05:10) (90% - 99%)    Parameters below as of 05 Nov 2022 05:10  Patient On (Oxygen Delivery Method): nasal cannula    I&O's Summary  04 Nov 2022 07:01  -  05 Nov 2022 07:00  --------------------------------------------------------  IN: 480 mL / OUT: 1100 mL / NET: -620 mL    Physical Examination:  GEN: thin man, laying in stretcher in NAD  PSYCH: A&Ox3, mood and affect appear appropriate   SKIN: intact, no e/o rash  NEURO: no focal neurologic deficits appreciated; CN II-XII intact, sensation intact B/L, motor 5/5 in all mm groups  EYES: PERRL, anicteric, EOMI, no vertical/horizontal nystagmus  HEAD: NC, AT  NECK: supple  RESPI: no accessory muscle use, B/L air entry, CTAB   CARDIO: regular rate/rhythm, no LE edema B/L  ABD: soft, NT, ND, +BS  EXT: patient able to move all extremities spontaneously  VASC: peripheral pulses palpated    Labs:  CAPILLARY BLOOD GLUCOSE  POCT Blood Glucose.: 157 mg/dL (05 Nov 2022 08:13)  POCT Blood Glucose.: 137 mg/dL (04 Nov 2022 22:00)  POCT Blood Glucose.: 129 mg/dL (04 Nov 2022 17:16)  POCT Blood Glucose.: 111 mg/dL (04 Nov 2022 12:33)                        8.8    10.21 )-----------( 256      ( 05 Nov 2022 06:52 )             29.3     11-04  142  |  103  |  30<H>  ----------------------------<  131<H>  3.9   |  29  |  2.58<H>    Ca    8.5      04 Nov 2022 05:59    TPro  6.5  /  Alb  3.0<L>  /  TBili  0.3  /  DBili  x   /  AST  24  /  ALT  13  /  AlkPhos  145<H>  11-04    Consultant(s) Notes Reviewed: Cards, Nephro, CTS  Care Discussed with Consultants/Other Providers: MISTI Ribera, MISTI Rizzo    MEDICATIONS  (STANDING):  amLODIPine   Tablet 10 milliGRAM(s) Oral daily  aspirin enteric coated 81 milliGRAM(s) Oral daily  atorvastatin 80 milliGRAM(s) Oral at bedtime  benzonatate 100 milliGRAM(s) Oral three times a day  calcium acetate 667 milliGRAM(s) Oral three times a day with meals  chlorhexidine 2% Cloths 1 Application(s) Topical <User Schedule>  chlorhexidine 4% Liquid 1 Application(s) Topical <User Schedule>  insulin lispro (ADMELOG) corrective regimen sliding scale   SubCutaneous three times a day before meals  insulin lispro (ADMELOG) corrective regimen sliding scale   SubCutaneous at bedtime  lidocaine   4% Patch 2 Patch Transdermal daily  metoprolol tartrate 50 milliGRAM(s) Oral every 12 hours  pantoprazole    Tablet 40 milliGRAM(s) Oral before breakfast  polyethylene glycol 3350 17 Gram(s) Oral two times a day  raloxifene 60 milliGRAM(s) Oral daily  senna 2 Tablet(s) Oral at bedtime  sodium chloride 0.9% lock flush 3 milliLiter(s) IV Push every 8 hours    MEDICATIONS  (PRN):  benzocaine 15 mG/menthol 3.6 mG Lozenge 2 Lozenge Oral every 6 hours PRN Sore Throat  bisacodyl 5 milliGRAM(s) Oral every 12 hours PRN Constipation  guaiFENesin Oral Liquid (Sugar-Free) 100 milliGRAM(s) Oral every 6 hours PRN Cough  melatonin 3 milliGRAM(s) Oral at bedtime PRN Insomnia  traMADol 25 milliGRAM(s) Oral every 6 hours PRN Severe Pain (7 - 10) Emily Fernandez M.D.  Division of Hospital Medicine  Available on TEAMS    Patient is a 72y old  Female who presents with a chief complaint of Pericardial Eff (04 Nov 2022 15:30)    SUBJECTIVE / OVERNIGHT EVENTS: Patient seen and examined. No acute overnight events, no subjective complaints as pt stated she had a BM this AM; she is eager to be discharged home but is clear on plan for continued discussions with CTS to review possibility of CABG vs. PCI intervention w/ cardiology vs. medical management. She denies CP or CP equivalents at this time; encouraged OOB to chair as much as tolerated.     OBJECTIVE:  Vital Signs Last 24 Hrs  T(F): 98.6 (05 Nov 2022 04:17), Max: 98.9 (05 Nov 2022 04:14)  HR: 88 (05 Nov 2022 05:10) (80 - 108)  BP: 130/79 (05 Nov 2022 05:10) (102/62 - 152/76)  RR: 18 (05 Nov 2022 05:10) (18 - 18)  SpO2: 97% (05 Nov 2022 05:10) (90% - 99%)    Parameters below as of 05 Nov 2022 05:10  Patient On (Oxygen Delivery Method): nasal cannula    I&O's Summary  04 Nov 2022 07:01  -  05 Nov 2022 07:00  --------------------------------------------------------  IN: 480 mL / OUT: 1100 mL / NET: -620 mL    Physical Examination:  GEN: elderly woman, laying in bed in NAD  PSYCH: A&Ox3, mood and affect appear appropriate   NEURO: no focal neurologic deficits appreciated  NECK: supple, no e/o elevated JVP  RESPI: no accessory muscle use, B/L air entry, CTAB   CARDIO: regular rate/rhythm, no LE edema B/L  ABD: soft, NT, ND  EXT: patient able to move all extremities spontaneously  VASC: peripheral pulses palpated    Labs:  CAPILLARY BLOOD GLUCOSE  POCT Blood Glucose.: 157 mg/dL (05 Nov 2022 08:13)  POCT Blood Glucose.: 137 mg/dL (04 Nov 2022 22:00)  POCT Blood Glucose.: 129 mg/dL (04 Nov 2022 17:16)  POCT Blood Glucose.: 111 mg/dL (04 Nov 2022 12:33)                        8.8    10.21 )-----------( 256      ( 05 Nov 2022 06:52 )             29.3     11-04  142  |  103  |  30<H>  ----------------------------<  131<H>  3.9   |  29  |  2.58<H>    Ca    8.5      04 Nov 2022 05:59    TPro  6.5  /  Alb  3.0<L>  /  TBili  0.3  /  DBili  x   /  AST  24  /  ALT  13  /  AlkPhos  145<H>  11-04    Consultant(s) Notes Reviewed: Cards, Nephro, CTS  Care Discussed with Consultants/Other Providers: MISTI Ribera, ACP LYNDON Rizzo    MEDICATIONS  (STANDING):  amLODIPine   Tablet 10 milliGRAM(s) Oral daily  aspirin enteric coated 81 milliGRAM(s) Oral daily  atorvastatin 80 milliGRAM(s) Oral at bedtime  benzonatate 100 milliGRAM(s) Oral three times a day  calcium acetate 667 milliGRAM(s) Oral three times a day with meals  chlorhexidine 2% Cloths 1 Application(s) Topical <User Schedule>  chlorhexidine 4% Liquid 1 Application(s) Topical <User Schedule>  insulin lispro (ADMELOG) corrective regimen sliding scale   SubCutaneous three times a day before meals  insulin lispro (ADMELOG) corrective regimen sliding scale   SubCutaneous at bedtime  lidocaine   4% Patch 2 Patch Transdermal daily  metoprolol tartrate 50 milliGRAM(s) Oral every 12 hours  pantoprazole    Tablet 40 milliGRAM(s) Oral before breakfast  polyethylene glycol 3350 17 Gram(s) Oral two times a day  raloxifene 60 milliGRAM(s) Oral daily  senna 2 Tablet(s) Oral at bedtime  sodium chloride 0.9% lock flush 3 milliLiter(s) IV Push every 8 hours    MEDICATIONS  (PRN):  benzocaine 15 mG/menthol 3.6 mG Lozenge 2 Lozenge Oral every 6 hours PRN Sore Throat  bisacodyl 5 milliGRAM(s) Oral every 12 hours PRN Constipation  guaiFENesin Oral Liquid (Sugar-Free) 100 milliGRAM(s) Oral every 6 hours PRN Cough  melatonin 3 milliGRAM(s) Oral at bedtime PRN Insomnia  traMADol 25 milliGRAM(s) Oral every 6 hours PRN Severe Pain (7 - 10)

## 2022-11-05 NOTE — PROGRESS NOTE ADULT - PROBLEM SELECTOR PLAN 7
In the setting of anticoagulation and uremia.   - Per GI, no interventions planned  - Now resolved  - Monitor H/H daily

## 2022-11-06 LAB
ALBUMIN SERPL ELPH-MCNC: 3 G/DL — LOW (ref 3.3–5)
ALP SERPL-CCNC: 134 U/L — HIGH (ref 40–120)
ALT FLD-CCNC: 14 U/L — SIGNIFICANT CHANGE UP (ref 10–45)
ANION GAP SERPL CALC-SCNC: 9 MMOL/L — SIGNIFICANT CHANGE UP (ref 5–17)
AST SERPL-CCNC: 23 U/L — SIGNIFICANT CHANGE UP (ref 10–40)
BILIRUB SERPL-MCNC: 0.3 MG/DL — SIGNIFICANT CHANGE UP (ref 0.2–1.2)
BUN SERPL-MCNC: 34 MG/DL — HIGH (ref 7–23)
CALCIUM SERPL-MCNC: 8.4 MG/DL — SIGNIFICANT CHANGE UP (ref 8.4–10.5)
CHLORIDE SERPL-SCNC: 102 MMOL/L — SIGNIFICANT CHANGE UP (ref 96–108)
CO2 SERPL-SCNC: 28 MMOL/L — SIGNIFICANT CHANGE UP (ref 22–31)
CREAT SERPL-MCNC: 2.78 MG/DL — HIGH (ref 0.5–1.3)
EGFR: 18 ML/MIN/1.73M2 — LOW
GLUCOSE BLDC GLUCOMTR-MCNC: 123 MG/DL — HIGH (ref 70–99)
GLUCOSE BLDC GLUCOMTR-MCNC: 142 MG/DL — HIGH (ref 70–99)
GLUCOSE BLDC GLUCOMTR-MCNC: 143 MG/DL — HIGH (ref 70–99)
GLUCOSE BLDC GLUCOMTR-MCNC: 160 MG/DL — HIGH (ref 70–99)
GLUCOSE SERPL-MCNC: 163 MG/DL — HIGH (ref 70–99)
HCT VFR BLD CALC: 29.2 % — LOW (ref 34.5–45)
HGB BLD-MCNC: 8.7 G/DL — LOW (ref 11.5–15.5)
MAGNESIUM SERPL-MCNC: 1.8 MG/DL — SIGNIFICANT CHANGE UP (ref 1.6–2.6)
MCHC RBC-ENTMCNC: 29.5 PG — SIGNIFICANT CHANGE UP (ref 27–34)
MCHC RBC-ENTMCNC: 29.8 GM/DL — LOW (ref 32–36)
MCV RBC AUTO: 99 FL — SIGNIFICANT CHANGE UP (ref 80–100)
NRBC # BLD: 0 /100 WBCS — SIGNIFICANT CHANGE UP (ref 0–0)
PHOSPHATE SERPL-MCNC: 3.7 MG/DL — SIGNIFICANT CHANGE UP (ref 2.5–4.5)
PLATELET # BLD AUTO: 286 K/UL — SIGNIFICANT CHANGE UP (ref 150–400)
POTASSIUM SERPL-MCNC: 4.1 MMOL/L — SIGNIFICANT CHANGE UP (ref 3.5–5.3)
POTASSIUM SERPL-SCNC: 4.1 MMOL/L — SIGNIFICANT CHANGE UP (ref 3.5–5.3)
PROT SERPL-MCNC: 6.4 G/DL — SIGNIFICANT CHANGE UP (ref 6–8.3)
RBC # BLD: 2.95 M/UL — LOW (ref 3.8–5.2)
RBC # FLD: 16.6 % — HIGH (ref 10.3–14.5)
SODIUM SERPL-SCNC: 139 MMOL/L — SIGNIFICANT CHANGE UP (ref 135–145)
WBC # BLD: 11.34 K/UL — HIGH (ref 3.8–10.5)
WBC # FLD AUTO: 11.34 K/UL — HIGH (ref 3.8–10.5)

## 2022-11-06 PROCEDURE — 99232 SBSQ HOSP IP/OBS MODERATE 35: CPT

## 2022-11-06 RX ORDER — SIMETHICONE 80 MG/1
80 TABLET, CHEWABLE ORAL THREE TIMES A DAY
Refills: 0 | Status: DISCONTINUED | OUTPATIENT
Start: 2022-11-06 | End: 2022-11-09

## 2022-11-06 RX ADMIN — ATORVASTATIN CALCIUM 80 MILLIGRAM(S): 80 TABLET, FILM COATED ORAL at 21:26

## 2022-11-06 RX ADMIN — SODIUM CHLORIDE 3 MILLILITER(S): 9 INJECTION INTRAMUSCULAR; INTRAVENOUS; SUBCUTANEOUS at 06:38

## 2022-11-06 RX ADMIN — Medication 50 MILLIGRAM(S): at 05:14

## 2022-11-06 RX ADMIN — RALOXIFENE HYDROCHLORIDE 60 MILLIGRAM(S): 60 TABLET, COATED ORAL at 11:50

## 2022-11-06 RX ADMIN — CHLORHEXIDINE GLUCONATE 1 APPLICATION(S): 213 SOLUTION TOPICAL at 05:18

## 2022-11-06 RX ADMIN — Medication 100 MILLIGRAM(S): at 15:32

## 2022-11-06 RX ADMIN — Medication 3 MILLIGRAM(S): at 21:26

## 2022-11-06 RX ADMIN — Medication 81 MILLIGRAM(S): at 11:50

## 2022-11-06 RX ADMIN — Medication 667 MILLIGRAM(S): at 17:14

## 2022-11-06 RX ADMIN — AMLODIPINE BESYLATE 10 MILLIGRAM(S): 2.5 TABLET ORAL at 05:14

## 2022-11-06 RX ADMIN — SODIUM CHLORIDE 3 MILLILITER(S): 9 INJECTION INTRAMUSCULAR; INTRAVENOUS; SUBCUTANEOUS at 21:28

## 2022-11-06 RX ADMIN — Medication 667 MILLIGRAM(S): at 08:08

## 2022-11-06 RX ADMIN — Medication 100 MILLIGRAM(S): at 21:26

## 2022-11-06 RX ADMIN — PANTOPRAZOLE SODIUM 40 MILLIGRAM(S): 20 TABLET, DELAYED RELEASE ORAL at 05:16

## 2022-11-06 RX ADMIN — Medication 50 MILLIGRAM(S): at 17:14

## 2022-11-06 RX ADMIN — Medication 667 MILLIGRAM(S): at 11:50

## 2022-11-06 RX ADMIN — Medication 100 MILLIGRAM(S): at 05:16

## 2022-11-06 NOTE — PROGRESS NOTE ADULT - SUBJECTIVE AND OBJECTIVE BOX
Emily Fernandez M.D.  Division of Hospital Medicine  Available on TEAMS    Patient is a 72y old  Female who presents with a chief complaint of Pericardial Eff (05 Nov 2022 13:17)    SUBJECTIVE / OVERNIGHT EVENTS: Patient seen and examined. No acute overnight events, no subjective complaints.    OBJECTIVE:  Vital Signs Last 24 Hrs  T(F): 98 (06 Nov 2022 04:28), Max: 98 (06 Nov 2022 04:28)  HR: 82 (06 Nov 2022 04:28) (82 - 94)  BP: 123/73 (06 Nov 2022 04:28) (119/73 - 125/74)  RR: 18 (06 Nov 2022 04:28) (18 - 18)  SpO2: 100% (06 Nov 2022 04:28) (98% - 100%)    Parameters below as of 06 Nov 2022 04:28  Patient On (Oxygen Delivery Method): nasal cannula    I&O's Summary  05 Nov 2022 08:01  -  06 Nov 2022 07:00  --------------------------------------------------------  IN: 1040 mL / OUT: 900 mL / NET: 140 mL    Physical Examination:  GEN: elderly woman, laying in bed in NAD  PSYCH: A&Ox3, mood and affect appear appropriate   NEURO: no focal neurologic deficits appreciated  NECK: supple, no e/o elevated JVP  RESPI: no accessory muscle use, B/L air entry, CTAB   CARDIO: regular rate/rhythm, no LE edema B/L  ABD: soft, NT, ND  EXT: patient able to move all extremities spontaneously  VASC: peripheral pulses palpated    Labs:  CAPILLARY BLOOD GLUCOSE  POCT Blood Glucose.: 142 mg/dL (06 Nov 2022 07:42)  POCT Blood Glucose.: 143 mg/dL (05 Nov 2022 21:15)  POCT Blood Glucose.: 129 mg/dL (05 Nov 2022 16:29)  POCT Blood Glucose.: 164 mg/dL (05 Nov 2022 11:31)                        8.8    10.21 )-----------( 256      ( 05 Nov 2022 06:52 )             29.3     Consultant(s) Notes Reviewed: Nephrology  Care Discussed with Consultants/Other Providers: MISTI Gold    MEDICATIONS  (STANDING):  amLODIPine   Tablet 10 milliGRAM(s) Oral daily  aspirin enteric coated 81 milliGRAM(s) Oral daily  atorvastatin 80 milliGRAM(s) Oral at bedtime  benzonatate 100 milliGRAM(s) Oral three times a day  calcium acetate 667 milliGRAM(s) Oral three times a day with meals  chlorhexidine 2% Cloths 1 Application(s) Topical <User Schedule>  chlorhexidine 4% Liquid 1 Application(s) Topical <User Schedule>  insulin lispro (ADMELOG) corrective regimen sliding scale   SubCutaneous three times a day before meals  insulin lispro (ADMELOG) corrective regimen sliding scale   SubCutaneous at bedtime  lidocaine   4% Patch 2 Patch Transdermal daily  metoprolol tartrate 50 milliGRAM(s) Oral every 12 hours  pantoprazole    Tablet 40 milliGRAM(s) Oral before breakfast  polyethylene glycol 3350 17 Gram(s) Oral two times a day  raloxifene 60 milliGRAM(s) Oral daily  senna 2 Tablet(s) Oral at bedtime  sodium chloride 0.9% lock flush 3 milliLiter(s) IV Push every 8 hours    MEDICATIONS  (PRN):  benzocaine 15 mG/menthol 3.6 mG Lozenge 2 Lozenge Oral every 6 hours PRN Sore Throat  bisacodyl 5 milliGRAM(s) Oral every 12 hours PRN Constipation  guaiFENesin Oral Liquid (Sugar-Free) 100 milliGRAM(s) Oral every 6 hours PRN Cough  melatonin 3 milliGRAM(s) Oral at bedtime PRN Insomnia  traMADol 25 milliGRAM(s) Oral every 6 hours PRN Severe Pain (7 - 10) Emily Fernandez M.D.  Division of Hospital Medicine  Available on TEAMS    Patient is a 72y old  Female who presents with a chief complaint of Pericardial Eff (05 Nov 2022 13:17)    SUBJECTIVE / OVERNIGHT EVENTS: Patient seen and examined with family at bedside. No acute overnight events, no subjective complaints except for continued gas; encouraged OOB to chair w/ assistance which patient is eager to participate in. Reviewed decision before her during upcoming discussion w/ CTS re: CABG vs. PCI vs. med mgmt only for TVD.     OBJECTIVE:  Vital Signs Last 24 Hrs  T(F): 98 (06 Nov 2022 04:28), Max: 98 (06 Nov 2022 04:28)  HR: 82 (06 Nov 2022 04:28) (82 - 94)  BP: 123/73 (06 Nov 2022 04:28) (119/73 - 125/74)  RR: 18 (06 Nov 2022 04:28) (18 - 18)  SpO2: 100% (06 Nov 2022 04:28) (98% - 100%)    Parameters below as of 06 Nov 2022 04:28  Patient On (Oxygen Delivery Method): nasal cannula    I&O's Summary  05 Nov 2022 08:01  -  06 Nov 2022 07:00  --------------------------------------------------------  IN: 1040 mL / OUT: 900 mL / NET: 140 mL    Physical Examination:  GEN: elderly woman, laying in bed in NAD  PSYCH: A&Ox3, mood and affect appear appropriate   NEURO: no focal neurologic deficits appreciated  NECK: supple, no e/o elevated JVP  RESPI: no accessory muscle use, B/L air entry, CTAB   CARDIO: regular rate/rhythm, no LE edema B/L  ABD: soft, NT, ND  EXT: patient able to move all extremities spontaneously  VASC: peripheral pulses palpated    Labs:  CAPILLARY BLOOD GLUCOSE  POCT Blood Glucose.: 142 mg/dL (06 Nov 2022 07:42)  POCT Blood Glucose.: 143 mg/dL (05 Nov 2022 21:15)  POCT Blood Glucose.: 129 mg/dL (05 Nov 2022 16:29)  POCT Blood Glucose.: 164 mg/dL (05 Nov 2022 11:31)                        8.8    10.21 )-----------( 256      ( 05 Nov 2022 06:52 )             29.3     Consultant(s) Notes Reviewed: Nephrology  Care Discussed with Consultants/Other Providers: MISTI Gold    MEDICATIONS  (STANDING):  amLODIPine   Tablet 10 milliGRAM(s) Oral daily  aspirin enteric coated 81 milliGRAM(s) Oral daily  atorvastatin 80 milliGRAM(s) Oral at bedtime  benzonatate 100 milliGRAM(s) Oral three times a day  calcium acetate 667 milliGRAM(s) Oral three times a day with meals  chlorhexidine 2% Cloths 1 Application(s) Topical <User Schedule>  chlorhexidine 4% Liquid 1 Application(s) Topical <User Schedule>  insulin lispro (ADMELOG) corrective regimen sliding scale   SubCutaneous three times a day before meals  insulin lispro (ADMELOG) corrective regimen sliding scale   SubCutaneous at bedtime  lidocaine   4% Patch 2 Patch Transdermal daily  metoprolol tartrate 50 milliGRAM(s) Oral every 12 hours  pantoprazole    Tablet 40 milliGRAM(s) Oral before breakfast  polyethylene glycol 3350 17 Gram(s) Oral two times a day  raloxifene 60 milliGRAM(s) Oral daily  senna 2 Tablet(s) Oral at bedtime  sodium chloride 0.9% lock flush 3 milliLiter(s) IV Push every 8 hours    MEDICATIONS  (PRN):  benzocaine 15 mG/menthol 3.6 mG Lozenge 2 Lozenge Oral every 6 hours PRN Sore Throat  bisacodyl 5 milliGRAM(s) Oral every 12 hours PRN Constipation  guaiFENesin Oral Liquid (Sugar-Free) 100 milliGRAM(s) Oral every 6 hours PRN Cough  melatonin 3 milliGRAM(s) Oral at bedtime PRN Insomnia  traMADol 25 milliGRAM(s) Oral every 6 hours PRN Severe Pain (7 - 10)

## 2022-11-06 NOTE — PROGRESS NOTE ADULT - ASSESSMENT
72yoF w/ PMHx of HTN, HLD, DM, CKD presented to [facility] initially with MAR on CKD with acute uremia and metabolic derangements requiring ICU stay, urgent HD and COVID-19 positive, complicated with Afib with GIB 2/2 AC and acute uremia. Transferred to [facility] CTS for large pericardial effusion and developed cardiac tamponade requiring urgent percardiocentesis 10/20 s/p 700cc renetta blood, monitored in CTU. Downgraded to medicine for further management -- pending CTS discussion re: CABG vs. PCI (if appropriate per cards) vs. med mgmt only.

## 2022-11-06 NOTE — PROGRESS NOTE ADULT - PROBLEM SELECTOR PLAN 3
new paroxysmal afib complicated with GIB at [facility] VS. Converted from afib -> NSR 10/22  - S/p amiodarone x2 and digoxin x1 in ctu  - Cardiology following  - Monitor on telemetry  - C/w metoprolol 25mg bid  - Monitor hgb and hold off AC given converted to nsr, pericardial effusion and GIB  - CHADVasc at least 3

## 2022-11-06 NOTE — PROGRESS NOTE ADULT - ASSESSMENT
72yoF w/ PMHx of HTN, HLD, DM, CKD presented to [facility] initially with MAR on CKD with acute uremia and metabolic derangements requiring ICU stay, urgent HD and COVID-19 positive, complicated with Afib with GIB 2/2 AC and acute uremia. Transferred to [facility] CTS for large pericardial effusion and developed cardiac tamponade requiring urgent percardiocentesis 10/20 s/p 700cc renetta blood, monitored in CTU. Downgraded to medicine for further management -- pending CTS discussion re: CABG vs. PCI (if appropriate per cards) vs. med mgmt only.

## 2022-11-06 NOTE — PROGRESS NOTE ADULT - SUBJECTIVE AND OBJECTIVE BOX
PATIENT SEEN AND EXAMINED ON :- 11/6/22  DATE OF SERVICE:  11.6.22           Interim events noted,Labs ,Radiological studies and Cardiology tests reviewed .    Covering for Dr Hector Santillan       HOSPITAL COURSE: HPI:  72F HTN, HLD, DM, CKD who presented to [facility] initially with MAR on CKD with acute uremia and metabolic derangements requiring urgent HD and was COVID-19 positive. During HD went into atrial fibrillation and started on HD, subsequently developing GIB thought 2/2 AC and acute uremia. Also on amiodarone for Afib. On TTE noted to have a moderate to large pericardial effusion with early echocardiographic evidence of tamponade. Clinically she is not hypotensive and she tolerates the fluid shifts related to HD. Patient today was transferred to [facility] CTS Dr. Gabriel for evaluation of Pericardial Effusion.   (20 Oct 2022 02:28)      INTERIM EVENTS:Patient seen at bedside ,interim events noted.      PMH -reviewed admission note, no change since admission  HEART FAILURE: Acute[ ]Chronic[ ] Systolic[ ] Diastolic[ ] Combined Systolic and Diastolic[ ]  CAD[ ] CABG[ ] PCI[ ]  DEVICES[ ] PPM[ ] ICD[ ] ILR[ ]  ATRIAL FIBRILLATION[ ] Paroxysmal[ ] Permanent[ ] CHADS2-[  ]  MAR[ ] CKD1[ ] CKD2[ ] CKD3[ ] CKD4[ ] ESRD[ ]  COPD[ ] HTN[ ]   DM[ ] Type1[ ] Type 2[ ]   CVA[ ] Paresis[ ]    AMBULATION: Assisted[ ] Cane/walker[ ] Independent[ ]    MEDICATIONS  (STANDING):  amLODIPine   Tablet 10 milliGRAM(s) Oral daily  aspirin enteric coated 81 milliGRAM(s) Oral daily  atorvastatin 80 milliGRAM(s) Oral at bedtime  benzonatate 100 milliGRAM(s) Oral three times a day  calcium acetate 667 milliGRAM(s) Oral three times a day with meals  chlorhexidine 2% Cloths 1 Application(s) Topical <User Schedule>  chlorhexidine 4% Liquid 1 Application(s) Topical <User Schedule>  insulin lispro (ADMELOG) corrective regimen sliding scale   SubCutaneous three times a day before meals  insulin lispro (ADMELOG) corrective regimen sliding scale   SubCutaneous at bedtime  lidocaine   4% Patch 2 Patch Transdermal daily  metoprolol tartrate 50 milliGRAM(s) Oral every 12 hours  pantoprazole    Tablet 40 milliGRAM(s) Oral before breakfast  polyethylene glycol 3350 17 Gram(s) Oral two times a day  raloxifene 60 milliGRAM(s) Oral daily  senna 2 Tablet(s) Oral at bedtime  sodium chloride 0.9% lock flush 3 milliLiter(s) IV Push every 8 hours    MEDICATIONS  (PRN):  benzocaine 15 mG/menthol 3.6 mG Lozenge 2 Lozenge Oral every 6 hours PRN Sore Throat  bisacodyl 5 milliGRAM(s) Oral every 12 hours PRN Constipation  guaiFENesin Oral Liquid (Sugar-Free) 100 milliGRAM(s) Oral every 6 hours PRN Cough  melatonin 3 milliGRAM(s) Oral at bedtime PRN Insomnia  simethicone 80 milliGRAM(s) Chew three times a day PRN Gas  traMADol 25 milliGRAM(s) Oral every 6 hours PRN Severe Pain (7 - 10)            REVIEW OF SYSTEMS:  Constitutional: [ ] fever, [ ]weight loss,  [ ]fatigue [ ]weight gain  Eyes: [ ] visual changes  Respiratory: [ ]shortness of breath;  [ ] cough, [ ]wheezing, [ ]chills, [ ]hemoptysis  Cardiovascular: [ ] chest pain, [ ]palpitations, [ ]dizziness,  [ ]leg swelling[ ]orthopnea[ ]PND  Gastrointestinal: [ ] abdominal pain, [ ]nausea, [ ]vomiting,  [ ]diarrhea [ ]Constipation [ ]Melena  Genitourinary: [ ] dysuria, [ ] hematuria [ ]Bah  Neurologic: [ ] headaches [ ] tremors[ ]weakness [ ]Paralysis Right[ ] Left[ ]  Skin: [ ] itching, [ ]burning, [ ] rashes  Endocrine: [ ] heat or cold intolerance  Musculoskeletal: [ ] joint pain or swelling; [ ] muscle, back, or extremity pain  Psychiatric: [ ] depression, [ ]anxiety, [ ]mood swings, or [ ]difficulty sleeping  Hematologic: [ ] easy bruising, [ ] bleeding gums    [ ] All remaining systems negative except as per above.   [ ]Unable to obtain.  [x] No change in ROS since admission      Vital Signs Last 24 Hrs  T(C): 37 (06 Nov 2022 11:18), Max: 37 (06 Nov 2022 11:18)  T(F): 98.6 (06 Nov 2022 11:18), Max: 98.6 (06 Nov 2022 11:18)  HR: 87 (06 Nov 2022 11:18) (82 - 87)  BP: 127/68 (06 Nov 2022 11:18) (123/73 - 127/68)  BP(mean): --  RR: 18 (06 Nov 2022 11:18) (18 - 18)  SpO2: 98% (06 Nov 2022 11:18) (98% - 100%)    Parameters below as of 06 Nov 2022 11:18  Patient On (Oxygen Delivery Method): nasal cannula  O2 Flow (L/min): 1    I&O's Summary    05 Nov 2022 08:01  -  06 Nov 2022 07:00  --------------------------------------------------------  IN: 1040 mL / OUT: 900 mL / NET: 140 mL    06 Nov 2022 07:01  -  06 Nov 2022 20:27  --------------------------------------------------------  IN: 240 mL / OUT: 300 mL / NET: -60 mL        PHYSICAL EXAM:  General: No acute distress BMI-  HEENT: EOMI, PERRL  Neck: Supple, [ ] JVD  Lungs: Equal air entry bilaterally; [ ] rales [ ] wheezing [ ] rhonchi  Heart: Regular rate and rhythm; [x ] murmur   2/6 [ x] systolic [ ] diastolic [ ] radiation[ ] rubs [ ]  gallops  Abdomen: Nontender, bowel sounds present  Extremities: No clubbing, cyanosis, [ ] edema [ ]Pulses  equal and intact  Nervous system:  Alert & Oriented X3, no focal deficits  Psychiatric: Normal affect  Skin: No rashes or lesions    LABS:  11-06    139  |  102  |  34<H>  ----------------------------<  163<H>  4.1   |  28  |  2.78<H>    Ca    8.4      06 Nov 2022 09:34  Phos  3.7     11-06  Mg     1.8     11-06    TPro  6.4  /  Alb  3.0<L>  /  TBili  0.3  /  DBili  x   /  AST  23  /  ALT  14  /  AlkPhos  134<H>  11-06    Creatinine Trend: 2.78<--, 2.58<--, 1.99<--, 2.57<--, 2.91<--, 2.84<--                        8.7    11.34 )-----------( 286      ( 06 Nov 2022 09:34 )             29.2   < from: TTE with Doppler (w/Cont) (10.28.22 @ 09:08) >    Patient name: RILEY MEJÍA  YOB: 1950   Age: 72 (F)   MR#: [id number]  Study Date: 10/28/2022  Location: [address] [place], [place] [id number]: Isela Earl ОЛЕГ  Study quality: Technically difficult  Referring Physician: Leonardo Horn MD  Blood Pressure: 100/66 mmHg  Height: 163 cm  Weight: 103 kg  BSA: 2.1 m2  Heart Rate: 102 mmHg  ------------------------------------------------------------------------  PROCEDURE: Transthoracic echocardiogram with 2-D, M-Mode  and complete spectral and color flow Doppler. Verbal  consent was obtained for injection of  Ultrasonic Enhancing  Agent following a discussion of risks and benefits.  Following intravenous injection of Ultrasonic Enhancing  Agent, harmonic imaging was performed.  INDICATION: Chest pain, unspecified (R07.9)  ------------------------------------------------------------------------  Dimensions:    Normal Values:  LA:     3.7    2.0 - 4.0 cm  Ao:     3.2    2.0 - 3.8 cm  SEPTUM: 1.3    0.6 - 1.2 cm  PWT:    0.8    0.6 -1.1 cm  LVIDd:  3.9    3.0 - 5.6 cm  LVIDs:  2.9    1.8 - 4.0 cm  Derived variables:  LVMI: 64 g/m2  RWT: 0.41  Fractional short: 26 %  EF (Modified Carcamo Rule): 63 %Doppler Peak Velocity  (m/sec): AoV=2.3  ------------------------------------------------------------------------  Observations:  Mitral Valve: Mitral annular calcification, otherwise  normal mitral valve. Minimal mitral regurgitation.  Aortic Valve/Aorta: Calcified trileaflet aortic valve with  decreased opening. Peak transaortic valve gradient equals  21 mm Hg, estimated aortic valve area equals 1.7 sqcm (by  continuity equation), consistent with mild aortic stenosis.  No aortic valve regurgitation seen. Peak left ventricular  outflow tract gradient equals 5 mm Hg, mean gradient is  equal to 3 mm Hg, LVOT velocity time integral equals 18 cm.  Aortic Root: 3.2 cm.  Left Atrium: Mildly dilated left atrium.  LA volume index =  37 cc/m2.  Left Ventricle: Endocardial visualization enhanced with  intravenous injection of Ultrasonic Enhancing Agent  (Definity).  Normal left ventricular systolic function. No  left ventricular thrombus. Normal left ventricular internal  dimensions and wall thicknesses. Mild diastolic dysfunction  (Stage I).  Right Heart: Normal right atrium. Normal right ventricular  size and function. Normal tricuspid valve. Minimal  tricuspid regurgitation. Normal pulmonic valve. No pulmonic  regurgitation.  Pericardium/Pleura: Normal pericardium with trace  pericardial effusion.  Hemodynamic: Estimated right atrial pressure is 8 mm Hg.  Color Doppler demonstrates no evidence of a patent foramen  ovale.  ------------------------------------------------------------------------  Conclusions:  1. Mitral annular calcification, otherwise normal mitral  valve. Minimal mitral regurgitation.  2. Calcified trileaflet aortic valve with decreased  opening. No aortic valve regurgitation seen.  3. Endocardial visualization enhanced with intravenous  injection of Ultrasonic Enhancing Agent (Definity).  Normal  left ventricular systolic function. No left ventricular  thrombus.  4. Mild diastolic dysfunction (Stage I).  5. Normal right ventricular size and function.  6. Normal pericardium with trace pericardial effusion.  *** Compared with echocardiogram of 10/25/2022, no  significant changes noted.  ------------------------------------------------------------------------  Confirmed on  10/28/2022 - 11:52:08 by Acosta Carty M.D.  ------------------------------------------------------------------------    < end of copied text >  < from: TTE with Doppler (w/Cont) (10.28.22 @ 09:08) >    Patient name: RILEY MEJÍA  YOB: 1950   Age: 72 (F)   MR#: [id number]  Study Date: 10/28/2022  Location: [address] [place], [place] [id number]: Isela Earl RDCS  Study quality: Technically difficult  Referring Physician: Leonardo Horn MD  Blood Pressure: 100/66 mmHg  Height: 163 cm  Weight: 103 kg  BSA: 2.1 m2  Heart Rate: 102 mmHg  ------------------------------------------------------------------------  PROCEDURE: Transthoracic echocardiogram with 2-D, M-Mode  and complete spectral and color flow Doppler. Verbal  consent was obtained for injection of  Ultrasonic Enhancing  Agent following a discussion of risks and benefits.  Following intravenous injection of Ultrasonic Enhancing  Agent, harmonic imaging was performed.  INDICATION: Chest pain, unspecified (R07.9)  ------------------------------------------------------------------------  Dimensions:    Normal Values:  LA:     3.7    2.0 - 4.0 cm  Ao:     3.2    2.0 - 3.8 cm  SEPTUM: 1.3    0.6 - 1.2 cm  PWT:    0.8    0.6 -1.1 cm  LVIDd:  3.9    3.0 - 5.6 cm  LVIDs:  2.9    1.8 - 4.0 cm  Derived variables:  LVMI: 64 g/m2  RWT: 0.41  Fractional short: 26 %  EF (Modified Carcamo Rule): 63 %Doppler Peak Velocity  (m/sec): AoV=2.3  ------------------------------------------------------------------------  Observations:  Mitral Valve: Mitral annular calcification, otherwise  normal mitral valve. Minimal mitral regurgitation.  Aortic Valve/Aorta: Calcified trileaflet aortic valve with  decreased opening. Peak transaortic valve gradient equals  21 mm Hg, estimated aortic valve area equals 1.7 sqcm (by  continuity equation), consistent with mild aortic stenosis.  No aortic valve regurgitation seen. Peak left ventricular  outflow tract gradient equals 5 mm Hg, mean gradient is  equal to 3 mm Hg, LVOT velocity time integral equals 18 cm.  Aortic Root: 3.2 cm.  Left Atrium: Mildly dilated left atrium.  LA volume index =  37 cc/m2.  Left Ventricle: Endocardial visualization enhanced with  intravenous injection of Ultrasonic Enhancing Agent  (Definity).  Normal left ventricular systolic function. No  left ventricular thrombus. Normal left ventricular internal  dimensions and wall thicknesses. Mild diastolic dysfunction  (Stage I).  Right Heart: Normal right atrium. Normal right ventricular  size and function. Normal tricuspid valve. Minimal  tricuspid regurgitation. Normal pulmonic valve. No pulmonic  regurgitation.  Pericardium/Pleura: Normal pericardium with trace  pericardial effusion.  Hemodynamic: Estimated right atrial pressure is 8 mm Hg.  Color Doppler demonstrates no evidence of a patent foramen  ovale.  ------------------------------------------------------------------------  Conclusions:  1. Mitral annular calcification, otherwise normal mitral  valve. Minimal mitral regurgitation.  2. Calcified trileaflet aortic valve with decreased  opening. No aortic valve regurgitation seen.  3. Endocardial visualization enhanced with intravenous  injection of Ultrasonic Enhancing Agent (Definity).  Normal  left ventricular systolic function. No left ventricular  thrombus.  4. Mild diastolic dysfunction (Stage I).  5. Normal right ventricular size and function.  6. Normal pericardium with trace pericardial effusion.  *** Compared with echocardiogram of 10/25/2022, no  significant changes noted.  ------------------------------------------------------------------------  Confirmed on  10/28/2022 - 11:52:08 by Acosta Carty M.D.  ------------------------------------------------------------------------    < end of copied text >

## 2022-11-06 NOTE — PROGRESS NOTE ADULT - PROBLEM SELECTOR PLAN 1
11/1 overnight WCT 50 beats to 190s  asymptomatic.  Now S/p LHC on 11/2 showing multivessel dz. CTS eval with Dr. Mann pending   Cont with tele monitoring  Cont with BB and high intensity statin   Will follow up CTS eval first before starting DAPT/AC - pending discussion on 11/7

## 2022-11-07 PROBLEM — E11.9 TYPE 2 DIABETES MELLITUS WITHOUT COMPLICATIONS: Chronic | Status: ACTIVE | Noted: 2022-10-20

## 2022-11-07 PROBLEM — I10 ESSENTIAL (PRIMARY) HYPERTENSION: Chronic | Status: ACTIVE | Noted: 2022-10-20

## 2022-11-07 PROBLEM — E78.5 HYPERLIPIDEMIA, UNSPECIFIED: Chronic | Status: ACTIVE | Noted: 2022-10-20

## 2022-11-07 LAB
ANION GAP SERPL CALC-SCNC: 15 MMOL/L — SIGNIFICANT CHANGE UP (ref 5–17)
BUN SERPL-MCNC: 39 MG/DL — HIGH (ref 7–23)
CALCIUM SERPL-MCNC: 8.6 MG/DL — SIGNIFICANT CHANGE UP (ref 8.4–10.5)
CHLORIDE SERPL-SCNC: 101 MMOL/L — SIGNIFICANT CHANGE UP (ref 96–108)
CO2 SERPL-SCNC: 24 MMOL/L — SIGNIFICANT CHANGE UP (ref 22–31)
CREAT SERPL-MCNC: 3.15 MG/DL — HIGH (ref 0.5–1.3)
EGFR: 15 ML/MIN/1.73M2 — LOW
GLUCOSE BLDC GLUCOMTR-MCNC: 141 MG/DL — HIGH (ref 70–99)
GLUCOSE BLDC GLUCOMTR-MCNC: 159 MG/DL — HIGH (ref 70–99)
GLUCOSE BLDC GLUCOMTR-MCNC: 169 MG/DL — HIGH (ref 70–99)
GLUCOSE BLDC GLUCOMTR-MCNC: 96 MG/DL — SIGNIFICANT CHANGE UP (ref 70–99)
GLUCOSE SERPL-MCNC: 148 MG/DL — HIGH (ref 70–99)
HCT VFR BLD CALC: 28.2 % — LOW (ref 34.5–45)
HGB BLD-MCNC: 8.5 G/DL — LOW (ref 11.5–15.5)
MCHC RBC-ENTMCNC: 29.8 PG — SIGNIFICANT CHANGE UP (ref 27–34)
MCHC RBC-ENTMCNC: 30.1 GM/DL — LOW (ref 32–36)
MCV RBC AUTO: 98.9 FL — SIGNIFICANT CHANGE UP (ref 80–100)
NRBC # BLD: 0 /100 WBCS — SIGNIFICANT CHANGE UP (ref 0–0)
PLATELET # BLD AUTO: 303 K/UL — SIGNIFICANT CHANGE UP (ref 150–400)
POTASSIUM SERPL-MCNC: 3.9 MMOL/L — SIGNIFICANT CHANGE UP (ref 3.5–5.3)
POTASSIUM SERPL-SCNC: 3.9 MMOL/L — SIGNIFICANT CHANGE UP (ref 3.5–5.3)
RBC # BLD: 2.85 M/UL — LOW (ref 3.8–5.2)
RBC # FLD: 16.9 % — HIGH (ref 10.3–14.5)
SODIUM SERPL-SCNC: 140 MMOL/L — SIGNIFICANT CHANGE UP (ref 135–145)
WBC # BLD: 10.59 K/UL — HIGH (ref 3.8–10.5)
WBC # FLD AUTO: 10.59 K/UL — HIGH (ref 3.8–10.5)

## 2022-11-07 PROCEDURE — 99232 SBSQ HOSP IP/OBS MODERATE 35: CPT

## 2022-11-07 PROCEDURE — 99233 SBSQ HOSP IP/OBS HIGH 50: CPT

## 2022-11-07 RX ORDER — HEPARIN SODIUM 5000 [USP'U]/ML
5000 INJECTION INTRAVENOUS; SUBCUTANEOUS EVERY 8 HOURS
Refills: 0 | Status: DISCONTINUED | OUTPATIENT
Start: 2022-11-07 | End: 2022-11-08

## 2022-11-07 RX ORDER — HEPARIN SODIUM 5000 [USP'U]/ML
500 INJECTION INTRAVENOUS; SUBCUTANEOUS
Refills: 0 | Status: COMPLETED | OUTPATIENT
Start: 2022-11-07 | End: 2022-11-07

## 2022-11-07 RX ORDER — SIMETHICONE 80 MG/1
80 TABLET, CHEWABLE ORAL ONCE
Refills: 0 | Status: COMPLETED | OUTPATIENT
Start: 2022-11-07 | End: 2022-11-07

## 2022-11-07 RX ORDER — HEPARIN SODIUM 5000 [USP'U]/ML
1000 INJECTION INTRAVENOUS; SUBCUTANEOUS ONCE
Refills: 0 | Status: COMPLETED | OUTPATIENT
Start: 2022-11-07 | End: 2022-11-07

## 2022-11-07 RX ADMIN — SODIUM CHLORIDE 3 MILLILITER(S): 9 INJECTION INTRAMUSCULAR; INTRAVENOUS; SUBCUTANEOUS at 22:43

## 2022-11-07 RX ADMIN — Medication 100 MILLIGRAM(S): at 06:20

## 2022-11-07 RX ADMIN — Medication 50 MILLIGRAM(S): at 17:18

## 2022-11-07 RX ADMIN — RALOXIFENE HYDROCHLORIDE 60 MILLIGRAM(S): 60 TABLET, COATED ORAL at 17:18

## 2022-11-07 RX ADMIN — SIMETHICONE 80 MILLIGRAM(S): 80 TABLET, CHEWABLE ORAL at 22:33

## 2022-11-07 RX ADMIN — HEPARIN SODIUM 500 UNIT(S): 5000 INJECTION INTRAVENOUS; SUBCUTANEOUS at 13:54

## 2022-11-07 RX ADMIN — Medication 667 MILLIGRAM(S): at 17:17

## 2022-11-07 RX ADMIN — Medication 100 MILLIGRAM(S): at 22:33

## 2022-11-07 RX ADMIN — HEPARIN SODIUM 1000 UNIT(S): 5000 INJECTION INTRAVENOUS; SUBCUTANEOUS at 12:54

## 2022-11-07 RX ADMIN — LIDOCAINE 2 PATCH: 4 CREAM TOPICAL at 20:07

## 2022-11-07 RX ADMIN — LIDOCAINE 2 PATCH: 4 CREAM TOPICAL at 17:19

## 2022-11-07 RX ADMIN — CHLORHEXIDINE GLUCONATE 1 APPLICATION(S): 213 SOLUTION TOPICAL at 06:24

## 2022-11-07 RX ADMIN — ATORVASTATIN CALCIUM 80 MILLIGRAM(S): 80 TABLET, FILM COATED ORAL at 22:34

## 2022-11-07 RX ADMIN — Medication 1: at 12:13

## 2022-11-07 RX ADMIN — Medication 100 MILLIGRAM(S): at 17:17

## 2022-11-07 RX ADMIN — HEPARIN SODIUM 5000 UNIT(S): 5000 INJECTION INTRAVENOUS; SUBCUTANEOUS at 22:43

## 2022-11-07 RX ADMIN — SENNA PLUS 2 TABLET(S): 8.6 TABLET ORAL at 22:34

## 2022-11-07 RX ADMIN — Medication 1: at 08:27

## 2022-11-07 RX ADMIN — Medication 667 MILLIGRAM(S): at 08:30

## 2022-11-07 RX ADMIN — AMLODIPINE BESYLATE 10 MILLIGRAM(S): 2.5 TABLET ORAL at 06:22

## 2022-11-07 RX ADMIN — Medication 81 MILLIGRAM(S): at 17:17

## 2022-11-07 RX ADMIN — Medication 0: at 22:34

## 2022-11-07 RX ADMIN — CHLORHEXIDINE GLUCONATE 1 APPLICATION(S): 213 SOLUTION TOPICAL at 06:23

## 2022-11-07 RX ADMIN — PANTOPRAZOLE SODIUM 40 MILLIGRAM(S): 20 TABLET, DELAYED RELEASE ORAL at 06:21

## 2022-11-07 RX ADMIN — Medication 50 MILLIGRAM(S): at 06:21

## 2022-11-07 RX ADMIN — SODIUM CHLORIDE 3 MILLILITER(S): 9 INJECTION INTRAMUSCULAR; INTRAVENOUS; SUBCUTANEOUS at 06:34

## 2022-11-07 RX ADMIN — SODIUM CHLORIDE 3 MILLILITER(S): 9 INJECTION INTRAMUSCULAR; INTRAVENOUS; SUBCUTANEOUS at 13:21

## 2022-11-07 NOTE — PROGRESS NOTE ADULT - ASSESSMENT
72F HTN, HLD, DM, CKD who presented to [facility] initially with MAR on CKD with acute uremia and metabolic derangements requiring urgent HD and was COVID-19 positive. During HD went into atrial fibrillation and started on HD, subsequently developing GIB thought 2/2 AC and acute uremia.  On TTE noted to have a moderate to large pericardial effusion with early echocardiographic evidence of tamponade.  ON 10/20 had pericardiocentesis in the cath lab with 700 cc bloody fluid removed.  Had pericardial drain which was draining minimally, and removed 10/27.     - Repeat echo with no significant re- accumulation of effusion.    - Tolerated AVF with no issues.    - Continue off of AC for now secondary to bleeding issues    - Sinus tachycardia has been reactive  - cont metoprolol 50mg q12  - Continue amlodipine 10 QD    - s/p CTsx w/u for potential CABG this admission.   - she is very apprehensive on CABG. Though appears to be more agreeable now  - Continue statin drug  - asa 81mg daily for CAD    - Monitor and replete electrolytes. Keep K>4.0 and Mg>2.0.   - Further cardiac workup will depend on clinical course.   - All other workup per primary team. Will followup.

## 2022-11-07 NOTE — PROGRESS NOTE ADULT - PROBLEM SELECTOR PLAN 1
11/1 overnight WCT 50 beats to 190s  asymptomatic.  Now S/p LHC on 11/2 showing multivessel dz. CTS eval with Dr. Mann - f/u meeting with  today as per pt   post CTS discussion with card if consideration for staged PCIs or medical therapy.   Cont with tele monitoring  Cont with BB and high intensity statin

## 2022-11-07 NOTE — PROGRESS NOTE ADULT - SUBJECTIVE AND OBJECTIVE BOX
[facility] [phone]        Patient seen and examined         MEDICATIONS  (STANDING):  amLODIPine   Tablet 10 milliGRAM(s) Oral daily  aspirin enteric coated 81 milliGRAM(s) Oral daily  atorvastatin 80 milliGRAM(s) Oral at bedtime  benzonatate 100 milliGRAM(s) Oral three times a day  calcium acetate 667 milliGRAM(s) Oral three times a day with meals  chlorhexidine 2% Cloths 1 Application(s) Topical <User Schedule>  chlorhexidine 4% Liquid 1 Application(s) Topical <User Schedule>  insulin lispro (ADMELOG) corrective regimen sliding scale   SubCutaneous three times a day before meals  insulin lispro (ADMELOG) corrective regimen sliding scale   SubCutaneous at bedtime  lidocaine   4% Patch 2 Patch Transdermal daily  metoprolol tartrate 50 milliGRAM(s) Oral every 12 hours  pantoprazole    Tablet 40 milliGRAM(s) Oral before breakfast  polyethylene glycol 3350 17 Gram(s) Oral two times a day  raloxifene 60 milliGRAM(s) Oral daily  senna 2 Tablet(s) Oral at bedtime  sodium chloride 0.9% lock flush 3 milliLiter(s) IV Push every 8 hours      VITAL:  T(C): , Max: 37 (11-06-22 @ 11:18)  T(F): , Max: 98.6 (11-06-22 @ 11:18)  HR: 87 (11-07-22 @ 04:49)  BP: 146/83 (11-07-22 @ 04:49)  BP(mean): --  RR: 18 (11-07-22 @ 04:49)  SpO2: 98% (11-07-22 @ 04:49)  Wt(kg): --    I and O's:    11-06 @ 07:01  -  11-07 @ 07:00  --------------------------------------------------------  IN: 480 mL / OUT: 1600 mL / NET: -1120 mL          PHYSICAL EXAM:    Constitutional: NAD  Neck:  No JVD  Respiratory: CTAB/L  Cardiovascular: S1 and S2  Gastrointestinal: BS+, soft, NT/ND  Extremities: No peripheral edema  Neurological: A/O x 3, no focal deficits  Psychiatric: Normal mood, normal affect  : No Bah  Skin: No rashes  Access: Not applicable    LABS:                        8.5    10.59 )-----------( 303      ( 07 Nov 2022 05:52 )             28.2     11-07    140  |  101  |  39<H>  ----------------------------<  148<H>  3.9   |  24  |  3.15<H>    Ca    8.6      07 Nov 2022 05:52  Phos  3.7     11-06  Mg     1.8     11-06    TPro  6.4  /  Alb  3.0<L>  /  TBili  0.3  /  DBili  x   /  AST  23  /  ALT  14  /  AlkPhos  134<H>  11-06          Urine Studies:          RADIOLOGY & ADDITIONAL STUDIES:

## 2022-11-07 NOTE — PROGRESS NOTE ADULT - SUBJECTIVE AND OBJECTIVE BOX
[facility] -- Julius Castañeda, Sherwin, Lucoi, Tyrell Garrison Savella  Office # [phone]      Follow Up:  CAD    Subjective/Observations: Patient seen and examined. Events noted. Resting comfortably in bed. No complaints of chest pain, dyspnea, or palpitations reported. No signs of orthopnea or PND.       REVIEW OF SYSTEMS: All other review of systems is negative unless indicated above    PAST MEDICAL & SURGICAL HISTORY:  HTN (hypertension)      HLD (hyperlipidemia)      DM (diabetes mellitus)          MEDICATIONS  (STANDING):  amLODIPine   Tablet 10 milliGRAM(s) Oral daily  aspirin enteric coated 81 milliGRAM(s) Oral daily  atorvastatin 80 milliGRAM(s) Oral at bedtime  benzonatate 100 milliGRAM(s) Oral three times a day  calcium acetate 667 milliGRAM(s) Oral three times a day with meals  chlorhexidine 2% Cloths 1 Application(s) Topical <User Schedule>  chlorhexidine 4% Liquid 1 Application(s) Topical <User Schedule>  heparin   Injectable. 1000 Unit(s) Dialysis. once  heparin   Injectable. 500 Unit(s) Dialysis. every 1 hour  insulin lispro (ADMELOG) corrective regimen sliding scale   SubCutaneous three times a day before meals  insulin lispro (ADMELOG) corrective regimen sliding scale   SubCutaneous at bedtime  lidocaine   4% Patch 2 Patch Transdermal daily  metoprolol tartrate 50 milliGRAM(s) Oral every 12 hours  pantoprazole    Tablet 40 milliGRAM(s) Oral before breakfast  polyethylene glycol 3350 17 Gram(s) Oral two times a day  raloxifene 60 milliGRAM(s) Oral daily  senna 2 Tablet(s) Oral at bedtime  sodium chloride 0.9% lock flush 3 milliLiter(s) IV Push every 8 hours    MEDICATIONS  (PRN):  benzocaine 15 mG/menthol 3.6 mG Lozenge 2 Lozenge Oral every 6 hours PRN Sore Throat  bisacodyl 5 milliGRAM(s) Oral every 12 hours PRN Constipation  guaiFENesin Oral Liquid (Sugar-Free) 100 milliGRAM(s) Oral every 6 hours PRN Cough  melatonin 3 milliGRAM(s) Oral at bedtime PRN Insomnia  simethicone 80 milliGRAM(s) Chew three times a day PRN Gas  traMADol 25 milliGRAM(s) Oral every 6 hours PRN Severe Pain (7 - 10)      Allergies    sulfa drugs (Rash)    Intolerances            Vital Signs Last 24 Hrs  T(C): 36.9 (07 Nov 2022 10:43), Max: 37 (06 Nov 2022 11:18)  T(F): 98.5 (07 Nov 2022 10:43), Max: 98.6 (06 Nov 2022 11:18)  HR: 78 (07 Nov 2022 10:43) (78 - 87)  BP: 147/73 (07 Nov 2022 10:43) (127/68 - 147/73)  BP(mean): --  RR: 18 (07 Nov 2022 10:43) (18 - 18)  SpO2: 98% (07 Nov 2022 10:43) (98% - 99%)    Parameters below as of 07 Nov 2022 10:43  Patient On (Oxygen Delivery Method): nasal cannula        I&O's Summary    06 Nov 2022 07:01  -  07 Nov 2022 07:00  --------------------------------------------------------  IN: 480 mL / OUT: 1600 mL / NET: -1120 mL          PHYSICAL EXAM:  TELE: SR   Constitutional: NAD, awake and alert, well-developed  HEENT: Moist Mucous Membranes, Anicteric  Pulmonary: Decreased breath sounds b/l. No rales, crackles or wheeze appreciated.   Cardiovascular: Regular, S1 and S2, + SM   Gastrointestinal: Bowel Sounds present, soft, nontender.   Lymph: ++ peripheral edema. No lymphadenopathy.  Skin: No visible rashes or ulcers.  Psych:  Mood & affect appropriate for situation    LABS: All Labs Reviewed:                        8.5    10.59 )-----------( 303      ( 07 Nov 2022 05:52 )             28.2                         8.7    11.34 )-----------( 286      ( 06 Nov 2022 09:34 )             29.2                         8.8    10.21 )-----------( 256      ( 05 Nov 2022 06:52 )             29.3     07 Nov 2022 05:52    140    |  101    |  39     ----------------------------<  148    3.9     |  24     |  3.15   06 Nov 2022 09:34    139    |  102    |  34     ----------------------------<  163    4.1     |  28     |  2.78     Ca    8.6        07 Nov 2022 05:52  Ca    8.4        06 Nov 2022 09:34  Phos  3.7       06 Nov 2022 09:34  Mg     1.8       06 Nov 2022 09:34    TPro  6.4    /  Alb  3.0    /  TBili  0.3    /  DBili  x      /  AST  23     /  ALT  14     /  AlkPhos  134    06 Nov 2022 09:34        - Troponin:

## 2022-11-07 NOTE — PROGRESS NOTE ADULT - ASSESSMENT
72yoF w/ PMHx of HTN, HLD, DM, CKD presented to [facility] initially with MAR on CKD with acute uremia and metabolic derangements requiring ICU stay, urgent HD and COVID-19 positive, complicated with Afib with GIB 2/2 AC and acute uremia. Transferred to [facility] CTS for large pericardial effusion and developed cardiac tamponade requiring urgent percardiocentesis 10/20 s/p 700cc renetta blood, monitored in CTU. Downgraded to medicine for further management -- pending CTS discussion re: CABG vs. PCI (if appropriate per cards) vs. med mgmt only.

## 2022-11-07 NOTE — PROGRESS NOTE ADULT - ASSESSMENT
72F w/ HTN, HLD, DM, CKD, 10/20/22 from OSH with uremia, COVID-19, and pericardial effusion    (1)Renal - newly ESRD-HD   (2)CTS - s/p pericardial drain placement and now removal   (3)CV - 50 beats of wide complex tachycardia - V tach - likely from the Triple vessel disease     RECOMMEND:  (1)Will plan for HD today   3 K bath due to arrythmia   (2)+ Perm cath and sp AVF and she will need outpt follow up with vasc for maturation   (3)Cont Lopressor for now;  CTS to evaluate. She is deciding between medical management vs surgical options  Cards follow up noted and discussion will be today       Sayed [facility]   [phone]

## 2022-11-08 ENCOUNTER — TRANSCRIPTION ENCOUNTER (OUTPATIENT)
Age: 72
End: 2022-11-08

## 2022-11-08 DIAGNOSIS — I25.10 ATHEROSCLEROTIC HEART DISEASE OF NATIVE CORONARY ARTERY WITHOUT ANGINA PECTORIS: ICD-10-CM

## 2022-11-08 LAB
ANION GAP SERPL CALC-SCNC: 12 MMOL/L — SIGNIFICANT CHANGE UP (ref 5–17)
BLD GP AB SCN SERPL QL: NEGATIVE — SIGNIFICANT CHANGE UP
BUN SERPL-MCNC: 28 MG/DL — HIGH (ref 7–23)
CALCIUM SERPL-MCNC: 8.6 MG/DL — SIGNIFICANT CHANGE UP (ref 8.4–10.5)
CHLORIDE SERPL-SCNC: 100 MMOL/L — SIGNIFICANT CHANGE UP (ref 96–108)
CO2 SERPL-SCNC: 27 MMOL/L — SIGNIFICANT CHANGE UP (ref 22–31)
CREAT SERPL-MCNC: 2.3 MG/DL — HIGH (ref 0.5–1.3)
EGFR: 22 ML/MIN/1.73M2 — LOW
GLUCOSE BLDC GLUCOMTR-MCNC: 110 MG/DL — HIGH (ref 70–99)
GLUCOSE BLDC GLUCOMTR-MCNC: 146 MG/DL — HIGH (ref 70–99)
GLUCOSE BLDC GLUCOMTR-MCNC: 151 MG/DL — HIGH (ref 70–99)
GLUCOSE BLDC GLUCOMTR-MCNC: 167 MG/DL — HIGH (ref 70–99)
GLUCOSE SERPL-MCNC: 135 MG/DL — HIGH (ref 70–99)
POTASSIUM SERPL-MCNC: 4.1 MMOL/L — SIGNIFICANT CHANGE UP (ref 3.5–5.3)
POTASSIUM SERPL-SCNC: 4.1 MMOL/L — SIGNIFICANT CHANGE UP (ref 3.5–5.3)
RH IG SCN BLD-IMP: POSITIVE — SIGNIFICANT CHANGE UP
SARS-COV-2 RNA SPEC QL NAA+PROBE: SIGNIFICANT CHANGE UP
SODIUM SERPL-SCNC: 139 MMOL/L — SIGNIFICANT CHANGE UP (ref 135–145)

## 2022-11-08 PROCEDURE — 99232 SBSQ HOSP IP/OBS MODERATE 35: CPT

## 2022-11-08 PROCEDURE — 99233 SBSQ HOSP IP/OBS HIGH 50: CPT

## 2022-11-08 PROCEDURE — 73110 X-RAY EXAM OF WRIST: CPT | Mod: 26,LT

## 2022-11-08 RX ORDER — ASCORBIC ACID 60 MG
2000 TABLET,CHEWABLE ORAL ONCE
Refills: 0 | Status: COMPLETED | OUTPATIENT
Start: 2022-11-08 | End: 2022-11-08

## 2022-11-08 RX ORDER — CHLORHEXIDINE GLUCONATE 213 G/1000ML
30 SOLUTION TOPICAL ONCE
Refills: 0 | Status: COMPLETED | OUTPATIENT
Start: 2022-11-08 | End: 2022-11-09

## 2022-11-08 RX ORDER — GABAPENTIN 400 MG/1
300 CAPSULE ORAL ONCE
Refills: 0 | Status: COMPLETED | OUTPATIENT
Start: 2022-11-08 | End: 2022-11-09

## 2022-11-08 RX ORDER — ACETAMINOPHEN 500 MG
1000 TABLET ORAL ONCE
Refills: 0 | Status: COMPLETED | OUTPATIENT
Start: 2022-11-08 | End: 2022-11-09

## 2022-11-08 RX ORDER — CEFUROXIME AXETIL 250 MG
1500 TABLET ORAL ONCE
Refills: 0 | Status: DISCONTINUED | OUTPATIENT
Start: 2022-11-08 | End: 2022-11-09

## 2022-11-08 RX ORDER — CHLORHEXIDINE GLUCONATE 213 G/1000ML
1 SOLUTION TOPICAL ONCE
Refills: 0 | Status: COMPLETED | OUTPATIENT
Start: 2022-11-08 | End: 2022-11-08

## 2022-11-08 RX ORDER — MUPIROCIN 20 MG/G
1 OINTMENT TOPICAL
Refills: 0 | Status: DISCONTINUED | OUTPATIENT
Start: 2022-11-08 | End: 2022-11-09

## 2022-11-08 RX ADMIN — TRAMADOL HYDROCHLORIDE 25 MILLIGRAM(S): 50 TABLET ORAL at 22:04

## 2022-11-08 RX ADMIN — PANTOPRAZOLE SODIUM 40 MILLIGRAM(S): 20 TABLET, DELAYED RELEASE ORAL at 06:01

## 2022-11-08 RX ADMIN — TRAMADOL HYDROCHLORIDE 25 MILLIGRAM(S): 50 TABLET ORAL at 05:59

## 2022-11-08 RX ADMIN — TRAMADOL HYDROCHLORIDE 25 MILLIGRAM(S): 50 TABLET ORAL at 06:56

## 2022-11-08 RX ADMIN — Medication 667 MILLIGRAM(S): at 17:08

## 2022-11-08 RX ADMIN — LIDOCAINE 2 PATCH: 4 CREAM TOPICAL at 23:08

## 2022-11-08 RX ADMIN — Medication 667 MILLIGRAM(S): at 08:24

## 2022-11-08 RX ADMIN — SODIUM CHLORIDE 3 MILLILITER(S): 9 INJECTION INTRAMUSCULAR; INTRAVENOUS; SUBCUTANEOUS at 06:53

## 2022-11-08 RX ADMIN — POLYETHYLENE GLYCOL 3350 17 GRAM(S): 17 POWDER, FOR SOLUTION ORAL at 05:57

## 2022-11-08 RX ADMIN — CHLORHEXIDINE GLUCONATE 1 APPLICATION(S): 213 SOLUTION TOPICAL at 21:13

## 2022-11-08 RX ADMIN — MUPIROCIN 1 APPLICATION(S): 20 OINTMENT TOPICAL at 21:13

## 2022-11-08 RX ADMIN — HEPARIN SODIUM 5000 UNIT(S): 5000 INJECTION INTRAVENOUS; SUBCUTANEOUS at 05:57

## 2022-11-08 RX ADMIN — Medication 2000 MILLIGRAM(S): at 21:19

## 2022-11-08 RX ADMIN — Medication 50 MILLIGRAM(S): at 17:08

## 2022-11-08 RX ADMIN — Medication 100 MILLIGRAM(S): at 21:18

## 2022-11-08 RX ADMIN — CHLORHEXIDINE GLUCONATE 1 APPLICATION(S): 213 SOLUTION TOPICAL at 05:56

## 2022-11-08 RX ADMIN — SODIUM CHLORIDE 3 MILLILITER(S): 9 INJECTION INTRAMUSCULAR; INTRAVENOUS; SUBCUTANEOUS at 21:13

## 2022-11-08 RX ADMIN — CHLORHEXIDINE GLUCONATE 1 APPLICATION(S): 213 SOLUTION TOPICAL at 06:05

## 2022-11-08 RX ADMIN — Medication 3 MILLIGRAM(S): at 21:19

## 2022-11-08 RX ADMIN — LIDOCAINE 2 PATCH: 4 CREAM TOPICAL at 06:06

## 2022-11-08 RX ADMIN — AMLODIPINE BESYLATE 10 MILLIGRAM(S): 2.5 TABLET ORAL at 05:56

## 2022-11-08 RX ADMIN — TRAMADOL HYDROCHLORIDE 25 MILLIGRAM(S): 50 TABLET ORAL at 23:00

## 2022-11-08 RX ADMIN — Medication 50 MILLIGRAM(S): at 05:56

## 2022-11-08 RX ADMIN — Medication 100 MILLIGRAM(S): at 05:57

## 2022-11-08 RX ADMIN — LIDOCAINE 2 PATCH: 4 CREAM TOPICAL at 19:56

## 2022-11-08 RX ADMIN — ATORVASTATIN CALCIUM 80 MILLIGRAM(S): 80 TABLET, FILM COATED ORAL at 21:19

## 2022-11-08 NOTE — PROGRESS NOTE ADULT - PROBLEM SELECTOR PROBLEM 6
Atrial fibrillation
GI bleed
Atrial fibrillation
Atrial fibrillation
GI bleed
Preventive measure
Atrial fibrillation
Atrial fibrillation
2019 novel coronavirus disease (COVID-19)
Atrial fibrillation
Atrial fibrillation
UTI (urinary tract infection)
GI bleed
GI bleed
Atrial fibrillation
Atrial fibrillation

## 2022-11-08 NOTE — PROGRESS NOTE ADULT - PROBLEM SELECTOR PLAN 5
new paroxysmal afib complicated with GIB at [facility] VS. Converted from afib -> NSR 10/22  -s/p amiodarone x2 and digoxin x1 in ctu  - Cardiology following  -monitor on telemetry  -c/w metoprolol 25mg bid  -monitor hgb and hold off AC given converted to nsr, pericardial effusion and GIB  -CHADVasc at least 3
UA+ with urine culture growing E. coli  -completed course of CTX (10/22-10/26 )  -ID recs appreciated,   -blood cx ngtd
Incidental finding, s/p mAB  Asymptomatic  -Cont supportive care, continuous pulse ox  -Isolation precautions discontinued as per ID recommendations
AST 3810 with ALT 1441. Likely shock liver  -check hepatitis panel   -trend lefts  -RUQ US
Incidental finding, s/p mAB  Asymptomatic  -Cont supportive care, continuous pulse ox  -Isolation precautions discontinued as per ID recommendations
in the setting of anticoagulation and uremia.   Per GI, no interventions planned  Now resolved  Monitor H/H daily
Incidental finding, s/p mAB  Asymptomatic  - Cont supportive care, continuous pulse ox  - Isolation precautions discontinued as per ID recommendations
Incidental finding, s/p mAB  Asymptomatic  -Cont supportive care, continuous pulse ox  -Isolation precautions discontinued as per ID recommendations
new paroxysmal afib complicated with GIB at [facility] VS. Converted from afib -> NSR 10/22  -s/p amiodarone x2 and digoxin x1 in ctu  -monitor on telemetry  -c/w metoprolol 25mg bid  -monitor hgb and hold off AC given converted to nsr, pericardial effusion and GIB  -CHADVasc at least 3
Incidental finding, s/p mAB  Asymptomatic  -Cont supportive care, continuous pulse ox  -Isolation precautions discontinued as per ID recommendations
new paroxysmal afib complicated with GIB at [facility] VS. Converted from afib -> NSR 10/22  -s/p amiodarone x2 and digoxin x1 in ctu  -monitor on telemetry  -c/w metoprolol 25mg bid  -monitor hgb and hold off AC given converted to nsr, pericardial effusion and GIB  -CHADVasc at least 3
Incidental finding, s/p mAB  Asymptomatic  - Cont supportive care, continuous pulse ox  - Isolation precautions discontinued as per ID recommendations
Incidental finding, s/p mAB  Asymptomatic  - Cont supportive care, continuous pulse ox  - Isolation precautions discontinued as per ID recommendations
new paroxysmal afib complicated with GIB at [facility] VS. Converted from afib -> NSR 10/22  -s/p amiodarone x2 and digoxin x1 in ctu  -monitor on telemetry  -c/w metoprolol 25mg bid  -monitor hgb and hold off AC given converted to nsr, pericardial effusion and GIB  -CHADVasc at least 3
new paroxysmal afib complicated with GIB at [facility] VS. Converted from afib -> NSR 10/22  -s/p amiodarone x2 and digoxin x1 in ctu  -monitor on telemetry  -c/w metoprolol 25mg bid  -monitor hgb and hold off AC given converted to nsr, pericardial effusion and GIB  -CHADVasc at least 3
Incidental finding, s/p mAB  Asymptomatic  - Cont supportive care, continuous pulse ox  - Isolation precautions discontinued as per ID recommendations
new paroxysmal afib complicated with GIB at [facility] VS. Converted from afib -> NSR 10/22  -s/p amiodarone x2 and digoxin x1 in ctu  - Cardiology following  -monitor on telemetry  -c/w metoprolol 25mg bid  -monitor hgb and hold off AC given converted to nsr, pericardial effusion and GIB  -CHADVasc at least 3
Incidental finding, s/p mAB  Asymptomatic  -Cont supportive care, continuous pulse ox  -Isolation precautions discontinued as per ID recommendations

## 2022-11-08 NOTE — PROGRESS NOTE ADULT - PROBLEM SELECTOR PLAN 2
NPO at midnight    Hibiclens  shower tonight and am  Peridex  swish and rinse in am  Zinacef on call to OR taped to chart  Mupirocin b/l nares  PRBCx1 11/8  COVID swab f/u results   Transfer to 2 [facility]  CABG in am with Dr Mann

## 2022-11-08 NOTE — PROGRESS NOTE ADULT - ASSESSMENT
72F w/ HTN, HLD, DM, CKD, 10/20/22 from OSH with uremia, COVID-19, and pericardial effusion    (1)Renal - newly ESRD-HD   (2)CTS - s/p pericardial drain placement and now removal   (3)CV - 50 beats of wide complex tachycardia - V tach - likely from the Triple vessel disease     RECOMMEND:  (1)Will plan for HD today in anticipation of surgery in am   3 K bath due to arrythmia   (2)+ Perm cath and sp AVF and she will need outpt follow up with vasc for maturation   (3)Cont Lopressor for now;  CTS OR in am         Sayed ReacciÃ³n   [phone]

## 2022-11-08 NOTE — PROGRESS NOTE ADULT - PROBLEM SELECTOR PLAN 9
DVT: heparin subq  Diet: Renal  Dispo: PT recommending [facility]
A1C 6.0  - Appreciate endocrine recs  - Cont current regimen  - TSH low, with low T3 and T4; repeat TFTs in 4 weeks as outpatient
DVT: heparin subq  Diet: Renal  Dispo: PT recommending [place]
DVT: heparin subq  Diet: Renal  Dispo: PT recommending [place]
A1C 6.0  Appreciate endocrine recs  Cont current regimen  - TSH low, with low T3 and T4; repeat TFTs in 4 weeks as outpatient
DVT: heparin subq  Diet: Renal  Dispo: PT recommending [place]
DVT: heparin subq  Diet: Renal  Dispo: PT recommending [place]
A1C 6.0  - Appreciate endocrine recs  - Cont current regimen  - TSH low, with low T3 and T4; repeat TFTs in 4 weeks as outpatient
A1C 6.0  - Appreciate endocrine recs  - Cont current regimen  - TSH low, with low T3 and T4; repeat TFTs in 4 weeks as outpatient
A1C 6.0  Appreciate endocrine recs  Cont current regimen  - TSH low, with low T3 and T4; repeat TFTs in 4 weeks as outpatient
Likely shock liver, improving  -trend lfts
A1C 6.0  - Appreciate endocrine recs  - Cont current regimen  - TSH low, with low T3 and T4; repeat TFTs in 4 weeks as outpatient
DVT: heparin subq  Diet: Renal  Dispo: PT eval  DC central line
DVT: heparin subq  Diet: Renal  Dispo: PT recommending [place]
A1C 6.0  Appreciate endocrine recs  Cont current regimen  - TSH low, with low T3 and T4; repeat TFTs in 4 weeks as outpatient

## 2022-11-08 NOTE — PROGRESS NOTE ADULT - PROBLEM SELECTOR PROBLEM 8
Diabetes
Transaminitis
Diabetes
Diabetes
Transaminitis
Diabetes
Transaminitis
GI bleed
Transaminitis

## 2022-11-08 NOTE — PROGRESS NOTE ADULT - SUBJECTIVE AND OBJECTIVE BOX
Vascular Surgery DAILY PROGRESS NOTE:       SUBJECTIVE/ROS: Primary team called for swollen/tender left wrist. patient seen and examined at bedside. c/o left wrist pain for 1 day, c/o left hand weakness 2T2 pain. denies f/c, left hand/fingers numbness or tingling. denies injury, joint pain in past    MEDICATIONS  (STANDING):  acetaminophen     Tablet .. 1000 milliGRAM(s) Oral once  amLODIPine   Tablet 10 milliGRAM(s) Oral daily  ascorbic acid 2000 milliGRAM(s) Oral once  aspirin enteric coated 81 milliGRAM(s) Oral daily  atorvastatin 80 milliGRAM(s) Oral at bedtime  benzonatate 100 milliGRAM(s) Oral three times a day  calcium acetate 667 milliGRAM(s) Oral three times a day with meals  cefuroxime  IVPB 1500 milliGRAM(s) IV Intermittent once  chlorhexidine 0.12% Liquid 30 milliLiter(s) Swish and Spit once  chlorhexidine 2% Cloths 1 Application(s) Topical <User Schedule>  chlorhexidine 4% Liquid 1 Application(s) Topical once  chlorhexidine 4% Liquid 1 Application(s) Topical <User Schedule>  gabapentin 300 milliGRAM(s) Oral once  insulin lispro (ADMELOG) corrective regimen sliding scale   SubCutaneous three times a day before meals  insulin lispro (ADMELOG) corrective regimen sliding scale   SubCutaneous at bedtime  lidocaine   4% Patch 2 Patch Transdermal daily  metoprolol tartrate 50 milliGRAM(s) Oral every 12 hours  mupirocin 2% Ointment 1 Application(s) Both Nostrils two times a day  pantoprazole    Tablet 40 milliGRAM(s) Oral before breakfast  polyethylene glycol 3350 17 Gram(s) Oral two times a day  raloxifene 60 milliGRAM(s) Oral daily  senna 2 Tablet(s) Oral at bedtime  sodium chloride 0.9% lock flush 3 milliLiter(s) IV Push every 8 hours    MEDICATIONS  (PRN):  benzocaine 15 mG/menthol 3.6 mG Lozenge 2 Lozenge Oral every 6 hours PRN Sore Throat  bisacodyl 5 milliGRAM(s) Oral every 12 hours PRN Constipation  guaiFENesin Oral Liquid (Sugar-Free) 100 milliGRAM(s) Oral every 6 hours PRN Cough  melatonin 3 milliGRAM(s) Oral at bedtime PRN Insomnia  simethicone 80 milliGRAM(s) Chew three times a day PRN Gas  traMADol 25 milliGRAM(s) Oral every 6 hours PRN Severe Pain (7 - 10)      OBJECTIVE:    Vital Signs Last 24 Hrs  T(C): 36.8 (2022 18:27), Max: 36.9 (2022 11:07)  T(F): 98.3 (:), Max: 98.5 (2022 11:07)  HR: 85 (:) (83 - 89)  BP: 138/75 (:) (127/57 - 145/71)  BP(mean): --  RR: 18 (:) (16 - 18)  SpO2: 100% (:) (96% - 100%)    Parameters below as of :  Patient On (Oxygen Delivery Method): nasal cannula  O2 Flow (L/min): 1      Physical Exam     Gen: NAD, A&O x 4  Pulm: nonlabor breathing   Heart: RRR  Abd: soft, ND/NT  LUE: mild edema noted at left wrist, extending from left first metacarpal to wrist, slight bruising noted at base of left thumb, tender and warm to touch. Limited ROM of left fingers 2/2 to tenderness. skin warm to touch, + capillary refills. palpable radial/ulnar pulses. unable to appreciate thrill or pulse over fistula.     I&O's Detail    2022 07:01  -  2022 07:00  --------------------------------------------------------  IN:    Other (mL): 1000 mL  Total IN: 1000 mL    OUT:    Indwelling Catheter - Urethral (mL): 550 mL    Other (mL): 1500 mL  Total OUT: 2050 mL    Total NET: -1050 mL      2022 07:01  -  2022 20:55  --------------------------------------------------------  IN:    Other (mL): 700 mL    PRBCs (Packed Red Blood Cells): 300 mL  Total IN: 1000 mL    OUT:    Indwelling Catheter - Urethral (mL): 200 mL    Other (mL): 2000 mL  Total OUT: 2200 mL    Total NET: -1200 mL          Daily     Daily Weight in k.9 (2022 14:55)    LABS:                        8.5    10.59 )-----------( 303      ( 2022 05:52 )             28.2     11-08    139  |  100  |  28<H>  ----------------------------<  135<H>  4.1   |  27  |  2.30<H>    Ca    8.6      2022 06:39

## 2022-11-08 NOTE — PROGRESS NOTE ADULT - SUBJECTIVE AND OBJECTIVE BOX
NEPHROLOGY[facility] [phone]        Patient seen and examined in bed.  She was the same         MEDICATIONS  (STANDING):  amLODIPine   Tablet 10 milliGRAM(s) Oral daily  aspirin enteric coated 81 milliGRAM(s) Oral daily  atorvastatin 80 milliGRAM(s) Oral at bedtime  benzonatate 100 milliGRAM(s) Oral three times a day  calcium acetate 667 milliGRAM(s) Oral three times a day with meals  chlorhexidine 2% Cloths 1 Application(s) Topical <User Schedule>  chlorhexidine 4% Liquid 1 Application(s) Topical <User Schedule>  heparin   Injectable 5000 Unit(s) SubCutaneous every 8 hours  insulin lispro (ADMELOG) corrective regimen sliding scale   SubCutaneous three times a day before meals  insulin lispro (ADMELOG) corrective regimen sliding scale   SubCutaneous at bedtime  lidocaine   4% Patch 2 Patch Transdermal daily  metoprolol tartrate 50 milliGRAM(s) Oral every 12 hours  pantoprazole    Tablet 40 milliGRAM(s) Oral before breakfast  polyethylene glycol 3350 17 Gram(s) Oral two times a day  raloxifene 60 milliGRAM(s) Oral daily  senna 2 Tablet(s) Oral at bedtime  sodium chloride 0.9% lock flush 3 milliLiter(s) IV Push every 8 hours      VITAL:  T(C): , Max: 37.2 (11-07-22 @ 12:54)  T(F): , Max: 98.9 (11-07-22 @ 12:54)  HR: 89 (11-08-22 @ 05:07)  BP: 135/75 (11-08-22 @ 05:07)  BP(mean): --  RR: 18 (11-08-22 @ 05:07)  SpO2: 100% (11-08-22 @ 05:07)  Wt(kg): --    I and O's:    11-07 @ 07:01  -  11-08 @ 07:00  --------------------------------------------------------  IN: 1000 mL / OUT: 2050 mL / NET: -1050 mL          PHYSICAL EXAM:    Constitutional: NAD  Neck:  No JVD  Respiratory: CTAB/L  Cardiovascular: S1 and S2  Gastrointestinal: BS+, soft, NT/ND  Extremities: No peripheral edema  Neurological: A/O x 3, no focal deficits  Psychiatric: Normal mood, normal affect  : No Bah  Skin: No rashes  Access: perm cath    LABS:                        8.5    10.59 )-----------( 303      ( 07 Nov 2022 05:52 )             28.2     11-08    139  |  100  |  28<H>  ----------------------------<  135<H>  4.1   |  27  |  2.30<H>    Ca    8.6      08 Nov 2022 06:39  Phos  3.7     11-06  Mg     1.8     11-06    TPro  6.4  /  Alb  3.0<L>  /  TBili  0.3  /  DBili  x   /  AST  23  /  ALT  14  /  AlkPhos  134<H>  11-06          Urine Studies:          RADIOLOGY & ADDITIONAL STUDIES:

## 2022-11-08 NOTE — PROGRESS NOTE ADULT - PROBLEM SELECTOR PROBLEM 9
Diabetes
Preventive measure
Diabetes
Preventive measure
Diabetes
Diabetes
Preventive measure
Preventive measure
Diabetes
Preventive measure
Diabetes
Preventive measure
Preventive measure
Diabetes
Transaminitis

## 2022-11-08 NOTE — PROGRESS NOTE ADULT - PROBLEM SELECTOR PROBLEM 7
Transaminitis
GI bleed
GI bleed
Transaminitis
GI bleed
Transaminitis
GI bleed
GI bleed
Transaminitis
GI bleed
GI bleed
Atrial fibrillation
Transaminitis
GI bleed
GI bleed

## 2022-11-08 NOTE — PROGRESS NOTE ADULT - PROBLEM SELECTOR PLAN 1
11/1 overnight WCT 50 beats to 190s  asymptomatic.  Now S/p LHC on 11/2 showing multivessel dz. - now planned for CABG 11/9  Cont with tele monitoring  Cont with BB and high intensity statin

## 2022-11-08 NOTE — PROGRESS NOTE ADULT - PROBLEM SELECTOR PLAN 6
in the setting of anticoagulation and uremia.   Per GI, no interventions planned  Now resolved  Monitor H/H daily
UA+ with urine culture growing E. coli  -cefepime (10/20-10/21) -> CTX (10/22- )  -f/u sensitivities  -ID recs appreciated  -blood cx ngtd
new paroxysmal afib complicated with GIB at [facility] VS. Converted from afib -> NSR 10/22  - S/p amiodarone x2 and digoxin x1 in ctu  - Cardiology following  - Monitor on telemetry  - C/w metoprolol 25mg bid  - Monitor hgb and hold off AC given converted to nsr, pericardial effusion and GIB  - CHADVasc at least 3
new paroxysmal afib complicated with GIB at [facility] VS. Converted from afib -> NSR 10/22  -s/p amiodarone x2 and digoxin x1 in ctu  - Cardiology following  -monitor on telemetry  -c/w metoprolol 25mg bid  -monitor hgb and hold off AC given converted to nsr, pericardial effusion and GIB  -CHADVasc at least 3
DVT: heparin subq  Diet: Renal  Dispo: PT eval
Incidental finding, s/p mAB  Asymptomatic  -Cont supportive care, continuous pulse ox  -Isolation precautions discontinued as per ID recommendations
new paroxysmal afib complicated with GIB at [facility] VS. Converted from afib -> NSR 10/22  - S/p amiodarone x2 and digoxin x1 in ctu  - Cardiology following  - Monitor on telemetry  - C/w metoprolol 25mg bid  - Monitor hgb and hold off AC given converted to nsr, pericardial effusion and GIB  - CHADVasc at least 3
new paroxysmal afib complicated with GIB at [facility] VS. Converted from afib -> NSR 10/22  -s/p amiodarone x2 and digoxin x1 in ctu  - Cardiology following  -monitor on telemetry  -c/w metoprolol 25mg bid  -monitor hgb and hold off AC given converted to nsr, pericardial effusion and GIB  -CHADVasc at least 3
new paroxysmal afib complicated with GIB at [facility] VS. Converted from afib -> NSR 10/22  - S/p amiodarone x2 and digoxin x1 in ctu  - Cardiology following  - Monitor on telemetry  - C/w metoprolol 25mg bid  - Monitor hgb and hold off AC given converted to nsr, pericardial effusion and GIB  - CHADVasc at least 3
new paroxysmal afib complicated with GIB at [facility] VS. Converted from afib -> NSR 10/22  - S/p amiodarone x2 and digoxin x1 in ctu  - Cardiology following  - Monitor on telemetry  - C/w metoprolol 25mg bid  - Monitor hgb and hold off AC given converted to nsr, pericardial effusion and GIB  - CHADVasc at least 3
new paroxysmal afib complicated with GIB at [facility] VS. Converted from afib -> NSR 10/22  -s/p amiodarone x2 and digoxin x1 in ctu  - Cardiology following  -monitor on telemetry  -c/w metoprolol 25mg bid  -monitor hgb and hold off AC given converted to nsr, pericardial effusion and GIB  -CHADVasc at least 3
new paroxysmal afib complicated with GIB at [facility] VS. Converted from afib -> NSR 10/22  -s/p amiodarone x2 and digoxin x1 in ctu  - Cardiology following  -monitor on telemetry  -c/w metoprolol 25mg bid  -monitor hgb and hold off AC given converted to nsr, pericardial effusion and GIB  -CHADVasc at least 3
in the setting of anticoagulation and uremia.   Per GI, no interventions planned  Now resolved  Monitor H/H daily
new paroxysmal afib complicated with GIB at [facility] VS. Converted from afib -> NSR 10/22  -s/p amiodarone x2 and digoxin x1 in ctu  - Cardiology following  -monitor on telemetry  -c/w metoprolol 25mg bid  -monitor hgb and hold off AC given converted to nsr, pericardial effusion and GIB  -CHADVasc at least 3

## 2022-11-08 NOTE — PROGRESS NOTE ADULT - ASSESSMENT
72F HTN, HLD, DM, CKD who presented to [facility] initially with MAR on CKD with acute uremia and metabolic derangements requiring urgent HD and was COVID-19 positive. During HD went into atrial fibrillation and started on HD, subsequently developing GIB thought 2/2 AC and acute uremia.  On TTE noted to have a moderate to large pericardial effusion with early echocardiographic evidence of tamponade.  ON 10/20 had pericardiocentesis in the cath lab with 700 cc bloody fluid removed.  Had pericardial drain which was draining minimally, and removed 10/27.     - Repeat echo with no significant re- accumulation of effusion.    - Tolerated AVF with no issues.    - Continue off of AC for now secondary to bleeding issues    - Sinus tachycardia has been reactive  - cont metoprolol 50mg q12  - Continue amlodipine 10 QD    -developed 50 beats of vt and so cath done  -found with 3v cad and is now agreeable to cabg, planned for tomorrow, per pt   - Continue statin drug  - asa 81mg daily for CAD    - Monitor and replete electrolytes. Keep K>4.0 and Mg>2.0.   - Further cardiac workup will depend on clinical course.   - All other workup per primary team. Will followup.

## 2022-11-08 NOTE — PROGRESS NOTE ADULT - PROBLEM SELECTOR PROBLEM 4
2019 novel coronavirus disease (COVID-19)
2019 novel coronavirus disease (COVID-19)
ESRD (end stage renal disease)
UTI (urinary tract infection)
ESRD (end stage renal disease)
2019 novel coronavirus disease (COVID-19)
ESRD (end stage renal disease)
UTI (urinary tract infection)
Atrial fibrillation
2019 novel coronavirus disease (COVID-19)
2019 novel coronavirus disease (COVID-19)
ESRD (end stage renal disease)
UTI (urinary tract infection)
2019 novel coronavirus disease (COVID-19)
ESRD (end stage renal disease)
UTI (urinary tract infection)
ESRD (end stage renal disease)
ESRD (end stage renal disease)
Atrial fibrillation

## 2022-11-08 NOTE — PROGRESS NOTE ADULT - PROBLEM SELECTOR PLAN 1
Rt IJ Permcath  HD via PermCath 11/8   AVF maturing  D/c riya 11/8 Rt MAXWELL Lowerycatkimberley palced 10/26 by GIA HUGHES via PermCath 11/8   JEAN CARLOS palma  D/c riya 11/8 Rt MAXWELL Permcath plaved  10/26 by IR  HD via PermCath 11/8   Creation of arteriovenous fistula for dialysis 29-Oct-2022 14:06:35    D/c riya 11/8

## 2022-11-08 NOTE — PROGRESS NOTE ADULT - PROBLEM SELECTOR PROBLEM 5
Atrial fibrillation
2019 novel coronavirus disease (COVID-19)
2019 novel coronavirus disease (COVID-19)
Atrial fibrillation
2019 novel coronavirus disease (COVID-19)
2019 novel coronavirus disease (COVID-19)
Atrial fibrillation
2019 novel coronavirus disease (COVID-19)
2019 novel coronavirus disease (COVID-19)
Transaminitis
GI bleed
UTI (urinary tract infection)
2019 novel coronavirus disease (COVID-19)
Atrial fibrillation
2019 novel coronavirus disease (COVID-19)
2019 novel coronavirus disease (COVID-19)

## 2022-11-08 NOTE — PROGRESS NOTE ADULT - ASSESSMENT
This is a  72F HTN, HLD, DM, CKD who presented to [facility] initially with MAR on CKD with acute uremia and metabolic derangements requiring urgent HD and was COVID-19 positive. During HD went into atrial fibrillation and started on HD, subsequently developing GIB thought 2/2 AC and acute uremia. Also on amiodarone for Afib. On TTE noted to have a moderate to large pericardial effusion with early echocardiographic evidence of tamponade. Clinically she is not hypotensive and she tolerates the fluid shifts related to HD. Patient today was transferred to [facility] CTS Dr. Gabriel for evaluation of Pericardial Effusion.  10/20 had pericardiocentesis pericardial drain which was draining minimally, and removed 10/27.  Cath 11/2 revealed triple vessel CAD referred for CABG eval with Dr Mann  11/8 VSS   Dr Cristobal nephrology -D/ C Olvin   HD today-  PRBC x1-  transfer to [address] [place] [address] CABG in am first case with Dr Mann

## 2022-11-08 NOTE — PROGRESS NOTE ADULT - SUBJECTIVE AND OBJECTIVE BOX
[facility] Division of Hospital Medicine  Barbara Anderson MD  Pager (M-F, 2T-5W): [phone]  Other Times:  [phone]    Patient is a 72y old  Female who presents with a chief complaint of Pericardial Eff (08 Nov 2022 13:38)      SUBJECTIVE / OVERNIGHT EVENTS:  feeling ok. anxious about upcoming surg. denies any new complaints.     ADDITIONAL REVIEW OF SYSTEMS: otherwise neg    MEDICATIONS  (STANDING):  acetaminophen     Tablet .. 1000 milliGRAM(s) Oral once  amLODIPine   Tablet 10 milliGRAM(s) Oral daily  ascorbic acid 2000 milliGRAM(s) Oral once  aspirin enteric coated 81 milliGRAM(s) Oral daily  atorvastatin 80 milliGRAM(s) Oral at bedtime  benzonatate 100 milliGRAM(s) Oral three times a day  calcium acetate 667 milliGRAM(s) Oral three times a day with meals  cefuroxime  IVPB 1500 milliGRAM(s) IV Intermittent once  chlorhexidine 0.12% Liquid 30 milliLiter(s) Swish and Spit once  chlorhexidine 2% Cloths 1 Application(s) Topical <User Schedule>  chlorhexidine 4% Liquid 1 Application(s) Topical once  chlorhexidine 4% Liquid 1 Application(s) Topical <User Schedule>  gabapentin 300 milliGRAM(s) Oral once  heparin   Injectable 5000 Unit(s) SubCutaneous every 8 hours  insulin lispro (ADMELOG) corrective regimen sliding scale   SubCutaneous three times a day before meals  insulin lispro (ADMELOG) corrective regimen sliding scale   SubCutaneous at bedtime  lidocaine   4% Patch 2 Patch Transdermal daily  metoprolol tartrate 50 milliGRAM(s) Oral every 12 hours  mupirocin 2% Ointment 1 Application(s) Both Nostrils two times a day  pantoprazole    Tablet 40 milliGRAM(s) Oral before breakfast  polyethylene glycol 3350 17 Gram(s) Oral two times a day  raloxifene 60 milliGRAM(s) Oral daily  senna 2 Tablet(s) Oral at bedtime  sodium chloride 0.9% lock flush 3 milliLiter(s) IV Push every 8 hours    MEDICATIONS  (PRN):  benzocaine 15 mG/menthol 3.6 mG Lozenge 2 Lozenge Oral every 6 hours PRN Sore Throat  bisacodyl 5 milliGRAM(s) Oral every 12 hours PRN Constipation  guaiFENesin Oral Liquid (Sugar-Free) 100 milliGRAM(s) Oral every 6 hours PRN Cough  melatonin 3 milliGRAM(s) Oral at bedtime PRN Insomnia  simethicone 80 milliGRAM(s) Chew three times a day PRN Gas  traMADol 25 milliGRAM(s) Oral every 6 hours PRN Severe Pain (7 - 10)      CAPILLARY BLOOD GLUCOSE      POCT Blood Glucose.: 151 mg/dL (08 Nov 2022 11:50)  POCT Blood Glucose.: 146 mg/dL (08 Nov 2022 07:40)  POCT Blood Glucose.: 141 mg/dL (07 Nov 2022 21:41)  POCT Blood Glucose.: 96 mg/dL (07 Nov 2022 16:38)    I&O's Summary    07 Nov 2022 07:01  -  08 Nov 2022 07:00  --------------------------------------------------------  IN: 1000 mL / OUT: 2050 mL / NET: -1050 mL    08 Nov 2022 07:01  -  08 Nov 2022 15:22  --------------------------------------------------------  IN: 0 mL / OUT: 200 mL / NET: -200 mL        PHYSICAL EXAM:  Vital Signs Last 24 Hrs  T(C): 36.8 (08 Nov 2022 12:55), Max: 36.9 (07 Nov 2022 20:44)  T(F): 98.2 (08 Nov 2022 12:55), Max: 98.5 (07 Nov 2022 20:44)  HR: 83 (08 Nov 2022 12:55) (75 - 89)  BP: 136/52 (08 Nov 2022 12:55) (104/51 - 145/71)  BP(mean): --  RR: 16 (08 Nov 2022 12:55) (16 - 18)  SpO2: 96% (08 Nov 2022 12:55) (96% - 100%)    Parameters below as of 08 Nov 2022 12:55  Patient On (Oxygen Delivery Method): nasal cannula      CONSTITUTIONAL: NAD, obese  EYES:  conjunctiva and sclera clear  ENMT: Moist oral mucosa  NECK: Supple, no palpable masses; no JVD  RESPIRATORY: Normal respiratory effort; lungs are clear to auscultation bilaterally  CARDIOVASCULAR: Regular rate and rhythm, normal S1 and S2, no murmur/rub/gallop; +lower extremity edema  ABDOMEN: Nontender to palpation, normoactive bowel sounds, no rebound/guarding  MUSCULOSKELETAL:  no clubbing or cyanosis of digits; no joint swelling or tenderness to palpation  PSYCH: A+O to person, place, and time; affect appropriate  SKIN: No rashes; no palpable lesions    LABS:                        8.5    10.59 )-----------( 303      ( 07 Nov 2022 05:52 )             28.2     11-08    139  |  100  |  28<H>  ----------------------------<  135<H>  4.1   |  27  |  2.30<H>    Ca    8.6      08 Nov 2022 06:39                  RADIOLOGY & ADDITIONAL TESTS:  Results Reviewed:   Imaging Personally Reviewed:  Electrocardiogram Personally Reviewed:    COORDINATION OF CARE:  Care Discussed with Consultants/Other Providers [Y/N]:  Prior or Outpatient Records Reviewed [Y/N]:

## 2022-11-08 NOTE — PROGRESS NOTE ADULT - ASSESSMENT
72yoF w/ PMHx of HTN, HLD, DM, CKD presented to [facility] initially with MAR on CKD with acute uremia and metabolic derangements requiring ICU stay, urgent HD and COVID-19 positive, complicated with Afib with GIB 2/2 AC and acute uremia. Transferred to [facility] CTS for large pericardial effusion and developed cardiac tamponade requiring urgent percardiocentesis 10/20 s/p 700cc renetta blood, monitored in CTU. Downgraded to medicine for further management -- pending CTS discussion re: CABG vs. PCI (if appropriate per cards) vs. med mgmt only.

## 2022-11-08 NOTE — PROGRESS NOTE ADULT - PROBLEM SELECTOR PLAN 8
A1C 6.0  Appreciate endocrine recs  Cont current regimen  - TSH low, with low T3 and T4; repeat TFTs in 4 weeks as outpatient
in the setting of anticoagulation and uremia.   Per GI, no interventions planned  Now resolved  Monitor H/H daily
Likely shock liver, improving  -trend lfts
A1C 6.0  Appreciate endocrine recs  Cont current regimen  - TSH low, with low T3 and T4; repeat TFTs in 4 weeks as outpatient
Likely shock liver, improving  -trend lfts
A1C 6.0  Appreciate endocrine recs  Cont current regimen  - TSH low, with low T3 and T4; repeat TFTs in 4 weeks as outpatient
A1C 6.0  Appreciate endocrine recs  Cont current regimen  - TSH low, with low T3 and T4; repeat TFTs in 4 weeks as outpatient
Likely shock liver, improving  - Monitor LFTs
Likely shock liver, improving  -trend lfts
A1C 6.0  Appreciate endocrine recs  Cont current regimen  - TSH low, with low T3 and T4; repeat TFTs in 4 weeks as outpatient
Likely shock liver, improving  - Monitor LFTs
Likely shock liver, improving  -trend lfts
Likely shock liver, improving  -trend lfts
Likely shock liver, improving  - Monitor LFTs
A1C 6.0  Appreciate endocrine recs  Cont current regimen  - TSH low, with low T3 and T4; repeat TFTs in 4 weeks as outpatient
A1C 6.0  Appreciate endocrine recs  Cont current regimen  - TSH low, with low T3 and T4; repeat TFTs in 4 weeks as outpatient
Likely shock liver, improving  - Monitor LFTs

## 2022-11-08 NOTE — PROGRESS NOTE ADULT - PROBLEM SELECTOR PROBLEM 10
Preventive measure
Diabetes
Preventive measure

## 2022-11-08 NOTE — PROGRESS NOTE ADULT - SUBJECTIVE AND OBJECTIVE BOX
[facility]    Lucio Madrid, Bladimir, Autumn Santillan      [phone]    CHIEF COMPLAINT: Patient is a 72y old  Female who presents with a chief complaint of Pericardial Eff (07 Nov 2022 15:55)      Follow Up: pericardial eff drained, cad planning for cabg    Interim history: The patient reports no new symptoms.  Denies chest discomfort and shortness of breath.  No abdominal pain.  No new neurologic symptoms. Is now agreeable to cabg, planned for tomorrow am      MEDICATIONS  (STANDING):  amLODIPine   Tablet 10 milliGRAM(s) Oral daily  aspirin enteric coated 81 milliGRAM(s) Oral daily  atorvastatin 80 milliGRAM(s) Oral at bedtime  benzonatate 100 milliGRAM(s) Oral three times a day  calcium acetate 667 milliGRAM(s) Oral three times a day with meals  chlorhexidine 2% Cloths 1 Application(s) Topical <User Schedule>  chlorhexidine 4% Liquid 1 Application(s) Topical <User Schedule>  heparin   Injectable 5000 Unit(s) SubCutaneous every 8 hours  insulin lispro (ADMELOG) corrective regimen sliding scale   SubCutaneous three times a day before meals  insulin lispro (ADMELOG) corrective regimen sliding scale   SubCutaneous at bedtime  lidocaine   4% Patch 2 Patch Transdermal daily  metoprolol tartrate 50 milliGRAM(s) Oral every 12 hours  pantoprazole    Tablet 40 milliGRAM(s) Oral before breakfast  polyethylene glycol 3350 17 Gram(s) Oral two times a day  raloxifene 60 milliGRAM(s) Oral daily  senna 2 Tablet(s) Oral at bedtime  sodium chloride 0.9% lock flush 3 milliLiter(s) IV Push every 8 hours    MEDICATIONS  (PRN):  benzocaine 15 mG/menthol 3.6 mG Lozenge 2 Lozenge Oral every 6 hours PRN Sore Throat  bisacodyl 5 milliGRAM(s) Oral every 12 hours PRN Constipation  guaiFENesin Oral Liquid (Sugar-Free) 100 milliGRAM(s) Oral every 6 hours PRN Cough  melatonin 3 milliGRAM(s) Oral at bedtime PRN Insomnia  simethicone 80 milliGRAM(s) Chew three times a day PRN Gas  traMADol 25 milliGRAM(s) Oral every 6 hours PRN Severe Pain (7 - 10)      REVIEW OF SYSTEMS:  eye, ent, GI, , allergic, dermatologic, musculoskeletal and neurologic are negative except as described above    Vital Signs Last 24 Hrs  T(C): 36.7 (08 Nov 2022 05:07), Max: 37.2 (07 Nov 2022 12:54)  T(F): 98.1 (08 Nov 2022 05:07), Max: 98.9 (07 Nov 2022 12:54)  HR: 89 (08 Nov 2022 05:07) (75 - 89)  BP: 135/75 (08 Nov 2022 05:07) (104/51 - 147/73)  BP(mean): --  RR: 18 (08 Nov 2022 05:07) (18 - 18)  SpO2: 100% (08 Nov 2022 05:07) (98% - 100%)    Parameters below as of 08 Nov 2022 05:07  Patient On (Oxygen Delivery Method): nasal cannula        I&O's Summary    07 Nov 2022 07:01  -  08 Nov 2022 07:00  --------------------------------------------------------  IN: 1000 mL / OUT: 2050 mL / NET: -1050 mL        Telemetry past 24h: sr    PHYSICAL EXAM:    Constitutional: well-nourished, well-developed, NAD   HEENT:  MMM, sclerae anicteric, conjunctivae clear, no oral cyanosis.  Pulmonary: Non-labored, breath sounds are clear bilaterally, No wheezing, rales or rhonchi  Cardiovascular: Regular, S1 and S2.  No murmur.  No rubs, gallops or clicks  Gastrointestinal: Bowel Sounds present, soft, nontender.   Lymph: No peripheral edema.   Neurological: Alert, no focal deficits  Skin: No rashes.  Psych:  Mood & affect appropriate    LABS: All Labs Reviewed:                        8.5    10.59 )-----------( 303      ( 07 Nov 2022 05:52 )             28.2                         8.7    11.34 )-----------( 286      ( 06 Nov 2022 09:34 )             29.2     08 Nov 2022 06:39    139    |  100    |  28     ----------------------------<  135    4.1     |  27     |  2.30   07 Nov 2022 05:52    140    |  101    |  39     ----------------------------<  148    3.9     |  24     |  3.15   06 Nov 2022 09:34    139    |  102    |  34     ----------------------------<  163    4.1     |  28     |  2.78     Ca    8.6        08 Nov 2022 06:39  Ca    8.6        07 Nov 2022 05:52  Ca    8.4        06 Nov 2022 09:34  Phos  3.7       06 Nov 2022 09:34  Mg     1.8       06 Nov 2022 09:34    TPro  6.4    /  Alb  3.0    /  TBili  0.3    /  DBili  x      /  AST  23     /  ALT  14     /  AlkPhos  134    06 Nov 2022 09:34          Blood Culture:         RADIOLOGY:    EKG:    Echo:

## 2022-11-08 NOTE — CHART NOTE - NSCHARTNOTEFT_GEN_A_CORE
Nutrition Follow Up Note  Patient seen for: Malnutrition Follow Up. Chart reviewed, events noted.     Per chart, pt is a "72F HTN, HLD, DM, CKD who presented to [facility] initially with MAR on CKD with acute uremia and metabolic derangements requiring urgent HD and was COVID-19 positive. During HD went into atrial fibrillation and started on HD, subsequently developing GIB thought 2/2 AC and acute uremia. Also on amiodarone for Afib. On TTE noted to have a moderate to large pericardial effusion with early echocardiographic evidence of tamponade. Clinically she is not hypotensive and she tolerates the fluid shifts related to HD. Patient today was transferred to [facility] CTS Dr. Gabriel for evaluation of Pericardial Effusion."    Source: [x] Patient       [x] Medical Record        [] RN        [] Family at bedside       [] Other:    Diet Order:   Diet, Renal Restrictions:   For patients receiving Renal Replacement - No Protein Restr, No Conc K, No Conc Phos, Low Sodium (10-26-22)    - Is current order appropriate/adequate? [] Yes  []  No:      - PO intake :   [] >75%  Adequate    [x] 50-75%  Fair       [] <50%  Poor - In house, pt reports ~50% PO intake at baseline. Flowsheets indicate adequate (51-75%) PO intake.     - Nutrition-related concerns:      - pt reports wants nutrition education/ information for recommended nutrition therapy at home; written refrences provided and reviewd; Pt denies having further questions/ concerns about diet and nutrition- made aware RD remains available.      - pt reports nausea and constipation at this time     GI: - Pt denies vomiting or diarrhea at this time; reports constipation. reports occasional nausea -reports sometimes after dialysis).   - Last BM: "maybe during the weekend" per pt report, reports requested enema; Bowel Regimen? [x] Yes: miralax, senna.    Weights:   Drug Dosing Weight  Height (cm): 162.6 (29 Oct 2022 09:30)  Weight (kg): 102.7 (29 Oct 2022 09:30)  BMI (kg/m2): 38.8 (29 Oct 2022 09:30)  BSA (m2): 2.06 (29 Oct 2022 09:30)    Weight in k.6 ( @ 08:25)  Weight in k ( @ 15:52)  Weight in k.5 ( @ 12:54)  Weight in k.7 ( @ 09:44)  Weight in k.4 ( @ 13:30)  Weight in k.9 ( @ 11:00)  Weight in k.6 ( @ 08:56)  per last RD note:  Daily Weight in k.4 (10-),   Weight in k.4 (10-),   Weight in k.5 (10-),   Weight in k.5 (10-),   Weight in k.3 (10-),   Weight in k.9 (10-),   Weight in k.6 (10-22)    Bed wt obtained by RD (10/28): 96.9 kg  possible wt loss in house 105.6->91.6 kg 14 kg 13% x~2.5 weeks; noted pt on HD, wt fluctuations also likely   - RD to continue to monitor weight trends as able.     MEDICATIONS  (STANDING):  amLODIPine   Tablet 10 milliGRAM(s) Oral daily  aspirin enteric coated 81 milliGRAM(s) Oral daily  atorvastatin 80 milliGRAM(s) Oral at bedtime  benzonatate 100 milliGRAM(s) Oral three times a day  calcium acetate 667 milliGRAM(s) Oral three times a day with meals  chlorhexidine 2% Cloths 1 Application(s) Topical <User Schedule>  chlorhexidine 4% Liquid 1 Application(s) Topical <User Schedule>  heparin   Injectable 5000 Unit(s) SubCutaneous every 8 hours  insulin lispro (ADMELOG) corrective regimen sliding scale   SubCutaneous three times a day before meals  insulin lispro (ADMELOG) corrective regimen sliding scale   SubCutaneous at bedtime  lidocaine   4% Patch 2 Patch Transdermal daily  metoprolol tartrate 50 milliGRAM(s) Oral every 12 hours  pantoprazole    Tablet 40 milliGRAM(s) Oral before breakfast  polyethylene glycol 3350 17 Gram(s) Oral two times a day  raloxifene 60 milliGRAM(s) Oral daily  senna 2 Tablet(s) Oral at bedtime  sodium chloride 0.9% lock flush 3 milliLiter(s) IV Push every 8 hours    MEDICATIONS  (PRN):  benzocaine 15 mG/menthol 3.6 mG Lozenge 2 Lozenge Oral every 6 hours PRN Sore Throat  bisacodyl 5 milliGRAM(s) Oral every 12 hours PRN Constipation  guaiFENesin Oral Liquid (Sugar-Free) 100 milliGRAM(s) Oral every 6 hours PRN Cough  melatonin 3 milliGRAM(s) Oral at bedtime PRN Insomnia  simethicone 80 milliGRAM(s) Chew three times a day PRN Gas  traMADol 25 milliGRAM(s) Oral every 6 hours PRN Severe Pain (7 - 10)    Pertinent Labs:  @ 06:39: Na 139, BUN 28<H>, Cr 2.30<H>, <H>, K+ 4.1, Phos --, Mg --, Alk Phos --, ALT/SGPT --, AST/SGOT --, HbA1c --    A1C with Estimated Average Glucose Result: 6.0 % (10-20-22 @ 05:39)  A1C with Estimated Average Glucose Result: 6.0 % (10-15-22 @ 02:24)    Finger Sticks:  POCT Blood Glucose.: 146 mg/dL (22 @ 07:40)  POCT Blood Glucose.: 141 mg/dL (22 @ 21:41)  POCT Blood Glucose.: 96 mg/dL (22 @ 16:38)  POCT Blood Glucose.: 169 mg/dL (22 @ 12:01)    Skin per wound care note 10/21 "Sacral/bilateral Buttocks deep tissue injury present on admission"  Edema per nursing documentation: +1 bi leg as per flowsheets     Estimated Needs:   [x] no change since previous assessment  Energy: 9872-7375 kcal/day (30-35 kcal/kg)  Protein: 70.72-81.6 g pro/day (1.3-1.5 g pro/kg)  Fluid needs deferred to team.   Wt used for estimating needs: 120 pounds (IBW)    Previous Nutrition Diagnosis: 1) Malnutrition 2) Increased Nutrient Needs  Nutrition Diagnosis is: [x] ongoing  [] resolved [] not applicable - being addressed with PO diet, assistance/encouragement with meals, oral nutrition supplementation.     Nutrition Care Plan:  [x] In Progress- addressed with diet order, PO encouragement and nutritional supplements   [] Achieved  [] Not applicable    New Nutrition Diagnosis: [x] Not applicable    Nutrition Interventions:     Education Provided   [x] Yes: Provided education on renal diet, education included importance of monitoring intake of foods high in potassium/phosphorous/sodium, review of foods high in these micronutrients as well as alternatives, monitoring fluid intake, and importance of adequate protein intake due to increased demand on HD.     Recommendations:      - Continue Renal diet as tolerated. Defer texture/consistency to SLP/team.   - Pt denied oral nutrition supplement at this time   - Recommend providing Nephro-yosef daily to promote wound healing pending no medical contraindications.  - Continue to monitor PO intake, weight, labs, skin, GI status, and diet.  - Provide assistance with meals as needed to promote adequate PO intake   - RD remains available for diet education PRN.    Monitoring and Evaluation:   Continue to monitor nutritional intake, tolerance to diet prescription, weights, labs, skin integrity    RD remains available upon request and will follow up per protocol  Rose Recinos RD CDN #[id number]

## 2022-11-08 NOTE — PROGRESS NOTE ADULT - ASSESSMENT
A: 72F HTN, HLD, DM, CKD who presented to [facility] initially with MAR on CKD with acute uremia and metabolic derangements requiring urgent HD and was COVID-19 positive. s/p left AVF 10/29 by Dr. Galeano, OR planning CABG with Dr. Mann on 11/9    - Please send left wrist XR r/o injury, fistula VA duplex to evaluate flow   - Pain control  - There is no contraindication for CABG tomorrow from Vascular Surgery standpoint    d/w Vascular Surgery team  [id number]

## 2022-11-08 NOTE — PROGRESS NOTE ADULT - SUBJECTIVE AND OBJECTIVE BOX
Cardiac Surgery Pre-op Note:    CC: Patient is a 72y old  Female who presents with a chief complaint of Pericardial Effusion now on HD via permcath found to have multi vessel disease now for CABG in am with Dr Mann.      Referring Physician:                                                                                                           Surgeon: Dr Mann     Procedure:  CABG  Allergies    sulfa drugs (Rash)    Intolerances        HPI:  72F HTN, HLD, DM, CKD who presented to [facility] initially with MAR on CKD with acute uremia and metabolic derangements requiring urgent HD and was COVID-19 positive. During HD went into atrial fibrillation and started on HD, subsequently developing GIB thought 2/2 AC and acute uremia. Also on amiodarone for Afib. On TTE noted to have a moderate to large pericardial effusion with early echocardiographic evidence of tamponade. Clinically she is not hypotensive and she tolerates the fluid shifts related to HD. Patient today was transferred to [facility] CTS Dr. Gabriel for evaluation of Pericardial Effusion.  10/20 had pericardiocentesis pericardial drain which was draining minimally, and removed 10/27.  Cath  revealed triple vessel CAD referred for CABG eval with Dr Mann          PAST MEDICAL & SURGICAL HISTORY:  HTN (hypertension)      HLD (hyperlipidemia)      DM (diabetes mellitus)          MEDICATIONS  (STANDING):  acetaminophen     Tablet .. 1000 milliGRAM(s) Oral once  amLODIPine   Tablet 10 milliGRAM(s) Oral daily  ascorbic acid 2000 milliGRAM(s) Oral once  aspirin enteric coated 81 milliGRAM(s) Oral daily  atorvastatin 80 milliGRAM(s) Oral at bedtime  benzonatate 100 milliGRAM(s) Oral three times a day  calcium acetate 667 milliGRAM(s) Oral three times a day with meals  cefuroxime  IVPB 1500 milliGRAM(s) IV Intermittent once  chlorhexidine 0.12% Liquid 30 milliLiter(s) Swish and Spit once  chlorhexidine 2% Cloths 1 Application(s) Topical <User Schedule>  chlorhexidine 4% Liquid 1 Application(s) Topical once  chlorhexidine 4% Liquid 1 Application(s) Topical <User Schedule>  gabapentin 300 milliGRAM(s) Oral once  heparin   Injectable 5000 Unit(s) SubCutaneous every 8 hours  insulin lispro (ADMELOG) corrective regimen sliding scale   SubCutaneous three times a day before meals  insulin lispro (ADMELOG) corrective regimen sliding scale   SubCutaneous at bedtime  lidocaine   4% Patch 2 Patch Transdermal daily  metoprolol tartrate 50 milliGRAM(s) Oral every 12 hours  mupirocin 2% Ointment 1 Application(s) Both Nostrils two times a day  pantoprazole    Tablet 40 milliGRAM(s) Oral before breakfast  polyethylene glycol 3350 17 Gram(s) Oral two times a day  raloxifene 60 milliGRAM(s) Oral daily  senna 2 Tablet(s) Oral at bedtime  sodium chloride 0.9% lock flush 3 milliLiter(s) IV Push every 8 hours    MEDICATIONS  (PRN):  benzocaine 15 mG/menthol 3.6 mG Lozenge 2 Lozenge Oral every 6 hours PRN Sore Throat  bisacodyl 5 milliGRAM(s) Oral every 12 hours PRN Constipation  guaiFENesin Oral Liquid (Sugar-Free) 100 milliGRAM(s) Oral every 6 hours PRN Cough  melatonin 3 milliGRAM(s) Oral at bedtime PRN Insomnia  simethicone 80 milliGRAM(s) Chew three times a day PRN Gas  traMADol 25 milliGRAM(s) Oral every 6 hours PRN Severe Pain (7 - 10)    T(C): 36.8 (22 @ 12:55), Max: 36.9 (22 @ 20:44)  T(F): 98.2 (22 @ 12:55), Max: 98.5 (22 @ 20:44)  HR: 83 (22 @ 12:55) (75 - 89)  BP: 136/52 (22 @ 12:55) (104/51 - 145/71)  RR: 16 (22 @ 12:55) (16 - 18)  SpO2: 96% (22 @ 12:55) (96% - 100%)  Daily   162.2 cm  Daily Weight in k.9 (2022 12:55)        Labs:                        8.5    10.59 )-----------( 303      ( 2022 05:52 )             28.2         139  |  100  |  28<H>  ----------------------------<  135<H>  4.1   |  27  |  2.30<H>    Ca    8.6      2022 06:39          Blood Type: B  HGB A1C: 6.0  Prealbumin:   Pro-BNP:   Thyroid Panel: 0.73  MRSA:  / MSSA: + 10/30       CXR: < from: Xray Chest 1 View- PORTABLE-Urgent (Xray Chest 1 View- PORTABLE-Urgent .) (10.29.22 @ 14:58) >    IMPRESSION: Right internal jugular line reidentified in position. Low   lung volumes. No change heart mediastinum. Marked decrease in the left   pleural effusion and left lung atelectasis with improved aeration left   lung. Small left pleural effusion and left basilar atelectasis persists.   No pneumothorax or other interval change    < end of copied text >      EKG:  < from: 12 Lead ECG (22 @ 06:11) >  Diagnosis Line NORMAL SINUS RHYTHM  WHEN COMPARED WITH ECG OF 10/20/22       < end of copied text >    Carotid Duplex:      PFT's:    Echocardiogram: < from: Limited Transthoracic Echo (10.25.22 @ 13:36) >  ------------------------------------------------------------------------  Dimensions:    Normal Values:  LA:            2.0 - 4.0 cm  Ao:            2.0 - 3.8 cm  SEPTUM:        0.6 - 1.2 cm  PWT:           0.6 - 1.1 cm  LVIDd:         3.0 - 5.6 cm  LVIDs:         1.8 - 4.0 cm  EF (Visual Estimate): NA %  ------------------------------------------------------------------------  Observations:  Aortic Valve/Aorta:  Not well visualized.  Pericardium/Pleura: Trace pericardial effusion seen.  Hemodynamic: Estimated right atrial pressure is 8 mm Hg.  ------------------------------------------------------------------------  Conclusions:  1. Trace pericardial effusion seen.  *** Compared with echocardiogram of 10/21/2022,there is  less pericardial effusion.  ------------------------------------------------------------------------  Confirmed on  10/25/2022 - 16:22:52 by Marlin Avila M.D.    < end of copied text >      Cardiac catheterization:< from: Cardiac Catheterization (22 @ 14:36) >    Coronary Angiography   The coronary circulation is right dominant.      LM   Left main artery: Angiography shows no disease.      LAD   Proximal left anterior descending: There is a 50 % stenosis. Mid left  anterior descending: There is an 80 % stenosis. First  diagonal: There is a 90 % stenosis.      CX   Ostial circumflex: There is a 90 % stenosis. Distal circumflex:  Angiography shows moderate atherosclerosis.    RCA     < end of copied text >  < from: Cardiac Catheterization (22 @ 14:36) >  Mid right coronary artery: There is a 90 % stenosis.        < end of copied text >      Gen: WN/WD NAD  Neuro: AAOx3, nonfocal  Pulm: CTA B/L  CV: RRR, S1S2  Abd: Soft, NT, ND +BS  Ext: No edema, + peripheral pulses  AVF not mature  RT IJ Permcath      Pt has AICD/PPM [ ] Yes  [ x] No             Brand Name:  Pre-op Beta Blocker ordered within 24 hrs of surgery (CABG ONLY)?  Metooprolol  50 bid  Type & Cross  [ x] Yes  [ ] No  NPO after Midnight x[ ] Yes  [ ] No  Pre-op ABX ordered, to be taped on chart:  [x ] Yes  [ ] No     Hibiclens/Peridex ordered x] Yes  [ ] No  Intraop on Hold: PRBCs, CXR, SHELDON [x ]   Consent obtained  [ x] Yes  [ ] No   Cardiac Surgery Pre-op Note:    CC: Patient is a 72y old  Female who presents with a chief complaint of Pericardial Effusion now on HD via permcath found to have multi vessel disease now for CABG in am with Dr Mann.      Referring Physician:                                                                                                           Surgeon: Dr Mann     Procedure:  CABG  Allergies    sulfa drugs (Rash)    Intolerances        HPI:  72F HTN, HLD, DM, CKD who presented to [facility] initially with MAR on CKD with acute uremia and metabolic derangements requiring urgent HD and was COVID-19 positive. During HD went into atrial fibrillation and started on HD, subsequently developing GIB thought 2/2 AC and acute uremia. Also on amiodarone for Afib. On TTE noted to have a moderate to large pericardial effusion with early echocardiographic evidence of tamponade. Clinically she is not hypotensive and she tolerates the fluid shifts related to HD. Patient today was transferred to [facility] CTS Dr. Gabriel for evaluation of Pericardial Effusion.  10/20 had pericardiocentesis pericardial drain which was draining minimally, and removed 10/27.  Cath  revealed triple vessel CAD referred for CABG eval with Dr Mann          PAST MEDICAL & SURGICAL HISTORY:  HTN (hypertension)      HLD (hyperlipidemia)      DM (diabetes mellitus)          MEDICATIONS  (STANDING):  acetaminophen     Tablet .. 1000 milliGRAM(s) Oral once  amLODIPine   Tablet 10 milliGRAM(s) Oral daily  ascorbic acid 2000 milliGRAM(s) Oral once  aspirin enteric coated 81 milliGRAM(s) Oral daily  atorvastatin 80 milliGRAM(s) Oral at bedtime  benzonatate 100 milliGRAM(s) Oral three times a day  calcium acetate 667 milliGRAM(s) Oral three times a day with meals  cefuroxime  IVPB 1500 milliGRAM(s) IV Intermittent once  chlorhexidine 0.12% Liquid 30 milliLiter(s) Swish and Spit once  chlorhexidine 2% Cloths 1 Application(s) Topical <User Schedule>  chlorhexidine 4% Liquid 1 Application(s) Topical once  chlorhexidine 4% Liquid 1 Application(s) Topical <User Schedule>  gabapentin 300 milliGRAM(s) Oral once  heparin   Injectable 5000 Unit(s) SubCutaneous every 8 hours  insulin lispro (ADMELOG) corrective regimen sliding scale   SubCutaneous three times a day before meals  insulin lispro (ADMELOG) corrective regimen sliding scale   SubCutaneous at bedtime  lidocaine   4% Patch 2 Patch Transdermal daily  metoprolol tartrate 50 milliGRAM(s) Oral every 12 hours  mupirocin 2% Ointment 1 Application(s) Both Nostrils two times a day  pantoprazole    Tablet 40 milliGRAM(s) Oral before breakfast  polyethylene glycol 3350 17 Gram(s) Oral two times a day  raloxifene 60 milliGRAM(s) Oral daily  senna 2 Tablet(s) Oral at bedtime  sodium chloride 0.9% lock flush 3 milliLiter(s) IV Push every 8 hours    MEDICATIONS  (PRN):  benzocaine 15 mG/menthol 3.6 mG Lozenge 2 Lozenge Oral every 6 hours PRN Sore Throat  bisacodyl 5 milliGRAM(s) Oral every 12 hours PRN Constipation  guaiFENesin Oral Liquid (Sugar-Free) 100 milliGRAM(s) Oral every 6 hours PRN Cough  melatonin 3 milliGRAM(s) Oral at bedtime PRN Insomnia  simethicone 80 milliGRAM(s) Chew three times a day PRN Gas  traMADol 25 milliGRAM(s) Oral every 6 hours PRN Severe Pain (7 - 10)    T(C): 36.8 (22 @ 12:55), Max: 36.9 (22 @ 20:44)  T(F): 98.2 (22 @ 12:55), Max: 98.5 (22 @ 20:44)  HR: 83 (22 @ 12:55) (75 - 89)  BP: 136/52 (22 @ 12:55) (104/51 - 145/71)  RR: 16 (22 @ 12:55) (16 - 18)  SpO2: 96% (22 @ 12:55) (96% - 100%)  Daily   162.2 cm  Daily Weight in k.9 (2022 12:55)        Labs:                        8.5    10.59 )-----------( 303      ( 2022 05:52 )             28.2         139  |  100  |  28<H>  ----------------------------<  135<H>  4.1   |  27  |  2.30<H>    Ca    8.6      2022 06:39          Blood Type: B  HGB A1C: 6.0  Prealbumin:   Pro-BNP:   Thyroid Panel: 0.73  MRSA:  / MSSA: + 10/30       CXR: < from: Xray Chest 1 View- PORTABLE-Urgent (Xray Chest 1 View- PORTABLE-Urgent .) (10.29.22 @ 14:58) >    IMPRESSION: Right internal jugular line reidentified in position. Low   lung volumes. No change heart mediastinum. Marked decrease in the left   pleural effusion and left lung atelectasis with improved aeration left   lung. Small left pleural effusion and left basilar atelectasis persists.   No pneumothorax or other interval change    < end of copied text >      EKG:  < from: 12 Lead ECG (22 @ 06:11) >  Diagnosis Line NORMAL SINUS RHYTHM  WHEN COMPARED WITH ECG OF 10/20/22       < end of copied text >    Carotid Duplex:      PFT's:    Echocardiogram: < from: Limited Transthoracic Echo (10.25.22 @ 13:36) >  ------------------------------------------------------------------------  Dimensions:    Normal Values:  LA:            2.0 - 4.0 cm  Ao:            2.0 - 3.8 cm  SEPTUM:        0.6 - 1.2 cm  PWT:           0.6 - 1.1 cm  LVIDd:         3.0 - 5.6 cm  LVIDs:         1.8 - 4.0 cm  EF (Visual Estimate): NA %  ------------------------------------------------------------------------  Observations:  Aortic Valve/Aorta:  Not well visualized.  Pericardium/Pleura: Trace pericardial effusion seen.  Hemodynamic: Estimated right atrial pressure is 8 mm Hg.  ------------------------------------------------------------------------  Conclusions:  1. Trace pericardial effusion seen.  *** Compared with echocardiogram of 10/21/2022,there is  less pericardial effusion.  ------------------------------------------------------------------------  Confirmed on  10/25/2022 - 16:22:52 by Marlin Avila M.D.    < end of copied text >      Cardiac catheterization:< from: Cardiac Catheterization (22 @ 14:36) >    Coronary Angiography   The coronary circulation is right dominant.      LM   Left main artery: Angiography shows no disease.      LAD   Proximal left anterior descending: There is a 50 % stenosis. Mid left  anterior descending: There is an 80 % stenosis. First  diagonal: There is a 90 % stenosis.      CX   Ostial circumflex: There is a 90 % stenosis. Distal circumflex:  Angiography shows moderate atherosclerosis.    RCA     < end of copied text >  < from: Cardiac Catheterization (22 @ 14:36) >  Mid right coronary artery: There is a 90 % stenosis.        < end of copied text >      Gen: WN/WD NAD  Neuro: AAOx3, nonfocal  Pulm: CTA B/L  CV: RRR, S1S2  Abd: Soft, NT, ND +BS  Ext: No edema, + peripheral pulses  AVF not mature  RT IJ Permcath  Skin:  Sacral/bilateral buttocks superficially denuded skin in and around the gluteal cleft L 4cm X W 4cm x D 0.1cm       Pt has AICD/PPM [ ] Yes  [ x] No             Brand Name:  Pre-op Beta Blocker ordered within 24 hrs of surgery (CABG ONLY)?  Metooprolol  50 bid  Type & Cross  [ x] Yes  [ ] No  NPO after Midnight x[ ] Yes  [ ] No  Pre-op ABX ordered, to be taped on chart:  [x ] Yes  [ ] No     Hibiclens/Peridex ordered x] Yes  [ ] No  Intraop on Hold: PRBCs, CXR, SHELDON [x ]   Consent obtained  [ x] Yes  [ ] No   Cardiac Surgery Pre-op Note:    CC: Patient is a 72y old  Female who presents with a chief complaint of Pericardial Effusion now on HD via permcath found to have multi vessel disease now for CABG in am with Dr Mann.      Referring Physician:                                                                                                           Surgeon: Dr Mann     Procedure:  CABG  Allergies    sulfa drugs (Rash)    Intolerances        HPI:  72F HTN, HLD, DM, CKD who presented to [facility] initially with MAR on CKD with acute uremia and metabolic derangements requiring urgent HD and was COVID-19 positive. During HD went into atrial fibrillation and started on HD, subsequently developing GIB thought 2/2 AC and acute uremia. Also on amiodarone for Afib. On TTE noted to have a moderate to large pericardial effusion with early echocardiographic evidence of tamponade. Clinically she is not hypotensive and she tolerates the fluid shifts related to HD. Patient today was transferred to [facility] CTS Dr. Gabriel for evaluation of Pericardial Effusion.  10/20 had pericardiocentesis pericardial drain which was draining minimally, and removed 10/27.  Cath  revealed triple vessel CAD referred for CABG eval with Dr Mann          PAST MEDICAL & SURGICAL HISTORY:  HTN (hypertension)      HLD (hyperlipidemia)      DM (diabetes mellitus)          MEDICATIONS  (STANDING):  acetaminophen     Tablet .. 1000 milliGRAM(s) Oral once  amLODIPine   Tablet 10 milliGRAM(s) Oral daily  ascorbic acid 2000 milliGRAM(s) Oral once  aspirin enteric coated 81 milliGRAM(s) Oral daily  atorvastatin 80 milliGRAM(s) Oral at bedtime  benzonatate 100 milliGRAM(s) Oral three times a day  calcium acetate 667 milliGRAM(s) Oral three times a day with meals  cefuroxime  IVPB 1500 milliGRAM(s) IV Intermittent once  chlorhexidine 0.12% Liquid 30 milliLiter(s) Swish and Spit once  chlorhexidine 2% Cloths 1 Application(s) Topical <User Schedule>  chlorhexidine 4% Liquid 1 Application(s) Topical once  chlorhexidine 4% Liquid 1 Application(s) Topical <User Schedule>  gabapentin 300 milliGRAM(s) Oral once  heparin   Injectable 5000 Unit(s) SubCutaneous every 8 hours  insulin lispro (ADMELOG) corrective regimen sliding scale   SubCutaneous three times a day before meals  insulin lispro (ADMELOG) corrective regimen sliding scale   SubCutaneous at bedtime  lidocaine   4% Patch 2 Patch Transdermal daily  metoprolol tartrate 50 milliGRAM(s) Oral every 12 hours  mupirocin 2% Ointment 1 Application(s) Both Nostrils two times a day  pantoprazole    Tablet 40 milliGRAM(s) Oral before breakfast  polyethylene glycol 3350 17 Gram(s) Oral two times a day  raloxifene 60 milliGRAM(s) Oral daily  senna 2 Tablet(s) Oral at bedtime  sodium chloride 0.9% lock flush 3 milliLiter(s) IV Push every 8 hours    MEDICATIONS  (PRN):  benzocaine 15 mG/menthol 3.6 mG Lozenge 2 Lozenge Oral every 6 hours PRN Sore Throat  bisacodyl 5 milliGRAM(s) Oral every 12 hours PRN Constipation  guaiFENesin Oral Liquid (Sugar-Free) 100 milliGRAM(s) Oral every 6 hours PRN Cough  melatonin 3 milliGRAM(s) Oral at bedtime PRN Insomnia  simethicone 80 milliGRAM(s) Chew three times a day PRN Gas  traMADol 25 milliGRAM(s) Oral every 6 hours PRN Severe Pain (7 - 10)    T(C): 36.8 (22 @ 12:55), Max: 36.9 (22 @ 20:44)  T(F): 98.2 (22 @ 12:55), Max: 98.5 (22 @ 20:44)  HR: 83 (22 @ 12:55) (75 - 89)  BP: 136/52 (22 @ 12:55) (104/51 - 145/71)  RR: 16 (22 @ 12:55) (16 - 18)  SpO2: 96% (22 @ 12:55) (96% - 100%)  Daily   162.2 cm  Daily Weight in k.9 (2022 12:55)        Labs:                        8.5    10.59 )-----------( 303      ( 2022 05:52 )             28.2         139  |  100  |  28<H>  ----------------------------<  135<H>  4.1   |  27  |  2.30<H>    Ca    8.6      2022 06:39          Blood Type: B  HGB A1C: 6.0  Prealbumin:   Pro-BNP:   Thyroid Panel: 0.73  MRSA:  / MSSA: + 10/30       CXR: < from: Xray Chest 1 View- PORTABLE-Urgent (Xray Chest 1 View- PORTABLE-Urgent .) (10.29.22 @ 14:58) >    IMPRESSION: Right internal jugular line reidentified in position. Low   lung volumes. No change heart mediastinum. Marked decrease in the left   pleural effusion and left lung atelectasis with improved aeration left   lung. Small left pleural effusion and left basilar atelectasis persists.   No pneumothorax or other interval change    < end of copied text >      EKG:  < from: 12 Lead ECG (22 @ 06:11) >  Diagnosis Line NORMAL SINUS RHYTHM  WHEN COMPARED WITH ECG OF 10/20/22       < end of copied text >    Carotid Duplex:      PFT's:    Echocardiogram: < from: Limited Transthoracic Echo (10.25.22 @ 13:36) >  ------------------------------------------------------------------------  Dimensions:    Normal Values:  LA:            2.0 - 4.0 cm  Ao:            2.0 - 3.8 cm  SEPTUM:        0.6 - 1.2 cm  PWT:           0.6 - 1.1 cm  LVIDd:         3.0 - 5.6 cm  LVIDs:         1.8 - 4.0 cm  EF (Visual Estimate): NA %  ------------------------------------------------------------------------  Observations:  Aortic Valve/Aorta:  Not well visualized.  Pericardium/Pleura: Trace pericardial effusion seen.  Hemodynamic: Estimated right atrial pressure is 8 mm Hg.  ------------------------------------------------------------------------  Conclusions:  1. Trace pericardial effusion seen.  *** Compared with echocardiogram of 10/21/2022,there is  less pericardial effusion.  ------------------------------------------------------------------------  Confirmed on  10/25/2022 - 16:22:52 by Marlin Avila M.D.    < end of copied text >      Cardiac catheterization:< from: Cardiac Catheterization (22 @ 14:36) >    Coronary Angiography   The coronary circulation is right dominant.      LM   Left main artery: Angiography shows no disease.      LAD   Proximal left anterior descending: There is a 50 % stenosis. Mid left  anterior descending: There is an 80 % stenosis. First  diagonal: There is a 90 % stenosis.      CX   Ostial circumflex: There is a 90 % stenosis. Distal circumflex:  Angiography shows moderate atherosclerosis.    RCA     < end of copied text >  < from: Cardiac Catheterization (22 @ 14:36) >  Mid right coronary artery: There is a 90 % stenosis.        < end of copied text >      Gen: WN/WD NAD  Neuro: AAOx3, nonfocal  Pulm: CTA B/L  CV: RRR, S1S2  Abd: Soft, NT, ND +BS  Ext: No edema, + peripheral pulses   lUE AVF not mature  RT IJ Permcath  Skin:  Sacral/bilateral buttocks superficially denuded skin in and around the gluteal cleft L 4cm X W 4cm x D 0.1cm       Pt has AICD/PPM [ ] Yes  [ x] No             Brand Name:  Pre-op Beta Blocker ordered within 24 hrs of surgery (CABG ONLY)?  Metooprolol  50 bid  Type & Cross  [ x] Yes  [ ] No  NPO after Midnight x[ ] Yes  [ ] No  Pre-op ABX ordered, to be taped on chart:  [x ] Yes  [ ] No     Hibiclens/Peridex ordered x] Yes  [ ] No  Intraop on Hold: PRBCs, CXR, SHELDON [x ]   Consent obtained  [ x] Yes  [ ] No

## 2022-11-09 ENCOUNTER — APPOINTMENT (OUTPATIENT)
Dept: CARDIOTHORACIC SURGERY | Facility: HOSPITAL | Age: 72
End: 2022-11-09

## 2022-11-09 LAB
ALBUMIN SERPL ELPH-MCNC: 3.1 G/DL — LOW (ref 3.3–5)
ALP SERPL-CCNC: 82 U/L — SIGNIFICANT CHANGE UP (ref 40–120)
ALT FLD-CCNC: 10 U/L — SIGNIFICANT CHANGE UP (ref 10–45)
ANION GAP SERPL CALC-SCNC: 15 MMOL/L — SIGNIFICANT CHANGE UP (ref 5–17)
APTT BLD: 30.1 SEC — SIGNIFICANT CHANGE UP (ref 27.5–35.5)
AST SERPL-CCNC: 22 U/L — SIGNIFICANT CHANGE UP (ref 10–40)
BASE EXCESS BLDV CALC-SCNC: 2.2 MMOL/L — SIGNIFICANT CHANGE UP (ref -2–3)
BASE EXCESS BLDV CALC-SCNC: 2.5 MMOL/L — SIGNIFICANT CHANGE UP (ref -2–3)
BASE EXCESS BLDV CALC-SCNC: 3.7 MMOL/L — HIGH (ref -2–3)
BASOPHILS # BLD AUTO: 0.1 K/UL — SIGNIFICANT CHANGE UP (ref 0–0.2)
BASOPHILS NFR BLD AUTO: 0.4 % — SIGNIFICANT CHANGE UP (ref 0–2)
BILIRUB SERPL-MCNC: 1.2 MG/DL — SIGNIFICANT CHANGE UP (ref 0.2–1.2)
BLOOD GAS VENOUS - CREATININE: SIGNIFICANT CHANGE UP MG/DL (ref 0.5–1.3)
BUN SERPL-MCNC: 27 MG/DL — HIGH (ref 7–23)
CA-I SERPL-SCNC: 0.93 MMOL/L — LOW (ref 1.15–1.33)
CA-I SERPL-SCNC: 0.94 MMOL/L — LOW (ref 1.15–1.33)
CA-I SERPL-SCNC: 0.95 MMOL/L — LOW (ref 1.15–1.33)
CALCIUM SERPL-MCNC: 8.1 MG/DL — LOW (ref 8.4–10.5)
CHLORIDE BLDV-SCNC: 102 MMOL/L — SIGNIFICANT CHANGE UP (ref 96–108)
CHLORIDE BLDV-SCNC: 103 MMOL/L — SIGNIFICANT CHANGE UP (ref 96–108)
CHLORIDE BLDV-SCNC: 99 MMOL/L — SIGNIFICANT CHANGE UP (ref 96–108)
CHLORIDE SERPL-SCNC: 101 MMOL/L — SIGNIFICANT CHANGE UP (ref 96–108)
CK MB CFR SERPL CALC: 15.2 NG/ML — HIGH (ref 0–3.8)
CK SERPL-CCNC: 89 U/L — SIGNIFICANT CHANGE UP (ref 25–170)
CO2 BLDV-SCNC: 28 MMOL/L — HIGH (ref 22–26)
CO2 BLDV-SCNC: 28 MMOL/L — HIGH (ref 22–26)
CO2 BLDV-SCNC: 29 MMOL/L — HIGH (ref 22–26)
CO2 SERPL-SCNC: 24 MMOL/L — SIGNIFICANT CHANGE UP (ref 22–31)
CREAT SERPL-MCNC: 2.04 MG/DL — HIGH (ref 0.5–1.3)
EGFR: 26 ML/MIN/1.73M2 — LOW
EOSINOPHIL # BLD AUTO: 0.09 K/UL — SIGNIFICANT CHANGE UP (ref 0–0.5)
EOSINOPHIL NFR BLD AUTO: 0.4 % — SIGNIFICANT CHANGE UP (ref 0–6)
FIBRINOGEN PPP-MCNC: 425 MG/DL — SIGNIFICANT CHANGE UP (ref 200–445)
GAS PNL BLDA: SIGNIFICANT CHANGE UP
GAS PNL BLDV: 136 MMOL/L — SIGNIFICANT CHANGE UP (ref 136–145)
GAS PNL BLDV: 136 MMOL/L — SIGNIFICANT CHANGE UP (ref 136–145)
GAS PNL BLDV: 137 MMOL/L — SIGNIFICANT CHANGE UP (ref 136–145)
GAS PNL BLDV: SIGNIFICANT CHANGE UP
GLUCOSE BLDC GLUCOMTR-MCNC: 129 MG/DL — HIGH (ref 70–99)
GLUCOSE BLDC GLUCOMTR-MCNC: 129 MG/DL — HIGH (ref 70–99)
GLUCOSE BLDC GLUCOMTR-MCNC: 133 MG/DL — HIGH (ref 70–99)
GLUCOSE BLDC GLUCOMTR-MCNC: 142 MG/DL — HIGH (ref 70–99)
GLUCOSE BLDC GLUCOMTR-MCNC: 163 MG/DL — HIGH (ref 70–99)
GLUCOSE BLDC GLUCOMTR-MCNC: 164 MG/DL — HIGH (ref 70–99)
GLUCOSE BLDC GLUCOMTR-MCNC: 193 MG/DL — HIGH (ref 70–99)
GLUCOSE BLDV-MCNC: 105 MG/DL — HIGH (ref 70–99)
GLUCOSE BLDV-MCNC: 108 MG/DL — HIGH (ref 70–99)
GLUCOSE BLDV-MCNC: 113 MG/DL — HIGH (ref 70–99)
GLUCOSE SERPL-MCNC: 176 MG/DL — HIGH (ref 70–99)
HCO3 BLDV-SCNC: 26 MMOL/L — SIGNIFICANT CHANGE UP (ref 22–29)
HCO3 BLDV-SCNC: 27 MMOL/L — SIGNIFICANT CHANGE UP (ref 22–29)
HCO3 BLDV-SCNC: 28 MMOL/L — SIGNIFICANT CHANGE UP (ref 22–29)
HCT VFR BLD CALC: 34 % — LOW (ref 34.5–45)
HCT VFR BLDA CALC: 21 % — CRITICAL LOW (ref 34.5–46.5)
HCT VFR BLDA CALC: 21 % — CRITICAL LOW (ref 34.5–46.5)
HCT VFR BLDA CALC: 22 % — LOW (ref 34.5–46.5)
HEPARINASE TEG R TIME: 4.6 MIN — SIGNIFICANT CHANGE UP (ref 4.3–8.3)
HGB BLD CALC-MCNC: 6.9 G/DL — CRITICAL LOW (ref 11.7–16.1)
HGB BLD CALC-MCNC: 7.1 G/DL — LOW (ref 11.7–16.1)
HGB BLD CALC-MCNC: 7.3 G/DL — LOW (ref 11.7–16.1)
HGB BLD-MCNC: 11.5 G/DL — SIGNIFICANT CHANGE UP (ref 11.5–15.5)
IMM GRANULOCYTES NFR BLD AUTO: 2.6 % — HIGH (ref 0–0.9)
INR BLD: 1.35 RATIO — HIGH (ref 0.88–1.16)
LACTATE BLDV-MCNC: 0.8 MMOL/L — SIGNIFICANT CHANGE UP (ref 0.5–2)
LACTATE BLDV-MCNC: 1 MMOL/L — SIGNIFICANT CHANGE UP (ref 0.5–2)
LACTATE BLDV-MCNC: 1 MMOL/L — SIGNIFICANT CHANGE UP (ref 0.5–2)
LYMPHOCYTES # BLD AUTO: 2.05 K/UL — SIGNIFICANT CHANGE UP (ref 1–3.3)
LYMPHOCYTES # BLD AUTO: 8.6 % — LOW (ref 13–44)
MAGNESIUM SERPL-MCNC: 2.7 MG/DL — HIGH (ref 1.6–2.6)
MCHC RBC-ENTMCNC: 30.1 PG — SIGNIFICANT CHANGE UP (ref 27–34)
MCHC RBC-ENTMCNC: 33.8 GM/DL — SIGNIFICANT CHANGE UP (ref 32–36)
MCV RBC AUTO: 89 FL — SIGNIFICANT CHANGE UP (ref 80–100)
MONOCYTES # BLD AUTO: 2.02 K/UL — HIGH (ref 0–0.9)
MONOCYTES NFR BLD AUTO: 8.5 % — SIGNIFICANT CHANGE UP (ref 2–14)
NEUTROPHILS # BLD AUTO: 18.95 K/UL — HIGH (ref 1.8–7.4)
NEUTROPHILS NFR BLD AUTO: 79.5 % — HIGH (ref 43–77)
NRBC # BLD: 0 /100 WBCS — SIGNIFICANT CHANGE UP (ref 0–0)
PCO2 BLDV: 38 MMHG — LOW (ref 39–42)
PCO2 BLDV: 39 MMHG — SIGNIFICANT CHANGE UP (ref 39–42)
PCO2 BLDV: 42 MMHG — SIGNIFICANT CHANGE UP (ref 39–42)
PH BLDV: 7.42 — SIGNIFICANT CHANGE UP (ref 7.32–7.43)
PH BLDV: 7.44 — HIGH (ref 7.32–7.43)
PH BLDV: 7.47 — HIGH (ref 7.32–7.43)
PHOSPHATE SERPL-MCNC: 4.4 MG/DL — SIGNIFICANT CHANGE UP (ref 2.5–4.5)
PLATELET # BLD AUTO: 244 K/UL — SIGNIFICANT CHANGE UP (ref 150–400)
PO2 BLDV: 58 MMHG — HIGH (ref 25–45)
PO2 BLDV: 82 MMHG — HIGH (ref 25–45)
PO2 BLDV: 83 MMHG — HIGH (ref 25–45)
POTASSIUM BLDV-SCNC: 3.6 MMOL/L — SIGNIFICANT CHANGE UP (ref 3.5–5.1)
POTASSIUM BLDV-SCNC: 4.2 MMOL/L — SIGNIFICANT CHANGE UP (ref 3.5–5.1)
POTASSIUM BLDV-SCNC: 4.9 MMOL/L — SIGNIFICANT CHANGE UP (ref 3.5–5.1)
POTASSIUM SERPL-MCNC: 5 MMOL/L — SIGNIFICANT CHANGE UP (ref 3.5–5.3)
POTASSIUM SERPL-SCNC: 5 MMOL/L — SIGNIFICANT CHANGE UP (ref 3.5–5.3)
PROT SERPL-MCNC: 5.4 G/DL — LOW (ref 6–8.3)
PROTHROM AB SERPL-ACNC: 15.7 SEC — HIGH (ref 10.5–13.4)
RAPIDTEG MAXIMUM AMPLITUDE: 71.9 MM — SIGNIFICANT CHANGE UP (ref 52–70)
RBC # BLD: 3.82 M/UL — SIGNIFICANT CHANGE UP (ref 3.8–5.2)
RBC # FLD: 15.6 % — HIGH (ref 10.3–14.5)
SAO2 % BLDV: 100 % — HIGH (ref 67–88)
SAO2 % BLDV: 93.9 % — HIGH (ref 67–88)
SAO2 % BLDV: 98.1 % — HIGH (ref 67–88)
SODIUM SERPL-SCNC: 140 MMOL/L — SIGNIFICANT CHANGE UP (ref 135–145)
TEG FUNCTIONAL FIBRINOGEN: 45.1 MM — SIGNIFICANT CHANGE UP (ref 15–32)
TEG MAXIMUM AMPLITUDE: 72.1 MM — SIGNIFICANT CHANGE UP (ref 52–69)
TEG REACTION TIME: 5 MIN — SIGNIFICANT CHANGE UP (ref 4.6–9.1)
TROPONIN T, HIGH SENSITIVITY RESULT: 369 NG/L — HIGH (ref 0–51)
WBC # BLD: 23.83 K/UL — HIGH (ref 3.8–10.5)
WBC # FLD AUTO: 23.83 K/UL — HIGH (ref 3.8–10.5)

## 2022-11-09 PROCEDURE — 99292 CRITICAL CARE ADDL 30 MIN: CPT

## 2022-11-09 PROCEDURE — 33518 CABG ARTERY-VEIN TWO: CPT | Mod: AS

## 2022-11-09 PROCEDURE — 33508 ENDOSCOPIC VEIN HARVEST: CPT | Mod: 59

## 2022-11-09 PROCEDURE — 33533 CABG ARTERIAL SINGLE: CPT

## 2022-11-09 PROCEDURE — 71045 X-RAY EXAM CHEST 1 VIEW: CPT | Mod: 26

## 2022-11-09 PROCEDURE — 33518 CABG ARTERY-VEIN TWO: CPT

## 2022-11-09 PROCEDURE — 33533 CABG ARTERIAL SINGLE: CPT | Mod: AS

## 2022-11-09 PROCEDURE — 99291 CRITICAL CARE FIRST HOUR: CPT

## 2022-11-09 PROCEDURE — 94010 BREATHING CAPACITY TEST: CPT | Mod: 26

## 2022-11-09 DEVICE — CANNULA AORTIC ROOT WITH VENT LINE 12G X 14CM FLANGED: Type: IMPLANTABLE DEVICE | Status: FUNCTIONAL

## 2022-11-09 DEVICE — MEDIASTINAL CATH DRAIN 9MM: Type: IMPLANTABLE DEVICE | Status: FUNCTIONAL

## 2022-11-09 DEVICE — LIGATING CLIPS WECK HORIZON SMALL-WIDE (RED) 24: Type: IMPLANTABLE DEVICE | Status: FUNCTIONAL

## 2022-11-09 DEVICE — PACING WIRE WHITE M-25 WINGED WIRE 37MM X 89MM: Type: IMPLANTABLE DEVICE | Status: FUNCTIONAL

## 2022-11-09 DEVICE — CANNULA VENOUS RETURN DUAL WITH OBTURATOR 34 TO 46FR X 37.5C: Type: IMPLANTABLE DEVICE | Status: FUNCTIONAL

## 2022-11-09 DEVICE — PACING WIRE WHITE M-24 BAREWIRE 26MM X 89MM: Type: IMPLANTABLE DEVICE | Status: FUNCTIONAL

## 2022-11-09 DEVICE — KIT THORECON DBL CBL PLT: Type: IMPLANTABLE DEVICE | Status: FUNCTIONAL

## 2022-11-09 DEVICE — KIT CVC 2LUM MAC 9FR CHG: Type: IMPLANTABLE DEVICE | Status: FUNCTIONAL

## 2022-11-09 DEVICE — CHEST DRAIN THORACIC ARGYLE PVC 28FR STRAIGHT: Type: IMPLANTABLE DEVICE | Status: FUNCTIONAL

## 2022-11-09 DEVICE — LIGATING CLIPS WECK HORIZON MEDIUM (BLUE) 24: Type: IMPLANTABLE DEVICE | Status: FUNCTIONAL

## 2022-11-09 DEVICE — SHUNT FLO-THRU INTRALUMINAL 1MM X 18MM: Type: IMPLANTABLE DEVICE | Status: FUNCTIONAL

## 2022-11-09 DEVICE — CANNULA AORTIC PERFUSION EZ GLIDE STRAIGHT 21FR X 35CM VENTED: Type: IMPLANTABLE DEVICE | Status: FUNCTIONAL

## 2022-11-09 DEVICE — PACING WIRE ORANGE M-25 WINGED WIRE 37MM X 89MM: Type: IMPLANTABLE DEVICE | Status: FUNCTIONAL

## 2022-11-09 DEVICE — KIT A-LINE 1LUM 20G X 12CM SAFE KIT: Type: IMPLANTABLE DEVICE | Status: FUNCTIONAL

## 2022-11-09 DEVICE — KIT THORECON DBL CBL PLT SHARP: Type: IMPLANTABLE DEVICE | Status: FUNCTIONAL

## 2022-11-09 RX ORDER — OXYCODONE HYDROCHLORIDE 5 MG/1
5 TABLET ORAL EVERY 4 HOURS
Refills: 0 | Status: DISCONTINUED | OUTPATIENT
Start: 2022-11-09 | End: 2022-11-16

## 2022-11-09 RX ORDER — CHLORHEXIDINE GLUCONATE 213 G/1000ML
15 SOLUTION TOPICAL EVERY 12 HOURS
Refills: 0 | Status: DISCONTINUED | OUTPATIENT
Start: 2022-11-09 | End: 2022-11-09

## 2022-11-09 RX ORDER — ACETAMINOPHEN 500 MG
650 TABLET ORAL EVERY 6 HOURS
Refills: 0 | Status: COMPLETED | OUTPATIENT
Start: 2022-11-12 | End: 2023-10-11

## 2022-11-09 RX ORDER — ASCORBIC ACID 60 MG
500 TABLET,CHEWABLE ORAL
Refills: 0 | Status: COMPLETED | OUTPATIENT
Start: 2022-11-09 | End: 2022-11-14

## 2022-11-09 RX ORDER — DEXTROSE 50 % IN WATER 50 %
50 SYRINGE (ML) INTRAVENOUS ONCE
Refills: 0 | Status: COMPLETED | OUTPATIENT
Start: 2022-11-09 | End: 2022-11-09

## 2022-11-09 RX ORDER — NOREPINEPHRINE BITARTRATE/D5W 8 MG/250ML
0.03 PLASTIC BAG, INJECTION (ML) INTRAVENOUS
Qty: 8 | Refills: 0 | Status: DISCONTINUED | OUTPATIENT
Start: 2022-11-09 | End: 2022-11-10

## 2022-11-09 RX ORDER — SODIUM CHLORIDE 9 MG/ML
1000 INJECTION INTRAMUSCULAR; INTRAVENOUS; SUBCUTANEOUS
Refills: 0 | Status: DISCONTINUED | OUTPATIENT
Start: 2022-11-09 | End: 2022-11-13

## 2022-11-09 RX ORDER — ASPIRIN/CALCIUM CARB/MAGNESIUM 324 MG
300 TABLET ORAL ONCE
Refills: 0 | Status: DISCONTINUED | OUTPATIENT
Start: 2022-11-09 | End: 2022-11-10

## 2022-11-09 RX ORDER — AMIODARONE HYDROCHLORIDE 400 MG/1
400 TABLET ORAL
Refills: 0 | Status: COMPLETED | OUTPATIENT
Start: 2022-11-09 | End: 2022-11-12

## 2022-11-09 RX ORDER — CALCIUM GLUCONATE 100 MG/ML
2 VIAL (ML) INTRAVENOUS ONCE
Refills: 0 | Status: COMPLETED | OUTPATIENT
Start: 2022-11-09 | End: 2022-11-09

## 2022-11-09 RX ORDER — CHLORHEXIDINE GLUCONATE 213 G/1000ML
1 SOLUTION TOPICAL DAILY
Refills: 0 | Status: DISCONTINUED | OUTPATIENT
Start: 2022-11-09 | End: 2022-11-22

## 2022-11-09 RX ORDER — INSULIN HUMAN 100 [IU]/ML
3 INJECTION, SOLUTION SUBCUTANEOUS
Qty: 100 | Refills: 0 | Status: DISCONTINUED | OUTPATIENT
Start: 2022-11-09 | End: 2022-11-10

## 2022-11-09 RX ORDER — DEXMEDETOMIDINE HYDROCHLORIDE IN 0.9% SODIUM CHLORIDE 4 UG/ML
0.1 INJECTION INTRAVENOUS
Qty: 200 | Refills: 0 | Status: DISCONTINUED | OUTPATIENT
Start: 2022-11-09 | End: 2022-11-10

## 2022-11-09 RX ORDER — POLYETHYLENE GLYCOL 3350 17 G/17G
17 POWDER, FOR SOLUTION ORAL DAILY
Refills: 0 | Status: DISCONTINUED | OUTPATIENT
Start: 2022-11-10 | End: 2022-11-22

## 2022-11-09 RX ORDER — DEXTROSE 50 % IN WATER 50 %
25 SYRINGE (ML) INTRAVENOUS
Refills: 0 | Status: DISCONTINUED | OUTPATIENT
Start: 2022-11-09 | End: 2022-11-22

## 2022-11-09 RX ORDER — DEXTROSE 50 % IN WATER 50 %
50 SYRINGE (ML) INTRAVENOUS
Refills: 0 | Status: DISCONTINUED | OUTPATIENT
Start: 2022-11-09 | End: 2022-11-22

## 2022-11-09 RX ORDER — SODIUM ZIRCONIUM CYCLOSILICATE 10 G/10G
10 POWDER, FOR SUSPENSION ORAL EVERY 8 HOURS
Refills: 0 | Status: DISCONTINUED | OUTPATIENT
Start: 2022-11-09 | End: 2022-11-10

## 2022-11-09 RX ORDER — OXYCODONE HYDROCHLORIDE 5 MG/1
10 TABLET ORAL EVERY 4 HOURS
Refills: 0 | Status: DISCONTINUED | OUTPATIENT
Start: 2022-11-09 | End: 2022-11-09

## 2022-11-09 RX ORDER — HYDROMORPHONE HYDROCHLORIDE 2 MG/ML
0.5 INJECTION INTRAMUSCULAR; INTRAVENOUS; SUBCUTANEOUS EVERY 6 HOURS
Refills: 0 | Status: DISCONTINUED | OUTPATIENT
Start: 2022-11-09 | End: 2022-11-10

## 2022-11-09 RX ORDER — ASPIRIN/CALCIUM CARB/MAGNESIUM 324 MG
81 TABLET ORAL DAILY
Refills: 0 | Status: DISCONTINUED | OUTPATIENT
Start: 2022-11-09 | End: 2022-11-22

## 2022-11-09 RX ORDER — METOCLOPRAMIDE HCL 10 MG
5 TABLET ORAL EVERY 12 HOURS
Refills: 0 | Status: COMPLETED | OUTPATIENT
Start: 2022-11-09 | End: 2022-11-11

## 2022-11-09 RX ORDER — SODIUM BICARBONATE 1 MEQ/ML
50 SYRINGE (ML) INTRAVENOUS ONCE
Refills: 0 | Status: COMPLETED | OUTPATIENT
Start: 2022-11-09 | End: 2022-11-09

## 2022-11-09 RX ORDER — GABAPENTIN 400 MG/1
100 CAPSULE ORAL EVERY 12 HOURS
Refills: 0 | Status: COMPLETED | OUTPATIENT
Start: 2022-11-09 | End: 2022-11-14

## 2022-11-09 RX ORDER — SENNA PLUS 8.6 MG/1
2 TABLET ORAL AT BEDTIME
Refills: 0 | Status: DISCONTINUED | OUTPATIENT
Start: 2022-11-10 | End: 2022-11-22

## 2022-11-09 RX ORDER — FAMOTIDINE 10 MG/ML
20 INJECTION INTRAVENOUS EVERY 24 HOURS
Refills: 0 | Status: DISCONTINUED | OUTPATIENT
Start: 2022-11-09 | End: 2022-11-10

## 2022-11-09 RX ORDER — MEPERIDINE HYDROCHLORIDE 50 MG/ML
25 INJECTION INTRAMUSCULAR; INTRAVENOUS; SUBCUTANEOUS ONCE
Refills: 0 | Status: DISCONTINUED | OUTPATIENT
Start: 2022-11-09 | End: 2022-11-09

## 2022-11-09 RX ORDER — INSULIN HUMAN 100 [IU]/ML
8 INJECTION, SOLUTION SUBCUTANEOUS ONCE
Refills: 0 | Status: COMPLETED | OUTPATIENT
Start: 2022-11-09 | End: 2022-11-09

## 2022-11-09 RX ORDER — ACETAMINOPHEN 500 MG
650 TABLET ORAL EVERY 6 HOURS
Refills: 0 | Status: COMPLETED | OUTPATIENT
Start: 2022-11-09 | End: 2022-11-12

## 2022-11-09 RX ORDER — CEFUROXIME AXETIL 250 MG
1500 TABLET ORAL ONCE
Refills: 0 | Status: COMPLETED | OUTPATIENT
Start: 2022-11-10 | End: 2022-11-10

## 2022-11-09 RX ORDER — ATORVASTATIN CALCIUM 80 MG/1
80 TABLET, FILM COATED ORAL AT BEDTIME
Refills: 0 | Status: DISCONTINUED | OUTPATIENT
Start: 2022-11-09 | End: 2022-11-22

## 2022-11-09 RX ADMIN — Medication 50 MILLILITER(S): at 20:30

## 2022-11-09 RX ADMIN — AMLODIPINE BESYLATE 10 MILLIGRAM(S): 2.5 TABLET ORAL at 06:10

## 2022-11-09 RX ADMIN — Medication 50 MILLIEQUIVALENT(S): at 20:30

## 2022-11-09 RX ADMIN — Medication 200 GRAM(S): at 20:31

## 2022-11-09 RX ADMIN — Medication 650 MILLIGRAM(S): at 18:30

## 2022-11-09 RX ADMIN — GABAPENTIN 100 MILLIGRAM(S): 400 CAPSULE ORAL at 18:21

## 2022-11-09 RX ADMIN — INSULIN HUMAN 8 UNIT(S): 100 INJECTION, SOLUTION SUBCUTANEOUS at 20:29

## 2022-11-09 RX ADMIN — Medication 1000 MILLIGRAM(S): at 06:09

## 2022-11-09 RX ADMIN — CHLORHEXIDINE GLUCONATE 30 MILLILITER(S): 213 SOLUTION TOPICAL at 06:09

## 2022-11-09 RX ADMIN — Medication 100 MILLIGRAM(S): at 06:10

## 2022-11-09 RX ADMIN — PANTOPRAZOLE SODIUM 40 MILLIGRAM(S): 20 TABLET, DELAYED RELEASE ORAL at 06:10

## 2022-11-09 RX ADMIN — CHLORHEXIDINE GLUCONATE 1 APPLICATION(S): 213 SOLUTION TOPICAL at 06:30

## 2022-11-09 RX ADMIN — Medication 81 MILLIGRAM(S): at 18:23

## 2022-11-09 RX ADMIN — Medication 1000 MILLIGRAM(S): at 07:00

## 2022-11-09 RX ADMIN — Medication 650 MILLIGRAM(S): at 18:21

## 2022-11-09 RX ADMIN — SODIUM CHLORIDE 3 MILLILITER(S): 9 INJECTION INTRAMUSCULAR; INTRAVENOUS; SUBCUTANEOUS at 05:35

## 2022-11-09 RX ADMIN — Medication 500 MILLIGRAM(S): at 18:21

## 2022-11-09 RX ADMIN — Medication 667 MILLIGRAM(S): at 06:10

## 2022-11-09 RX ADMIN — MUPIROCIN 1 APPLICATION(S): 20 OINTMENT TOPICAL at 06:10

## 2022-11-09 RX ADMIN — Medication 5 MILLIGRAM(S): at 20:30

## 2022-11-09 RX ADMIN — GABAPENTIN 300 MILLIGRAM(S): 400 CAPSULE ORAL at 06:10

## 2022-11-09 RX ADMIN — Medication 50 MILLIGRAM(S): at 06:09

## 2022-11-09 RX ADMIN — FAMOTIDINE 20 MILLIGRAM(S): 10 INJECTION INTRAVENOUS at 18:21

## 2022-11-09 RX ADMIN — AMIODARONE HYDROCHLORIDE 400 MILLIGRAM(S): 400 TABLET ORAL at 18:21

## 2022-11-09 RX ADMIN — CHLORHEXIDINE GLUCONATE 1 APPLICATION(S): 213 SOLUTION TOPICAL at 19:44

## 2022-11-09 NOTE — PROGRESS NOTE ADULT - ASSESSMENT
72F w/ HTN, HLD, DM, CKD, 10/20/22 from OSH with uremia, COVID-19, and pericardial effusion    (1)Renal - newly ESRD-HD   (2)CTS - s/p pericardial drain placement and now removal;  POD # 0 for CABG today   (3)CV - 50 beats of wide complex tachycardia - V tach - likely from the Triple vessel disease     RECOMMEND:  (1)Will plan for HD likely in 24 hours;  She still urinates as well   (2)+ Perm cath and sp AVF and she will need outpt follow up with vasc for maturation   (3)Cont Lopressor for now;  CTS OR this am     Further carlin pending CTS course     Sayed [facility]   [facility] [facility]   [phone]

## 2022-11-09 NOTE — AIRWAY REMOVAL NOTE  ADULT & PEDS - ARTIFICAL AIRWAY REMOVAL COMMENTS
Written order for extubation verified. The patient was identified by full name and birth date compared to the identification band.  Present during the procedure was  the RN
Written order for extubation verified. The patient was identified by full name and birth date compared to the identification band. Present during the procedure was Stefan Herzog RRT and Jatin DE ANDA

## 2022-11-09 NOTE — PROGRESS NOTE ADULT - SUBJECTIVE AND OBJECTIVE BOX
CRITICAL CARE ATTENDING - CTICU    MEDICATIONS  (STANDING):  acetaminophen     Tablet .. 650 milliGRAM(s) Oral every 6 hours  aMIOdarone    Tablet 400 milliGRAM(s) Oral two times a day  ascorbic acid 500 milliGRAM(s) Oral two times a day  aspirin enteric coated 81 milliGRAM(s) Oral daily  aspirin Suppository 300 milliGRAM(s) Rectal once  chlorhexidine 0.12% Liquid 15 milliLiter(s) Oral Mucosa every 12 hours  chlorhexidine 2% Cloths 1 Application(s) Topical daily  dextrose 50% Injectable 50 milliLiter(s) IV Push every 15 minutes  dextrose 50% Injectable 25 milliLiter(s) IV Push every 15 minutes  famotidine Injectable 20 milliGRAM(s) IV Push every 12 hours  gabapentin 100 milliGRAM(s) Oral every 12 hours  insulin regular Infusion 3 Unit(s)/Hr (3 mL/Hr) IV Continuous <Continuous>  meperidine     Injectable 25 milliGRAM(s) IV Push once  sodium chloride 0.9%. 1000 milliLiter(s) (10 mL/Hr) IV Continuous <Continuous>                      139  |  100  |  28<H>  ----------------------------<  135<H>  4.1   |  27  |  2.30<H>    Ca    8.6      2022 06:39            Mode: AC/ CMV (Assist Control/ Continuous Mandatory Ventilation)  RR (machine): 12  TV (machine): 500  FiO2: 100  PEEP: 5  ITime: 1  MAP: 9  PIP: 28      Daily Height in cm: 162.56 (2022 07:04)    Daily Weight in k.3 (2022 05:17)       @ 07:  -   @ 07:00  --------------------------------------------------------  IN: 1100 mL / OUT: 2200 mL / NET: -1100 mL        Critically Ill patient  : [ ] preoperative ,   [ x] post operative    Requires :  [x ] Arterial Line   [x ] Central Line  [ ] PA catheter  [ ] IABP  [ ] ECMO  [ ] LVAD  [x ] Ventilator  [ x] pacemaker - TPM  [ ] Impella.                      [ x] ABG's     [x ] Pulse Oxymetry Monitoring  Bedside evaluation , monitoring , treatment of hemodynamics , fluids , IVP/ IVCD meds.        Diagnosis:     Op Day - CABG  X 3 L    Hypotension     Hypovolemia     Hemodynamic lability,  instability. Requires IVCD [ x] vasopressors [ ] inotropes  [ ] vasodilator  [x ]IVSS fluid  to maintain MAP, perfusion, C.I.     CHF- acute [ x]   chronic [x ]    systolic [ x]   diastolic [x ]  Valvular [ ]          - Echo- EF -  40's / LVH           [ ] RV dysfunction          - Cxr-cardiomegally, edema          - Clinical-  [ ]inotropes   [ x]pressors   [ ]diuresis   [ ]IABP   [ ]ECMO   [ ]LVAD   [x ] Respiratory insuffiencey     Chronic Renal Failure - End Stage Renal Disease     [x ] Hemodialysis - yesterday / tomorrow   [ ] Hemofiltration:    [x ] negative fluid balance , in a hemodynamically unstable patient.     Obesity O[facility] / MATTY    Ventilator Management:  [ x]AC-rest post op    [ x]CPAP-PS Wean  this PM    [ ]Trach Collar     [ ]Extubate    [ ] T-Piece  [ ]peep>5     Requires chest PT, pulmonary toilet, ambu bagging, suctioning to maintain SaO2,  patent airway and treat atelectasis.     Chest Tube Drainage / Management     Temporary pacemaker (TPM) interrogation and setting.     Requires bedside physical therapy, mobilization and total detention care.                         -                     Discussed with CT surgeon, Physician's Assistant - Nurse Practitioner- Critical care medicine team.   Discussed at  AM / PM rounds.   Chart, labs , films reviewed.    Cumulative Critical Care Time Given Today : 30 min

## 2022-11-09 NOTE — PROGRESS NOTE ADULT - SUBJECTIVE AND OBJECTIVE BOX
NEPHROLOGY-[facility] [phone]        Patient seen and examined in bed.  She was anxious this am         MEDICATIONS  (STANDING):      VITAL:  T(C): , Max: 36.9 (11-08-22 @ 11:07)  T(F): , Max: 98.5 (11-08-22 @ 11:07)  HR: 87 (11-09-22 @ 07:04)  BP: 128/75 (11-09-22 @ 07:04)  BP(mean): 93 (11-09-22 @ 07:04)  RR: 18 (11-09-22 @ 07:04)  SpO2: 97% (11-09-22 @ 07:04)  Wt(kg): --    I and O's:    11-08 @ 07:01  -  11-09 @ 07:00  --------------------------------------------------------  IN: 1100 mL / OUT: 2200 mL / NET: -1100 mL      Height (cm): 162.6 (11-09 @ 07:04)  Weight (kg): 85.3 (11-09 @ 07:04)  BMI (kg/m2): 32.3 (11-09 @ 07:04)  BSA (m2): 1.91 (11-09 @ 07:04)    PHYSICAL EXAM:    Constitutional: NAD  Neck:  No JVD  Respiratory: CTAB/L  Cardiovascular: S1 and S2  Gastrointestinal: BS+, soft, NT/ND  Extremities: No peripheral edema  Neurological: A/O x 3, no focal deficits  Psychiatric: Normal mood, normal affect  : No Bah  Skin: No rashes  Access: avf and perm cath     LABS:    11-08    139  |  100  |  28<H>  ----------------------------<  135<H>  4.1   |  27  |  2.30<H>    Ca    8.6      08 Nov 2022 06:39            Urine Studies:          RADIOLOGY & ADDITIONAL STUDIES:

## 2022-11-09 NOTE — PRE-ANESTHESIA EVALUATION ADULT - HEIGHT IN CM
162.56
Referred To Plastics For Closure Text (Leave Blank If You Do Not Want): After obtaining clear surgical margins the patient was sent to plastics for surgical repair.  The patient understands they will receive post-surgical care and follow-up from Dr. Altamirano.

## 2022-11-09 NOTE — BRIEF OPERATIVE NOTE - NSICDXBRIEFPROCEDURE_GEN_ALL_CORE_FT
PROCEDURES:  Creation of arteriovenous fistula for dialysis 29-Oct-2022 14:06:35  Jesus Tran  
PROCEDURES:  CABG, with SHELDON 09-Nov-2022 13:58:27 LIMA-LAD, SVG-OM, SVG-OM Arabella Aparicio

## 2022-11-09 NOTE — PRE-ANESTHESIA EVALUATION ADULT - NSANTHADDINFOFT_GEN_ALL_CORE
Discussed possible rbc transfusion and all other possible interventions for cardiopulmonary bypass. Possible GI bleed due to history. Mgt airway glidescope planned. pt accepts all procedure and risks as described.

## 2022-11-09 NOTE — PRE-ANESTHESIA EVALUATION ADULT - NSANTHPMHFT_GEN_ALL_CORE
ESRD started on HD
72F HTN, HLD, DM, CKD presented to OSH with MAR, acute uremia and metabolic derangements. Pt required urgent HD and was COVID-19 positive. During HD went into atrial fibrillation, subsequently developd GIB thought 2/2 acute uremia. TTE noted to have a moderate to large pericardial effusion with early echocardiographic evidence of tamponade. 10/20 had pericardiocentesis pericardial drain, and removed 10/27.  Cath 11/2 revealed triple vessel CAD referred for CABG eval with Dr Mann.
72F HTN HLD T2DM ESRD (on HD) for L AVF creation. Hospital course complicated by pericardial effusion s/p drainage, GIB, AF RVR all now resolved. METS > 4

## 2022-11-09 NOTE — PRE-ANESTHESIA EVALUATION ADULT - NSANTHAIRWAYFT_ENT_ALL_CORE
Good mouth opening, decreased neck extension, large tongue
Good mouth opening  Short thyromental distance
Good neck range of motion.  Mouth opens 3b.

## 2022-11-09 NOTE — PROGRESS NOTE ADULT - SUBJECTIVE AND OBJECTIVE BOX
Patient seen and examined at the bedside.    Remained critically ill on continuous ICU monitoring.    OBJECTIVE:  Vital Signs Last 24 Hrs  T(C): 36.3 (09 Nov 2022 16:00), Max: 36.9 (09 Nov 2022 02:27)  T(F): 97.3 (09 Nov 2022 16:00), Max: 98.4 (09 Nov 2022 02:27)  HR: 93 (09 Nov 2022 18:05) (59 - 93)  BP: 128/75 (09 Nov 2022 07:04) (128/75 - 128/75)  BP(mean): 93 (09 Nov 2022 07:04) (93 - 93)  RR: 23 (09 Nov 2022 17:45) (3 - 23)  SpO2: 100% (09 Nov 2022 18:05) (97% - 100%)    Parameters below as of 09 Nov 2022 18:06  O2 Flow (L/min): 10  O2 Concentration (%): 40    Physical Exam:   General: NAD   Neurology: Sedated   Eyes: bilateral pupils equal and reactive   ENT/Neck: Neck supple, trachea midline, No JVD   Respiratory: Clear bilaterally   CV: S1S2, no murmurs        [x] Sternal dressing, [x] Mediastinal CT, [x]L Pleural CT        [x] Sinus rhythm, [x] Temporary pacing   Abdominal: Soft, NT, ND +BS   Extremities: 1-2+ pedal edema noted, + peripheral pulses   Skin: No Rashes, Hematoma, Ecchymosis         ============================I/O===========================   I&O's Detail    08 Nov 2022 07:01  -  09 Nov 2022 07:00  --------------------------------------------------------  IN:    Oral Fluid: 100 mL    Other (mL): 700 mL    PRBCs (Packed Red Blood Cells): 300 mL  Total IN: 1100 mL    OUT:    Indwelling Catheter - Urethral (mL): 200 mL    Other (mL): 2000 mL  Total OUT: 2200 mL    Total NET: -1100 mL      09 Nov 2022 07:01  -  09 Nov 2022 19:27  --------------------------------------------------------  IN:    Dexmedetomidine: 13.9 mL    Insulin: 17 mL    Norepinephrine: 3.2 mL    sodium chloride 0.9%: 560 mL  Total IN: 594.1 mL    OUT:    Chest Tube (mL): 20 mL    Chest Tube (mL): 70 mL    Indwelling Catheter - Urethral (mL): 130 mL  Total OUT: 220 mL    Total NET: 374.1 mL    ============================ LABS =========================                        11.5   23.83 )-----------( 244      ( 09 Nov 2022 14:19 )             34.0     11-09    140  |  101  |  27<H>  ----------------------------<  176<H>  5.0   |  24  |  2.04<H>    Ca    8.1<L>      09 Nov 2022 14:19  Phos  4.4     11-09  Mg     2.7     11-09    TPro  5.4<L>  /  Alb  3.1<L>  /  TBili  1.2  /  DBili  x   /  AST  22  /  ALT  10  /  AlkPhos  82  11-09    LIVER FUNCTIONS - ( 09 Nov 2022 14:19 )  Alb: 3.1 g/dL / Pro: 5.4 g/dL / ALK PHOS: 82 U/L / ALT: 10 U/L / AST: 22 U/L / GGT: x           PT/INR - ( 09 Nov 2022 14:19 )   PT: 15.7 sec;   INR: 1.35 ratio         PTT - ( 09 Nov 2022 14:19 )  PTT:30.1 sec  ABG - ( 09 Nov 2022 18:08 )  pH, Arterial: 7.34  pH, Blood: x     /  pCO2: 44    /  pO2: 171   / HCO3: 24    / Base Excess: -2.2  /  SaO2: 98.8      ======================Micro/Rad/Cardio=================  CXR: Reviewed  Echo: Reviewed  ======================================================  PAST MEDICAL & SURGICAL HISTORY:  HTN (hypertension)    HLD (hyperlipidemia)    DM (diabetes mellitus)  ======================================================  Assessment:  72F HTN, HLD, DM, CKD who presented to [facility] initially with MAR on CKD with acute uremia and metabolic derangements requiring urgent HD and was COVID-19 positive. During HD went into atrial fibrillation and started on HD, subsequently developing GIB thought 2/2 AC and acute uremia. Also on amiodarone for Afib. On TTE noted to have a moderate to large pericardial effusion with early echocardiographic evidence of tamponade. Clinically she is not hypotensive and she tolerates the fluid shifts related to HD. Patient today was transferred to [facility] CTS Dr. Gabriel for evaluation of Pericardial Effusion.      CAD s/p C3L 11/09/22  Hemodynamic instability   Hypovolemia     ======================================================  Plan:   ***Neuro***  [x] Sedated with [x] Precedex   Post operative neuro assessment   Hydromorphone, Tylenol, Gabapentin, and Oxycodone PRN pain management     ***Cardiovascular***  Invasive hemodynamic monitoring, assess perfusion indices   SR / CVP 4/ MAP 81/Hct 37.0%/ Lactate 1.2  [x] Levophed - 0.03mcgs   Reassessment of hemodynamics post resuscitation   Amio for afib prophylaxis   Monitor chest tube outputs   [x]  ASA [x] Statin   Serial EKG and cardiac enzymes     ***Pulmonary***  [x] Extubated this evening to CPAP  Encourage incentive spirometry, continue pulse ox monitoring, follow ABGs     Mode: CPAP with PS  FiO2: 50  PEEP: 5  PS: 5  MAP: 7  PIP: 11              ***GI***  [x] NPO  [x]  Pepcid    ***Renal***  [x] ESRD on HD   Continue to monitor I/Os, BUN/Creatinine.   Replete lytes PRN  Bah present     ***ID***  No active antibiotic coverage.    ***Endocrine***  [x] DM1 : HbA1c 6.0%                - [x] Insulin gtt             - Need tight glycemic control to prevent wound infection.        Patient requires continuous monitoring with:  bedside rhythm monitoring, arterial line, pulse oximetry, ventilator management and monitoring; intermittent blood gas analysis.  Care plan discussed with ICU care team.  patient remain critical; required more than usual post op care; I have spent 50 minutes providing non routine post op care, revaluated multiple times during the day .     Care Plan discussed with the ICU care team. Patient remained critical, at risk for life threatening decompensation.     By signing my name below, I, Li Gramajo, attest that this documentation has been prepared under the direction and in the presence of Raghu Parham MD.  Electronically signed: Bam Navarro, 11-09-22 @ 19:27    IPrasad, personally performed the services described in this documentation. all medical record entries made by the scribe were at my direction and in my presence. I have reviewed the chart and agree that the record reflects my personal performance and is accurate and complete  Electronically signed: Raghu Parham MD. Patient seen and examined at the bedside.    Remained critically ill on continuous ICU monitoring.    OBJECTIVE:  Vital Signs Last 24 Hrs  T(C): 36.3 (09 Nov 2022 16:00), Max: 36.9 (09 Nov 2022 02:27)  T(F): 97.3 (09 Nov 2022 16:00), Max: 98.4 (09 Nov 2022 02:27)  HR: 93 (09 Nov 2022 18:05) (59 - 93)  BP: 128/75 (09 Nov 2022 07:04) (128/75 - 128/75)  BP(mean): 93 (09 Nov 2022 07:04) (93 - 93)  RR: 23 (09 Nov 2022 17:45) (3 - 23)  SpO2: 100% (09 Nov 2022 18:05) (97% - 100%)    Parameters below as of 09 Nov 2022 18:06  O2 Flow (L/min): 10  O2 Concentration (%): 40    Physical Exam:   General: NAD   Neurology: Sedated   Eyes: bilateral pupils equal and reactive   ENT/Neck: Neck supple, trachea midline, No JVD   Respiratory: Clear bilaterally   CV: S1S2, no murmurs        [x] Sternal dressing, [x] Mediastinal CT, [x]L Pleural CT        [x] Sinus rhythm, [x] Temporary pacing   Abdominal: Soft, NT, ND +BS   Extremities: 1-2+ pedal edema noted, + peripheral pulses   Skin: No Rashes, Hematoma, Ecchymosis         ============================I/O===========================   I&O's Detail    08 Nov 2022 07:01  -  09 Nov 2022 07:00  --------------------------------------------------------  IN:    Oral Fluid: 100 mL    Other (mL): 700 mL    PRBCs (Packed Red Blood Cells): 300 mL  Total IN: 1100 mL    OUT:    Indwelling Catheter - Urethral (mL): 200 mL    Other (mL): 2000 mL  Total OUT: 2200 mL    Total NET: -1100 mL      09 Nov 2022 07:01  -  09 Nov 2022 19:27  --------------------------------------------------------  IN:    Dexmedetomidine: 13.9 mL    Insulin: 17 mL    Norepinephrine: 3.2 mL    sodium chloride 0.9%: 560 mL  Total IN: 594.1 mL    OUT:    Chest Tube (mL): 20 mL    Chest Tube (mL): 70 mL    Indwelling Catheter - Urethral (mL): 130 mL  Total OUT: 220 mL    Total NET: 374.1 mL    ============================ LABS =========================                        11.5   23.83 )-----------( 244      ( 09 Nov 2022 14:19 )             34.0     11-09    140  |  101  |  27<H>  ----------------------------<  176<H>  5.0   |  24  |  2.04<H>    Ca    8.1<L>      09 Nov 2022 14:19  Phos  4.4     11-09  Mg     2.7     11-09    TPro  5.4<L>  /  Alb  3.1<L>  /  TBili  1.2  /  DBili  x   /  AST  22  /  ALT  10  /  AlkPhos  82  11-09    LIVER FUNCTIONS - ( 09 Nov 2022 14:19 )  Alb: 3.1 g/dL / Pro: 5.4 g/dL / ALK PHOS: 82 U/L / ALT: 10 U/L / AST: 22 U/L / GGT: x           PT/INR - ( 09 Nov 2022 14:19 )   PT: 15.7 sec;   INR: 1.35 ratio         PTT - ( 09 Nov 2022 14:19 )  PTT:30.1 sec  ABG - ( 09 Nov 2022 18:08 )  pH, Arterial: 7.34  pH, Blood: x     /  pCO2: 44    /  pO2: 171   / HCO3: 24    / Base Excess: -2.2  /  SaO2: 98.8      ======================Micro/Rad/Cardio=================  CXR: Reviewed  Echo: Reviewed  ======================================================  PAST MEDICAL & SURGICAL HISTORY:  HTN (hypertension)    HLD (hyperlipidemia)    DM (diabetes mellitus)  ======================================================  Assessment:  72F HTN, HLD, DM2, CKD who presented to [facility] initially with MAR on CKD with acute uremia and metabolic derangements requiring urgent HD and was COVID-19 positive. During HD went into atrial fibrillation and started on HD, subsequently developing GIB thought 2/2 AC and acute uremia. Also on amiodarone for Afib. On TTE noted to have a moderate to large pericardial effusion with early echocardiographic evidence of tamponade. Clinically she is not hypotensive and she tolerates the fluid shifts related to HD. Patient today was transferred to [facility] CTS Dr. Gabriel for evaluation of Pericardial Effusion.      CAD s/p C3L 11/09/22  Hemodynamic instability   Hypovolemia     ======================================================  Plan:   ***Neuro***  [x] Sedated with [x] Precedex   Post operative neuro assessment   Hydromorphone, Tylenol, Gabapentin, and Oxycodone PRN pain management     ***Cardiovascular***  Invasive hemodynamic monitoring, assess perfusion indices   SR / CVP 4/ MAP 81/Hct 37.0%/ Lactate 1.2  [x] Levophed - 0.03mcgs   Reassessment of hemodynamics post resuscitation   Amio for afib prophylaxis   Monitor chest tube outputs   [x]  ASA [x] Statin   Serial EKG and cardiac enzymes     ***Pulmonary***  [x] Extubated this evening to CPAP  Encourage incentive spirometry, continue pulse ox monitoring, follow ABGs     Mode: CPAP with PS  FiO2: 50  PEEP: 5  PS: 5  MAP: 7  PIP: 11              ***GI***  [x] NPO  [x]  Pepcid    ***Renal***  [x] ESRD on HD - M/W/F diaylsis   Continue to monitor I/Os, BUN/Creatinine.   Replete lytes PRN  Bah present     ***ID***  No active antibiotic coverage.    ***Endocrine***  [x] DM2 : HbA1c 6.0%                - [x] Insulin gtt             - Need tight glycemic control to prevent wound infection.        Patient requires continuous monitoring with:  bedside rhythm monitoring, arterial line, pulse oximetry, ventilator management and monitoring; intermittent blood gas analysis.  Care plan discussed with ICU care team.  patient remain critical; required more than usual post op care; I have spent 50 minutes providing non routine post op care, revaluated multiple times during the day .     Care Plan discussed with the ICU care team. Patient remained critical, at risk for life threatening decompensation.     By signing my name below, I, Li Gramajo, attest that this documentation has been prepared under the direction and in the presence of Raghu Parham MD.  Electronically signed: Bam Navarro, 11-09-22 @ 19:27    I, Prasad Krishnamurthy, personally performed the services described in this documentation. all medical record entries made by the scribe were at my direction and in my presence. I have reviewed the chart and agree that the record reflects my personal performance and is accurate and complete  Electronically signed: Raghu Parham MD. Patient seen and examined at the bedside.    Remained critically ill on continuous ICU monitoring.    OBJECTIVE:  Vital Signs Last 24 Hrs  T(C): 36.3 (09 Nov 2022 16:00), Max: 36.9 (09 Nov 2022 02:27)  T(F): 97.3 (09 Nov 2022 16:00), Max: 98.4 (09 Nov 2022 02:27)  HR: 93 (09 Nov 2022 18:05) (59 - 93)  BP: 128/75 (09 Nov 2022 07:04) (128/75 - 128/75)  BP(mean): 93 (09 Nov 2022 07:04) (93 - 93)  RR: 23 (09 Nov 2022 17:45) (3 - 23)  SpO2: 100% (09 Nov 2022 18:05) (97% - 100%)    Parameters below as of 09 Nov 2022 18:06  O2 Flow (L/min): 10  O2 Concentration (%): 40    Physical Exam:   General: NAD   Neurology: Sedated   Eyes: bilateral pupils equal and reactive   ENT/Neck: Neck supple, trachea midline, No JVD   Respiratory: Clear bilaterally   CV: S1S2, no murmurs        [x] Sternal dressing, [x] Mediastinal CT, [x]L Pleural CT        [x] Sinus rhythm, [x] Temporary pacing   Abdominal: Soft, NT, ND +BS   Extremities: 1-2+ pedal edema noted, + peripheral pulses   Skin: No Rashes, Hematoma, Ecchymosis         ============================I/O===========================   I&O's Detail    08 Nov 2022 07:01  -  09 Nov 2022 07:00  --------------------------------------------------------  IN:    Oral Fluid: 100 mL    Other (mL): 700 mL    PRBCs (Packed Red Blood Cells): 300 mL  Total IN: 1100 mL    OUT:    Indwelling Catheter - Urethral (mL): 200 mL    Other (mL): 2000 mL  Total OUT: 2200 mL    Total NET: -1100 mL      09 Nov 2022 07:01  -  09 Nov 2022 19:27  --------------------------------------------------------  IN:    Dexmedetomidine: 13.9 mL    Insulin: 17 mL    Norepinephrine: 3.2 mL    sodium chloride 0.9%: 560 mL  Total IN: 594.1 mL    OUT:    Chest Tube (mL): 20 mL    Chest Tube (mL): 70 mL    Indwelling Catheter - Urethral (mL): 130 mL  Total OUT: 220 mL    Total NET: 374.1 mL    ============================ LABS =========================                        11.5   23.83 )-----------( 244      ( 09 Nov 2022 14:19 )             34.0     11-09    140  |  101  |  27<H>  ----------------------------<  176<H>  5.0   |  24  |  2.04<H>    Ca    8.1<L>      09 Nov 2022 14:19  Phos  4.4     11-09  Mg     2.7     11-09    TPro  5.4<L>  /  Alb  3.1<L>  /  TBili  1.2  /  DBili  x   /  AST  22  /  ALT  10  /  AlkPhos  82  11-09    LIVER FUNCTIONS - ( 09 Nov 2022 14:19 )  Alb: 3.1 g/dL / Pro: 5.4 g/dL / ALK PHOS: 82 U/L / ALT: 10 U/L / AST: 22 U/L / GGT: x           PT/INR - ( 09 Nov 2022 14:19 )   PT: 15.7 sec;   INR: 1.35 ratio         PTT - ( 09 Nov 2022 14:19 )  PTT:30.1 sec  ABG - ( 09 Nov 2022 18:08 )  pH, Arterial: 7.34  pH, Blood: x     /  pCO2: 44    /  pO2: 171   / HCO3: 24    / Base Excess: -2.2  /  SaO2: 98.8      ======================Micro/Rad/Cardio=================  CXR: Reviewed  Echo: Reviewed  ======================================================  PAST MEDICAL & SURGICAL HISTORY:  HTN (hypertension)    HLD (hyperlipidemia)    DM (diabetes mellitus)  ======================================================  Assessment:  72F HTN, HLD, DM2, CKD who presented to [facility] initially with MAR on CKD with acute uremia and metabolic derangements requiring urgent HD and was COVID-19 positive. During HD went into atrial fibrillation and started on HD, subsequently developing GIB thought 2/2 AC and acute uremia. Also on amiodarone for Afib. On TTE noted to have a moderate to large pericardial effusion with early echocardiographic evidence of tamponade. Clinically she is not hypotensive and she tolerates the fluid shifts related to HD. Patient today was transferred to [facility] CTS Dr. Gabriel for evaluation of Pericardial Effusion.      CAD s/p C3L 11/09/22  Hemodynamic instability   Hypovolemia   encounter weaning mechanical ventilation    ======================================================  Plan:   ***Neuro***  [x] Sedated with [x] Precedex   Post operative neuro assessment   Hydromorphone, Tylenol, Gabapentin, and Oxycodone PRN pain management     ***Cardiovascular***  Invasive hemodynamic monitoring, assess perfusion indices   SR / CVP 4/ MAP 81/Hct 37.0%/ Lactate 1.2  [x] Levophed - 0.03mcgs   Reassessment of hemodynamics post resuscitation   Amio for afib prophylaxis   Monitor chest tube outputs   [x]  ASA [x] Statin   Serial EKG and cardiac enzymes     ***Pulmonary***  [x] Extubated this evening to CPAP  Encourage incentive spirometry, continue pulse ox monitoring, follow ABGs     Mode: CPAP with PS  FiO2: 50  PEEP: 5  PS: 5  MAP: 7  PIP: 11  wean to extubate         ***GI***  [x] NPO  [x]  Pepcid    ***Renal***  [x] ESRD on HD - M/W/F diaylsis   Continue to monitor I/Os, BUN/Creatinine.   Replete lytes PRN  Bah present     ***ID***  No active antibiotic coverage.    ***Endocrine***  [x] DM2 : HbA1c 6.0%                - [x] Insulin gtt             - Need tight glycemic control to prevent wound infection.        Patient requires continuous monitoring with:  bedside rhythm monitoring, arterial line, pulse oximetry, ventilator management and monitoring; intermittent blood gas analysis.  Care plan discussed with ICU care team.  patient remain critical; required more than usual post op care; I have spent 50 minutes providing non routine post op care, revaluated multiple times during the day .     Care Plan discussed with the ICU care team. Patient remained critical, at risk for life threatening decompensation.     By signing my name below, I, Li Gramajo, attest that this documentation has been prepared under the direction and in the presence of Raghu Parham MD.  Electronically signed: Bam Navarro, 11-09-22 @ 19:27    I, Prasad Krishnamurthy, personally performed the services described in this documentation. all medical record entries made by the scribe were at my direction and in my presence. I have reviewed the chart and agree that the record reflects my personal performance and is accurate and complete  Electronically signed: Raghu Parham MD.

## 2022-11-09 NOTE — PRE-OP CHECKLIST - SELECT TESTS ORDERED
BMP/CBC/PT/PTT/INR/Type and Screen/Urinalysis/EKG/POCT Blood Glucose/COVID-19
BMP/POCT Blood Glucose
BMP/CBC/CMP/PT/PTT/INR/Hepatic Function/Type and Screen/Urinalysis/EKG/CXR/POCT Blood Glucose/COVID-19

## 2022-11-09 NOTE — PRE-ANESTHESIA EVALUATION ADULT - NSANTHPEFT_GEN_ALL_CORE
Gen: Obese
General: Well appearing, no distress  CV: Regular  Pulm: Unlabored
GENERAL: NAD  HEAD:  Atraumatic, Normocephalic, RIJ permacath  CHEST/LUNG: Clear to auscultation bilaterally  HEART: Normal S1/S2  PSYCH: AAOx3  NEUROLOGY: non-focal  SKIN: No obvious rashes or lesions.

## 2022-11-09 NOTE — PRE-ANESTHESIA EVALUATION ADULT - NSANTHOSAYNRD_GEN_A_CORE
No. MATTY screening performed.  STOP BANG Legend: 0-2 = LOW Risk; 3-4 = INTERMEDIATE Risk; 5-8 = HIGH Risk

## 2022-11-09 NOTE — PRE-ANESTHESIA EVALUATION ADULT - MALLAMPATI CLASS
Class III - visualization of the soft palate and the base of the uvula
Class II - visualization of the soft palate, fauces, and uvula
2-3/Class II - visualization of the soft palate, fauces, and uvula

## 2022-11-10 LAB
ALBUMIN SERPL ELPH-MCNC: 3.1 G/DL — LOW (ref 3.3–5)
ALBUMIN SERPL ELPH-MCNC: 3.2 G/DL — LOW (ref 3.3–5)
ALP SERPL-CCNC: 95 U/L — SIGNIFICANT CHANGE UP (ref 40–120)
ALP SERPL-CCNC: 96 U/L — SIGNIFICANT CHANGE UP (ref 40–120)
ALT FLD-CCNC: 10 U/L — SIGNIFICANT CHANGE UP (ref 10–45)
ALT FLD-CCNC: 9 U/L — LOW (ref 10–45)
ANION GAP SERPL CALC-SCNC: 12 MMOL/L — SIGNIFICANT CHANGE UP (ref 5–17)
ANION GAP SERPL CALC-SCNC: 13 MMOL/L — SIGNIFICANT CHANGE UP (ref 5–17)
APPEARANCE UR: ABNORMAL
APTT BLD: 25.9 SEC — LOW (ref 27.5–35.5)
AST SERPL-CCNC: 23 U/L — SIGNIFICANT CHANGE UP (ref 10–40)
AST SERPL-CCNC: 24 U/L — SIGNIFICANT CHANGE UP (ref 10–40)
BACTERIA # UR AUTO: ABNORMAL
BASOPHILS # BLD AUTO: 0.04 K/UL — SIGNIFICANT CHANGE UP (ref 0–0.2)
BASOPHILS NFR BLD AUTO: 0.2 % — SIGNIFICANT CHANGE UP (ref 0–2)
BILIRUB SERPL-MCNC: 0.6 MG/DL — SIGNIFICANT CHANGE UP (ref 0.2–1.2)
BILIRUB SERPL-MCNC: 0.6 MG/DL — SIGNIFICANT CHANGE UP (ref 0.2–1.2)
BILIRUB UR-MCNC: NEGATIVE — SIGNIFICANT CHANGE UP
BUN SERPL-MCNC: 22 MG/DL — SIGNIFICANT CHANGE UP (ref 7–23)
BUN SERPL-MCNC: 33 MG/DL — HIGH (ref 7–23)
CALCIUM SERPL-MCNC: 8.4 MG/DL — SIGNIFICANT CHANGE UP (ref 8.4–10.5)
CALCIUM SERPL-MCNC: 8.9 MG/DL — SIGNIFICANT CHANGE UP (ref 8.4–10.5)
CHLORIDE SERPL-SCNC: 101 MMOL/L — SIGNIFICANT CHANGE UP (ref 96–108)
CHLORIDE SERPL-SCNC: 101 MMOL/L — SIGNIFICANT CHANGE UP (ref 96–108)
CO2 SERPL-SCNC: 26 MMOL/L — SIGNIFICANT CHANGE UP (ref 22–31)
CO2 SERPL-SCNC: 26 MMOL/L — SIGNIFICANT CHANGE UP (ref 22–31)
COLOR SPEC: ABNORMAL
CREAT SERPL-MCNC: 1.77 MG/DL — HIGH (ref 0.5–1.3)
CREAT SERPL-MCNC: 2.39 MG/DL — HIGH (ref 0.5–1.3)
DIFF PNL FLD: ABNORMAL
EGFR: 21 ML/MIN/1.73M2 — LOW
EGFR: 30 ML/MIN/1.73M2 — LOW
EOSINOPHIL # BLD AUTO: 0 K/UL — SIGNIFICANT CHANGE UP (ref 0–0.5)
EOSINOPHIL NFR BLD AUTO: 0 % — SIGNIFICANT CHANGE UP (ref 0–6)
EPI CELLS # UR: 44 /HPF — HIGH
FIBRINOGEN PPP-MCNC: 535 MG/DL — HIGH (ref 200–445)
GAS PNL BLDA: SIGNIFICANT CHANGE UP
GLUCOSE BLDC GLUCOMTR-MCNC: 103 MG/DL — HIGH (ref 70–99)
GLUCOSE BLDC GLUCOMTR-MCNC: 106 MG/DL — HIGH (ref 70–99)
GLUCOSE BLDC GLUCOMTR-MCNC: 118 MG/DL — HIGH (ref 70–99)
GLUCOSE BLDC GLUCOMTR-MCNC: 143 MG/DL — HIGH (ref 70–99)
GLUCOSE BLDC GLUCOMTR-MCNC: 150 MG/DL — HIGH (ref 70–99)
GLUCOSE BLDC GLUCOMTR-MCNC: 159 MG/DL — HIGH (ref 70–99)
GLUCOSE BLDC GLUCOMTR-MCNC: 162 MG/DL — HIGH (ref 70–99)
GLUCOSE BLDC GLUCOMTR-MCNC: 178 MG/DL — HIGH (ref 70–99)
GLUCOSE BLDC GLUCOMTR-MCNC: 212 MG/DL — HIGH (ref 70–99)
GLUCOSE BLDC GLUCOMTR-MCNC: 227 MG/DL — HIGH (ref 70–99)
GLUCOSE BLDC GLUCOMTR-MCNC: 254 MG/DL — HIGH (ref 70–99)
GLUCOSE SERPL-MCNC: 128 MG/DL — HIGH (ref 70–99)
GLUCOSE SERPL-MCNC: 140 MG/DL — HIGH (ref 70–99)
GLUCOSE UR QL: ABNORMAL
HCT VFR BLD CALC: 34.4 % — LOW (ref 34.5–45)
HGB BLD-MCNC: 11.4 G/DL — LOW (ref 11.5–15.5)
HYALINE CASTS # UR AUTO: 582 /LPF — HIGH (ref 0–2)
IMM GRANULOCYTES NFR BLD AUTO: 0.9 % — SIGNIFICANT CHANGE UP (ref 0–0.9)
INR BLD: 1.15 RATIO — SIGNIFICANT CHANGE UP (ref 0.88–1.16)
KETONES UR-MCNC: NEGATIVE — SIGNIFICANT CHANGE UP
LEUKOCYTE ESTERASE UR-ACNC: ABNORMAL
LYMPHOCYTES # BLD AUTO: 1.04 K/UL — SIGNIFICANT CHANGE UP (ref 1–3.3)
LYMPHOCYTES # BLD AUTO: 5.2 % — LOW (ref 13–44)
MAGNESIUM SERPL-MCNC: 2.2 MG/DL — SIGNIFICANT CHANGE UP (ref 1.6–2.6)
MAGNESIUM SERPL-MCNC: 2.7 MG/DL — HIGH (ref 1.6–2.6)
MCHC RBC-ENTMCNC: 30.2 PG — SIGNIFICANT CHANGE UP (ref 27–34)
MCHC RBC-ENTMCNC: 33.1 GM/DL — SIGNIFICANT CHANGE UP (ref 32–36)
MCV RBC AUTO: 91 FL — SIGNIFICANT CHANGE UP (ref 80–100)
MONOCYTES # BLD AUTO: 0.42 K/UL — SIGNIFICANT CHANGE UP (ref 0–0.9)
MONOCYTES NFR BLD AUTO: 2.1 % — SIGNIFICANT CHANGE UP (ref 2–14)
NEUTROPHILS # BLD AUTO: 18.14 K/UL — HIGH (ref 1.8–7.4)
NEUTROPHILS NFR BLD AUTO: 91.6 % — HIGH (ref 43–77)
NITRITE UR-MCNC: NEGATIVE — SIGNIFICANT CHANGE UP
NRBC # BLD: 0 /100 WBCS — SIGNIFICANT CHANGE UP (ref 0–0)
PH UR: 5.5 — SIGNIFICANT CHANGE UP (ref 5–8)
PHOSPHATE SERPL-MCNC: 4.4 MG/DL — SIGNIFICANT CHANGE UP (ref 2.5–4.5)
PHOSPHATE SERPL-MCNC: 5.6 MG/DL — HIGH (ref 2.5–4.5)
PLATELET # BLD AUTO: 198 K/UL — SIGNIFICANT CHANGE UP (ref 150–400)
POTASSIUM SERPL-MCNC: 4.5 MMOL/L — SIGNIFICANT CHANGE UP (ref 3.5–5.3)
POTASSIUM SERPL-MCNC: 5.2 MMOL/L — SIGNIFICANT CHANGE UP (ref 3.5–5.3)
POTASSIUM SERPL-SCNC: 4.5 MMOL/L — SIGNIFICANT CHANGE UP (ref 3.5–5.3)
POTASSIUM SERPL-SCNC: 5.2 MMOL/L — SIGNIFICANT CHANGE UP (ref 3.5–5.3)
PROT SERPL-MCNC: 6 G/DL — SIGNIFICANT CHANGE UP (ref 6–8.3)
PROT SERPL-MCNC: 6 G/DL — SIGNIFICANT CHANGE UP (ref 6–8.3)
PROT UR-MCNC: ABNORMAL
PROTHROM AB SERPL-ACNC: 13.4 SEC — SIGNIFICANT CHANGE UP (ref 10.5–13.4)
RBC # BLD: 3.78 M/UL — LOW (ref 3.8–5.2)
RBC # FLD: 16.2 % — HIGH (ref 10.3–14.5)
RBC CASTS # UR COMP ASSIST: 35 /HPF — HIGH (ref 0–4)
SODIUM SERPL-SCNC: 139 MMOL/L — SIGNIFICANT CHANGE UP (ref 135–145)
SODIUM SERPL-SCNC: 140 MMOL/L — SIGNIFICANT CHANGE UP (ref 135–145)
SP GR SPEC: 1.02 — SIGNIFICANT CHANGE UP (ref 1.01–1.02)
UROBILINOGEN FLD QL: NEGATIVE — SIGNIFICANT CHANGE UP
WBC # BLD: 19.82 K/UL — HIGH (ref 3.8–10.5)
WBC # FLD AUTO: 19.82 K/UL — HIGH (ref 3.8–10.5)
WBC UR QL: 5434 /HPF — HIGH (ref 0–5)

## 2022-11-10 PROCEDURE — 93010 ELECTROCARDIOGRAM REPORT: CPT

## 2022-11-10 PROCEDURE — 93990 DOPPLER FLOW TESTING: CPT | Mod: 26

## 2022-11-10 PROCEDURE — 99291 CRITICAL CARE FIRST HOUR: CPT

## 2022-11-10 PROCEDURE — 99233 SBSQ HOSP IP/OBS HIGH 50: CPT

## 2022-11-10 PROCEDURE — 71045 X-RAY EXAM CHEST 1 VIEW: CPT | Mod: 26

## 2022-11-10 RX ORDER — INSULIN LISPRO 100/ML
5 VIAL (ML) SUBCUTANEOUS
Refills: 0 | Status: DISCONTINUED | OUTPATIENT
Start: 2022-11-10 | End: 2022-11-21

## 2022-11-10 RX ORDER — METOPROLOL TARTRATE 50 MG
25 TABLET ORAL EVERY 12 HOURS
Refills: 0 | Status: DISCONTINUED | OUTPATIENT
Start: 2022-11-10 | End: 2022-11-10

## 2022-11-10 RX ORDER — ENOXAPARIN SODIUM 100 MG/ML
30 INJECTION SUBCUTANEOUS EVERY 12 HOURS
Refills: 0 | Status: DISCONTINUED | OUTPATIENT
Start: 2022-11-10 | End: 2022-11-22

## 2022-11-10 RX ORDER — INSULIN GLARGINE 100 [IU]/ML
10 INJECTION, SOLUTION SUBCUTANEOUS EVERY MORNING
Refills: 0 | Status: DISCONTINUED | OUTPATIENT
Start: 2022-11-10 | End: 2022-11-21

## 2022-11-10 RX ORDER — INSULIN LISPRO 100/ML
VIAL (ML) SUBCUTANEOUS
Refills: 0 | Status: DISCONTINUED | OUTPATIENT
Start: 2022-11-10 | End: 2022-11-21

## 2022-11-10 RX ORDER — FAMOTIDINE 10 MG/ML
20 INJECTION INTRAVENOUS DAILY
Refills: 0 | Status: DISCONTINUED | OUTPATIENT
Start: 2022-11-10 | End: 2022-11-22

## 2022-11-10 RX ORDER — INSULIN GLARGINE 100 [IU]/ML
10 INJECTION, SOLUTION SUBCUTANEOUS AT BEDTIME
Refills: 0 | Status: DISCONTINUED | OUTPATIENT
Start: 2022-11-10 | End: 2022-11-22

## 2022-11-10 RX ORDER — CEFTRIAXONE 500 MG/1
1000 INJECTION, POWDER, FOR SOLUTION INTRAMUSCULAR; INTRAVENOUS EVERY 24 HOURS
Refills: 0 | Status: DISCONTINUED | OUTPATIENT
Start: 2022-11-10 | End: 2022-11-10

## 2022-11-10 RX ORDER — PANTOPRAZOLE SODIUM 20 MG/1
40 TABLET, DELAYED RELEASE ORAL
Refills: 0 | Status: DISCONTINUED | OUTPATIENT
Start: 2022-11-10 | End: 2022-11-10

## 2022-11-10 RX ORDER — ACETAMINOPHEN 500 MG
1000 TABLET ORAL ONCE
Refills: 0 | Status: COMPLETED | OUTPATIENT
Start: 2022-11-10 | End: 2022-11-10

## 2022-11-10 RX ORDER — METOPROLOL TARTRATE 50 MG
25 TABLET ORAL EVERY 8 HOURS
Refills: 0 | Status: DISCONTINUED | OUTPATIENT
Start: 2022-11-10 | End: 2022-11-11

## 2022-11-10 RX ORDER — CEFTRIAXONE 500 MG/1
1000 INJECTION, POWDER, FOR SOLUTION INTRAMUSCULAR; INTRAVENOUS EVERY 24 HOURS
Refills: 0 | Status: COMPLETED | OUTPATIENT
Start: 2022-11-11 | End: 2022-11-13

## 2022-11-10 RX ADMIN — Medication 650 MILLIGRAM(S): at 11:09

## 2022-11-10 RX ADMIN — Medication 5: at 17:20

## 2022-11-10 RX ADMIN — Medication 5 MILLIGRAM(S): at 17:16

## 2022-11-10 RX ADMIN — HYDROMORPHONE HYDROCHLORIDE 0.5 MILLIGRAM(S): 2 INJECTION INTRAMUSCULAR; INTRAVENOUS; SUBCUTANEOUS at 03:10

## 2022-11-10 RX ADMIN — Medication 100 MILLIGRAM(S): at 05:30

## 2022-11-10 RX ADMIN — SENNA PLUS 2 TABLET(S): 8.6 TABLET ORAL at 21:21

## 2022-11-10 RX ADMIN — CHLORHEXIDINE GLUCONATE 1 APPLICATION(S): 213 SOLUTION TOPICAL at 12:45

## 2022-11-10 RX ADMIN — ATORVASTATIN CALCIUM 80 MILLIGRAM(S): 80 TABLET, FILM COATED ORAL at 06:31

## 2022-11-10 RX ADMIN — Medication 5 MILLIGRAM(S): at 05:30

## 2022-11-10 RX ADMIN — FAMOTIDINE 20 MILLIGRAM(S): 10 INJECTION INTRAVENOUS at 11:10

## 2022-11-10 RX ADMIN — Medication 650 MILLIGRAM(S): at 12:44

## 2022-11-10 RX ADMIN — Medication 650 MILLIGRAM(S): at 17:15

## 2022-11-10 RX ADMIN — Medication 25 MILLIGRAM(S): at 22:43

## 2022-11-10 RX ADMIN — Medication 500 MILLIGRAM(S): at 17:16

## 2022-11-10 RX ADMIN — HYDROMORPHONE HYDROCHLORIDE 0.5 MILLIGRAM(S): 2 INJECTION INTRAMUSCULAR; INTRAVENOUS; SUBCUTANEOUS at 03:25

## 2022-11-10 RX ADMIN — Medication 650 MILLIGRAM(S): at 18:29

## 2022-11-10 RX ADMIN — GABAPENTIN 100 MILLIGRAM(S): 400 CAPSULE ORAL at 05:30

## 2022-11-10 RX ADMIN — ATORVASTATIN CALCIUM 80 MILLIGRAM(S): 80 TABLET, FILM COATED ORAL at 21:22

## 2022-11-10 RX ADMIN — Medication 1000 MILLIGRAM(S): at 00:15

## 2022-11-10 RX ADMIN — Medication 25 MILLIGRAM(S): at 11:12

## 2022-11-10 RX ADMIN — GABAPENTIN 100 MILLIGRAM(S): 400 CAPSULE ORAL at 17:16

## 2022-11-10 RX ADMIN — Medication 650 MILLIGRAM(S): at 05:29

## 2022-11-10 RX ADMIN — AMIODARONE HYDROCHLORIDE 400 MILLIGRAM(S): 400 TABLET ORAL at 17:15

## 2022-11-10 RX ADMIN — Medication 81 MILLIGRAM(S): at 11:12

## 2022-11-10 RX ADMIN — AMIODARONE HYDROCHLORIDE 400 MILLIGRAM(S): 400 TABLET ORAL at 05:30

## 2022-11-10 RX ADMIN — Medication 500 MILLIGRAM(S): at 05:30

## 2022-11-10 RX ADMIN — Medication 25 MILLIGRAM(S): at 17:17

## 2022-11-10 RX ADMIN — Medication 10: at 12:09

## 2022-11-10 RX ADMIN — INSULIN GLARGINE 10 UNIT(S): 100 INJECTION, SOLUTION SUBCUTANEOUS at 11:19

## 2022-11-10 RX ADMIN — INSULIN GLARGINE 10 UNIT(S): 100 INJECTION, SOLUTION SUBCUTANEOUS at 21:20

## 2022-11-10 RX ADMIN — Medication 400 MILLIGRAM(S): at 00:00

## 2022-11-10 RX ADMIN — ENOXAPARIN SODIUM 30 MILLIGRAM(S): 100 INJECTION SUBCUTANEOUS at 17:16

## 2022-11-10 NOTE — PROGRESS NOTE ADULT - SUBJECTIVE AND OBJECTIVE BOX
Patient seen and examined at the bedside.    Remained critically ill on continuous ICU monitoring.    OBJECTIVE:  Vital Signs Last 24 Hrs  T(C): 36.5 (10 Nov 2022 04:00), Max: 36.9 (09 Nov 2022 07:04)  T(F): 97.7 (10 Nov 2022 04:00), Max: 98.2 (09 Nov 2022 20:00)  HR: 80 (10 Nov 2022 05:00) (59 - 93)  BP: 128/75 (09 Nov 2022 07:04) (128/75 - 128/75)  BP(mean): 93 (09 Nov 2022 07:04) (93 - 93)  RR: 16 (10 Nov 2022 05:00) (3 - 26)  SpO2: 100% (10 Nov 2022 05:00) (97% - 100%)    Parameters below as of 10 Nov 2022 04:00  Patient On (Oxygen Delivery Method): BiPAP/CPAP  O2 Flow (L/min): 30  O2 Concentration (%): 30                 Assessment:  72F HTN, HLD, DM2, CKD who presented to [facility] initially with MAR on CKD with acute uremia and metabolic derangements requiring urgent HD and was COVID-19 positive. During HD went into atrial fibrillation and started on HD, subsequently developing GIB thought 2/2 AC and acute uremia. Also on amiodarone for Afib. On TTE noted to have a moderate to large pericardial effusion with early echocardiographic evidence of tamponade. Clinically she is not hypotensive and she tolerates the fluid shifts related to HD. Patient today was transferred to [facility] CTS Dr. Gabriel for evaluation of Pericardial Effusion.      CAD s/p C3L 11/09/22  Hemodynamic instability   Leukocytosis   Hypovolemia           Plan:   ***Neuro***  [x] Nonfocal   Post operative neuro assessment   Hydromorphone, Tylenol, Gabapentin, and Oxycodone PRN pain management       ***Cardiovascular***  11/9 TTE, EF: 45-50%, normal RV function   Invasive hemodynamic monitoring, assess perfusion indices   SR / CVP 1/ MAP 83/ Hct 34.4/ Lactate 0.9  [x] TPM- VVI 40 >>> box turned off overnight   Reassessment of hemodynamics post resuscitation   Amio for afib prophylaxis   Monitor chest tube outputs   [x]  ASA [x] Statin   Serial EKG and cardiac enzymes       ***Pulmonary***  [x] Extubated Last night to CPAP  Encourage incentive spirometry, continue pulse ox monitoring, follow ABGs       Mode: CPAP with PS  FiO2: 50  PEEP: 5  PS: 5  MAP: 7  PIP: 11              ***GI***  [x] Diet: Tolerating PO consistent carb diet.   [x]  Pepcid  Bowel regimen with Miralax and senna  Reglan for gut motility     ***Renal***  [x] ESRD on HD - M/W/F diaylsis   Continue to monitor I/Os, BUN/Creatinine.   Replete lytes PRN  Bah present       ***ID***  No active antibiotic coverage      ***Endocrine***  [x] DM2 : HbA1c 6.0%                - [x] Insulin gtt             - Need tight glycemic control to prevent wound infection.          Patient requires continuous monitoring with bedside rhythm monitoring, pulse oximetry monitoring, and continuous central venous and arterial pressure monitoring; and intermittent blood gas analysis. Care plan discussed with the ICU care team.   Patient remained critical, at risk for life threatening decompensation.    I have spent 30 minutes providing critical care management to this patient.    By signing my name below, I, Mohamud Juarez, attest that this documentation has been prepared under the direction and in the presence of Gregorio Bill MD   Electronically signed: Bam Jacome, 11-10-22 @ 05:59    I, Gregorio Bill, personally performed the services described in this documentation. all medical record entries made by the scriblalo were at my direction and in my presence. I have reviewed the chart and agree that the record reflects my personal performance and is accurate and complete  Electronically signed: Gregorio Bill MD  Patient seen and examined at the bedside.    Remained critically ill on continuous ICU monitoring.    OBJECTIVE:  Vital Signs Last 24 Hrs  T(C): 36.5 (10 Nov 2022 04:00), Max: 36.9 (09 Nov 2022 07:04)  T(F): 97.7 (10 Nov 2022 04:00), Max: 98.2 (09 Nov 2022 20:00)  HR: 80 (10 Nov 2022 05:00) (59 - 93)  BP: 128/75 (09 Nov 2022 07:04) (128/75 - 128/75)  BP(mean): 93 (09 Nov 2022 07:04) (93 - 93)  RR: 16 (10 Nov 2022 05:00) (3 - 26)  SpO2: 100% (10 Nov 2022 05:00) (97% - 100%)    Parameters below as of 10 Nov 2022 04:00  Patient On (Oxygen Delivery Method): BiPAP/CPAP  O2 Flow (L/min): 30  O2 Concentration (%): 30                 Assessment:  72F HTN, HLD, DM2, CKD who presented to [facility] initially with MAR on CKD with acute uremia and metabolic derangements requiring urgent HD and was COVID-19 positive. During HD went into atrial fibrillation and started on HD, subsequently developing GIB thought 2/2 AC and acute uremia. Also on amiodarone for Afib. On TTE noted to have a moderate to large pericardial effusion with early echocardiographic evidence of tamponade. Clinically she is not hypotensive and she tolerates the fluid shifts related to HD. Patient today was transferred to [facility] CTS Dr. Gabriel for evaluation of Pericardial Effusion.      CAD s/p C3L 11/09/22  Hemodynamic instability   Leukocytosis   Hypovolemia           Plan:   ***Neuro***  [x] Nonfocal   Post operative neuro assessment   Hydromorphone, Tylenol, Gabapentin, and Oxycodone PRN pain management       ***Cardiovascular***  11/9 TTE, EF: 45-50%, normal RV function   Invasive hemodynamic monitoring, assess perfusion indices   SR / CVP 1/ MAP 83/ Hct 34.4/ Lactate 0.9  [x] TPM- VVI 40 >>> box turned off overnight   Will start back on Nipride   Plan to Put on ECMO   Reassessment of hemodynamics post resuscitation   Amio for afib prophylaxis   Plan to start Beta blocker   Monitor chest tube outputs   [x] VTE prophylaxis with Lovenox   [x]  ASA [x] Statin   Serial EKG and cardiac enzymes       ***Pulmonary***  [x] Extubated Last night to CPAP  Encourage incentive spirometry, continue pulse ox monitoring, follow ABGs       Mode: CPAP with PS  FiO2: 50  PEEP: 5  PS: 5  MAP: 7  PIP: 11              ***GI***  [x] Diet: Tolerating PO consistent carb diet.   [x]  Pepcid  Bowel regimen with Miralax and senna  Reglan for gut motility     ***Renal***  [x] ESRD on HD - M/W/F diaylsis   Continue to monitor I/Os, BUN/Creatinine.   Replete lytes PRN  Bah taken out       ***ID***  UTI--> assess for antibiotics   No active antibiotic coverage      ***Endocrine***  [x] DM2 : HbA1c 6.0%                - [x] Insulin gtt--> Will come down to ISS and Lantus              - Need tight glycemic control to prevent wound infection.          Patient requires continuous monitoring with bedside rhythm monitoring, pulse oximetry monitoring, and continuous central venous and arterial pressure monitoring; and intermittent blood gas analysis. Care plan discussed with the ICU care team.   Patient remained critical, at risk for life threatening decompensation.    I have spent 30 minutes providing critical care management to this patient.    By signing my name below, I, Mohamud Juarez, attest that this documentation has been prepared under the direction and in the presence of Gregorio Bill MD   Electronically signed: Bam Jacome, 11-10-22 @ 05:59    I, Gregorio Bill, personally performed the services described in this documentation. all medical record entries made by the jorgeiblalo were at my direction and in my presence. I have reviewed the chart and agree that the record reflects my personal performance and is accurate and complete  Electronically signed: Gregorio Bill MD

## 2022-11-10 NOTE — OCCUPATIONAL THERAPY INITIAL EVALUATION ADULT - LIVES WITH, PROFILE
Pt lives in a house with her , PTA IND with ADL/mobility using RW. Past month or so, pt has required assist for ADL/mobility

## 2022-11-10 NOTE — PHYSICAL THERAPY INITIAL EVALUATION ADULT - STRENGTHENING, PT EVAL
Half grade increase in muscle strength x 4 extremities to improve functional mobility
GOAL: Patient will improve strength by 1/4 grade in 2 weeks to improve overall functional mobility including gait, transfers, bed mobility and decrease risk of falls.

## 2022-11-10 NOTE — PHYSICAL THERAPY INITIAL EVALUATION ADULT - IMPAIRMENTS FOUND, PT EVAL
aerobic capacity/endurance/arousal, attention, and cognition/gait, locomotion, and balance/muscle strength
aerobic capacity/endurance/gait, locomotion, and balance/muscle strength

## 2022-11-10 NOTE — PHYSICAL THERAPY INITIAL EVALUATION ADULT - TRANSFER TRAINING, PT EVAL
Pt will transfer independenly with RW or least restrictive AD in 2 weeks.
GOAL: Pt will independently perform sit to/from stand transfers from various surfaces without LOB using least restrictive device by 2 weeks.

## 2022-11-10 NOTE — PHYSICAL THERAPY INITIAL EVALUATION ADULT - LEVEL OF INDEPENDENCE: STAND/SIT, REHAB EVAL
minimum assist (75% patients effort)/moderate assist (50% patients effort)
minimum assist (75% patients effort)

## 2022-11-10 NOTE — PHYSICAL THERAPY INITIAL EVALUATION ADULT - BALANCE TRAINING, PT EVAL
normal balance for safe upright activity in 2 weeks
GOAL: Patient will improve static and dynamic standing balance by 1/2 grade using least restrictive device within 2 weeks to improve safety and decrease risk of falls.

## 2022-11-10 NOTE — OCCUPATIONAL THERAPY INITIAL EVALUATION ADULT - RANGE OF MOTION EXAMINATION, UPPER EXTREMITY
shoulder flexion tested to 90* to maintain sternal precautions/bilateral UE Active ROM was WFL  (within functional limits)

## 2022-11-10 NOTE — OCCUPATIONAL THERAPY INITIAL EVALUATION ADULT - GENERAL OBSERVATIONS, REHAB EVAL
Pt received seated in chair, +chest tube, +external pacer, +a-line, +IV, +ICU monitoring, +wound vac

## 2022-11-10 NOTE — PHYSICAL THERAPY INITIAL EVALUATION ADULT - GENERAL OBSERVATIONS, REHAB EVAL
Pt received in NAD, (+)tele, x2 CT to suction, external pacer, A line, R IJ CVC, 2L NC, pulse ox, ritter, sternal vac, dressings intact, A&Ox4, VS screened and stable.
Pt encountered supine in bed, + HD + Chest tube. + PIV

## 2022-11-10 NOTE — OCCUPATIONAL THERAPY INITIAL EVALUATION ADULT - PERTINENT HX OF CURRENT PROBLEM, REHAB EVAL
72F HTN, HLD, DM, CKD who presented to [facility] initially with MAR on CKD with acute uremia and metabolic derangements requiring urgent HD and was COVID-19 positive. During HD went into atrial fibrillation and started on HD, subsequently developing GIB thought 2/2 AC and acute uremia. Also on amiodarone for Afib. On TTE noted to have a moderate to large pericardial effusion with early echocardiographic evidence of tamponade. Clinically she is not hypotensive and she tolerates the fluid shifts related to HD. Patient was transferred to [facility] CTS Dr. Gabriel for evaluation of Pericardial Effusion.      S/p CABG 11/9
72F HTN, HLD, DM, CKD who presented to [facility] initially with MAR on CKD with acute uremia and metabolic derangements requiring urgent HD and was COVID-19 positive. During HD went into atrial fibrillation and started on HD, subsequently developing GIB thought 2/2 AC and acute uremia.  On TTE noted to have a moderate to large pericardial effusion with early echocardiographic evidence of tamponade.  On 10/20 had pericardiocentesis in the cath lab with 700 cc bloody fluid removed.  Had pericardial drain which was draining minimally, and removed 10/27. developed 50 beats of vt and so cath done, found with 3v cad and is now s/p C3L 11/9/22.

## 2022-11-10 NOTE — PHYSICAL THERAPY INITIAL EVALUATION ADULT - OCCUPATION
Post-Anesthesia Evaluation and Assessment    Patient: Ana Kennedy MRN: [id number]  SSN: [id number]    YOB: 1940  Age: 77 y.o. Sex: male       Cardiovascular Function/Vital Signs  Visit Vitals    /60    Pulse 73    Temp 36.4 °C (97.5 °F)    Resp 16    Ht 6' (1.829 m)    Wt 90.7 kg (200 lb)    SpO2 100%    BMI 27.12 kg/m2       Patient is status post MAC anesthesia for Procedure(s):  COLONOSCOPY. Nausea/Vomiting: None    Postoperative hydration reviewed and adequate. Pain:  Pain Scale 1: Numeric (0 - 10) (03/17/17 1032)  Pain Intensity 1: 0 (03/17/17 1032)   Managed    Neurological Status: At baseline    Mental Status and Level of Consciousness: Arousable    Pulmonary Status:   O2 Device: Room air (03/17/17 1032)   Adequate oxygenation and airway patent    Complications related to anesthesia: None    Post-anesthesia assessment completed.  No concerns    Signed By: Orin Harvey MD     March 17, 2017
not reported
not reported

## 2022-11-10 NOTE — PHYSICAL THERAPY INITIAL EVALUATION ADULT - PERTINENT HX OF CURRENT PROBLEM, REHAB EVAL
72F HTN, HLD, DM, CKD presented to [facility] initially with MAR on CKD with acute uremia and metabolic derangements requiring urgent HD and COVID-19 positive, complicated with Afib with GIB 2/2 AC and acute uremia. Transferred to [facility] CTS for large pericardial effusion and developed cardiac tamponade requiring urgent percardiocentesis 10/20 s/p 700cc renteta blood, monitored in CTU. Downgraded to medicine for further management. IR on 10/20 for pericardial effusion drain. S/p CABG 11/9.

## 2022-11-10 NOTE — PHYSICAL THERAPY INITIAL EVALUATION ADULT - BED MOBILITY TRAINING, PT EVAL
GOAL: Pt will independently perform all aspects of bed mobility to help prevent pressure ulcers, by 2 weeks
Pt will perform all bed mobility in 2 weeks independently

## 2022-11-10 NOTE — PROGRESS NOTE ADULT - SUBJECTIVE AND OBJECTIVE BOX
[facility] - Julius Castañeda Wachsman, Tyrell Garrison Savella, [place]  Office Number:  [phone]    POD#1 C3L  Sore, otherwise feels well  Extubated to CPAP last night  on amio for AF ppx    ROS: negative unless otherwise mentioned.    Telemetry:  ST, PVC's    MEDICATIONS  (STANDING):  acetaminophen     Tablet .. 650 milliGRAM(s) Oral every 6 hours  aMIOdarone    Tablet 400 milliGRAM(s) Oral two times a day  ascorbic acid 500 milliGRAM(s) Oral two times a day  aspirin enteric coated 81 milliGRAM(s) Oral daily  atorvastatin 80 milliGRAM(s) Oral at bedtime  chlorhexidine 2% Cloths 1 Application(s) Topical daily  dextrose 50% Injectable 50 milliLiter(s) IV Push every 15 minutes  dextrose 50% Injectable 25 milliLiter(s) IV Push every 15 minutes  enoxaparin Injectable 30 milliGRAM(s) SubCutaneous every 12 hours  famotidine Injectable 20 milliGRAM(s) IV Push every 24 hours  gabapentin 100 milliGRAM(s) Oral every 12 hours  insulin lispro (ADMELOG) corrective regimen sliding scale   SubCutaneous three times a day before meals  insulin lispro Injectable (ADMELOG) 5 Unit(s) SubCutaneous before breakfast  insulin lispro Injectable (ADMELOG) 5 Unit(s) SubCutaneous before lunch  insulin lispro Injectable (ADMELOG) 5 Unit(s) SubCutaneous before dinner  insulin regular Infusion 3 Unit(s)/Hr (3 mL/Hr) IV Continuous <Continuous>  metoclopramide Injectable 5 milliGRAM(s) IV Push every 12 hours  polyethylene glycol 3350 17 Gram(s) Oral daily  senna 2 Tablet(s) Oral at bedtime  sodium chloride 0.9%. 1000 milliLiter(s) (10 mL/Hr) IV Continuous <Continuous>    MEDICATIONS  (PRN):  HYDROmorphone  Injectable 0.5 milliGRAM(s) IV Push every 6 hours PRN Breakthrough Pain  oxyCODONE    IR 5 milliGRAM(s) Oral every 4 hours PRN Moderate Pain (4 - 6)  oxyCODONE    IR 10 milliGRAM(s) Oral every 4 hours PRN Severe Pain (7 - 10)      Allergies    sulfa drugs (Rash)    Intolerances        Vital Signs Last 24 Hrs  T(C): 36.6 (10 Nov 2022 07:00), Max: 36.8 (09 Nov 2022 20:00)  T(F): 97.9 (10 Nov 2022 07:00), Max: 98.2 (09 Nov 2022 20:00)  HR: 97 (10 Nov 2022 09:00) (59 - 98)  BP: --  BP(mean): --  RR: 20 (10 Nov 2022 09:00) (3 - 26)  SpO2: 100% (10 Nov 2022 09:00) (99% - 100%)    Parameters below as of 10 Nov 2022 07:00  Patient On (Oxygen Delivery Method): nasal cannula  O2 Flow (L/min): 2      I&O's Summary    09 Nov 2022 07:01  -  10 Nov 2022 07:00  --------------------------------------------------------  IN: 2151.1 mL / OUT: 1440 mL / NET: 711.1 mL    10 Nov 2022 07:01  -  10 Nov 2022 10:14  --------------------------------------------------------  IN: 280 mL / OUT: 40 mL / NET: 240 mL        ON EXAM:  General: NAD, awake and alert  HEENT: Mucous membranes are moist, anicteric  Lungs: poor inspiratory effort, bibasilar crackles  Cardiovascular: Regular, S1 and S2, no murmurs, rubs, or gallops  Gastrointestinal: soft   Lymph: trace LE edema  Skin: No rashes or ulcers  Psych:  Mood & affect appropriate    LABS: All Labs Reviewed:                        11.4   19.82 )-----------( 198      ( 10 Nov 2022 00:22 )             34.4                         11.5   23.83 )-----------( 244      ( 09 Nov 2022 14:19 )             34.0     10 Nov 2022 04:38    139    |  101    |  22     ----------------------------<  128    4.5     |  26     |  1.77   10 Nov 2022 00:22    140    |  101    |  33     ----------------------------<  140    5.2     |  26     |  2.39   09 Nov 2022 14:19    140    |  101    |  27     ----------------------------<  176    5.0     |  24     |  2.04     Ca    8.4        10 Nov 2022 04:38  Ca    8.9        10 Nov 2022 00:22  Ca    8.1        09 Nov 2022 14:19  Phos  4.4       10 Nov 2022 04:38  Phos  5.6       10 Nov 2022 00:22  Phos  4.4       09 Nov 2022 14:19  Mg     2.2       10 Nov 2022 04:38  Mg     2.7       10 Nov 2022 00:22  Mg     2.7       09 Nov 2022 14:19    TPro  6.0    /  Alb  3.1    /  TBili  0.6    /  DBili  x      /  AST  23     /  ALT  10     /  AlkPhos  95     10 Nov 2022 04:38  TPro  6.0    /  Alb  3.2    /  TBili  0.6    /  DBili  x      /  AST  24     /  ALT  9      /  AlkPhos  96     10 Nov 2022 00:22  TPro  5.4    /  Alb  3.1    /  TBili  1.2    /  DBili  x      /  AST  22     /  ALT  10     /  AlkPhos  82     09 Nov 2022 14:19    PT/INR - ( 10 Nov 2022 00:22 )   PT: 13.4 sec;   INR: 1.15 ratio       PTT - ( 10 Nov 2022 00:22 )  PTT:25.9 sec  CARDIAC MARKERS ( 09 Nov 2022 14:19 )  x     / x     / 89 U/L / x     / 15.2 ng/mL    Blood Culture:

## 2022-11-10 NOTE — PHYSICAL THERAPY INITIAL EVALUATION ADULT - ADDITIONAL COMMENTS
as per pt: PTA pt was living in a PH + Stairs and was independent in all functional mobility and ADL's. RW for gait.
as per pt: PTA pt was living in a PH + Stairs and was independent in all functional mobility and ADL's. RW for gait.

## 2022-11-10 NOTE — PROGRESS NOTE ADULT - ASSESSMENT
72F w/ HTN, HLD, DM, CKD, 10/20/22 from OSH with uremia, COVID-19, and pericardial effusion    (1)Renal - newly ESRD-HD;  HD yesterday for hyperkalemia   (2)CTS - POD #1 for C3L  (3)Pulm-Chest tube removed     RECOMMEND:  (1)Will plan for HD likely in 24 hours;  She still urinates as well and the ritter was removed   (2)+ Perm cath and sp AVF and she will need outpt follow up with vasc for maturation   (3)Restart  Lopressor for now;      Physical therapy        Sayed Trekea   [phone]

## 2022-11-10 NOTE — PHYSICAL THERAPY INITIAL EVALUATION ADULT - MANUAL MUSCLE TESTING RESULTS, REHAB EVAL
at least 3+/5 x all 4 extremities grossly assessed through AROM within sternal precautions/grossly assessed due to
grossly 3/5 t/o extremities/grossly assessed due to

## 2022-11-10 NOTE — PHYSICAL THERAPY INITIAL EVALUATION ADULT - GAIT TRAINING, PT EVAL
Pt will ambulate 200-300 ft independently with RW in 2 weeks
GOAL: Pt will ambulate x150 ft w CGA using least restrictive device without loss of balance, within 2 weeks.

## 2022-11-10 NOTE — PHYSICAL THERAPY INITIAL EVALUATION ADULT - LEVEL OF INDEPENDENCE: SIT/STAND, REHAB EVAL
1st atte,pt modAx2, 2nd attempt minAx2/minimum assist (75% patients effort)/moderate assist (50% patients effort)
moderate assist (50% patients effort)

## 2022-11-10 NOTE — PHYSICAL THERAPY INITIAL EVALUATION ADULT - DIAGNOSIS, PT EVAL
Decreased strength, functional mobilty and endurance
Decreased strength, functional mobilty and endurance

## 2022-11-11 LAB
ALBUMIN SERPL ELPH-MCNC: 2.9 G/DL — LOW (ref 3.3–5)
ALP SERPL-CCNC: 116 U/L — SIGNIFICANT CHANGE UP (ref 40–120)
ALT FLD-CCNC: 13 U/L — SIGNIFICANT CHANGE UP (ref 10–45)
ANION GAP SERPL CALC-SCNC: 13 MMOL/L — SIGNIFICANT CHANGE UP (ref 5–17)
AST SERPL-CCNC: 24 U/L — SIGNIFICANT CHANGE UP (ref 10–40)
BASOPHILS # BLD AUTO: 0.04 K/UL — SIGNIFICANT CHANGE UP (ref 0–0.2)
BASOPHILS NFR BLD AUTO: 0.2 % — SIGNIFICANT CHANGE UP (ref 0–2)
BILIRUB SERPL-MCNC: 0.3 MG/DL — SIGNIFICANT CHANGE UP (ref 0.2–1.2)
BUN SERPL-MCNC: 36 MG/DL — HIGH (ref 7–23)
CALCIUM SERPL-MCNC: 7.8 MG/DL — LOW (ref 8.4–10.5)
CHLORIDE SERPL-SCNC: 99 MMOL/L — SIGNIFICANT CHANGE UP (ref 96–108)
CO2 SERPL-SCNC: 25 MMOL/L — SIGNIFICANT CHANGE UP (ref 22–31)
CREAT SERPL-MCNC: 3.11 MG/DL — HIGH (ref 0.5–1.3)
EGFR: 15 ML/MIN/1.73M2 — LOW
EOSINOPHIL # BLD AUTO: 0 K/UL — SIGNIFICANT CHANGE UP (ref 0–0.5)
EOSINOPHIL NFR BLD AUTO: 0 % — SIGNIFICANT CHANGE UP (ref 0–6)
GAS PNL BLDA: SIGNIFICANT CHANGE UP
GLUCOSE BLDC GLUCOMTR-MCNC: 137 MG/DL — HIGH (ref 70–99)
GLUCOSE BLDC GLUCOMTR-MCNC: 189 MG/DL — HIGH (ref 70–99)
GLUCOSE BLDC GLUCOMTR-MCNC: 195 MG/DL — HIGH (ref 70–99)
GLUCOSE BLDC GLUCOMTR-MCNC: 225 MG/DL — HIGH (ref 70–99)
GLUCOSE BLDC GLUCOMTR-MCNC: 232 MG/DL — HIGH (ref 70–99)
GLUCOSE SERPL-MCNC: 212 MG/DL — HIGH (ref 70–99)
HCT VFR BLD CALC: 27.7 % — LOW (ref 34.5–45)
HGB BLD-MCNC: 9.2 G/DL — LOW (ref 11.5–15.5)
IMM GRANULOCYTES NFR BLD AUTO: 1 % — HIGH (ref 0–0.9)
LYMPHOCYTES # BLD AUTO: 0.96 K/UL — LOW (ref 1–3.3)
LYMPHOCYTES # BLD AUTO: 4.5 % — LOW (ref 13–44)
MAGNESIUM SERPL-MCNC: 2.3 MG/DL — SIGNIFICANT CHANGE UP (ref 1.6–2.6)
MCHC RBC-ENTMCNC: 30.7 PG — SIGNIFICANT CHANGE UP (ref 27–34)
MCHC RBC-ENTMCNC: 33.2 GM/DL — SIGNIFICANT CHANGE UP (ref 32–36)
MCV RBC AUTO: 92.3 FL — SIGNIFICANT CHANGE UP (ref 80–100)
MONOCYTES # BLD AUTO: 0.71 K/UL — SIGNIFICANT CHANGE UP (ref 0–0.9)
MONOCYTES NFR BLD AUTO: 3.3 % — SIGNIFICANT CHANGE UP (ref 2–14)
NEUTROPHILS # BLD AUTO: 19.45 K/UL — HIGH (ref 1.8–7.4)
NEUTROPHILS NFR BLD AUTO: 91 % — HIGH (ref 43–77)
NRBC # BLD: 0 /100 WBCS — SIGNIFICANT CHANGE UP (ref 0–0)
PHOSPHATE SERPL-MCNC: 5 MG/DL — HIGH (ref 2.5–4.5)
PLATELET # BLD AUTO: 191 K/UL — SIGNIFICANT CHANGE UP (ref 150–400)
POTASSIUM SERPL-MCNC: 4.9 MMOL/L — SIGNIFICANT CHANGE UP (ref 3.5–5.3)
POTASSIUM SERPL-SCNC: 4.9 MMOL/L — SIGNIFICANT CHANGE UP (ref 3.5–5.3)
PROT SERPL-MCNC: 5.6 G/DL — LOW (ref 6–8.3)
RBC # BLD: 3 M/UL — LOW (ref 3.8–5.2)
RBC # FLD: 16.3 % — HIGH (ref 10.3–14.5)
SODIUM SERPL-SCNC: 137 MMOL/L — SIGNIFICANT CHANGE UP (ref 135–145)
WBC # BLD: 21.37 K/UL — HIGH (ref 3.8–10.5)
WBC # FLD AUTO: 21.37 K/UL — HIGH (ref 3.8–10.5)

## 2022-11-11 PROCEDURE — 99291 CRITICAL CARE FIRST HOUR: CPT

## 2022-11-11 PROCEDURE — 99232 SBSQ HOSP IP/OBS MODERATE 35: CPT

## 2022-11-11 PROCEDURE — 71045 X-RAY EXAM CHEST 1 VIEW: CPT | Mod: 26

## 2022-11-11 PROCEDURE — 93010 ELECTROCARDIOGRAM REPORT: CPT

## 2022-11-11 RX ORDER — METOPROLOL TARTRATE 50 MG
25 TABLET ORAL ONCE
Refills: 0 | Status: COMPLETED | OUTPATIENT
Start: 2022-11-11 | End: 2022-11-11

## 2022-11-11 RX ORDER — ERYTHROPOIETIN 10000 [IU]/ML
10000 INJECTION, SOLUTION INTRAVENOUS; SUBCUTANEOUS ONCE
Refills: 0 | Status: COMPLETED | OUTPATIENT
Start: 2022-11-11 | End: 2022-11-11

## 2022-11-11 RX ORDER — METOPROLOL TARTRATE 50 MG
5025 TABLET ORAL EVERY 12 HOURS
Refills: 0 | Status: DISCONTINUED | OUTPATIENT
Start: 2022-11-11 | End: 2022-11-11

## 2022-11-11 RX ORDER — METOPROLOL TARTRATE 50 MG
50 TABLET ORAL EVERY 12 HOURS
Refills: 0 | Status: DISCONTINUED | OUTPATIENT
Start: 2022-11-11 | End: 2022-11-13

## 2022-11-11 RX ADMIN — CEFTRIAXONE 100 MILLIGRAM(S): 500 INJECTION, POWDER, FOR SOLUTION INTRAMUSCULAR; INTRAVENOUS at 05:41

## 2022-11-11 RX ADMIN — INSULIN GLARGINE 10 UNIT(S): 100 INJECTION, SOLUTION SUBCUTANEOUS at 22:19

## 2022-11-11 RX ADMIN — Medication 25 MILLIGRAM(S): at 17:36

## 2022-11-11 RX ADMIN — SENNA PLUS 2 TABLET(S): 8.6 TABLET ORAL at 22:19

## 2022-11-11 RX ADMIN — Medication 5 MILLIGRAM(S): at 05:40

## 2022-11-11 RX ADMIN — Medication 650 MILLIGRAM(S): at 05:38

## 2022-11-11 RX ADMIN — Medication 500 MILLIGRAM(S): at 05:40

## 2022-11-11 RX ADMIN — AMIODARONE HYDROCHLORIDE 400 MILLIGRAM(S): 400 TABLET ORAL at 17:36

## 2022-11-11 RX ADMIN — CHLORHEXIDINE GLUCONATE 1 APPLICATION(S): 213 SOLUTION TOPICAL at 11:16

## 2022-11-11 RX ADMIN — ATORVASTATIN CALCIUM 80 MILLIGRAM(S): 80 TABLET, FILM COATED ORAL at 22:19

## 2022-11-11 RX ADMIN — Medication 8: at 07:40

## 2022-11-11 RX ADMIN — Medication 650 MILLIGRAM(S): at 11:19

## 2022-11-11 RX ADMIN — Medication 5 UNIT(S): at 07:40

## 2022-11-11 RX ADMIN — Medication 81 MILLIGRAM(S): at 11:20

## 2022-11-11 RX ADMIN — Medication 500 MILLIGRAM(S): at 17:36

## 2022-11-11 RX ADMIN — Medication 650 MILLIGRAM(S): at 01:04

## 2022-11-11 RX ADMIN — Medication 5 UNIT(S): at 11:25

## 2022-11-11 RX ADMIN — Medication 8: at 11:26

## 2022-11-11 RX ADMIN — GABAPENTIN 100 MILLIGRAM(S): 400 CAPSULE ORAL at 05:39

## 2022-11-11 RX ADMIN — Medication 5 UNIT(S): at 16:43

## 2022-11-11 RX ADMIN — Medication 650 MILLIGRAM(S): at 17:36

## 2022-11-11 RX ADMIN — Medication 2: at 16:44

## 2022-11-11 RX ADMIN — POLYETHYLENE GLYCOL 3350 17 GRAM(S): 17 POWDER, FOR SOLUTION ORAL at 11:20

## 2022-11-11 RX ADMIN — AMIODARONE HYDROCHLORIDE 400 MILLIGRAM(S): 400 TABLET ORAL at 05:38

## 2022-11-11 RX ADMIN — Medication 25 MILLIGRAM(S): at 15:55

## 2022-11-11 RX ADMIN — ERYTHROPOIETIN 10000 UNIT(S): 10000 INJECTION, SOLUTION INTRAVENOUS; SUBCUTANEOUS at 13:18

## 2022-11-11 RX ADMIN — Medication 25 MILLIGRAM(S): at 05:38

## 2022-11-11 RX ADMIN — ENOXAPARIN SODIUM 30 MILLIGRAM(S): 100 INJECTION SUBCUTANEOUS at 05:40

## 2022-11-11 RX ADMIN — GABAPENTIN 100 MILLIGRAM(S): 400 CAPSULE ORAL at 17:37

## 2022-11-11 RX ADMIN — ENOXAPARIN SODIUM 30 MILLIGRAM(S): 100 INJECTION SUBCUTANEOUS at 17:37

## 2022-11-11 RX ADMIN — INSULIN GLARGINE 10 UNIT(S): 100 INJECTION, SOLUTION SUBCUTANEOUS at 07:39

## 2022-11-11 RX ADMIN — FAMOTIDINE 20 MILLIGRAM(S): 10 INJECTION INTRAVENOUS at 11:19

## 2022-11-11 NOTE — PROGRESS NOTE ADULT - ASSESSMENT
72F w/ HTN, HLD, DM, CKD, 10/20/22 from OSH with uremia, COVID-19, and pericardial effusion    (1)Renal - newly ESRD-HD;  HD yesterday for hyperkalemia   (2)CTS - POD #2 for C3L  (3)Pulm-Chest tube removed     RECOMMEND:  (1)Will plan for HD today and then again in am.  Both sessions will be 2 hours and 1 Kg removal      (2)+ Perm cath and sp AVF and she will need outpt follow up with vasc for maturation     (3)Physical therapy     DW CTICU         Sayed [facility]   [phone]

## 2022-11-11 NOTE — PROGRESS NOTE ADULT - SUBJECTIVE AND OBJECTIVE BOX
[facility] [phone]        Patient seen and examined in the chair.  She was able to stand with PT today  No pressors         MEDICATIONS  (STANDING):  acetaminophen     Tablet .. 650 milliGRAM(s) Oral every 6 hours  aMIOdarone    Tablet 400 milliGRAM(s) Oral two times a day  ascorbic acid 500 milliGRAM(s) Oral two times a day  aspirin enteric coated 81 milliGRAM(s) Oral daily  atorvastatin 80 milliGRAM(s) Oral at bedtime  bisacodyl Suppository 10 milliGRAM(s) Rectal once  cefTRIAXone   IVPB 1000 milliGRAM(s) IV Intermittent every 24 hours  chlorhexidine 2% Cloths 1 Application(s) Topical daily  dextrose 50% Injectable 50 milliLiter(s) IV Push every 15 minutes  dextrose 50% Injectable 25 milliLiter(s) IV Push every 15 minutes  enoxaparin Injectable 30 milliGRAM(s) SubCutaneous every 12 hours  epoetin beatris-epbx (RETACRIT) Injectable 54803 Unit(s) IV Push once  famotidine    Tablet 20 milliGRAM(s) Oral daily  gabapentin 100 milliGRAM(s) Oral every 12 hours  insulin glargine Injectable (LANTUS) 10 Unit(s) SubCutaneous every morning  insulin glargine Injectable (LANTUS) 10 Unit(s) SubCutaneous at bedtime  insulin lispro (ADMELOG) corrective regimen sliding scale   SubCutaneous three times a day before meals  insulin lispro Injectable (ADMELOG) 5 Unit(s) SubCutaneous before breakfast  insulin lispro Injectable (ADMELOG) 5 Unit(s) SubCutaneous before lunch  insulin lispro Injectable (ADMELOG) 5 Unit(s) SubCutaneous before dinner  metoprolol tartrate 25 milliGRAM(s) Oral every 8 hours  polyethylene glycol 3350 17 Gram(s) Oral daily  senna 2 Tablet(s) Oral at bedtime  sodium chloride 0.9%. 1000 milliLiter(s) (10 mL/Hr) IV Continuous <Continuous>      VITAL:  T(C): , Max: 36.6 (11-10-22 @ 16:00)  T(F): , Max: 97.9 (11-10-22 @ 16:00)  HR: 78 (22 @ 10:00)  BP: --  BP(mean): --  RR: 19 (22 @ 10:00)  SpO2: 100% (22 @ 10:00)  Wt(kg): --    I and O's:    11-10 @ 07:  -   @ 07:00  --------------------------------------------------------  IN: 908 mL / OUT: 210 mL / NET: 698 mL     @ 07:  -   @ 10:30  --------------------------------------------------------  IN: 130 mL / OUT: 80 mL / NET: 50 mL          PHYSICAL EXAM:    Constitutional: NAD; obese   Neck:  No JVD  Respiratory: CTAB/L  Cardiovascular: S1 and S2  Gastrointestinal: BS+, soft, NT/ND  Extremities: No peripheral edema  Neurological: A/O x 3, no focal deficits  Psychiatric: Normal mood, normal affect  : No Bah  Skin: No rashes  Access: perm cath ; avf     LABS:                        9.2    21.37 )-----------( 191      ( 2022 00:49 )             27.7         137  |  99  |  36<H>  ----------------------------<  212<H>  4.9   |  25  |  3.11<H>    Ca    7.8<L>      2022 00:49  Phos  5.0       Mg     2.3         TPro  5.6<L>  /  Alb  2.9<L>  /  TBili  0.3  /  DBili  x   /  AST  24  /  ALT  13  /  AlkPhos  116            Urine Studies:  Urinalysis Basic - ( 2022 23:14 )    Color: Orange / Appearance: Turbid / S.022 / pH: x  Gluc: x / Ketone: Negative  / Bili: Negative / Urobili: Negative   Blood: x / Protein: 300 mg/dL / Nitrite: Negative   Leuk Esterase: Large / RBC: 35 /hpf / WBC 5434 /HPF   Sq Epi: x / Non Sq Epi: 44 /hpf / Bacteria: Moderate            RADIOLOGY & ADDITIONAL STUDIES:

## 2022-11-11 NOTE — PROGRESS NOTE ADULT - ASSESSMENT
72F HTN, HLD, DM, CKD who presented to [facility] initially with MAR on CKD with acute uremia and metabolic derangements requiring urgent HD and was COVID-19 positive. During HD went into atrial fibrillation and started on HD, subsequently developing GIB thought 2/2 AC and acute uremia.  On TTE noted to have a moderate to large pericardial effusion with early echocardiographic evidence of tamponade.  On 10/20 had pericardiocentesis in the cath lab with 700 cc bloody fluid removed.  Had pericardial drain which was draining minimally, and removed 10/27.     - Repeat echo with no significant re- accumulation of effusion.    - Tolerated AVF with no issues.    - Continue off of AC for now secondary to bleeding issues    - developed 50 beats of vt and so cath done  - found with 3v cad and is now s/p C3L 11/9/22  - Continue asa and statin  - continue bb  - amio as per CTS  - hx of pAF in the setting of acute uremia and hemopericardium; cont to monitor on tele and if recurrent AF may need to reconsider AC when safe to do so from a surgical standpoint    - Monitor and replete electrolytes. Keep K>4.0 and Mg>2.0.   - Further cardiac workup will depend on clinical course.   - All other workup per primary team. Will followup.

## 2022-11-11 NOTE — CHART NOTE - NSCHARTNOTEFT_GEN_A_CORE
US duplex obtained today -- AVF is patent with non-occlusive thrombus and low flow. No acute vascular intervention at this time. Patient can follow up with Dr. Galeano outpatient for follow up.     Vascular surgery will sign off. Please reconsult as needed.     Vascular Surgery [id number]

## 2022-11-11 NOTE — PROGRESS NOTE ADULT - SUBJECTIVE AND OBJECTIVE BOX
Patient seen and examined at the bedside.    Remained critically ill on continuous ICU monitoring.    OBJECTIVE:  Vital Signs Last 24 Hrs  T(C): 36.3 (11 Nov 2022 04:00), Max: 36.6 (10 Nov 2022 07:00)  T(F): 97.3 (11 Nov 2022 04:00), Max: 97.9 (10 Nov 2022 07:00)  HR: 80 (11 Nov 2022 05:04) (75 - 98)  BP: --  BP(mean): --  RR: 10 (11 Nov 2022 05:00) (6 - 39)  SpO2: 100% (11 Nov 2022 05:04) (95% - 100%)    Parameters below as of 11 Nov 2022 04:00  Patient On (Oxygen Delivery Method): nasal cannula  O2 Flow (L/min): 1        Assessment:  72F HTN, HLD, DM2, CKD who presented to [facility] initially with MAR on CKD with acute uremia and metabolic derangements requiring urgent HD and was COVID-19 positive. During HD went into atrial fibrillation and started on HD, subsequently developing GIB thought 2/2 AC and acute uremia. Also on amiodarone for Afib. On TTE noted to have a moderate to large pericardial effusion with early echocardiographic evidence of tamponade. Clinically she is not hypotensive and she tolerates the fluid shifts related to HD. Patient today was transferred to [facility] CTS Dr. Gabriel for evaluation of Pericardial Effusion.      CAD s/p C3L 11/09/22  Hemodynamic instability   Leukocytosis   Hypovolemia       Plan:   ***Neuro***  [x] Nonfocal   Post operative neuro assessment   Hydromorphone, Tylenol, Gabapentin, and Oxycodone PRN pain management       ***Cardiovascular***  11/9 TTE, EF: 45-50%, normal RV function   Invasive hemodynamic monitoring, assess perfusion indices   SR / CVP 2/ MAP 81/ Hct 7.7/ Lactate 0.6  [x] TPM- VVI 40 box off  Plan to Put on ECMO   Reassessment of hemodynamics post resuscitation   Amio for afib prophylaxis   Lopressor for blood pressure management  Monitor chest tube outputs   [x] VTE prophylaxis with Lovenox   [x]  ASA [x] Statin   Serial EKG and cardiac enzymes       ***Pulmonary***  [x] 2L NC  Encourage incentive spirometry, continue pulse ox monitoring, follow ABGs     ***GI***  [x] Diet: Tolerating PO consistent carb diet.   [x] Pepcid  Bowel regimen with Miralax and senna    ***Renal***  [x] ESRD on HD - M/W/F diaylsis   Continue to monitor I/Os, BUN/Creatinine.   Replete lytes PRN       ***ID***  UTI--> Started on Ceftriaxone      ***Endocrine***  [x] DM2 : HbA1c 6.0%                - [x] Insulin ISS and Lantus             - Need tight glycemic control to prevent wound infection.            Patient requires continuous monitoring with bedside rhythm monitoring, pulse oximetry monitoring, and continuous central venous and arterial pressure monitoring; and intermittent blood gas analysis. Care plan discussed with the ICU care team.   Patient remained critical, at risk for life threatening decompensation.    I have spent 30 minutes providing critical care management to this patient.    By signing my name below, I, Maria D'Amico, attest that this documentation has been prepared under the direction and in the presence of Gregorio Bill MD   Electronically signed: Maria D'Amico, Scribe, 11-11-22 @ 06:12    I, Gregorio Bill, personally performed the services described in this documentation. all medical record entries made by the scribe were at my direction and in my presence. I have reviewed the chart and agree that the record reflects my personal performance and is accurate and complete  Electronically signed: Gregorio Bill MD  Patient seen and examined at the bedside.    Remained critically ill on continuous ICU monitoring.    OBJECTIVE:  Vital Signs Last 24 Hrs  T(C): 36.3 (11 Nov 2022 04:00), Max: 36.6 (10 Nov 2022 07:00)  T(F): 97.3 (11 Nov 2022 04:00), Max: 97.9 (10 Nov 2022 07:00)  HR: 80 (11 Nov 2022 05:04) (75 - 98)  BP: --  BP(mean): --  RR: 10 (11 Nov 2022 05:00) (6 - 39)  SpO2: 100% (11 Nov 2022 05:04) (95% - 100%)    Parameters below as of 11 Nov 2022 04:00  Patient On (Oxygen Delivery Method): nasal cannula  O2 Flow (L/min): 1        Assessment:  72F HTN, HLD, DM2, CKD who presented to [facility] initially with MAR on CKD with acute uremia and metabolic derangements requiring urgent HD and was COVID-19 positive. During HD went into atrial fibrillation and started on HD, subsequently developing GIB thought 2/2 AC and acute uremia. Also on amiodarone for Afib. On TTE noted to have a moderate to large pericardial effusion with early echocardiographic evidence of tamponade. Clinically she is not hypotensive and she tolerates the fluid shifts related to HD. Patient today was transferred to [facility] CTS Dr. Gabriel for evaluation of Pericardial Effusion.      CAD s/p C3L 11/09/22  Hemodynamic instability   Leukocytosis   Hypovolemia       Plan:   ***Neuro***  [x] Nonfocal   Post operative neuro assessment   Hydromorphone, Tylenol, Gabapentin, and Oxycodone PRN pain management       ***Cardiovascular***  11/9 TTE, EF: 45-50%, normal RV function   Invasive hemodynamic monitoring, assess perfusion indices   SR / CVP 2/ MAP 81/ Hct 7.7/ Lactate 0.6  [x] TPM- VVI 40 box off  Plan to Put on ECMO   Reassessment of hemodynamics post resuscitation   Amio for afib prophylaxis   Lopressor for blood pressure management  Monitor chest tube outputs   [x] VTE prophylaxis with Lovenox   [x]  ASA [x] Statin   Serial EKG and cardiac enzymes       ***Pulmonary***  [x] 2L NC  Encourage incentive spirometry, continue pulse ox monitoring, follow ABGs     ***GI***  [x] Diet: Tolerating PO consistent carb diet.   [x] Pepcid  Bowel regimen with Miralax and senna    ***Renal***  [x] ESRD on HD - M/W/F diaylsis   Continue to monitor I/Os, BUN/Creatinine.   Replete lytes PRN   HD today      ***ID***  UTI--> Started on Ceftriaxone      ***Endocrine***  [x] DM2 : HbA1c 6.0%                - [x] Insulin ISS and Lantus             - Need tight glycemic control to prevent wound infection.            Patient requires continuous monitoring with bedside rhythm monitoring, pulse oximetry monitoring, and continuous central venous and arterial pressure monitoring; and intermittent blood gas analysis. Care plan discussed with the ICU care team.   Patient remained critical, at risk for life threatening decompensation.    I have spent 30 minutes providing critical care management to this patient.    By signing my name below, I, Maria D'Amico, attest that this documentation has been prepared under the direction and in the presence of Gregorio Bill MD   Electronically signed: Maria D'Amico, Scribe, 11-11-22 @ 06:12    I, Gregorio Bill, personally performed the services described in this documentation. all medical record entries made by the jorgeiblalo were at my direction and in my presence. I have reviewed the chart and agree that the record reflects my personal performance and is accurate and complete  Electronically signed: Gregorio Bill MD

## 2022-11-11 NOTE — PROGRESS NOTE ADULT - SUBJECTIVE AND OBJECTIVE BOX
[facility] - Lucio Madrid, Tyrell Garrison Savella  Office Number:  [phone]    Patient resting comfortably in bed in NAD.  Laying flat with no respiratory distress.  No complaints of chest pain, dyspnea, palpitations, PND, or orthopnea.    ROS: negative unless otherwise mentioned.    Telemetry:  sr    MEDICATIONS  (STANDING):  acetaminophen     Tablet .. 650 milliGRAM(s) Oral every 6 hours  aMIOdarone    Tablet 400 milliGRAM(s) Oral two times a day  ascorbic acid 500 milliGRAM(s) Oral two times a day  aspirin enteric coated 81 milliGRAM(s) Oral daily  atorvastatin 80 milliGRAM(s) Oral at bedtime  bisacodyl Suppository 10 milliGRAM(s) Rectal once  cefTRIAXone   IVPB 1000 milliGRAM(s) IV Intermittent every 24 hours  chlorhexidine 2% Cloths 1 Application(s) Topical daily  dextrose 50% Injectable 50 milliLiter(s) IV Push every 15 minutes  dextrose 50% Injectable 25 milliLiter(s) IV Push every 15 minutes  enoxaparin Injectable 30 milliGRAM(s) SubCutaneous every 12 hours  famotidine    Tablet 20 milliGRAM(s) Oral daily  gabapentin 100 milliGRAM(s) Oral every 12 hours  insulin glargine Injectable (LANTUS) 10 Unit(s) SubCutaneous every morning  insulin glargine Injectable (LANTUS) 10 Unit(s) SubCutaneous at bedtime  insulin lispro (ADMELOG) corrective regimen sliding scale   SubCutaneous three times a day before meals  insulin lispro Injectable (ADMELOG) 5 Unit(s) SubCutaneous before breakfast  insulin lispro Injectable (ADMELOG) 5 Unit(s) SubCutaneous before lunch  insulin lispro Injectable (ADMELOG) 5 Unit(s) SubCutaneous before dinner  metoprolol tartrate 25 milliGRAM(s) Oral every 8 hours  polyethylene glycol 3350 17 Gram(s) Oral daily  senna 2 Tablet(s) Oral at bedtime  sodium chloride 0.9%. 1000 milliLiter(s) (10 mL/Hr) IV Continuous <Continuous>    MEDICATIONS  (PRN):  HYDROmorphone  Injectable 0.5 milliGRAM(s) IV Push every 6 hours PRN Breakthrough Pain  oxyCODONE    IR 5 milliGRAM(s) Oral every 4 hours PRN Moderate Pain (4 - 6)  oxyCODONE    IR 10 milliGRAM(s) Oral every 4 hours PRN Severe Pain (7 - 10)      Allergies    sulfa drugs (Rash)    Intolerances        Vital Signs Last 24 Hrs  T(C): 36.4 (11 Nov 2022 08:00), Max: 36.6 (10 Nov 2022 16:00)  T(F): 97.5 (11 Nov 2022 08:00), Max: 97.9 (10 Nov 2022 16:00)  HR: 80 (11 Nov 2022 09:00) (75 - 96)  BP: --  BP(mean): --  RR: 19 (11 Nov 2022 08:00) (8 - 39)  SpO2: 100% (11 Nov 2022 09:00) (95% - 100%)    Parameters below as of 11 Nov 2022 08:00  Patient On (Oxygen Delivery Method): nasal cannula  O2 Flow (L/min): 1      I&O's Summary    10 Nov 2022 07:01  -  11 Nov 2022 07:00  --------------------------------------------------------  IN: 908 mL / OUT: 210 mL / NET: 698 mL    11 Nov 2022 07:01  -  11 Nov 2022 09:41  --------------------------------------------------------  IN: 120 mL / OUT: 55 mL / NET: 65 mL        ON EXAM:    General: NAD, awake and alert  HEENT: Mucous membranes are moist, anicteric  Lungs: poor inspiratory effort, bibasilar crackles  Cardiovascular: Regular, S1 and S2, no murmurs, rubs, or gallops  Gastrointestinal: soft   Lymph: trace LE edema  Skin: No rashes or ulcers  Psych:  Mood & affect appropriate  LABS: All Labs Reviewed:                        9.2    21.37 )-----------( 191      ( 11 Nov 2022 00:49 )             27.7                         11.4   19.82 )-----------( 198      ( 10 Nov 2022 00:22 )             34.4                         11.5   23.83 )-----------( 244      ( 09 Nov 2022 14:19 )             34.0     11 Nov 2022 00:49    137    |  99     |  36     ----------------------------<  212    4.9     |  25     |  3.11   10 Nov 2022 04:38    139    |  101    |  22     ----------------------------<  128    4.5     |  26     |  1.77   10 Nov 2022 00:22    140    |  101    |  33     ----------------------------<  140    5.2     |  26     |  2.39     Ca    7.8        11 Nov 2022 00:49  Ca    8.4        10 Nov 2022 04:38  Ca    8.9        10 Nov 2022 00:22  Phos  5.0       11 Nov 2022 00:49  Phos  4.4       10 Nov 2022 04:38  Phos  5.6       10 Nov 2022 00:22  Mg     2.3       11 Nov 2022 00:49  Mg     2.2       10 Nov 2022 04:38  Mg     2.7       10 Nov 2022 00:22    TPro  5.6    /  Alb  2.9    /  TBili  0.3    /  DBili  x      /  AST  24     /  ALT  13     /  AlkPhos  116    11 Nov 2022 00:49  TPro  6.0    /  Alb  3.1    /  TBili  0.6    /  DBili  x      /  AST  23     /  ALT  10     /  AlkPhos  95     10 Nov 2022 04:38  TPro  6.0    /  Alb  3.2    /  TBili  0.6    /  DBili  x      /  AST  24     /  ALT  9      /  AlkPhos  96     10 Nov 2022 00:22    PT/INR - ( 10 Nov 2022 00:22 )   PT: 13.4 sec;   INR: 1.15 ratio         PTT - ( 10 Nov 2022 00:22 )  PTT:25.9 sec  CARDIAC MARKERS ( 09 Nov 2022 14:19 )  x     / x     / 89 U/L / x     / 15.2 ng/mL      Blood Culture:

## 2022-11-12 LAB
ALBUMIN SERPL ELPH-MCNC: 2.8 G/DL — LOW (ref 3.3–5)
ALP SERPL-CCNC: 136 U/L — HIGH (ref 40–120)
ALT FLD-CCNC: 18 U/L — SIGNIFICANT CHANGE UP (ref 10–45)
ANION GAP SERPL CALC-SCNC: 14 MMOL/L — SIGNIFICANT CHANGE UP (ref 5–17)
AST SERPL-CCNC: 25 U/L — SIGNIFICANT CHANGE UP (ref 10–40)
BILIRUB SERPL-MCNC: 0.2 MG/DL — SIGNIFICANT CHANGE UP (ref 0.2–1.2)
BUN SERPL-MCNC: 38 MG/DL — HIGH (ref 7–23)
CALCIUM SERPL-MCNC: 7.8 MG/DL — LOW (ref 8.4–10.5)
CHLORIDE SERPL-SCNC: 101 MMOL/L — SIGNIFICANT CHANGE UP (ref 96–108)
CO2 SERPL-SCNC: 24 MMOL/L — SIGNIFICANT CHANGE UP (ref 22–31)
CREAT SERPL-MCNC: 2.49 MG/DL — HIGH (ref 0.5–1.3)
CULTURE RESULTS: SIGNIFICANT CHANGE UP
EGFR: 20 ML/MIN/1.73M2 — LOW
GAS PNL BLDA: SIGNIFICANT CHANGE UP
GAS PNL BLDA: SIGNIFICANT CHANGE UP
GLUCOSE BLDC GLUCOMTR-MCNC: 108 MG/DL — HIGH (ref 70–99)
GLUCOSE BLDC GLUCOMTR-MCNC: 137 MG/DL — HIGH (ref 70–99)
GLUCOSE BLDC GLUCOMTR-MCNC: 154 MG/DL — HIGH (ref 70–99)
GLUCOSE BLDC GLUCOMTR-MCNC: 86 MG/DL — SIGNIFICANT CHANGE UP (ref 70–99)
GLUCOSE SERPL-MCNC: 153 MG/DL — HIGH (ref 70–99)
HCT VFR BLD CALC: 28 % — LOW (ref 34.5–45)
HGB BLD-MCNC: 8.8 G/DL — LOW (ref 11.5–15.5)
MAGNESIUM SERPL-MCNC: 2.2 MG/DL — SIGNIFICANT CHANGE UP (ref 1.6–2.6)
MCHC RBC-ENTMCNC: 30.3 PG — SIGNIFICANT CHANGE UP (ref 27–34)
MCHC RBC-ENTMCNC: 31.4 GM/DL — LOW (ref 32–36)
MCV RBC AUTO: 96.6 FL — SIGNIFICANT CHANGE UP (ref 80–100)
NRBC # BLD: 0 /100 WBCS — SIGNIFICANT CHANGE UP (ref 0–0)
PHOSPHATE SERPL-MCNC: 3.4 MG/DL — SIGNIFICANT CHANGE UP (ref 2.5–4.5)
PLATELET # BLD AUTO: 190 K/UL — SIGNIFICANT CHANGE UP (ref 150–400)
POTASSIUM SERPL-MCNC: 3.9 MMOL/L — SIGNIFICANT CHANGE UP (ref 3.5–5.3)
POTASSIUM SERPL-SCNC: 3.9 MMOL/L — SIGNIFICANT CHANGE UP (ref 3.5–5.3)
PROT SERPL-MCNC: 5.7 G/DL — LOW (ref 6–8.3)
RBC # BLD: 2.9 M/UL — LOW (ref 3.8–5.2)
RBC # FLD: 15.9 % — HIGH (ref 10.3–14.5)
SODIUM SERPL-SCNC: 139 MMOL/L — SIGNIFICANT CHANGE UP (ref 135–145)
SPECIMEN SOURCE: SIGNIFICANT CHANGE UP
WBC # BLD: 20.23 K/UL — HIGH (ref 3.8–10.5)
WBC # FLD AUTO: 20.23 K/UL — HIGH (ref 3.8–10.5)

## 2022-11-12 PROCEDURE — 99233 SBSQ HOSP IP/OBS HIGH 50: CPT

## 2022-11-12 PROCEDURE — 71045 X-RAY EXAM CHEST 1 VIEW: CPT | Mod: 26

## 2022-11-12 RX ORDER — LACTULOSE 10 G/15ML
10 SOLUTION ORAL ONCE
Refills: 0 | Status: COMPLETED | OUTPATIENT
Start: 2022-11-12 | End: 2022-11-12

## 2022-11-12 RX ADMIN — ATORVASTATIN CALCIUM 80 MILLIGRAM(S): 80 TABLET, FILM COATED ORAL at 21:02

## 2022-11-12 RX ADMIN — Medication 5 UNIT(S): at 11:46

## 2022-11-12 RX ADMIN — Medication 650 MILLIGRAM(S): at 00:34

## 2022-11-12 RX ADMIN — INSULIN GLARGINE 10 UNIT(S): 100 INJECTION, SOLUTION SUBCUTANEOUS at 08:00

## 2022-11-12 RX ADMIN — SENNA PLUS 2 TABLET(S): 8.6 TABLET ORAL at 21:02

## 2022-11-12 RX ADMIN — Medication 10 MILLIGRAM(S): at 23:15

## 2022-11-12 RX ADMIN — Medication 50 MILLIGRAM(S): at 17:58

## 2022-11-12 RX ADMIN — INSULIN GLARGINE 10 UNIT(S): 100 INJECTION, SOLUTION SUBCUTANEOUS at 21:00

## 2022-11-12 RX ADMIN — GABAPENTIN 100 MILLIGRAM(S): 400 CAPSULE ORAL at 17:57

## 2022-11-12 RX ADMIN — CHLORHEXIDINE GLUCONATE 1 APPLICATION(S): 213 SOLUTION TOPICAL at 11:11

## 2022-11-12 RX ADMIN — Medication 650 MILLIGRAM(S): at 05:48

## 2022-11-12 RX ADMIN — POLYETHYLENE GLYCOL 3350 17 GRAM(S): 17 POWDER, FOR SOLUTION ORAL at 12:09

## 2022-11-12 RX ADMIN — AMIODARONE HYDROCHLORIDE 400 MILLIGRAM(S): 400 TABLET ORAL at 05:50

## 2022-11-12 RX ADMIN — Medication 81 MILLIGRAM(S): at 11:06

## 2022-11-12 RX ADMIN — ENOXAPARIN SODIUM 30 MILLIGRAM(S): 100 INJECTION SUBCUTANEOUS at 05:49

## 2022-11-12 RX ADMIN — FAMOTIDINE 20 MILLIGRAM(S): 10 INJECTION INTRAVENOUS at 11:06

## 2022-11-12 RX ADMIN — Medication 650 MILLIGRAM(S): at 11:06

## 2022-11-12 RX ADMIN — Medication 5: at 11:46

## 2022-11-12 RX ADMIN — Medication 500 MILLIGRAM(S): at 17:57

## 2022-11-12 RX ADMIN — LACTULOSE 10 GRAM(S): 10 SOLUTION ORAL at 21:49

## 2022-11-12 RX ADMIN — Medication 500 MILLIGRAM(S): at 05:49

## 2022-11-12 RX ADMIN — ENOXAPARIN SODIUM 30 MILLIGRAM(S): 100 INJECTION SUBCUTANEOUS at 17:58

## 2022-11-12 RX ADMIN — Medication 50 MILLIGRAM(S): at 05:49

## 2022-11-12 RX ADMIN — GABAPENTIN 100 MILLIGRAM(S): 400 CAPSULE ORAL at 05:48

## 2022-11-12 RX ADMIN — CEFTRIAXONE 100 MILLIGRAM(S): 500 INJECTION, POWDER, FOR SOLUTION INTRAMUSCULAR; INTRAVENOUS at 04:20

## 2022-11-12 NOTE — PROGRESS NOTE ADULT - ASSESSMENT
72F w/ HTN, HLD, DM, CKD, 10/20/22 from OSH with uremia, COVID-19, and pericardial effusion    (1)Renal - newly ESRD-HD;  HD yesterday for hyperkalemia   (2)CTS - POD #3 for C3L  (3)Pulm-Chest tube removed     RECOMMEND:  (1)Will plan for HD today   and 1 Kg removal.  More then likely no HD on SUNDAY.  Can use lasix if needed for diuresis      (2)+ Perm cath and sp AVF (follow up with vasc as outpt)       (3)Physical therapy -  Once she is walking then dc the riya AGUDELO [facility]         [facility]   [phone]

## 2022-11-12 NOTE — PROGRESS NOTE ADULT - SUBJECTIVE AND OBJECTIVE BOX
[facility] [phone]        Patient seen and examined in the chair.   Bah was re inserted   Not on oxygen         MEDICATIONS  (STANDING):  acetaminophen     Tablet .. 650 milliGRAM(s) Oral every 6 hours  ascorbic acid 500 milliGRAM(s) Oral two times a day  aspirin enteric coated 81 milliGRAM(s) Oral daily  atorvastatin 80 milliGRAM(s) Oral at bedtime  bisacodyl Suppository 10 milliGRAM(s) Rectal once  cefTRIAXone   IVPB 1000 milliGRAM(s) IV Intermittent every 24 hours  chlorhexidine 2% Cloths 1 Application(s) Topical daily  dextrose 50% Injectable 50 milliLiter(s) IV Push every 15 minutes  dextrose 50% Injectable 25 milliLiter(s) IV Push every 15 minutes  enoxaparin Injectable 30 milliGRAM(s) SubCutaneous every 12 hours  famotidine    Tablet 20 milliGRAM(s) Oral daily  gabapentin 100 milliGRAM(s) Oral every 12 hours  insulin glargine Injectable (LANTUS) 10 Unit(s) SubCutaneous every morning  insulin glargine Injectable (LANTUS) 10 Unit(s) SubCutaneous at bedtime  insulin lispro (ADMELOG) corrective regimen sliding scale   SubCutaneous three times a day before meals  insulin lispro Injectable (ADMELOG) 5 Unit(s) SubCutaneous before breakfast  insulin lispro Injectable (ADMELOG) 5 Unit(s) SubCutaneous before lunch  insulin lispro Injectable (ADMELOG) 5 Unit(s) SubCutaneous before dinner  metoprolol tartrate 50 milliGRAM(s) Oral every 12 hours  polyethylene glycol 3350 17 Gram(s) Oral daily  senna 2 Tablet(s) Oral at bedtime  sodium chloride 0.9%. 1000 milliLiter(s) (10 mL/Hr) IV Continuous <Continuous>      VITAL:  T(C): , Max: 37.2 (11-12-22 @ 00:00)  T(F): , Max: 99 (11-12-22 @ 00:00)  HR: 72 (11-12-22 @ 08:00)  BP: --  BP(mean): --  RR: 24 (11-12-22 @ 08:00)  SpO2: 98% (11-12-22 @ 08:00)  Wt(kg): --    I and O's:    11-11 @ 07:01  -  11-12 @ 07:00  --------------------------------------------------------  IN: 816 mL / OUT: 2337 mL / NET: -1521 mL    11-12 @ 07:01  -  11-12 @ 09:03  --------------------------------------------------------  IN: 242 mL / OUT: 45 mL / NET: 197 mL          PHYSICAL EXAM:    Constitutional: NAD  Neck:  No JVD  Respiratory: CTAB/L  Cardiovascular: S1 and S2  Gastrointestinal: BS+, soft, NT/ND  Extremities: No peripheral edema  Neurological: A/O x 3, no focal deficits  Psychiatric: Normal mood, normal affect  : No Bah  Skin: No rashes  Access: perm cath     LABS:                        8.8    20.23 )-----------( 190      ( 12 Nov 2022 00:48 )             28.0     11-12    139  |  101  |  38<H>  ----------------------------<  153<H>  3.9   |  24  |  2.49<H>    Ca    7.8<L>      12 Nov 2022 00:48  Phos  3.4     11-12  Mg     2.2     11-12    TPro  5.7<L>  /  Alb  2.8<L>  /  TBili  0.2  /  DBili  x   /  AST  25  /  ALT  18  /  AlkPhos  136<H>  11-12          Urine Studies:          RADIOLOGY & ADDITIONAL STUDIES:

## 2022-11-12 NOTE — PROGRESS NOTE ADULT - SUBJECTIVE AND OBJECTIVE BOX
Patient seen and examined at the bedside.    Remained critically ill on continuous ICU monitoring.    OBJECTIVE:  Vital Signs Last 24 Hrs  T(C): 36.7 (12 Nov 2022 04:00), Max: 37.2 (12 Nov 2022 00:00)  T(F): 98.1 (12 Nov 2022 04:00), Max: 99 (12 Nov 2022 00:00)  HR: 80 (12 Nov 2022 06:00) (74 - 84)  BP: --  BP(mean): --  RR: 21 (12 Nov 2022 06:00) (12 - 27)  SpO2: 97% (12 Nov 2022 06:00) (94% - 100%)    Parameters below as of 12 Nov 2022 04:00  Patient On (Oxygen Delivery Method): room air          Assessment:  72F HTN, HLD, DM2, CKD who presented to [facility] initially with MAR on CKD with acute uremia and metabolic derangements requiring urgent HD and was COVID-19 positive. During HD went into atrial fibrillation and started on HD, subsequently developing GIB thought 2/2 AC and acute uremia. Also on amiodarone for Afib. On TTE noted to have a moderate to large pericardial effusion with early echocardiographic evidence of tamponade. Clinically she is not hypotensive and she tolerates the fluid shifts related to HD. Patient today was transferred to [facility] CTS Dr. Gabriel for evaluation of Pericardial Effusion.      CAD s/p C3L 11/09/22  Hemodynamic instability   Leukocytosis   Lactic acidosis  Hypovolemic shock      Plan:   ***Neuro***  [x] Nonfocal   Post operative neuro assessment   Hydromorphone, Tylenol, Gabapentin, and Oxycodone PRN pain management       ***Cardiovascular***  11/9 TTE, EF: 45-50%, normal RV function   Invasive hemodynamic monitoring, assess perfusion indices   SR / MAP 92/ Hct 28.0/ Lactate 2.1  [x] TPM- VVI 40 box off  Plan to Put on ECMO   Reassessment of hemodynamics post resuscitation   Amio for afib prophylaxis   Lopressor for blood pressure management  Monitor chest tube outputs   [x] VTE prophylaxis with Lovenox   [x]  ASA [x] Statin   Serial EKG and cardiac enzymes       ***Pulmonary***  [x] RA SpO2 97%  Encourage incentive spirometry, continue pulse ox monitoring, follow ABGs     ***GI***  [x] Diet: Tolerating PO consistent carb diet.   [x] Pepcid  Bowel regimen with Miralax and senna    ***Renal***  [x] ESRD on HD - M/W/F diaylsis   Continue to monitor I/Os, BUN/Creatinine.   Replete lytes PRN   HD yesterday      ***ID***  UTI--> Started on Ceftriaxone      ***Endocrine***  [x] DM2 : HbA1c 6.0%                - [x] Insulin ISS and Lantus             - Need tight glycemic control to prevent wound infection.          Patient requires continuous monitoring with bedside rhythm monitoring, pulse oximetry monitoring, and continuous central venous and arterial pressure monitoring; and intermittent blood gas analysis. Care plan discussed with the ICU care team.   Patient remained critical, at risk for life threatening decompensation.    I have spent 30 minutes providing critical care management to this patient.    By signing my name below, I, Maria D'Amico, attest that this documentation has been prepared under the direction and in the presence of Gregorio Bill MD   Electronically signed: Maria D'Amico, Scribe, 11-12-22 @ 06:11    I, Gregorio Bill, personally performed the services described in this documentation. all medical record entries made by the jorgeiblalo were at my direction and in my presence. I have reviewed the chart and agree that the record reflects my personal performance and is accurate and complete  Electronically signed: Gregorio Bill MD  Patient seen and examined at the bedside.    Remained critically ill on continuous ICU monitoring.    OBJECTIVE:  Vital Signs Last 24 Hrs  T(C): 36.7 (12 Nov 2022 04:00), Max: 37.2 (12 Nov 2022 00:00)  T(F): 98.1 (12 Nov 2022 04:00), Max: 99 (12 Nov 2022 00:00)  HR: 80 (12 Nov 2022 06:00) (74 - 84)  BP: --  BP(mean): --  RR: 21 (12 Nov 2022 06:00) (12 - 27)  SpO2: 97% (12 Nov 2022 06:00) (94% - 100%)    Parameters below as of 12 Nov 2022 04:00  Patient On (Oxygen Delivery Method): room air          Assessment:  72F HTN, HLD, DM2, CKD who presented to [facility] initially with MAR on CKD with acute uremia and metabolic derangements requiring urgent HD and was COVID-19 positive. During HD went into atrial fibrillation and started on HD, subsequently developing GIB thought 2/2 AC and acute uremia. Also on amiodarone for Afib. On TTE noted to have a moderate to large pericardial effusion with early echocardiographic evidence of tamponade. Clinically she is not hypotensive and she tolerates the fluid shifts related to HD. Patient today was transferred to [facility] CTS Dr. Gabriel for evaluation of Pericardial Effusion.      CAD s/p C3L 11/09/22  Hemodynamic instability   Leukocytosis   Lactic acidosis  Hypovolemic shock      Plan:   ***Neuro***  [x] Nonfocal   Post operative neuro assessment   Hydromorphone, Tylenol, Gabapentin, and Oxycodone PRN pain management   OOBTC      ***Cardiovascular***  11/9 TTE, EF: 45-50%, normal RV function   Invasive hemodynamic monitoring, assess perfusion indices   SR / MAP 92/ Hct 28.0/ Lactate 2.1  [x] TPM- VVI 40 box off  Plan to Put on ECMO   Reassessment of hemodynamics post resuscitation   Amio for afib prophylaxis   Lopressor for blood pressure management  Monitor chest tube outputs   [x] VTE prophylaxis with Lovenox   [x]  ASA [x] Statin   Serial EKG and cardiac enzymes       ***Pulmonary***  [x] RA SpO2 97%  Encourage incentive spirometry, continue pulse ox monitoring, follow ABGs     ***GI***  [x] Diet: Tolerating PO consistent carb diet.   [x] Pepcid  Bowel regimen with Miralax and senna    ***Renal***  [x] ESRD on HD - M/W/F diaylsis   Continue to monitor I/Os, BUN/Creatinine.   Replete lytes PRN   HD yesterday, plan for today  Bah put in yesterday      ***ID***  UTI--> Started on Ceftriaxone      ***Endocrine***  [x] DM2 : HbA1c 6.0%                - [x] Insulin ISS and Lantus             - Need tight glycemic control to prevent wound infection.          Patient requires continuous monitoring with bedside rhythm monitoring, pulse oximetry monitoring, and continuous central venous and arterial pressure monitoring; and intermittent blood gas analysis. Care plan discussed with the ICU care team.   Patient remained critical, at risk for life threatening decompensation.    I have spent 0 minutes providing critical care management to this patient.    By signing my name below, I, Maria D'Amico, attest that this documentation has been prepared under the direction and in the presence of Gregorio Bill MD   Electronically signed: Maria D'Amico, Scribe, 11-12-22 @ 06:11    I, Gregorio Bill, personally performed the services described in this documentation. all medical record entries made by the jorgeiblalo were at my direction and in my presence. I have reviewed the chart and agree that the record reflects my personal performance and is accurate and complete  Electronically signed: Gregorio Bill MD

## 2022-11-13 LAB
ALBUMIN SERPL ELPH-MCNC: 3.1 G/DL — LOW (ref 3.3–5)
ALP SERPL-CCNC: 173 U/L — HIGH (ref 40–120)
ALT FLD-CCNC: 24 U/L — SIGNIFICANT CHANGE UP (ref 10–45)
ANION GAP SERPL CALC-SCNC: 12 MMOL/L — SIGNIFICANT CHANGE UP (ref 5–17)
AST SERPL-CCNC: 28 U/L — SIGNIFICANT CHANGE UP (ref 10–40)
BILIRUB SERPL-MCNC: 0.3 MG/DL — SIGNIFICANT CHANGE UP (ref 0.2–1.2)
BUN SERPL-MCNC: 36 MG/DL — HIGH (ref 7–23)
CALCIUM SERPL-MCNC: 7.8 MG/DL — LOW (ref 8.4–10.5)
CHLORIDE SERPL-SCNC: 102 MMOL/L — SIGNIFICANT CHANGE UP (ref 96–108)
CO2 SERPL-SCNC: 23 MMOL/L — SIGNIFICANT CHANGE UP (ref 22–31)
CREAT SERPL-MCNC: 2.08 MG/DL — HIGH (ref 0.5–1.3)
EGFR: 25 ML/MIN/1.73M2 — LOW
GAS PNL BLDA: SIGNIFICANT CHANGE UP
GLUCOSE BLDC GLUCOMTR-MCNC: 101 MG/DL — HIGH (ref 70–99)
GLUCOSE BLDC GLUCOMTR-MCNC: 124 MG/DL — HIGH (ref 70–99)
GLUCOSE BLDC GLUCOMTR-MCNC: 180 MG/DL — HIGH (ref 70–99)
GLUCOSE BLDC GLUCOMTR-MCNC: 194 MG/DL — HIGH (ref 70–99)
GLUCOSE BLDC GLUCOMTR-MCNC: 69 MG/DL — LOW (ref 70–99)
GLUCOSE SERPL-MCNC: 210 MG/DL — HIGH (ref 70–99)
HCT VFR BLD CALC: 31.9 % — LOW (ref 34.5–45)
HGB BLD-MCNC: 10.4 G/DL — LOW (ref 11.5–15.5)
MAGNESIUM SERPL-MCNC: 1.9 MG/DL — SIGNIFICANT CHANGE UP (ref 1.6–2.6)
MCHC RBC-ENTMCNC: 30.4 PG — SIGNIFICANT CHANGE UP (ref 27–34)
MCHC RBC-ENTMCNC: 32.6 GM/DL — SIGNIFICANT CHANGE UP (ref 32–36)
MCV RBC AUTO: 93.3 FL — SIGNIFICANT CHANGE UP (ref 80–100)
NRBC # BLD: 0 /100 WBCS — SIGNIFICANT CHANGE UP (ref 0–0)
PHOSPHATE SERPL-MCNC: 2.6 MG/DL — SIGNIFICANT CHANGE UP (ref 2.5–4.5)
PLATELET # BLD AUTO: 219 K/UL — SIGNIFICANT CHANGE UP (ref 150–400)
POTASSIUM SERPL-MCNC: 4.1 MMOL/L — SIGNIFICANT CHANGE UP (ref 3.5–5.3)
POTASSIUM SERPL-SCNC: 4.1 MMOL/L — SIGNIFICANT CHANGE UP (ref 3.5–5.3)
PROT SERPL-MCNC: 6.1 G/DL — SIGNIFICANT CHANGE UP (ref 6–8.3)
RBC # BLD: 3.42 M/UL — LOW (ref 3.8–5.2)
RBC # FLD: 15.5 % — HIGH (ref 10.3–14.5)
SODIUM SERPL-SCNC: 137 MMOL/L — SIGNIFICANT CHANGE UP (ref 135–145)
WBC # BLD: 19.69 K/UL — HIGH (ref 3.8–10.5)
WBC # FLD AUTO: 19.69 K/UL — HIGH (ref 3.8–10.5)

## 2022-11-13 PROCEDURE — 99233 SBSQ HOSP IP/OBS HIGH 50: CPT

## 2022-11-13 PROCEDURE — 71045 X-RAY EXAM CHEST 1 VIEW: CPT | Mod: 26

## 2022-11-13 RX ORDER — HYDRALAZINE HCL 50 MG
5 TABLET ORAL ONCE
Refills: 0 | Status: COMPLETED | OUTPATIENT
Start: 2022-11-13 | End: 2022-11-13

## 2022-11-13 RX ORDER — AMLODIPINE BESYLATE 2.5 MG/1
10 TABLET ORAL DAILY
Refills: 0 | Status: DISCONTINUED | OUTPATIENT
Start: 2022-11-13 | End: 2022-11-22

## 2022-11-13 RX ORDER — ACETAMINOPHEN 500 MG
650 TABLET ORAL EVERY 6 HOURS
Refills: 0 | Status: DISCONTINUED | OUTPATIENT
Start: 2022-11-13 | End: 2022-11-22

## 2022-11-13 RX ORDER — MEROPENEM 1 G/30ML
500 INJECTION INTRAVENOUS ONCE
Refills: 0 | Status: COMPLETED | OUTPATIENT
Start: 2022-11-13 | End: 2022-11-13

## 2022-11-13 RX ORDER — METOPROLOL TARTRATE 50 MG
50 TABLET ORAL EVERY 8 HOURS
Refills: 0 | Status: DISCONTINUED | OUTPATIENT
Start: 2022-11-13 | End: 2022-11-18

## 2022-11-13 RX ORDER — HYDRALAZINE HCL 50 MG
10 TABLET ORAL ONCE
Refills: 0 | Status: COMPLETED | OUTPATIENT
Start: 2022-11-13 | End: 2022-11-13

## 2022-11-13 RX ORDER — MONTELUKAST 4 MG/1
10 TABLET, CHEWABLE ORAL DAILY
Refills: 0 | Status: DISCONTINUED | OUTPATIENT
Start: 2022-11-13 | End: 2022-11-22

## 2022-11-13 RX ORDER — GEMFIBROZIL 600 MG
600 TABLET ORAL
Refills: 0 | Status: DISCONTINUED | OUTPATIENT
Start: 2022-11-13 | End: 2022-11-22

## 2022-11-13 RX ORDER — MAGNESIUM SULFATE 500 MG/ML
1 VIAL (ML) INJECTION ONCE
Refills: 0 | Status: COMPLETED | OUTPATIENT
Start: 2022-11-13 | End: 2022-11-13

## 2022-11-13 RX ADMIN — MONTELUKAST 10 MILLIGRAM(S): 4 TABLET, CHEWABLE ORAL at 11:55

## 2022-11-13 RX ADMIN — INSULIN GLARGINE 10 UNIT(S): 100 INJECTION, SOLUTION SUBCUTANEOUS at 08:00

## 2022-11-13 RX ADMIN — CEFTRIAXONE 100 MILLIGRAM(S): 500 INJECTION, POWDER, FOR SOLUTION INTRAMUSCULAR; INTRAVENOUS at 06:13

## 2022-11-13 RX ADMIN — Medication 650 MILLIGRAM(S): at 21:22

## 2022-11-13 RX ADMIN — Medication 5: at 07:58

## 2022-11-13 RX ADMIN — CHLORHEXIDINE GLUCONATE 1 APPLICATION(S): 213 SOLUTION TOPICAL at 11:59

## 2022-11-13 RX ADMIN — Medication 10 MILLIGRAM(S): at 17:43

## 2022-11-13 RX ADMIN — ATORVASTATIN CALCIUM 80 MILLIGRAM(S): 80 TABLET, FILM COATED ORAL at 21:15

## 2022-11-13 RX ADMIN — GABAPENTIN 100 MILLIGRAM(S): 400 CAPSULE ORAL at 17:43

## 2022-11-13 RX ADMIN — Medication 10 MILLIGRAM(S): at 02:50

## 2022-11-13 RX ADMIN — Medication 500 MILLIGRAM(S): at 06:13

## 2022-11-13 RX ADMIN — Medication 5 UNIT(S): at 11:55

## 2022-11-13 RX ADMIN — Medication 600 MILLIGRAM(S): at 06:14

## 2022-11-13 RX ADMIN — Medication 50 MILLIGRAM(S): at 21:15

## 2022-11-13 RX ADMIN — ENOXAPARIN SODIUM 30 MILLIGRAM(S): 100 INJECTION SUBCUTANEOUS at 06:14

## 2022-11-13 RX ADMIN — Medication 5 UNIT(S): at 07:58

## 2022-11-13 RX ADMIN — Medication 650 MILLIGRAM(S): at 21:52

## 2022-11-13 RX ADMIN — Medication 50 MILLIGRAM(S): at 14:14

## 2022-11-13 RX ADMIN — Medication 500 MILLIGRAM(S): at 17:43

## 2022-11-13 RX ADMIN — Medication 81 MILLIGRAM(S): at 11:54

## 2022-11-13 RX ADMIN — Medication 600 MILLIGRAM(S): at 17:43

## 2022-11-13 RX ADMIN — GABAPENTIN 100 MILLIGRAM(S): 400 CAPSULE ORAL at 06:13

## 2022-11-13 RX ADMIN — OXYCODONE HYDROCHLORIDE 5 MILLIGRAM(S): 5 TABLET ORAL at 22:27

## 2022-11-13 RX ADMIN — Medication 10 MILLIGRAM(S): at 06:14

## 2022-11-13 RX ADMIN — Medication 100 GRAM(S): at 06:46

## 2022-11-13 RX ADMIN — INSULIN GLARGINE 10 UNIT(S): 100 INJECTION, SOLUTION SUBCUTANEOUS at 21:15

## 2022-11-13 RX ADMIN — Medication 50 MILLIGRAM(S): at 06:13

## 2022-11-13 RX ADMIN — AMLODIPINE BESYLATE 10 MILLIGRAM(S): 2.5 TABLET ORAL at 06:16

## 2022-11-13 RX ADMIN — OXYCODONE HYDROCHLORIDE 5 MILLIGRAM(S): 5 TABLET ORAL at 22:57

## 2022-11-13 RX ADMIN — Medication 5 MILLIGRAM(S): at 00:51

## 2022-11-13 RX ADMIN — ENOXAPARIN SODIUM 30 MILLIGRAM(S): 100 INJECTION SUBCUTANEOUS at 17:43

## 2022-11-13 RX ADMIN — FAMOTIDINE 20 MILLIGRAM(S): 10 INJECTION INTRAVENOUS at 11:54

## 2022-11-13 NOTE — PROGRESS NOTE ADULT - ASSESSMENT
72F w/ HTN, HLD, DM, CKD, 10/20/22 from OSH with uremia, COVID-19, and pericardial effusion    (1)Renal - newly ESRD-HD;  HD yesterday for hyperkalemia   (2)CTS - POD 4  for C3L  (3)Pulm-Chest tube   (4)Bah reinserted   (5) diarrhea     RECOMMEND:  (1)Will plan for HD in am?(may skip as well given the diarrhea....VOlume status is fine at present)      (2)+ Perm cath and sp AVF (follow up with vasc as outpt)       (3)Physical therapy -  Once she is walking then dc the riya     [facility]         [facility]   [phone]

## 2022-11-13 NOTE — PROGRESS NOTE ADULT - ASSESSMENT
72F HTN, HLD, DM2, CKD who presented to [facility] initially with MAR on CKD with acute uremia and metabolic derangements requiring urgent HD and was COVID-19 positive. During HD went into atrial fibrillation and started on HD, subsequently developing GIB thought 2/2 AC and acute uremia. Also on amiodarone for Afib. On TTE noted to have a moderate to large pericardial effusion with early echocardiographic evidence of tamponade. Clinically she is not hypotensive and she tolerates the fluid shifts related to HD. Patient today was transferred to [facility] CTS Dr. Gabriel for evaluation of Pericardial Effusion.      CAD s/p C3L 11/09/22  Hemodynamic instability   Leukocytosis   Hypovolemic shock      11/9 TTE, EF: 45-50%, normal RV function   Invasive hemodynamic monitoring, assess perfusion indices   SR / / Hct 31.9/ Lactate 1.2  [x] TPM- VVI 40 box off  Plan to Put on ECMO   Reassessment of hemodynamics post resuscitation   Lopressor and Norvasc for blood pressure management  Monitor chest tube outputs   [x] VTE prophylaxis with Lovenox   [x]  ASA [x] Statin   Serial EKG and cardiac enzymes

## 2022-11-13 NOTE — PROGRESS NOTE ADULT - SUBJECTIVE AND OBJECTIVE BOX
Patient seen and examined at the bedside.    Remained critically ill on continuous ICU monitoring.    OBJECTIVE:  Vital Signs Last 24 Hrs  T(C): 36.6 (13 Nov 2022 04:00), Max: 36.9 (12 Nov 2022 16:00)  T(F): 97.9 (13 Nov 2022 04:00), Max: 98.4 (12 Nov 2022 16:00)  HR: 114 (13 Nov 2022 05:00) (67 - 114)  BP: --  BP(mean): --  RR: 30 (13 Nov 2022 05:00) (13 - 41)  SpO2: 97% (13 Nov 2022 05:00) (96% - 100%)    Parameters below as of 13 Nov 2022 04:00  Patient On (Oxygen Delivery Method): room air                     Assessment:  72F HTN, HLD, DM2, CKD who presented to [facility] initially with MAR on CKD with acute uremia and metabolic derangements requiring urgent HD and was COVID-19 positive. During HD went into atrial fibrillation and started on HD, subsequently developing GIB thought 2/2 AC and acute uremia. Also on amiodarone for Afib. On TTE noted to have a moderate to large pericardial effusion with early echocardiographic evidence of tamponade. Clinically she is not hypotensive and she tolerates the fluid shifts related to HD. Patient today was transferred to [facility] CTS Dr. Gabriel for evaluation of Pericardial Effusion.      CAD s/p C3L 11/09/22  Hemodynamic instability   Leukocytosis   Hypovolemic shock      Plan:   ***Neuro***  [x] Nonfocal   Post operative neuro assessment   Hydromorphone, Tylenol, Gabapentin, and Oxycodone PRN pain management   OOBTC      ***Cardiovascular***  11/9 TTE, EF: 45-50%, normal RV function   Invasive hemodynamic monitoring, assess perfusion indices   SR / / Hct 31.9/ Lactate 1.2  [x] TPM- VVI 40 box off  Plan to Put on ECMO   Reassessment of hemodynamics post resuscitation   Lopressor and Norvasc for blood pressure management  Monitor chest tube outputs   [x] VTE prophylaxis with Lovenox   [x]  ASA [x] Statin   Serial EKG and cardiac enzymes       ***Pulmonary***  [x] RA SpO2 97%  Encourage incentive spirometry, continue pulse ox monitoring, follow ABGs                 ***GI***  [x] Diet: Tolerating PO consistent carb diet.   [x] Pepcid  Bowel regimen with Miralax and senna      ***Renal***  [x] ESRD on HD - M/W/F diaylsis   Continue to monitor I/Os, BUN/Creatinine.   Replete lytes PRN   HD yesterday, plan for today  Bah put in yesterday      ***ID***  UTI--> Started on Ceftriaxone      ***Endocrine***  [x] DM2 : HbA1c 6.0%                - [x] Insulin ISS  [x] Lantus             - Need tight glycemic control to prevent wound infection.        Patient requires continuous monitoring with bedside rhythm monitoring, pulse oximetry monitoring, and continuous central venous and arterial pressure monitoring; and intermittent blood gas analysis. Care plan discussed with the ICU care team.   Patient remained critical, at risk for life threatening decompensation.    I have spent 30 minutes providing critical care management to this patient.    By signing my name below, I, Mohamud Juarez, attest that this documentation has been prepared under the direction and in the presence of Gregorio Bill MD   Electronically signed: Bam Jacome, 11-13-22 @ 05:53    I, Gregorio Bill, personally performed the services described in this documentation. all medical record entries made by the jorgeiblalo were at my direction and in my presence. I have reviewed the chart and agree that the record reflects my personal performance and is accurate and complete  Electronically signed: Gregorio Bill MD  Patient seen and examined at the bedside.    Remained critically ill on continuous ICU monitoring.    OBJECTIVE:  Vital Signs Last 24 Hrs  T(C): 36.6 (13 Nov 2022 04:00), Max: 36.9 (12 Nov 2022 16:00)  T(F): 97.9 (13 Nov 2022 04:00), Max: 98.4 (12 Nov 2022 16:00)  HR: 114 (13 Nov 2022 05:00) (67 - 114)  BP: --  BP(mean): --  RR: 30 (13 Nov 2022 05:00) (13 - 41)  SpO2: 97% (13 Nov 2022 05:00) (96% - 100%)    Parameters below as of 13 Nov 2022 04:00  Patient On (Oxygen Delivery Method): room air                     Assessment:  72F HTN, HLD, DM2, CKD who presented to [facility] initially with MAR on CKD with acute uremia and metabolic derangements requiring urgent HD and was COVID-19 positive. During HD went into atrial fibrillation and started on HD, subsequently developing GIB thought 2/2 AC and acute uremia. Also on amiodarone for Afib. On TTE noted to have a moderate to large pericardial effusion with early echocardiographic evidence of tamponade. Clinically she is not hypotensive and she tolerates the fluid shifts related to HD. Patient today was transferred to [facility] CTS Dr. Gabriel for evaluation of Pericardial Effusion.      CAD s/p C3L 11/09/22  Hemodynamic instability   Leukocytosis   Hypovolemic shock      Plan:   ***Neuro***  [x] Nonfocal   Post operative neuro assessment   Hydromorphone, Tylenol, Gabapentin, and Oxycodone PRN pain management   OOBTC      ***Cardiovascular***  11/9 TTE, EF: 45-50%, normal RV function   Invasive hemodynamic monitoring, assess perfusion indices   SR / / Hct 31.9/ Lactate 1.2  [x] TPM- VVI 40 box off  Plan to Put on ECMO   Reassessment of hemodynamics post resuscitation   Lopressor and Norvasc for blood pressure management  Monitor chest tube outputs   [x] VTE prophylaxis with Lovenox   [x]  ASA [x] Statin   Serial EKG and cardiac enzymes       ***Pulmonary***  [x] RA SpO2 97%  Encourage incentive spirometry, continue pulse ox monitoring, follow ABGs                 ***GI***  [x] Diet: Tolerating PO consistent carb diet.   [x] Pepcid  Bowel regimen with Miralax and senna      ***Renal***  [x] ESRD on HD - M/W/F diaylsis   Continue to monitor I/Os, BUN/Creatinine.   Replete lytes PRN   HD yesterday, plan for today  Bah put in yesterday      ***ID***  UTI--> On Ceftriaxone for 1 more day       ***Endocrine***  [x] DM2 : HbA1c 6.0%                - [x] Insulin ISS  [x] Lantus             - Need tight glycemic control to prevent wound infection.        Patient requires continuous monitoring with bedside rhythm monitoring, pulse oximetry monitoring, and continuous central venous and arterial pressure monitoring; and intermittent blood gas analysis. Care plan discussed with the ICU care team.   Patient remained critical, at risk for life threatening decompensation.      By signing my name below, I, Mohamud Juarez, attest that this documentation has been prepared under the direction and in the presence of Gregorio Bill MD   Electronically signed: Bam Jacome, 11-13-22 @ 05:53    I, Gregorio Bill, personally performed the services described in this documentation. all medical record entries made by the scriblalo were at my direction and in my presence. I have reviewed the chart and agree that the record reflects my personal performance and is accurate and complete  Electronically signed: Gregorio Bill MD

## 2022-11-13 NOTE — PROGRESS NOTE ADULT - SUBJECTIVE AND OBJECTIVE BOX
PATIENT SEEN AND EXAMINED ON :- 11/13/22  DATE OF SERVICE:    11/13/22         Interim events noted,Labs ,Radiological studies and Cardiology tests reviewed        HOSPITAL COURSE: HPI:  72F HTN, HLD, DM, CKD who presented to [facility] initially with MAR on CKD with acute uremia and metabolic derangements requiring urgent HD and was COVID-19 positive. During HD went into atrial fibrillation and started on HD, subsequently developing GIB thought 2/2 AC and acute uremia. Also on amiodarone for Afib. On TTE noted to have a moderate to large pericardial effusion with early echocardiographic evidence of tamponade. Clinically she is not hypotensive and she tolerates the fluid shifts related to HD. Patient today was transferred to [facility] CTS Dr. Gabriel for evaluation of Pericardial Effusion.   (20 Oct 2022 02:28)      INTERIM EVENTS:Patient seen at bedside ,interim events noted.      PMH -reviewed admission note, no change since admission  HEART FAILURE: Acute[ ]Chronic[ ] Systolic[ ] Diastolic[ ] Combined Systolic and Diastolic[ ]  CAD[ ] CABG[ ] PCI[ ]  DEVICES[ ] PPM[ ] ICD[ ] ILR[ ]  ATRIAL FIBRILLATION[ ] Paroxysmal[ ] Permanent[ ] CHADS2-[  ]  MAR[ ] CKD1[ ] CKD2[ ] CKD3[ ] CKD4[ ] ESRD[ ]  COPD[ ] HTN[ ]   DM[ ] Type1[ ] Type 2[ ]   CVA[ ] Paresis[ ]    AMBULATION: Assisted[ ] Cane/walker[ ] Independent[ ]    MEDICATIONS  (STANDING):  amLODIPine   Tablet 10 milliGRAM(s) Oral daily  ascorbic acid 500 milliGRAM(s) Oral two times a day  aspirin enteric coated 81 milliGRAM(s) Oral daily  atorvastatin 80 milliGRAM(s) Oral at bedtime  busPIRone 10 milliGRAM(s) Oral two times a day  chlorhexidine 2% Cloths 1 Application(s) Topical daily  dextrose 50% Injectable 50 milliLiter(s) IV Push every 15 minutes  dextrose 50% Injectable 25 milliLiter(s) IV Push every 15 minutes  enoxaparin Injectable 30 milliGRAM(s) SubCutaneous every 12 hours  famotidine    Tablet 20 milliGRAM(s) Oral daily  gabapentin 100 milliGRAM(s) Oral every 12 hours  gemfibrozil 600 milliGRAM(s) Oral two times a day  insulin glargine Injectable (LANTUS) 10 Unit(s) SubCutaneous every morning  insulin glargine Injectable (LANTUS) 10 Unit(s) SubCutaneous at bedtime  insulin lispro (ADMELOG) corrective regimen sliding scale   SubCutaneous three times a day before meals  insulin lispro Injectable (ADMELOG) 5 Unit(s) SubCutaneous before breakfast  insulin lispro Injectable (ADMELOG) 5 Unit(s) SubCutaneous before lunch  insulin lispro Injectable (ADMELOG) 5 Unit(s) SubCutaneous before dinner  metoprolol tartrate 50 milliGRAM(s) Oral every 8 hours  montelukast 10 milliGRAM(s) Oral daily  polyethylene glycol 3350 17 Gram(s) Oral daily  senna 2 Tablet(s) Oral at bedtime    MEDICATIONS  (PRN):  acetaminophen     Tablet .. 650 milliGRAM(s) Oral every 6 hours PRN Mild Pain (1 - 3)  oxyCODONE    IR 5 milliGRAM(s) Oral every 4 hours PRN Moderate Pain (4 - 6)  oxyCODONE    IR 10 milliGRAM(s) Oral every 4 hours PRN Severe Pain (7 - 10)            REVIEW OF SYSTEMS:  Constitutional: [ ] fever, [ ]weight loss,  [ ]fatigue [ ]weight gain  Eyes: [ ] visual changes  Respiratory: [ ]shortness of breath;  [ ] cough, [ ]wheezing, [ ]chills, [ ]hemoptysis  Cardiovascular: [ ] chest pain, [ ]palpitations, [ ]dizziness,  [ ]leg swelling[ ]orthopnea[ ]PND  Gastrointestinal: [ ] abdominal pain, [ ]nausea, [ ]vomiting,  [ ]diarrhea [ ]Constipation [ ]Melena  Genitourinary: [ ] dysuria, [ ] hematuria [ ]Bah  Neurologic: [ ] headaches [ ] tremors[ ]weakness [ ]Paralysis Right[ ] Left[ ]  Skin: [ ] itching, [ ]burning, [ ] rashes  Endocrine: [ ] heat or cold intolerance  Musculoskeletal: [ ] joint pain or swelling; [ ] muscle, back, or extremity pain  Psychiatric: [ ] depression, [ ]anxiety, [ ]mood swings, or [ ]difficulty sleeping  Hematologic: [ ] easy bruising, [ ] bleeding gums    [ ] All remaining systems negative except as per above.   [ ]Unable to obtain.  [x] No change in ROS since admission      Vital Signs Last 24 Hrs  T(C): 37.4 (13 Nov 2022 16:00), Max: 37.6 (13 Nov 2022 12:00)  T(F): 99.3 (13 Nov 2022 16:00), Max: 99.7 (13 Nov 2022 12:00)  HR: 83 (13 Nov 2022 18:00) (75 - 114)  BP: 155/66 (13 Nov 2022 18:00) (142/66 - 158/70)  BP(mean): 95 (13 Nov 2022 18:00) (91 - 102)  RR: 34 (13 Nov 2022 18:00) (17 - 96)  SpO2: 97% (13 Nov 2022 18:00) (90% - 99%)    Parameters below as of 13 Nov 2022 16:00  Patient On (Oxygen Delivery Method): room air      I&O's Summary    12 Nov 2022 07:01  -  13 Nov 2022 07:00  --------------------------------------------------------  IN: 1796 mL / OUT: 2530 mL / NET: -734 mL    13 Nov 2022 07:01  -  13 Nov 2022 18:35  --------------------------------------------------------  IN: 360 mL / OUT: 430 mL / NET: -70 mL        PHYSICAL EXAM:  General: No acute distress BMI-  HEENT: EOMI, PERRL  Neck: Supple, [ ] JVD  Lungs: Equal air entry bilaterally; [ ] rales [ ] wheezing [ ] rhonchi  Heart: Regular rate and rhythm; [x ] murmur   2/6 [ x] systolic [ ] diastolic [ ] radiation[ ] rubs [ ]  gallops  Abdomen: Nontender, bowel sounds present  Extremities: No clubbing, cyanosis, [ ] edema [ ]Pulses  equal and intact  Nervous system:  Alert & Oriented X3, no focal deficits  Psychiatric: Normal affect  Skin: No rashes or lesions    LABS:  11-13    137  |  102  |  36<H>  ----------------------------<  210<H>  4.1   |  23  |  2.08<H>    Ca    7.8<L>      13 Nov 2022 01:00  Phos  2.6     11-13  Mg     1.9     11-13    TPro  6.1  /  Alb  3.1<L>  /  TBili  0.3  /  DBili  x   /  AST  28  /  ALT  24  /  AlkPhos  173<H>  11-13    Creatinine Trend: 2.08<--, 2.49<--, 3.11<--, 1.77<--, 2.39<--, 2.04<--                        10.4   19.69 )-----------( 219      ( 13 Nov 2022 01:00 )             31.9       < from: Intra-Operative Transesophageal Echo (11.09.22 @ 12:14) >    Patient name: RILEY MEJÍA  YOB: 1950   Age: 72 (F)   MR#: [id number]  Study Date: 11/9/2022  Location: O/PSonographer: Christie Crouch M.D.  Study quality: Technically good  Referring Physician: [facility] Jo In Sherie SANDERSON M.D  Blood Pressure: 136/77 mmHg  Height: 162 cm  Weight: 85 kg  BSA: 1.9 m2  Heart Rate: 78 mmHg  ------------------------------------------------------------------------  PROCEDURE: Intra operative transeophageal echocardiogram  with 2D, M mode and complete  Doppler examination.  The  transesophageal probe was placed in the esophagus after  induction of anesthesia.  INDICATION: Atherosclerotic heart disease of native  coronary artery with other forms of angina pectoris  (I25.118)  ------------------------------------------------------------------------  Dimensions:    Normal Values:  LA:     5.4    2.0 - 4.0 cm  Ao:     2.8    2.0 - 3.8 cm  SEPTUM: 1.4    0.6 - 1.2 cm  PWT:           0.6 - 1.1 cm  LVIDd:  4.2    3.0 - 5.6 cm  LVIDs:  3.1    1.8 - 4.0 cm  Fractional short: 26 %  EF (Single Plane Ellipsoid): 47 %Doppler Peak Velocity  (m/sec): MV=0.4  ------------------------------------------------------------------------  Pre-Bypass Observations:  Mitral Valve: Mitral annular calcification, otherwise  normal mitral valve. Mild mitral regurgitation.  Peak  mitral valve gradient equals 1 mm Hg.  Aortic Valve/Aorta: Normal trileaflet aortic valve. No  aortic valve regurgitation seen.  Aortic Annulus: 2.2 cm.  Aortic Root: 2.8 cm.  Sinotubular Junction: 3 cm.  Ascending Aorta: 3 cm.  LVOT diameter: 2.2 cm.  Left Atrium: Mild left atrial enlargement.  Left Ventricle: Mild segmental left ventricular systolic  dysfunction of inferior and lateral walls.  Other walls  normal function Normal left ventricular systolic function.  Mild concentric left ventricular hypertrophy. Mild  diastolic dysfunction (Stage I).  Right Heart: Normal right atrium. Normal right ventricular  size and function. Normal tricuspid valve. Normal pulmonic  valve.  Pericardium/Pleura: Normal pericardium with trace  pericardial effusion.  Left pleural effusion.  Hemodynamic: Color Doppler demonstrates no evidence of a  patent foramen ovale.  ------------------------------------------------------------------------  Post-Bypass Observations:    post bypass exam shows improved left ventricular  function.  ejection fraction area 50%  no other changes from pre bypass  all significant findings discussed with Dr. Mann  ------------------------------------------------------------------------  Conclusions:  1. Mitral annular calcification, otherwise normal mitral  valve. Mild mitral regurgitation.  2. Normal trileaflet aortic valve. No aortic valve  regurgitation seen.  3. Mild left atrial enlargement.  4. Mild concentric left ventricular hypertrophy.  5. Mild segmental left ventricular systolic dysfunction of  inferior and lateral walls.  Other walls normal function  Normal left ventricular systolic function.  6. Mild diastolic dysfunction (StageI).  7. Normal tricuspid valve.  8. Normal pericardium with trace pericardial effusion.  9. Left pleural effusion.  Confirmed on  11/9/2022 - 12:59:20 by Christie Aguayo M.D  ------------------------------------------------------------------------    < end of copied text >

## 2022-11-13 NOTE — PROGRESS NOTE ADULT - SUBJECTIVE AND OBJECTIVE BOX
NEPHROLOGY-[facility] [phone]        Patient seen and examined in bed.  She is having diarrhea         MEDICATIONS  (STANDING):  amLODIPine   Tablet 10 milliGRAM(s) Oral daily  ascorbic acid 500 milliGRAM(s) Oral two times a day  aspirin enteric coated 81 milliGRAM(s) Oral daily  atorvastatin 80 milliGRAM(s) Oral at bedtime  busPIRone 10 milliGRAM(s) Oral two times a day  chlorhexidine 2% Cloths 1 Application(s) Topical daily  dextrose 50% Injectable 50 milliLiter(s) IV Push every 15 minutes  dextrose 50% Injectable 25 milliLiter(s) IV Push every 15 minutes  enoxaparin Injectable 30 milliGRAM(s) SubCutaneous every 12 hours  famotidine    Tablet 20 milliGRAM(s) Oral daily  gabapentin 100 milliGRAM(s) Oral every 12 hours  gemfibrozil 600 milliGRAM(s) Oral two times a day  insulin glargine Injectable (LANTUS) 10 Unit(s) SubCutaneous every morning  insulin glargine Injectable (LANTUS) 10 Unit(s) SubCutaneous at bedtime  insulin lispro (ADMELOG) corrective regimen sliding scale   SubCutaneous three times a day before meals  insulin lispro Injectable (ADMELOG) 5 Unit(s) SubCutaneous before breakfast  insulin lispro Injectable (ADMELOG) 5 Unit(s) SubCutaneous before lunch  insulin lispro Injectable (ADMELOG) 5 Unit(s) SubCutaneous before dinner  metoprolol tartrate 50 milliGRAM(s) Oral every 12 hours  montelukast 10 milliGRAM(s) Oral daily  polyethylene glycol 3350 17 Gram(s) Oral daily  senna 2 Tablet(s) Oral at bedtime      VITAL:  T(C): , Max: 37.3 (11-13-22 @ 08:00)  T(F): , Max: 99.1 (11-13-22 @ 08:00)  HR: 90 (11-13-22 @ 09:00)  BP: --  BP(mean): --  RR: 28 (11-13-22 @ 09:00)  SpO2: 90% (11-13-22 @ 09:00)  Wt(kg): --    I and O's:    11-12 @ 07:01  -  11-13 @ 07:00  --------------------------------------------------------  IN: 1796 mL / OUT: 2530 mL / NET: -734 mL    11-13 @ 07:01  - 11-13 @ 09:21  --------------------------------------------------------  IN: 0 mL / OUT: 175 mL / NET: -175 mL          PHYSICAL EXAM:    Constitutional: NAD  Neck:  No JVD  Respiratory: CTAB/L  Cardiovascular: S1 and S2  Gastrointestinal: BS+, soft, NT/ND  Extremities: No peripheral edema  Neurological: A/O x 3, no focal deficits  Psychiatric: Normal mood, normal affect  : +  Bah  Skin: No rashes  Access: perm cath avf    LABS:                        10.4   19.69 )-----------( 219      ( 13 Nov 2022 01:00 )             31.9     11-13    137  |  102  |  36<H>  ----------------------------<  210<H>  4.1   |  23  |  2.08<H>    Ca    7.8<L>      13 Nov 2022 01:00  Phos  2.6     11-13  Mg     1.9     11-13    TPro  6.1  /  Alb  3.1<L>  /  TBili  0.3  /  DBili  x   /  AST  28  /  ALT  24  /  AlkPhos  173<H>  11-13          Urine Studies:          RADIOLOGY & ADDITIONAL STUDIES:

## 2022-11-13 NOTE — PROGRESS NOTE ADULT - TIME BILLING
- Review of records, telemetry, vital signs and daily labs.   - General and cardiovascular physical examination.  - Generation of cardiovascular treatment plan.  - Coordination of care.      Patient was seen and examined by me on 10/29/2022,interim events noted,labs and radiology studies reviewed.  Clayton Lemon MD,FACC.  [address] [place], [place] [id number].  [phone]
- Review of records, telemetry, vital signs and daily labs.   - General and cardiovascular physical examination.  - Generation of cardiovascular treatment plan.  - Coordination of care.      Patient was seen and examined by me on 11/6/22,interim events noted,labs and radiology studies reviewed.  Clayton Lemon MD,FACC.  [address] [place], [place] [id number].  [phone]
- Review of records, telemetry, vital signs and daily labs.   - General and cardiovascular physical examination.  - Generation of cardiovascular treatment plan.  - Coordination of care.      Patient was seen and examined by me on 10/30/2022,interim events noted,labs and radiology studies reviewed.  Clayton Lemon MD,FACC.  [address] [place], [place] [id number].  [phone]
- Review of records, telemetry, vital signs and daily labs.   - General and cardiovascular physical examination.  - Generation of cardiovascular treatment plan.  - Coordination of care.      Patient was seen and examined by me on 11/13/22,interim events noted,labs and radiology studies reviewed.  Clayton Lemon MD,FACC.  [address] [place], [place] [id number].  [phone]

## 2022-11-14 LAB
-  AMIKACIN: SIGNIFICANT CHANGE UP
-  AMOXICILLIN/CLAVULANIC ACID: SIGNIFICANT CHANGE UP
-  AMPICILLIN/SULBACTAM: SIGNIFICANT CHANGE UP
-  AMPICILLIN: SIGNIFICANT CHANGE UP
-  AZTREONAM: SIGNIFICANT CHANGE UP
-  CEFAZOLIN: SIGNIFICANT CHANGE UP
-  CEFEPIME: SIGNIFICANT CHANGE UP
-  CEFTRIAXONE: SIGNIFICANT CHANGE UP
-  CIPROFLOXACIN: SIGNIFICANT CHANGE UP
-  ERTAPENEM: SIGNIFICANT CHANGE UP
-  GENTAMICIN: SIGNIFICANT CHANGE UP
-  IMIPENEM: SIGNIFICANT CHANGE UP
-  LEVOFLOXACIN: SIGNIFICANT CHANGE UP
-  MEROPENEM: SIGNIFICANT CHANGE UP
-  NITROFURANTOIN: SIGNIFICANT CHANGE UP
-  PIPERACILLIN/TAZOBACTAM: SIGNIFICANT CHANGE UP
-  TOBRAMYCIN: SIGNIFICANT CHANGE UP
-  TRIMETHOPRIM/SULFAMETHOXAZOLE: SIGNIFICANT CHANGE UP
ALBUMIN SERPL ELPH-MCNC: 2.6 G/DL — LOW (ref 3.3–5)
ALP SERPL-CCNC: 134 U/L — HIGH (ref 40–120)
ALT FLD-CCNC: 20 U/L — SIGNIFICANT CHANGE UP (ref 10–45)
ANION GAP SERPL CALC-SCNC: 13 MMOL/L — SIGNIFICANT CHANGE UP (ref 5–17)
AST SERPL-CCNC: 35 U/L — SIGNIFICANT CHANGE UP (ref 10–40)
BASOPHILS # BLD AUTO: 0.03 K/UL — SIGNIFICANT CHANGE UP (ref 0–0.2)
BASOPHILS NFR BLD AUTO: 0.2 % — SIGNIFICANT CHANGE UP (ref 0–2)
BILIRUB SERPL-MCNC: 0.3 MG/DL — SIGNIFICANT CHANGE UP (ref 0.2–1.2)
BUN SERPL-MCNC: 41 MG/DL — HIGH (ref 7–23)
CALCIUM SERPL-MCNC: 7.7 MG/DL — LOW (ref 8.4–10.5)
CHLORIDE SERPL-SCNC: 103 MMOL/L — SIGNIFICANT CHANGE UP (ref 96–108)
CO2 SERPL-SCNC: 24 MMOL/L — SIGNIFICANT CHANGE UP (ref 22–31)
CREAT SERPL-MCNC: 2.41 MG/DL — HIGH (ref 0.5–1.3)
EGFR: 21 ML/MIN/1.73M2 — LOW
EOSINOPHIL # BLD AUTO: 0.08 K/UL — SIGNIFICANT CHANGE UP (ref 0–0.5)
EOSINOPHIL NFR BLD AUTO: 0.5 % — SIGNIFICANT CHANGE UP (ref 0–6)
GLUCOSE BLDC GLUCOMTR-MCNC: 104 MG/DL — HIGH (ref 70–99)
GLUCOSE BLDC GLUCOMTR-MCNC: 122 MG/DL — HIGH (ref 70–99)
GLUCOSE BLDC GLUCOMTR-MCNC: 155 MG/DL — HIGH (ref 70–99)
GLUCOSE BLDC GLUCOMTR-MCNC: 64 MG/DL — LOW (ref 70–99)
GLUCOSE BLDC GLUCOMTR-MCNC: 98 MG/DL — SIGNIFICANT CHANGE UP (ref 70–99)
GLUCOSE SERPL-MCNC: 111 MG/DL — HIGH (ref 70–99)
HCT VFR BLD CALC: 29.3 % — LOW (ref 34.5–45)
HGB BLD-MCNC: 9.2 G/DL — LOW (ref 11.5–15.5)
IMM GRANULOCYTES NFR BLD AUTO: 2.5 % — HIGH (ref 0–0.9)
LYMPHOCYTES # BLD AUTO: 13.9 % — SIGNIFICANT CHANGE UP (ref 13–44)
LYMPHOCYTES # BLD AUTO: 2.21 K/UL — SIGNIFICANT CHANGE UP (ref 1–3.3)
MAGNESIUM SERPL-MCNC: 2 MG/DL — SIGNIFICANT CHANGE UP (ref 1.6–2.6)
MCHC RBC-ENTMCNC: 30.1 PG — SIGNIFICANT CHANGE UP (ref 27–34)
MCHC RBC-ENTMCNC: 31.4 GM/DL — LOW (ref 32–36)
MCV RBC AUTO: 95.8 FL — SIGNIFICANT CHANGE UP (ref 80–100)
METHOD TYPE: SIGNIFICANT CHANGE UP
MONOCYTES # BLD AUTO: 1.02 K/UL — HIGH (ref 0–0.9)
MONOCYTES NFR BLD AUTO: 6.4 % — SIGNIFICANT CHANGE UP (ref 2–14)
NEUTROPHILS # BLD AUTO: 12.14 K/UL — HIGH (ref 1.8–7.4)
NEUTROPHILS NFR BLD AUTO: 76.5 % — SIGNIFICANT CHANGE UP (ref 43–77)
NRBC # BLD: 0 /100 WBCS — SIGNIFICANT CHANGE UP (ref 0–0)
ORGANISM # SPEC MICROSCOPIC CNT: SIGNIFICANT CHANGE UP
ORGANISM # SPEC MICROSCOPIC CNT: SIGNIFICANT CHANGE UP
PHOSPHATE SERPL-MCNC: 3.5 MG/DL — SIGNIFICANT CHANGE UP (ref 2.5–4.5)
PLATELET # BLD AUTO: 228 K/UL — SIGNIFICANT CHANGE UP (ref 150–400)
POTASSIUM SERPL-MCNC: 4.8 MMOL/L — SIGNIFICANT CHANGE UP (ref 3.5–5.3)
POTASSIUM SERPL-SCNC: 4.8 MMOL/L — SIGNIFICANT CHANGE UP (ref 3.5–5.3)
PROT SERPL-MCNC: 5.7 G/DL — LOW (ref 6–8.3)
RBC # BLD: 3.06 M/UL — LOW (ref 3.8–5.2)
RBC # FLD: 15.7 % — HIGH (ref 10.3–14.5)
SARS-COV-2 RNA SPEC QL NAA+PROBE: DETECTED
SODIUM SERPL-SCNC: 140 MMOL/L — SIGNIFICANT CHANGE UP (ref 135–145)
WBC # BLD: 15.88 K/UL — HIGH (ref 3.8–10.5)
WBC # FLD AUTO: 15.88 K/UL — HIGH (ref 3.8–10.5)

## 2022-11-14 PROCEDURE — 99233 SBSQ HOSP IP/OBS HIGH 50: CPT

## 2022-11-14 PROCEDURE — 99232 SBSQ HOSP IP/OBS MODERATE 35: CPT

## 2022-11-14 PROCEDURE — 71045 X-RAY EXAM CHEST 1 VIEW: CPT | Mod: 26

## 2022-11-14 RX ORDER — ERTAPENEM SODIUM 1 G/1
500 INJECTION, POWDER, LYOPHILIZED, FOR SOLUTION INTRAMUSCULAR; INTRAVENOUS EVERY 24 HOURS
Refills: 0 | Status: COMPLETED | OUTPATIENT
Start: 2022-11-15 | End: 2022-11-17

## 2022-11-14 RX ORDER — HYDRALAZINE HCL 50 MG
10 TABLET ORAL ONCE
Refills: 0 | Status: COMPLETED | OUTPATIENT
Start: 2022-11-14 | End: 2022-11-14

## 2022-11-14 RX ORDER — ERTAPENEM SODIUM 1 G/1
INJECTION, POWDER, LYOPHILIZED, FOR SOLUTION INTRAMUSCULAR; INTRAVENOUS
Refills: 0 | Status: COMPLETED | OUTPATIENT
Start: 2022-11-14 | End: 2022-11-18

## 2022-11-14 RX ORDER — ERYTHROPOIETIN 10000 [IU]/ML
10000 INJECTION, SOLUTION INTRAVENOUS; SUBCUTANEOUS ONCE
Refills: 0 | Status: COMPLETED | OUTPATIENT
Start: 2022-11-15 | End: 2022-11-15

## 2022-11-14 RX ORDER — LOSARTAN POTASSIUM 100 MG/1
25 TABLET, FILM COATED ORAL DAILY
Refills: 0 | Status: DISCONTINUED | OUTPATIENT
Start: 2022-11-14 | End: 2022-11-22

## 2022-11-14 RX ORDER — ERTAPENEM SODIUM 1 G/1
500 INJECTION, POWDER, LYOPHILIZED, FOR SOLUTION INTRAMUSCULAR; INTRAVENOUS ONCE
Refills: 0 | Status: COMPLETED | OUTPATIENT
Start: 2022-11-14 | End: 2022-11-14

## 2022-11-14 RX ADMIN — ENOXAPARIN SODIUM 30 MILLIGRAM(S): 100 INJECTION SUBCUTANEOUS at 18:26

## 2022-11-14 RX ADMIN — Medication 81 MILLIGRAM(S): at 12:00

## 2022-11-14 RX ADMIN — CHLORHEXIDINE GLUCONATE 1 APPLICATION(S): 213 SOLUTION TOPICAL at 21:33

## 2022-11-14 RX ADMIN — Medication 50 MILLIGRAM(S): at 21:18

## 2022-11-14 RX ADMIN — ATORVASTATIN CALCIUM 80 MILLIGRAM(S): 80 TABLET, FILM COATED ORAL at 21:18

## 2022-11-14 RX ADMIN — Medication 600 MILLIGRAM(S): at 05:10

## 2022-11-14 RX ADMIN — Medication 10 MILLIGRAM(S): at 18:26

## 2022-11-14 RX ADMIN — INSULIN GLARGINE 10 UNIT(S): 100 INJECTION, SOLUTION SUBCUTANEOUS at 08:02

## 2022-11-14 RX ADMIN — Medication 600 MILLIGRAM(S): at 18:26

## 2022-11-14 RX ADMIN — Medication 50 MILLIGRAM(S): at 13:23

## 2022-11-14 RX ADMIN — ERTAPENEM SODIUM 100 MILLIGRAM(S): 1 INJECTION, POWDER, LYOPHILIZED, FOR SOLUTION INTRAMUSCULAR; INTRAVENOUS at 16:33

## 2022-11-14 RX ADMIN — FAMOTIDINE 20 MILLIGRAM(S): 10 INJECTION INTRAVENOUS at 12:00

## 2022-11-14 RX ADMIN — AMLODIPINE BESYLATE 10 MILLIGRAM(S): 2.5 TABLET ORAL at 05:09

## 2022-11-14 RX ADMIN — MEROPENEM 100 MILLIGRAM(S): 1 INJECTION INTRAVENOUS at 01:18

## 2022-11-14 RX ADMIN — LOSARTAN POTASSIUM 25 MILLIGRAM(S): 100 TABLET, FILM COATED ORAL at 09:32

## 2022-11-14 RX ADMIN — Medication 2: at 11:15

## 2022-11-14 RX ADMIN — INSULIN GLARGINE 10 UNIT(S): 100 INJECTION, SOLUTION SUBCUTANEOUS at 21:33

## 2022-11-14 RX ADMIN — Medication 5 UNIT(S): at 07:35

## 2022-11-14 RX ADMIN — MONTELUKAST 10 MILLIGRAM(S): 4 TABLET, CHEWABLE ORAL at 12:00

## 2022-11-14 RX ADMIN — Medication 50 MILLIGRAM(S): at 05:09

## 2022-11-14 RX ADMIN — Medication 500 MILLIGRAM(S): at 05:09

## 2022-11-14 RX ADMIN — GABAPENTIN 100 MILLIGRAM(S): 400 CAPSULE ORAL at 05:09

## 2022-11-14 RX ADMIN — Medication 10 MILLIGRAM(S): at 05:09

## 2022-11-14 RX ADMIN — Medication 10 MILLIGRAM(S): at 06:50

## 2022-11-14 RX ADMIN — Medication 5 UNIT(S): at 11:19

## 2022-11-14 RX ADMIN — ENOXAPARIN SODIUM 30 MILLIGRAM(S): 100 INJECTION SUBCUTANEOUS at 05:10

## 2022-11-14 NOTE — PROGRESS NOTE ADULT - SUBJECTIVE AND OBJECTIVE BOX
[facility] -- Julius Castañeda, Sherwin, Lucio, Bladimir, Autumn Santillan  Office # [phone]      Follow Up:  CAD, CABG, post op,     Subjective/Observations: Patient seen and examined. Events noted. Resting comfortably in bed. No complaints of chest pain, dyspnea, or palpitations reported. No signs of orthopnea or PND.       REVIEW OF SYSTEMS: All other review of systems is negative unless indicated above    PAST MEDICAL & SURGICAL HISTORY:  HTN (hypertension)      HLD (hyperlipidemia)      DM (diabetes mellitus)          MEDICATIONS  (STANDING):  amLODIPine   Tablet 10 milliGRAM(s) Oral daily  aspirin enteric coated 81 milliGRAM(s) Oral daily  atorvastatin 80 milliGRAM(s) Oral at bedtime  busPIRone 10 milliGRAM(s) Oral two times a day  chlorhexidine 2% Cloths 1 Application(s) Topical daily  dextrose 50% Injectable 50 milliLiter(s) IV Push every 15 minutes  dextrose 50% Injectable 25 milliLiter(s) IV Push every 15 minutes  enoxaparin Injectable 30 milliGRAM(s) SubCutaneous every 12 hours  ertapenem  IVPB      ertapenem  IVPB 500 milliGRAM(s) IV Intermittent once  famotidine    Tablet 20 milliGRAM(s) Oral daily  gemfibrozil 600 milliGRAM(s) Oral two times a day  insulin glargine Injectable (LANTUS) 10 Unit(s) SubCutaneous every morning  insulin glargine Injectable (LANTUS) 10 Unit(s) SubCutaneous at bedtime  insulin lispro (ADMELOG) corrective regimen sliding scale   SubCutaneous three times a day before meals  insulin lispro Injectable (ADMELOG) 5 Unit(s) SubCutaneous before lunch  insulin lispro Injectable (ADMELOG) 5 Unit(s) SubCutaneous before dinner  insulin lispro Injectable (ADMELOG) 5 Unit(s) SubCutaneous before breakfast  losartan 25 milliGRAM(s) Oral daily  metoprolol tartrate 50 milliGRAM(s) Oral every 8 hours  montelukast 10 milliGRAM(s) Oral daily  polyethylene glycol 3350 17 Gram(s) Oral daily  senna 2 Tablet(s) Oral at bedtime    MEDICATIONS  (PRN):  acetaminophen     Tablet .. 650 milliGRAM(s) Oral every 6 hours PRN Mild Pain (1 - 3)  oxyCODONE    IR 5 milliGRAM(s) Oral every 4 hours PRN Moderate Pain (4 - 6)  oxyCODONE    IR 10 milliGRAM(s) Oral every 4 hours PRN Severe Pain (7 - 10)      Allergies    sulfa drugs (Rash)    Intolerances            Vital Signs Last 24 Hrs  T(C): 37.3 (14 Nov 2022 12:00), Max: 37.4 (13 Nov 2022 16:00)  T(F): 99.1 (14 Nov 2022 12:00), Max: 99.3 (13 Nov 2022 16:00)  HR: 86 (14 Nov 2022 13:00) (66 - 90)  BP: 146/66 (14 Nov 2022 13:00) (121/58 - 181/73)  BP(mean): 95 (14 Nov 2022 13:00) (80 - 120)  RR: 17 (14 Nov 2022 13:00) (9 - 36)  SpO2: 98% (14 Nov 2022 13:00) (96% - 99%)    Parameters below as of 14 Nov 2022 12:00  Patient On (Oxygen Delivery Method): room air        I&O's Summary    13 Nov 2022 07:01  -  14 Nov 2022 07:00  --------------------------------------------------------  IN: 410 mL / OUT: 940 mL / NET: -530 mL    14 Nov 2022 07:01  -  14 Nov 2022 13:29  --------------------------------------------------------  IN: 0 mL / OUT: 200 mL / NET: -200 mL          PHYSICAL EXAM:  TELE: SR  Constitutional: NAD, awake    HEENT: Moist Mucous Membranes, Anicteric  Pulmonary: Decreased breath sounds b/l. No rales, crackles or wheeze appreciated.   Cardiovascular: Regular, S1 and S2, No murmurs, rubs, gallops or clicks  Gastrointestinal: Bowel Sounds present, soft, nontender.   Lymph: + peripheral edema. No lymphadenopathy.  Skin: No visible rashes or ulcers.  Psych:  Mood & affect appropriate for situation    LABS: All Labs Reviewed:                        9.2    15.88 )-----------( 228      ( 14 Nov 2022 00:26 )             29.3                         10.4   19.69 )-----------( 219      ( 13 Nov 2022 01:00 )             31.9                         8.8    20.23 )-----------( 190      ( 12 Nov 2022 00:48 )             28.0     14 Nov 2022 00:26    140    |  103    |  41     ----------------------------<  111    4.8     |  24     |  2.41   13 Nov 2022 01:00    137    |  102    |  36     ----------------------------<  210    4.1     |  23     |  2.08   12 Nov 2022 00:48    139    |  101    |  38     ----------------------------<  153    3.9     |  24     |  2.49     Ca    7.7        14 Nov 2022 00:26  Ca    7.8        13 Nov 2022 01:00  Ca    7.8        12 Nov 2022 00:48  Phos  3.5       14 Nov 2022 00:26  Phos  2.6       13 Nov 2022 01:00  Phos  3.4       12 Nov 2022 00:48  Mg     2.0       14 Nov 2022 00:26  Mg     1.9       13 Nov 2022 01:00  Mg     2.2       12 Nov 2022 00:48    TPro  5.7    /  Alb  2.6    /  TBili  0.3    /  DBili  x      /  AST  35     /  ALT  20     /  AlkPhos  134    14 Nov 2022 00:26  TPro  6.1    /  Alb  3.1    /  TBili  0.3    /  DBili  x      /  AST  28     /  ALT  24     /  AlkPhos  173    13 Nov 2022 01:00  TPro  5.7    /  Alb  2.8    /  TBili  0.2    /  DBili  x      /  AST  25     /  ALT  18     /  AlkPhos  136    12 Nov 2022 00:48        - Troponin:

## 2022-11-14 NOTE — PROGRESS NOTE ADULT - SUBJECTIVE AND OBJECTIVE BOX
Patient seen and examined at the bedside.    Remained critically ill on continuous ICU monitoring.    OBJECTIVE:  Vital Signs Last 24 Hrs  T(C): 36.8 (14 Nov 2022 04:00), Max: 37.6 (13 Nov 2022 12:00)  T(F): 98.2 (14 Nov 2022 04:00), Max: 99.7 (13 Nov 2022 12:00)  HR: 72 (14 Nov 2022 05:00) (66 - 96)  BP: 152/67 (14 Nov 2022 05:00) (121/58 - 158/70)  BP(mean): 96 (14 Nov 2022 05:00) (84 - 102)  RR: 18 (14 Nov 2022 05:00) (9 - 96)  SpO2: 97% (14 Nov 2022 05:00) (90% - 99%)    Parameters below as of 14 Nov 2022 04:00  Patient On (Oxygen Delivery Method): room air    Assessment:  72F HTN, HLD, DM2, CKD who presented to [facility] initially with MAR on CKD with acute uremia and metabolic derangements requiring urgent HD and was COVID-19 positive. During HD went into atrial fibrillation and started on HD, subsequently developing GIB thought 2/2 AC and acute uremia. Also on amiodarone for Afib. On TTE noted to have a moderate to large pericardial effusion with early echocardiographic evidence of tamponade. Clinically she is not hypotensive and she tolerates the fluid shifts related to HD. Patient today was transferred to [facility] CTS Dr. Gabriel for evaluation of Pericardial Effusion.      CAD s/p C3L 11/09/22  Hemodynamic instability   Leukocytosis   Hypovolemic shock    Plan:   ***Neuro***  [x] Nonfocal   Post operative neuro assessment   Hydromorphone, Tylenol, Gabapentin, and Oxycodone PRN pain management   OOBTC    ***Cardiovascular***  11/9 TTE, EF: 45-50%, normal RV function   Invasive hemodynamic monitoring, assess perfusion indices   SR / / Hct 29.3%/ Lactate 1.2  [x] TPM- VVI 40 box off  Plan to Put on ECMO   Reassessment of hemodynamics post resuscitation   Lopressor and Norvasc for blood pressure management  Monitor chest tube outputs   [x] VTE prophylaxis with Lovenox   [x]  ASA   Serial EKG and cardiac enzymes     ***Pulmonary***  [x] RA SpO2 97%  Encourage incentive spirometry, continue pulse ox monitoring, follow ABGs     ***GI***  [x] Diet: Tolerating PO consistent carb diet.   [x] Pepcid  Bowel regimen with Miralax and senna    ***Renal***  [x] ESRD on HD - M/W/F diaylsis   Continue to monitor I/Os, BUN/Creatinine.   Replete lytes PRN   Bah present     ***ID***  No antibiotic coverage.    ***Endocrine***  [x] DM2 : HbA1c 6.0%                - [x] ISS  [x] Lantus             - Need tight glycemic control to prevent wound infection.        Patient requires continuous monitoring with bedside rhythm monitoring, pulse oximetry monitoring, and continuous central venous and arterial pressure monitoring; and intermittent blood gas analysis. Care plan discussed with the ICU care team.   Patient remained critical, at risk for life threatening decompensation.    I have spent 30 minutes providing critical care management to this patient.    By signing my name below, I, Li Gramajo, attest that this documentation has been prepared under the direction and in the presence of Gregorio Bill MD   Electronically signed: Bam Navarro, 11-14-22 @ 06:20    I, Gregorio Bill, personally performed the services described in this documentation. all medical record entries made by the jorgeibe were at my direction and in my presence. I have reviewed the chart and agree that the record reflects my personal performance and is accurate and complete  Electronically signed: Gregorio Bill MD  Patient seen and examined at the bedside.    Remained critically ill on continuous ICU monitoring.    OBJECTIVE:  Vital Signs Last 24 Hrs  T(C): 36.8 (14 Nov 2022 04:00), Max: 37.6 (13 Nov 2022 12:00)  T(F): 98.2 (14 Nov 2022 04:00), Max: 99.7 (13 Nov 2022 12:00)  HR: 72 (14 Nov 2022 05:00) (66 - 96)  BP: 152/67 (14 Nov 2022 05:00) (121/58 - 158/70)  BP(mean): 96 (14 Nov 2022 05:00) (84 - 102)  RR: 18 (14 Nov 2022 05:00) (9 - 96)  SpO2: 97% (14 Nov 2022 05:00) (90% - 99%)    Parameters below as of 14 Nov 2022 04:00  Patient On (Oxygen Delivery Method): room air    Assessment:  72F HTN, HLD, DM2, CKD who presented to [facility] initially with MAR on CKD with acute uremia and metabolic derangements requiring urgent HD and was COVID-19 positive. During HD went into atrial fibrillation and started on HD, subsequently developing GIB thought 2/2 AC and acute uremia. Also on amiodarone for Afib. On TTE noted to have a moderate to large pericardial effusion with early echocardiographic evidence of tamponade. Clinically she is not hypotensive and she tolerates the fluid shifts related to HD. Patient today was transferred to [facility] CTS Dr. Gabriel for evaluation of Pericardial Effusion.      CAD s/p C3L 11/09/22  Hemodynamic instability   Leukocytosis   Hypovolemic shock    Plan:   ***Neuro***  [x] Nonfocal   Post operative neuro assessment   Hydromorphone, Tylenol, Gabapentin, and Oxycodone PRN pain management   OOBTC    ***Cardiovascular***  11/9 TTE, EF: 45-50%, normal RV function   Invasive hemodynamic monitoring, assess perfusion indices   SR / / Hct 29.3%/ Lactate 1.2  [x] TPM- VVI 40 box off  Plan to Put on ECMO   Reassessment of hemodynamics post resuscitation   Lopressor and Norvasc for blood pressure management  Monitor chest tube outputs   [x] VTE prophylaxis with Lovenox   [x]  ASA   Serial EKG and cardiac enzymes     ***Pulmonary***  [x] RA SpO2 97%  Encourage incentive spirometry, continue pulse ox monitoring, follow ABGs     ***GI***  [x] Diet: Tolerating PO consistent carb diet.   [x] Pepcid  Bowel regimen with Miralax and senna    ***Renal***  [x] ESRD on HD - M/W/F diaylsis   Continue to monitor I/Os, BUN/Creatinine.   Replete lytes PRN   Bah present     ***ID***  No antibiotic coverage.    ***Endocrine***  [x] DM2 : HbA1c 6.0%                - [x] ISS  [x] Lantus             - Need tight glycemic control to prevent wound infection.        Patient requires continuous monitoring with bedside rhythm monitoring, pulse oximetry monitoring, and continuous central venous and arterial pressure monitoring; and intermittent blood gas analysis. Care plan discussed with the ICU care team.   Patient remained critical, at risk for life threatening decompensation.    By signing my name below, I, Li Gramajo, attest that this documentation has been prepared under the direction and in the presence of Gregorio Bill MD   Electronically signed: Bam Navarro, 11-14-22 @ 06:20    I, Gregorio Bill, personally performed the services described in this documentation. all medical record entries made by the scribe were at my direction and in my presence. I have reviewed the chart and agree that the record reflects my personal performance and is accurate and complete  Electronically signed: Gregorio Bill MD

## 2022-11-14 NOTE — PROGRESS NOTE ADULT - SUBJECTIVE AND OBJECTIVE BOX
infectious diseases progress note:    Patient is a 72y old  Female who presents with a chief complaint of Pericardial Eff (14 Nov 2022 08:18)        Malignant pericardial effusion               Allergies    sulfa drugs (Rash)    Intolerances        ANTIBIOTICS/RELEVANT:  antimicrobials    immunologic:    OTHER:  acetaminophen     Tablet .. 650 milliGRAM(s) Oral every 6 hours PRN  amLODIPine   Tablet 10 milliGRAM(s) Oral daily  aspirin enteric coated 81 milliGRAM(s) Oral daily  atorvastatin 80 milliGRAM(s) Oral at bedtime  busPIRone 10 milliGRAM(s) Oral two times a day  chlorhexidine 2% Cloths 1 Application(s) Topical daily  dextrose 50% Injectable 50 milliLiter(s) IV Push every 15 minutes  dextrose 50% Injectable 25 milliLiter(s) IV Push every 15 minutes  enoxaparin Injectable 30 milliGRAM(s) SubCutaneous every 12 hours  famotidine    Tablet 20 milliGRAM(s) Oral daily  gemfibrozil 600 milliGRAM(s) Oral two times a day  insulin glargine Injectable (LANTUS) 10 Unit(s) SubCutaneous at bedtime  insulin glargine Injectable (LANTUS) 10 Unit(s) SubCutaneous every morning  insulin lispro (ADMELOG) corrective regimen sliding scale   SubCutaneous three times a day before meals  insulin lispro Injectable (ADMELOG) 5 Unit(s) SubCutaneous before breakfast  insulin lispro Injectable (ADMELOG) 5 Unit(s) SubCutaneous before lunch  insulin lispro Injectable (ADMELOG) 5 Unit(s) SubCutaneous before dinner  losartan 25 milliGRAM(s) Oral daily  metoprolol tartrate 50 milliGRAM(s) Oral every 8 hours  montelukast 10 milliGRAM(s) Oral daily  oxyCODONE    IR 5 milliGRAM(s) Oral every 4 hours PRN  oxyCODONE    IR 10 milliGRAM(s) Oral every 4 hours PRN  polyethylene glycol 3350 17 Gram(s) Oral daily  senna 2 Tablet(s) Oral at bedtime      Objective:  Vital Signs Last 24 Hrs  T(C): 37.3 (14 Nov 2022 12:00), Max: 37.4 (13 Nov 2022 16:00)  T(F): 99.1 (14 Nov 2022 12:00), Max: 99.3 (13 Nov 2022 16:00)  HR: 86 (14 Nov 2022 12:00) (66 - 90)  BP: 181/73 (14 Nov 2022 12:00) (121/58 - 181/73)  BP(mean): 112 (14 Nov 2022 12:00) (80 - 120)  RR: 27 (14 Nov 2022 12:00) (9 - 36)  SpO2: 99% (14 Nov 2022 12:00) (96% - 99%)    Parameters below as of 14 Nov 2022 12:00  Patient On (Oxygen Delivery Method): room air         Eyes:BOBBI, EOMI  Ear/Nose/Throat: no oral lesion, no sinus tenderness on percussion	  Neck:no JVD, no lymphadenopathy, supple  Respiratory: CTA olamide  Cardiovascular: S1S2 RRR, no murmurs  Gastrointestinal:soft, (+) BS, no HSM  Extremities:no e/e/c        LABS:                        9.2    15.88 )-----------( 228      ( 14 Nov 2022 00:26 )             29.3     11-14    140  |  103  |  41<H>  ----------------------------<  111<H>  4.8   |  24  |  2.41<H>    Ca    7.7<L>      14 Nov 2022 00:26  Phos  3.5     11-14  Mg     2.0     11-14    TPro  5.7<L>  /  Alb  2.6<L>  /  TBili  0.3  /  DBili  x   /  AST  35  /  ALT  20  /  AlkPhos  134<H>  11-14            MICROBIOLOGY:    RECENT CULTURES:  11-10 @ 04:38 Catheterized Catheterized                >100,000 CFU/ml Escherichia coli ESBL          RESPIRATORY CULTURES:              RADIOLOGY & ADDITIONAL STUDIES:        Pager [phone]  After 5 pm/weekends or if no response :[phone]

## 2022-11-14 NOTE — PROVIDER CONTACT NOTE (OTHER) - REASON
Discontinuation of Covid Isolation
No need for covid isolation
bladder scan >460
50 beats of wide complexes with HR up to 190.

## 2022-11-14 NOTE — PROVIDER CONTACT NOTE (OTHER) - SITUATION
50 beats of wide complexes with HR up to 190. WCT duration of 22 seconds.
Patient diagnosed with Covid >10 days ago (10/14/2022). Asymptomatic. No further isolation required.
Patient was Covid + 10/14/22  Completed 10 days Quarantine.  Currently asymptomatic
bladder scan >460

## 2022-11-14 NOTE — PROGRESS NOTE ADULT - ASSESSMENT
72F HTN, HLD, DM, CKD who presented to [facility] initially with MAR on CKD with acute uremia and metabolic derangements requiring urgent HD and was COVID-19 positive.      #Leukocytosis  Unclear source - may be multifactorial given ongoing clinical issues  CT without clear evidence for infection     bacteruria   Urine culture growing E.coli esbl sensitivity pending   will start invanz pending sensitivities    Covid  afebrile on RA  to monitor        #Transaminitis    PLAN:      to IR Permacath   stable off ab  reculture prn

## 2022-11-14 NOTE — PROGRESS NOTE ADULT - ASSESSMENT
72F w/ HTN, HLD, DM, CKD, 10/20/22 from OSH with uremia, COVID-19, and pericardial effusion    (1)Renal - newly ESRD-HD;  HD yesterday for hyperkalemia   (2)CTS - POD 4  for C3L  (3)Pulm-Chest tube   (4)Bah reinserted   (5) diarrhea     RECOMMEND:  (1)Will plan for HD on Tuesday       (2)+ Perm cath and sp AVF (follow up with vasc as outpt)       (3)Physical therapy -  Once she is walking then dc the riya      CTU         Sayed [facility]   [phone]

## 2022-11-14 NOTE — PROGRESS NOTE ADULT - ASSESSMENT
72F HTN, HLD, DM, CKD who presented to [facility] initially with MAR on CKD with acute uremia and metabolic derangements requiring urgent HD and was COVID-19 positive. During HD went into atrial fibrillation and started on HD, subsequently developing GIB thought 2/2 AC and acute uremia.  On TTE noted to have a moderate to large pericardial effusion with early echocardiographic evidence of tamponade.  On 10/20 had pericardiocentesis in the cath lab with 700 cc bloody fluid removed.  Had pericardial drain which was draining minimally, and removed 10/27.     - developed 50 beats of vt and so cath done  - found with 3v cad and is now s/p C3L 11/9/22  - Continue asa and statin/gemfibrozil  - continue bb  - now s/p amio prophy per CTsx  - hx of pAF in the setting of acute uremia and hemopericardium; cont to monitor on tele and if recurrent AF may need to reconsider AC when safe to do so from a surgical standpoint    - cont losartan  - cont norvasc    - Repeat echo with no significant re- accumulation of effusion.    - Tolerated AVF with no issues.    - Continue off of AC for now secondary to bleeding issues  - Please continue to maintain strict I/Os, monitor daily weights, Cr, and K.   - fu renal.     - Monitor and replete electrolytes. Keep K>4.0 and Mg>2.0.   - Further cardiac workup will depend on clinical course.   - All other workup per primary team. Will followup.

## 2022-11-14 NOTE — PROGRESS NOTE ADULT - SUBJECTIVE AND OBJECTIVE BOX
NEPHROLOGY[facility] [phone]        Patient seen and examined in bed.  She is no longer having diarrhea  Urinating well         MEDICATIONS  (STANDING):  amLODIPine   Tablet 10 milliGRAM(s) Oral daily  aspirin enteric coated 81 milliGRAM(s) Oral daily  atorvastatin 80 milliGRAM(s) Oral at bedtime  busPIRone 10 milliGRAM(s) Oral two times a day  chlorhexidine 2% Cloths 1 Application(s) Topical daily  dextrose 50% Injectable 50 milliLiter(s) IV Push every 15 minutes  dextrose 50% Injectable 25 milliLiter(s) IV Push every 15 minutes  enoxaparin Injectable 30 milliGRAM(s) SubCutaneous every 12 hours  famotidine    Tablet 20 milliGRAM(s) Oral daily  gemfibrozil 600 milliGRAM(s) Oral two times a day  insulin glargine Injectable (LANTUS) 10 Unit(s) SubCutaneous every morning  insulin glargine Injectable (LANTUS) 10 Unit(s) SubCutaneous at bedtime  insulin lispro (ADMELOG) corrective regimen sliding scale   SubCutaneous three times a day before meals  insulin lispro Injectable (ADMELOG) 5 Unit(s) SubCutaneous before breakfast  insulin lispro Injectable (ADMELOG) 5 Unit(s) SubCutaneous before lunch  insulin lispro Injectable (ADMELOG) 5 Unit(s) SubCutaneous before dinner  metoprolol tartrate 50 milliGRAM(s) Oral every 8 hours  montelukast 10 milliGRAM(s) Oral daily  polyethylene glycol 3350 17 Gram(s) Oral daily  senna 2 Tablet(s) Oral at bedtime      VITAL:  T(C): , Max: 37.6 (11-13-22 @ 12:00)  T(F): , Max: 99.7 (11-13-22 @ 12:00)  HR: 78 (11-14-22 @ 07:00)  BP: 138/65 (11-14-22 @ 07:00)  BP(mean): 93 (11-14-22 @ 07:00)  RR: 21 (11-14-22 @ 07:00)  SpO2: 99% (11-14-22 @ 07:00)  Wt(kg): --    I and O's:    11-13 @ 07:01  -  11-14 @ 07:00  --------------------------------------------------------  IN: 410 mL / OUT: 940 mL / NET: -530 mL    11-14 @ 07:01  -  11-14 @ 08:18  --------------------------------------------------------  IN: 0 mL / OUT: 45 mL / NET: -45 mL          PHYSICAL EXAM:    Constitutional: NAD  Neck:  No JVD  Respiratory: CTAB/L  Cardiovascular: S1 and S2  Gastrointestinal: BS+, soft, NT/ND  Extremities: No peripheral edema  Neurological: A/O x 3, no focal deficits  Psychiatric: Normal mood, normal affect  : +  Bah  Skin: No rashes  Access: + perm cath ; avf-no flow    LABS:                        9.2    15.88 )-----------( 228      ( 14 Nov 2022 00:26 )             29.3     11-14    140  |  103  |  41<H>  ----------------------------<  111<H>  4.8   |  24  |  2.41<H>    Ca    7.7<L>      14 Nov 2022 00:26  Phos  3.5     11-14  Mg     2.0     11-14    TPro  5.7<L>  /  Alb  2.6<L>  /  TBili  0.3  /  DBili  x   /  AST  35  /  ALT  20  /  AlkPhos  134<H>  11-14          Urine Studies:          RADIOLOGY & ADDITIONAL STUDIES:

## 2022-11-15 DIAGNOSIS — Z95.1 PRESENCE OF AORTOCORONARY BYPASS GRAFT: ICD-10-CM

## 2022-11-15 DIAGNOSIS — I25.10 ATHEROSCLEROTIC HEART DISEASE OF NATIVE CORONARY ARTERY WITHOUT ANGINA PECTORIS: ICD-10-CM

## 2022-11-15 LAB
ALBUMIN SERPL ELPH-MCNC: 2.9 G/DL — LOW (ref 3.3–5)
ALP SERPL-CCNC: 127 U/L — HIGH (ref 40–120)
ALT FLD-CCNC: 19 U/L — SIGNIFICANT CHANGE UP (ref 10–45)
ANION GAP SERPL CALC-SCNC: 13 MMOL/L — SIGNIFICANT CHANGE UP (ref 5–17)
AST SERPL-CCNC: 22 U/L — SIGNIFICANT CHANGE UP (ref 10–40)
BASOPHILS # BLD AUTO: 0.06 K/UL — SIGNIFICANT CHANGE UP (ref 0–0.2)
BASOPHILS NFR BLD AUTO: 0.4 % — SIGNIFICANT CHANGE UP (ref 0–2)
BILIRUB SERPL-MCNC: 0.4 MG/DL — SIGNIFICANT CHANGE UP (ref 0.2–1.2)
BUN SERPL-MCNC: 47 MG/DL — HIGH (ref 7–23)
CALCIUM SERPL-MCNC: 8.1 MG/DL — LOW (ref 8.4–10.5)
CHLORIDE SERPL-SCNC: 104 MMOL/L — SIGNIFICANT CHANGE UP (ref 96–108)
CO2 SERPL-SCNC: 24 MMOL/L — SIGNIFICANT CHANGE UP (ref 22–31)
CREAT SERPL-MCNC: 2.46 MG/DL — HIGH (ref 0.5–1.3)
EGFR: 20 ML/MIN/1.73M2 — LOW
EOSINOPHIL # BLD AUTO: 0.22 K/UL — SIGNIFICANT CHANGE UP (ref 0–0.5)
EOSINOPHIL NFR BLD AUTO: 1.6 % — SIGNIFICANT CHANGE UP (ref 0–6)
GLUCOSE BLDC GLUCOMTR-MCNC: 100 MG/DL — HIGH (ref 70–99)
GLUCOSE BLDC GLUCOMTR-MCNC: 152 MG/DL — HIGH (ref 70–99)
GLUCOSE BLDC GLUCOMTR-MCNC: 156 MG/DL — HIGH (ref 70–99)
GLUCOSE BLDC GLUCOMTR-MCNC: 83 MG/DL — SIGNIFICANT CHANGE UP (ref 70–99)
GLUCOSE SERPL-MCNC: 75 MG/DL — SIGNIFICANT CHANGE UP (ref 70–99)
HCT VFR BLD CALC: 30.1 % — LOW (ref 34.5–45)
HGB BLD-MCNC: 9.3 G/DL — LOW (ref 11.5–15.5)
IMM GRANULOCYTES NFR BLD AUTO: 3.8 % — HIGH (ref 0–0.9)
LYMPHOCYTES # BLD AUTO: 17.7 % — SIGNIFICANT CHANGE UP (ref 13–44)
LYMPHOCYTES # BLD AUTO: 2.46 K/UL — SIGNIFICANT CHANGE UP (ref 1–3.3)
MAGNESIUM SERPL-MCNC: 2 MG/DL — SIGNIFICANT CHANGE UP (ref 1.6–2.6)
MCHC RBC-ENTMCNC: 30.1 PG — SIGNIFICANT CHANGE UP (ref 27–34)
MCHC RBC-ENTMCNC: 30.9 GM/DL — LOW (ref 32–36)
MCV RBC AUTO: 97.4 FL — SIGNIFICANT CHANGE UP (ref 80–100)
MONOCYTES # BLD AUTO: 0.99 K/UL — HIGH (ref 0–0.9)
MONOCYTES NFR BLD AUTO: 7.1 % — SIGNIFICANT CHANGE UP (ref 2–14)
NEUTROPHILS # BLD AUTO: 9.62 K/UL — HIGH (ref 1.8–7.4)
NEUTROPHILS NFR BLD AUTO: 69.4 % — SIGNIFICANT CHANGE UP (ref 43–77)
NRBC # BLD: 0 /100 WBCS — SIGNIFICANT CHANGE UP (ref 0–0)
PHOSPHATE SERPL-MCNC: 3.7 MG/DL — SIGNIFICANT CHANGE UP (ref 2.5–4.5)
PLATELET # BLD AUTO: 246 K/UL — SIGNIFICANT CHANGE UP (ref 150–400)
POTASSIUM SERPL-MCNC: 4 MMOL/L — SIGNIFICANT CHANGE UP (ref 3.5–5.3)
POTASSIUM SERPL-SCNC: 4 MMOL/L — SIGNIFICANT CHANGE UP (ref 3.5–5.3)
PROT SERPL-MCNC: 5.7 G/DL — LOW (ref 6–8.3)
RBC # BLD: 3.09 M/UL — LOW (ref 3.8–5.2)
RBC # FLD: 15.6 % — HIGH (ref 10.3–14.5)
SARS-COV-2 RNA SPEC QL NAA+PROBE: SIGNIFICANT CHANGE UP
SODIUM SERPL-SCNC: 141 MMOL/L — SIGNIFICANT CHANGE UP (ref 135–145)
WBC # BLD: 13.88 K/UL — HIGH (ref 3.8–10.5)
WBC # FLD AUTO: 13.88 K/UL — HIGH (ref 3.8–10.5)

## 2022-11-15 PROCEDURE — 99232 SBSQ HOSP IP/OBS MODERATE 35: CPT

## 2022-11-15 PROCEDURE — 99291 CRITICAL CARE FIRST HOUR: CPT

## 2022-11-15 PROCEDURE — 71045 X-RAY EXAM CHEST 1 VIEW: CPT | Mod: 26

## 2022-11-15 RX ADMIN — Medication 5 UNIT(S): at 12:22

## 2022-11-15 RX ADMIN — Medication 5 UNIT(S): at 17:25

## 2022-11-15 RX ADMIN — INSULIN GLARGINE 10 UNIT(S): 100 INJECTION, SOLUTION SUBCUTANEOUS at 08:33

## 2022-11-15 RX ADMIN — ENOXAPARIN SODIUM 30 MILLIGRAM(S): 100 INJECTION SUBCUTANEOUS at 05:07

## 2022-11-15 RX ADMIN — MONTELUKAST 10 MILLIGRAM(S): 4 TABLET, CHEWABLE ORAL at 15:30

## 2022-11-15 RX ADMIN — Medication 10 MILLIGRAM(S): at 17:38

## 2022-11-15 RX ADMIN — OXYCODONE HYDROCHLORIDE 5 MILLIGRAM(S): 5 TABLET ORAL at 20:09

## 2022-11-15 RX ADMIN — OXYCODONE HYDROCHLORIDE 5 MILLIGRAM(S): 5 TABLET ORAL at 20:40

## 2022-11-15 RX ADMIN — Medication 600 MILLIGRAM(S): at 05:07

## 2022-11-15 RX ADMIN — INSULIN GLARGINE 10 UNIT(S): 100 INJECTION, SOLUTION SUBCUTANEOUS at 22:02

## 2022-11-15 RX ADMIN — Medication 50 MILLIGRAM(S): at 20:15

## 2022-11-15 RX ADMIN — AMLODIPINE BESYLATE 10 MILLIGRAM(S): 2.5 TABLET ORAL at 05:07

## 2022-11-15 RX ADMIN — Medication 5 UNIT(S): at 08:15

## 2022-11-15 RX ADMIN — Medication 600 MILLIGRAM(S): at 17:38

## 2022-11-15 RX ADMIN — ERTAPENEM SODIUM 100 MILLIGRAM(S): 1 INJECTION, POWDER, LYOPHILIZED, FOR SOLUTION INTRAMUSCULAR; INTRAVENOUS at 17:49

## 2022-11-15 RX ADMIN — FAMOTIDINE 20 MILLIGRAM(S): 10 INJECTION INTRAVENOUS at 15:30

## 2022-11-15 RX ADMIN — Medication 50 MILLIGRAM(S): at 05:07

## 2022-11-15 RX ADMIN — Medication 10 MILLIGRAM(S): at 05:07

## 2022-11-15 RX ADMIN — Medication 81 MILLIGRAM(S): at 15:29

## 2022-11-15 RX ADMIN — Medication 5: at 12:18

## 2022-11-15 RX ADMIN — LOSARTAN POTASSIUM 25 MILLIGRAM(S): 100 TABLET, FILM COATED ORAL at 05:07

## 2022-11-15 RX ADMIN — Medication 5: at 17:25

## 2022-11-15 RX ADMIN — Medication 50 MILLIGRAM(S): at 15:30

## 2022-11-15 RX ADMIN — ERYTHROPOIETIN 10000 UNIT(S): 10000 INJECTION, SOLUTION INTRAVENOUS; SUBCUTANEOUS at 12:08

## 2022-11-15 RX ADMIN — ATORVASTATIN CALCIUM 80 MILLIGRAM(S): 80 TABLET, FILM COATED ORAL at 20:09

## 2022-11-15 RX ADMIN — ENOXAPARIN SODIUM 30 MILLIGRAM(S): 100 INJECTION SUBCUTANEOUS at 17:38

## 2022-11-15 RX ADMIN — CHLORHEXIDINE GLUCONATE 1 APPLICATION(S): 213 SOLUTION TOPICAL at 08:33

## 2022-11-15 NOTE — ADVANCED PRACTICE NURSE CONSULT - RECOMMEDATIONS
Will recommend the followin. Sacrum, b/l buttocks: routine pericare with Eusebio, continue to monitor for changes.  2. continue with T&P  3. Complete CAir boots  4. Seat cushion when OOB to chair  5. Nutrition support as pt condition allows  Tx plan discussed with RN

## 2022-11-15 NOTE — PROGRESS NOTE ADULT - SUBJECTIVE AND OBJECTIVE BOX
Patient seen and examined at the bedside.    Remained critically ill on continuous ICU monitoring.    OBJECTIVE:  Vital Signs Last 24 Hrs  T(C): 36.5 (15 Nov 2022 04:00), Max: 37.4 (14 Nov 2022 16:00)  T(F): 97.7 (15 Nov 2022 04:00), Max: 99.3 (14 Nov 2022 16:00)  HR: 75 (15 Nov 2022 05:00) (74 - 86)  BP: 124/72 (15 Nov 2022 05:00) (124/72 - 187/96)  BP(mean): 94 (15 Nov 2022 05:00) (80 - 131)  RR: 16 (15 Nov 2022 05:00) (13 - 35)  SpO2: 97% (15 Nov 2022 05:00) (94% - 100%)    Parameters below as of 15 Nov 2022 04:00  Patient On (Oxygen Delivery Method): room air                     Assessment:  72F HTN, HLD, DM2, CKD who presented to [facility] initially with MAR on CKD with acute uremia and metabolic derangements requiring urgent HD and was COVID-19 positive. During HD went into atrial fibrillation and started on HD, subsequently developing GIB thought 2/2 AC and acute uremia. Also on amiodarone for Afib. On TTE noted to have a moderate to large pericardial effusion with early echocardiographic evidence of tamponade. Clinically she is not hypotensive and she tolerates the fluid shifts related to HD. Patient today was transferred to [facility] CTS Dr. Gabriel for evaluation of Pericardial Effusion.      CAD s/p C3L 11/09/22  Hemodynamic instability   Leukocytosis   Hypovolemic shock      Plan:   ***Neuro***  [x] Nonfocal   Post operative neuro assessment   Buspirone for anxiety   Hydromorphone, Tylenol, Gabapentin, and Oxycodone PRN pain management   OOBTC      ***Cardiovascular***  11/9 TTE, EF: 45-50%, normal RV function   Invasive hemodynamic monitoring, assess perfusion indices   SR / MAP 89/ Hct 30.1/ Lactate 1.2  [x] TPM- VVI 40; box off  Plan to Put on ECMO   Reassessment of hemodynamics post resuscitation   Lopressor and Norvasc for blood pressure management  Losartan for Afterload reduction   Monitor chest tube outputs   [x] VTE prophylaxis with Lovenox   [x]  ASA [x] Statin   Serial EKG and cardiac enzymes       ***Pulmonary***  [x] RA SpO2 97%  Encourage incentive spirometry, continue pulse ox monitoring, follow ABGs                   ***GI***  [x] Diet: Tolerating PO consistent carb diet.   [x] Pepcid  Bowel regimen with Miralax and senna      ***Renal***  [x] ESRD on HD - M/W/F diaylsis   Continue to monitor I/Os, BUN/Creatinine.   Replete lytes PRN   Bah present       ***ID***  Invanz IVBP for Genitourinary infections, stop on 11/18/2022    ***Endocrine***  [x] DM2 : HbA1c 6.0%                - [x] ISS  [x] Lantus             - Need tight glycemic control to prevent wound infection.          Patient requires continuous monitoring with bedside rhythm monitoring, pulse oximetry monitoring, and continuous central venous and arterial pressure monitoring; and intermittent blood gas analysis. Care plan discussed with the ICU care team.   Patient remained critical, at risk for life threatening decompensation.    I have spent 30 minutes providing critical care management to this patient.    By signing my name below, I, Mohamud Juarez, attest that this documentation has been prepared under the direction and in the presence of Gregorio Bill MD   Electronically signed: Bam Jacome, 11-15-22 @ 06:06    I, Gregorio Bill, personally performed the services described in this documentation. all medical record entries made by the jorgeiblalo were at my direction and in my presence. I have reviewed the chart and agree that the record reflects my personal performance and is accurate and complete  Electronically signed: Gregorio Bill MD

## 2022-11-15 NOTE — ADVANCED PRACTICE NURSE CONSULT - ASSESSMENT
When wound care RN arrived to unit, staff RN Jeannette informed wound RN that an RRT was called on patient Elías and that the patient is in transit to CTU and may emergently go to OR. Wound care RN will follow up with patient when stable. 
Since last assessment, by the ON, the pt was transferred to [facility]. Today bedside HD is in progress with the Dialysis RN assisting with turning the pt in the bed for wound assessment. Upon assessment, the deep tissue injury noted on admission has continued to evolve- dimensions are smaller with the wound measuring 8cm x9cm x0.2cm. 80% of this area is intact with  blanchable and non-blanchable skin; an open area with partial-thickness tissue loss was noted on the right buttocks- the remaining 20% of the wound. It is pale pink with no drainage. will recommend to continue to monitor and ot apply Eusebio with pericare.  The pt is on a BTC Trip P 500 support surface and was able to assist slightly with turning to her side. will recommend Complete CAir boots to off-load the heels.  The pt is being followed by nutrition.

## 2022-11-15 NOTE — PROGRESS NOTE ADULT - ASSESSMENT
72F HTN, HLD, DM, CKD who presented to [facility] initially with MAR on CKD with acute uremia and metabolic derangements requiring urgent HD and was COVID-19 positive.      #Leukocytosis  Unclear source - may be multifactorial given ongoing clinical issues  CT without clear evidence for infection     bacteruria   Urine culture growing E.coli esbl    will start invanz pending sensitivities  plan 3-5 days     Covid  afebrile on RA  to monitor        #Transaminitis

## 2022-11-15 NOTE — PROGRESS NOTE ADULT - ASSESSMENT
72F HTN, HLD, DM, CKD who presented to [facility] initially with MAR on CKD with acute uremia and metabolic derangements requiring urgent HD and was COVID-19 positive. During HD went into atrial fibrillation and started on HD, subsequently developing GIB thought 2/2 AC and acute uremia. Also on amiodarone for Afib. On TTE noted to have a moderate to large pericardial effusion with early echocardiographic evidence of tamponade. Clinically she is not hypotensive and she tolerates the fluid shifts related to HD. Patient today was transferred to [facility] CTS Dr. Gabriel for evaluation of Pericardial Effusion.  10/20 had pericardiocentesis pericardial drain which was draining minimally, and removed 10/27.  Cath 11/2 revealed triple vessel CAD referred for CABG eval with Dr Mann  11/9/22 cabg x 3 LIMA-LAD, SVG-OM, SVG-OM   esrd   Insulin infusion for hyperglycemia  Vascular surgery to evaluate AVF :US duplex obtained today -- AVF is patent with non-occlusive thrombus and low flow. No acute vascular intervention at this time. Patient can follow up with Dr. Galeano outpatient for follow up.   ID for UTI- invanz  11/15 Transferred to [facility]

## 2022-11-15 NOTE — PROGRESS NOTE ADULT - ASSESSMENT
72F HTN, HLD, DM, CKD who presented to [facility] initially with MAR on CKD with acute uremia and metabolic derangements requiring urgent HD and was COVID-19 positive. During HD went into atrial fibrillation and started on HD, subsequently developing GIB thought 2/2 AC and acute uremia.  On TTE noted to have a moderate to large pericardial effusion with early echocardiographic evidence of tamponade.  On 10/20 had pericardiocentesis in the cath lab with 700 cc bloody fluid removed.  Had pericardial drain which was draining minimally, and removed 10/27.     - developed 50 beats of vt and so cath done  - found with 3v cad and is now s/p C3L 11/9/22  - Continue asa and statin/gemfibrozil  - continue bb  - now s/p amio prophy per CTsx  - hx of pAF in the setting of acute uremia and hemopericardium; cont to monitor on tele and if recurrent AF may need to reconsider AC  - cont losartan  - cont norvasc    - Repeat echo with no significant re- accumulation of effusion.    - Tolerated AVF with no issues.    - Continue off of AC for now secondary to bleeding issues  - Please continue to maintain strict I/Os, monitor daily weights, Cr, and K.   - fu renal.     - Monitor and replete electrolytes. Keep K>4.0 and Mg>2.0.   - Further cardiac workup will depend on clinical course.   - All other workup per primary team. Will followup.      resolved  Thoracic surgery follow up  Chest tube DCed

## 2022-11-15 NOTE — PROGRESS NOTE ADULT - SUBJECTIVE AND OBJECTIVE BOX
Overnight events noted      VITAL:  T(C): , Max: 37.4 (11-14-22 @ 16:00)  T(F): , Max: 99.3 (11-14-22 @ 16:00)  HR: 75 (11-15-22 @ 05:00)  BP: 124/72 (11-15-22 @ 05:00)  BP(mean): 94 (11-15-22 @ 05:00)  RR: 16 (11-15-22 @ 05:00)  SpO2: 97% (11-15-22 @ 05:00)  Wt(kg): --      PHYSICAL EXAM:  Constitutional: NAD  Neck:  No JVD  Respiratory: CTAB/L  Cardiovascular: S1 and S2  Gastrointestinal: BS+, soft, NT/ND  Extremities: No peripheral edema  Neurological: A/O x 3, no focal deficits  Psychiatric: Normal mood, normal affect  : +  Bah  Skin: No rashes  Access: + perm cath ; avf-no     LABS:                        9.3    13.88 )-----------( 246      ( 15 Nov 2022 00:54 )             30.1     Na(141)/K(4.0)/Cl(104)/HCO3(24)/BUN(47)/Cr(2.46)Glu(75)/Ca(8.1)/Mg(2.0)/PO4(3.7)    11-15 @ 00:54  Na(140)/K(4.8)/Cl(103)/HCO3(24)/BUN(41)/Cr(2.41)Glu(111)/Ca(7.7)/Mg(2.0)/PO4(3.5)    11-14 @ 00:26  Na(137)/K(4.1)/Cl(102)/HCO3(23)/BUN(36)/Cr(2.08)Glu(210)/Ca(7.8)/Mg(1.9)/PO4(2.6)    11-13 @ 01:00      IMPRESSION: 72F w/ HTN, HLD, DM, CKD, 10/20/22 from OSH with uremia, COVID-19, and pericardial effusion    (1)Renal - newly ESRD-HD;  due for HD today  (2)CV - reasonable BP/volume  (3)CTS - s/p C3L on 11/9      RECOMMEND:  (1)HD today as ordered - 1L UF as able        Angel Luis Bradshaw MD  [facility]  Office: [phone]  Cell: [phone]       No pain, no sob      VITAL:  T(C): , Max: 37.4 (11-14-22 @ 16:00)  T(F): , Max: 99.3 (11-14-22 @ 16:00)  HR: 75 (11-15-22 @ 05:00)  BP: 124/72 (11-15-22 @ 05:00)  BP(mean): 94 (11-15-22 @ 05:00)  RR: 16 (11-15-22 @ 05:00)  SpO2: 97% (11-15-22 @ 05:00)  Wt(kg): --      PHYSICAL EXAM:  Constitutional: NAD  Neck:  No JVD  Respiratory: CTAB/L  Cardiovascular: S1 and S2  Gastrointestinal: BS+, soft, NT/ND  Extremities: No peripheral edema  Neurological: A/O x 3, no focal deficits  Psychiatric: Normal mood, normal affect  : +  Bah  Skin: No rashes  Access: + permacath ; avf-no thrill    LABS:                        9.3    13.88 )-----------( 246      ( 15 Nov 2022 00:54 )             30.1     Na(141)/K(4.0)/Cl(104)/HCO3(24)/BUN(47)/Cr(2.46)Glu(75)/Ca(8.1)/Mg(2.0)/PO4(3.7)    11-15 @ 00:54  Na(140)/K(4.8)/Cl(103)/HCO3(24)/BUN(41)/Cr(2.41)Glu(111)/Ca(7.7)/Mg(2.0)/PO4(3.5)    11-14 @ 00:26  Na(137)/K(4.1)/Cl(102)/HCO3(23)/BUN(36)/Cr(2.08)Glu(210)/Ca(7.8)/Mg(1.9)/PO4(2.6)    11-13 @ 01:00      IMPRESSION: 72F w/ HTN, HLD, DM, CKD, 10/20/22 from OSH with uremia, COVID-19, and pericardial effusion    (1)Renal - newly ESRD-HD;  due for HD today  (2)CV - reasonable BP/volume  (3)CTS - s/p C3L on 11/9      RECOMMEND:  (1)HD today as ordered - 1L UF as able  (2)AV access repair as outpatient      Angel Luis Bradshaw MD  [facility]  Office: [phone]  Cell: [phone]

## 2022-11-15 NOTE — PROGRESS NOTE ADULT - SUBJECTIVE AND OBJECTIVE BOX
VITAL SIGNS    Telemetry:      Vital Signs Last 24 Hrs  T(C): 36.3 (11-15-22 @ 10:30), Max: 37.4 (22 @ 16:00)  T(F): 97.4 (11-15-22 @ 10:30), Max: 99.3 (22 @ 16:00)  HR: 89 (11-15-22 @ 10:30) (74 - 89)  BP: 114/95 (11-15-22 @ 10:30) (114/95 - 187/96)  RR: 18 (11-15-22 @ 10:30) (16 - 33)  SpO2: 97% (11-15-22 @ 10:30) (94% - 100%)                    @ 07:01  -  11-15 @ 07:00  --------------------------------------------------------  IN: 50 mL / OUT: 700 mL / NET: -650 mL    11-15 @ 07:01  -  11-15 @ 12:59  --------------------------------------------------------  IN: 480 mL / OUT: 0 mL / NET: 480 mL          Daily     Daily Weight in k.5 (15 Nov 2022 08:00)            CAPILLARY BLOOD GLUCOSE      POCT Blood Glucose.: 152 mg/dL (15 Nov 2022 11:18)  POCT Blood Glucose.: 83 mg/dL (15 Nov 2022 07:27)  POCT Blood Glucose.: 98 mg/dL (2022 21:15)  POCT Blood Glucose.: 155 mg/dL (2022 17:17)  POCT Blood Glucose.: 64 mg/dL (2022 16:31)            Drains:     MS         [  ] Drainage:                 L Pleural  [  ]  Drainage:                R Pleural  [  ]  Drainage:    Pacing Wires        [  ]   Settings:                                  Isolated  [  ]    Coumadin    [ ] YES          [  ]      NO                                   PHYSICAL EXAM        Neurology: alert and oriented x 3, nonfocal, no gross deficits  CV : s1 s2 RRR  Sternal Wound :  CDI , Stable  Lungs: cta  Abdomen: soft, nontender, nondistended, positive bowel sounds, last bowel movement                       chest tubes  :    voiding / ritter - sbd         Extremities:      edema   /  -   calve tenderness ,    L leg  /  R leg  incisions cdi          acetaminophen     Tablet .. 650 milliGRAM(s) Oral every 6 hours PRN  amLODIPine   Tablet 10 milliGRAM(s) Oral daily  aspirin enteric coated 81 milliGRAM(s) Oral daily  atorvastatin 80 milliGRAM(s) Oral at bedtime  busPIRone 10 milliGRAM(s) Oral two times a day  chlorhexidine 2% Cloths 1 Application(s) Topical daily  dextrose 50% Injectable 50 milliLiter(s) IV Push every 15 minutes  dextrose 50% Injectable 25 milliLiter(s) IV Push every 15 minutes  enoxaparin Injectable 30 milliGRAM(s) SubCutaneous every 12 hours  ertapenem  IVPB      ertapenem  IVPB 500 milliGRAM(s) IV Intermittent every 24 hours  famotidine    Tablet 20 milliGRAM(s) Oral daily  gemfibrozil 600 milliGRAM(s) Oral two times a day  insulin glargine Injectable (LANTUS) 10 Unit(s) SubCutaneous every morning  insulin glargine Injectable (LANTUS) 10 Unit(s) SubCutaneous at bedtime  insulin lispro (ADMELOG) corrective regimen sliding scale   SubCutaneous three times a day before meals  insulin lispro Injectable (ADMELOG) 5 Unit(s) SubCutaneous before breakfast  insulin lispro Injectable (ADMELOG) 5 Unit(s) SubCutaneous before lunch  insulin lispro Injectable (ADMELOG) 5 Unit(s) SubCutaneous before dinner  losartan 25 milliGRAM(s) Oral daily  metoprolol tartrate 50 milliGRAM(s) Oral every 8 hours  montelukast 10 milliGRAM(s) Oral daily  oxyCODONE    IR 5 milliGRAM(s) Oral every 4 hours PRN  oxyCODONE    IR 10 milliGRAM(s) Oral every 4 hours PRN  polyethylene glycol 3350 17 Gram(s) Oral daily  senna 2 Tablet(s) Oral at bedtime                    Physical Therapy Rec:   Home  [  ]   Home w/ PT  [  ]  Rehab  [  ]  Discussed with Cardiothoracic Team at AM rounds.     VITAL SIGNS    Telemetry:  nsr 85    Vital Signs Last 24 Hrs  T(C): 36.3 (11-15-22 @ 10:30), Max: 37.4 (22 @ 16:00)  T(F): 97.4 (11-15-22 @ 10:30), Max: 99.3 (22 @ 16:00)  HR: 89 (11-15-22 @ 10:30) (74 - 89)  BP: 114/95 (11-15-22 @ 10:30) (114/95 - 187/96)  RR: 18 (11-15-22 @ 10:30) (16 - 33)  SpO2: 97% (11-15-22 @ 10:30) (94% - 100%)                    @ 07:01  -  11-15 @ 07:00  --------------------------------------------------------  IN: 50 mL / OUT: 700 mL / NET: -650 mL    11-15 @ 07:01  -  11-15 @ 12:59  --------------------------------------------------------  IN: 480 mL / OUT: 0 mL / NET: 480 mL          Daily     Daily Weight in k.5 (15 Nov 2022 08:00)            CAPILLARY BLOOD GLUCOSE      POCT Blood Glucose.: 152 mg/dL (15 Nov 2022 11:18)  POCT Blood Glucose.: 83 mg/dL (15 Nov 2022 07:27)  POCT Blood Glucose.: 98 mg/dL (2022 21:15)  POCT Blood Glucose.: 155 mg/dL (2022 17:17)  POCT Blood Glucose.: 64 mg/dL (2022 16:31)              Pacing Wires        [  ]   Settings:                                  Isolated  [x  ]    Coumadin    [ ] YES          [x  ]      NO                                   PHYSICAL EXAM        Neurology: alert and oriented x 3, nonfocal, no gross deficits  CV : s1 s2 RRR   R chest cdi  Sternal Wound :  CDI , Stable  Lungs: cta  Abdomen: soft, nontender, nondistended, positive bowel sounds, last bowel movement                     :    voiding esrd     Extremities:    trace  edema   /  -   calve tenderness   R leg  incisions cdi          acetaminophen     Tablet .. 650 milliGRAM(s) Oral every 6 hours PRN  amLODIPine   Tablet 10 milliGRAM(s) Oral daily  aspirin enteric coated 81 milliGRAM(s) Oral daily  atorvastatin 80 milliGRAM(s) Oral at bedtime  busPIRone 10 milliGRAM(s) Oral two times a day  chlorhexidine 2% Cloths 1 Application(s) Topical daily  dextrose 50% Injectable 50 milliLiter(s) IV Push every 15 minutes  dextrose 50% Injectable 25 milliLiter(s) IV Push every 15 minutes  enoxaparin Injectable 30 milliGRAM(s) SubCutaneous every 12 hours  ertapenem  IVPB      ertapenem  IVPB 500 milliGRAM(s) IV Intermittent every 24 hours  famotidine    Tablet 20 milliGRAM(s) Oral daily  gemfibrozil 600 milliGRAM(s) Oral two times a day  insulin glargine Injectable (LANTUS) 10 Unit(s) SubCutaneous every morning  insulin glargine Injectable (LANTUS) 10 Unit(s) SubCutaneous at bedtime  insulin lispro (ADMELOG) corrective regimen sliding scale   SubCutaneous three times a day before meals  insulin lispro Injectable (ADMELOG) 5 Unit(s) SubCutaneous before breakfast  insulin lispro Injectable (ADMELOG) 5 Unit(s) SubCutaneous before lunch  insulin lispro Injectable (ADMELOG) 5 Unit(s) SubCutaneous before dinner  losartan 25 milliGRAM(s) Oral daily  metoprolol tartrate 50 milliGRAM(s) Oral every 8 hours  montelukast 10 milliGRAM(s) Oral daily  oxyCODONE    IR 5 milliGRAM(s) Oral every 4 hours PRN  oxyCODONE    IR 10 milliGRAM(s) Oral every 4 hours PRN  polyethylene glycol 3350 17 Gram(s) Oral daily  senna 2 Tablet(s) Oral at bedtime                    Physical Therapy Rec:   Home  [  ]   Home w/ PT  [  ]  Rehab  [  ]  Discussed with Cardiothoracic Team at AM rounds.

## 2022-11-15 NOTE — ADVANCED PRACTICE NURSE CONSULT - REASON FOR CONSULT
Wound care consult initiated by RN to assess pt's skin for a possible sacral stage 2 pressure injury POA      History of Present Illness:   72F HTN, HLD, DM, CKD who presented to [facility] initially with MAR on CKD with acute uremia and metabolic derangements requiring urgent HD and was COVID-19 positive. During HD went into atrial fibrillation and started on HD, subsequently developing GIB thought 2/2 AC and acute uremia. Also on amiodarone for Afib. On TTE noted to have a moderate to large pericardial effusion with early echocardiographic evidence of tamponade. Clinically she is not hypotensive and she tolerates the fluid shifts related to HD. Patient today was transferred to [facility] CTS Dr. Gabriel for evaluation of Pericardial Effusion.
Requested by staff to re-evaluate skin status: sacrum and b/l buttocks. PMH is noted:  72F HTN, HLD, DM2, CKD who presented to [facility] initially with MAR on CKD with acute uremia and metabolic derangements requiring urgent HD and was COVID-19 positive. During HD went into atrial fibrillation and started on AC, subsequently developing GIB thought 2/2 AC and acute uremia. Also on amiodarone for Afib. On TTE noted to have a moderate to large pericardial effusion with early echocardiographic evidence of tamponade. Clinically she is not hypotensive and she tolerates the fluid shifts related to HD. Patient today was transferred to [facility] CTS Dr. Gabriel for evaluation of Pericardial Effusion.      CAD s/p C3L 11/09/22  The pt was seen by the Wound Care NP on 10/21

## 2022-11-15 NOTE — PROGRESS NOTE ADULT - SUBJECTIVE AND OBJECTIVE BOX
[facility] - Lucio Madrid, Bladimir, Autumn Santillan  Office Number:  [phone]    Patient resting comfortably in bed in NAD.  Laying flat with no respiratory distress.  No complaints of chest pain, dyspnea, palpitations, PND, or orthopnea.    ROS: negative unless otherwise mentioned.    Telemetry: sr     MEDICATIONS  (STANDING):  amLODIPine   Tablet 10 milliGRAM(s) Oral daily  aspirin enteric coated 81 milliGRAM(s) Oral daily  atorvastatin 80 milliGRAM(s) Oral at bedtime  busPIRone 10 milliGRAM(s) Oral two times a day  chlorhexidine 2% Cloths 1 Application(s) Topical daily  dextrose 50% Injectable 50 milliLiter(s) IV Push every 15 minutes  dextrose 50% Injectable 25 milliLiter(s) IV Push every 15 minutes  enoxaparin Injectable 30 milliGRAM(s) SubCutaneous every 12 hours  epoetin beatris-epbx (RETACRIT) Injectable 31636 Unit(s) IV Push once  ertapenem  IVPB      ertapenem  IVPB 500 milliGRAM(s) IV Intermittent every 24 hours  famotidine    Tablet 20 milliGRAM(s) Oral daily  gemfibrozil 600 milliGRAM(s) Oral two times a day  insulin glargine Injectable (LANTUS) 10 Unit(s) SubCutaneous every morning  insulin glargine Injectable (LANTUS) 10 Unit(s) SubCutaneous at bedtime  insulin lispro (ADMELOG) corrective regimen sliding scale   SubCutaneous three times a day before meals  insulin lispro Injectable (ADMELOG) 5 Unit(s) SubCutaneous before breakfast  insulin lispro Injectable (ADMELOG) 5 Unit(s) SubCutaneous before lunch  insulin lispro Injectable (ADMELOG) 5 Unit(s) SubCutaneous before dinner  losartan 25 milliGRAM(s) Oral daily  metoprolol tartrate 50 milliGRAM(s) Oral every 8 hours  montelukast 10 milliGRAM(s) Oral daily  polyethylene glycol 3350 17 Gram(s) Oral daily  senna 2 Tablet(s) Oral at bedtime    MEDICATIONS  (PRN):  acetaminophen     Tablet .. 650 milliGRAM(s) Oral every 6 hours PRN Mild Pain (1 - 3)  oxyCODONE    IR 5 milliGRAM(s) Oral every 4 hours PRN Moderate Pain (4 - 6)  oxyCODONE    IR 10 milliGRAM(s) Oral every 4 hours PRN Severe Pain (7 - 10)      Allergies    sulfa drugs (Rash)    Intolerances        Vital Signs Last 24 Hrs  T(C): 36.5 (15 Nov 2022 04:00), Max: 37.4 (14 Nov 2022 16:00)  T(F): 97.7 (15 Nov 2022 04:00), Max: 99.3 (14 Nov 2022 16:00)  HR: 75 (15 Nov 2022 05:00) (74 - 86)  BP: 124/72 (15 Nov 2022 05:00) (124/72 - 187/96)  BP(mean): 94 (15 Nov 2022 05:00) (80 - 131)  RR: 16 (15 Nov 2022 05:00) (13 - 35)  SpO2: 97% (15 Nov 2022 05:00) (94% - 100%)    Parameters below as of 15 Nov 2022 04:00  Patient On (Oxygen Delivery Method): room air        I&O's Summary    14 Nov 2022 07:01  -  15 Nov 2022 07:00  --------------------------------------------------------  IN: 50 mL / OUT: 700 mL / NET: -650 mL        ON EXAM:    Constitutional: NAD, awake    HEENT: Moist Mucous Membranes, Anicteric  Pulmonary: Decreased breath sounds b/l. No rales, crackles or wheeze appreciated.   Cardiovascular: Regular, S1 and S2, No murmurs, rubs, gallops or clicks  Gastrointestinal: Bowel Sounds present, soft, nontender.   Lymph: + peripheral edema. No lymphadenopathy.  Skin: No visible rashes or ulcers.  Psych:  Mood & affect appropriate for situation      LABS: All Labs Reviewed:                        9.3    13.88 )-----------( 246      ( 15 Nov 2022 00:54 )             30.1                         9.2    15.88 )-----------( 228      ( 14 Nov 2022 00:26 )             29.3                         10.4   19.69 )-----------( 219      ( 13 Nov 2022 01:00 )             31.9     15 Nov 2022 00:54    141    |  104    |  47     ----------------------------<  75     4.0     |  24     |  2.46   14 Nov 2022 00:26    140    |  103    |  41     ----------------------------<  111    4.8     |  24     |  2.41   13 Nov 2022 01:00    137    |  102    |  36     ----------------------------<  210    4.1     |  23     |  2.08     Ca    8.1        15 Nov 2022 00:54  Ca    7.7        14 Nov 2022 00:26  Ca    7.8        13 Nov 2022 01:00  Phos  3.7       15 Nov 2022 00:54  Phos  3.5       14 Nov 2022 00:26  Phos  2.6       13 Nov 2022 01:00  Mg     2.0       15 Nov 2022 00:54  Mg     2.0       14 Nov 2022 00:26  Mg     1.9       13 Nov 2022 01:00    TPro  5.7    /  Alb  2.9    /  TBili  0.4    /  DBili  x      /  AST  22     /  ALT  19     /  AlkPhos  127    15 Nov 2022 00:54  TPro  5.7    /  Alb  2.6    /  TBili  0.3    /  DBili  x      /  AST  35     /  ALT  20     /  AlkPhos  134    14 Nov 2022 00:26  TPro  6.1    /  Alb  3.1    /  TBili  0.3    /  DBili  x      /  AST  28     /  ALT  24     /  AlkPhos  173    13 Nov 2022 01:00          Blood Culture:

## 2022-11-15 NOTE — PROGRESS NOTE ADULT - SUBJECTIVE AND OBJECTIVE BOX
infectious diseases progress note:    Patient is a 72y old  Female who presents with a chief complaint of Pericardial Eff (15 Nov 2022 08:47)        Malignant pericardial effusion               Allergies    sulfa drugs (Rash)    Intolerances        ANTIBIOTICS/RELEVANT:  antimicrobials  ertapenem  IVPB      ertapenem  IVPB 500 milliGRAM(s) IV Intermittent every 24 hours    immunologic:  epoetin beatris-epbx (RETACRIT) Injectable 16086 Unit(s) IV Push once    OTHER:  acetaminophen     Tablet .. 650 milliGRAM(s) Oral every 6 hours PRN  amLODIPine   Tablet 10 milliGRAM(s) Oral daily  aspirin enteric coated 81 milliGRAM(s) Oral daily  atorvastatin 80 milliGRAM(s) Oral at bedtime  busPIRone 10 milliGRAM(s) Oral two times a day  chlorhexidine 2% Cloths 1 Application(s) Topical daily  dextrose 50% Injectable 50 milliLiter(s) IV Push every 15 minutes  dextrose 50% Injectable 25 milliLiter(s) IV Push every 15 minutes  enoxaparin Injectable 30 milliGRAM(s) SubCutaneous every 12 hours  famotidine    Tablet 20 milliGRAM(s) Oral daily  gemfibrozil 600 milliGRAM(s) Oral two times a day  insulin glargine Injectable (LANTUS) 10 Unit(s) SubCutaneous every morning  insulin glargine Injectable (LANTUS) 10 Unit(s) SubCutaneous at bedtime  insulin lispro (ADMELOG) corrective regimen sliding scale   SubCutaneous three times a day before meals  insulin lispro Injectable (ADMELOG) 5 Unit(s) SubCutaneous before breakfast  insulin lispro Injectable (ADMELOG) 5 Unit(s) SubCutaneous before lunch  insulin lispro Injectable (ADMELOG) 5 Unit(s) SubCutaneous before dinner  losartan 25 milliGRAM(s) Oral daily  metoprolol tartrate 50 milliGRAM(s) Oral every 8 hours  montelukast 10 milliGRAM(s) Oral daily  oxyCODONE    IR 5 milliGRAM(s) Oral every 4 hours PRN  oxyCODONE    IR 10 milliGRAM(s) Oral every 4 hours PRN  polyethylene glycol 3350 17 Gram(s) Oral daily  senna 2 Tablet(s) Oral at bedtime      Objective:  Vital Signs Last 24 Hrs  T(C): 36.5 (15 Nov 2022 04:00), Max: 37.4 (14 Nov 2022 16:00)  T(F): 97.7 (15 Nov 2022 04:00), Max: 99.3 (14 Nov 2022 16:00)  HR: 75 (15 Nov 2022 05:00) (74 - 86)  BP: 124/72 (15 Nov 2022 05:00) (124/72 - 187/96)  BP(mean): 94 (15 Nov 2022 05:00) (90 - 131)  RR: 16 (15 Nov 2022 05:00) (13 - 35)  SpO2: 97% (15 Nov 2022 05:00) (94% - 100%)    Parameters below as of 15 Nov 2022 04:00  Patient On (Oxygen Delivery Method): room air           Eyes:BOBBI, EOMI  Ear/Nose/Throat: no oral lesion, no sinus tenderness on percussion	  Neck:no JVD, no lymphadenopathy, supple  Respiratory: CTA olamide  Cardiovascular: S1S2 RRR, no murmurs  Gastrointestinal:soft, (+) BS, no HSM  Extremities:no e/e/c        LABS:                        9.3    13.88 )-----------( 246      ( 15 Nov 2022 00:54 )             30.1     11-15    141  |  104  |  47<H>  ----------------------------<  75  4.0   |  24  |  2.46<H>    Ca    8.1<L>      15 Nov 2022 00:54  Phos  3.7     11-15  Mg     2.0     11-15    TPro  5.7<L>  /  Alb  2.9<L>  /  TBili  0.4  /  DBili  x   /  AST  22  /  ALT  19  /  AlkPhos  127<H>  11-15            MICROBIOLOGY:    RECENT CULTURES:  11-10 @ 04:38 Catheterized Catheterized   FAYE      Escherichia coli ESBL  Escherichia coli ESBL     >100,000 CFU/ml Escherichia coli ESBL          RESPIRATORY CULTURES:              RADIOLOGY & ADDITIONAL STUDIES:        Pager [phone]  After 5 pm/weekends or if no response :[phone]

## 2022-11-16 DIAGNOSIS — D72.829 ELEVATED WHITE BLOOD CELL COUNT, UNSPECIFIED: ICD-10-CM

## 2022-11-16 LAB
ALBUMIN SERPL ELPH-MCNC: 2.9 G/DL — LOW (ref 3.3–5)
ALP SERPL-CCNC: 120 U/L — SIGNIFICANT CHANGE UP (ref 40–120)
ALT FLD-CCNC: 16 U/L — SIGNIFICANT CHANGE UP (ref 10–45)
ANION GAP SERPL CALC-SCNC: 11 MMOL/L — SIGNIFICANT CHANGE UP (ref 5–17)
ANISOCYTOSIS BLD QL: SLIGHT — SIGNIFICANT CHANGE UP
APTT BLD: 29.9 SEC — SIGNIFICANT CHANGE UP (ref 27.5–35.5)
AST SERPL-CCNC: 18 U/L — SIGNIFICANT CHANGE UP (ref 10–40)
BASOPHILS # BLD AUTO: 0.12 K/UL — SIGNIFICANT CHANGE UP (ref 0–0.2)
BASOPHILS NFR BLD AUTO: 0.9 % — SIGNIFICANT CHANGE UP (ref 0–2)
BILIRUB SERPL-MCNC: 0.4 MG/DL — SIGNIFICANT CHANGE UP (ref 0.2–1.2)
BUN SERPL-MCNC: 30 MG/DL — HIGH (ref 7–23)
CALCIUM SERPL-MCNC: 8.1 MG/DL — LOW (ref 8.4–10.5)
CHLORIDE SERPL-SCNC: 103 MMOL/L — SIGNIFICANT CHANGE UP (ref 96–108)
CO2 SERPL-SCNC: 27 MMOL/L — SIGNIFICANT CHANGE UP (ref 22–31)
CREAT SERPL-MCNC: 2.1 MG/DL — HIGH (ref 0.5–1.3)
DACRYOCYTES BLD QL SMEAR: SLIGHT — SIGNIFICANT CHANGE UP
EGFR: 25 ML/MIN/1.73M2 — LOW
EOSINOPHIL # BLD AUTO: 0 K/UL — SIGNIFICANT CHANGE UP (ref 0–0.5)
EOSINOPHIL NFR BLD AUTO: 0 % — SIGNIFICANT CHANGE UP (ref 0–6)
GLUCOSE BLDC GLUCOMTR-MCNC: 110 MG/DL — HIGH (ref 70–99)
GLUCOSE BLDC GLUCOMTR-MCNC: 125 MG/DL — HIGH (ref 70–99)
GLUCOSE BLDC GLUCOMTR-MCNC: 77 MG/DL — SIGNIFICANT CHANGE UP (ref 70–99)
GLUCOSE BLDC GLUCOMTR-MCNC: 80 MG/DL — SIGNIFICANT CHANGE UP (ref 70–99)
GLUCOSE BLDC GLUCOMTR-MCNC: 83 MG/DL — SIGNIFICANT CHANGE UP (ref 70–99)
GLUCOSE BLDC GLUCOMTR-MCNC: 91 MG/DL — SIGNIFICANT CHANGE UP (ref 70–99)
GLUCOSE SERPL-MCNC: 78 MG/DL — SIGNIFICANT CHANGE UP (ref 70–99)
HCT VFR BLD CALC: 31.3 % — LOW (ref 34.5–45)
HGB BLD-MCNC: 9.8 G/DL — LOW (ref 11.5–15.5)
INR BLD: 1.15 RATIO — SIGNIFICANT CHANGE UP (ref 0.88–1.16)
LYMPHOCYTES # BLD AUTO: 1.76 K/UL — SIGNIFICANT CHANGE UP (ref 1–3.3)
LYMPHOCYTES # BLD AUTO: 13.2 % — SIGNIFICANT CHANGE UP (ref 13–44)
MACROCYTES BLD QL: SLIGHT — SIGNIFICANT CHANGE UP
MAGNESIUM SERPL-MCNC: 1.9 MG/DL — SIGNIFICANT CHANGE UP (ref 1.6–2.6)
MANUAL SMEAR VERIFICATION: SIGNIFICANT CHANGE UP
MCHC RBC-ENTMCNC: 30.3 PG — SIGNIFICANT CHANGE UP (ref 27–34)
MCHC RBC-ENTMCNC: 31.3 GM/DL — LOW (ref 32–36)
MCV RBC AUTO: 96.9 FL — SIGNIFICANT CHANGE UP (ref 80–100)
METAMYELOCYTES # FLD: 1.7 % — HIGH (ref 0–0)
MONOCYTES # BLD AUTO: 0.71 K/UL — SIGNIFICANT CHANGE UP (ref 0–0.9)
MONOCYTES NFR BLD AUTO: 5.3 % — SIGNIFICANT CHANGE UP (ref 2–14)
MYELOCYTES NFR BLD: 1.7 % — HIGH (ref 0–0)
NEUTROPHILS # BLD AUTO: 10.28 K/UL — HIGH (ref 1.8–7.4)
NEUTROPHILS NFR BLD AUTO: 77.2 % — HIGH (ref 43–77)
OVALOCYTES BLD QL SMEAR: SLIGHT — SIGNIFICANT CHANGE UP
PHOSPHATE SERPL-MCNC: 3.6 MG/DL — SIGNIFICANT CHANGE UP (ref 2.5–4.5)
PLAT MORPH BLD: NORMAL — SIGNIFICANT CHANGE UP
PLATELET # BLD AUTO: 217 K/UL — SIGNIFICANT CHANGE UP (ref 150–400)
POIKILOCYTOSIS BLD QL AUTO: SLIGHT — SIGNIFICANT CHANGE UP
POLYCHROMASIA BLD QL SMEAR: SLIGHT — SIGNIFICANT CHANGE UP
POTASSIUM SERPL-MCNC: 4.5 MMOL/L — SIGNIFICANT CHANGE UP (ref 3.5–5.3)
POTASSIUM SERPL-SCNC: 4.5 MMOL/L — SIGNIFICANT CHANGE UP (ref 3.5–5.3)
PROT SERPL-MCNC: 5.7 G/DL — LOW (ref 6–8.3)
PROTHROM AB SERPL-ACNC: 13.2 SEC — SIGNIFICANT CHANGE UP (ref 10.5–13.4)
RBC # BLD: 3.23 M/UL — LOW (ref 3.8–5.2)
RBC # FLD: 15.9 % — HIGH (ref 10.3–14.5)
RBC BLD AUTO: ABNORMAL
SODIUM SERPL-SCNC: 141 MMOL/L — SIGNIFICANT CHANGE UP (ref 135–145)
WBC # BLD: 13.31 K/UL — HIGH (ref 3.8–10.5)
WBC # FLD AUTO: 13.31 K/UL — HIGH (ref 3.8–10.5)

## 2022-11-16 PROCEDURE — 99232 SBSQ HOSP IP/OBS MODERATE 35: CPT

## 2022-11-16 RX ADMIN — Medication 650 MILLIGRAM(S): at 12:57

## 2022-11-16 RX ADMIN — Medication 50 MILLIGRAM(S): at 21:56

## 2022-11-16 RX ADMIN — MONTELUKAST 10 MILLIGRAM(S): 4 TABLET, CHEWABLE ORAL at 12:28

## 2022-11-16 RX ADMIN — Medication 600 MILLIGRAM(S): at 17:27

## 2022-11-16 RX ADMIN — Medication 650 MILLIGRAM(S): at 12:27

## 2022-11-16 RX ADMIN — ERTAPENEM SODIUM 100 MILLIGRAM(S): 1 INJECTION, POWDER, LYOPHILIZED, FOR SOLUTION INTRAMUSCULAR; INTRAVENOUS at 15:31

## 2022-11-16 RX ADMIN — Medication 10 MILLIGRAM(S): at 05:54

## 2022-11-16 RX ADMIN — ATORVASTATIN CALCIUM 80 MILLIGRAM(S): 80 TABLET, FILM COATED ORAL at 21:56

## 2022-11-16 RX ADMIN — Medication 5 UNIT(S): at 09:00

## 2022-11-16 RX ADMIN — Medication 600 MILLIGRAM(S): at 05:54

## 2022-11-16 RX ADMIN — INSULIN GLARGINE 10 UNIT(S): 100 INJECTION, SOLUTION SUBCUTANEOUS at 22:28

## 2022-11-16 RX ADMIN — Medication 5 UNIT(S): at 12:26

## 2022-11-16 RX ADMIN — INSULIN GLARGINE 10 UNIT(S): 100 INJECTION, SOLUTION SUBCUTANEOUS at 09:01

## 2022-11-16 RX ADMIN — Medication 10 MILLIGRAM(S): at 17:28

## 2022-11-16 RX ADMIN — Medication 50 MILLIGRAM(S): at 14:18

## 2022-11-16 RX ADMIN — ENOXAPARIN SODIUM 30 MILLIGRAM(S): 100 INJECTION SUBCUTANEOUS at 17:27

## 2022-11-16 RX ADMIN — OXYCODONE HYDROCHLORIDE 5 MILLIGRAM(S): 5 TABLET ORAL at 14:18

## 2022-11-16 RX ADMIN — FAMOTIDINE 20 MILLIGRAM(S): 10 INJECTION INTRAVENOUS at 12:28

## 2022-11-16 RX ADMIN — Medication 50 MILLIGRAM(S): at 05:53

## 2022-11-16 RX ADMIN — CHLORHEXIDINE GLUCONATE 1 APPLICATION(S): 213 SOLUTION TOPICAL at 12:34

## 2022-11-16 RX ADMIN — LOSARTAN POTASSIUM 25 MILLIGRAM(S): 100 TABLET, FILM COATED ORAL at 05:53

## 2022-11-16 RX ADMIN — ENOXAPARIN SODIUM 30 MILLIGRAM(S): 100 INJECTION SUBCUTANEOUS at 05:52

## 2022-11-16 RX ADMIN — OXYCODONE HYDROCHLORIDE 5 MILLIGRAM(S): 5 TABLET ORAL at 14:48

## 2022-11-16 RX ADMIN — AMLODIPINE BESYLATE 10 MILLIGRAM(S): 2.5 TABLET ORAL at 05:53

## 2022-11-16 RX ADMIN — Medication 5 UNIT(S): at 17:27

## 2022-11-16 RX ADMIN — Medication 81 MILLIGRAM(S): at 12:27

## 2022-11-16 NOTE — PROGRESS NOTE ADULT - ASSESSMENT
72F HTN, HLD, DM, CKD who presented to [facility] initially with MAR on CKD with acute uremia and metabolic derangements requiring urgent HD and was COVID-19 positive.      #Leukocytosis  Unclear source - may be multifactorial given ongoing clinical issues  CT without clear evidence for infection     bacteruria   Urine culture growing E.coli esbl     plan 3-5 days     Covid  afebrile on RA  to monitor        #Transaminitis

## 2022-11-16 NOTE — PROGRESS NOTE ADULT - SUBJECTIVE AND OBJECTIVE BOX
[facility]    Lucio Madrid, Bladimir, Autumn Santillan      [phone]    CHIEF COMPLAINT: Patient is a 72y old  Female who presents with a chief complaint of Pericardial Eff (16 Nov 2022 08:46)      Follow Up: cad, s/p cabg, s/p tamponade    Interim history: The patient reports no new symptoms.  Denies chest discomfort and shortness of breath.  No abdominal pain.  No new neurologic symptoms.      MEDICATIONS  (STANDING):  amLODIPine   Tablet 10 milliGRAM(s) Oral daily  aspirin enteric coated 81 milliGRAM(s) Oral daily  atorvastatin 80 milliGRAM(s) Oral at bedtime  busPIRone 10 milliGRAM(s) Oral two times a day  chlorhexidine 2% Cloths 1 Application(s) Topical daily  dextrose 50% Injectable 50 milliLiter(s) IV Push every 15 minutes  dextrose 50% Injectable 25 milliLiter(s) IV Push every 15 minutes  enoxaparin Injectable 30 milliGRAM(s) SubCutaneous every 12 hours  ertapenem  IVPB 500 milliGRAM(s) IV Intermittent every 24 hours  ertapenem  IVPB      famotidine    Tablet 20 milliGRAM(s) Oral daily  gemfibrozil 600 milliGRAM(s) Oral two times a day  insulin glargine Injectable (LANTUS) 10 Unit(s) SubCutaneous every morning  insulin glargine Injectable (LANTUS) 10 Unit(s) SubCutaneous at bedtime  insulin lispro (ADMELOG) corrective regimen sliding scale   SubCutaneous three times a day before meals  insulin lispro Injectable (ADMELOG) 5 Unit(s) SubCutaneous before breakfast  insulin lispro Injectable (ADMELOG) 5 Unit(s) SubCutaneous before lunch  insulin lispro Injectable (ADMELOG) 5 Unit(s) SubCutaneous before dinner  losartan 25 milliGRAM(s) Oral daily  metoprolol tartrate 50 milliGRAM(s) Oral every 8 hours  montelukast 10 milliGRAM(s) Oral daily  polyethylene glycol 3350 17 Gram(s) Oral daily  senna 2 Tablet(s) Oral at bedtime    MEDICATIONS  (PRN):  acetaminophen     Tablet .. 650 milliGRAM(s) Oral every 6 hours PRN Mild Pain (1 - 3)  oxyCODONE    IR 10 milliGRAM(s) Oral every 4 hours PRN Severe Pain (7 - 10)  oxyCODONE    IR 5 milliGRAM(s) Oral every 4 hours PRN Moderate Pain (4 - 6)      REVIEW OF SYSTEMS:  eye, ent, GI, , allergic, dermatologic, musculoskeletal and neurologic are negative except as described above    Vital Signs Last 24 Hrs  T(C): 36.7 (16 Nov 2022 05:51), Max: 36.7 (15 Nov 2022 19:01)  T(F): 98.1 (16 Nov 2022 05:51), Max: 98.1 (16 Nov 2022 05:51)  HR: 87 (16 Nov 2022 05:51) (76 - 90)  BP: 107/66 (16 Nov 2022 05:51) (107/66 - 135/77)  BP(mean): --  RR: 18 (16 Nov 2022 05:51) (18 - 18)  SpO2: 96% (16 Nov 2022 05:51) (96% - 97%)    Parameters below as of 16 Nov 2022 05:51  Patient On (Oxygen Delivery Method): room air        I&O's Summary    15 Nov 2022 07:01  -  16 Nov 2022 07:00  --------------------------------------------------------  IN: 1560 mL / OUT: 1800 mL / NET: -240 mL    16 Nov 2022 07:01  -  16 Nov 2022 08:59  --------------------------------------------------------  IN: 360 mL / OUT: 0 mL / NET: 360 mL        Telemetry past 24h: sr    PHYSICAL EXAM:    Constitutional: well-nourished, well-developed, NAD   HEENT:  MMM, sclerae anicteric, conjunctivae clear, no oral cyanosis.  Pulmonary: Non-labored, breath sounds are clear bilaterally, No wheezing, rales or rhonchi  Cardiovascular: Regular, S1 and S2.  No murmur.  No rubs, gallops or clicks  Gastrointestinal: Bowel Sounds present, soft, nontender.   Lymph: No peripheral edema.   Neurological: Alert, no focal deficits  Skin: No rashes.  Psych:  Mood & affect appropriate    LABS: All Labs Reviewed:                        9.8    13.31 )-----------( 217      ( 16 Nov 2022 08:51 )             31.3                         9.3    13.88 )-----------( 246      ( 15 Nov 2022 00:54 )             30.1                         9.2    15.88 )-----------( 228      ( 14 Nov 2022 00:26 )             29.3     15 Nov 2022 00:54    141    |  104    |  47     ----------------------------<  75     4.0     |  24     |  2.46   14 Nov 2022 00:26    140    |  103    |  41     ----------------------------<  111    4.8     |  24     |  2.41     Ca    8.1        15 Nov 2022 00:54  Ca    7.7        14 Nov 2022 00:26  Phos  3.7       15 Nov 2022 00:54  Phos  3.5       14 Nov 2022 00:26  Mg     2.0       15 Nov 2022 00:54  Mg     2.0       14 Nov 2022 00:26    TPro  5.7    /  Alb  2.9    /  TBili  0.4    /  DBili  x      /  AST  22     /  ALT  19     /  AlkPhos  127    15 Nov 2022 00:54  TPro  5.7    /  Alb  2.6    /  TBili  0.3    /  DBili  x      /  AST  35     /  ALT  20     /  AlkPhos  134    14 Nov 2022 00:26          Blood Culture:         RADIOLOGY:    EKG:    Echo:

## 2022-11-16 NOTE — PROGRESS NOTE ADULT - PROBLEM SELECTOR PLAN 3
ID Dr Fajardo  #Leukocytosis  Unclear source - may be multifactorial given ongoing clinical issues  CT without clear evidence for infection     bacteruria   Urine culture growing E.coli esbl     plan 3-5 days  Mertapenem  IVPB 500 milliGRAM(s) IV Intermittent every 24 hours started 11/15  ertapenem  IVPB

## 2022-11-16 NOTE — PROGRESS NOTE ADULT - PROBLEM SELECTOR PLAN 1
Asa, 81 Atorvastatin 80 ,metoprolol 50 tid  gemfibrozil 600 milliGRAM(s) Oral two times a day  Losartan 25 qd  , Chest PT,  Incentive spirometry,   wound care and assessment.  OOB to chair ambulate  Ambulate    Sacrum, b/l buttocks: routine pericare with Eusebio,   disposition to Rehab

## 2022-11-16 NOTE — PROGRESS NOTE ADULT - ASSESSMENT
72F w/ HTN, HLD, DM, CKD, 10/20/22 from OSH with uremia, COVID-19, and pericardial effusion    (1)Renal - newly ESRD-HD;   (2)CV - reasonable BP/volume  (3)CTS - s/p C3L on 11/9      RECOMMEND:  (1)HD in am likely   (2)AV access repair as outpatient    Rehab placement     [facility]   [phone]

## 2022-11-16 NOTE — PROGRESS NOTE ADULT - SUBJECTIVE AND OBJECTIVE BOX
infectious diseases progress note:    Patient is a 72y old  Female who presents with a chief complaint of Pericardial Eff (15 Nov 2022 12:58)        Malignant pericardial effusion          ROS:  CONSTITUTIONAL:  Negative fever or chills, feels well, good appetite  EYES:  Negative  blurry vision or double vision  CARDIOVASCULAR:  Negative for chest pain or palpitations  RESPIRATORY:  Negative for cough, wheezing, or SOB   GASTROINTESTINAL:  Negative for nausea, vomiting, diarrhea, constipation, or abdominal pain  GENITOURINARY:  Negative frequency, urgency or dysuria  NEUROLOGIC:  No headache, confusion, dizziness, lightheadedness    Allergies    sulfa drugs (Rash)    Intolerances        ANTIBIOTICS/RELEVANT:  antimicrobials  ertapenem  IVPB      ertapenem  IVPB 500 milliGRAM(s) IV Intermittent every 24 hours    immunologic:    OTHER:  acetaminophen     Tablet .. 650 milliGRAM(s) Oral every 6 hours PRN  amLODIPine   Tablet 10 milliGRAM(s) Oral daily  aspirin enteric coated 81 milliGRAM(s) Oral daily  atorvastatin 80 milliGRAM(s) Oral at bedtime  busPIRone 10 milliGRAM(s) Oral two times a day  chlorhexidine 2% Cloths 1 Application(s) Topical daily  dextrose 50% Injectable 50 milliLiter(s) IV Push every 15 minutes  dextrose 50% Injectable 25 milliLiter(s) IV Push every 15 minutes  enoxaparin Injectable 30 milliGRAM(s) SubCutaneous every 12 hours  famotidine    Tablet 20 milliGRAM(s) Oral daily  gemfibrozil 600 milliGRAM(s) Oral two times a day  insulin glargine Injectable (LANTUS) 10 Unit(s) SubCutaneous every morning  insulin glargine Injectable (LANTUS) 10 Unit(s) SubCutaneous at bedtime  insulin lispro (ADMELOG) corrective regimen sliding scale   SubCutaneous three times a day before meals  insulin lispro Injectable (ADMELOG) 5 Unit(s) SubCutaneous before breakfast  insulin lispro Injectable (ADMELOG) 5 Unit(s) SubCutaneous before lunch  insulin lispro Injectable (ADMELOG) 5 Unit(s) SubCutaneous before dinner  losartan 25 milliGRAM(s) Oral daily  metoprolol tartrate 50 milliGRAM(s) Oral every 8 hours  montelukast 10 milliGRAM(s) Oral daily  oxyCODONE    IR 10 milliGRAM(s) Oral every 4 hours PRN  oxyCODONE    IR 5 milliGRAM(s) Oral every 4 hours PRN  polyethylene glycol 3350 17 Gram(s) Oral daily  senna 2 Tablet(s) Oral at bedtime      Objective:  Vital Signs Last 24 Hrs  T(C): 36.7 (16 Nov 2022 05:51), Max: 36.7 (15 Nov 2022 19:01)  T(F): 98.1 (16 Nov 2022 05:51), Max: 98.1 (16 Nov 2022 05:51)  HR: 87 (16 Nov 2022 05:51) (76 - 90)  BP: 107/66 (16 Nov 2022 05:51) (107/66 - 135/77)  BP(mean): --  RR: 18 (16 Nov 2022 05:51) (18 - 18)  SpO2: 96% (16 Nov 2022 05:51) (96% - 97%)    Parameters below as of 16 Nov 2022 05:51  Patient On (Oxygen Delivery Method): room air           Eyes:BOBBI, EOMI  Ear/Nose/Throat: no oral lesion, no sinus tenderness on percussion	  Neck:no JVD, no lymphadenopathy, supple  Respiratory: CTA olamide  Cardiovascular: S1S2 RRR, no murmurs  Gastrointestinal:soft, (+) BS, no HSM  Extremities:no e/e/c        LABS:                        9.3    13.88 )-----------( 246      ( 15 Nov 2022 00:54 )             30.1     11-15    141  |  104  |  47<H>  ----------------------------<  75  4.0   |  24  |  2.46<H>    Ca    8.1<L>      15 Nov 2022 00:54  Phos  3.7     11-15  Mg     2.0     11-15    TPro  5.7<L>  /  Alb  2.9<L>  /  TBili  0.4  /  DBili  x   /  AST  22  /  ALT  19  /  AlkPhos  127<H>  11-15            MICROBIOLOGY:    RECENT CULTURES:  11-10 @ 04:38 Catheterized Catheterized   FAYE      Escherichia coli ESBL  Escherichia coli ESBL     >100,000 CFU/ml Escherichia coli ESBL          RESPIRATORY CULTURES:              RADIOLOGY & ADDITIONAL STUDIES:        Pager [phone]  After 5 pm/weekends or if no response :[phone]

## 2022-11-16 NOTE — PROGRESS NOTE ADULT - PROBLEM SELECTOR PLAN 2
Dr Cristobal nephrology  RT SCL permacath  LEFT AVF thrombosed   HD Dr Cristobal nephrology  RT SCL permacath  LEFT AVF thrombosed - Patient can follow up with Dr. Galeano outpatient for follow up.   Vascular surgery will sign off. Please reconsult as needed.       HD TIW   Bah in place

## 2022-11-16 NOTE — PROGRESS NOTE ADULT - ASSESSMENT
72F HTN, HLD, DM, CKD who presented to [facility] initially with MAR on CKD with acute uremia and metabolic derangements requiring urgent HD and was COVID-19 positive. During HD went into atrial fibrillation and started on HD, subsequently developing GIB thought 2/2 AC and acute uremia.  On TTE noted to have a moderate to large pericardial effusion with early echocardiographic evidence of tamponade.  On 10/20 had pericardiocentesis in the cath lab with 700 cc bloody fluid removed.  Had pericardial drain which was draining minimally, and removed 10/27.     - developed 50 beats of vt and so cath done  - found with 3v cad and is now s/p C3L 11/9/22  - Continue asa and statin/gemfibrozil  - continue bb  - now s/p amio    - hx of pAF in the setting of acute uremia and hemopericardium; cont to monitor on tele and if recurrent AF may need to reconsider AC  - cont losartan  - cont norvasc    - Repeat echo with no significant re- accumulation of effusion.    - Tolerated AVF with no issues.    -hd as per renal  - Continue off of AC for now secondary to bleeding issues  - Please continue to maintain strict I/Os, monitor daily weights, Cr, and K.      - Monitor and replete electrolytes. Keep K>4.0 and Mg>2.0.   - Further cardiac workup will depend on clinical course.   - All other workup per primary team. Will followup.

## 2022-11-16 NOTE — PROGRESS NOTE ADULT - SUBJECTIVE AND OBJECTIVE BOX
subjective ' hello'    VITAL SIGNS    Telemetry:   NSR 60-90  Vital Signs Last 24 Hrs  T(C): 36.6 (11-16-22 @ 12:40), Max: 36.7 (11-15-22 @ 19:01)  T(F): 97.8 (11-16-22 @ 12:40), Max: 98.1 (11-16-22 @ 05:51)  HR: 91 (11-16-22 @ 12:40) (76 - 91)  BP: 105/70 (11-16-22 @ 12:40) (105/70 - 115/72)  RR: 18 (11-16-22 @ 12:40) (18 - 18)  SpO2: 98% (11-16-22 @ 12:40) (96% - 98%)           11-15 @ 07:01  -  11-16 @ 07:00  --------------------------------------------------------  IN: 1560 mL / OUT: 1800 mL / NET: -240 mL    11-16 @ 07:01  -  11-16 @ 16:34  --------------------------------------------------------  IN: 720 mL / OUT: 2 mL / NET: 718 mL                            9.8    13.31 )-----------( 217      ( 16 Nov 2022 08:51 )             31.3       11-16    141  |  103  |  30<H>  ----------------------------<  78  4.5   |  27  |  2.10<H>    Ca    8.1<L>      16 Nov 2022 08:51  Phos  3.6     11-16  Mg     1.9     11-16    TPro  5.7<L>  /  Alb  2.9<L>  /  TBili  0.4  /  DBili  x   /  AST  18  /  ALT  16  /  AlkPhos  120  11-16      MEDICATIONS  (STANDING):  amLODIPine   Tablet 10 milliGRAM(s) Oral daily  aspirin enteric coated 81 milliGRAM(s) Oral daily  atorvastatin 80 milliGRAM(s) Oral at bedtime  busPIRone 10 milliGRAM(s) Oral two times a day  chlorhexidine 2% Cloths 1 Application(s) Topical daily  enoxaparin Injectable 30 milliGRAM(s) SubCutaneous every 12 hours  ertapenem  IVPB 500 milliGRAM(s) IV Intermittent every 24 hours  ertapenem  IVPB      famotidine    Tablet 20 milliGRAM(s) Oral daily  gemfibrozil 600 milliGRAM(s) Oral two times a day  insulin glargine Injectable (LANTUS) 10 Unit(s) SubCutaneous every morning  insulin glargine Injectable (LANTUS) 10 Unit(s) SubCutaneous at bedtime  insulin lispro (ADMELOG) corrective regimen sliding scale   SubCutaneous three times a day before meals  insulin lispro Injectable (ADMELOG) 5 Unit(s) SubCutaneous before breakfast  insulin lispro Injectable (ADMELOG) 5 Unit(s) SubCutaneous before lunch  insulin lispro Injectable (ADMELOG) 5 Unit(s) SubCutaneous before dinner  losartan 25 milliGRAM(s) Oral daily  metoprolol tartrate 50 milliGRAM(s) Oral every 8 hours  montelukast 10 milliGRAM(s) Oral daily  polyethylene glycol 3350 17 Gram(s) Oral daily  senna 2 Tablet(s) Oral at bedtime    MEDICATIONS  (PRN):  acetaminophen     Tablet .. 650 milliGRAM(s) Oral every 6 hours PRN Mild Pain (1 - 3)  oxyCODONE    IR 5 milliGRAM(s) Oral every 4 hours PRN Moderate Pain (4 - 6)  oxyCODONE    IR 10 milliGRAM(s) Oral every 4 hours PRN Severe Pain (7 - 10)        Bilirubin Total, Serum: 0.4 mg/dL (11-16 @ 08:51)    CAPILLARY BLOOD GLUCOSE      POCT Blood Glucose.: 80 mg/dL (16 Nov 2022 16:32)  POCT Blood Glucose.: 91 mg/dL (16 Nov 2022 11:34)  POCT Blood Glucose.: 125 mg/dL (16 Nov 2022 08:58)  POCT Blood Glucose.: 83 mg/dL (16 Nov 2022 07:37)  POCT Blood Glucose.: 100 mg/dL (15 Nov 2022 21:29)                                PHYSICAL EXAM        Neurology: alert and oriented x 3, nonfocal, no gross deficits    CV : [id number]    Sternal Wound : RADHA Stable    Lungs: B/l breath sounds    Abdomen: soft, nontender, nondistended, positive bowel sounds, last bowel movement     : hd and Bah placed 11//11               Extremities:   LUE - thrombosed avf     Warm well perfused b/l + DP                                       Physical Therapy Rec:   Home  [  ]   Home w/ PT  [  ]  Rehab  [ x ]    Discussed with Cardiothoracic Team at AM rounds.

## 2022-11-16 NOTE — PROGRESS NOTE ADULT - SUBJECTIVE AND OBJECTIVE BOX
NEPHROLOGY-[facility] [phone]        Patient seen and examined         MEDICATIONS  (STANDING):  amLODIPine   Tablet 10 milliGRAM(s) Oral daily  aspirin enteric coated 81 milliGRAM(s) Oral daily  atorvastatin 80 milliGRAM(s) Oral at bedtime  busPIRone 10 milliGRAM(s) Oral two times a day  chlorhexidine 2% Cloths 1 Application(s) Topical daily  dextrose 50% Injectable 50 milliLiter(s) IV Push every 15 minutes  dextrose 50% Injectable 25 milliLiter(s) IV Push every 15 minutes  enoxaparin Injectable 30 milliGRAM(s) SubCutaneous every 12 hours  ertapenem  IVPB      ertapenem  IVPB 500 milliGRAM(s) IV Intermittent every 24 hours  famotidine    Tablet 20 milliGRAM(s) Oral daily  gemfibrozil 600 milliGRAM(s) Oral two times a day  insulin glargine Injectable (LANTUS) 10 Unit(s) SubCutaneous every morning  insulin glargine Injectable (LANTUS) 10 Unit(s) SubCutaneous at bedtime  insulin lispro (ADMELOG) corrective regimen sliding scale   SubCutaneous three times a day before meals  insulin lispro Injectable (ADMELOG) 5 Unit(s) SubCutaneous before breakfast  insulin lispro Injectable (ADMELOG) 5 Unit(s) SubCutaneous before lunch  insulin lispro Injectable (ADMELOG) 5 Unit(s) SubCutaneous before dinner  losartan 25 milliGRAM(s) Oral daily  metoprolol tartrate 50 milliGRAM(s) Oral every 8 hours  montelukast 10 milliGRAM(s) Oral daily  polyethylene glycol 3350 17 Gram(s) Oral daily  senna 2 Tablet(s) Oral at bedtime      VITAL:  T(C): , Max: 36.7 (11-15-22 @ 19:01)  T(F): , Max: 98.1 (11-16-22 @ 05:51)  HR: 87 (11-16-22 @ 05:51)  BP: 107/66 (11-16-22 @ 05:51)  BP(mean): --  RR: 18 (11-16-22 @ 05:51)  SpO2: 96% (11-16-22 @ 05:51)  Wt(kg): --    I and O's:    11-15 @ 07:01  -  11-16 @ 07:00  --------------------------------------------------------  IN: 1560 mL / OUT: 1800 mL / NET: -240 mL    11-16 @ 07:01  -  11-16 @ 10:58  --------------------------------------------------------  IN: 360 mL / OUT: 2 mL / NET: 358 mL          PHYSICAL EXAM:    Constitutional: NAD  Neck:  No JVD  Respiratory: CTAB/L  Cardiovascular: S1 and S2  Gastrointestinal: BS+, soft, NT/ND  Extremities: No peripheral edema  Neurological: A/O x 3, no focal deficits  Psychiatric: Normal mood, normal affect  : No Bah  Skin: No rashes  Access: Not applicable    LABS:                        9.8    13.31 )-----------( 217      ( 16 Nov 2022 08:51 )             31.3     11-16    141  |  103  |  30<H>  ----------------------------<  78  4.5   |  27  |  2.10<H>    Ca    8.1<L>      16 Nov 2022 08:51  Phos  3.6     11-16  Mg     1.9     11-16    TPro  5.7<L>  /  Alb  2.9<L>  /  TBili  0.4  /  DBili  x   /  AST  18  /  ALT  16  /  AlkPhos  120  11-16          Urine Studies:          RADIOLOGY & ADDITIONAL STUDIES:

## 2022-11-16 NOTE — PROGRESS NOTE ADULT - ASSESSMENT
72F HTN, HLD, DM, CKD who presented to [facility] initially with MAR on CKD with acute uremia and metabolic derangements requiring urgent HD and was COVID-19 positive. During HD went into atrial fibrillation and started on HD, subsequently developing GIB thought 2/2 AC and acute uremia. Also on amiodarone for Afib. On TTE noted to have a moderate to large pericardial effusion with early echocardiographic evidence of tamponade. Clinically she is not hypotensive and she tolerates the fluid shifts related to HD. Patient today was transferred to [facility] CTS Dr. Gabriel for evaluation of Pericardial Effusion.  10/20 had pericardiocentesis pericardial drain which was draining minimally, and removed 10/27.  Cath 11/2 revealed triple vessel CAD referred for CABG eval with Dr Mann  11/9/22 cabg x 3 LIMA-LAD, SVG-OM, SVG-OM   esrd   Insulin infusion for hyperglycemia  Vascular surgery to evaluate AVF :US duplex obtained today -- AVF is patent with non-occlusive thrombus and low flow. No acute vascular intervention at this time. Patient can follow up with Dr. Galeano outpatient for follow up.   ID for UTI- invanz  11/15 Transferred to [address] [place] [address]   11.16 VSS    72F HTN, HLD, DM, CKD who presented to [facility] initially with MAR on CKD with acute uremia and metabolic derangements requiring urgent HD and was COVID-19 positive. During HD went into atrial fibrillation and started on HD, subsequently developing GIB thought 2/2 AC and acute uremia. Also on amiodarone for Afib. On TTE noted to have a moderate to large pericardial effusion with early echocardiographic evidence of tamponade. Clinically she is not hypotensive and she tolerates the fluid shifts related to HD. Patient today was transferred to [facility] CTS Dr. Gabriel for evaluation of Pericardial Effusion.  10/20 had pericardiocentesis pericardial drain which was draining minimally, and removed 10/27.  Cath 11/2 revealed triple vessel CAD referred for CABG eval with Dr Mann  11/9/22 cabg x 3 LIMA-LAD, SVG-OM, SVG-OM   esrd   Insulin infusion for hyperglycemia  Vascular surgery to evaluate AVF :US duplex obtained today -- AVF is patent with non-occlusive thrombus and low flow. No acute vascular intervention at this time. Patient can follow up with Dr. Galeano outpatient for follow up.   ID for UTI- invanz  11/15 Transferred to [address] [place] [address]   11.16 VSS ABX per ID   disposition to rehab

## 2022-11-17 LAB
ALBUMIN SERPL ELPH-MCNC: 2.8 G/DL — LOW (ref 3.3–5)
ALP SERPL-CCNC: 112 U/L — SIGNIFICANT CHANGE UP (ref 40–120)
ALT FLD-CCNC: 14 U/L — SIGNIFICANT CHANGE UP (ref 10–45)
ANION GAP SERPL CALC-SCNC: 11 MMOL/L — SIGNIFICANT CHANGE UP (ref 5–17)
AST SERPL-CCNC: 16 U/L — SIGNIFICANT CHANGE UP (ref 10–40)
BASOPHILS # BLD AUTO: 0.08 K/UL — SIGNIFICANT CHANGE UP (ref 0–0.2)
BASOPHILS NFR BLD AUTO: 0.7 % — SIGNIFICANT CHANGE UP (ref 0–2)
BILIRUB SERPL-MCNC: 0.4 MG/DL — SIGNIFICANT CHANGE UP (ref 0.2–1.2)
BUN SERPL-MCNC: 36 MG/DL — HIGH (ref 7–23)
CALCIUM SERPL-MCNC: 8 MG/DL — LOW (ref 8.4–10.5)
CHLORIDE SERPL-SCNC: 104 MMOL/L — SIGNIFICANT CHANGE UP (ref 96–108)
CO2 SERPL-SCNC: 25 MMOL/L — SIGNIFICANT CHANGE UP (ref 22–31)
CREAT SERPL-MCNC: 2.51 MG/DL — HIGH (ref 0.5–1.3)
EGFR: 20 ML/MIN/1.73M2 — LOW
EOSINOPHIL # BLD AUTO: 0.2 K/UL — SIGNIFICANT CHANGE UP (ref 0–0.5)
EOSINOPHIL NFR BLD AUTO: 1.7 % — SIGNIFICANT CHANGE UP (ref 0–6)
GLUCOSE BLDC GLUCOMTR-MCNC: 100 MG/DL — HIGH (ref 70–99)
GLUCOSE BLDC GLUCOMTR-MCNC: 108 MG/DL — HIGH (ref 70–99)
GLUCOSE BLDC GLUCOMTR-MCNC: 132 MG/DL — HIGH (ref 70–99)
GLUCOSE BLDC GLUCOMTR-MCNC: 142 MG/DL — HIGH (ref 70–99)
GLUCOSE BLDC GLUCOMTR-MCNC: 88 MG/DL — SIGNIFICANT CHANGE UP (ref 70–99)
GLUCOSE SERPL-MCNC: 78 MG/DL — SIGNIFICANT CHANGE UP (ref 70–99)
HCT VFR BLD CALC: 29 % — LOW (ref 34.5–45)
HGB BLD-MCNC: 8.9 G/DL — LOW (ref 11.5–15.5)
IMM GRANULOCYTES NFR BLD AUTO: 6.5 % — HIGH (ref 0–0.9)
LYMPHOCYTES # BLD AUTO: 2.5 K/UL — SIGNIFICANT CHANGE UP (ref 1–3.3)
LYMPHOCYTES # BLD AUTO: 21 % — SIGNIFICANT CHANGE UP (ref 13–44)
MAGNESIUM SERPL-MCNC: 2 MG/DL — SIGNIFICANT CHANGE UP (ref 1.6–2.6)
MCHC RBC-ENTMCNC: 30 PG — SIGNIFICANT CHANGE UP (ref 27–34)
MCHC RBC-ENTMCNC: 30.7 GM/DL — LOW (ref 32–36)
MCV RBC AUTO: 97.6 FL — SIGNIFICANT CHANGE UP (ref 80–100)
MONOCYTES # BLD AUTO: 1.04 K/UL — HIGH (ref 0–0.9)
MONOCYTES NFR BLD AUTO: 8.7 % — SIGNIFICANT CHANGE UP (ref 2–14)
NEUTROPHILS # BLD AUTO: 7.31 K/UL — SIGNIFICANT CHANGE UP (ref 1.8–7.4)
NEUTROPHILS NFR BLD AUTO: 61.4 % — SIGNIFICANT CHANGE UP (ref 43–77)
NRBC # BLD: 0 /100 WBCS — SIGNIFICANT CHANGE UP (ref 0–0)
PHOSPHATE SERPL-MCNC: 4.5 MG/DL — SIGNIFICANT CHANGE UP (ref 2.5–4.5)
PLATELET # BLD AUTO: 234 K/UL — SIGNIFICANT CHANGE UP (ref 150–400)
POTASSIUM SERPL-MCNC: 4.8 MMOL/L — SIGNIFICANT CHANGE UP (ref 3.5–5.3)
POTASSIUM SERPL-SCNC: 4.8 MMOL/L — SIGNIFICANT CHANGE UP (ref 3.5–5.3)
PROT SERPL-MCNC: 5.6 G/DL — LOW (ref 6–8.3)
RBC # BLD: 2.97 M/UL — LOW (ref 3.8–5.2)
RBC # FLD: 16.2 % — HIGH (ref 10.3–14.5)
SODIUM SERPL-SCNC: 140 MMOL/L — SIGNIFICANT CHANGE UP (ref 135–145)
WBC # BLD: 11.91 K/UL — HIGH (ref 3.8–10.5)
WBC # FLD AUTO: 11.91 K/UL — HIGH (ref 3.8–10.5)

## 2022-11-17 PROCEDURE — 99232 SBSQ HOSP IP/OBS MODERATE 35: CPT

## 2022-11-17 RX ORDER — ERYTHROPOIETIN 10000 [IU]/ML
10000 INJECTION, SOLUTION INTRAVENOUS; SUBCUTANEOUS ONCE
Refills: 0 | Status: COMPLETED | OUTPATIENT
Start: 2022-11-17 | End: 2022-11-17

## 2022-11-17 RX ADMIN — CHLORHEXIDINE GLUCONATE 1 APPLICATION(S): 213 SOLUTION TOPICAL at 11:50

## 2022-11-17 RX ADMIN — INSULIN GLARGINE 10 UNIT(S): 100 INJECTION, SOLUTION SUBCUTANEOUS at 23:28

## 2022-11-17 RX ADMIN — MONTELUKAST 10 MILLIGRAM(S): 4 TABLET, CHEWABLE ORAL at 11:28

## 2022-11-17 RX ADMIN — Medication 600 MILLIGRAM(S): at 05:47

## 2022-11-17 RX ADMIN — Medication 81 MILLIGRAM(S): at 11:28

## 2022-11-17 RX ADMIN — INSULIN GLARGINE 10 UNIT(S): 100 INJECTION, SOLUTION SUBCUTANEOUS at 08:06

## 2022-11-17 RX ADMIN — Medication 10 MILLIGRAM(S): at 05:47

## 2022-11-17 RX ADMIN — ENOXAPARIN SODIUM 30 MILLIGRAM(S): 100 INJECTION SUBCUTANEOUS at 17:09

## 2022-11-17 RX ADMIN — ATORVASTATIN CALCIUM 80 MILLIGRAM(S): 80 TABLET, FILM COATED ORAL at 23:24

## 2022-11-17 RX ADMIN — ENOXAPARIN SODIUM 30 MILLIGRAM(S): 100 INJECTION SUBCUTANEOUS at 05:47

## 2022-11-17 RX ADMIN — Medication 5 UNIT(S): at 08:05

## 2022-11-17 RX ADMIN — Medication 50 MILLIGRAM(S): at 05:47

## 2022-11-17 RX ADMIN — ERYTHROPOIETIN 10000 UNIT(S): 10000 INJECTION, SOLUTION INTRAVENOUS; SUBCUTANEOUS at 13:32

## 2022-11-17 RX ADMIN — Medication 5 UNIT(S): at 11:28

## 2022-11-17 RX ADMIN — Medication 2: at 17:07

## 2022-11-17 RX ADMIN — Medication 10 MILLIGRAM(S): at 17:11

## 2022-11-17 RX ADMIN — ERTAPENEM SODIUM 100 MILLIGRAM(S): 1 INJECTION, POWDER, LYOPHILIZED, FOR SOLUTION INTRAMUSCULAR; INTRAVENOUS at 18:00

## 2022-11-17 RX ADMIN — FAMOTIDINE 20 MILLIGRAM(S): 10 INJECTION INTRAVENOUS at 11:29

## 2022-11-17 RX ADMIN — Medication 5 UNIT(S): at 17:10

## 2022-11-17 RX ADMIN — AMLODIPINE BESYLATE 10 MILLIGRAM(S): 2.5 TABLET ORAL at 05:47

## 2022-11-17 RX ADMIN — Medication 600 MILLIGRAM(S): at 17:11

## 2022-11-17 RX ADMIN — Medication 50 MILLIGRAM(S): at 17:10

## 2022-11-17 NOTE — PROGRESS NOTE ADULT - SUBJECTIVE AND OBJECTIVE BOX
NEPHROLOGY-[facility] [phone]        Patient seen and examined in bed.  She was the same         MEDICATIONS  (STANDING):  amLODIPine   Tablet 10 milliGRAM(s) Oral daily  aspirin enteric coated 81 milliGRAM(s) Oral daily  atorvastatin 80 milliGRAM(s) Oral at bedtime  busPIRone 10 milliGRAM(s) Oral two times a day  chlorhexidine 2% Cloths 1 Application(s) Topical daily  dextrose 50% Injectable 50 milliLiter(s) IV Push every 15 minutes  dextrose 50% Injectable 25 milliLiter(s) IV Push every 15 minutes  enoxaparin Injectable 30 milliGRAM(s) SubCutaneous every 12 hours  ertapenem  IVPB      ertapenem  IVPB 500 milliGRAM(s) IV Intermittent every 24 hours  famotidine    Tablet 20 milliGRAM(s) Oral daily  gemfibrozil 600 milliGRAM(s) Oral two times a day  insulin glargine Injectable (LANTUS) 10 Unit(s) SubCutaneous every morning  insulin glargine Injectable (LANTUS) 10 Unit(s) SubCutaneous at bedtime  insulin lispro (ADMELOG) corrective regimen sliding scale   SubCutaneous three times a day before meals  insulin lispro Injectable (ADMELOG) 5 Unit(s) SubCutaneous before breakfast  insulin lispro Injectable (ADMELOG) 5 Unit(s) SubCutaneous before lunch  insulin lispro Injectable (ADMELOG) 5 Unit(s) SubCutaneous before dinner  losartan 25 milliGRAM(s) Oral daily  metoprolol tartrate 50 milliGRAM(s) Oral every 8 hours  montelukast 10 milliGRAM(s) Oral daily  polyethylene glycol 3350 17 Gram(s) Oral daily  senna 2 Tablet(s) Oral at bedtime      VITAL:  T(C): , Max: 36.8 (11-17-22 @ 05:39)  T(F): , Max: 98.2 (11-17-22 @ 05:39)  HR: 80 (11-17-22 @ 05:39)  BP: 103/62 (11-17-22 @ 05:39)  BP(mean): 81 (11-16-22 @ 20:25)  RR: 18 (11-17-22 @ 05:39)  SpO2: 95% (11-17-22 @ 05:39)  Wt(kg): --    I and O's:    11-16 @ 07:01 - 11-17 @ 07:00  --------------------------------------------------------  IN: 1000 mL / OUT: 2 mL / NET: 998 mL    11-17 @ 07:01 - 11-17 @ 09:46  --------------------------------------------------------  IN: 360 mL / OUT: 0 mL / NET: 360 mL          PHYSICAL EXAM:    Constitutional: NAD  Neck:  No JVD  Respiratory: CTAB/L  Cardiovascular: S1 and S2  Gastrointestinal: BS+, soft, NT/ND  Extremities: No peripheral edema  Neurological: A/O x 3, no focal deficits  Psychiatric: Normal mood, normal affect  : No Bah  Skin: No rashes  Access: perm cath and avf     LABS:                        8.9    11.91 )-----------( 234      ( 17 Nov 2022 06:16 )             29.0     11-17    140  |  104  |  36<H>  ----------------------------<  78  4.8   |  25  |  2.51<H>    Ca    8.0<L>      17 Nov 2022 06:16  Phos  4.5     11-17  Mg     2.0     11-17    TPro  5.6<L>  /  Alb  2.8<L>  /  TBili  0.4  /  DBili  x   /  AST  16  /  ALT  14  /  AlkPhos  112  11-17          Urine Studies:          RADIOLOGY & ADDITIONAL STUDIES:

## 2022-11-17 NOTE — PROGRESS NOTE ADULT - SUBJECTIVE AND OBJECTIVE BOX
Subjective:  "Hello"  Lying in bed, HD in progress      Tele:  SR  70-80           V/S:                    T(F): 98 (22 @ 17:14), Max: 98.2 (22 @ 05:39)  HR: 86 (22 @ 17:14) (76 - 86)  BP: 144/73 (22 @ 17:14) (103/62 - 159/71)  RR: 18 (22 @ 17:14) (18 - 18)  SpO2: 98% (22 @ 17:14) (95% - 98%)  Wt(kg): --      LV EF:      Labs:      140  |  104  |  36<H>  ----------------------------<  78  4.8   |  25  |  2.51<H>    Ca    8.0<L>      2022 06:16  Phos  4.5       Mg     2.0         TPro  5.6<L>  /  Alb  2.8<L>  /  TBili  0.4  /  DBili  x   /  AST  16  /  ALT  14  /  AlkPhos  112                                 8.9    11.91 )-----------( 234      ( 2022 06:16 )             29.0        PT/INR - ( 2022 08:51 )   PT: 13.2 sec;   INR: 1.15 ratio         PTT - ( 2022 08:51 )  PTT:29.9 sec     CAPILLARY BLOOD GLUCOSE      POCT Blood Glucose.: 142 mg/dL (2022 16:33)  POCT Blood Glucose.: 108 mg/dL (2022 11:19)  POCT Blood Glucose.: 100 mg/dL (2022 07:37)  POCT Blood Glucose.: 110 mg/dL (2022 22:18)  POCT Blood Glucose.: 77 mg/dL (2022 21:43)           CXR:    I&O's Detail    2022 07:01  -  2022 07:00  --------------------------------------------------------  IN:    Oral Fluid: 1000 mL  Total IN: 1000 mL    OUT:    Voided (mL): 2 mL  Total OUT: 2 mL    Total NET: 998 mL      2022 07:01  -  2022 17:41  --------------------------------------------------------  IN:    Oral Fluid: 720 mL  Total IN: 720 mL    OUT:    Other (mL): 0 mL  Total OUT: 0 mL    Total NET: 720 mL            BOWEL MOVEMENT:  [ ] YES [ x] NO      Daily     Daily Weight in k.7 (2022 13:00)  Medications:  acetaminophen     Tablet .. 650 milliGRAM(s) Oral every 6 hours PRN  amLODIPine   Tablet 10 milliGRAM(s) Oral daily  aspirin enteric coated 81 milliGRAM(s) Oral daily  atorvastatin 80 milliGRAM(s) Oral at bedtime  busPIRone 10 milliGRAM(s) Oral two times a day  chlorhexidine 2% Cloths 1 Application(s) Topical daily  dextrose 50% Injectable 50 milliLiter(s) IV Push every 15 minutes  dextrose 50% Injectable 25 milliLiter(s) IV Push every 15 minutes  enoxaparin Injectable 30 milliGRAM(s) SubCutaneous every 12 hours  ertapenem  IVPB      ertapenem  IVPB 500 milliGRAM(s) IV Intermittent every 24 hours  famotidine    Tablet 20 milliGRAM(s) Oral daily  gemfibrozil 600 milliGRAM(s) Oral two times a day  insulin glargine Injectable (LANTUS) 10 Unit(s) SubCutaneous every morning  insulin glargine Injectable (LANTUS) 10 Unit(s) SubCutaneous at bedtime  insulin lispro (ADMELOG) corrective regimen sliding scale   SubCutaneous three times a day before meals  insulin lispro Injectable (ADMELOG) 5 Unit(s) SubCutaneous before breakfast  insulin lispro Injectable (ADMELOG) 5 Unit(s) SubCutaneous before lunch  insulin lispro Injectable (ADMELOG) 5 Unit(s) SubCutaneous before dinner  losartan 25 milliGRAM(s) Oral daily  metoprolol tartrate 50 milliGRAM(s) Oral every 8 hours  montelukast 10 milliGRAM(s) Oral daily  polyethylene glycol 3350 17 Gram(s) Oral daily  senna 2 Tablet(s) Oral at bedtime        Physical Exam:    Neurology: alert and oriented x 3    CV : [id number]    Sternal Wound : RADHA Stable  R chest wall permacath    Lungs: B/l breath sounds    Abdomen: soft, nontender, nondistended, positive bowel sounds    : Bah jamal urine   HD today              Extremities:   LUE - thrombosed avf     Warm well perfused b/l + DP                      PAST MEDICAL & SURGICAL HISTORY:  HTN (hypertension)      HLD (hyperlipidemia)      DM (diabetes mellitus)

## 2022-11-17 NOTE — PROGRESS NOTE ADULT - PROBLEM SELECTOR PLAN 2
Dr Cristobal nephrology  RT SCL permacath  LEFT AVF thrombosed - Patient can follow up with Dr. Galeano outpatient for follow up.   HD TIW   Bah in place

## 2022-11-17 NOTE — PROGRESS NOTE ADULT - ASSESSMENT
72F HTN, HLD, DM, CKD who presented to [facility] initially with MAR on CKD with acute uremia and metabolic derangements requiring urgent HD and was COVID-19 positive. During HD went into atrial fibrillation and started on HD, subsequently developing GIB thought 2/2 AC and acute uremia.  On TTE noted to have a moderate to large pericardial effusion with early echocardiographic evidence of tamponade.  On 10/20 had pericardiocentesis in the cath lab with 700 cc bloody fluid removed.  Had pericardial drain which was draining minimally, and removed 10/27.     - developed 50 beats of vt and so cath done  - found with 3v cad and is now s/p C3L 11/9/22  - Continue asa and statin/gemfibrozil  - continue bb  - now s/p amio    - hx of pAF in the setting of acute uremia and hemopericardium; cont to monitor on tele and if recurrent AF may need to reconsider AC  - cont losartan  - cont norvasc    - Repeat echo with no significant re- accumulation of effusion.    - Tolerated AVF with no issues.    - hd as per renal  - Continue off of AC for now secondary to bleeding issues  - Please continue to maintain strict I/Os, monitor daily weights, Cr, and K.      - abx for uti  - Further cardiac workup will depend on clinical course.   - All other workup per primary team. Will followup.

## 2022-11-17 NOTE — PROGRESS NOTE ADULT - ASSESSMENT
72F w/ HTN, HLD, DM, CKD, 10/20/22 from OSH with uremia, COVID-19, and pericardial effusion    (1)Renal - newly ESRD-HD;   (2)CV - reasonable BP/volume  (3)CTS - s/p C3L on 11/9      RECOMMEND:  (1)HD today   (2)AV access repair as outpatient    Rehab placement     Sayed [facility]   [phone]

## 2022-11-17 NOTE — PROGRESS NOTE ADULT - SUBJECTIVE AND OBJECTIVE BOX
[facility] - Lucio Madrid Pannella, Patel, Savella  Office Number:  [phone]    Patient resting comfortably in bed in NAD.   sleeping comfortably this am    ROS: negative unless otherwise mentioned.    Telemetry:  sr    MEDICATIONS  (STANDING):  amLODIPine   Tablet 10 milliGRAM(s) Oral daily  aspirin enteric coated 81 milliGRAM(s) Oral daily  atorvastatin 80 milliGRAM(s) Oral at bedtime  busPIRone 10 milliGRAM(s) Oral two times a day  chlorhexidine 2% Cloths 1 Application(s) Topical daily  dextrose 50% Injectable 50 milliLiter(s) IV Push every 15 minutes  dextrose 50% Injectable 25 milliLiter(s) IV Push every 15 minutes  enoxaparin Injectable 30 milliGRAM(s) SubCutaneous every 12 hours  ertapenem  IVPB      ertapenem  IVPB 500 milliGRAM(s) IV Intermittent every 24 hours  famotidine    Tablet 20 milliGRAM(s) Oral daily  gemfibrozil 600 milliGRAM(s) Oral two times a day  insulin glargine Injectable (LANTUS) 10 Unit(s) SubCutaneous every morning  insulin glargine Injectable (LANTUS) 10 Unit(s) SubCutaneous at bedtime  insulin lispro (ADMELOG) corrective regimen sliding scale   SubCutaneous three times a day before meals  insulin lispro Injectable (ADMELOG) 5 Unit(s) SubCutaneous before breakfast  insulin lispro Injectable (ADMELOG) 5 Unit(s) SubCutaneous before lunch  insulin lispro Injectable (ADMELOG) 5 Unit(s) SubCutaneous before dinner  losartan 25 milliGRAM(s) Oral daily  metoprolol tartrate 50 milliGRAM(s) Oral every 8 hours  montelukast 10 milliGRAM(s) Oral daily  polyethylene glycol 3350 17 Gram(s) Oral daily  senna 2 Tablet(s) Oral at bedtime    MEDICATIONS  (PRN):  acetaminophen     Tablet .. 650 milliGRAM(s) Oral every 6 hours PRN Mild Pain (1 - 3)      Allergies    sulfa drugs (Rash)    Intolerances        Vital Signs Last 24 Hrs  T(C): 36.8 (17 Nov 2022 05:39), Max: 36.8 (17 Nov 2022 05:39)  T(F): 98.2 (17 Nov 2022 05:39), Max: 98.2 (17 Nov 2022 05:39)  HR: 80 (17 Nov 2022 05:39) (80 - 91)  BP: 103/62 (17 Nov 2022 05:39) (103/62 - 116/63)  BP(mean): 81 (16 Nov 2022 20:25) (81 - 81)  RR: 18 (17 Nov 2022 05:39) (18 - 18)  SpO2: 95% (17 Nov 2022 05:39) (95% - 98%)    Parameters below as of 17 Nov 2022 05:39  Patient On (Oxygen Delivery Method): room air        I&O's Summary    16 Nov 2022 07:01  -  17 Nov 2022 07:00  --------------------------------------------------------  IN: 1000 mL / OUT: 2 mL / NET: 998 mL    17 Nov 2022 07:01  -  17 Nov 2022 09:32  --------------------------------------------------------  IN: 360 mL / OUT: 0 mL / NET: 360 mL        ON EXAM:    Constitutional: well-nourished, well-developed, NAD   HEENT:  MMM, sclerae anicteric, conjunctivae clear, no oral cyanosis.  Pulmonary: Non-labored, breath sounds are clear bilaterally, No wheezing, rales or rhonchi  Cardiovascular: Regular, S1 and S2.  No murmur.  No rubs, gallops or clicks  Gastrointestinal: Bowel Sounds present, soft, nontender.   Lymph: No peripheral edema.   Neurological: Alert, no focal deficits  Skin: No rashes.  Psych:  Mood & affect appropriate    LABS: All Labs Reviewed:                        8.9    11.91 )-----------( 234      ( 17 Nov 2022 06:16 )             29.0                         9.8    13.31 )-----------( 217      ( 16 Nov 2022 08:51 )             31.3                         9.3    13.88 )-----------( 246      ( 15 Nov 2022 00:54 )             30.1     17 Nov 2022 06:16    140    |  104    |  36     ----------------------------<  78     4.8     |  25     |  2.51   16 Nov 2022 08:51    141    |  103    |  30     ----------------------------<  78     4.5     |  27     |  2.10   15 Nov 2022 00:54    141    |  104    |  47     ----------------------------<  75     4.0     |  24     |  2.46     Ca    8.0        17 Nov 2022 06:16  Ca    8.1        16 Nov 2022 08:51  Ca    8.1        15 Nov 2022 00:54  Phos  4.5       17 Nov 2022 06:16  Phos  3.6       16 Nov 2022 08:51  Phos  3.7       15 Nov 2022 00:54  Mg     2.0       17 Nov 2022 06:16  Mg     1.9       16 Nov 2022 08:51  Mg     2.0       15 Nov 2022 00:54    TPro  5.6    /  Alb  2.8    /  TBili  0.4    /  DBili  x      /  AST  16     /  ALT  14     /  AlkPhos  112    17 Nov 2022 06:16  TPro  5.7    /  Alb  2.9    /  TBili  0.4    /  DBili  x      /  AST  18     /  ALT  16     /  AlkPhos  120    16 Nov 2022 08:51  TPro  5.7    /  Alb  2.9    /  TBili  0.4    /  DBili  x      /  AST  22     /  ALT  19     /  AlkPhos  127    15 Nov 2022 00:54    PT/INR - ( 16 Nov 2022 08:51 )   PT: 13.2 sec;   INR: 1.15 ratio         PTT - ( 16 Nov 2022 08:51 )  PTT:29.9 sec      Blood Culture:

## 2022-11-17 NOTE — CHART NOTE - NSCHARTNOTEFT_GEN_A_CORE
Nutrition Follow Up Note  Patient seen for: Nutrition follow up   Chart reviewed, events noted.     Per chart, "72F HTN, HLD, DM, CKD who presented to [facility] initially with MAR on CKD with acute uremia and metabolic derangements requiring urgent HD and was COVID-19 positive. During HD went into atrial fibrillation and started on HD, subsequently developing GIB thought 2/2 AC and acute uremia. Also on amiodarone for Afib. On TTE noted to have a moderate to large pericardial effusion with early echocardiographic evidence of tamponade. Clinically she is not hypotensive and she tolerates the fluid shifts related to HD. Patient today was transferred to [facility] CTS Dr. Gabriel for evaluation of Pericardial Effusion.  10/20 had pericardiocentesis pericardial drain which was draining minimally, and removed 10/27.  Cath  revealed triple vessel CAD referred for CABG eval with Dr Mann. CABG on ."    Source: [x] Patient       [x] Medical Record        [] RN        [] Family at bedside       [] Other:    Diet Order:   Diet, Regular:   Consistent Carbohydrate {No Snacks} (CSTCHO) (11-10-22)    - Is current order appropriate/adequate? [] Yes  [x]  No:     - PO intake :   [] >75%  Adequate    [x] 50-75%  Fair       [] <50%  Poor    - Nutrition-related events:      - Pt s/p CABG on       - Renal - newly ESRD-HD. Last HD .    - Nutrition-related concerns:      - Pt reports fair appetite and PO intake. Reports she is consuming ~50% of food. Pt is currently ordered for a consistent carbohydrate diet.       - Pt with hx of DM, finger sticks today; (100), (108), (142). BG managed by [facility] sliding scale insulin and Lantus.       - Pt on HD, is not currently on renal diet. RD to recommend diet order change to team.       - All electrolytes WDL.    GI:  Last BM . Pt denies nausea/vomiting. Confirms diarrhea as of today. (Is unsure if it was something she ate). Bowel Regimen? [x] Yes   Senna and polyethylene glycol       Weights:   Daily Weight in k.7 (-), Weight in k.5 (-15), Weight in k.5 (-15), Weight in k.1 (11-14), Weight in k (11-13), Weight in k.7 (11-12), Weight in k.7 (11-12)    Weight appears to be downtrending likely secondary to reported fair PO intake. Will continue to monitor weight trend as able.     Nutritionally Pertinent MEDICATIONS  (STANDING):  amLODIPine   Tablet  atorvastatin  dextrose 50% Injectable  dextrose 50% Injectable  ertapenem  IVPB  ertapenem  IVPB  famotidine    Tablet  gemfibrozil  insulin glargine Injectable (LANTUS)  insulin glargine Injectable (LANTUS)  insulin lispro (ADMELOG) corrective regimen sliding scale  insulin lispro Injectable (ADMELOG)  insulin lispro Injectable (ADMELOG)  insulin lispro Injectable (ADMELOG)  losartan  metoprolol tartrate  polyethylene glycol 3350  senna    Pertinent Labs:  @ 06:16: Na 140, BUN 36<H>, Cr 2.51<H>, BG 78, K+ 4.8, Phos 4.5, Mg 2.0, Alk Phos 112, ALT/SGPT 14, AST/SGOT 16, HbA1c --    A1C with Estimated Average Glucose Result: 6.0 % (10-20-22 @ 05:39)  A1C with Estimated Average Glucose Result: 6.0 % (10-15-22 @ 02:24)    Finger Sticks:  POCT Blood Glucose.: 142 mg/dL ( @ 16:33)  POCT Blood Glucose.: 108 mg/dL ( @ 11:19)  POCT Blood Glucose.: 100 mg/dL ( @ 07:37)  POCT Blood Glucose.: 110 mg/dL ( @ 22:18)  POCT Blood Glucose.: 77 mg/dL ( @ 21:43)      Skin per nursing documentation: No pressure injuries noted.  Edema per nursing documentation: 1+ generalized    Estimated Needs:   [x] no change since previous assessment  Energy: 6414-5391 kcal/day (30-35 kcal/kg)  Protein: 70.72-81.6 g pro/day (1.3-1.5 g pro/kg)  Fluid needs deferred to team.   Wt used for estimating needs: 120 pounds (IBW)    Previous Nutrition Diagnosis: 1) Severe Malnutrition 2) Increased Nutrient Needs  Nutrition Diagnosis is: [x] ongoing  [] resolved [] not applicable - being addressed with PO diet, assistance/encouragement with meals, oral nutrition supplementation.      Nutrition Care Plan:  [x] In Progress  [] Achieved  [] Not applicable    New Nutrition Diagnosis: [x] Not applicable    Nutrition Interventions:     Education Provided   [x] Yes:  [] No: Emphasized importance of adequate lean protein intake and optimal blood glucose levels for wound healing. Advised pt to limit concentrated sweets, portion control, and importance of fiber intake.     Recommendations:      1) Recommend change diet to: consistent carbohydrate, renal diet in setting of HD.  2) Recommend Nepro 2x/day to optimize PO intake.   3) Encourage adequate intake of meals and supplements to optimize PO intake.   4) Reinforce nutrition education as able.   5) Recommend Nephrovite if no contraindications.       Monitoring and Evaluation:   Continue to monitor nutritional intake, tolerance to diet prescription, weights, labs, skin integrity    RD remains available upon request and will follow up per protocol  Laurie Finch RD pager # [phone]

## 2022-11-17 NOTE — PROGRESS NOTE ADULT - ASSESSMENT
72F HTN, HLD, DM, CKD who presented to [facility] initially with MAR on CKD with acute uremia and metabolic derangements requiring urgent HD and was COVID-19 positive. During HD went into atrial fibrillation and started on HD, subsequently developing GIB thought 2/2 AC and acute uremia. Also on amiodarone for Afib. On TTE noted to have a moderate to large pericardial effusion with early echocardiographic evidence of tamponade. Clinically she is not hypotensive and she tolerates the fluid shifts related to HD. Patient today was transferred to [facility] CTS Dr. Gabriel for evaluation of Pericardial Effusion.  10/20 had pericardiocentesis pericardial drain which was draining minimally, and removed 10/27.  Cath 11/2 revealed triple vessel CAD referred for CABG eval with Dr Mann  11/9/22 cabg x 3 LIMA-LAD, SVG-OM, SVG-OM   esrd   Insulin infusion for hyperglycemia  Vascular surgery to evaluate AVF :US duplex obtained today -- AVF is patent with non-occlusive thrombus and low flow. No acute vascular intervention at this time. Patient can follow up with Dr. Galeano outpatient for follow up.   ID for UTI- invanz  11/15 Transferred to [address] [place] [address]   11.16 VSS ABX per ID     11/17  HD today Abx as per ID Eventual rehab

## 2022-11-18 LAB
ALBUMIN SERPL ELPH-MCNC: 2.8 G/DL — LOW (ref 3.3–5)
ALP SERPL-CCNC: 122 U/L — HIGH (ref 40–120)
ALT FLD-CCNC: 15 U/L — SIGNIFICANT CHANGE UP (ref 10–45)
ANION GAP SERPL CALC-SCNC: 12 MMOL/L — SIGNIFICANT CHANGE UP (ref 5–17)
AST SERPL-CCNC: 20 U/L — SIGNIFICANT CHANGE UP (ref 10–40)
BASOPHILS # BLD AUTO: 0.09 K/UL — SIGNIFICANT CHANGE UP (ref 0–0.2)
BASOPHILS NFR BLD AUTO: 0.8 % — SIGNIFICANT CHANGE UP (ref 0–2)
BILIRUB SERPL-MCNC: 0.3 MG/DL — SIGNIFICANT CHANGE UP (ref 0.2–1.2)
BUN SERPL-MCNC: 20 MG/DL — SIGNIFICANT CHANGE UP (ref 7–23)
CALCIUM SERPL-MCNC: 8 MG/DL — LOW (ref 8.4–10.5)
CHLORIDE SERPL-SCNC: 103 MMOL/L — SIGNIFICANT CHANGE UP (ref 96–108)
CO2 SERPL-SCNC: 26 MMOL/L — SIGNIFICANT CHANGE UP (ref 22–31)
CREAT SERPL-MCNC: 2.29 MG/DL — HIGH (ref 0.5–1.3)
EGFR: 22 ML/MIN/1.73M2 — LOW
EOSINOPHIL # BLD AUTO: 0.21 K/UL — SIGNIFICANT CHANGE UP (ref 0–0.5)
EOSINOPHIL NFR BLD AUTO: 1.8 % — SIGNIFICANT CHANGE UP (ref 0–6)
GLUCOSE BLDC GLUCOMTR-MCNC: 105 MG/DL — HIGH (ref 70–99)
GLUCOSE BLDC GLUCOMTR-MCNC: 126 MG/DL — HIGH (ref 70–99)
GLUCOSE BLDC GLUCOMTR-MCNC: 144 MG/DL — HIGH (ref 70–99)
GLUCOSE BLDC GLUCOMTR-MCNC: 63 MG/DL — LOW (ref 70–99)
GLUCOSE BLDC GLUCOMTR-MCNC: 66 MG/DL — LOW (ref 70–99)
GLUCOSE BLDC GLUCOMTR-MCNC: 86 MG/DL — SIGNIFICANT CHANGE UP (ref 70–99)
GLUCOSE BLDC GLUCOMTR-MCNC: 87 MG/DL — SIGNIFICANT CHANGE UP (ref 70–99)
GLUCOSE BLDC GLUCOMTR-MCNC: 99 MG/DL — SIGNIFICANT CHANGE UP (ref 70–99)
GLUCOSE SERPL-MCNC: 85 MG/DL — SIGNIFICANT CHANGE UP (ref 70–99)
HCT VFR BLD CALC: 30.9 % — LOW (ref 34.5–45)
HGB BLD-MCNC: 9.4 G/DL — LOW (ref 11.5–15.5)
IMM GRANULOCYTES NFR BLD AUTO: 4.9 % — HIGH (ref 0–0.9)
LYMPHOCYTES # BLD AUTO: 1.99 K/UL — SIGNIFICANT CHANGE UP (ref 1–3.3)
LYMPHOCYTES # BLD AUTO: 16.9 % — SIGNIFICANT CHANGE UP (ref 13–44)
MAGNESIUM SERPL-MCNC: 1.9 MG/DL — SIGNIFICANT CHANGE UP (ref 1.6–2.6)
MCHC RBC-ENTMCNC: 29.7 PG — SIGNIFICANT CHANGE UP (ref 27–34)
MCHC RBC-ENTMCNC: 30.4 GM/DL — LOW (ref 32–36)
MCV RBC AUTO: 97.5 FL — SIGNIFICANT CHANGE UP (ref 80–100)
MONOCYTES # BLD AUTO: 0.98 K/UL — HIGH (ref 0–0.9)
MONOCYTES NFR BLD AUTO: 8.3 % — SIGNIFICANT CHANGE UP (ref 2–14)
NEUTROPHILS # BLD AUTO: 7.96 K/UL — HIGH (ref 1.8–7.4)
NEUTROPHILS NFR BLD AUTO: 67.3 % — SIGNIFICANT CHANGE UP (ref 43–77)
NRBC # BLD: 0 /100 WBCS — SIGNIFICANT CHANGE UP (ref 0–0)
PHOSPHATE SERPL-MCNC: 3.9 MG/DL — SIGNIFICANT CHANGE UP (ref 2.5–4.5)
PLATELET # BLD AUTO: 212 K/UL — SIGNIFICANT CHANGE UP (ref 150–400)
POTASSIUM SERPL-MCNC: 4.8 MMOL/L — SIGNIFICANT CHANGE UP (ref 3.5–5.3)
POTASSIUM SERPL-SCNC: 4.8 MMOL/L — SIGNIFICANT CHANGE UP (ref 3.5–5.3)
PROT SERPL-MCNC: 5.7 G/DL — LOW (ref 6–8.3)
RBC # BLD: 3.17 M/UL — LOW (ref 3.8–5.2)
RBC # FLD: 16.4 % — HIGH (ref 10.3–14.5)
SODIUM SERPL-SCNC: 141 MMOL/L — SIGNIFICANT CHANGE UP (ref 135–145)
WBC # BLD: 11.81 K/UL — HIGH (ref 3.8–10.5)
WBC # FLD AUTO: 11.81 K/UL — HIGH (ref 3.8–10.5)

## 2022-11-18 PROCEDURE — 99232 SBSQ HOSP IP/OBS MODERATE 35: CPT

## 2022-11-18 RX ORDER — MAGNESIUM SULFATE 500 MG/ML
2 VIAL (ML) INJECTION ONCE
Refills: 0 | Status: COMPLETED | OUTPATIENT
Start: 2022-11-18 | End: 2022-11-18

## 2022-11-18 RX ORDER — ERYTHROPOIETIN 10000 [IU]/ML
10000 INJECTION, SOLUTION INTRAVENOUS; SUBCUTANEOUS ONCE
Refills: 0 | Status: COMPLETED | OUTPATIENT
Start: 2022-11-19 | End: 2022-11-19

## 2022-11-18 RX ORDER — METOPROLOL TARTRATE 50 MG
100 TABLET ORAL DAILY
Refills: 0 | Status: DISCONTINUED | OUTPATIENT
Start: 2022-11-18 | End: 2022-11-22

## 2022-11-18 RX ADMIN — SENNA PLUS 2 TABLET(S): 8.6 TABLET ORAL at 22:20

## 2022-11-18 RX ADMIN — FAMOTIDINE 20 MILLIGRAM(S): 10 INJECTION INTRAVENOUS at 11:00

## 2022-11-18 RX ADMIN — Medication 100 MILLIGRAM(S): at 11:01

## 2022-11-18 RX ADMIN — Medication 5 UNIT(S): at 07:56

## 2022-11-18 RX ADMIN — Medication 81 MILLIGRAM(S): at 11:00

## 2022-11-18 RX ADMIN — Medication 10 MILLIGRAM(S): at 07:21

## 2022-11-18 RX ADMIN — Medication 600 MILLIGRAM(S): at 17:01

## 2022-11-18 RX ADMIN — Medication 600 MILLIGRAM(S): at 07:21

## 2022-11-18 RX ADMIN — MONTELUKAST 10 MILLIGRAM(S): 4 TABLET, CHEWABLE ORAL at 11:00

## 2022-11-18 RX ADMIN — ENOXAPARIN SODIUM 30 MILLIGRAM(S): 100 INJECTION SUBCUTANEOUS at 07:31

## 2022-11-18 RX ADMIN — LOSARTAN POTASSIUM 25 MILLIGRAM(S): 100 TABLET, FILM COATED ORAL at 07:21

## 2022-11-18 RX ADMIN — Medication 50 MILLIGRAM(S): at 00:19

## 2022-11-18 RX ADMIN — Medication 10 MILLIGRAM(S): at 17:01

## 2022-11-18 RX ADMIN — Medication 5 UNIT(S): at 12:33

## 2022-11-18 RX ADMIN — CHLORHEXIDINE GLUCONATE 1 APPLICATION(S): 213 SOLUTION TOPICAL at 11:01

## 2022-11-18 RX ADMIN — ATORVASTATIN CALCIUM 80 MILLIGRAM(S): 80 TABLET, FILM COATED ORAL at 22:20

## 2022-11-18 RX ADMIN — Medication 50 MILLIGRAM(S): at 07:21

## 2022-11-18 RX ADMIN — Medication 25 GRAM(S): at 11:01

## 2022-11-18 RX ADMIN — AMLODIPINE BESYLATE 10 MILLIGRAM(S): 2.5 TABLET ORAL at 07:21

## 2022-11-18 RX ADMIN — INSULIN GLARGINE 10 UNIT(S): 100 INJECTION, SOLUTION SUBCUTANEOUS at 07:56

## 2022-11-18 RX ADMIN — INSULIN GLARGINE 10 UNIT(S): 100 INJECTION, SOLUTION SUBCUTANEOUS at 22:25

## 2022-11-18 RX ADMIN — Medication 5 UNIT(S): at 16:58

## 2022-11-18 RX ADMIN — ENOXAPARIN SODIUM 30 MILLIGRAM(S): 100 INJECTION SUBCUTANEOUS at 17:01

## 2022-11-18 NOTE — PROGRESS NOTE ADULT - ASSESSMENT
72F w/ HTN, HLD, DM, CKD, 10/20/22 from OSH with uremia, COVID-19, and pericardial effusion    (1)Renal - newly ESRD-HD;   (2)CV - reasonable BP/volume  (3)CTS - s/p C3L on 11/9      RECOMMEND:  (1)HD in am   (2)AV access repair as outpatient  (3) Retacrit at HD     Rehab placement and care coordination is aware     Sayed [facility]   [phone]

## 2022-11-18 NOTE — PROGRESS NOTE ADULT - PROBLEM SELECTOR PLAN 1
Asa, 81 Atorvastatin 80 ,Toprol 100 qd  gemfibrozil 600 milliGRAM(s) Oral two times a day  Losartan 25 qd  , Chest PT,  Incentive spirometry,   wound care and assessment.  OOB to chair ambulate  Ambulate    Sacrum, b/l buttocks: routine pericare with Eusebio,   disposition to Rehab

## 2022-11-18 NOTE — PROGRESS NOTE ADULT - ASSESSMENT
72F HTN, HLD, DM, CKD who presented to [facility] initially with MAR on CKD with acute uremia and metabolic derangements requiring urgent HD and was COVID-19 positive. During HD went into atrial fibrillation and started on HD, subsequently developing GIB thought 2/2 AC and acute uremia. Also on amiodarone for Afib. On TTE noted to have a moderate to large pericardial effusion with early echocardiographic evidence of tamponade. Clinically she is not hypotensive and she tolerates the fluid shifts related to HD. Patient today was transferred to [facility] CTS Dr. Gabriel for evaluation of Pericardial Effusion.  10/20 had pericardiocentesis pericardial drain which was draining minimally, and removed 10/27.  Cath 11/2 revealed triple vessel CAD referred for CABG eval with Dr Mann  11/9/22 cabg x 3 LIMA-LAD, SVG-OM, SVG-OM   esrd   Insulin infusion for hyperglycemia  Vascular surgery to evaluate AVF :US duplex obtained today -- AVF is patent with non-occlusive thrombus and low flow. No acute vascular intervention at this time. Patient can follow up with Dr. Galeano outpatient for follow up.   ID for UTI- invanz  11/15 Transferred to [facility]   11.16 VSS ABX per ID     11/17  HD today Abx as per ID Eventual rehab  11/18 Change to Toprol  ABX  UTI thru 11/19 Eventual rehab

## 2022-11-18 NOTE — CHART NOTE - NSCHARTNOTEFT_GEN_A_CORE
Patient should follow-up with Dr. Galeano as outpatient for her LUE AVF. dUS showed possibly outflow vein thrombosis. May need revision or other procedures for maturation. Vascular surgery signing off.

## 2022-11-18 NOTE — PROGRESS NOTE ADULT - ASSESSMENT
72F HTN, HLD, DM, CKD who presented to [facility] initially with MAR on CKD with acute uremia and metabolic derangements requiring urgent HD and was COVID-19 positive. During HD went into atrial fibrillation and started on HD, subsequently developing GIB thought 2/2 AC and acute uremia.  On TTE noted to have a moderate to large pericardial effusion with early echocardiographic evidence of tamponade.  On 10/20 had pericardiocentesis in the cath lab with 700 cc bloody fluid removed.  Had pericardial drain which was draining minimally, and removed 10/27.     - developed 50 beats of vt and so cath done  - found with 3v cad and is now s/p C3L 11/9/22  - Continue asa and statin/gemfibrozil  - continue bb  - now s/p amio    - hx of pAF in the setting of acute uremia and hemopericardium; cont to monitor on tele and if recurrent AF may need to reconsider AC  - cont losartan  - cont norvasc    - Repeat echo with no significant re- accumulation of effusion.    - Tolerated AVF with no issues.    - hd as per renal  - Continue off of AC for now secondary to bleeding issues  - Please continue to maintain strict I/Os, monitor daily weights, Cr, and K.      - abx for uti  - Further cardiac workup will depend on clinical course.   - All other workup per primary team. Will followup.

## 2022-11-18 NOTE — PROGRESS NOTE ADULT - PROBLEM SELECTOR PLAN 3
ID Dr Fajardo  #Leukocytosis  Unclear source - may be multifactorial given ongoing clinical issues  CT without clear evidence for infection     bacteruria   Urine culture growing E.coli esbl     plan 5 days  Mertapenem  IVPB 500 milliGRAM(s) IV Intermittent every 24 hours started 11/15

## 2022-11-18 NOTE — PROGRESS NOTE ADULT - SUBJECTIVE AND OBJECTIVE BOX
[facility] - Lucio Madrid, Tyrell Garrison Savella, [place]  Office Number:  [phone]    Patient resting comfortably in bed in NAD.  Laying flat with no respiratory distress.  No complaints of chest pain, dyspnea, palpitations, PND, or orthopnea.    F/U for:  CAD    Telemetry:  No events    MEDICATIONS  (STANDING):  amLODIPine   Tablet 10 milliGRAM(s) Oral daily  aspirin enteric coated 81 milliGRAM(s) Oral daily  atorvastatin 80 milliGRAM(s) Oral at bedtime  busPIRone 10 milliGRAM(s) Oral two times a day  chlorhexidine 2% Cloths 1 Application(s) Topical daily  dextrose 50% Injectable 25 milliLiter(s) IV Push every 15 minutes  dextrose 50% Injectable 50 milliLiter(s) IV Push every 15 minutes  enoxaparin Injectable 30 milliGRAM(s) SubCutaneous every 12 hours  ertapenem  IVPB      ertapenem  IVPB 500 milliGRAM(s) IV Intermittent every 24 hours  famotidine    Tablet 20 milliGRAM(s) Oral daily  gemfibrozil 600 milliGRAM(s) Oral two times a day  insulin glargine Injectable (LANTUS) 10 Unit(s) SubCutaneous every morning  insulin glargine Injectable (LANTUS) 10 Unit(s) SubCutaneous at bedtime  insulin lispro (ADMELOG) corrective regimen sliding scale   SubCutaneous three times a day before meals  insulin lispro Injectable (ADMELOG) 5 Unit(s) SubCutaneous before breakfast  insulin lispro Injectable (ADMELOG) 5 Unit(s) SubCutaneous before lunch  insulin lispro Injectable (ADMELOG) 5 Unit(s) SubCutaneous before dinner  losartan 25 milliGRAM(s) Oral daily  metoprolol tartrate 50 milliGRAM(s) Oral every 8 hours  montelukast 10 milliGRAM(s) Oral daily  polyethylene glycol 3350 17 Gram(s) Oral daily  senna 2 Tablet(s) Oral at bedtime    MEDICATIONS  (PRN):  acetaminophen     Tablet .. 650 milliGRAM(s) Oral every 6 hours PRN Mild Pain (1 - 3)      Allergies    sulfa drugs (Rash)    Intolerances        Vital Signs Last 24 Hrs  T(C): 36.7 (18 Nov 2022 04:38), Max: 36.7 (17 Nov 2022 17:14)  T(F): 98 (18 Nov 2022 04:38), Max: 98.1 (17 Nov 2022 19:55)  HR: 75 (18 Nov 2022 04:38) (75 - 86)  BP: 129/68 (18 Nov 2022 04:38) (112/66 - 159/71)  BP(mean): 88 (18 Nov 2022 04:38) (81 - 97)  RR: 18 (18 Nov 2022 04:38) (18 - 18)  SpO2: 98% (18 Nov 2022 04:38) (97% - 98%)    Parameters below as of 18 Nov 2022 04:38  Patient On (Oxygen Delivery Method): room air        I&O's Summary    17 Nov 2022 07:01  -  18 Nov 2022 07:00  --------------------------------------------------------  IN: 990 mL / OUT: 0 mL / NET: 990 mL        ON EXAM:    Constitutional: well-nourished, well-developed, NAD   HEENT:  MMM, sclerae anicteric, conjunctivae clear, no oral cyanosis.  Pulmonary: Non-labored, breath sounds are clear bilaterally, No wheezing, rales or rhonchi  Cardiovascular: Regular, S1 and S2.  No murmur.  No rubs, gallops or clicks  Gastrointestinal: Bowel Sounds present, soft, nontender.   Lymph: No peripheral edema.   Neurological: Alert, no focal deficits  Skin: No rashes.  Psych:  Mood & affect appropriate    LABS: All Labs Reviewed:                        9.4    11.81 )-----------( 212      ( 18 Nov 2022 05:35 )             30.9                         8.9    11.91 )-----------( 234      ( 17 Nov 2022 06:16 )             29.0                         9.8    13.31 )-----------( 217      ( 16 Nov 2022 08:51 )             31.3     18 Nov 2022 05:35    141    |  103    |  20     ----------------------------<  85     4.8     |  26     |  2.29   17 Nov 2022 06:16    140    |  104    |  36     ----------------------------<  78     4.8     |  25     |  2.51   16 Nov 2022 08:51    141    |  103    |  30     ----------------------------<  78     4.5     |  27     |  2.10     Ca    8.0        18 Nov 2022 05:35  Ca    8.0        17 Nov 2022 06:16  Ca    8.1        16 Nov 2022 08:51  Phos  3.9       18 Nov 2022 05:35  Phos  4.5       17 Nov 2022 06:16  Phos  3.6       16 Nov 2022 08:51  Mg     1.9       18 Nov 2022 05:35  Mg     2.0       17 Nov 2022 06:16  Mg     1.9       16 Nov 2022 08:51    TPro  5.7    /  Alb  2.8    /  TBili  0.3    /  DBili  x      /  AST  20     /  ALT  15     /  AlkPhos  122    18 Nov 2022 05:35  TPro  5.6    /  Alb  2.8    /  TBili  0.4    /  DBili  x      /  AST  16     /  ALT  14     /  AlkPhos  112    17 Nov 2022 06:16  TPro  5.7    /  Alb  2.9    /  TBili  0.4    /  DBili  x      /  AST  18     /  ALT  16     /  AlkPhos  120    16 Nov 2022 08:51    PT/INR - ( 16 Nov 2022 08:51 )   PT: 13.2 sec;   INR: 1.15 ratio         PTT - ( 16 Nov 2022 08:51 )  PTT:29.9 sec      Blood Culture:

## 2022-11-18 NOTE — CHART NOTE - NSCHARTNOTESELECT_GEN_ALL_CORE
Caty d/c/Event Note
Event Note
Event Note
IR
Nutrition Services
magnesium/Event Note
Event Note
Left IJ TLC d/c/Event Note
Nutrition Services
Nutrition Services
Post Op Check - Vascular/Event Note
vascular surgery sign off

## 2022-11-18 NOTE — PROGRESS NOTE ADULT - PROBLEM SELECTOR PLAN 2
GENERAL PRE-PROCEDURE:   Procedure:  CAG possible PCI  Date/Time:  4/20/2022 11:43 AM    Verbal consent obtained?: Yes    Written consent obtained?: Yes    Risks and benefits: Risks, benefits and alternatives were discussed    Patient states understanding of procedure being performed: Yes    Patient's understanding of procedure matches consent: Yes    Procedure consent matches procedure scheduled: Yes    Expected level of sedation:  Moderate  Appropriately NPO:  Yes  ASA Class:  2  Mallampati  :  Grade 2- soft palate, base of uvula, tonsillar pillars, and portion of posterior pharyngeal wall visible  Lungs:  Lungs clear with good breath sounds bilaterally  Heart:  Normal heart sounds and rate  Statement of review:  I have reviewed the lab findings, diagnostic data, medications, and the plan for sedation  I have examined the patient, reviewed the history, medications and pre procedural tests. She has atypical chest pain multiple CAD risk factors and a history of an abnormal nuclear stress test. I have explained to the patient the risks of death, MI, stroke, hematoma, possible urgent bypass surgery for failed PCI, use of stents, thienopyridine agents, possible peripheral vascular complications, arrhythmia, the use of FFR in clinical decision-making and alternative of medical therapy alone in regards to left heart catheterization, left ventriculography, coronary angiography, and possible percutaneous coronary intervention. The patient voiced understanding and wishes to proceed. The patient has a good right radial pulse, normal ulnar pulse and a normal Darian's sign.    Dr Cristobal nephrology  RT SCL permacath  LEFT AVF thrombosed - Patient can follow up with Dr. Galeano outpatient for follow up.   HD TIW   Bah in place

## 2022-11-18 NOTE — PROGRESS NOTE ADULT - SUBJECTIVE AND OBJECTIVE BOX
NEPHROLOGY-[facility] [phone]        Patient seen and examined in bed.  She was about the same but she had diarrhea         MEDICATIONS  (STANDING):  amLODIPine   Tablet 10 milliGRAM(s) Oral daily  aspirin enteric coated 81 milliGRAM(s) Oral daily  atorvastatin 80 milliGRAM(s) Oral at bedtime  busPIRone 10 milliGRAM(s) Oral two times a day  chlorhexidine 2% Cloths 1 Application(s) Topical daily  dextrose 50% Injectable 50 milliLiter(s) IV Push every 15 minutes  dextrose 50% Injectable 25 milliLiter(s) IV Push every 15 minutes  enoxaparin Injectable 30 milliGRAM(s) SubCutaneous every 12 hours  famotidine    Tablet 20 milliGRAM(s) Oral daily  gemfibrozil 600 milliGRAM(s) Oral two times a day  insulin glargine Injectable (LANTUS) 10 Unit(s) SubCutaneous every morning  insulin glargine Injectable (LANTUS) 10 Unit(s) SubCutaneous at bedtime  insulin lispro (ADMELOG) corrective regimen sliding scale   SubCutaneous three times a day before meals  insulin lispro Injectable (ADMELOG) 5 Unit(s) SubCutaneous before breakfast  insulin lispro Injectable (ADMELOG) 5 Unit(s) SubCutaneous before lunch  insulin lispro Injectable (ADMELOG) 5 Unit(s) SubCutaneous before dinner  losartan 25 milliGRAM(s) Oral daily  metoprolol succinate  milliGRAM(s) Oral daily  montelukast 10 milliGRAM(s) Oral daily  polyethylene glycol 3350 17 Gram(s) Oral daily  senna 2 Tablet(s) Oral at bedtime      VITAL:  T(C): , Max: 36.7 (11-17-22 @ 17:14)  T(F): , Max: 98.1 (11-17-22 @ 19:55)  HR: 75 (11-18-22 @ 04:38)  BP: 129/68 (11-18-22 @ 04:38)  BP(mean): 88 (11-18-22 @ 04:38)  RR: 18 (11-18-22 @ 04:38)  SpO2: 98% (11-18-22 @ 04:38)  Wt(kg): --    I and O's:    11-17 @ 07:01  -  11-18 @ 07:00  --------------------------------------------------------  IN: 1050 mL / OUT: 400 mL / NET: 650 mL    11-18 @ 07:01  -  11-18 @ 11:18  --------------------------------------------------------  IN: 200 mL / OUT: 0 mL / NET: 200 mL          PHYSICAL EXAM:    Constitutional: NAD  Neck:  No JVD  Respiratory: CTAB/L  Cardiovascular: S1 and S2  Gastrointestinal: BS+, soft, NT/ND  Extremities: No peripheral edema  Neurological: A/O x 3, no focal deficits  Psychiatric: Normal mood, normal affect  : No Bah  Skin: No rashes  Access: Not applicable    LABS:                        9.4    11.81 )-----------( 212      ( 18 Nov 2022 05:35 )             30.9     11-18    141  |  103  |  20  ----------------------------<  85  4.8   |  26  |  2.29<H>    Ca    8.0<L>      18 Nov 2022 05:35  Phos  3.9     11-18  Mg     1.9     11-18    TPro  5.7<L>  /  Alb  2.8<L>  /  TBili  0.3  /  DBili  x   /  AST  20  /  ALT  15  /  AlkPhos  122<H>  11-18          Urine Studies:          RADIOLOGY & ADDITIONAL STUDIES:

## 2022-11-18 NOTE — PROGRESS NOTE ADULT - SUBJECTIVE AND OBJECTIVE BOX
Subjective:  "Hello"  Sitting up in bed>reports loose stool x 2 overnite,  No complaints pain      Tele:  SR  60-90           V/S:                    T(F): 98 (22 @ 04:38), Max: 98.1 (22 @ 19:55)  HR: 75 (22 @ 04:38) (75 - 86)  BP: 129/68 (22 @ 04:38) (112/66 - 159/71)  RR: 18 (22 @ 04:38) (18 - 18)  SpO2: 98% (22 @ 04:38) (97% - 98%)  Wt(kg): --      LV EF:      Labs:      141  |  103  |  20  ----------------------------<  85  4.8   |  26  |  2.29<H>    Ca    8.0<L>      2022 05:35  Phos  3.9       Mg     1.9         TPro  5.7<L>  /  Alb  2.8<L>  /  TBili  0.3  /  DBili  x   /  AST  20  /  ALT  15  /  AlkPhos  122<H>                                 9.4    11.81 )-----------( 212      ( 2022 05:35 )             30.9        PT/INR - ( 2022 08:51 )   PT: 13.2 sec;   INR: 1.15 ratio         PTT - ( 2022 08:51 )  PTT:29.9 sec     CAPILLARY BLOOD GLUCOSE      POCT Blood Glucose.: 87 mg/dL (2022 07:38)  POCT Blood Glucose.: 132 mg/dL (2022 23:26)  POCT Blood Glucose.: 88 mg/dL (2022 22:12)  POCT Blood Glucose.: 142 mg/dL (2022 16:33)  POCT Blood Glucose.: 108 mg/dL (2022 11:19)           CXR:    I&O's Detail    2022 07:01  -  2022 07:00  --------------------------------------------------------  IN:    Oral Fluid: 990 mL  Total IN: 990 mL    OUT:    Other (mL): 0 mL  Total OUT: 0 mL    Total NET: 990 mL          BOWEL MOVEMENT:  [x ] YES [ ] NO      Daily     Daily Weight in k.7 (2022 13:00)  Medications:  acetaminophen     Tablet .. 650 milliGRAM(s) Oral every 6 hours PRN  amLODIPine   Tablet 10 milliGRAM(s) Oral daily  aspirin enteric coated 81 milliGRAM(s) Oral daily  atorvastatin 80 milliGRAM(s) Oral at bedtime  busPIRone 10 milliGRAM(s) Oral two times a day  chlorhexidine 2% Cloths 1 Application(s) Topical daily  dextrose 50% Injectable 50 milliLiter(s) IV Push every 15 minutes  dextrose 50% Injectable 25 milliLiter(s) IV Push every 15 minutes  enoxaparin Injectable 30 milliGRAM(s) SubCutaneous every 12 hours  ertapenem  IVPB 500 milliGRAM(s) IV Intermittent every 24 hours  ertapenem  IVPB      famotidine    Tablet 20 milliGRAM(s) Oral daily  gemfibrozil 600 milliGRAM(s) Oral two times a day  insulin glargine Injectable (LANTUS) 10 Unit(s) SubCutaneous every morning  insulin glargine Injectable (LANTUS) 10 Unit(s) SubCutaneous at bedtime  insulin lispro (ADMELOG) corrective regimen sliding scale   SubCutaneous three times a day before meals  insulin lispro Injectable (ADMELOG) 5 Unit(s) SubCutaneous before breakfast  insulin lispro Injectable (ADMELOG) 5 Unit(s) SubCutaneous before lunch  insulin lispro Injectable (ADMELOG) 5 Unit(s) SubCutaneous before dinner  losartan 25 milliGRAM(s) Oral daily  magnesium sulfate  IVPB 2 Gram(s) IV Intermittent once  metoprolol succinate  milliGRAM(s) Oral daily  montelukast 10 milliGRAM(s) Oral daily  polyethylene glycol 3350 17 Gram(s) Oral daily  senna 2 Tablet(s) Oral at bedtime        Physical Exam:    Neurology: alert and oriented x 3    CV : [id number]    Sternal Wound : RADHA Stable  R chest wall permacath    Lungs: B/l breath sounds    Abdomen: soft, nontender, nondistended, positive bowel sounds  + BM overnite    : Bah jamal urine   HD today              Extremities:   LUE - thrombosed avf     Warm well perfused b/l + DP                    PAST MEDICAL & SURGICAL HISTORY:  HTN (hypertension)      HLD (hyperlipidemia)      DM (diabetes mellitus)

## 2022-11-19 LAB
ANION GAP SERPL CALC-SCNC: 11 MMOL/L — SIGNIFICANT CHANGE UP (ref 5–17)
BUN SERPL-MCNC: 23 MG/DL — SIGNIFICANT CHANGE UP (ref 7–23)
CALCIUM SERPL-MCNC: 8.4 MG/DL — SIGNIFICANT CHANGE UP (ref 8.4–10.5)
CHLORIDE SERPL-SCNC: 105 MMOL/L — SIGNIFICANT CHANGE UP (ref 96–108)
CO2 SERPL-SCNC: 23 MMOL/L — SIGNIFICANT CHANGE UP (ref 22–31)
CREAT SERPL-MCNC: 2.45 MG/DL — HIGH (ref 0.5–1.3)
CULTURE RESULTS: SIGNIFICANT CHANGE UP
EGFR: 21 ML/MIN/1.73M2 — LOW
GLUCOSE BLDC GLUCOMTR-MCNC: 109 MG/DL — HIGH (ref 70–99)
GLUCOSE BLDC GLUCOMTR-MCNC: 128 MG/DL — HIGH (ref 70–99)
GLUCOSE BLDC GLUCOMTR-MCNC: 63 MG/DL — LOW (ref 70–99)
GLUCOSE BLDC GLUCOMTR-MCNC: 64 MG/DL — LOW (ref 70–99)
GLUCOSE BLDC GLUCOMTR-MCNC: 78 MG/DL — SIGNIFICANT CHANGE UP (ref 70–99)
GLUCOSE BLDC GLUCOMTR-MCNC: 82 MG/DL — SIGNIFICANT CHANGE UP (ref 70–99)
GLUCOSE BLDC GLUCOMTR-MCNC: 92 MG/DL — SIGNIFICANT CHANGE UP (ref 70–99)
GLUCOSE SERPL-MCNC: 80 MG/DL — SIGNIFICANT CHANGE UP (ref 70–99)
HCT VFR BLD CALC: 33.2 % — LOW (ref 34.5–45)
HGB BLD-MCNC: 10.1 G/DL — LOW (ref 11.5–15.5)
MCHC RBC-ENTMCNC: 30.4 GM/DL — LOW (ref 32–36)
MCHC RBC-ENTMCNC: 30.4 PG — SIGNIFICANT CHANGE UP (ref 27–34)
MCV RBC AUTO: 100 FL — SIGNIFICANT CHANGE UP (ref 80–100)
NRBC # BLD: 0 /100 WBCS — SIGNIFICANT CHANGE UP (ref 0–0)
PLATELET # BLD AUTO: 234 K/UL — SIGNIFICANT CHANGE UP (ref 150–400)
POTASSIUM SERPL-MCNC: 4.9 MMOL/L — SIGNIFICANT CHANGE UP (ref 3.5–5.3)
POTASSIUM SERPL-SCNC: 4.9 MMOL/L — SIGNIFICANT CHANGE UP (ref 3.5–5.3)
RBC # BLD: 3.32 M/UL — LOW (ref 3.8–5.2)
RBC # FLD: 17.2 % — HIGH (ref 10.3–14.5)
SODIUM SERPL-SCNC: 139 MMOL/L — SIGNIFICANT CHANGE UP (ref 135–145)
SPECIMEN SOURCE: SIGNIFICANT CHANGE UP
WBC # BLD: 11.24 K/UL — HIGH (ref 3.8–10.5)
WBC # FLD AUTO: 11.24 K/UL — HIGH (ref 3.8–10.5)

## 2022-11-19 RX ADMIN — Medication 10 MILLIGRAM(S): at 05:04

## 2022-11-19 RX ADMIN — ENOXAPARIN SODIUM 30 MILLIGRAM(S): 100 INJECTION SUBCUTANEOUS at 05:05

## 2022-11-19 RX ADMIN — CHLORHEXIDINE GLUCONATE 1 APPLICATION(S): 213 SOLUTION TOPICAL at 11:50

## 2022-11-19 RX ADMIN — INSULIN GLARGINE 10 UNIT(S): 100 INJECTION, SOLUTION SUBCUTANEOUS at 08:09

## 2022-11-19 RX ADMIN — Medication 600 MILLIGRAM(S): at 16:59

## 2022-11-19 RX ADMIN — Medication 5 UNIT(S): at 11:48

## 2022-11-19 RX ADMIN — Medication 650 MILLIGRAM(S): at 16:00

## 2022-11-19 RX ADMIN — Medication 5 UNIT(S): at 08:10

## 2022-11-19 RX ADMIN — Medication 81 MILLIGRAM(S): at 11:48

## 2022-11-19 RX ADMIN — FAMOTIDINE 20 MILLIGRAM(S): 10 INJECTION INTRAVENOUS at 11:48

## 2022-11-19 RX ADMIN — Medication 650 MILLIGRAM(S): at 15:30

## 2022-11-19 RX ADMIN — MONTELUKAST 10 MILLIGRAM(S): 4 TABLET, CHEWABLE ORAL at 11:49

## 2022-11-19 RX ADMIN — Medication 100 MILLIGRAM(S): at 05:03

## 2022-11-19 RX ADMIN — ATORVASTATIN CALCIUM 80 MILLIGRAM(S): 80 TABLET, FILM COATED ORAL at 21:40

## 2022-11-19 RX ADMIN — ERYTHROPOIETIN 10000 UNIT(S): 10000 INJECTION, SOLUTION INTRAVENOUS; SUBCUTANEOUS at 19:06

## 2022-11-19 RX ADMIN — INSULIN GLARGINE 10 UNIT(S): 100 INJECTION, SOLUTION SUBCUTANEOUS at 21:39

## 2022-11-19 RX ADMIN — AMLODIPINE BESYLATE 10 MILLIGRAM(S): 2.5 TABLET ORAL at 05:04

## 2022-11-19 RX ADMIN — Medication 600 MILLIGRAM(S): at 05:04

## 2022-11-19 RX ADMIN — Medication 10 MILLIGRAM(S): at 16:59

## 2022-11-19 RX ADMIN — ENOXAPARIN SODIUM 30 MILLIGRAM(S): 100 INJECTION SUBCUTANEOUS at 16:59

## 2022-11-19 RX ADMIN — LOSARTAN POTASSIUM 25 MILLIGRAM(S): 100 TABLET, FILM COATED ORAL at 05:03

## 2022-11-19 NOTE — PROGRESS NOTE ADULT - ASSESSMENT
72F w/ HTN, HLD, DM, CKD, 10/20/22 from OSH with uremia, COVID-19, and pericardial effusion    (1)Renal - newly ESRD-HD;   (2)CV - reasonable BP/volume  (3)CTS - s/p C3L on 11/9      RECOMMEND:  (1)HD today  (2)AV access repair as outpatient  (3) Retacrit with  HD     Rehab placement and care coordination is aware     Felicia Ceja NP  [facility]   [phone]  72F w/ HTN, HLD, DM, CKD, 10/20/22 from OSH with uremia, COVID-19, and pericardial effusion    (1)Renal - newly ESRD-HD;   (2)CV - reasonable BP/volume  (3)CTS - s/p C3L on 11/9      RECOMMEND:  (1)HD today  (2)AV access repair as outpatient  (3) Retacrit with  HD   (4) Dose medications for intermittent HD.     Waiting Rehab placement     Felicia Ceja NP  [facility]   [phone]

## 2022-11-19 NOTE — PROGRESS NOTE ADULT - SUBJECTIVE AND OBJECTIVE BOX
VITAL SIGNS    Telemetry: SR 70-80   Overnight Events: no acute events    Vital Signs Last 24 Hrs  T(C): 36.7 (11-19-22 @ 11:17), Max: 36.9 (11-18-22 @ 18:41)  T(F): 98.1 (11-19-22 @ 11:17), Max: 98.4 (11-18-22 @ 18:41)  HR: 78 (11-19-22 @ 11:17) (78 - 81)  BP: 135/69 (11-19-22 @ 11:17) (121/72 - 143/60)  RR: 18 (11-19-22 @ 11:17) (18 - 18)  SpO2: 96% (11-19-22 @ 11:17) (96% - 97%)            11-18 @ 07:01  -  11-19 @ 07:00  --------------------------------------------------------  IN: 690 mL / OUT: 400 mL / NET: 290 mL    11-19 @ 07:01  -  11-19 @ 16:01  --------------------------------------------------------  IN: 440 mL / OUT: 0 mL / NET: 440 mL       Daily     Daily   Admit Wt: Drug Dosing Weight  Height (cm): 162.6 (09 Nov 2022 07:04)  Weight (kg): 85.3 (09 Nov 2022 07:04)  BMI (kg/m2): 32.3 (09 Nov 2022 07:04)  BSA (m2): 1.91 (09 Nov 2022 07:04)      CAPILLARY BLOOD GLUCOSE      POCT Blood Glucose.: 109 mg/dL (19 Nov 2022 11:44)  POCT Blood Glucose.: 78 mg/dL (19 Nov 2022 07:36)  POCT Blood Glucose.: 105 mg/dL (18 Nov 2022 21:28)  POCT Blood Glucose.: 99 mg/dL (18 Nov 2022 16:21)          acetaminophen     Tablet .. 650 milliGRAM(s) Oral every 6 hours PRN  amLODIPine   Tablet 10 milliGRAM(s) Oral daily  aspirin enteric coated 81 milliGRAM(s) Oral daily  atorvastatin 80 milliGRAM(s) Oral at bedtime  busPIRone 10 milliGRAM(s) Oral two times a day  chlorhexidine 2% Cloths 1 Application(s) Topical daily  dextrose 50% Injectable 50 milliLiter(s) IV Push every 15 minutes  dextrose 50% Injectable 25 milliLiter(s) IV Push every 15 minutes  enoxaparin Injectable 30 milliGRAM(s) SubCutaneous every 12 hours  epoetin beatris-epbx (RETACRIT) Injectable 67502 Unit(s) IV Push once  famotidine    Tablet 20 milliGRAM(s) Oral daily  gemfibrozil 600 milliGRAM(s) Oral two times a day  insulin glargine Injectable (LANTUS) 10 Unit(s) SubCutaneous every morning  insulin glargine Injectable (LANTUS) 10 Unit(s) SubCutaneous at bedtime  insulin lispro (ADMELOG) corrective regimen sliding scale   SubCutaneous three times a day before meals  insulin lispro Injectable (ADMELOG) 5 Unit(s) SubCutaneous before breakfast  insulin lispro Injectable (ADMELOG) 5 Unit(s) SubCutaneous before lunch  insulin lispro Injectable (ADMELOG) 5 Unit(s) SubCutaneous before dinner  losartan 25 milliGRAM(s) Oral daily  metoprolol succinate  milliGRAM(s) Oral daily  montelukast 10 milliGRAM(s) Oral daily  polyethylene glycol 3350 17 Gram(s) Oral daily  senna 2 Tablet(s) Oral at bedtime                            10.1   11.24 )-----------( 234      ( 19 Nov 2022 06:03 )             33.2     11-19    139  |  105  |  23  ----------------------------<  80  4.9   |  23  |  2.45<H>    Ca    8.4      19 Nov 2022 06:03  Phos  3.9     11-18  Mg     1.9     11-18    TPro  5.7<L>  /  Alb  2.8<L>  /  TBili  0.3  /  DBili  x   /  AST  20  /  ALT  15  /  AlkPhos  122<H>  11-18        PHYSICAL EXAM    Subjective: "Hi."   General: well appearing female, in no acute distress, laying in bed. POD #10  Neurology: alert and oriented x 3, nonfocal, no gross deficits  CV : S1/S2, no murmurs/rubs/gallops  Sternal Wound : CDI RADHA, Stable, R CW permacath  Lungs: clear. RR easy, unlabored   Abdomen: soft, nontender, nondistended, positive bowel sounds, +bowel movement today, Neg N/V/D   :  pt voiding without difficulty   Extremities: RODRIGES; trace edema, neg calf tenderness. PPP bilaterally, groins stable, L AV fistula -bruit/thrill        Coumadin    [ ] YES          [x]      NO         Eliquis    [ ] YES          [x]      NO       Heparin gtt   [  ] YES              [x]   NO  Dose:    Disposition:  Home [   ]     Rehab [ x  ]       OR Date:    Plan of care discussed with Cardiothoracic Team at AM rounds.

## 2022-11-19 NOTE — PROVIDER CONTACT NOTE (HYPOGLYCEMIA EVENT) - NS PROVIDER CONTACT BACKGROUND-HYPO
Age: 72y    Gender: Female    POCT Blood Glucose:  63 mg/dL (11-19-22 @ 16:33)  64 mg/dL (11-19-22 @ 16:33)  109 mg/dL (11-19-22 @ 11:44)  78 mg/dL (11-19-22 @ 07:36)  105 mg/dL (11-18-22 @ 21:28)      eMAR:atorvastatin   80 milliGRAM(s) Oral (11-18-22 @ 22:20)    gemfibrozil   600 milliGRAM(s) Oral (11-19-22 @ 05:04)   600 milliGRAM(s) Oral (11-18-22 @ 17:01)    insulin glargine Injectable (LANTUS)   10 Unit(s) SubCutaneous (11-19-22 @ 08:09)    insulin glargine Injectable (LANTUS)   10 Unit(s) SubCutaneous (11-18-22 @ 22:25)    insulin lispro Injectable (ADMELOG)   5 Unit(s) SubCutaneous (11-19-22 @ 08:10)    insulin lispro Injectable (ADMELOG)   5 Unit(s) SubCutaneous (11-19-22 @ 11:48)    insulin lispro Injectable (ADMELOG)   5 Unit(s) SubCutaneous (11-18-22 @ 16:58)

## 2022-11-19 NOTE — PROGRESS NOTE ADULT - SUBJECTIVE AND OBJECTIVE BOX
Nephrology Progress Note    Patient is a 72y female with    Allergies:  sulfa drugs (Rash)    Hospital Medications:   MEDICATIONS  (STANDING):  amLODIPine   Tablet 10 milliGRAM(s) Oral daily  aspirin enteric coated 81 milliGRAM(s) Oral daily  atorvastatin 80 milliGRAM(s) Oral at bedtime  busPIRone 10 milliGRAM(s) Oral two times a day  chlorhexidine 2% Cloths 1 Application(s) Topical daily  dextrose 50% Injectable 50 milliLiter(s) IV Push every 15 minutes  dextrose 50% Injectable 25 milliLiter(s) IV Push every 15 minutes  enoxaparin Injectable 30 milliGRAM(s) SubCutaneous every 12 hours  epoetin beatris-epbx (RETACRIT) Injectable 84236 Unit(s) IV Push once  famotidine    Tablet 20 milliGRAM(s) Oral daily  gemfibrozil 600 milliGRAM(s) Oral two times a day  insulin glargine Injectable (LANTUS) 10 Unit(s) SubCutaneous every morning  insulin glargine Injectable (LANTUS) 10 Unit(s) SubCutaneous at bedtime  insulin lispro (ADMELOG) corrective regimen sliding scale   SubCutaneous three times a day before meals  insulin lispro Injectable (ADMELOG) 5 Unit(s) SubCutaneous before breakfast  insulin lispro Injectable (ADMELOG) 5 Unit(s) SubCutaneous before lunch  insulin lispro Injectable (ADMELOG) 5 Unit(s) SubCutaneous before dinner  losartan 25 milliGRAM(s) Oral daily  metoprolol succinate  milliGRAM(s) Oral daily  montelukast 10 milliGRAM(s) Oral daily  polyethylene glycol 3350 17 Gram(s) Oral daily  senna 2 Tablet(s) Oral at bedtime        VITALS:  T(F): 98 (11-19-22 @ 04:52), Max: 98.4 (11-18-22 @ 18:41)  HR: 81 (11-19-22 @ 04:52)  BP: 121/72 (11-19-22 @ 04:52)  RR: 18 (11-19-22 @ 04:52)  SpO2: 97% (11-19-22 @ 04:52)    11-17 @ 07:01  -  11-18 @ 07:00  --------------------------------------------------------  IN: 1050 mL / OUT: 400 mL / NET: 650 mL    11-18 @ 07:01  -  11-19 @ 07:00  --------------------------------------------------------  IN: 690 mL / OUT: 400 mL / NET: 290 mL        PHYSICAL EXAM:  Constitutional: NAD  HEENT: anicteric sclera, oropharynx clear, MMM  Neck: No JVD  Respiratory: CTAB, no wheezes, rales or rhonchi  Cardiovascular: S1, S2, RRR  Gastrointestinal: BS+, soft, NT/ND  Extremities: No cyanosis or clubbing. No peripheral edema  Neurological: A/O x 3,   Psychiatric: Normal mood, normal affect  : No CVA tenderness. No ritter.   Skin: No rashes  Vascular Access: LUE AVF    LABS:  11-19    139  |  105  |  23  ----------------------------<  80  4.9   |  23  |  2.45<H>    Ca    8.4      19 Nov 2022 06:03  Phos  3.9     11-18  Mg     1.9     11-18    TPro  5.7<L>  /  Alb  2.8<L>  /  TBili  0.3  /  DBili      /  AST  20  /  ALT  15  /  AlkPhos  122<H>  11-18                          10.1   11.24 )-----------( 234      ( 19 Nov 2022 06:03 )             33.2           RADIOLOGY & ADDITIONAL STUDIES:    SHELDON: intra op :11/9/2022  Conclusions:  1. Mitral annular calcification, otherwise normal mitral  valve. Mild mitral regurgitation.  2. Normal trileaflet aortic valve. No aortic valve  regurgitation seen.  3. Mild left atrial enlargement.  4. Mild concentric left ventricular hypertrophy.  5. Mild segmental left ventricular systolic dysfunction of  inferior and lateral walls.  Other walls normal function  Normal left ventricular systolic function.  6. Mild diastolic dysfunction (StageI).  7. Normal tricuspid valve.  8. Normal pericardium with trace pericardial effusion.  9. Left pleural effusion.  Confirmed on  11/9/2022 - 12:59:20 by Christie Aguayo M.D   Nephrology Progress Note    Patient is a 72y female seen today sitting up in bed, denies shortness of breath.    Allergies:  sulfa drugs (Rash)    Hospital Medications:   MEDICATIONS  (STANDING):  amLODIPine   Tablet 10 milliGRAM(s) Oral daily  aspirin enteric coated 81 milliGRAM(s) Oral daily  atorvastatin 80 milliGRAM(s) Oral at bedtime  busPIRone 10 milliGRAM(s) Oral two times a day  chlorhexidine 2% Cloths 1 Application(s) Topical daily  dextrose 50% Injectable 50 milliLiter(s) IV Push every 15 minutes  dextrose 50% Injectable 25 milliLiter(s) IV Push every 15 minutes  enoxaparin Injectable 30 milliGRAM(s) SubCutaneous every 12 hours  epoetin beatris-epbx (RETACRIT) Injectable 82888 Unit(s) IV Push once  famotidine    Tablet 20 milliGRAM(s) Oral daily  gemfibrozil 600 milliGRAM(s) Oral two times a day  insulin glargine Injectable (LANTUS) 10 Unit(s) SubCutaneous every morning  insulin glargine Injectable (LANTUS) 10 Unit(s) SubCutaneous at bedtime  insulin lispro (ADMELOG) corrective regimen sliding scale   SubCutaneous three times a day before meals  insulin lispro Injectable (ADMELOG) 5 Unit(s) SubCutaneous before breakfast  insulin lispro Injectable (ADMELOG) 5 Unit(s) SubCutaneous before lunch  insulin lispro Injectable (ADMELOG) 5 Unit(s) SubCutaneous before dinner  losartan 25 milliGRAM(s) Oral daily  metoprolol succinate  milliGRAM(s) Oral daily  montelukast 10 milliGRAM(s) Oral daily  polyethylene glycol 3350 17 Gram(s) Oral daily  senna 2 Tablet(s) Oral at bedtime        VITALS:  T(F): 98 (11-19-22 @ 04:52), Max: 98.4 (11-18-22 @ 18:41)  HR: 81 (11-19-22 @ 04:52)  BP: 121/72 (11-19-22 @ 04:52)  RR: 18 (11-19-22 @ 04:52)  SpO2: 97% (11-19-22 @ 04:52)    11-17 @ 07:01  -  11-18 @ 07:00  --------------------------------------------------------  IN: 1050 mL / OUT: 400 mL / NET: 650 mL    11-18 @ 07:01  -  11-19 @ 07:00  --------------------------------------------------------  IN: 690 mL / OUT: 400 mL / NET: 290 mL        PHYSICAL EXAM:  Constitutional: NAD  HEENT: anicteric sclera, oropharynx clear, MMM  Neck: No JVD  Respiratory: CTAB, no wheezes, rales or rhonchi  Cardiovascular: S1, S2, RRR  Gastrointestinal: BS+, soft, NT/ND  Extremities: No cyanosis or clubbing. No peripheral edema  Neurological: A/O x 3,   Psychiatric: Normal mood, normal affect  : No CVA tenderness. No ritter.   Skin: No rashes  Vascular Access: LUE AVF    LABS:  11-19    139  |  105  |  23  ----------------------------<  80  4.9   |  23  |  2.45<H>    Ca    8.4      19 Nov 2022 06:03  Phos  3.9     11-18  Mg     1.9     11-18    TPro  5.7<L>  /  Alb  2.8<L>  /  TBili  0.3  /  DBili      /  AST  20  /  ALT  15  /  AlkPhos  122<H>  11-18                          10.1   11.24 )-----------( 234      ( 19 Nov 2022 06:03 )             33.2           RADIOLOGY & ADDITIONAL STUDIES:    SHELDON: intra op :11/9/2022  Conclusions:  1. Mitral annular calcification, otherwise normal mitral  valve. Mild mitral regurgitation.  2. Normal trileaflet aortic valve. No aortic valve  regurgitation seen.  3. Mild left atrial enlargement.  4. Mild concentric left ventricular hypertrophy.  5. Mild segmental left ventricular systolic dysfunction of  inferior and lateral walls.  Other walls normal function  Normal left ventricular systolic function.  6. Mild diastolic dysfunction (StageI).  7. Normal tricuspid valve.  8. Normal pericardium with trace pericardial effusion.  9. Left pleural effusion.  Confirmed on  11/9/2022 - 12:59:20 by Christie Aguayo M.D

## 2022-11-19 NOTE — PROGRESS NOTE ADULT - ASSESSMENT
72F HTN, HLD, DM, CKD who presented to [facility] initially with MAR on CKD with acute uremia and metabolic derangements requiring urgent HD and was COVID-19 positive. During HD went into atrial fibrillation and started on HD, subsequently developing GIB thought 2/2 AC and acute uremia. Also on amiodarone for Afib. On TTE noted to have a moderate to large pericardial effusion with early echocardiographic evidence of tamponade. Clinically she is not hypotensive and she tolerates the fluid shifts related to HD. Patient today was transferred to [facility] CTS Dr. Gabriel for evaluation of Pericardial Effusion.  10/20 had pericardiocentesis pericardial drain which was draining minimally, and removed 10/27.  Cath 11/2 revealed triple vessel CAD referred for CABG eval with Dr Mann  11/9/22 cabg x 3 LIMA-LAD, SVG-OM, SVG-OM   esrd   Insulin infusion for hyperglycemia  Vascular surgery to evaluate AVF :US duplex obtained today -- AVF is patent with non-occlusive thrombus and low flow. No acute vascular intervention at this time. Patient can follow up with Dr. Galeano outpatient for follow up.   ID for UTI- invanz  11/15 Transferred to [address] [place] [address]   11.16 VSS ABX per ID     11/17  HD today Abx as per ID Eventual rehab  11/18 Change to Toprol  ABX  UTI thru 11/19 Eventual rehab  11/19 Last day of Invanz, awaiting [facility] bed with HD chair

## 2022-11-20 LAB
ALBUMIN SERPL ELPH-MCNC: 2.9 G/DL — LOW (ref 3.3–5)
ALP SERPL-CCNC: 132 U/L — HIGH (ref 40–120)
ALT FLD-CCNC: 9 U/L — LOW (ref 10–45)
ANION GAP SERPL CALC-SCNC: 12 MMOL/L — SIGNIFICANT CHANGE UP (ref 5–17)
AST SERPL-CCNC: 16 U/L — SIGNIFICANT CHANGE UP (ref 10–40)
BILIRUB SERPL-MCNC: 0.4 MG/DL — SIGNIFICANT CHANGE UP (ref 0.2–1.2)
BUN SERPL-MCNC: 14 MG/DL — SIGNIFICANT CHANGE UP (ref 7–23)
CALCIUM SERPL-MCNC: 8.6 MG/DL — SIGNIFICANT CHANGE UP (ref 8.4–10.5)
CHLORIDE SERPL-SCNC: 102 MMOL/L — SIGNIFICANT CHANGE UP (ref 96–108)
CO2 SERPL-SCNC: 26 MMOL/L — SIGNIFICANT CHANGE UP (ref 22–31)
CREAT SERPL-MCNC: 1.98 MG/DL — HIGH (ref 0.5–1.3)
EGFR: 27 ML/MIN/1.73M2 — LOW
GLUCOSE BLDC GLUCOMTR-MCNC: 139 MG/DL — HIGH (ref 70–99)
GLUCOSE BLDC GLUCOMTR-MCNC: 71 MG/DL — SIGNIFICANT CHANGE UP (ref 70–99)
GLUCOSE BLDC GLUCOMTR-MCNC: 80 MG/DL — SIGNIFICANT CHANGE UP (ref 70–99)
GLUCOSE BLDC GLUCOMTR-MCNC: 87 MG/DL — SIGNIFICANT CHANGE UP (ref 70–99)
GLUCOSE BLDC GLUCOMTR-MCNC: 92 MG/DL — SIGNIFICANT CHANGE UP (ref 70–99)
GLUCOSE BLDC GLUCOMTR-MCNC: 93 MG/DL — SIGNIFICANT CHANGE UP (ref 70–99)
GLUCOSE SERPL-MCNC: 83 MG/DL — SIGNIFICANT CHANGE UP (ref 70–99)
HAV IGM SER-ACNC: SIGNIFICANT CHANGE UP
HBV CORE IGM SER-ACNC: SIGNIFICANT CHANGE UP
HBV SURFACE AG SER-ACNC: SIGNIFICANT CHANGE UP
HBV SURFACE AG SER-ACNC: SIGNIFICANT CHANGE UP
HCT VFR BLD CALC: 32.2 % — LOW (ref 34.5–45)
HCV AB S/CO SERPL IA: 0.07 S/CO — SIGNIFICANT CHANGE UP (ref 0–0.99)
HCV AB SERPL-IMP: SIGNIFICANT CHANGE UP
HGB BLD-MCNC: 10.1 G/DL — LOW (ref 11.5–15.5)
MAGNESIUM SERPL-MCNC: 2.2 MG/DL — SIGNIFICANT CHANGE UP (ref 1.6–2.6)
MCHC RBC-ENTMCNC: 30.7 PG — SIGNIFICANT CHANGE UP (ref 27–34)
MCHC RBC-ENTMCNC: 31.4 GM/DL — LOW (ref 32–36)
MCV RBC AUTO: 97.9 FL — SIGNIFICANT CHANGE UP (ref 80–100)
NRBC # BLD: 0 /100 WBCS — SIGNIFICANT CHANGE UP (ref 0–0)
PHOSPHATE SERPL-MCNC: 4 MG/DL — SIGNIFICANT CHANGE UP (ref 2.5–4.5)
PLATELET # BLD AUTO: 238 K/UL — SIGNIFICANT CHANGE UP (ref 150–400)
POTASSIUM SERPL-MCNC: 4.7 MMOL/L — SIGNIFICANT CHANGE UP (ref 3.5–5.3)
POTASSIUM SERPL-SCNC: 4.7 MMOL/L — SIGNIFICANT CHANGE UP (ref 3.5–5.3)
PROT SERPL-MCNC: 6.3 G/DL — SIGNIFICANT CHANGE UP (ref 6–8.3)
RBC # BLD: 3.29 M/UL — LOW (ref 3.8–5.2)
RBC # FLD: 17.6 % — HIGH (ref 10.3–14.5)
SODIUM SERPL-SCNC: 140 MMOL/L — SIGNIFICANT CHANGE UP (ref 135–145)
WBC # BLD: 12.32 K/UL — HIGH (ref 3.8–10.5)
WBC # FLD AUTO: 12.32 K/UL — HIGH (ref 3.8–10.5)

## 2022-11-20 PROCEDURE — 71045 X-RAY EXAM CHEST 1 VIEW: CPT | Mod: 26

## 2022-11-20 RX ORDER — LOPERAMIDE HCL 2 MG
2 TABLET ORAL ONCE
Refills: 0 | Status: COMPLETED | OUTPATIENT
Start: 2022-11-20 | End: 2022-11-20

## 2022-11-20 RX ADMIN — Medication 10 MILLIGRAM(S): at 05:46

## 2022-11-20 RX ADMIN — Medication 600 MILLIGRAM(S): at 05:46

## 2022-11-20 RX ADMIN — INSULIN GLARGINE 10 UNIT(S): 100 INJECTION, SOLUTION SUBCUTANEOUS at 08:23

## 2022-11-20 RX ADMIN — Medication 5 UNIT(S): at 16:57

## 2022-11-20 RX ADMIN — Medication 650 MILLIGRAM(S): at 08:34

## 2022-11-20 RX ADMIN — AMLODIPINE BESYLATE 10 MILLIGRAM(S): 2.5 TABLET ORAL at 05:46

## 2022-11-20 RX ADMIN — Medication 5 UNIT(S): at 08:13

## 2022-11-20 RX ADMIN — Medication 5 UNIT(S): at 11:30

## 2022-11-20 RX ADMIN — LOSARTAN POTASSIUM 25 MILLIGRAM(S): 100 TABLET, FILM COATED ORAL at 05:46

## 2022-11-20 RX ADMIN — ENOXAPARIN SODIUM 30 MILLIGRAM(S): 100 INJECTION SUBCUTANEOUS at 17:23

## 2022-11-20 RX ADMIN — Medication 650 MILLIGRAM(S): at 09:04

## 2022-11-20 RX ADMIN — MONTELUKAST 10 MILLIGRAM(S): 4 TABLET, CHEWABLE ORAL at 11:25

## 2022-11-20 RX ADMIN — INSULIN GLARGINE 10 UNIT(S): 100 INJECTION, SOLUTION SUBCUTANEOUS at 21:59

## 2022-11-20 RX ADMIN — CHLORHEXIDINE GLUCONATE 1 APPLICATION(S): 213 SOLUTION TOPICAL at 11:25

## 2022-11-20 RX ADMIN — FAMOTIDINE 20 MILLIGRAM(S): 10 INJECTION INTRAVENOUS at 11:25

## 2022-11-20 RX ADMIN — Medication 100 MILLIGRAM(S): at 05:46

## 2022-11-20 RX ADMIN — ATORVASTATIN CALCIUM 80 MILLIGRAM(S): 80 TABLET, FILM COATED ORAL at 21:59

## 2022-11-20 RX ADMIN — ENOXAPARIN SODIUM 30 MILLIGRAM(S): 100 INJECTION SUBCUTANEOUS at 05:45

## 2022-11-20 RX ADMIN — Medication 10 MILLIGRAM(S): at 17:22

## 2022-11-20 RX ADMIN — Medication 2 MILLIGRAM(S): at 11:25

## 2022-11-20 RX ADMIN — Medication 81 MILLIGRAM(S): at 11:25

## 2022-11-20 RX ADMIN — Medication 600 MILLIGRAM(S): at 17:22

## 2022-11-20 NOTE — PROGRESS NOTE ADULT - PROBLEM SELECTOR PLAN 3
ID Dr Fajardo  #Leukocytosis- CT without clear evidence for infection, bacteruria   Urine culture growing E.coli esbl    Mertapenem  IVPB 500 milliGRAM(s) IV Intermittent every 24 hours started 11/15- completed course

## 2022-11-20 NOTE — PROGRESS NOTE ADULT - ASSESSMENT
72F HTN, HLD, DM, CKD who presented to [facility] initially with MAR on CKD with acute uremia and metabolic derangements requiring urgent HD and was COVID-19 positive. During HD went into atrial fibrillation and started on HD, subsequently developing GIB thought 2/2 AC and acute uremia. Also on amiodarone for Afib. On TTE noted to have a moderate to large pericardial effusion with early echocardiographic evidence of tamponade. Clinically she is not hypotensive and she tolerates the fluid shifts related to HD. Patient today was transferred to [facility] CTS Dr. Gabriel for evaluation of Pericardial Effusion.  10/20 had pericardiocentesis pericardial drain which was draining minimally, and removed 10/27.  Cath 11/2 revealed triple vessel CAD referred for CABG eval with Dr Mann  11/9/22 cabg x 3 LIMA-LAD, SVG-OM, SVG-OM   esrd   Insulin infusion for hyperglycemia  Vascular surgery to evaluate AVF :US duplex obtained today -- AVF is patent with non-occlusive thrombus and low flow. No acute vascular intervention at this time. Patient can follow up with Dr. Galeano outpatient for follow up.   ID for UTI- invanz  11/15 Transferred to [address] [place] [address]   11.16 VSS ABX per ID     11/17  HD today Abx as per ID Eventual rehab  11/18 Change to Toprol  ABX  UTI thru 11/19 Eventual rehab  11/19 Last day of Invanz, awaiting [facility] bed with HD chair, HD today  11/20 Multiple episodes of diarrhea, PND cdiff sample, Imodium x 1 as per TIAGO Carrasco to chair today, PT eval

## 2022-11-20 NOTE — PROGRESS NOTE ADULT - ASSESSMENT
on room air resting in bed  afebrile  attests to feeling much better  s/p HD yesterday with zero net fluid loss  Next HD Monday     acetaminophen     Tablet .. 650 milliGRAM(s) Oral every 6 hours PRN  amLODIPine   Tablet 10 milliGRAM(s) Oral daily  aspirin enteric coated 81 milliGRAM(s) Oral daily  atorvastatin 80 milliGRAM(s) Oral at bedtime  busPIRone 10 milliGRAM(s) Oral two times a day  chlorhexidine 2% Cloths 1 Application(s) Topical daily  dextrose 50% Injectable 50 milliLiter(s) IV Push every 15 minutes  dextrose 50% Injectable 25 milliLiter(s) IV Push every 15 minutes  enoxaparin Injectable 30 milliGRAM(s) SubCutaneous every 12 hours  famotidine    Tablet 20 milliGRAM(s) Oral daily  gemfibrozil 600 milliGRAM(s) Oral two times a day  insulin glargine Injectable (LANTUS) 10 Unit(s) SubCutaneous every morning  insulin glargine Injectable (LANTUS) 10 Unit(s) SubCutaneous at bedtime  insulin lispro (ADMELOG) corrective regimen sliding scale   SubCutaneous three times a day before meals  insulin lispro Injectable (ADMELOG) 5 Unit(s) SubCutaneous before lunch  insulin lispro Injectable (ADMELOG) 5 Unit(s) SubCutaneous before dinner  insulin lispro Injectable (ADMELOG) 5 Unit(s) SubCutaneous before breakfast  losartan 25 milliGRAM(s) Oral daily  metoprolol succinate  milliGRAM(s) Oral daily  montelukast 10 milliGRAM(s) Oral daily  polyethylene glycol 3350 17 Gram(s) Oral daily  senna 2 Tablet(s) Oral at bedtime      VITAL:  T(C): , Max: 36.9 (11-19-22 @ 18:17)  T(F): , Max: 98.4 (11-19-22 @ 18:17)  HR: 87 (11-20-22 @ 11:05)  BP: 106/69 (11-20-22 @ 11:05)  BP(mean): 81 (11-20-22 @ 11:05)  RR: 18 (11-20-22 @ 11:05)  SpO2: 97% (11-20-22 @ 11:05)  Wt(kg): --    11-19-22 @ 07:01  -  11-20-22 @ 07:00  --------------------------------------------------------  IN: 1540 mL / OUT: 1200 mL / NET: 340 mL    11-20-22 @ 07:01  -  11-20-22 @ 15:40  --------------------------------------------------------  IN: 440 mL / OUT: 0 mL / NET: 440 mL        PHYSICAL EXAM:  Constitutional: NAD  HEENT: anicteric sclera, oropharynx clear, MMM  Neck: No JVD  Respiratory: CTAB, no wheezes, rales or rhonchi  Cardiovascular: S1, S2, RRR  Gastrointestinal: BS+, soft, NT/ND  Extremities: No cyanosis or clubbing. No peripheral edema  Neurological: A/O x 3,   Psychiatric: Normal mood, normal affect  : No CVA tenderness. No ritter.   Skin: No rashes  Vascular Access: Left  AVF (with weak  thrill  and faint  bruit)    LABS:                          10.1   12.32 )-----------( 238      ( 20 Nov 2022 07:20 )             32.2     Na(140)/K(4.7)/Cl(102)/HCO3(26)/BUN(14)/Cr(1.98)Glu(83)/Ca(8.6)/Mg(2.2)/PO4(4.0)    11-20 @ 07:04  Na(139)/K(4.9)/Cl(105)/HCO3(23)/BUN(23)/Cr(2.45)Glu(80)/Ca(8.4)/Mg(--)/PO4(--)    11-19 @ 06:03  Na(141)/K(4.8)/Cl(103)/HCO3(26)/BUN(20)/Cr(2.29)Glu(85)/Ca(8.0)/Mg(1.9)/PO4(3.9)    11-18 @ 05:35            ASSESSMENT/PLAN  72F w/ HTN, HLD, DM, CKD, 10/20/22 from OSH with uremia, COVID-19, and pericardial effusion    (1)Renal - newly ESRD-HD;  s/p  HD yesterday with zero net fluid loss  (2)Lytes- controlled  (4)CV - reasonable BP/volume  (5)Anemia- hgb stable  (6)CTS - s/p C3L on 11/9      RECOMMEND:  (1)HD Monday  (2)AV access repair as outpatient  (3) Retacrit with  HD   (4) Dose medications for intermittent HD.     Waiting Rehab placement       Rich Ann NP-BC  [email or web address]  [phone]

## 2022-11-20 NOTE — PROGRESS NOTE ADULT - SUBJECTIVE AND OBJECTIVE BOX
VITAL SIGNS    Telemetry:  70-90   Overnight Events: no acute events    Vital Signs Last 24 Hrs  T(C): 36.6 (22 @ 11:05), Max: 36.9 (22 @ 18:17)  T(F): 97.8 (22 @ 11:05), Max: 98.4 (22 @ 18:17)  HR: 87 (22 @ 11:05) (68 - 87)  BP: 106/69 (22 @ 11:05) (106/69 - 151/70)  RR: 18 (22 @ 11:05) (18 - 18)  SpO2: 97% (22 @ 11:05) (96% - 100%)             @ 07:01  -   @ 07:00  --------------------------------------------------------  IN: 1540 mL / OUT: 1200 mL / NET: 340 mL     @ 07:01  -   @ 14:53  --------------------------------------------------------  IN: 440 mL / OUT: 0 mL / NET: 440 mL       Daily     Daily Weight in k.5 (2022 20:18)  Admit Wt: Drug Dosing Weight  Height (cm): 162.6 (2022 07:04)  Weight (kg): 85.3 (2022 07:04)  BMI (kg/m2): 32.3 (2022 07:04)  BSA (m2): 1.91 (2022 07:04)    Bilirubin Total, Serum: 0.4 mg/dL ( @ 07:04)    CAPILLARY BLOOD GLUCOSE      POCT Blood Glucose.: 80 mg/dL (2022 11:20)  POCT Blood Glucose.: 87 mg/dL (2022 07:36)  POCT Blood Glucose.: 92 mg/dL (2022 20:58)  POCT Blood Glucose.: 128 mg/dL (2022 17:12)  POCT Blood Glucose.: 82 mg/dL (2022 16:50)  POCT Blood Glucose.: 63 mg/dL (2022 16:33)  POCT Blood Glucose.: 64 mg/dL (2022 16:33)          acetaminophen     Tablet .. 650 milliGRAM(s) Oral every 6 hours PRN  amLODIPine   Tablet 10 milliGRAM(s) Oral daily  aspirin enteric coated 81 milliGRAM(s) Oral daily  atorvastatin 80 milliGRAM(s) Oral at bedtime  busPIRone 10 milliGRAM(s) Oral two times a day  chlorhexidine 2% Cloths 1 Application(s) Topical daily  dextrose 50% Injectable 50 milliLiter(s) IV Push every 15 minutes  dextrose 50% Injectable 25 milliLiter(s) IV Push every 15 minutes  enoxaparin Injectable 30 milliGRAM(s) SubCutaneous every 12 hours  famotidine    Tablet 20 milliGRAM(s) Oral daily  gemfibrozil 600 milliGRAM(s) Oral two times a day  insulin glargine Injectable (LANTUS) 10 Unit(s) SubCutaneous every morning  insulin glargine Injectable (LANTUS) 10 Unit(s) SubCutaneous at bedtime  insulin lispro (ADMELOG) corrective regimen sliding scale   SubCutaneous three times a day before meals  insulin lispro Injectable (ADMELOG) 5 Unit(s) SubCutaneous before breakfast  insulin lispro Injectable (ADMELOG) 5 Unit(s) SubCutaneous before lunch  insulin lispro Injectable (ADMELOG) 5 Unit(s) SubCutaneous before dinner  losartan 25 milliGRAM(s) Oral daily  metoprolol succinate  milliGRAM(s) Oral daily  montelukast 10 milliGRAM(s) Oral daily  polyethylene glycol 3350 17 Gram(s) Oral daily  senna 2 Tablet(s) Oral at bedtime                            10.1   12.32 )-----------( 238      ( 2022 07:20 )             32.2     11-20    140  |  102  |  14  ----------------------------<  83  4.7   |  26  |  1.98<H>    Ca    8.6      2022 07:04  Phos  4.0     -  Mg     2.2     -20    TPro  6.3  /  Alb  2.9<L>  /  TBili  0.4  /  DBili  x   /  AST  16  /  ALT  9<L>  /  AlkPhos  132<H>          PHYSICAL EXAM    Subjective: "Hi"   General: well appearing female, in no acute distress, sitting  in chair. POD #11  Neurology: alert and oriented x 3, nonfocal, no gross deficits  CV : S1/S2, no murmurs/rubs/gallops  Sternal Wound : CDI RADHA, Stable, +PW A/V wires isolated  Lungs: clear. RR easy, unlabored   Abdomen: soft, nontender, nondistended, positive bowel sounds, +bowel movement today, +diarrhea, Neg N/V   :  pt voiding without difficulty   Extremities: RODRIGES; noedema, neg calf tenderness. PPP bilaterally, groins stable        Pacing Wires        [  ]   Settings:                                  Isolated  [ x ]    Coumadin    [ ] YES          [x]      NO         Eliquis    [ ] YES          [x]      NO       Heparin gtt   [  ] YES              [x]   NO  Dose: LVX 30 BID    Disposition:  Home [   ]     Rehab [ x  ]       OR Date:    Plan of care discussed with Cardiothoracic Team at AM rounds.

## 2022-11-20 NOTE — PROGRESS NOTE ADULT - PROBLEM SELECTOR PLAN 1
Asa, 81 Atorvastatin 80 ,Toprol 100 qd  gemfibrozil 600 milliGRAM(s) Oral two times a day  Losartan 25 qd  Chest PT,  Incentive spirometry,   wound care and assessment.  OOB to chair ambulate  Sacrum, b/l buttocks: routine pericare with Eusebio,   disposition to Rehab awaiting HD chair  PW isolated- remove tomorrow

## 2022-11-20 NOTE — PROGRESS NOTE ADULT - PROBLEM SELECTOR PLAN 2
Dr Cristobal nephrology  RT SCL permacath  LEFT AVF thrombosed - Patient can follow up with Dr. Galeano outpatient for follow up.   HD TIW

## 2022-11-20 NOTE — PROGRESS NOTE ADULT - NSPROGADDITIONALINFOA_GEN_ALL_CORE
[facility] Division of Hospital Medicine  Aura Luna MD  Pager (M-F, 8A-5P): [phone]  Other Times:  [phone]
d/w ACP
d/w ACP
Sadia Schmidt, AGACNP-BC  Cardiovascular & Thoracic Surgery  [address] [place] [address]  Office: [phone]    Available on [employer] Teams  Spectra: [phone]  New Consults: [id number]
Sadia Schmidt, AGACNP-BC  Cardiovascular & Thoracic Surgery  [address] [place] [address]  Office: [phone]    Available on [employer] Teams  Spectra: [phone]  New Consults: [id number]
d/w ACP
d/w ACP    Bonny Rene MD  Division of Hospital Medicine  Available on [employer] Teams
d/w ACP  d/w renal
d/w ACP  d/w card
d/w ACP
d/w ACP

## 2022-11-21 ENCOUNTER — TRANSCRIPTION ENCOUNTER (OUTPATIENT)
Age: 72
End: 2022-11-21

## 2022-11-21 LAB
ALBUMIN SERPL ELPH-MCNC: 2.8 G/DL — LOW (ref 3.3–5)
ALP SERPL-CCNC: 122 U/L — HIGH (ref 40–120)
ALT FLD-CCNC: 10 U/L — SIGNIFICANT CHANGE UP (ref 10–45)
ANION GAP SERPL CALC-SCNC: 12 MMOL/L — SIGNIFICANT CHANGE UP (ref 5–17)
APTT BLD: 30.5 SEC — SIGNIFICANT CHANGE UP (ref 27.5–35.5)
AST SERPL-CCNC: 14 U/L — SIGNIFICANT CHANGE UP (ref 10–40)
BASOPHILS # BLD AUTO: 0.09 K/UL — SIGNIFICANT CHANGE UP (ref 0–0.2)
BASOPHILS NFR BLD AUTO: 0.7 % — SIGNIFICANT CHANGE UP (ref 0–2)
BILIRUB SERPL-MCNC: 0.5 MG/DL — SIGNIFICANT CHANGE UP (ref 0.2–1.2)
BUN SERPL-MCNC: 21 MG/DL — SIGNIFICANT CHANGE UP (ref 7–23)
CALCIUM SERPL-MCNC: 8.4 MG/DL — SIGNIFICANT CHANGE UP (ref 8.4–10.5)
CHLORIDE SERPL-SCNC: 103 MMOL/L — SIGNIFICANT CHANGE UP (ref 96–108)
CO2 SERPL-SCNC: 25 MMOL/L — SIGNIFICANT CHANGE UP (ref 22–31)
CREAT SERPL-MCNC: 2.54 MG/DL — HIGH (ref 0.5–1.3)
EGFR: 20 ML/MIN/1.73M2 — LOW
EOSINOPHIL # BLD AUTO: 0.2 K/UL — SIGNIFICANT CHANGE UP (ref 0–0.5)
EOSINOPHIL NFR BLD AUTO: 1.6 % — SIGNIFICANT CHANGE UP (ref 0–6)
GLUCOSE BLDC GLUCOMTR-MCNC: 122 MG/DL — HIGH (ref 70–99)
GLUCOSE BLDC GLUCOMTR-MCNC: 164 MG/DL — HIGH (ref 70–99)
GLUCOSE BLDC GLUCOMTR-MCNC: 171 MG/DL — HIGH (ref 70–99)
GLUCOSE BLDC GLUCOMTR-MCNC: 45 MG/DL — CRITICAL LOW (ref 70–99)
GLUCOSE BLDC GLUCOMTR-MCNC: 50 MG/DL — CRITICAL LOW (ref 70–99)
GLUCOSE BLDC GLUCOMTR-MCNC: 55 MG/DL — LOW (ref 70–99)
GLUCOSE BLDC GLUCOMTR-MCNC: 80 MG/DL — SIGNIFICANT CHANGE UP (ref 70–99)
GLUCOSE BLDC GLUCOMTR-MCNC: 82 MG/DL — SIGNIFICANT CHANGE UP (ref 70–99)
GLUCOSE SERPL-MCNC: 70 MG/DL — SIGNIFICANT CHANGE UP (ref 70–99)
HCT VFR BLD CALC: 32 % — LOW (ref 34.5–45)
HGB BLD-MCNC: 9.6 G/DL — LOW (ref 11.5–15.5)
IMM GRANULOCYTES NFR BLD AUTO: 2.2 % — HIGH (ref 0–0.9)
INR BLD: 1.16 RATIO — SIGNIFICANT CHANGE UP (ref 0.88–1.16)
LYMPHOCYTES # BLD AUTO: 16.8 % — SIGNIFICANT CHANGE UP (ref 13–44)
LYMPHOCYTES # BLD AUTO: 2.07 K/UL — SIGNIFICANT CHANGE UP (ref 1–3.3)
MCHC RBC-ENTMCNC: 29.9 PG — SIGNIFICANT CHANGE UP (ref 27–34)
MCHC RBC-ENTMCNC: 30 GM/DL — LOW (ref 32–36)
MCV RBC AUTO: 99.7 FL — SIGNIFICANT CHANGE UP (ref 80–100)
MONOCYTES # BLD AUTO: 1.07 K/UL — HIGH (ref 0–0.9)
MONOCYTES NFR BLD AUTO: 8.7 % — SIGNIFICANT CHANGE UP (ref 2–14)
NEUTROPHILS # BLD AUTO: 8.62 K/UL — HIGH (ref 1.8–7.4)
NEUTROPHILS NFR BLD AUTO: 70 % — SIGNIFICANT CHANGE UP (ref 43–77)
NRBC # BLD: 0 /100 WBCS — SIGNIFICANT CHANGE UP (ref 0–0)
PLATELET # BLD AUTO: 250 K/UL — SIGNIFICANT CHANGE UP (ref 150–400)
POTASSIUM SERPL-MCNC: 4.7 MMOL/L — SIGNIFICANT CHANGE UP (ref 3.5–5.3)
POTASSIUM SERPL-SCNC: 4.7 MMOL/L — SIGNIFICANT CHANGE UP (ref 3.5–5.3)
PROT SERPL-MCNC: 6.2 G/DL — SIGNIFICANT CHANGE UP (ref 6–8.3)
PROTHROM AB SERPL-ACNC: 13.4 SEC — SIGNIFICANT CHANGE UP (ref 10.5–13.4)
RBC # BLD: 3.21 M/UL — LOW (ref 3.8–5.2)
RBC # FLD: 18 % — HIGH (ref 10.3–14.5)
SARS-COV-2 RNA SPEC QL NAA+PROBE: DETECTED
SODIUM SERPL-SCNC: 140 MMOL/L — SIGNIFICANT CHANGE UP (ref 135–145)
WBC # BLD: 12.32 K/UL — HIGH (ref 3.8–10.5)
WBC # FLD AUTO: 12.32 K/UL — HIGH (ref 3.8–10.5)

## 2022-11-21 PROCEDURE — 99232 SBSQ HOSP IP/OBS MODERATE 35: CPT

## 2022-11-21 RX ORDER — DEXTROSE 50 % IN WATER 50 %
25 SYRINGE (ML) INTRAVENOUS
Refills: 0 | Status: COMPLETED | OUTPATIENT
Start: 2022-11-21 | End: 2022-11-21

## 2022-11-21 RX ORDER — INSULIN LISPRO 100/ML
VIAL (ML) SUBCUTANEOUS
Refills: 0 | Status: DISCONTINUED | OUTPATIENT
Start: 2022-11-21 | End: 2022-11-22

## 2022-11-21 RX ORDER — INSULIN LISPRO 100/ML
VIAL (ML) SUBCUTANEOUS AT BEDTIME
Refills: 0 | Status: DISCONTINUED | OUTPATIENT
Start: 2022-11-21 | End: 2022-11-22

## 2022-11-21 RX ADMIN — Medication 25 MILLILITER(S): at 16:45

## 2022-11-21 RX ADMIN — Medication 600 MILLIGRAM(S): at 05:11

## 2022-11-21 RX ADMIN — ENOXAPARIN SODIUM 30 MILLIGRAM(S): 100 INJECTION SUBCUTANEOUS at 17:35

## 2022-11-21 RX ADMIN — Medication 650 MILLIGRAM(S): at 09:06

## 2022-11-21 RX ADMIN — Medication 5 UNIT(S): at 08:34

## 2022-11-21 RX ADMIN — Medication 10 MILLIGRAM(S): at 17:34

## 2022-11-21 RX ADMIN — ENOXAPARIN SODIUM 30 MILLIGRAM(S): 100 INJECTION SUBCUTANEOUS at 05:11

## 2022-11-21 RX ADMIN — CHLORHEXIDINE GLUCONATE 1 APPLICATION(S): 213 SOLUTION TOPICAL at 11:47

## 2022-11-21 RX ADMIN — Medication 650 MILLIGRAM(S): at 08:36

## 2022-11-21 RX ADMIN — Medication 600 MILLIGRAM(S): at 17:34

## 2022-11-21 RX ADMIN — INSULIN GLARGINE 10 UNIT(S): 100 INJECTION, SOLUTION SUBCUTANEOUS at 21:56

## 2022-11-21 RX ADMIN — Medication 5: at 11:43

## 2022-11-21 RX ADMIN — FAMOTIDINE 20 MILLIGRAM(S): 10 INJECTION INTRAVENOUS at 11:44

## 2022-11-21 RX ADMIN — ATORVASTATIN CALCIUM 80 MILLIGRAM(S): 80 TABLET, FILM COATED ORAL at 21:56

## 2022-11-21 RX ADMIN — Medication 100 MILLIGRAM(S): at 05:11

## 2022-11-21 RX ADMIN — MONTELUKAST 10 MILLIGRAM(S): 4 TABLET, CHEWABLE ORAL at 11:44

## 2022-11-21 RX ADMIN — Medication 5 UNIT(S): at 11:43

## 2022-11-21 RX ADMIN — AMLODIPINE BESYLATE 10 MILLIGRAM(S): 2.5 TABLET ORAL at 05:11

## 2022-11-21 RX ADMIN — Medication 10 MILLIGRAM(S): at 05:11

## 2022-11-21 RX ADMIN — Medication 81 MILLIGRAM(S): at 11:44

## 2022-11-21 RX ADMIN — LOSARTAN POTASSIUM 25 MILLIGRAM(S): 100 TABLET, FILM COATED ORAL at 05:11

## 2022-11-21 NOTE — DISCHARGE NOTE PROVIDER - NSDCPNSUBOBJ_GEN_ALL_CORE
72F HTN, HLD, DM, CKD who presented to [facility] initially with MAR on CKD with acute uremia and metabolic derangements requiring urgent HD and was COVID-19 positive. During HD went into atrial fibrillation and started on HD, subsequently developing GIB thought 2/2 AC and acute uremia. Also on amiodarone for Afib. On TTE noted to have a moderate to large pericardial effusion with early echocardiographic evidence of tamponade. Clinically she is not hypotensive and she tolerates the fluid shifts related to HD. Patient today was transferred to [facility] CTS Dr. Gabriel for evaluation of Pericardial Effusion.  10/20 had pericardiocentesis pericardial drain which was draining minimally, and removed 10/27.  Cath 11/2 revealed triple vessel CAD referred for CABG eval with Dr Mann  11/9/22 cabg x 3 LIMA-LAD, SVG-OM, SVG-OM   esrd   Insulin infusion for hyperglycemia  Vascular surgery to evaluate AVF :US duplex obtained today -- AVF is patent with non-occlusive thrombus and low flow. No acute vascular intervention at this time. Patient can follow up with Dr. Galeano outpatient for follow up.   ID for UTI- invanz  11/15 Transferred to [address] [place] [address]   11.16 VSS ABX per ID     11/17  HD today Abx as per ID Eventual rehab  11/18 Change to Toprol  ABX  UTI thru 11/19 Eventual rehab  11/19 Last day of Invanz, awaiting [facility] bed with HD chair, HD today  11/20 Multiple episodes of diarrhea, PND cdiff sample, Imodium x 1 as per TIAGO Carrasco to chair today, PT eval   11/21   dc   am lantus dosing,     vsss   rt cw  permacath   HD  plan  11/22 VSS. Imodium for multiple formed bowel movements. Cleared for discharge by Dr. Mann to rehab. Plan to continue with HD MWF

## 2022-11-21 NOTE — PROGRESS NOTE ADULT - PROBLEM SELECTOR PROBLEM 2
Acute respiratory failure with hypoxia
ESRD (end stage renal disease)
Pericardial effusion, acute
ESRD (end stage renal disease)
ESRD (end stage renal disease)
Acute respiratory failure with hypoxia
ESRD (end stage renal disease)
Pericardial effusion, acute
CAD (coronary artery disease)
ESRD (end stage renal disease)
Pericardial effusion, acute
CAD, multiple vessel
Transaminitis
Acute respiratory failure with hypoxia
ESRD (end stage renal disease)
Acute respiratory failure with hypoxia
ESRD (end stage renal disease)
Acute respiratory failure with hypoxia
Acute respiratory failure with hypoxia
Pericardial effusion, acute
Pericardial effusion, acute
Acute respiratory failure with hypoxia

## 2022-11-21 NOTE — PROGRESS NOTE ADULT - PROBLEM SELECTOR PLAN 1
Asa, 81 Atorvastatin 80 ,Toprol 100 qd  gemfibrozil 600 milliGRAM(s) Oral two times a day  Losartan 25 qd  Chest PT,  Incentive spirometry,   wound care and assessment.  OOB to chair ambulate  Sacrum, b/l buttocks: routine pericare with Eusebio,   disposition to Rehab awaiting HD chair  PW isolated- remove tomorrow Asa, 81 Atorvastati ,Toprol 100 qd  gemfibrozil 600 milliGRAM(s) Oral two times a day  Losartan 25 qd    Sacrum, b/l buttocks: routine pericare with Eusebio,   disposition to Rehab awaiting HD chair  hopeful tuesday

## 2022-11-21 NOTE — PROGRESS NOTE ADULT - SUBJECTIVE AND OBJECTIVE BOX
[facility] - Lucio Madrid, Bladimir, Autumn Santillan Goodger  Office Number:  [phone]    Patient resting comfortably in bed in NAD.  Laying flat with no respiratory distress.  No complaints of chest pain, dyspnea, palpitations, PND, or orthopnea.    F/U for:  CAD    Telemetry:  SR    MEDICATIONS  (STANDING):  amLODIPine   Tablet 10 milliGRAM(s) Oral daily  aspirin enteric coated 81 milliGRAM(s) Oral daily  atorvastatin 80 milliGRAM(s) Oral at bedtime  busPIRone 10 milliGRAM(s) Oral two times a day  chlorhexidine 2% Cloths 1 Application(s) Topical daily  dextrose 50% Injectable 50 milliLiter(s) IV Push every 15 minutes  dextrose 50% Injectable 25 milliLiter(s) IV Push every 15 minutes  enoxaparin Injectable 30 milliGRAM(s) SubCutaneous every 12 hours  famotidine    Tablet 20 milliGRAM(s) Oral daily  gemfibrozil 600 milliGRAM(s) Oral two times a day  insulin glargine Injectable (LANTUS) 10 Unit(s) SubCutaneous every morning  insulin glargine Injectable (LANTUS) 10 Unit(s) SubCutaneous at bedtime  insulin lispro (ADMELOG) corrective regimen sliding scale   SubCutaneous three times a day before meals  insulin lispro Injectable (ADMELOG) 5 Unit(s) SubCutaneous before breakfast  insulin lispro Injectable (ADMELOG) 5 Unit(s) SubCutaneous before lunch  insulin lispro Injectable (ADMELOG) 5 Unit(s) SubCutaneous before dinner  losartan 25 milliGRAM(s) Oral daily  metoprolol succinate  milliGRAM(s) Oral daily  montelukast 10 milliGRAM(s) Oral daily  polyethylene glycol 3350 17 Gram(s) Oral daily  senna 2 Tablet(s) Oral at bedtime    MEDICATIONS  (PRN):  acetaminophen     Tablet .. 650 milliGRAM(s) Oral every 6 hours PRN Mild Pain (1 - 3)      Allergies    sulfa drugs (Rash)    Intolerances        Vital Signs Last 24 Hrs  T(C): 37 (21 Nov 2022 04:14), Max: 37 (21 Nov 2022 04:14)  T(F): 98.6 (21 Nov 2022 04:14), Max: 98.6 (21 Nov 2022 04:14)  HR: 77 (21 Nov 2022 04:14) (77 - 87)  BP: 118/70 (21 Nov 2022 04:14) (106/69 - 127/77)  BP(mean): 81 (20 Nov 2022 11:05) (81 - 81)  RR: 18 (21 Nov 2022 04:14) (18 - 18)  SpO2: 99% (21 Nov 2022 04:14) (96% - 99%)    Parameters below as of 21 Nov 2022 04:14  Patient On (Oxygen Delivery Method): room air        I&O's Summary    19 Nov 2022 07:01  -  20 Nov 2022 07:00  --------------------------------------------------------  IN: 1540 mL / OUT: 1200 mL / NET: 340 mL    20 Nov 2022 07:01  -  21 Nov 2022 06:59  --------------------------------------------------------  IN: 1080 mL / OUT: 200 mL / NET: 880 mL        ON EXAM:    Constitutional: well-nourished, well-developed, NAD   HEENT:  MMM, sclerae anicteric, conjunctivae clear, no oral cyanosis.  Pulmonary: Non-labored, breath sounds are clear bilaterally, No wheezing, rales or rhonchi  Cardiovascular: Regular, S1 and S2.  No murmur.  No rubs, gallops or clicks  Gastrointestinal: Bowel Sounds present, soft, nontender.   Lymph: No peripheral edema.   Neurological: Alert, no focal deficits  Skin: No rashes.  Psych:  Mood & affect appropriate    LABS: All Labs Reviewed:                        9.6    12.32 )-----------( 250      ( 21 Nov 2022 05:40 )             32.0                         10.1   12.32 )-----------( 238      ( 20 Nov 2022 07:20 )             32.2                         10.1   11.24 )-----------( 234      ( 19 Nov 2022 06:03 )             33.2     21 Nov 2022 05:39    140    |  103    |  21     ----------------------------<  70     4.7     |  25     |  2.54   20 Nov 2022 07:04    140    |  102    |  14     ----------------------------<  83     4.7     |  26     |  1.98   19 Nov 2022 06:03    139    |  105    |  23     ----------------------------<  80     4.9     |  23     |  2.45     Ca    8.4        21 Nov 2022 05:39  Ca    8.6        20 Nov 2022 07:04  Ca    8.4        19 Nov 2022 06:03  Phos  4.0       20 Nov 2022 07:04  Mg     2.2       20 Nov 2022 07:04    TPro  6.2    /  Alb  2.8    /  TBili  0.5    /  DBili  x      /  AST  14     /  ALT  10     /  AlkPhos  122    21 Nov 2022 05:39  TPro  6.3    /  Alb  2.9    /  TBili  0.4    /  DBili  x      /  AST  16     /  ALT  9      /  AlkPhos  132    20 Nov 2022 07:04    PT/INR - ( 21 Nov 2022 05:40 )   PT: 13.4 sec;   INR: 1.16 ratio         PTT - ( 21 Nov 2022 05:40 )  PTT:30.5 sec      Blood Culture:

## 2022-11-21 NOTE — PROGRESS NOTE ADULT - SUBJECTIVE AND OBJECTIVE BOX
[facility] [phone]        Patient seen and examined in bed.  She was the same         MEDICATIONS  (STANDING):  amLODIPine   Tablet 10 milliGRAM(s) Oral daily  aspirin enteric coated 81 milliGRAM(s) Oral daily  atorvastatin 80 milliGRAM(s) Oral at bedtime  busPIRone 10 milliGRAM(s) Oral two times a day  chlorhexidine 2% Cloths 1 Application(s) Topical daily  dextrose 50% Injectable 50 milliLiter(s) IV Push every 15 minutes  dextrose 50% Injectable 25 milliLiter(s) IV Push every 15 minutes  enoxaparin Injectable 30 milliGRAM(s) SubCutaneous every 12 hours  famotidine    Tablet 20 milliGRAM(s) Oral daily  gemfibrozil 600 milliGRAM(s) Oral two times a day  insulin glargine Injectable (LANTUS) 10 Unit(s) SubCutaneous at bedtime  insulin lispro (ADMELOG) corrective regimen sliding scale   SubCutaneous three times a day before meals  insulin lispro Injectable (ADMELOG) 5 Unit(s) SubCutaneous before breakfast  insulin lispro Injectable (ADMELOG) 5 Unit(s) SubCutaneous before lunch  insulin lispro Injectable (ADMELOG) 5 Unit(s) SubCutaneous before dinner  losartan 25 milliGRAM(s) Oral daily  metoprolol succinate  milliGRAM(s) Oral daily  montelukast 10 milliGRAM(s) Oral daily  polyethylene glycol 3350 17 Gram(s) Oral daily  senna 2 Tablet(s) Oral at bedtime      VITAL:  T(C): , Max: 37 (11-21-22 @ 04:14)  T(F): , Max: 98.6 (11-21-22 @ 04:14)  HR: 77 (11-21-22 @ 04:14)  BP: 118/70 (11-21-22 @ 04:14)  BP(mean): 81 (11-20-22 @ 11:05)  RR: 18 (11-21-22 @ 04:14)  SpO2: 99% (11-21-22 @ 04:14)  Wt(kg): --    I and O's:    11-20 @ 07:01  -  11-21 @ 07:00  --------------------------------------------------------  IN: 1080 mL / OUT: 200 mL / NET: 880 mL    11-21 @ 07:01  -  11-21 @ 10:07  --------------------------------------------------------  IN: 120 mL / OUT: 0 mL / NET: 120 mL          PHYSICAL EXAM:    Constitutional: NAD  Neck:  No JVD  Respiratory: CTAB/L  Cardiovascular: S1 and S2  Gastrointestinal: BS+, soft, NT/ND  Extremities: No peripheral edema  Neurological: A/O x 3, no focal deficits  Psychiatric: Normal mood, normal affect  : No Bah  Skin: No rashes  Access: perm cath and avf     LABS:                        9.6    12.32 )-----------( 250      ( 21 Nov 2022 05:40 )             32.0     11-21    140  |  103  |  21  ----------------------------<  70  4.7   |  25  |  2.54<H>    Ca    8.4      21 Nov 2022 05:39  Phos  4.0     11-20  Mg     2.2     11-20    TPro  6.2  /  Alb  2.8<L>  /  TBili  0.5  /  DBili  x   /  AST  14  /  ALT  10  /  AlkPhos  122<H>  11-21          Urine Studies:          RADIOLOGY & ADDITIONAL STUDIES:

## 2022-11-21 NOTE — PROGRESS NOTE ADULT - PROBLEM SELECTOR PLAN 2
rehab in am  if bed available  cut pw   day of dc rehab in am  if bed available  cut pw   tuesday    if  patient dc

## 2022-11-21 NOTE — PROGRESS NOTE ADULT - NUTRITIONAL ASSESSMENT
This patient has been assessed with a concern for Malnutrition and has been determined to have a diagnosis/diagnoses of Severe protein-calorie malnutrition.    This patient is being managed with:   Diet Renal Restrictions-  For patients receiving Renal Replacement - No Protein Restr No Conc K No Conc Phos Low Sodium  Entered: Oct 26 2022  7:19AM    
This patient has been assessed with a concern for Malnutrition and has been determined to have a diagnosis/diagnoses of Severe protein-calorie malnutrition.    This patient is being managed with:   Diet Renal Restrictions-  For patients receiving Renal Replacement - No Protein Restr No Conc K No Conc Phos Low Sodium  Entered: Oct 26 2022  7:19AM    
This patient has been assessed with a concern for Malnutrition and has been determined to have a diagnosis/diagnoses of Severe protein-calorie malnutrition.    This patient is being managed with:   Diet Regular-  Consistent Carbohydrate {No Snacks} (CSTCHO)  Entered: Nov 10 2022 10:20AM    
This patient has been assessed with a concern for Malnutrition and has been determined to have a diagnosis/diagnoses of Severe protein-calorie malnutrition.    This patient is being managed with:   Diet Regular-  Consistent Carbohydrate {No Snacks} (CSTCHO)  Entered: Nov 10 2022 10:20AM    The following pending diet order is being considered for treatment of Severe protein-calorie malnutrition:  Diet Renal Restrictions-  For patients receiving Renal Replacement - No Protein Restr No Conc K No Conc Phos Low Sodium  Consistent Carbohydrate {Evening Snack} (CSTCHOSN)  Supplement Feeding Modality:  Oral  Nepro Cans or Servings Per Day:  2       Frequency:  Daily  Entered: Nov 17 2022  5:23PM  
This patient has been assessed with a concern for Malnutrition and has been determined to have a diagnosis/diagnoses of Severe protein-calorie malnutrition.    This patient is being managed with:   Diet Renal Restrictions-  For patients receiving Renal Replacement - No Protein Restr No Conc K No Conc Phos Low Sodium  Entered: Oct 21 2022  2:02PM    
This patient has been assessed with a concern for Malnutrition and has been determined to have a diagnosis/diagnoses of Severe protein-calorie malnutrition.    This patient is being managed with:   Diet Renal Restrictions-  For patients receiving Renal Replacement - No Protein Restr No Conc K No Conc Phos Low Sodium  Entered: Oct 26 2022  7:19AM    
This patient has been assessed with a concern for Malnutrition and has been determined to have a diagnosis/diagnoses of Severe protein-calorie malnutrition.    This patient is being managed with:   Diet Renal Restrictions-  For patients receiving Renal Replacement - No Protein Restr No Conc K No Conc Phos Low Sodium  Entered: Oct 26 2022  7:19AM    
This patient has been assessed with a concern for Malnutrition and has been determined to have a diagnosis/diagnoses of Severe protein-calorie malnutrition.    This patient is being managed with:   Diet Regular-  Consistent Carbohydrate {No Snacks} (CSTCHO)  Entered: Nov 10 2022 10:20AM    
This patient has been assessed with a concern for Malnutrition and has been determined to have a diagnosis/diagnoses of Severe protein-calorie malnutrition.    This patient is being managed with:   Diet Renal Restrictions-  For patients receiving Renal Replacement - No Protein Restr No Conc K No Conc Phos Low Sodium  Entered: Oct 26 2022  7:19AM    
This patient has been assessed with a concern for Malnutrition and has been determined to have a diagnosis/diagnoses of Severe protein-calorie malnutrition.    This patient is being managed with:   Diet NPO-  Entered: Nov 9 2022  1:57PM    
This patient has been assessed with a concern for Malnutrition and has been determined to have a diagnosis/diagnoses of Severe protein-calorie malnutrition.    This patient is being managed with:   Diet Renal Restrictions-  For patients receiving Renal Replacement - No Protein Restr No Conc K No Conc Phos Low Sodium  Entered: Oct 21 2022  2:02PM    
This patient has been assessed with a concern for Malnutrition and has been determined to have a diagnosis/diagnoses of Severe protein-calorie malnutrition.    This patient is being managed with:   Diet Renal Restrictions-  For patients receiving Renal Replacement - No Protein Restr No Conc K No Conc Phos Low Sodium  Entered: Oct 26 2022  7:19AM    
This patient has been assessed with a concern for Malnutrition and has been determined to have a diagnosis/diagnoses of Severe protein-calorie malnutrition.    This patient is being managed with:   Diet Regular-  Consistent Carbohydrate {No Snacks} (CSTCHO)  Entered: Nov 10 2022 10:20AM    The following pending diet order is being considered for treatment of Severe protein-calorie malnutrition:  Diet Renal Restrictions-  For patients receiving Renal Replacement - No Protein Restr No Conc K No Conc Phos Low Sodium  Consistent Carbohydrate {Evening Snack} (CSTCHOSN)  Supplement Feeding Modality:  Oral  Nepro Cans or Servings Per Day:  2       Frequency:  Daily  Entered: Nov 17 2022  5:23PM  
This patient has been assessed with a concern for Malnutrition and has been determined to have a diagnosis/diagnoses of Severe protein-calorie malnutrition.    This patient is being managed with:   Diet Renal Restrictions-  For patients receiving Renal Replacement - No Protein Restr No Conc K No Conc Phos Low Sodium  Entered: Oct 21 2022  2:02PM    
This patient has been assessed with a concern for Malnutrition and has been determined to have a diagnosis/diagnoses of Severe protein-calorie malnutrition.    This patient is being managed with:   Diet Regular-  Consistent Carbohydrate {No Snacks} (CSTCHO)  Entered: Nov 10 2022 10:20AM    
This patient has been assessed with a concern for Malnutrition and has been determined to have a diagnosis/diagnoses of Severe protein-calorie malnutrition.    This patient is being managed with:   Diet Renal Restrictions-  For patients receiving Renal Replacement - No Protein Restr No Conc K No Conc Phos Low Sodium  Entered: Oct 26 2022  7:19AM    
This patient has been assessed with a concern for Malnutrition and has been determined to have a diagnosis/diagnoses of Severe protein-calorie malnutrition.    This patient is being managed with:   Diet Regular-  Consistent Carbohydrate {No Snacks} (CSTCHO)  Entered: Nov 10 2022 10:20AM    The following pending diet order is being considered for treatment of Severe protein-calorie malnutrition:  Diet Renal Restrictions-  For patients receiving Renal Replacement - No Protein Restr No Conc K No Conc Phos Low Sodium  Consistent Carbohydrate {Evening Snack} (CSTCHOSN)  Supplement Feeding Modality:  Oral  Nepro Cans or Servings Per Day:  2       Frequency:  Daily  Entered: Nov 17 2022  5:23PM  
This patient has been assessed with a concern for Malnutrition and has been determined to have a diagnosis/diagnoses of Severe protein-calorie malnutrition.    This patient is being managed with:   Diet Renal Restrictions-  For patients receiving Renal Replacement - No Protein Restr No Conc K No Conc Phos Low Sodium  Entered: Oct 26 2022  7:19AM    
This patient has been assessed with a concern for Malnutrition and has been determined to have a diagnosis/diagnoses of Severe protein-calorie malnutrition.    This patient is being managed with:   Diet Regular-  Consistent Carbohydrate {No Snacks} (CSTCHO)  Entered: Nov 10 2022 10:20AM    The following pending diet order is being considered for treatment of Severe protein-calorie malnutrition:  Diet Renal Restrictions-  For patients receiving Renal Replacement - No Protein Restr No Conc K No Conc Phos Low Sodium  Consistent Carbohydrate {Evening Snack} (CSTCHOSN)  Supplement Feeding Modality:  Oral  Nepro Cans or Servings Per Day:  2       Frequency:  Daily  Entered: Nov 17 2022  5:23PM  
This patient has been assessed with a concern for Malnutrition and has been determined to have a diagnosis/diagnoses of Severe protein-calorie malnutrition.    This patient is being managed with:   Diet NPO after Midnight-     NPO Start Date: 08-Nov-2022   NPO Start Time: 23:59  Except Medications     Special Instructions for Nursing:  Except Medications  Entered: Nov 8 2022  1:35PM    Diet Renal Restrictions-  For patients receiving Renal Replacement - No Protein Restr No Conc K No Conc Phos Low Sodium  Entered: Oct 26 2022  7:19AM    
This patient has been assessed with a concern for Malnutrition and has been determined to have a diagnosis/diagnoses of Severe protein-calorie malnutrition.    This patient is being managed with:   Diet Renal Restrictions-  For patients receiving Renal Replacement - No Protein Restr No Conc K No Conc Phos Low Sodium  Entered: Oct 21 2022  2:02PM    
This patient has been assessed with a concern for Malnutrition and has been determined to have a diagnosis/diagnoses of Severe protein-calorie malnutrition.    This patient is being managed with:   Diet Regular-  Consistent Carbohydrate {No Snacks} (CSTCHO)  Entered: Nov 10 2022 10:20AM    The following pending diet order is being considered for treatment of Severe protein-calorie malnutrition:  Diet Renal Restrictions-  For patients receiving Renal Replacement - No Protein Restr No Conc K No Conc Phos Low Sodium  Consistent Carbohydrate {Evening Snack} (CSTCHOSN)  Supplement Feeding Modality:  Oral  Nepro Cans or Servings Per Day:  2       Frequency:  Daily  Entered: Nov 17 2022  5:23PM  
This patient has been assessed with a concern for Malnutrition and has been determined to have a diagnosis/diagnoses of Severe protein-calorie malnutrition.    This patient is being managed with:   Diet Renal Restrictions-  For patients receiving Renal Replacement - No Protein Restr No Conc K No Conc Phos Low Sodium  Entered: Oct 26 2022  7:19AM    
This patient has been assessed with a concern for Malnutrition and has been determined to have a diagnosis/diagnoses of Severe protein-calorie malnutrition.    This patient is being managed with:   Diet NPO-  Except Medications  Entered: Oct 26 2022  7:16AM    
This patient has been assessed with a concern for Malnutrition and has been determined to have a diagnosis/diagnoses of Severe protein-calorie malnutrition.    This patient is being managed with:   Diet NPO after Midnight-     NPO Start Date: 28-Oct-2022   NPO Start Time: 23:59  Entered: Oct 28 2022  3:45PM    Diet Renal Restrictions-  For patients receiving Renal Replacement - No Protein Restr No Conc K No Conc Phos Low Sodium  Entered: Oct 26 2022  7:19AM    
This patient has been assessed with a concern for Malnutrition and has been determined to have a diagnosis/diagnoses of Severe protein-calorie malnutrition.    This patient is being managed with:   Diet Renal Restrictions-  For patients receiving Renal Replacement - No Protein Restr No Conc K No Conc Phos Low Sodium  Entered: Oct 21 2022  2:02PM    
This patient has been assessed with a concern for Malnutrition and has been determined to have a diagnosis/diagnoses of Severe protein-calorie malnutrition.    This patient is being managed with:   Diet Renal Restrictions-  For patients receiving Renal Replacement - No Protein Restr No Conc K No Conc Phos Low Sodium  Entered: Oct 26 2022  7:19AM    

## 2022-11-21 NOTE — PROGRESS NOTE ADULT - ASSESSMENT
72F HTN, HLD, DM, CKD who presented to [facility] initially with MAR on CKD with acute uremia and metabolic derangements requiring urgent HD and was COVID-19 positive. During HD went into atrial fibrillation and started on HD, subsequently developing GIB thought 2/2 AC and acute uremia.  On TTE noted to have a moderate to large pericardial effusion with early echocardiographic evidence of tamponade.  On 10/20 had pericardiocentesis in the cath lab with 700 cc bloody fluid removed.  Had pericardial drain which was draining minimally, and removed 10/27.     - developed 50 beats of vt and so cath done  - found with 3v cad and is now s/p C3L 11/9/22  - Continue asa and statin/gemfibrozil  - continue bb  - now s/p amio    - hx of pAF in the setting of acute uremia and hemopericardium; cont to monitor on tele and if recurrent AF may need to reconsider AC  - cont losartan  - cont norvasc    - Repeat echo with no significant re- accumulation of effusion.    - Tolerated AVF with no issues.    - hd as per renal  - Continue off of AC for now secondary to bleeding issues  - Please continue to maintain strict I/Os, monitor daily weights, Cr, and K.      - Further cardiac workup will depend on clinical course.   - All other workup per primary team. Will followup.

## 2022-11-21 NOTE — PROGRESS NOTE ADULT - ASSESSMENT
ASSESSMENT/PLAN  72F w/ HTN, HLD, DM, CKD, 10/20/22 from OSH with uremia, COVID-19, and pericardial effusion    (1)Renal - newly ESRD-HD;  s/p  HD yesterday with zero net fluid loss  (2)Lytes- controlled  (4)CV - reasonable BP/volume  (5)Anemia- hgb stable  (6)CTS - s/p C3L on 11/9      RECOMMEND:  (1)HD today   (2)AV access repair as outpatient  (3) Retacrit with  HD   (4) Dose medications for intermittent HD.     Call placed to admin at [facility] and Hepatitis B antigen was forwarded     Sayed [employer]  [phone]

## 2022-11-21 NOTE — PROGRESS NOTE ADULT - PROBLEM SELECTOR PROBLEM 3
Atrial fibrillation
UTI (urinary tract infection)
Leukocytosis (leucocytosis)
ESRD (end stage renal disease)
UTI (urinary tract infection)
ESRD (end stage renal disease)
UTI (urinary tract infection)
UTI (urinary tract infection)
Leukocytosis (leucocytosis)
Pericardial effusion, acute
Pericardial effusion, acute
ESRD (end stage renal disease)
Leukocytosis (leucocytosis)
UTI (urinary tract infection)
Leukocytosis (leucocytosis)
Pericardial effusion, acute
Pericardial effusion, acute
UTI (urinary tract infection)
Leukocytosis (leucocytosis)
ESRD (end stage renal disease)
Leukocytosis (leucocytosis)
UTI (urinary tract infection)
Pericardial effusion, acute
ESRD (end stage renal disease)

## 2022-11-21 NOTE — DISCHARGE NOTE PROVIDER - NSDCMRMEDTOKEN_GEN_ALL_CORE_FT
amLODIPine 10 mg oral tablet: 1 tab(s) orally once a day  atorvastatin 80 mg oral tablet: 1 tab(s) orally once a day (at bedtime)  busPIRone 10 mg oral tablet: 1 tab(s) orally 2 times a day  gemfibrozil 600 mg oral tablet: 1 tab(s) orally 2 times a day  Januvia 50 mg oral tablet: 1 tab(s) orally once a day  losartan 25 mg oral tablet: 1 tab(s) orally once a day  metoprolol tartrate 25 mg oral tablet: 1 tab(s) orally 2 times a day  montelukast 10 mg oral tablet: 1 tab(s) orally once a day  raloxifene 60 mg oral tablet: 1 tab(s) orally once a day   acetaminophen 325 mg oral tablet: 2 tab(s) orally every 6 hours, As needed, Mild Pain (1 - 3)  amLODIPine 10 mg oral tablet: 1 tab(s) orally once a day  aspirin 81 mg oral delayed release tablet: 1 tab(s) orally once a day  atorvastatin 80 mg oral tablet: 1 tab(s) orally once a day (at bedtime)  busPIRone 10 mg oral tablet: 1 tab(s) orally 2 times a day  famotidine 20 mg oral tablet: 1 tab(s) orally once a day  gemfibrozil 600 mg oral tablet: 1 tab(s) orally 2 times a day  insulin glargine 100 units/mL subcutaneous solution: 10 unit(s) subcutaneous once a day (at bedtime)  Januvia 50 mg oral tablet: 1 tab(s) orally once a day  losartan 25 mg oral tablet: 1 tab(s) orally once a day  metoprolol succinate 100 mg oral tablet, extended release: 1 tab(s) orally once a day  montelukast 10 mg oral tablet: 1 tab(s) orally once a day  polyethylene glycol 3350 oral powder for reconstitution: 17 gram(s) orally once a day  raloxifene 60 mg oral tablet: 1 tab(s) orally once a day  senna leaf extract oral tablet: 2 tab(s) orally once a day (at bedtime)

## 2022-11-21 NOTE — DISCHARGE NOTE PROVIDER - CARE PROVIDERS DIRECT ADDRESSES
[email or web address]

## 2022-11-21 NOTE — DISCHARGE NOTE PROVIDER - NSDCCPCAREPLAN_GEN_ALL_CORE_FT
PRINCIPAL DISCHARGE DIAGNOSIS  Diagnosis: CAD (coronary artery disease)  Assessment and Plan of Treatment:       SECONDARY DISCHARGE DIAGNOSES  Diagnosis: Renal disease  Assessment and Plan of Treatment:      PRINCIPAL DISCHARGE DIAGNOSIS  Diagnosis: CAD (coronary artery disease)  Assessment and Plan of Treatment: s/p CABG C3L  Follow up with Dr. Mann within 2 weeks  Follow up with your nephrologist for hemodialysis through LUE AV fistula to continue Monday, Wednesday, Friday  DASH and renal diet   Shower daily      SECONDARY DISCHARGE DIAGNOSES  Diagnosis: Renal disease  Assessment and Plan of Treatment:

## 2022-11-21 NOTE — PROGRESS NOTE ADULT - PROBLEM SELECTOR PROBLEM 1
Pericardial effusion, acute
S/P CABG x 3
S/P CABG x 3
ESRD (end stage renal disease)
Pericardial effusion, acute
S/P CABG x 3
ESRD (end stage renal disease)
S/P CABG x 3
3-vessel CAD
Pericardial effusion, acute
S/P CABG x 3
Pericardial effusion, acute
3-vessel CAD
Acute respiratory failure with hypoxia
S/P CABG x 3
Pericardial effusion, acute
3-vessel CAD
NSVT (nonsustained ventricular tachycardia)
3-vessel CAD
2019 novel coronavirus disease (COVID-19)
Pericardial effusion, acute
3-vessel CAD
Pericardial effusion, acute
Acute respiratory failure with hypoxia
Acute respiratory failure with hypoxia

## 2022-11-21 NOTE — DISCHARGE NOTE PROVIDER - PROVIDER TOKENS
PROVIDER:[TOKEN:[[id number]]] PROVIDER:[TOKEN:[[id number]]],PROVIDER:[TOKEN:[[id number]]],PROVIDER:[TOKEN:[[id number]]]

## 2022-11-21 NOTE — DISCHARGE NOTE PROVIDER - CARE PROVIDER_API CALL
Bang Mann)  Surgery; Thoracic Surgery  [address] [place] [address]  Phone: [phone]  Fax: [phone]  Follow Up Time:    Bang Mann)  Surgery; Thoracic Surgery  [address] [place] [address]  Phone: [phone]  Fax: [phone]  Follow Up Time:     Aarti Cristobal)  Internal Medicine; Nephrology  [address] [facility], [address]  [place] [address]  Phone: [phone]  Fax: [phone]  Follow Up Time:     Vance Leyva)  Cardiovascular Disease; Internal Medicine  [address], [place] [id number]  Phone: [phone]  Fax: [phone]  Follow Up Time:

## 2022-11-21 NOTE — PROGRESS NOTE ADULT - SUBJECTIVE AND OBJECTIVE BOX
VITAL SIGNS    Telemetry:      Vital Signs Last 24 Hrs  T(C): 36.9 (11-21-22 @ 12:40), Max: 37 (11-21-22 @ 04:14)  T(F): 98.4 (11-21-22 @ 12:40), Max: 98.6 (11-21-22 @ 04:14)  HR: 76 (11-21-22 @ 12:40) (76 - 77)  BP: 151/80 (11-21-22 @ 12:40) (118/70 - 151/80)  RR: 18 (11-21-22 @ 12:40) (18 - 18)  SpO2: 100% (11-21-22 @ 12:40) (96% - 100%)                   Daily     Daily       Bilirubin Total, Serum: 0.5 mg/dL (11-21 @ 05:39)    CAPILLARY BLOOD GLUCOSE      POCT Blood Glucose.: 164 mg/dL (21 Nov 2022 11:29)  POCT Blood Glucose.: 82 mg/dL (21 Nov 2022 07:20)  POCT Blood Glucose.: 80 mg/dL (21 Nov 2022 03:15)  POCT Blood Glucose.: 139 mg/dL (20 Nov 2022 21:51)  POCT Blood Glucose.: 92 mg/dL (20 Nov 2022 21:30)  POCT Blood Glucose.: 71 mg/dL (20 Nov 2022 21:15)  POCT Blood Glucose.: 93 mg/dL (20 Nov 2022 16:48)        Pacing Wires          Coumadin    [ ] YES          [  ]      NO         Reason:                         PHYSICAL EXAM    Neurology: alert and oriented x 3, moves all extremities with no defecits  CV :  RRR  Sternal Wound :  CDI , Stable  Lungs:   CTA B/L  Abdomen: soft, nontender, nondistended, positive bowel sounds, last bowel movement   Extremities:                                            VITAL SIGNS    Telemetry:    sr  90    Vital Signs Last 24 Hrs  T(C): 36.9 (11-21-22 @ 12:40), Max: 37 (11-21-22 @ 04:14)  T(F): 98.4 (11-21-22 @ 12:40), Max: 98.6 (11-21-22 @ 04:14)  HR: 76 (11-21-22 @ 12:40) (76 - 77)  BP: 151/80 (11-21-22 @ 12:40) (118/70 - 151/80)  RR: 18 (11-21-22 @ 12:40) (18 - 18)  SpO2: 100% (11-21-22 @ 12:40) (96% - 100%)                   Daily     Daily       Bilirubin Total, Serum: 0.5 mg/dL (11-21 @ 05:39)    CAPILLARY BLOOD GLUCOSE      POCT Blood Glucose.: 164 mg/dL (21 Nov 2022 11:29)  POCT Blood Glucose.: 82 mg/dL (21 Nov 2022 07:20)  POCT Blood Glucose.: 80 mg/dL (21 Nov 2022 03:15)  POCT Blood Glucose.: 139 mg/dL (20 Nov 2022 21:51)  POCT Blood Glucose.: 92 mg/dL (20 Nov 2022 21:30)  POCT Blood Glucose.: 71 mg/dL (20 Nov 2022 21:15)  POCT Blood Glucose.: 93 mg/dL (20 Nov 2022 16:48)        Pacing Wires                              PHYSICAL EXAM    Neurology: alert and oriented x 3, moves all extremities with no defecits  CV :  RRR  Sternal Wound :  CDI , Stable    + pw  Lungs:   CTA B/L  Abdomen: soft, nontender, nondistended, positive bowel sounds,   Extremities:       rt cw  permacath

## 2022-11-21 NOTE — DISCHARGE NOTE PROVIDER - HOSPITAL COURSE
Assessment and Plan:   · Assessment    72F HTN, HLD, DM, CKD who presented to [facility] initially with MAR on CKD with acute uremia and metabolic derangements requiring urgent HD and was COVID-19 positive. During HD went into atrial fibrillation and started on HD, subsequently developing GIB thought 2/2 AC and acute uremia. Also on amiodarone for Afib. On TTE noted to have a moderate to large pericardial effusion with early echocardiographic evidence of tamponade. Clinically she is not hypotensive and she tolerates the fluid shifts related to HD. Patient today was transferred to [facility] CTS Dr. Gabriel for evaluation of Pericardial Effusion.  10/20 had pericardiocentesis pericardial drain which was draining minimally, and removed 10/27.  Cath 11/2 revealed triple vessel CAD referred for CABG eval with Dr Mann  11/9/22 cabg x 3 LIMA-LAD, SVG-OM, SVG-OM   esrd   Insulin infusion for hyperglycemia  Vascular surgery to evaluate AVF :US duplex obtained today -- AVF is patent with non-occlusive thrombus and low flow. No acute vascular intervention at this time. Patient can follow up with Dr. Galeano outpatient for follow up.   ID for UTI- invanz  11/15 Transferred to [facility]   11.16 VSS ABX per ID     11/17  HD today Abx as per ID Eventual rehab  11/18 Change to Toprol  ABX  UTI thru 11/19 Eventual rehab  11/19 Last day of Invanz, awaiting ISRA[facility] bed with HD chair, HD today  11/20 Multiple episodes of diarrhea, PND cdiff sample, Imodium x 1 as per TIAGO Carrasco to chair today, PT eval     Assessment and Plan:   · Assessment    72F HTN, HLD, DM, CKD who presented to [facility] initially with MAR on CKD with acute uremia and metabolic derangements requiring urgent HD and was COVID-19 positive. During HD went into atrial fibrillation and started on HD, subsequently developing GIB thought 2/2 AC and acute uremia. Also on amiodarone for Afib. On TTE noted to have a moderate to large pericardial effusion with early echocardiographic evidence of tamponade. Clinically she is not hypotensive and she tolerates the fluid shifts related to HD. Patient today was transferred to [facility] CTS Dr. Gabriel for evaluation of Pericardial Effusion.  10/20 had pericardiocentesis pericardial drain which was draining minimally, and removed 10/27.  Cath 11/2 revealed triple vessel CAD referred for CABG eval with Dr Mann  11/9/22 cabg x 3 LIMA-LAD, SVG-OM, SVG-OM   esrd   Insulin infusion for hyperglycemia  Vascular surgery to evaluate AVF :US duplex obtained today -- AVF is patent with non-occlusive thrombus and low flow. No acute vascular intervention at this time. Patient can follow up with Dr. Galeano outpatient for follow up.   ID for UTI- invanz  11/15 Transferred to [address] [place] [address]   11.16 VSS ABX per ID     11/17  HD today Abx as per ID Eventual rehab  11/18 Change to Toprol  ABX  UTI thru 11/19 Eventual rehab  11/19 Last day of Invanz, awaiting [facility] bed with HD chair, HD today  11/20 Multiple episodes of diarrhea, PND cdiff sample, Imodium x 1 as per TIAGO Carrasco to chair today, PT eval   11/21   dc   am lantus dosing,     vsss   rt cw  permacath   HD  plan  11/22 VSS. Imodium for multiple formed bowel movements. Cleared for discharge by Dr. Mann to rehab. Plan to continue with HD MWF

## 2022-11-21 NOTE — PROGRESS NOTE ADULT - ASSESSMENT
72F HTN, HLD, DM, CKD who presented to [facility] initially with MAR on CKD with acute uremia and metabolic derangements requiring urgent HD and was COVID-19 positive. During HD went into atrial fibrillation and started on HD, subsequently developing GIB thought 2/2 AC and acute uremia. Also on amiodarone for Afib. On TTE noted to have a moderate to large pericardial effusion with early echocardiographic evidence of tamponade. Clinically she is not hypotensive and she tolerates the fluid shifts related to HD. Patient today was transferred to [facility] CTS Dr. Gabriel for evaluation of Pericardial Effusion.  10/20 had pericardiocentesis pericardial drain which was draining minimally, and removed 10/27.  Cath 11/2 revealed triple vessel CAD referred for CABG eval with Dr Mann  11/9/22 cabg x 3 LIMA-LAD, SVG-OM, SVG-OM   esrd   Insulin infusion for hyperglycemia  Vascular surgery to evaluate AVF :US duplex obtained today -- AVF is patent with non-occlusive thrombus and low flow. No acute vascular intervention at this time. Patient can follow up with Dr. Galeano outpatient for follow up.   ID for UTI- invanz  11/15 Transferred to [address] [place] [address]   11.16 VSS ABX per ID     11/17  HD today Abx as per ID Eventual rehab  11/18 Change to Toprol  ABX  UTI thru 11/19 Eventual rehab  11/19 Last day of Invanz, awaiting [facility] bed with HD chair, HD today  11/20 Multiple episodes of diarrhea, PND cdiff sample, Imodium x 1 as per TIAGO Carrasco to chair today, PT eval    72F HTN, HLD, DM, CKD who presented to [facility] initially with MAR on CKD with acute uremia and metabolic derangements requiring urgent HD and was COVID-19 positive. During HD went into atrial fibrillation and started on HD, subsequently developing GIB thought 2/2 AC and acute uremia. Also on amiodarone for Afib. On TTE noted to have a moderate to large pericardial effusion with early echocardiographic evidence of tamponade. Clinically she is not hypotensive and she tolerates the fluid shifts related to HD. Patient today was transferred to [facility] CTS Dr. Gabriel for evaluation of Pericardial Effusion.  10/20 had pericardiocentesis pericardial drain which was draining minimally, and removed 10/27.  Cath 11/2 revealed triple vessel CAD referred for CABG eval with Dr Mann  11/9/22 cabg x 3 LIMA-LAD, SVG-OM, SVG-OM   esrd   Insulin infusion for hyperglycemia  Vascular surgery to evaluate AVF :US duplex obtained today -- AVF is patent with non-occlusive thrombus and low flow. No acute vascular intervention at this time. Patient can follow up with Dr. Galeano outpatient for follow up.   ID for UTI- invanz  11/15 Transferred to 2 [facility]   11.16 VSS ABX per ID     11/17  HD today Abx as per ID Eventual rehab  11/18 Change to Toprol  ABX  UTI thru 11/19 Eventual rehab  11/19 Last day of Invanz, awaiting [facility] bed with HD chair, HD today  11/20 Multiple episodes of diarrhea, PND cdiff sample, Imodium x 1 as per TIAGO Carrasco to chair today,  11/21   dc   am lantus dosing,     vsss   rt cw  permacath   HD  plan

## 2022-11-21 NOTE — DISCHARGE NOTE PROVIDER - NSDCFUSCHEDAPPT_GEN_ALL_CORE_FT
Wilmer Quigley  [facility] Physician [facility]  CARDIOLOGY [address]  Scheduled Appointment: 01/15/2023

## 2022-11-22 ENCOUNTER — NON-APPOINTMENT (OUTPATIENT)
Age: 72
End: 2022-11-22

## 2022-11-22 ENCOUNTER — TRANSCRIPTION ENCOUNTER (OUTPATIENT)
Age: 72
End: 2022-11-22

## 2022-11-22 VITALS
HEART RATE: 81 BPM | OXYGEN SATURATION: 96 % | TEMPERATURE: 99 F | SYSTOLIC BLOOD PRESSURE: 121 MMHG | RESPIRATION RATE: 18 BRPM | DIASTOLIC BLOOD PRESSURE: 72 MMHG

## 2022-11-22 LAB
ANION GAP SERPL CALC-SCNC: 14 MMOL/L — SIGNIFICANT CHANGE UP (ref 5–17)
BUN SERPL-MCNC: 14 MG/DL — SIGNIFICANT CHANGE UP (ref 7–23)
CALCIUM SERPL-MCNC: 8.6 MG/DL — SIGNIFICANT CHANGE UP (ref 8.4–10.5)
CHLORIDE SERPL-SCNC: 101 MMOL/L — SIGNIFICANT CHANGE UP (ref 96–108)
CO2 SERPL-SCNC: 24 MMOL/L — SIGNIFICANT CHANGE UP (ref 22–31)
CREAT SERPL-MCNC: 1.84 MG/DL — HIGH (ref 0.5–1.3)
EGFR: 29 ML/MIN/1.73M2 — LOW
GLUCOSE BLDC GLUCOMTR-MCNC: 140 MG/DL — HIGH (ref 70–99)
GLUCOSE BLDC GLUCOMTR-MCNC: 90 MG/DL — SIGNIFICANT CHANGE UP (ref 70–99)
GLUCOSE SERPL-MCNC: 86 MG/DL — SIGNIFICANT CHANGE UP (ref 70–99)
HCT VFR BLD CALC: 31.6 % — LOW (ref 34.5–45)
HGB BLD-MCNC: 9.6 G/DL — LOW (ref 11.5–15.5)
MAGNESIUM SERPL-MCNC: 2.1 MG/DL — SIGNIFICANT CHANGE UP (ref 1.6–2.6)
MCHC RBC-ENTMCNC: 30.4 GM/DL — LOW (ref 32–36)
MCHC RBC-ENTMCNC: 30.4 PG — SIGNIFICANT CHANGE UP (ref 27–34)
MCV RBC AUTO: 100 FL — SIGNIFICANT CHANGE UP (ref 80–100)
NRBC # BLD: 0 /100 WBCS — SIGNIFICANT CHANGE UP (ref 0–0)
PHOSPHATE SERPL-MCNC: 4.3 MG/DL — SIGNIFICANT CHANGE UP (ref 2.5–4.5)
PLATELET # BLD AUTO: 224 K/UL — SIGNIFICANT CHANGE UP (ref 150–400)
POTASSIUM SERPL-MCNC: 4.9 MMOL/L — SIGNIFICANT CHANGE UP (ref 3.5–5.3)
POTASSIUM SERPL-SCNC: 4.9 MMOL/L — SIGNIFICANT CHANGE UP (ref 3.5–5.3)
RBC # BLD: 3.16 M/UL — LOW (ref 3.8–5.2)
RBC # FLD: 18.1 % — HIGH (ref 10.3–14.5)
SODIUM SERPL-SCNC: 139 MMOL/L — SIGNIFICANT CHANGE UP (ref 135–145)
WBC # BLD: 10.34 K/UL — SIGNIFICANT CHANGE UP (ref 3.8–10.5)
WBC # FLD AUTO: 10.34 K/UL — SIGNIFICANT CHANGE UP (ref 3.8–10.5)

## 2022-11-22 PROCEDURE — 86900 BLOOD TYPING SEROLOGIC ABO: CPT

## 2022-11-22 PROCEDURE — 94002 VENT MGMT INPAT INIT DAY: CPT

## 2022-11-22 PROCEDURE — 82435 ASSAY OF BLOOD CHLORIDE: CPT

## 2022-11-22 PROCEDURE — 82945 GLUCOSE OTHER FLUID: CPT

## 2022-11-22 PROCEDURE — 84100 ASSAY OF PHOSPHORUS: CPT

## 2022-11-22 PROCEDURE — 83550 IRON BINDING TEST: CPT

## 2022-11-22 PROCEDURE — P9016: CPT

## 2022-11-22 PROCEDURE — 99232 SBSQ HOSP IP/OBS MODERATE 35: CPT

## 2022-11-22 PROCEDURE — 83735 ASSAY OF MAGNESIUM: CPT

## 2022-11-22 PROCEDURE — 97162 PT EVAL MOD COMPLEX 30 MIN: CPT

## 2022-11-22 PROCEDURE — 76705 ECHO EXAM OF ABDOMEN: CPT

## 2022-11-22 PROCEDURE — 99261: CPT

## 2022-11-22 PROCEDURE — 82947 ASSAY GLUCOSE BLOOD QUANT: CPT

## 2022-11-22 PROCEDURE — 80053 COMPREHEN METABOLIC PANEL: CPT

## 2022-11-22 PROCEDURE — U0005: CPT

## 2022-11-22 PROCEDURE — 84484 ASSAY OF TROPONIN QUANT: CPT

## 2022-11-22 PROCEDURE — 93970 EXTREMITY STUDY: CPT

## 2022-11-22 PROCEDURE — 85014 HEMATOCRIT: CPT

## 2022-11-22 PROCEDURE — 87205 SMEAR GRAM STAIN: CPT

## 2022-11-22 PROCEDURE — 87186 SC STD MICRODIL/AGAR DIL: CPT

## 2022-11-22 PROCEDURE — 85045 AUTOMATED RETICULOCYTE COUNT: CPT

## 2022-11-22 PROCEDURE — 93005 ELECTROCARDIOGRAM TRACING: CPT

## 2022-11-22 PROCEDURE — 86923 COMPATIBILITY TEST ELECTRIC: CPT

## 2022-11-22 PROCEDURE — 87077 CULTURE AEROBIC IDENTIFY: CPT

## 2022-11-22 PROCEDURE — 86709 HEPATITIS A IGM ANTIBODY: CPT

## 2022-11-22 PROCEDURE — 82140 ASSAY OF AMMONIA: CPT

## 2022-11-22 PROCEDURE — 82248 BILIRUBIN DIRECT: CPT

## 2022-11-22 PROCEDURE — 85027 COMPLETE CBC AUTOMATED: CPT

## 2022-11-22 PROCEDURE — 85025 COMPLETE CBC W/AUTO DIFF WBC: CPT

## 2022-11-22 PROCEDURE — 87015 SPECIMEN INFECT AGNT CONCNTJ: CPT

## 2022-11-22 PROCEDURE — 84478 ASSAY OF TRIGLYCERIDES: CPT

## 2022-11-22 PROCEDURE — 82553 CREATINE MB FRACTION: CPT

## 2022-11-22 PROCEDURE — 87340 HEPATITIS B SURFACE AG IA: CPT

## 2022-11-22 PROCEDURE — 84466 ASSAY OF TRANSFERRIN: CPT

## 2022-11-22 PROCEDURE — 86705 HEP B CORE ANTIBODY IGM: CPT

## 2022-11-22 PROCEDURE — 77001 FLUOROGUIDE FOR VEIN DEVICE: CPT

## 2022-11-22 PROCEDURE — 87075 CULTR BACTERIA EXCEPT BLOOD: CPT

## 2022-11-22 PROCEDURE — 80074 ACUTE HEPATITIS PANEL: CPT

## 2022-11-22 PROCEDURE — 87640 STAPH A DNA AMP PROBE: CPT

## 2022-11-22 PROCEDURE — 82150 ASSAY OF AMYLASE: CPT

## 2022-11-22 PROCEDURE — 86850 RBC ANTIBODY SCREEN: CPT

## 2022-11-22 PROCEDURE — 88112 CYTOPATH CELL ENHANCE TECH: CPT

## 2022-11-22 PROCEDURE — C1751: CPT

## 2022-11-22 PROCEDURE — 84295 ASSAY OF SERUM SODIUM: CPT

## 2022-11-22 PROCEDURE — 87102 FUNGUS ISOLATION CULTURE: CPT

## 2022-11-22 PROCEDURE — 36415 COLL VENOUS BLD VENIPUNCTURE: CPT

## 2022-11-22 PROCEDURE — 86901 BLOOD TYPING SEROLOGIC RH(D): CPT

## 2022-11-22 PROCEDURE — 87070 CULTURE OTHR SPECIMN AEROBIC: CPT

## 2022-11-22 PROCEDURE — 86803 HEPATITIS C AB TEST: CPT

## 2022-11-22 PROCEDURE — 83615 LACTATE (LD) (LDH) ENZYME: CPT

## 2022-11-22 PROCEDURE — 93308 TTE F-UP OR LMTD: CPT

## 2022-11-22 PROCEDURE — 94010 BREATHING CAPACITY TEST: CPT

## 2022-11-22 PROCEDURE — C1894: CPT

## 2022-11-22 PROCEDURE — 85396 CLOTTING ASSAY WHOLE BLOOD: CPT

## 2022-11-22 PROCEDURE — 89051 BODY FLUID CELL COUNT: CPT

## 2022-11-22 PROCEDURE — 84443 ASSAY THYROID STIM HORMONE: CPT

## 2022-11-22 PROCEDURE — 93321 DOPPLER ECHO F-UP/LMTD STD: CPT

## 2022-11-22 PROCEDURE — 97166 OT EVAL MOD COMPLEX 45 MIN: CPT

## 2022-11-22 PROCEDURE — 85520 HEPARIN ASSAY: CPT

## 2022-11-22 PROCEDURE — U0003: CPT

## 2022-11-22 PROCEDURE — 82962 GLUCOSE BLOOD TEST: CPT

## 2022-11-22 PROCEDURE — 70450 CT HEAD/BRAIN W/O DYE: CPT

## 2022-11-22 PROCEDURE — C1887: CPT

## 2022-11-22 PROCEDURE — 93454 CORONARY ARTERY ANGIO S&I: CPT

## 2022-11-22 PROCEDURE — 82550 ASSAY OF CK (CPK): CPT

## 2022-11-22 PROCEDURE — 83880 ASSAY OF NATRIURETIC PEPTIDE: CPT

## 2022-11-22 PROCEDURE — 85384 FIBRINOGEN ACTIVITY: CPT

## 2022-11-22 PROCEDURE — 87040 BLOOD CULTURE FOR BACTERIA: CPT

## 2022-11-22 PROCEDURE — 85018 HEMOGLOBIN: CPT

## 2022-11-22 PROCEDURE — 80076 HEPATIC FUNCTION PANEL: CPT

## 2022-11-22 PROCEDURE — 84439 ASSAY OF FREE THYROXINE: CPT

## 2022-11-22 PROCEDURE — 87252 VIRUS INOCULATION TISSUE: CPT

## 2022-11-22 PROCEDURE — 80048 BASIC METABOLIC PNL TOTAL CA: CPT

## 2022-11-22 PROCEDURE — C1769: CPT

## 2022-11-22 PROCEDURE — P9045: CPT

## 2022-11-22 PROCEDURE — 86706 HEP B SURFACE ANTIBODY: CPT

## 2022-11-22 PROCEDURE — C8929: CPT

## 2022-11-22 PROCEDURE — 71275 CT ANGIOGRAPHY CHEST: CPT

## 2022-11-22 PROCEDURE — 36558 INSERT TUNNELED CV CATH: CPT

## 2022-11-22 PROCEDURE — C1889: CPT

## 2022-11-22 PROCEDURE — 71045 X-RAY EXAM CHEST 1 VIEW: CPT

## 2022-11-22 PROCEDURE — 85730 THROMBOPLASTIN TIME PARTIAL: CPT

## 2022-11-22 PROCEDURE — 84157 ASSAY OF PROTEIN OTHER: CPT

## 2022-11-22 PROCEDURE — 82803 BLOOD GASES ANY COMBINATION: CPT

## 2022-11-22 PROCEDURE — 86891 AUTOLOGOUS BLOOD OP SALVAGE: CPT

## 2022-11-22 PROCEDURE — 82728 ASSAY OF FERRITIN: CPT

## 2022-11-22 PROCEDURE — 83690 ASSAY OF LIPASE: CPT

## 2022-11-22 PROCEDURE — 82042 OTHER SOURCE ALBUMIN QUAN EA: CPT

## 2022-11-22 PROCEDURE — 88305 TISSUE EXAM BY PATHOLOGIST: CPT

## 2022-11-22 PROCEDURE — 97112 NEUROMUSCULAR REEDUCATION: CPT

## 2022-11-22 PROCEDURE — 97530 THERAPEUTIC ACTIVITIES: CPT

## 2022-11-22 PROCEDURE — 76937 US GUIDE VASCULAR ACCESS: CPT

## 2022-11-22 PROCEDURE — 73110 X-RAY EXAM OF WRIST: CPT

## 2022-11-22 PROCEDURE — 86704 HEP B CORE ANTIBODY TOTAL: CPT

## 2022-11-22 PROCEDURE — 97110 THERAPEUTIC EXERCISES: CPT

## 2022-11-22 PROCEDURE — 81001 URINALYSIS AUTO W/SCOPE: CPT

## 2022-11-22 PROCEDURE — 97164 PT RE-EVAL EST PLAN CARE: CPT

## 2022-11-22 PROCEDURE — 87086 URINE CULTURE/COLONY COUNT: CPT

## 2022-11-22 PROCEDURE — 87206 SMEAR FLUORESCENT/ACID STAI: CPT

## 2022-11-22 PROCEDURE — 83605 ASSAY OF LACTIC ACID: CPT

## 2022-11-22 PROCEDURE — C1750: CPT

## 2022-11-22 PROCEDURE — 83540 ASSAY OF IRON: CPT

## 2022-11-22 PROCEDURE — 87641 MR-STAPH DNA AMP PROBE: CPT

## 2022-11-22 PROCEDURE — 85610 PROTHROMBIN TIME: CPT

## 2022-11-22 PROCEDURE — 36430 TRANSFUSION BLD/BLD COMPNT: CPT

## 2022-11-22 PROCEDURE — 83036 HEMOGLOBIN GLYCOSYLATED A1C: CPT

## 2022-11-22 PROCEDURE — 82565 ASSAY OF CREATININE: CPT

## 2022-11-22 PROCEDURE — 94660 CPAP INITIATION&MGMT: CPT

## 2022-11-22 PROCEDURE — 84132 ASSAY OF SERUM POTASSIUM: CPT

## 2022-11-22 PROCEDURE — 94003 VENT MGMT INPAT SUBQ DAY: CPT

## 2022-11-22 PROCEDURE — 87116 MYCOBACTERIA CULTURE: CPT

## 2022-11-22 PROCEDURE — 74174 CTA ABD&PLVS W/CONTRAST: CPT

## 2022-11-22 PROCEDURE — 82330 ASSAY OF CALCIUM: CPT

## 2022-11-22 PROCEDURE — 97116 GAIT TRAINING THERAPY: CPT

## 2022-11-22 PROCEDURE — 93990 DOPPLER FLOW TESTING: CPT

## 2022-11-22 RX ORDER — GEMFIBROZIL 600 MG
1 TABLET ORAL
Qty: 0 | Refills: 0 | DISCHARGE

## 2022-11-22 RX ORDER — METOPROLOL TARTRATE 50 MG
1 TABLET ORAL
Qty: 0 | Refills: 0 | DISCHARGE
Start: 2022-11-22

## 2022-11-22 RX ORDER — SENNA PLUS 8.6 MG/1
2 TABLET ORAL
Qty: 0 | Refills: 0 | DISCHARGE
Start: 2022-11-22

## 2022-11-22 RX ORDER — FAMOTIDINE 10 MG/ML
1 INJECTION INTRAVENOUS
Qty: 0 | Refills: 0 | DISCHARGE
Start: 2022-11-22

## 2022-11-22 RX ORDER — ACETAMINOPHEN 500 MG
2 TABLET ORAL
Qty: 0 | Refills: 0 | DISCHARGE
Start: 2022-11-22

## 2022-11-22 RX ORDER — AMLODIPINE BESYLATE 2.5 MG/1
1 TABLET ORAL
Qty: 0 | Refills: 0 | DISCHARGE

## 2022-11-22 RX ORDER — INSULIN GLARGINE 100 [IU]/ML
10 INJECTION, SOLUTION SUBCUTANEOUS
Qty: 0 | Refills: 0 | DISCHARGE
Start: 2022-11-22

## 2022-11-22 RX ORDER — LOPERAMIDE HCL 2 MG
2 TABLET ORAL ONCE
Refills: 0 | Status: COMPLETED | OUTPATIENT
Start: 2022-11-22 | End: 2022-11-22

## 2022-11-22 RX ORDER — LOSARTAN POTASSIUM 100 MG/1
1 TABLET, FILM COATED ORAL
Qty: 0 | Refills: 0 | DISCHARGE

## 2022-11-22 RX ORDER — MONTELUKAST 4 MG/1
1 TABLET, CHEWABLE ORAL
Qty: 0 | Refills: 0 | DISCHARGE
Start: 2022-11-22

## 2022-11-22 RX ORDER — GEMFIBROZIL 600 MG
1 TABLET ORAL
Qty: 0 | Refills: 0 | DISCHARGE
Start: 2022-11-22

## 2022-11-22 RX ORDER — ASPIRIN/CALCIUM CARB/MAGNESIUM 324 MG
1 TABLET ORAL
Qty: 0 | Refills: 0 | DISCHARGE
Start: 2022-11-22

## 2022-11-22 RX ORDER — LOSARTAN POTASSIUM 100 MG/1
1 TABLET, FILM COATED ORAL
Qty: 0 | Refills: 0 | DISCHARGE
Start: 2022-11-22

## 2022-11-22 RX ORDER — ATORVASTATIN CALCIUM 80 MG/1
1 TABLET, FILM COATED ORAL
Qty: 0 | Refills: 0 | DISCHARGE
Start: 2022-11-22

## 2022-11-22 RX ORDER — AMLODIPINE BESYLATE 2.5 MG/1
1 TABLET ORAL
Qty: 0 | Refills: 0 | DISCHARGE
Start: 2022-11-22

## 2022-11-22 RX ORDER — POLYETHYLENE GLYCOL 3350 17 G/17G
17 POWDER, FOR SOLUTION ORAL
Qty: 0 | Refills: 0 | DISCHARGE
Start: 2022-11-22

## 2022-11-22 RX ADMIN — Medication 100 MILLIGRAM(S): at 05:14

## 2022-11-22 RX ADMIN — CHLORHEXIDINE GLUCONATE 1 APPLICATION(S): 213 SOLUTION TOPICAL at 12:56

## 2022-11-22 RX ADMIN — ENOXAPARIN SODIUM 30 MILLIGRAM(S): 100 INJECTION SUBCUTANEOUS at 05:15

## 2022-11-22 RX ADMIN — FAMOTIDINE 20 MILLIGRAM(S): 10 INJECTION INTRAVENOUS at 12:58

## 2022-11-22 RX ADMIN — Medication 600 MILLIGRAM(S): at 05:14

## 2022-11-22 RX ADMIN — MONTELUKAST 10 MILLIGRAM(S): 4 TABLET, CHEWABLE ORAL at 12:58

## 2022-11-22 RX ADMIN — Medication 2 MILLIGRAM(S): at 07:38

## 2022-11-22 RX ADMIN — Medication 10 MILLIGRAM(S): at 05:15

## 2022-11-22 RX ADMIN — Medication 81 MILLIGRAM(S): at 12:57

## 2022-11-22 RX ADMIN — LOSARTAN POTASSIUM 25 MILLIGRAM(S): 100 TABLET, FILM COATED ORAL at 05:14

## 2022-11-22 RX ADMIN — AMLODIPINE BESYLATE 10 MILLIGRAM(S): 2.5 TABLET ORAL at 05:16

## 2022-11-22 NOTE — DISCHARGE NOTE NURSING/CASE MANAGEMENT/SOCIAL WORK - PATIENT PORTAL LINK FT
You can access the FollowMyHealth Patient Portal offered by [facility] by registering at the following website: [email or web address]. By joining [employer]’s FollowMyHealth portal, you will also be able to view your health information using other applications (apps) compatible with our system.

## 2022-11-22 NOTE — PROGRESS NOTE ADULT - SUBJECTIVE AND OBJECTIVE BOX
[facility] - Julius Castañeda, Sherwin, Lucio, Bladimir, Tyrell, Azbubba  Office Number:  [phone]    Patient resting comfortably in bed in NAD.  Laying flat with no respiratory distress.  No complaints of chest pain, dyspnea, palpitations, PND, or orthopnea.  reports loose bm x 4 overnight    ROS: negative unless otherwise mentioned.    Telemetry:  sr, brief pat    MEDICATIONS  (STANDING):  amLODIPine   Tablet 10 milliGRAM(s) Oral daily  aspirin enteric coated 81 milliGRAM(s) Oral daily  atorvastatin 80 milliGRAM(s) Oral at bedtime  busPIRone 10 milliGRAM(s) Oral two times a day  chlorhexidine 2% Cloths 1 Application(s) Topical daily  dextrose 50% Injectable 50 milliLiter(s) IV Push every 15 minutes  dextrose 50% Injectable 25 milliLiter(s) IV Push every 15 minutes  enoxaparin Injectable 30 milliGRAM(s) SubCutaneous every 12 hours  famotidine    Tablet 20 milliGRAM(s) Oral daily  gemfibrozil 600 milliGRAM(s) Oral two times a day  insulin glargine Injectable (LANTUS) 10 Unit(s) SubCutaneous at bedtime  insulin lispro (ADMELOG) corrective regimen sliding scale   SubCutaneous three times a day before meals  insulin lispro (ADMELOG) corrective regimen sliding scale   SubCutaneous at bedtime  losartan 25 milliGRAM(s) Oral daily  metoprolol succinate  milliGRAM(s) Oral daily  montelukast 10 milliGRAM(s) Oral daily  polyethylene glycol 3350 17 Gram(s) Oral daily  senna 2 Tablet(s) Oral at bedtime    MEDICATIONS  (PRN):  acetaminophen     Tablet .. 650 milliGRAM(s) Oral every 6 hours PRN Mild Pain (1 - 3)      Allergies    sulfa drugs (Rash)    Intolerances        Vital Signs Last 24 Hrs  T(C): 36.8 (22 Nov 2022 04:10), Max: 36.9 (21 Nov 2022 12:40)  T(F): 98.2 (22 Nov 2022 04:10), Max: 98.4 (21 Nov 2022 12:40)  HR: 82 (22 Nov 2022 04:10) (76 - 82)  BP: 144/77 (22 Nov 2022 04:10) (144/77 - 155/73)  BP(mean): 99 (22 Nov 2022 04:10) (99 - 99)  RR: 18 (22 Nov 2022 04:10) (18 - 19)  SpO2: 98% (22 Nov 2022 04:10) (95% - 100%)    Parameters below as of 22 Nov 2022 04:10  Patient On (Oxygen Delivery Method): room air        I&O's Summary    21 Nov 2022 07:01  -  22 Nov 2022 07:00  --------------------------------------------------------  IN: 1528 mL / OUT: 1600 mL / NET: -72 mL    22 Nov 2022 07:01  -  22 Nov 2022 08:00  --------------------------------------------------------  IN: 0 mL / OUT: 150 mL / NET: -150 mL        ON EXAM:  Constitutional: well-nourished, well-developed, NAD   HEENT:  MMM, sclerae anicteric, conjunctivae clear, no oral cyanosis.  Pulmonary: Non-labored, breath sounds are clear bilaterally, No wheezing, rales or rhonchi  Cardiovascular: Regular, S1 and S2.  No murmur.  No rubs, gallops or clicks  Gastrointestinal: Bowel Sounds present, soft, nontender.   Lymph: No peripheral edema.   Neurological: Alert, no focal deficits  Skin: No rashes.  Psych:  Mood & affect appropriate    LABS: All Labs Reviewed:                        9.6    10.34 )-----------( 224      ( 22 Nov 2022 07:23 )             31.6                         9.6    12.32 )-----------( 250      ( 21 Nov 2022 05:40 )             32.0                         10.1   12.32 )-----------( 238      ( 20 Nov 2022 07:20 )             32.2     21 Nov 2022 05:39    140    |  103    |  21     ----------------------------<  70     4.7     |  25     |  2.54   20 Nov 2022 07:04    140    |  102    |  14     ----------------------------<  83     4.7     |  26     |  1.98     Ca    8.4        21 Nov 2022 05:39  Ca    8.6        20 Nov 2022 07:04  Phos  4.0       20 Nov 2022 07:04  Mg     2.2       20 Nov 2022 07:04    TPro  6.2    /  Alb  2.8    /  TBili  0.5    /  DBili  x      /  AST  14     /  ALT  10     /  AlkPhos  122    21 Nov 2022 05:39  TPro  6.3    /  Alb  2.9    /  TBili  0.4    /  DBili  x      /  AST  16     /  ALT  9      /  AlkPhos  132    20 Nov 2022 07:04    PT/INR - ( 21 Nov 2022 05:40 )   PT: 13.4 sec;   INR: 1.16 ratio         PTT - ( 21 Nov 2022 05:40 )  PTT:30.5 sec      Blood Culture:

## 2022-11-22 NOTE — PROGRESS NOTE ADULT - REASON FOR ADMISSION
Pericardial Eff
sp    C3L
Pericardial Eff
Pericardial Eff/CABG
Pericardial Eff
C3LIMA
Pericardial Eff
11/9  C3 LIMA
Pericardial Eff

## 2022-11-22 NOTE — PROGRESS NOTE ADULT - SUBJECTIVE AND OBJECTIVE BOX
NEPHROLOGY[facility] [phone]        Patient seen and examined in bed.  She was the same         MEDICATIONS  (STANDING):  amLODIPine   Tablet 10 milliGRAM(s) Oral daily  aspirin enteric coated 81 milliGRAM(s) Oral daily  atorvastatin 80 milliGRAM(s) Oral at bedtime  busPIRone 10 milliGRAM(s) Oral two times a day  chlorhexidine 2% Cloths 1 Application(s) Topical daily  dextrose 50% Injectable 50 milliLiter(s) IV Push every 15 minutes  dextrose 50% Injectable 25 milliLiter(s) IV Push every 15 minutes  enoxaparin Injectable 30 milliGRAM(s) SubCutaneous every 12 hours  famotidine    Tablet 20 milliGRAM(s) Oral daily  gemfibrozil 600 milliGRAM(s) Oral two times a day  insulin glargine Injectable (LANTUS) 10 Unit(s) SubCutaneous at bedtime  insulin lispro (ADMELOG) corrective regimen sliding scale   SubCutaneous three times a day before meals  insulin lispro (ADMELOG) corrective regimen sliding scale   SubCutaneous at bedtime  losartan 25 milliGRAM(s) Oral daily  metoprolol succinate  milliGRAM(s) Oral daily  montelukast 10 milliGRAM(s) Oral daily  polyethylene glycol 3350 17 Gram(s) Oral daily  senna 2 Tablet(s) Oral at bedtime      VITAL:  T(C): , Max: 36.9 (11-21-22 @ 12:40)  T(F): , Max: 98.4 (11-21-22 @ 12:40)  HR: 82 (11-22-22 @ 04:10)  BP: 144/77 (11-22-22 @ 04:10)  BP(mean): 99 (11-22-22 @ 04:10)  RR: 18 (11-22-22 @ 04:10)  SpO2: 98% (11-22-22 @ 04:10)  Wt(kg): --    I and O's:    11-21 @ 07:01  -  11-22 @ 07:00  --------------------------------------------------------  IN: 1528 mL / OUT: 1600 mL / NET: -72 mL    11-22 @ 07:01  -  11-22 @ 11:07  --------------------------------------------------------  IN: 200 mL / OUT: 150 mL / NET: 50 mL          PHYSICAL EXAM:    Constitutional: NAD  Neck:  No JVD  Respiratory: CTAB/L  Cardiovascular: S1 and S2  Gastrointestinal: BS+, soft, NT/ND  Extremities: No peripheral edema  Neurological: A/O x 3, no focal deficits  Psychiatric: Normal mood, normal affect  : No Bah  Skin: No rashes  Access: perm cath and avf     LABS:                        9.6    10.34 )-----------( 224      ( 22 Nov 2022 07:23 )             31.6     11-22    139  |  101  |  14  ----------------------------<  86  4.9   |  24  |  1.84<H>    Ca    8.6      22 Nov 2022 07:23  Phos  4.3     11-22  Mg     2.1     11-22    TPro  6.2  /  Alb  2.8<L>  /  TBili  0.5  /  DBili  x   /  AST  14  /  ALT  10  /  AlkPhos  122<H>  11-21          Urine Studies:          RADIOLOGY & ADDITIONAL STUDIES:

## 2022-11-22 NOTE — DISCHARGE NOTE NURSING/CASE MANAGEMENT/SOCIAL WORK - NSDCPEFALRISK_GEN_ALL_CORE
For information on Fall & Injury Prevention, visit: [email or web address] OR  [email or web address] OR  https://www.cdc.gov/steadi/patient.html

## 2022-11-22 NOTE — PROGRESS NOTE ADULT - ASSESSMENT
ASSESSMENT/PLAN  72F w/ HTN, HLD, DM, CKD, 10/20/22 from OSH with uremia, COVID-19, and pericardial effusion    (1)Renal - newly ESRD-HD;  s/p  HD yesterday with zero net fluid loss  (2)Lytes- controlled  (4)CV - reasonable BP/volume  (5)Anemia- hgb stable  (6)CTS - s/p C3L on 11/9      RECOMMEND:  (1)HD as outpt at [place] on Wed   (2)AV access repair as outpatient  (3) Retacrit with  HD   (4) Dose medications for intermittent HD.          Sayed [facility]   [employer]  [phone]

## 2022-11-22 NOTE — PROGRESS NOTE ADULT - ASSESSMENT
72F HTN, HLD, DM, CKD who presented to [facility] initially with MAR on CKD with acute uremia and metabolic derangements requiring urgent HD and was COVID-19 positive. During HD went into atrial fibrillation and started on HD, subsequently developing GIB thought 2/2 AC and acute uremia.  On TTE noted to have a moderate to large pericardial effusion with early echocardiographic evidence of tamponade.  On 10/20 had pericardiocentesis in the cath lab with 700 cc bloody fluid removed.  Had pericardial drain which was draining minimally, and removed 10/27.     - developed 50 beats of vt and so cath done  - found with 3v cad and is now s/p C3L 11/9/22  - Continue asa and statin/gemfibrozil  - continue bb  - now s/p amio    - hx of pAF in the setting of acute uremia and hemopericardium; cont to monitor on tele and if recurrent AF may need to reconsider AC  - cont losartan  - cont norvasc    - Repeat echo with no significant re- accumulation of effusion.    - Tolerated AVF with no issues.    - hd as per renal  - Continue off of AC for now secondary to bleeding issues  - Please continue to maintain strict I/Os, monitor daily weights, Cr, and K.      - Further cardiac workup will depend on clinical course.   - All other workup per primary team. Will followup.   - dc planning to rehab, per primary team

## 2022-11-22 NOTE — PROGRESS NOTE ADULT - PROVIDER SPECIALTY LIST ADULT
CT Surgery
Cardiology
Cardiology
Critical Care
Nephrology
Vascular Surgery
CT Surgery
Cardiology
Critical Care
Infectious Disease
Intervent Radiology
Nephrology
Vascular Surgery
CT Surgery
Cardiology
Critical Care
Hospitalist
Hospitalist
Infectious Disease
Infectious Disease
Nephrology
Vascular Surgery
Vascular Surgery
Cardiology
Infectious Disease
Infectious Disease
Internal Medicine
Nephrology
CT Surgery
CT Surgery
Cardiology
Cardiology
Internal Medicine
Internal Medicine
CT Surgery
Internal Medicine
Cardiology
Internal Medicine
CT Surgery
Internal Medicine

## 2022-11-23 ENCOUNTER — TRANSCRIPTION ENCOUNTER (OUTPATIENT)
Age: 72
End: 2022-11-23

## 2022-11-25 ENCOUNTER — TRANSCRIPTION ENCOUNTER (OUTPATIENT)
Age: 72
End: 2022-11-25

## 2022-12-05 ENCOUNTER — TRANSCRIPTION ENCOUNTER (OUTPATIENT)
Age: 72
End: 2022-12-05

## 2022-12-09 ENCOUNTER — TRANSCRIPTION ENCOUNTER (OUTPATIENT)
Age: 72
End: 2022-12-09

## 2022-12-10 LAB
CULTURE RESULTS: SIGNIFICANT CHANGE UP
SPECIMEN SOURCE: SIGNIFICANT CHANGE UP

## 2022-12-14 ENCOUNTER — APPOINTMENT (OUTPATIENT)
Dept: CARDIOTHORACIC SURGERY | Facility: CLINIC | Age: 72
End: 2022-12-14

## 2022-12-14 VITALS
WEIGHT: 189 LBS | RESPIRATION RATE: 16 BRPM | TEMPERATURE: 98.2 F | HEART RATE: 78 BPM | SYSTOLIC BLOOD PRESSURE: 114 MMHG | HEIGHT: 61 IN | BODY MASS INDEX: 35.68 KG/M2 | DIASTOLIC BLOOD PRESSURE: 77 MMHG | OXYGEN SATURATION: 99 %

## 2022-12-14 PROCEDURE — 99024 POSTOP FOLLOW-UP VISIT: CPT

## 2022-12-14 NOTE — END OF VISIT
[FreeTextEntry3] : I, Dr. Mann, personally performed the evaluation and management (E/M) services for this established patient who presents today with (a) new problem(s)/exacerbation of (an) existing condition(s).  That E/M includes conducting the examination, assessing all new/exacerbated conditions, and establishing a new plan of care.  Today, the ACP, Jason Calvo, was here to observe my evaluation and management services for this new problem/exacerbated condition to be followed going forward\par

## 2022-12-14 NOTE — ASSESSMENT
[FreeTextEntry1] : Today on exam patient's lungs clear bilaterally, normal sinus rhythm, sternum stable, incision clean, dry and intact. Right LE SVG site is clean, dry and intact. No peripheral edema noted. Instructed patient on importance of optimal glycemic control, daily showering, daily weights, incentive spirometer use, and increase ambulation as tolerated. Instructed to call office with any signs or symptoms of infection or weight gain of 2 or more pounds in 1 day or 3 or more pounds in 1 week. \par \par Plan:\par - Continue current medication regimen\par - Follow up with cardiologist Dr. Quigley\par - May return on as needed basis \par - SBE antibiotic prophylaxis discussed at length \par - Continue to work in PT at [facility] [employer]\par - Low salt diet and fluids discussed in detail\par - Call with any questions or concerns\par \par

## 2022-12-14 NOTE — CONSULT LETTER
[Dear  ___] : Dear  [unfilled], [Courtesy Letter:] : I had the pleasure of seeing your patient, [unfilled], in my office today. [Please see my note below.] : Please see my note below. [Sincerely,] : Sincerely, [FreeTextEntry2] : Dr. Lynn [FreeTextEntry3] : Bang Mann MD\par Department of Cardiovascular &Thoracic Surgery\par \par Cardiovascular &Thoracic Surgery\par [facility].

## 2022-12-14 NOTE — PHYSICAL EXAM
[] : no respiratory distress [Respiration, Rhythm And Depth] : normal respiratory rhythm and effort [Exaggerated Use Of Accessory Muscles For Inspiration] : no accessory muscle use [Auscultation Breath Sounds / Voice Sounds] : lungs were clear to auscultation bilaterally [Apical Impulse] : the apical impulse was normal [Heart Rate And Rhythm] : heart rate was normal and rhythm regular [Heart Sounds] : normal S1 and S2 [Murmurs] : no murmurs [Clean] : clean [Dry] : dry [Healing Well] : healing well [No Edema] : no edema [Bleeding] : no active bleeding [Foul Odor] : no foul smell [Purulent Drainage] : no purulent drainage [Serosanguinous Drainage] : no serosanguinous drainage [Erythema] : not erythematous [Warm] : not warm [Tender] : not tender [FreeTextEntry5] : Right LE SVG

## 2022-12-14 NOTE — REASON FOR VISIT
[de-identified] : cabg x 3 LIMA-LAD, SVG-OM, SVG-OM  [de-identified] : 11/9/2022 [de-identified] : 72F HTN, HLD, DM, CKD who presented to [facility] initially with MAR on CKD with acute uremia and metabolic derangements requiring urgent HD and was COVID-19 positive. During HD went into atrial fibrillation and started on HD, subsequently developing GIB thought 2/2 AC and acute uremia. Also on amiodarone for Afib. On TTE noted to have a moderate to large pericardial effusion with early echocardiographic evidence of tamponade. Clinically she is not hypotensive and she tolerates the fluid shifts related to HD. Patient today was transferred to [facility] CTS Dr. Gabriel for evaluation of Pericardial Effusion. 10/20 had pericardiocentesis pericardial drain which was draining minimally, and removed 10/27.  Cath 11/2 revealed triple vessel CAD.  SP \par CABG X 3 LIMA -LAD, SVG-OM, SVG-OM on11/9/2022. ESRD, vascular surgery to evaluate AVF :US duplex - AVF is patent with non-occlusive thrombus and low flow. No acute vascular intervention at this time. Patient can follow up with Dr. Galeano outpatient for follow up. ID for UTI- invanz, ABX 11/19 Last day of Invanz, DC to rehab with HD on 11/22, Continue with HD MWF.  Cathed by Tejal Lynn, Referred by Dr. Jade.\par \par Presents today with .  [facility] and feeling better.  Working with PT and still having trouble walking and can not ambulate well. She denies CP, back pain, SOB, swelling, weight gain, cough, fever or chills.  SHe plans to follow up with her sons cardiologist DR. Quigley upon DC from rehab.  Eating with +BM.\par

## 2022-12-16 ENCOUNTER — TRANSCRIPTION ENCOUNTER (OUTPATIENT)
Age: 72
End: 2022-12-16

## 2022-12-22 ENCOUNTER — TRANSCRIPTION ENCOUNTER (OUTPATIENT)
Age: 72
End: 2022-12-22

## 2023-01-23 ENCOUNTER — APPOINTMENT (OUTPATIENT)
Dept: VASCULAR SURGERY | Facility: CLINIC | Age: 73
End: 2023-01-23
Payer: MEDICARE

## 2023-01-23 PROCEDURE — 93990 DOPPLER FLOW TESTING: CPT

## 2023-01-30 ENCOUNTER — APPOINTMENT (OUTPATIENT)
Dept: VASCULAR SURGERY | Facility: CLINIC | Age: 73
End: 2023-01-30
Payer: MEDICARE

## 2023-01-30 VITALS
HEART RATE: 80 BPM | HEIGHT: 61 IN | BODY MASS INDEX: 36.82 KG/M2 | WEIGHT: 195 LBS | SYSTOLIC BLOOD PRESSURE: 124 MMHG | TEMPERATURE: 97.3 F | DIASTOLIC BLOOD PRESSURE: 74 MMHG | OXYGEN SATURATION: 100 %

## 2023-01-30 PROCEDURE — 99213 OFFICE O/P EST LOW 20 MIN: CPT

## 2023-01-30 NOTE — PHYSICAL EXAM
[2+] : left 2+ [Calm] : calm [de-identified] : Well-appearing  [de-identified] : EOMI, anicteric, right chest permcath site is clean, dry [de-identified] : motor and sensation intact in all four extremities [de-identified] : A&Ox4

## 2023-01-30 NOTE — DATA REVIEWED
[FreeTextEntry1] : 1/23/2023 - RKE vein mapping\par Veins of adequate caliber in the bilateral upper extremities.

## 2023-01-30 NOTE — ASSESSMENT
[FreeTextEntry1] : RILEY MEJÍA is a 72 year old female presents for follow up.\par \par > ESRD on HD\par - via right chest permcath. \par - reviewed results of duplex with patient and . left arm veins appear of adequate caliber for left arm AVF creation. will schedule patient. 
Statement Selected

## 2023-01-30 NOTE — HISTORY OF PRESENT ILLNESS
[FreeTextEntry1] : + PMH: HTN, HLD, DM, ESRD, CAD, afib \par + PSH: s/p CABG x3 (11/9/2022), s/p left arm AVF (2022)\par + FH: non-contributory\par + SH: Denies smoking or etoh use\par + Aller: Sulfa medications\par \par PCP is Dr. Talha Koehler.  [de-identified] : 1/30/2023 - Pt presents for follow up visit. Patient with history of ESRD on HD via right IJ permcath. Also s/p left arm radiocephalic fistula creation. Postoperatively, patient underwent CABG and fistula thrombosed around this time. Fistula has never been used for HD. Denies any left hand pain, numbness, or weakness.

## 2023-02-07 ENCOUNTER — APPOINTMENT (OUTPATIENT)
Dept: FAMILY MEDICINE | Facility: CLINIC | Age: 73
End: 2023-02-07
Payer: MEDICARE

## 2023-02-07 VITALS
SYSTOLIC BLOOD PRESSURE: 104 MMHG | TEMPERATURE: 97.3 F | DIASTOLIC BLOOD PRESSURE: 66 MMHG | OXYGEN SATURATION: 97 % | WEIGHT: 195 LBS | HEART RATE: 72 BPM | HEIGHT: 61 IN | BODY MASS INDEX: 36.82 KG/M2

## 2023-02-07 PROCEDURE — 99214 OFFICE O/P EST MOD 30 MIN: CPT

## 2023-02-10 ENCOUNTER — OUTPATIENT (OUTPATIENT)
Dept: OUTPATIENT SERVICES | Facility: HOSPITAL | Age: 73
LOS: 1 days | Discharge: HOME | End: 2023-02-10
Payer: MEDICARE

## 2023-02-10 VITALS
RESPIRATION RATE: 16 BRPM | WEIGHT: 175.27 LBS | SYSTOLIC BLOOD PRESSURE: 100 MMHG | HEART RATE: 78 BPM | HEIGHT: 64 IN | OXYGEN SATURATION: 98 % | TEMPERATURE: 98 F | DIASTOLIC BLOOD PRESSURE: 54 MMHG

## 2023-02-10 DIAGNOSIS — Z01.818 ENCOUNTER FOR OTHER PREPROCEDURAL EXAMINATION: ICD-10-CM

## 2023-02-10 DIAGNOSIS — I25.10 ATHEROSCLEROTIC HEART DISEASE OF NATIVE CORONARY ARTERY WITHOUT ANGINA PECTORIS: ICD-10-CM

## 2023-02-10 DIAGNOSIS — N18.6 END STAGE RENAL DISEASE: ICD-10-CM

## 2023-02-10 DIAGNOSIS — E11.9 TYPE 2 DIABETES MELLITUS WITHOUT COMPLICATIONS: ICD-10-CM

## 2023-02-10 DIAGNOSIS — I10 ESSENTIAL (PRIMARY) HYPERTENSION: ICD-10-CM

## 2023-02-10 LAB
ALBUMIN SERPL ELPH-MCNC: 3 G/DL — LOW (ref 3.3–5)
ALP SERPL-CCNC: 91 U/L — SIGNIFICANT CHANGE UP (ref 40–120)
ALT FLD-CCNC: 13 U/L — SIGNIFICANT CHANGE UP (ref 12–78)
ANION GAP SERPL CALC-SCNC: 11 MMOL/L — SIGNIFICANT CHANGE UP (ref 5–17)
APTT BLD: 26.1 SEC — LOW (ref 27.5–35.5)
AST SERPL-CCNC: 14 U/L — LOW (ref 15–37)
BASOPHILS # BLD AUTO: 0.04 K/UL — SIGNIFICANT CHANGE UP (ref 0–0.2)
BASOPHILS NFR BLD AUTO: 0.3 % — SIGNIFICANT CHANGE UP (ref 0–2)
BILIRUB SERPL-MCNC: 0.3 MG/DL — SIGNIFICANT CHANGE UP (ref 0.2–1.2)
BUN SERPL-MCNC: 27 MG/DL — HIGH (ref 7–23)
CALCIUM SERPL-MCNC: 8.6 MG/DL — SIGNIFICANT CHANGE UP (ref 8.5–10.1)
CHLORIDE SERPL-SCNC: 101 MMOL/L — SIGNIFICANT CHANGE UP (ref 96–108)
CO2 SERPL-SCNC: 28 MMOL/L — SIGNIFICANT CHANGE UP (ref 22–31)
CREAT SERPL-MCNC: 3.32 MG/DL — HIGH (ref 0.5–1.3)
EGFR: 14 ML/MIN/1.73M2 — LOW
EOSINOPHIL # BLD AUTO: 0.07 K/UL — SIGNIFICANT CHANGE UP (ref 0–0.5)
EOSINOPHIL NFR BLD AUTO: 0.6 % — SIGNIFICANT CHANGE UP (ref 0–6)
GLUCOSE SERPL-MCNC: 165 MG/DL — HIGH (ref 70–99)
HCT VFR BLD CALC: 32.2 % — LOW (ref 34.5–45)
HGB BLD-MCNC: 10 G/DL — LOW (ref 11.5–15.5)
IMM GRANULOCYTES NFR BLD AUTO: 1.7 % — HIGH (ref 0–0.9)
INR BLD: 0.9 RATIO — SIGNIFICANT CHANGE UP (ref 0.88–1.16)
LYMPHOCYTES # BLD AUTO: 18.3 % — SIGNIFICANT CHANGE UP (ref 13–44)
LYMPHOCYTES # BLD AUTO: 2.29 K/UL — SIGNIFICANT CHANGE UP (ref 1–3.3)
MCHC RBC-ENTMCNC: 31.1 G/DL — LOW (ref 32–36)
MCHC RBC-ENTMCNC: 31.1 PG — SIGNIFICANT CHANGE UP (ref 27–34)
MCV RBC AUTO: 100 FL — SIGNIFICANT CHANGE UP (ref 80–100)
MONOCYTES # BLD AUTO: 0.62 K/UL — SIGNIFICANT CHANGE UP (ref 0–0.9)
MONOCYTES NFR BLD AUTO: 5 % — SIGNIFICANT CHANGE UP (ref 2–14)
NEUTROPHILS # BLD AUTO: 9.25 K/UL — HIGH (ref 1.8–7.4)
NEUTROPHILS NFR BLD AUTO: 74.1 % — SIGNIFICANT CHANGE UP (ref 43–77)
NRBC # BLD: 0 /100 WBCS — SIGNIFICANT CHANGE UP (ref 0–0)
PLATELET # BLD AUTO: 263 K/UL — SIGNIFICANT CHANGE UP (ref 150–400)
POTASSIUM SERPL-MCNC: 3.3 MMOL/L — LOW (ref 3.5–5.3)
POTASSIUM SERPL-SCNC: 3.3 MMOL/L — LOW (ref 3.5–5.3)
PROT SERPL-MCNC: 6.6 GM/DL — SIGNIFICANT CHANGE UP (ref 6–8.3)
PROTHROM AB SERPL-ACNC: 10.7 SEC — SIGNIFICANT CHANGE UP (ref 10.5–13.4)
RBC # BLD: 3.22 M/UL — LOW (ref 3.8–5.2)
RBC # FLD: 15.1 % — HIGH (ref 10.3–14.5)
SODIUM SERPL-SCNC: 140 MMOL/L — SIGNIFICANT CHANGE UP (ref 135–145)
WBC # BLD: 12.48 K/UL — HIGH (ref 3.8–10.5)
WBC # FLD AUTO: 12.48 K/UL — HIGH (ref 3.8–10.5)

## 2023-02-10 PROCEDURE — 93010 ELECTROCARDIOGRAM REPORT: CPT

## 2023-02-10 NOTE — H&P PST ADULT - RESPIRATORY AND THORAX
Dewey Butler was seen and treated in our emergency department on 10/27/2022  Diagnosis:     Jes Quach  may return to school on return date  She may return on this date: 11/02/2022    If remains afebrile, temperature less than 100 4 without medicines     If you have any questions or concerns, please don't hesitate to call        Alin Reed DO    ______________________________           _______________          _______________  Hospital Representative                              Date                                Time
details…

## 2023-02-10 NOTE — H&P PST ADULT - ATTENDING COMMENTS
ESRD on HD via permcath.   Plan for AV access placement.   Risks, benefits, and alternatives of the procedure were discussed with the patient. All questions were answered. She agrees to proceed with the procedure.

## 2023-02-10 NOTE — H&P PST ADULT - ASSESSMENT
72F [facility] cad s/p CABG x3 2022, htn, DM, ESRD on HD here for PST for scheduled left arm fistula creation possible graft possible right  CAPRINI SCORE [CLOT]    AGE RELATED RISK FACTORS                                                       MOBILITY RELATED FACTORS  [ ] Age 41-60 years                                            (1 Point)                  [ ] Bed rest                                                        (1 Point)  [x ] Age: 61-74 years                                           (2 Points)                 [ ] Plaster cast                                                   (2 Points)  [ ] Age= 75 years                                              (3 Points)                 [ ] Bed bound for more than 72 hours                 (2 Points)    DISEASE RELATED RISK FACTORS                                               GENDER SPECIFIC FACTORS  [ ] Edema in the lower extremities                       (1 Point)                  [ ] Pregnancy                                                     (1 Point)  [ ] Varicose veins                                               (1 Point)                  [ ] Post-partum < 6 weeks                                   (1 Point)             [x ] BMI > 25 Kg/m2                                            (1 Point)                  [ ] Hormonal therapy  or oral contraception          (1 Point)                 [ ] Sepsis (in the previous month)                        (1 Point)                  [ ] History of pregnancy complications                 (1 point)  [ ] Pneumonia or serious lung disease                                               [ ] Unexplained or recurrent                     (1 Point)           (in the previous month)                               (1 Point)  [ ] Abnormal pulmonary function test                     (1 Point)                 SURGERY RELATED RISK FACTORS  [ ] Acute myocardial infarction                              (1 Point)                 [ ]  Section                                             (1 Point)  [ ] Congestive heart failure (in the previous month)  (1 Point)               [ ] Minor surgery                                                  (1 Point)   [ ] Inflammatory bowel disease                             (1 Point)                 [ ] Arthroscopic surgery                                        (2 Points)  [ ] Central venous access                                      (2 Points)                [x ] General surgery lasting more than 45 minutes   (2 Points)       [ ] Stroke (in the previous month)                          (5 Points)               [ ] Elective arthroplasty                                         (5 Points)                                                                                                                                               HEMATOLOGY RELATED FACTORS                                                 TRAUMA RELATED RISK FACTORS  [ ] Prior episodes of VTE                                     (3 Points)                [ ] Fracture of the hip, pelvis, or leg                       (5 Points)  [ ] Positive family history for VTE                         (3 Points)                 [ ] Acute spinal cord injury (in the previous month)  (5 Points)  [ ] Prothrombin 70946 A                                     (3 Points)                 [ ] Paralysis  (less than 1 month)                             (5 Points)  [ ] Factor V Leiden                                             (3 Points)                  [ ] Multiple Trauma within 1 month                        (5 Points)  [ ] Lupus anticoagulants                                     (3 Points)                                                           [ ] Anticardiolipin antibodies                               (3 Points)                                                       [ ] High homocysteine in the blood                      (3 Points)                                             [ ] Other congenital or acquired thrombophilia      (3 Points)                                                [ ] Heparin induced thrombocytopenia                  (3 Points)                                          Total Score [    5     ]    Caprini Score 0 - 2:  Low Risk, No VTE Prophylaxis required for most patients, encourage ambulation  Caprini Score 3 - 6:  At Risk, pharmacologic VTE prophylaxis is indicated for most patients (in the absence of a contraindication)  Caprini Score Greater than or = 7:  High Risk, pharmacologic VTE prophylaxis is indicated for most patients (in the absence of a contraindication)

## 2023-02-10 NOTE — H&P PST ADULT - PROBLEM SELECTOR PLAN 1
Left arm fistula creation possible graft possible right  Pre-op instructions given by RN, patient verbalized understanding  Chlorhexidine wash instructions given   cardiac clearance  medical clearance   Anesthesiologist to review PST labs, EKG, required clearances and optimization for surgery.

## 2023-02-10 NOTE — H&P PST ADULT - HISTORY OF PRESENT ILLNESS
72F [facility] s/p CABG x3 11-, htn, DM, ESRD on HD here for PST for scheduled left arm fistula creation possible graft possible right  This patient denies any fever, cough, sob, flu like symptoms or travel outside of the [place] in the past 30 days

## 2023-02-10 NOTE — H&P PST ADULT - NSICDXPASTMEDICALHX_GEN_ALL_CORE_FT
PAST MEDICAL HISTORY:  DM (diabetes mellitus)     ESRD on dialysis     H/O:      H/O: hysterectomy     HLD (hyperlipidemia)     HTN (hypertension)     S/P CABG x 3

## 2023-02-14 ENCOUNTER — APPOINTMENT (OUTPATIENT)
Dept: CARDIOLOGY | Facility: CLINIC | Age: 73
End: 2023-02-14
Payer: MEDICARE

## 2023-02-14 VITALS
OXYGEN SATURATION: 97 % | DIASTOLIC BLOOD PRESSURE: 75 MMHG | SYSTOLIC BLOOD PRESSURE: 116 MMHG | HEART RATE: 88 BPM | HEIGHT: 61 IN | WEIGHT: 195 LBS | BODY MASS INDEX: 36.82 KG/M2

## 2023-02-14 DIAGNOSIS — I31.39 OTHER PERICARDIAL EFFUSION (NONINFLAMMATORY): ICD-10-CM

## 2023-02-14 PROCEDURE — 99214 OFFICE O/P EST MOD 30 MIN: CPT

## 2023-02-14 RX ORDER — LABETALOL HYDROCHLORIDE 300 MG/1
300 TABLET, FILM COATED ORAL
Qty: 180 | Refills: 1 | Status: DISCONTINUED | COMMUNITY
Start: 2021-03-16 | End: 2023-02-14

## 2023-02-15 PROBLEM — N18.6 END STAGE RENAL DISEASE: Chronic | Status: ACTIVE | Noted: 2023-02-10

## 2023-02-19 ENCOUNTER — NON-APPOINTMENT (OUTPATIENT)
Age: 73
End: 2023-02-19

## 2023-02-21 LAB
BASOPHILS # BLD AUTO: 0.03 K/UL — SIGNIFICANT CHANGE UP (ref 0–0.2)
BASOPHILS NFR BLD AUTO: 0.3 % — SIGNIFICANT CHANGE UP (ref 0–2)
EOSINOPHIL # BLD AUTO: 0.01 K/UL — SIGNIFICANT CHANGE UP (ref 0–0.5)
EOSINOPHIL NFR BLD AUTO: 0.1 % — SIGNIFICANT CHANGE UP (ref 0–6)
HCT VFR BLD CALC: 31.8 % — LOW (ref 34.5–45)
HGB BLD-MCNC: 10.3 G/DL — LOW (ref 11.5–15.5)
IMM GRANULOCYTES NFR BLD AUTO: 1.1 % — HIGH (ref 0–0.9)
LYMPHOCYTES # BLD AUTO: 0.9 K/UL — LOW (ref 1–3.3)
LYMPHOCYTES # BLD AUTO: 7.8 % — LOW (ref 13–44)
MCHC RBC-ENTMCNC: 31.5 PG — SIGNIFICANT CHANGE UP (ref 27–34)
MCHC RBC-ENTMCNC: 32.4 G/DL — SIGNIFICANT CHANGE UP (ref 32–36)
MCV RBC AUTO: 97.2 FL — SIGNIFICANT CHANGE UP (ref 80–100)
MONOCYTES # BLD AUTO: 0.32 K/UL — SIGNIFICANT CHANGE UP (ref 0–0.9)
MONOCYTES NFR BLD AUTO: 2.8 % — SIGNIFICANT CHANGE UP (ref 2–14)
NEUTROPHILS # BLD AUTO: 10.19 K/UL — HIGH (ref 1.8–7.4)
NEUTROPHILS NFR BLD AUTO: 87.9 % — HIGH (ref 43–77)
NRBC # BLD: 0 /100 WBCS — SIGNIFICANT CHANGE UP (ref 0–0)
PLATELET # BLD AUTO: 243 K/UL — SIGNIFICANT CHANGE UP (ref 150–400)
RBC # BLD: 3.27 M/UL — LOW (ref 3.8–5.2)
RBC # FLD: 15.6 % — HIGH (ref 10.3–14.5)
WBC # BLD: 11.58 K/UL — HIGH (ref 3.8–10.5)
WBC # FLD AUTO: 11.58 K/UL — HIGH (ref 3.8–10.5)

## 2023-02-23 ENCOUNTER — TRANSCRIPTION ENCOUNTER (OUTPATIENT)
Age: 73
End: 2023-02-23

## 2023-02-23 ENCOUNTER — APPOINTMENT (OUTPATIENT)
Dept: INTERNAL MEDICINE | Facility: CLINIC | Age: 73
End: 2023-02-23
Payer: MEDICARE

## 2023-02-23 PROCEDURE — 93306 TTE W/DOPPLER COMPLETE: CPT

## 2023-02-23 NOTE — DISCUSSION/SUMMARY
[FreeTextEntry1] : S/p CABG: Doing well. Post op with mild LV dysfunction. Not particularly active but no symptoms of CP or dyspnea\par \par Cont asa\par Restart lipitor 40mg, dc gemfibrozil (TG in hospital 271)\par \par HTN: BP well controlled, cont meds. Room to drop norvasc if warranted\par \par PAF: Brief while in hospital in the setting of MAR on CKD, COVID, large pericardial effusion. Subsequent ECG's have been SR. Developed GIB from AC\par \par Cont asa, Toprol\par Holding on AC for now. Can consider Watchman when more stable\par \par ESRD- On HD via permacath, pending AVF\par \par Pericardial effusion- likely uremic in the setting of acute kidney failure requiring HD. Post CABG echo with trace effusion\par \par Will repeat echo prior to surgery\par \par Pending a normal echo...\par \par No evidence of ongoing ischemia, arrhythmia, valvular heart disease or decompensated heart failure.  This patient is optimized from a cardiac standpoint for this intermediate risk surgery.  No further cardiac testing is necessary prior to surgery. Would continue asa in perioperative period given recent CABG\par \par RV 3M\par \par Addendum 2/23/23: Echo reviewed. Normal LV function, mild diastolic dysfunction. Mild aortic stenosis, mild mitral valve calcification without stenosis.  Given the above, patient is optimized from cardiac standpoint for intermediate risk surgery\par

## 2023-02-23 NOTE — HISTORY OF PRESENT ILLNESS
[FreeTextEntry1] : 72F with CAD s/p recent CABG, pAF, HTN, HLD who presents for cardiac eval prior to AVF placement \par \par Pt had AVF placed on RUE in October 2022 anticipation for HD\par Pt was admitted to [facility] in late October 22 with MAR on CKD, uremia, metabolic derangements in the setting of COVID\par Urgent HD via permacath\par Also with large pericardial effusion s/p drainage\par Onset of AFib, placed on amio and AC, AC discontinued secondary to anemia and GIB. On asa only  \par Subsequent cath with 3V CAD\par S/p CABG 11/9/22 (LIMA-LAD, SVG-OM, OM)\par Discharged 11/22 to rehab\par Meanwhile RUE AVF not maturing, pending repeat AVF placement \par \par She otherwise feels well. Denies CP, dyspnea, lightheadedness\par Still having trouble walking, in wheelchair\par \par Never smoker. Worked at a school, lunch aide.\par \par ECG: SR with nonspecific ST changes (PST)\par TTE post CABG:\par \par 1. Mitral annular calcification, otherwise normal mitral\par valve. Mild mitral regurgitation.\par 2. Normal trileaflet aortic valve. No aortic valve\par regurgitation seen.\par 3. Mild left atrial enlargement.\par 4. Mild concentric left ventricular hypertrophy.\par 5. Mild segmental left ventricular systolic dysfunction of\par inferior and lateral walls.  Other walls normal function\par Normal left ventricular systolic function.\par 6. Mild diastolic dysfunction (Stage I).\par 7. Normal tricuspid valve.\par 8. Normal pericardium with trace pericardial effusion.\par 9. Left pleural effusion.\par \par Cath 11/2/22:\par \par -Left main artery: Angiography shows no disease.  \par -Proximal left anterior descending: There is a 50 % stenosis. Mid left\par anterior descending: There is an 80 % stenosis. First\par diagonal: There is a 90 % stenosis.  \par -Ostial circumflex: There is a 90 % stenosis. Distal circumflex:\par Angiography shows moderate atherosclerosis.\par -Mid right coronary artery: There is a 90 % stenosis.  \par \par Asa 81mg\par Gemfibrozil 600mg\par \par Losartan 25mg\par Amlodipine 10mg\par Toprol 100mg \par \par \par

## 2023-02-24 ENCOUNTER — TRANSCRIPTION ENCOUNTER (OUTPATIENT)
Age: 73
End: 2023-02-24

## 2023-02-24 ENCOUNTER — OUTPATIENT (OUTPATIENT)
Dept: OUTPATIENT SERVICES | Facility: HOSPITAL | Age: 73
LOS: 1 days | Discharge: HOME | End: 2023-02-24
Payer: MEDICARE

## 2023-02-24 ENCOUNTER — APPOINTMENT (OUTPATIENT)
Dept: VASCULAR SURGERY | Facility: HOSPITAL | Age: 73
End: 2023-02-24

## 2023-02-24 VITALS
HEIGHT: 64 IN | HEART RATE: 82 BPM | SYSTOLIC BLOOD PRESSURE: 96 MMHG | OXYGEN SATURATION: 98 % | RESPIRATION RATE: 17 BRPM | WEIGHT: 175.27 LBS | TEMPERATURE: 98 F | DIASTOLIC BLOOD PRESSURE: 58 MMHG

## 2023-02-24 VITALS
RESPIRATION RATE: 18 BRPM | DIASTOLIC BLOOD PRESSURE: 54 MMHG | SYSTOLIC BLOOD PRESSURE: 94 MMHG | HEART RATE: 77 BPM | OXYGEN SATURATION: 98 %

## 2023-02-24 LAB
ANION GAP SERPL CALC-SCNC: 12 MMOL/L — SIGNIFICANT CHANGE UP (ref 5–17)
BUN SERPL-MCNC: 35 MG/DL — HIGH (ref 7–23)
CALCIUM SERPL-MCNC: 8.5 MG/DL — SIGNIFICANT CHANGE UP (ref 8.5–10.1)
CHLORIDE SERPL-SCNC: 110 MMOL/L — HIGH (ref 96–108)
CO2 SERPL-SCNC: 23 MMOL/L — SIGNIFICANT CHANGE UP (ref 22–31)
CREAT SERPL-MCNC: 3.7 MG/DL — HIGH (ref 0.5–1.3)
EGFR: 13 ML/MIN/1.73M2 — LOW
GLUCOSE BLDC GLUCOMTR-MCNC: 144 MG/DL — HIGH (ref 70–99)
GLUCOSE SERPL-MCNC: 151 MG/DL — HIGH (ref 70–99)
POTASSIUM SERPL-MCNC: 3.4 MMOL/L — LOW (ref 3.5–5.3)
POTASSIUM SERPL-SCNC: 3.4 MMOL/L — LOW (ref 3.5–5.3)
SODIUM SERPL-SCNC: 145 MMOL/L — SIGNIFICANT CHANGE UP (ref 135–145)

## 2023-02-24 PROCEDURE — 36821 AV FUSION DIRECT ANY SITE: CPT | Mod: LT

## 2023-02-24 PROCEDURE — 36818 AV FUSE UPPR ARM CEPHALIC: CPT | Mod: AS

## 2023-02-24 DEVICE — CLIP APPLIER COVIDIEN SURGICLIP III 9" SM: Type: IMPLANTABLE DEVICE | Site: LEFT | Status: FUNCTIONAL

## 2023-02-24 RX ORDER — OXYCODONE HYDROCHLORIDE 5 MG/1
1 TABLET ORAL
Qty: 5 | Refills: 0
Start: 2023-02-24 | End: 2023-02-25

## 2023-02-24 RX ORDER — FENTANYL CITRATE 50 UG/ML
25 INJECTION INTRAVENOUS ONCE
Refills: 0 | Status: DISCONTINUED | OUTPATIENT
Start: 2023-02-24 | End: 2023-02-25

## 2023-02-24 RX ORDER — SODIUM CHLORIDE 9 MG/ML
1000 INJECTION, SOLUTION INTRAVENOUS
Refills: 0 | Status: DISCONTINUED | OUTPATIENT
Start: 2023-02-24 | End: 2023-02-25

## 2023-02-24 RX ORDER — VANCOMYCIN HCL 1 G
1000 VIAL (EA) INTRAVENOUS ONCE
Refills: 0 | Status: COMPLETED | OUTPATIENT
Start: 2023-02-24 | End: 2023-02-24

## 2023-02-24 RX ORDER — SODIUM CHLORIDE 9 MG/ML
3 INJECTION INTRAMUSCULAR; INTRAVENOUS; SUBCUTANEOUS EVERY 8 HOURS
Refills: 0 | Status: DISCONTINUED | OUTPATIENT
Start: 2023-02-24 | End: 2023-02-24

## 2023-02-24 RX ORDER — FENTANYL CITRATE 50 UG/ML
12.5 INJECTION INTRAVENOUS ONCE
Refills: 0 | Status: DISCONTINUED | OUTPATIENT
Start: 2023-02-24 | End: 2023-02-25

## 2023-02-24 RX ADMIN — Medication 250 MILLIGRAM(S): at 09:45

## 2023-02-24 NOTE — ASU DISCHARGE PLAN (ADULT/PEDIATRIC) - ASU DC SPECIAL INSTRUCTIONSFT
Follow up with Dr. Galeano in 2 weeks. Please call to schedule an appointment.   Call the office if: You have numbness or tingling of your left hand or arm. You have any bleeding that does not stop, any pus draining from your wound, any fever (over 100.4 F) or chills, persistent nausea/vomiting, persistent diarrhea, or if your pain is not controlled on your discharge pain medications.    Wound: You may take off outer dressing and shower after 48 hours. After showering, pat steri strips dry. Leave the white steri strips in place, they will fall off on their own in approximately 5-7 days or they will be removed at your follow up appointment.

## 2023-02-24 NOTE — ASU DISCHARGE PLAN (ADULT/PEDIATRIC) - DIET/FLUID RESTRICTION
Head,  normocephalic,  atraumatic,  Face,  Face within normal limits,  Ears,  External ears within normal limits,  Nose/Nasopharynx,  External nose  normal appearance, no nasal discharge,  Mouth and Throat,  Oral cavity appearance normal Lips,  Appearance normal
No change

## 2023-02-24 NOTE — ASU DISCHARGE PLAN (ADULT/PEDIATRIC) - CARE PROVIDER_API CALL
Kandice Galeano (MD)  Surgery  [address], [address] [place] [address]  Phone: [phone]  Fax: [phone]  Follow Up Time:

## 2023-02-26 DIAGNOSIS — Z99.2 DEPENDENCE ON RENAL DIALYSIS: ICD-10-CM

## 2023-02-26 DIAGNOSIS — I31.39 OTHER PERICARDIAL EFFUSION (NONINFLAMMATORY): ICD-10-CM

## 2023-02-26 DIAGNOSIS — R39.2 EXTRARENAL UREMIA: ICD-10-CM

## 2023-02-26 DIAGNOSIS — E11.22 TYPE 2 DIABETES MELLITUS WITH DIABETIC CHRONIC KIDNEY DISEASE: ICD-10-CM

## 2023-02-26 DIAGNOSIS — I13.0 HYPERTENSIVE HEART AND CHRONIC KIDNEY DISEASE WITH HEART FAILURE AND STAGE 1 THROUGH STAGE 4 CHRONIC KIDNEY DISEASE, OR UNSPECIFIED CHRONIC KIDNEY DISEASE: ICD-10-CM

## 2023-02-26 DIAGNOSIS — E78.2 MIXED HYPERLIPIDEMIA: ICD-10-CM

## 2023-02-26 DIAGNOSIS — N18.6 END STAGE RENAL DISEASE: ICD-10-CM

## 2023-02-26 DIAGNOSIS — Z86.16 PERSONAL HISTORY OF COVID-19: ICD-10-CM

## 2023-02-27 ENCOUNTER — NON-APPOINTMENT (OUTPATIENT)
Age: 73
End: 2023-02-27

## 2023-03-06 ENCOUNTER — APPOINTMENT (OUTPATIENT)
Dept: VASCULAR SURGERY | Facility: CLINIC | Age: 73
End: 2023-03-06
Payer: MEDICARE

## 2023-03-06 VITALS
WEIGHT: 175 LBS | HEART RATE: 78 BPM | BODY MASS INDEX: 33.04 KG/M2 | TEMPERATURE: 98 F | SYSTOLIC BLOOD PRESSURE: 93 MMHG | HEIGHT: 61 IN | OXYGEN SATURATION: 99 % | DIASTOLIC BLOOD PRESSURE: 54 MMHG

## 2023-03-06 PROCEDURE — 99024 POSTOP FOLLOW-UP VISIT: CPT

## 2023-03-06 NOTE — ASSESSMENT
[FreeTextEntry1] : RILEY MEJÍA is a 72 year old female presents for follow up.\par \par > ESRD on HD\par - via right chest permcath. \par - s/p left upper arm fistula creation 2/24/2023. \par - mild steal symptoms. hand not threatened. recommend hand exercises, keep hand warm. \par - plan for duplex of fistula and follow up visit in 6 weeks postop.

## 2023-03-06 NOTE — PHYSICAL EXAM
[2+] : left 2+ [Calm] : calm [de-identified] : Well-appearing  [de-identified] : EOMI, anicteric, right chest permcath site is clean, dry [FreeTextEntry1] : palpable thrill overlying fistula. doppler radial, ulnar, palmar arch signals.  [de-identified] : incision healing well. clean, dry, intact. small hematoma.  [de-identified] : A&Ox4

## 2023-03-06 NOTE — HISTORY OF PRESENT ILLNESS
[FreeTextEntry1] : + PMH: HTN, HLD, DM, ESRD, CAD, afib \par + PSH: s/p CABG x3 (11/9/2022), s/p left arm AVF (2022)\par + FH: non-contributory\par + SH: Denies smoking or etoh use\par + Aller: Sulfa medications\par \par PCP is Dr. Talha Koehler. \par \par 1/30/2023 - Pt presents for follow up visit. Patient with history of ESRD on HD via right IJ permcath. Also s/p left arm radiocephalic fistula creation. Postoperatively, patient underwent CABG and fistula thrombosed around this time. Fistula has never been used for HD. Denies any left hand pain, numbness, or weakness.  [de-identified] : 2/24/2023–left brachiocephalic fistula creation\par \par 3/6/2023–Patient presents for follow-up visit. Since the surgery, patient has had mild intermittent numbness of the first and second digits of the left hand. The symptoms are slightly improving. Denies pain in the left hand. She has been using a hand exercise device but finds it bulky and ordered a stress ball at my recommendation.

## 2023-03-16 ENCOUNTER — NON-APPOINTMENT (OUTPATIENT)
Age: 73
End: 2023-03-16

## 2023-03-16 RX ORDER — LOSARTAN POTASSIUM 25 MG/1
25 TABLET, FILM COATED ORAL
Qty: 90 | Refills: 1 | Status: DISCONTINUED | COMMUNITY
Start: 2021-10-14 | End: 2023-03-16

## 2023-04-03 ENCOUNTER — APPOINTMENT (OUTPATIENT)
Dept: VASCULAR SURGERY | Facility: CLINIC | Age: 73
End: 2023-04-03
Payer: MEDICARE

## 2023-04-03 PROCEDURE — 93990 DOPPLER FLOW TESTING: CPT

## 2023-04-05 ENCOUNTER — APPOINTMENT (OUTPATIENT)
Dept: VASCULAR SURGERY | Facility: CLINIC | Age: 73
End: 2023-04-05
Payer: MEDICARE

## 2023-04-05 VITALS
HEIGHT: 61 IN | BODY MASS INDEX: 34.93 KG/M2 | TEMPERATURE: 98.5 F | DIASTOLIC BLOOD PRESSURE: 69 MMHG | HEART RATE: 99 BPM | WEIGHT: 185 LBS | OXYGEN SATURATION: 100 % | SYSTOLIC BLOOD PRESSURE: 112 MMHG

## 2023-04-05 PROCEDURE — 99024 POSTOP FOLLOW-UP VISIT: CPT

## 2023-04-05 NOTE — HISTORY OF PRESENT ILLNESS
[FreeTextEntry1] : + PMH: HTN, HLD, DM, ESRD, CAD, afib \par + PSH: s/p CABG x3 (11/9/2022), s/p left arm AVF (2022)\par + FH: non-contributory\par + SH: Denies smoking or etoh use\par + Aller: Sulfa medications\par \par PCP is Dr. Talha Koehler. \par \par 1/30/2023 - Pt presents for follow up visit. Patient with history of ESRD on HD via right IJ permcath. Also s/p left arm radiocephalic fistula creation. Postoperatively, patient underwent CABG and fistula thrombosed around this time. Fistula has never been used for HD. Denies any left hand pain, numbness, or weakness. \par \par 2/24/2023–left brachiocephalic fistula creation\par \par 3/6/2023–Patient presents for follow-up visit. Since the surgery, patient has had mild intermittent numbness of the first and second digits of the left hand. The symptoms are slightly improving. Denies pain in the left hand. She has been using a hand exercise device but finds it bulky and ordered a stress ball at my recommendation.  [de-identified] : 4/5/2023 - Pt presents for follow up visit. She reports that she is doing well. Decreased numbness in her hand and continues to do hand exercises. Continues to get HD on T,T,S via permcath.

## 2023-04-05 NOTE — ASSESSMENT
[FreeTextEntry1] : RILEY MEJÍA is a 72 year old female presents for follow up.\par \par > ESRD on HD\par - via right chest permcath. \par - s/p left upper arm fistula creation 2/24/2023. \par - mild steal symptoms. hand not threatened. recommend hand exercises, keep hand warm. \par - duplex shows good size fistula but is too deep for cannulation. will schedule patient for superficialization.

## 2023-04-05 NOTE — DATA REVIEWED
[FreeTextEntry1] : 4/3/2023 - LUKANDACE AVF duplex\par There is a patent brachiocephalic fistula in the left upper arm without evidence of flow restrictive lesions.  The subclavian vein remains patent.  The volume flow is 1499 mL/min.  The fistula measures 11.2 mm, 6.4 mm, 6.2 mm. Depth 1 cm in the usable segment of the fistula. \par ----------------------------------------------\par 1/23/2023 - BUE vein mapping\par Veins of adequate caliber in the bilateral upper extremities.

## 2023-04-05 NOTE — PHYSICAL EXAM
[2+] : left 2+ [Calm] : calm [de-identified] : Well-appearing  [de-identified] : EOMI, anicteric, right chest permcath site is clean, dry [FreeTextEntry1] : palpable thrill overlying fistula. [de-identified] : incision healing well. clean, dry, intact. small hematoma.  [de-identified] : A&Ox4

## 2023-04-14 ENCOUNTER — OUTPATIENT (OUTPATIENT)
Dept: OUTPATIENT SERVICES | Facility: HOSPITAL | Age: 73
LOS: 1 days | Discharge: HOME | End: 2023-04-14

## 2023-04-14 VITALS
HEART RATE: 106 BPM | WEIGHT: 180.12 LBS | TEMPERATURE: 98 F | SYSTOLIC BLOOD PRESSURE: 99 MMHG | DIASTOLIC BLOOD PRESSURE: 47 MMHG | RESPIRATION RATE: 16 BRPM | OXYGEN SATURATION: 98 % | HEIGHT: 64 IN

## 2023-04-14 DIAGNOSIS — Z98.891 HISTORY OF UTERINE SCAR FROM PREVIOUS SURGERY: Chronic | ICD-10-CM

## 2023-04-14 DIAGNOSIS — Z01.812 ENCOUNTER FOR PREPROCEDURAL LABORATORY EXAMINATION: ICD-10-CM

## 2023-04-14 DIAGNOSIS — N18.6 END STAGE RENAL DISEASE: ICD-10-CM

## 2023-04-14 DIAGNOSIS — E78.5 HYPERLIPIDEMIA, UNSPECIFIED: ICD-10-CM

## 2023-04-14 DIAGNOSIS — E11.9 TYPE 2 DIABETES MELLITUS WITHOUT COMPLICATIONS: ICD-10-CM

## 2023-04-14 DIAGNOSIS — Z01.818 ENCOUNTER FOR OTHER PREPROCEDURAL EXAMINATION: ICD-10-CM

## 2023-04-14 DIAGNOSIS — Z90.710 ACQUIRED ABSENCE OF BOTH CERVIX AND UTERUS: Chronic | ICD-10-CM

## 2023-04-14 DIAGNOSIS — I10 ESSENTIAL (PRIMARY) HYPERTENSION: ICD-10-CM

## 2023-04-14 DIAGNOSIS — Z95.1 PRESENCE OF AORTOCORONARY BYPASS GRAFT: Chronic | ICD-10-CM

## 2023-04-14 LAB
A1C WITH ESTIMATED AVERAGE GLUCOSE RESULT: 5.4 % — SIGNIFICANT CHANGE UP (ref 4–5.6)
ANION GAP SERPL CALC-SCNC: 7 MMOL/L — SIGNIFICANT CHANGE UP (ref 5–17)
APTT BLD: 27.3 SEC — LOW (ref 27.5–35.5)
BUN SERPL-MCNC: 26 MG/DL — HIGH (ref 7–23)
CALCIUM SERPL-MCNC: 9.7 MG/DL — SIGNIFICANT CHANGE UP (ref 8.5–10.1)
CHLORIDE SERPL-SCNC: 103 MMOL/L — SIGNIFICANT CHANGE UP (ref 96–108)
CO2 SERPL-SCNC: 31 MMOL/L — SIGNIFICANT CHANGE UP (ref 22–31)
CREAT SERPL-MCNC: 4.69 MG/DL — HIGH (ref 0.5–1.3)
EGFR: 9 ML/MIN/1.73M2 — LOW
ESTIMATED AVERAGE GLUCOSE: 108 MG/DL — SIGNIFICANT CHANGE UP (ref 68–114)
GLUCOSE SERPL-MCNC: 214 MG/DL — HIGH (ref 70–99)
HCT VFR BLD CALC: 35 % — SIGNIFICANT CHANGE UP (ref 34.5–45)
HGB BLD-MCNC: 11.1 G/DL — LOW (ref 11.5–15.5)
INR BLD: 0.96 RATIO — SIGNIFICANT CHANGE UP (ref 0.88–1.16)
MCHC RBC-ENTMCNC: 31.7 G/DL — LOW (ref 32–36)
MCHC RBC-ENTMCNC: 34.7 PG — HIGH (ref 27–34)
MCV RBC AUTO: 109.4 FL — HIGH (ref 80–100)
NRBC # BLD: 0 /100 WBCS — SIGNIFICANT CHANGE UP (ref 0–0)
PLATELET # BLD AUTO: 236 K/UL — SIGNIFICANT CHANGE UP (ref 150–400)
POTASSIUM SERPL-MCNC: 3.7 MMOL/L — SIGNIFICANT CHANGE UP (ref 3.5–5.3)
POTASSIUM SERPL-SCNC: 3.7 MMOL/L — SIGNIFICANT CHANGE UP (ref 3.5–5.3)
PROTHROM AB SERPL-ACNC: 11.4 SEC — SIGNIFICANT CHANGE UP (ref 10.5–13.4)
RBC # BLD: 3.2 M/UL — LOW (ref 3.8–5.2)
RBC # FLD: 16.5 % — HIGH (ref 10.3–14.5)
SODIUM SERPL-SCNC: 141 MMOL/L — SIGNIFICANT CHANGE UP (ref 135–145)
WBC # BLD: 9.74 K/UL — SIGNIFICANT CHANGE UP (ref 3.8–10.5)
WBC # FLD AUTO: 9.74 K/UL — SIGNIFICANT CHANGE UP (ref 3.8–10.5)

## 2023-04-14 RX ORDER — SODIUM CHLORIDE 9 MG/ML
3 INJECTION INTRAMUSCULAR; INTRAVENOUS; SUBCUTANEOUS EVERY 8 HOURS
Refills: 0 | Status: DISCONTINUED | OUTPATIENT
Start: 2023-05-03 | End: 2023-05-18

## 2023-04-14 NOTE — H&P PST ADULT - ATTENDING COMMENTS
s/p left arm fistula creation, now needing revision due to increased depth. plan for revision w/ superficialization.   Risks, benefits, and alternatives of the procedure were discussed with the patient. All questions were answered. She agrees to proceed with the procedure. Risks discussed included but were not limited to risk of bleeding, infection, damage to surrounding structures, thrombosis, steal syndrome, return to OR.

## 2023-04-14 NOTE — H&P PST ADULT - ASSESSMENT
72F [facility] cad s/p CABG x3 2022, htn, DM, ESRD on HD here for PST for scheduled left arm av fistula superficialization on 23 with Dr.Chang CAPRINI SCORE [CLOT]    AGE RELATED RISK FACTORS                                                       MOBILITY RELATED FACTORS  [ ] Age 41-60 years                                            (1 Point)                  [ ] Bed rest                                                        (1 Point)  [x ] Age: 61-74 years                                           (2 Points)                 [ ] Plaster cast                                                   (2 Points)  [ ] Age= 75 years                                              (3 Points)                 [ ] Bed bound for more than 72 hours                 (2 Points)    DISEASE RELATED RISK FACTORS                                               GENDER SPECIFIC FACTORS  [ ] Edema in the lower extremities                       (1 Point)                  [ ] Pregnancy                                                     (1 Point)  [ ] Varicose veins                                               (1 Point)                  [ ] Post-partum < 6 weeks                                   (1 Point)             [x ] BMI > 25 Kg/m2                                            (1 Point)                  [ ] Hormonal therapy  or oral contraception          (1 Point)                 [ ] Sepsis (in the previous month)                        (1 Point)                  [ ] History of pregnancy complications                 (1 point)  [ ] Pneumonia or serious lung disease                                               [ ] Unexplained or recurrent                     (1 Point)           (in the previous month)                               (1 Point)  [ ] Abnormal pulmonary function test                     (1 Point)                 SURGERY RELATED RISK FACTORS  [ ] Acute myocardial infarction                              (1 Point)                 [ ]  Section                                             (1 Point)  [ ] Congestive heart failure (in the previous month)  (1 Point)               [ ] Minor surgery                                                  (1 Point)   [ ] Inflammatory bowel disease                             (1 Point)                 [ ] Arthroscopic surgery                                        (2 Points)  [ ] Central venous access                                      (2 Points)                [x ] General surgery lasting more than 45 minutes   (2 Points)       [ ] Stroke (in the previous month)                          (5 Points)               [ ] Elective arthroplasty                                         (5 Points)                                                                                                                                               HEMATOLOGY RELATED FACTORS                                                 TRAUMA RELATED RISK FACTORS  [ ] Prior episodes of VTE                                     (3 Points)                [ ] Fracture of the hip, pelvis, or leg                       (5 Points)  [ ] Positive family history for VTE                         (3 Points)                 [ ] Acute spinal cord injury (in the previous month)  (5 Points)  [ ] Prothrombin 86096 A                                     (3 Points)                 [ ] Paralysis  (less than 1 month)                             (5 Points)  [ ] Factor V Leiden                                             (3 Points)                  [ ] Multiple Trauma within 1 month                        (5 Points)  [ ] Lupus anticoagulants                                     (3 Points)                                                           [ ] Anticardiolipin antibodies                               (3 Points)                                                       [ ] High homocysteine in the blood                      (3 Points)                                             [ ] Other congenital or acquired thrombophilia      (3 Points)                                                [ ] Heparin induced thrombocytopenia                  (3 Points)                                          Total Score [     5     ]    Caprini Score 0 - 2:  Low Risk, No VTE Prophylaxis required for most patients, encourage ambulation  Caprini Score 3 - 6:  At Risk, pharmacologic VTE prophylaxis is indicated for most patients (in the absence of a contraindication)  Caprini Score Greater than or = 7:  High Risk, pharmacologic VTE prophylaxis is indicated for most patients (in the absence of a contraindication)

## 2023-04-14 NOTE — H&P PST ADULT - NSICDXPASTMEDICALHX_GEN_ALL_CORE_FT
PAST MEDICAL HISTORY:  DM (diabetes mellitus)     ESRD on dialysis     H/O:      H/O: hysterectomy     HLD (hyperlipidemia)     HTN (hypertension)     S/P CABG x 3      PAST MEDICAL HISTORY:  DM (diabetes mellitus)     ESRD on dialysis     HLD (hyperlipidemia)     HTN (hypertension)     Uterine cancer

## 2023-04-14 NOTE — H&P PST ADULT - I HAVE PERSONALLY SEEN AND EXAMINED THE PATIENT. THERE HAVE NOT BEEN ANY CHANGES IN THE PATIENT'S HISTORY OR EXAM UNLESS COMMENTED BELOW
Administration History & Physical Completed prior to Circumcision & infant is < or = to 6 hours of age. Preoperative Diagnosis: non-circumcised    Postoperative Diagnosis: circumcised    Risks and benefits of circumcision explained to mother. All questions answered. Consent signed. Time out performed to verify infant and procedure. Infant prepped and draped in normal sterile fashion. 1 cc of  1% Lidocaine used. Anesthesia used:   Sweet Ease/ Pacifier/ 1% PF lidocaine/ Dorsal Penile Block. Mogan clamp used to perform procedure. Estimated blood loss:  minimal.  Hemostatis noted. Site Care:Vaseline gauze applied Sterile petroleum gauze applied to circumcised area. Infant tolerated the procedure well.   Complications:  none  Specimen Disposition: Biohazard Waste      Electronically signed by Edwin Morse MD on 2021 at 1:02 PM
Statement Selected

## 2023-04-14 NOTE — H&P PST ADULT - PROBLEM SELECTOR PLAN 1
Labs-CBC, BMP, PT/INR, PTT ,T&S, EKG   M/C required    Preop Hibiclens x 1 day instructions reviewed and given.  Take routine meds DOS with small sips of water, avoid NSAIDs and OTC supplement  Anesthesiologist to review PST labs, EKG, required clearances, and optimization for surgery

## 2023-04-14 NOTE — H&P PST ADULT - HISTORY OF PRESENT ILLNESS
72F pmh cad s/p CABG x3 11-, htn, DM, ESRD on HD here for PST for scheduled left arm fistula creation possible graft possible right  This patient denies any fever, cough, sob, flu like symptoms or travel outside of the [place] in the past 30 days  72F pmh cad s/p CABG x3 11-, htn, DM, ESRD on HD here for PST for scheduled left arm fistula creation possible graft possible right      This patient denies any fever, cough, sob, flu like symptoms or travel outside of the [place] in the past 30 days  72F pmh cad s/p CABG x3 11-, htn, DM, ESRD on HD here for PST for scheduled left arm     This patient denies any fever, cough, sob, flu like symptoms or travel outside of the [place] in the past 30 days  72F pmh cad s/p CABG x3 11-, htn, DM, ESRD on HD here for PST for scheduled left arm av fistula superficialization on 4/18/23 with     This patient denies any fever, cough, sob, flu like symptoms or travel outside of the [place] in the past 30 days

## 2023-04-14 NOTE — H&P PST ADULT - TRANSFUSION HX COMMENT, PROFILE
[facility] - anemic- blood in stool 2021 [facility] - anemic- blood in stool 2021

## 2023-04-14 NOTE — H&P PST ADULT - PATIENT'S GENDER IDENTITY
[facility] HEART CARE   CARDIOLOGY CONSULT     Jay Valenzuela   1951  [id number]    Shaheen Garduno     Chief Complaint   Patient presents with     Consult For     positive stress test- angio 1/18/19          HPI:   Mr. Valenzuela is a 67-year-old gentleman who is being seen by cardiology secondary to unstable angina with an abnormal stress test on 1/10/19 and severe peripheral arterial disease with claudication.  He also has a history of hyperlipidemia, hypertriglyceridemia, high suspicion for obstructive sleep apnea, high suspicion for COPD, and severe peripheral arterial disease.    He has been having discomfort to his lower extremity for approximate 3 years.  Every time he walks, he gets a heaviness and pain.  At this point, he is linda if he can walk 50 feet without excruciating pain to his lower extremity.  He has been reluctant to go to a doctor.  Finally his wife and his daughter were able to talk him into being seen.    He was seen in the clinic on 12/7/18.  He was set up for CT of his lower extremities after an abnormal BASSAM on 12/12/17.  The CT of the lower extremity showed an occluded right common iliac artery with an additional moderate approximately 50% stenosis to the right external iliac artery.  There is moderate stenosis to the left common iliac artery with mild multifocal stenosis in both lower extremities without significant stenosis in the legs.  He was referred by his primary to Dr. Johan Webster through [facility] and vascular surgery.  He was scheduled to have bypass versus stenting to his lower extremities on 1/18/19.  When the abnormal stress test came to light, this procedure was canceled and he was set up for a cardiac catheterization through the [facility] on 1/18/19.    For an extended period of time, worse over the last 1-2 months, he has been describing chest tightness particularly brought on with activity and more lately coming on at rest.  He describes a tightness  to his substernal region with radiation between his scapula, neck and his shoulder.  He has smoked for 50 years at 1.5 packs a day equaling 75-pack-year history.  He has had limited history but recently identified as having had hyperlipidemia and has a family history.  He does not know if he has diabetes at this point.  He has not been any medications up until recently when he was started on 81 mg aspirin and Crestor 40 mg.  He has been encouraged to quit smoking.  Both he and his wife smoke and they were told to quit together.    He had an ultrasound of his carotids on 1/14/18 and was felt to have no appreciable disease.  He also had an ultrasound of his abdomen looking for abdominal aortic aneurysm which was negative for an aneurysm.  He has significantly reduced air movement on physical exam which is suggestive of COPD but has not been fully evaluated for this problem.  Also, his wife describes him as snoring loudly at night stopping periodically, being fatigued throughout the day, and falling asleep almost instantly when sitting down.  It seems highly suspicious that he has sleep apnea but he would like to delay evaluation for COPD and sleep apnea until he gets through these anticipated procedures.    IMAGING RESULTS:   Exam:US CAROTID BILATERAL     History:67 years  Male carotid bruit     Findings:     Right side:     There is a small noncalcified plaque at the carotid  bifurcation.          Common carotid artery peak systolic velocity: 104cm/s          Internal carotid artery peak systolic velocity: 107cm/s          External carotid artery peak systolic velocity: 193cm/s          Vertebral artery: Antegrade flow        Left side:  No significant atherosclerotic plaque is visible on  grayscale imaging          Common carotid artery peak systolic velocity: 140cm/s          Internal carotid artery peak systolic velocity: 141cm/s          External carotid artery peak systolic velocity: 80cm/s          Vertebral  artery: Antegrade flow  Incidental note is made of a cyst within the right lobe of the thyroid  measuring 2.0 x 1.2 x 2.5 cm                                                                      Impression: Negative study.    Procedure:  AORTA MEDICARE AAA SCREENING     HISTORY:  Encounter for abdominal aortic aneurysm screening.      TECHNIQUE: Ultrasound of the aorta was performed.     COMPARISON: None.     FINDINGS:     Abdominal aorta dimension (cm):  Proximal: 1.7 x 1.9  Mid: 1.7 x 1.6  Distal: 1.3 x 1.6     Iliac dimension (cm):  Right: 0.5 x 0.6  Left: 0.6 x 0.7     Relatively little color Doppler flow is identified in the right common  iliac artery.     Moderate atherosclerotic plaque is present.                                                                      IMPRESSION: No abdominal aortic aneurysm.      PROCEDURE: CTA LOWER EXTREMITY BILATERAL WITH CONTRAST  12/17/2018   3:38 PM     HISTORY: Claudication, assess for revascularization. Peripheral  arterial disease (H). Bilateral hip and ankle pain, difficulty  walking, abnormal BASSAM.     COMPARISONS: BASSAM 12/12/2018     Meds/Dose Given: Omnipaque 350 100 ml     TECHNIQUE: CTA images of the abdomen and pelvis with leg runoff to  both lower extremities was performed. Sagittal and coronal and 3-D  volume rendered reconstructed images were reviewed.     FINDINGS:      Abdomen/pelvis: There is moderate plaque in the abdominal aorta. No  abdominal aortic aneurysm. No aortic narrowing. The celiac, superior  mesenteric, and inferior mesenteric arteries are patent. Bilateral  renal arteries are patent. There is occlusion of the right common  iliac artery with reconstitution at the level of the external and  internal iliac arteries. There is moderate narrowing of the right  external iliac artery of approximately 50%. On the left, there is  moderate, approximately 40% narrowing of the common iliac artery. The  external iliac artery is patent.     Right lower  extremity: There is mild, approximately 25% stenosis of  the common femoral artery. The superficial femoral artery is patent  with multifocal scattered plaque and mild stenoses. The profunda  femoral artery is patent. The popliteal artery has scattered plaque  but appears patent without significant stenoses. There is at least  initially three-vessel runoff to the right lower extremity with  scattered stenoses and attenuation of vessels distally.     Left lower extremity: There is mild plaque in the common femoral  artery with stenosis of less than 25%. The superficial femoral artery  is patent with mild scattered plaque but without significant stenosis.  The profunda femoral artery is patent. There is mild plaque at the  popliteal artery proximally without significant stenosis. There is  initially three-vessel runoff to the left lower extremity with  attenuation of branches distally.     Nonvascular findings: There are multiple bilateral renal cysts. There  are areas of cortical scarring in the left kidney. There are multiple  diverticula in the colon without evidence of diverticulitis. There is  a left posterolateral bladder diverticulum. There are degenerative  changes in the spine.                                                                      IMPRESSION:   1. Occluded right common iliac artery with reconstitution at the level  of the external iliac artery. Additional moderate, approximately 50%  stenosis of the right external iliac artery.  2. Moderate stenosis of the left common iliac artery.  3. Mild multifocal stenoses in both lower extremities without  significant stenosis in the legs.    US BASSAM DOPPLER NO EXERCISE, 1-2 LEVELS, BILAT     Comparison study: None     Clinical history: Claudication of both lower extremities (H)     Technique: Bilateral lower extremity resting ankle brachial indices  obtained.     Findings:     Right:       Arm: 113 mmHg   PT at ankle: 70 mmHg   DP at foot: 52 mmHg       BASSAM: 0.62     Left:      Arm: 112 mmHg   PT at ankle: 121 mmHg   DP at foot: 119 mmHg      BASSAM: 1.07                                                                      Impression:      Abnormal BASSAM of the right lower extremity, suggesting moderate  peripheral arterial disease.     Guidelines:     BASSAM Diagnostic Criteria (Based on criteria published in Circulation  2011; 124: 5706-4099):    > 1.4: Non compressible    1.00 - 1.40: Normal    0.91 - 0.99: Borderline    At or below 0.90: Abnormal     BASSAM Diagnostic Criteria (Based on ACC/AHA guideline 2008):    >/=1.3 - non compressible vessels    1.00  -1.29 - Normal    0.91 - 0.99 - Borderline    0.41 - 0.90 - Mild to moderate PAD    0.00 - 0.40 - Severe PAD    ALLERGIES:   Allergies   Allergen Reactions     Bees Anaphylaxis        PAST MEDICAL HISTORY:   History reviewed. No pertinent past medical history.     PAST SURGICAL HISTORY:   Past Surgical History:   Procedure Laterality Date     NO HISTORY OF SURGERY          FAMILY HISTORY:   Family History   Problem Relation Age of Onset     Prostate Cancer Father         Cancer-prostate     Pancreatic Cancer Sister         Cancer-pancreatic     Diabetes Maternal Grandmother         Diabetes        SOCIAL HISTORY:   Social History     Socioeconomic History     Marital status:      Spouse name: None     Number of children: None     Years of education: None     Highest education level: None   Social Needs     Financial resource strain: None     Food insecurity - worry: None     Food insecurity - inability: None     Transportation needs - medical: None     Transportation needs - non-medical: None   Occupational History     None   Tobacco Use     Smoking status: Current Every Day Smoker     Packs/day: 2.00     Years: 46.00     Pack years: 92.00     Types: Cigarettes     Smokeless tobacco: Never Used   Substance and Sexual Activity     Alcohol use: Yes     Comment: weston drinker monthly     Drug use: Yes     Types:  Marijuana     Sexual activity: None   Other Topics Concern     Parent/sibling w/ CABG, MI or angioplasty before 65F 55M? 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    3 children    1 grandkids         CURRENT MEDICATIONS:   Prior to Admission medications    Medication Sig Start Date End Date Taking? Authorizing Provider   aspirin (ASA) 81 MG chewable tablet Take 1 tablet (81 mg) by mouth daily 1/2/19  Yes Shaheen Garduno MD   nicotine (NICODERM CQ) 14 MG/24HR 24 hr patch Place 1 patch onto the skin every 24 hours 1/2/19 1/2/20 Yes Shaheen Garduno MD   nicotine (NICODERM CQ) 21 MG/24HR 24 hr patch Place 1 patch onto the skin every 24 hours 1/2/19 1/2/20 Yes Shaheen Garduno MD   nicotine (NICODERM CQ) 7 MG/24HR 24 hr patch Place 1 patch onto the skin every 24 hours 1/2/19 1/2/20 Yes Shaheen Garduno MD   rosuvastatin (CRESTOR) 40 MG tablet Take 1 tablet (40 mg) by mouth daily 12/12/18 12/12/19 Yes Shaheen Garduno MD          ROS:   CONSTITUTIONAL: No weight loss, fever, chills, but admits to generalized and lower extremity weakness with ongoing fatigue.   HEENT: Eyes: No visual changes. Ears, Nose, Throat: No hearing loss, congestion or difficulty swallowing.   CARDIOVASCULAR: Frequent chest pain with chest pressure and chest discomfort. No palpitations or lower extremity edema.   RESPIRATORY: Difficult shortness of breath with dyspnea upon exertion, no cough or sputum production.   GASTROINTESTINAL: No abdominal pain. No anorexia, nausea, vomiting or diarrhea.   NEUROLOGICAL: No headache, lightheadedness, dizziness, syncope, ataxia or weakness.   HEMATOLOGIC: No anemia, bleeding or bruising.   PSYCHIATRIC: No history of depression or anxiety.   ENDOCRINOLOGIC: No reports of sweating, cold or heat intolerance. No polyuria or polydipsia.   SKIN: No abnormal rashes or itching.       PHYSICAL EXAM:   GENERAL: The patient is a well-developed, well-nourished, in no apparent  distress. Alert and oriented x3.   HEENT: Head is normocephalic and atraumatic. Eyes are symmetrical with normal visual tracking.  HEART: Regular rate and rhythm, S1S2 present without murmur, rub or gallop.   LUNGS: Respirations regular and unlabored. Clear to auscultation.   GI: Abdomen is soft and nondistended.   EXTREMITIES: Minimal peripheral edema present.   MUSCULOSKELETAL: No joint swelling.   NEUROLOGIC: Alert and oriented X3.    SKIN: No jaundice. No rashes or visible skin lesions present.         LAB RESULTS:   Office Visit on 12/07/2018   Component Date Value Ref Range Status     Sodium 12/07/2018 139  134 - 144 mmol/L Final     Potassium 12/07/2018 4.0  3.5 - 5.1 mmol/L Final     Chloride 12/07/2018 104  98 - 107 mmol/L Final     Carbon Dioxide 12/07/2018 29  21 - 31 mmol/L Final     Anion Gap 12/07/2018 6  3 - 14 mmol/L Final     Glucose 12/07/2018 97  70 - 105 mg/dL Final     Urea Nitrogen 12/07/2018 19  7 - 25 mg/dL Final     Creatinine 12/07/2018 0.90  0.70 - 1.30 mg/dL Final     GFR Estimate 12/07/2018 84  >60 mL/min/1.7m2 Final     GFR Estimate If Black 12/07/2018 >90  >60 mL/min/1.7m2 Final     Calcium 12/07/2018 9.7  8.6 - 10.3 mg/dL Final     Bilirubin Total 12/07/2018 0.5  0.3 - 1.0 mg/dL Final     Albumin 12/07/2018 4.1  3.5 - 5.7 g/dL Final     Protein Total 12/07/2018 7.6  6.4 - 8.9 g/dL Final     Alkaline Phosphatase 12/07/2018 96  34 - 104 U/L Final     ALT 12/07/2018 14  7 - 52 U/L Final     AST 12/07/2018 15  13 - 39 U/L Final     WBC 12/07/2018 9.1  4.0 - 11.0 10e9/L Final     RBC Count 12/07/2018 4.97  4.4 - 5.9 10e12/L Final     Hemoglobin 12/07/2018 14.2  13.3 - 17.7 g/dL Final     Hematocrit 12/07/2018 43.0  40.0 - 53.0 % Final     MCV 12/07/2018 87  78 - 100 fl Final     MCH 12/07/2018 28.6  26.5 - 33.0 pg Final     MCHC 12/07/2018 33.0  31.5 - 36.5 g/dL Final     RDW 12/07/2018 13.8  10.0 - 15.0 % Final     Platelet Count 12/07/2018 285  150 - 450 10e9/L Final     Diff Method  12/07/2018 Automated Method   Final     % Neutrophils 12/07/2018 53.0  % Final     % Lymphocytes 12/07/2018 35.1  % Final     % Monocytes 12/07/2018 8.8  % Final     % Eosinophils 12/07/2018 1.6  % Final     % Basophils 12/07/2018 1.3  % Final     % Immature Granulocytes 12/07/2018 0.2  % Final     Absolute Neutrophil 12/07/2018 4.8  1.6 - 8.3 10e9/L Final     Absolute Lymphocytes 12/07/2018 3.2  0.8 - 5.3 10e9/L Final     Absolute Monocytes 12/07/2018 0.8  0.0 - 1.3 10e9/L Final     Absolute Eosinophils 12/07/2018 0.2  0.0 - 0.7 10e9/L Final     Absolute Basophils 12/07/2018 0.1  0.0 - 0.2 10e9/L Final     Abs Immature Granulocytes 12/07/2018 0.0  0 - 0.4 10e9/L Final     Cholesterol 12/07/2018 228* <200 mg/dL Final     Triglycerides 12/07/2018 234* <150 mg/dL Final     HDL Cholesterol 12/07/2018 35  23 - 92 mg/dL Final     LDL Cholesterol Calculated 12/07/2018 146* <100 mg/dL Final     Non HDL Cholesterol 12/07/2018 193* <130 mg/dL Final     Prostate Specific Antigen 12/07/2018 3.085  <3.100 ng/mL Final            ASSESSMENT:       ICD-10-CM    1. Unstable angina (H) I20.0 nitroGLYcerin (NITROSTAT) 0.4 MG sublingual tablet   2. Positive cardiac stress test on 1/10/19 R94.39    3. Hyperglycemia R73.9 Hemoglobin A1c     TSH   4. Tobacco abuse Z72.0    5. Tobacco abuse counseling Z71.6    6. Mixed hyperlipidemia E78.2 TSH   7. Hypertriglyceridemia E78.1 TSH   8. Chronic obstructive pulmonary disease, unspecified COPD type (H) J44.9    9. Obstructive sleep apnea G47.33    10. Claudication of both lower extremities (H) I73.9    11. PAD with an abnormal CT of the legs on 12/17/18 100% occlusion of the right iliac and moderate occlusion of the left iliac I73.9    12. Dyspnea on exertion R06.09 CBC with platelets     Comprehensive metabolic panel     Magnesium     BNP-N terminal pro         PLAN:   1.  Mr. Valenzuela is a longtime smoker who has been battling with claudication for 3 years.  He recently has been expressing  symptoms of unstable angina as outlined above.  He had a clearly positive stress test on 1/10/19 with classic anginal symptoms.  He was scheduled for bypass/stenting of his right lower extremity which was tentatively scheduled for 1/18/19.  He has been minimizing his cardiac symptoms until more recently.  His case was discussed with his vascular surgeon in which we came to a mutual decision that his heart should take precedence over his PAD.  As a result, his surgery for PAD was put on hold and he was scheduled for cardiac catheterization on 1/18/19.  2.  We discussed his abnormal stress test today, ultrasound of his carotids from 1/14/18 without any significant hemodynamic stenosis, as well as ultrasound of his abdomen for an aneurysm which was negative on 1/14/19.  3.  He will be given a prescription for sublingual nitro as needed for chest pain.  4.  There is high suspicion that he has MARS.  It was suggested that he be referred for a sleep study as it seems likely they are multiple months out at this point.  He has chosen to delay this until after these more pressing issues are taken care of.  5.  It seems highly suspicious on physical exam that that he has COPD. PFTs were suggested but will be delayed to a later date as described.  6.  He will have labs today which will include CMP, CBC, A1c, magnesium, and TSH.  7.  He will have his planned cardiac catheterization on 1/18/19.  8.  Follow-up will be contingent on the findings of his cardiac catheterization.      Thank you for allowing me to participate in the care of your patient. Please do not hesitate to contact me if you have any questions.     Jose Barrientos, DO         Female

## 2023-04-17 PROBLEM — C55 MALIGNANT NEOPLASM OF UTERUS, PART UNSPECIFIED: Chronic | Status: ACTIVE | Noted: 2023-04-14

## 2023-04-18 ENCOUNTER — APPOINTMENT (OUTPATIENT)
Dept: FAMILY MEDICINE | Facility: CLINIC | Age: 73
End: 2023-04-18
Payer: MEDICARE

## 2023-04-18 VITALS
OXYGEN SATURATION: 99 % | DIASTOLIC BLOOD PRESSURE: 60 MMHG | BODY MASS INDEX: 34.93 KG/M2 | WEIGHT: 185 LBS | SYSTOLIC BLOOD PRESSURE: 100 MMHG | HEART RATE: 98 BPM | HEIGHT: 61 IN | TEMPERATURE: 98.5 F

## 2023-04-18 PROCEDURE — 99214 OFFICE O/P EST MOD 30 MIN: CPT

## 2023-04-24 ENCOUNTER — RX RENEWAL (OUTPATIENT)
Age: 73
End: 2023-04-24

## 2023-05-02 ENCOUNTER — TRANSCRIPTION ENCOUNTER (OUTPATIENT)
Age: 73
End: 2023-05-02

## 2023-05-03 ENCOUNTER — APPOINTMENT (OUTPATIENT)
Dept: VASCULAR SURGERY | Facility: HOSPITAL | Age: 73
End: 2023-05-03

## 2023-05-03 ENCOUNTER — TRANSCRIPTION ENCOUNTER (OUTPATIENT)
Age: 73
End: 2023-05-03

## 2023-05-03 ENCOUNTER — OUTPATIENT (OUTPATIENT)
Dept: OUTPATIENT SERVICES | Facility: HOSPITAL | Age: 73
LOS: 1 days | Discharge: HOME | End: 2023-05-03
Payer: MEDICARE

## 2023-05-03 VITALS
RESPIRATION RATE: 16 BRPM | SYSTOLIC BLOOD PRESSURE: 133 MMHG | HEART RATE: 108 BPM | OXYGEN SATURATION: 99 % | TEMPERATURE: 98 F | DIASTOLIC BLOOD PRESSURE: 71 MMHG

## 2023-05-03 VITALS
TEMPERATURE: 98 F | HEIGHT: 61 IN | RESPIRATION RATE: 14 BRPM | OXYGEN SATURATION: 100 % | HEART RATE: 57 BPM | DIASTOLIC BLOOD PRESSURE: 66 MMHG | WEIGHT: 178.79 LBS | SYSTOLIC BLOOD PRESSURE: 125 MMHG

## 2023-05-03 DIAGNOSIS — Z95.1 PRESENCE OF AORTOCORONARY BYPASS GRAFT: Chronic | ICD-10-CM

## 2023-05-03 DIAGNOSIS — Z90.710 ACQUIRED ABSENCE OF BOTH CERVIX AND UTERUS: Chronic | ICD-10-CM

## 2023-05-03 DIAGNOSIS — Z98.891 HISTORY OF UTERINE SCAR FROM PREVIOUS SURGERY: Chronic | ICD-10-CM

## 2023-05-03 LAB
ANION GAP SERPL CALC-SCNC: 7 MMOL/L — SIGNIFICANT CHANGE UP (ref 5–17)
BUN SERPL-MCNC: 48 MG/DL — HIGH (ref 7–23)
CALCIUM SERPL-MCNC: 10.2 MG/DL — HIGH (ref 8.5–10.1)
CHLORIDE SERPL-SCNC: 102 MMOL/L — SIGNIFICANT CHANGE UP (ref 96–108)
CO2 SERPL-SCNC: 30 MMOL/L — SIGNIFICANT CHANGE UP (ref 22–31)
CREAT SERPL-MCNC: 5.87 MG/DL — HIGH (ref 0.5–1.3)
EGFR: 7 ML/MIN/1.73M2 — LOW
GLUCOSE BLDC GLUCOMTR-MCNC: 109 MG/DL — HIGH (ref 70–99)
GLUCOSE BLDC GLUCOMTR-MCNC: 96 MG/DL — SIGNIFICANT CHANGE UP (ref 70–99)
GLUCOSE SERPL-MCNC: 132 MG/DL — HIGH (ref 70–99)
POTASSIUM SERPL-MCNC: 4.2 MMOL/L — SIGNIFICANT CHANGE UP (ref 3.5–5.3)
POTASSIUM SERPL-SCNC: 4.2 MMOL/L — SIGNIFICANT CHANGE UP (ref 3.5–5.3)
SODIUM SERPL-SCNC: 139 MMOL/L — SIGNIFICANT CHANGE UP (ref 135–145)

## 2023-05-03 PROCEDURE — 36832 AV FISTULA REVISION OPEN: CPT | Mod: LT,58

## 2023-05-03 PROCEDURE — 36832 AV FISTULA REVISION OPEN: CPT | Mod: AS

## 2023-05-03 RX ORDER — SODIUM CHLORIDE 9 MG/ML
1000 INJECTION INTRAMUSCULAR; INTRAVENOUS; SUBCUTANEOUS
Refills: 0 | Status: DISCONTINUED | OUTPATIENT
Start: 2023-05-03 | End: 2023-05-03

## 2023-05-03 RX ORDER — HYDROMORPHONE HYDROCHLORIDE 2 MG/ML
1 INJECTION INTRAMUSCULAR; INTRAVENOUS; SUBCUTANEOUS
Refills: 0 | Status: DISCONTINUED | OUTPATIENT
Start: 2023-05-03 | End: 2023-05-03

## 2023-05-03 RX ORDER — HYDROMORPHONE HYDROCHLORIDE 2 MG/ML
0.5 INJECTION INTRAMUSCULAR; INTRAVENOUS; SUBCUTANEOUS
Refills: 0 | Status: DISCONTINUED | OUTPATIENT
Start: 2023-05-03 | End: 2023-05-03

## 2023-05-03 RX ORDER — OXYCODONE HYDROCHLORIDE 5 MG/1
1 TABLET ORAL
Qty: 30 | Refills: 0
Start: 2023-05-03 | End: 2023-05-07

## 2023-05-03 RX ADMIN — SODIUM CHLORIDE 50 MILLILITER(S): 9 INJECTION INTRAMUSCULAR; INTRAVENOUS; SUBCUTANEOUS at 17:47

## 2023-05-03 NOTE — ASU DISCHARGE PLAN (ADULT/PEDIATRIC) - PROVIDER TOKENS
FREE:[LAST:[Froylan],FIRST:[Kandice],PHONE:[(   )    -],FAX:[(   )    -],ADDRESS:[[address] [place] [address]]] FREE:[LAST:[Froylan],FIRST:[Kandice],PHONE:[phone]:[(   )    -],ADDRESS:[[address] [place] [address]]]

## 2023-05-03 NOTE — ASU PATIENT PROFILE, ADULT - FALL HARM RISK - HARM RISK INTERVENTIONS
Assistance with ambulation/Assistance OOB with selected safe patient handling equipment/Communicate Risk of Fall with Harm to all staff/Discuss with provider need for PT consult/Monitor gait and stability/Reinforce activity limits and safety measures with patient and family/Tailored Fall Risk Interventions/Visual Cue: Yellow wristband and red socks/Bed in lowest position, wheels locked, appropriate side rails in place/Call bell, personal items and telephone in reach/Instruct patient to call for assistance before getting out of bed or chair/Non-slip footwear when patient is out of bed/[place] to call system/Physically safe environment - no spills, clutter or unnecessary equipment/Purposeful Proactive Rounding/Room/bathroom lighting operational, light cord in reach

## 2023-05-03 NOTE — ASU PATIENT PROFILE, ADULT - NSICDXPASTMEDICALHX_GEN_ALL_CORE_FT
PAST MEDICAL HISTORY:  DM (diabetes mellitus)     ESRD on dialysis     HLD (hyperlipidemia)     HTN (hypertension)     Uterine cancer

## 2023-05-03 NOTE — ASU DISCHARGE PLAN (ADULT/PEDIATRIC) - CARE PROVIDER_API CALL
Kandice Galeano  [address]  Phone: (   )    -  Fax: (   )    -  Follow Up Time:    Kandice Galeano  [address], [place]  Phone: [phone]  Fax: (   )    -  Follow Up Time:

## 2023-05-03 NOTE — ASU DISCHARGE PLAN (ADULT/PEDIATRIC) - ASU DC SPECIAL INSTRUCTIONSFT
Follow up with Dr. Galeano 4 weeks. Please call to schedule an appointment.   NOTIFY YOUR SURGEON IF: You have any bleeding that does not stop, any pus draining from your wound, any fever (over 100.4 F) or chills, persistent nausea/vomiting, persistent diarrhea, or if your pain is not controlled on your discharge pain medications.    Wound: You may take off outer dressing and shower after 5 days. After showering, pat steri strips dry. Leave the white steri strips in place, they will fall off on their own in approximately 5-7 days or they will be removed at your follow up appointment.

## 2023-05-05 DIAGNOSIS — Z85.42 PERSONAL HISTORY OF MALIGNANT NEOPLASM OF OTHER PARTS OF UTERUS: ICD-10-CM

## 2023-05-05 DIAGNOSIS — Z95.1 PRESENCE OF AORTOCORONARY BYPASS GRAFT: ICD-10-CM

## 2023-05-05 DIAGNOSIS — Z99.2 DEPENDENCE ON RENAL DIALYSIS: ICD-10-CM

## 2023-05-05 DIAGNOSIS — E11.22 TYPE 2 DIABETES MELLITUS WITH DIABETIC CHRONIC KIDNEY DISEASE: ICD-10-CM

## 2023-05-05 DIAGNOSIS — Z90.710 ACQUIRED ABSENCE OF BOTH CERVIX AND UTERUS: ICD-10-CM

## 2023-05-05 DIAGNOSIS — N18.6 END STAGE RENAL DISEASE: ICD-10-CM

## 2023-05-05 DIAGNOSIS — E78.5 HYPERLIPIDEMIA, UNSPECIFIED: ICD-10-CM

## 2023-05-05 DIAGNOSIS — I13.11 HYPERTENSIVE HEART AND CHRONIC KIDNEY DISEASE WITHOUT HEART FAILURE, WITH STAGE 5 CHRONIC KIDNEY DISEASE, OR END STAGE RENAL DISEASE: ICD-10-CM

## 2023-05-05 DIAGNOSIS — Z79.84 LONG TERM (CURRENT) USE OF ORAL HYPOGLYCEMIC DRUGS: ICD-10-CM

## 2023-05-15 ENCOUNTER — NON-APPOINTMENT (OUTPATIENT)
Age: 73
End: 2023-05-15

## 2023-05-15 ENCOUNTER — APPOINTMENT (OUTPATIENT)
Dept: CARDIOLOGY | Facility: CLINIC | Age: 73
End: 2023-05-15
Payer: MEDICARE

## 2023-05-15 VITALS
BODY MASS INDEX: 32.28 KG/M2 | OXYGEN SATURATION: 98 % | HEART RATE: 90 BPM | WEIGHT: 171 LBS | DIASTOLIC BLOOD PRESSURE: 73 MMHG | TEMPERATURE: 97.8 F | SYSTOLIC BLOOD PRESSURE: 114 MMHG | HEIGHT: 61 IN

## 2023-05-15 PROCEDURE — 99214 OFFICE O/P EST MOD 30 MIN: CPT | Mod: 25

## 2023-05-15 PROCEDURE — 93000 ELECTROCARDIOGRAM COMPLETE: CPT

## 2023-05-15 RX ORDER — GEMFIBROZIL 600 MG/1
600 TABLET, FILM COATED ORAL
Qty: 180 | Refills: 1 | Status: DISCONTINUED | COMMUNITY
Start: 2021-09-23 | End: 2023-05-15

## 2023-05-15 RX ORDER — AMLODIPINE BESYLATE 5 MG/1
5 TABLET ORAL DAILY
Qty: 90 | Refills: 1 | Status: DISCONTINUED | COMMUNITY
Start: 2021-03-16 | End: 2023-05-15

## 2023-05-15 RX ORDER — METOPROLOL SUCCINATE 100 MG/1
100 TABLET, EXTENDED RELEASE ORAL
Refills: 0 | Status: DISCONTINUED | COMMUNITY
End: 2023-05-15

## 2023-05-15 NOTE — DISCUSSION/SUMMARY
[FreeTextEntry1] : S/p CABG: Doing well. Post op with mild LV dysfunction. Not really active but no symptoms of CP or dyspnea\par \par Cont asa\par Cont lipitor 40mg, off gemfibrozil\par \par HTN: BP now consistently low. Off all meds. Gets midodrine with HD \par \par PAF: Brief while in hospital in the setting of MAR on CKD, COVID, large pericardial effusion. Subsequent ECG's have been SR. Developed GIB from AC\par \par Cont asa, Toprol\par Holding on AC for now. Can consider Watchman when more stable\par \par ESRD- On HD via permacath, pending AVF maturation \par \par Pericardial effusion- likely uremic in the setting of acute kidney failure requiring HD. Post CABG echo with trace effusion\par \par RV 6M\par  [EKG obtained to assist in diagnosis and management of assessed problem(s)] : EKG obtained to assist in diagnosis and management of assessed problem(s)

## 2023-05-15 NOTE — HISTORY OF PRESENT ILLNESS
[FreeTextEntry1] : 73F with CAD s/p recent CABG, pAF, HTN, HLD who presents for follow up\par \par BP had been running much lower, all BP meds have been stopped\par She now gets midodrine during HD\par BP seemed to drop after AVF was created\par She otherwise feels well. Denies CP, dyspnea, lightheadedness\par Still having trouble walking, in wheelchair\par \par HISTORY:\par \par Pt had AVF placed on RUE in October 2022 anticipation for HD\par Pt was admitted to [facility] in late October 22 with MAR on CKD, uremia, metabolic derangements in the setting of COVID\par Urgent HD via permacath\par Also with large pericardial effusion s/p drainage\par Onset of AFib, placed on amio and AC, AC discontinued secondary to anemia and GIB. On asa only  \par Subsequent cath with 3V CAD\par S/p CABG 11/9/22 (LIMA-LAD, SVG-OM, OM)\par Discharged 11/22 to rehab\par \par Never smoker. Worked at a school, lunch aide.\par \par ECG: SR with nonspecific ST changes (PST)\par TTE post CABG:\par \par 1. Mitral annular calcification, otherwise normal mitral\par valve. Mild mitral regurgitation.\par 2. Normal trileaflet aortic valve. No aortic valve\par regurgitation seen.\par 3. Mild left atrial enlargement.\par 4. Mild concentric left ventricular hypertrophy.\par 5. Mild segmental left ventricular systolic dysfunction of\par inferior and lateral walls.  Other walls normal function\par Normal left ventricular systolic function.\par 6. Mild diastolic dysfunction (Stage I).\par 7. Normal tricuspid valve.\par 8. Normal pericardium with trace pericardial effusion.\par 9. Left pleural effusion.\par \par Cath 11/2/22:\par \par -Left main artery: Angiography shows no disease.  \par -Proximal left anterior descending: There is a 50 % stenosis. Mid left\par anterior descending: There is an 80 % stenosis. First\par diagonal: There is a 90 % stenosis.  \par -Ostial circumflex: There is a 90 % stenosis. Distal circumflex:\par Angiography shows moderate atherosclerosis.\par -Mid right coronary artery: There is a 90 % stenosis.  \par \par Asa 81mg\par Atorvastatin 40mg\par \par Midodrine \par \par \par

## 2023-05-19 NOTE — H&P ADULT - NSHPROSALLOTHERNEGRD_GEN_ALL_CORE
All other review of systems negative, except as noted in HPI
The patient is a 30y Male complaining of medical evaluation.

## 2023-05-22 ENCOUNTER — TRANSCRIPTION ENCOUNTER (OUTPATIENT)
Age: 73
End: 2023-05-22

## 2023-05-30 NOTE — PATIENT PROFILE ADULT - OVER THE PAST TWO WEEKS HAVE YOU FELT DOWN, DEPRESSED OR HOPELESS?
Writer went in to give patient ordered medication when patient was insistent that she was not staying.  at bedside on board with plan for admission here at [place]. Writer informed patient that she is on oxygen right now which she is not at home which is why she needs to stay. Patient not happy. Patient says \"I hate this hospital. You guys are a bunch of hyeenas, don't think I don't hear you out there.\" Patient continues to say she would rather be transferred to [place]. MD aware. Patients  would still like patient to stay here since 'all her doctors are here.' MD aware of interaction. Will continue to monitor situation    no

## 2023-06-05 ENCOUNTER — APPOINTMENT (OUTPATIENT)
Dept: VASCULAR SURGERY | Facility: CLINIC | Age: 73
End: 2023-06-05
Payer: MEDICARE

## 2023-06-05 VITALS
WEIGHT: 177 LBS | DIASTOLIC BLOOD PRESSURE: 72 MMHG | HEART RATE: 94 BPM | HEIGHT: 61 IN | BODY MASS INDEX: 33.42 KG/M2 | OXYGEN SATURATION: 99 % | TEMPERATURE: 98.1 F | SYSTOLIC BLOOD PRESSURE: 109 MMHG

## 2023-06-05 PROCEDURE — 99024 POSTOP FOLLOW-UP VISIT: CPT

## 2023-06-05 RX ORDER — MIDODRINE HYDROCHLORIDE 5 MG/1
5 TABLET ORAL
Qty: 90 | Refills: 0 | Status: ACTIVE | COMMUNITY
Start: 2023-04-25

## 2023-06-05 RX ORDER — BUSPIRONE HYDROCHLORIDE 10 MG/1
10 TABLET ORAL
Qty: 60 | Refills: 4 | Status: DISCONTINUED | COMMUNITY
Start: 2022-05-01 | End: 2023-06-05

## 2023-06-05 RX ORDER — B-COMPLEX WITH VITAMIN C
TABLET ORAL DAILY
Refills: 0 | Status: DISCONTINUED | COMMUNITY
Start: 2021-03-16 | End: 2023-06-05

## 2023-06-05 RX ORDER — ESOMEPRAZOLE MAGNESIUM 40 MG/1
40 CAPSULE, DELAYED RELEASE ORAL TWICE DAILY
Refills: 0 | Status: DISCONTINUED | COMMUNITY
Start: 2021-03-16 | End: 2023-06-05

## 2023-06-05 RX ORDER — KRILL/OM-3/DHA/EPA/PHOSPHO/AST 1000-230MG
81 CAPSULE ORAL
Refills: 0 | Status: ACTIVE | COMMUNITY

## 2023-06-05 RX ORDER — AMOXICILLIN AND CLAVULANATE POTASSIUM 875; 125 MG/1; MG/1
875-125 TABLET, COATED ORAL
Qty: 20 | Refills: 0 | Status: DISCONTINUED | COMMUNITY
Start: 2022-10-06 | End: 2023-06-05

## 2023-06-05 RX ORDER — MELOXICAM 15 MG/1
15 TABLET ORAL
Qty: 90 | Refills: 0 | Status: DISCONTINUED | COMMUNITY
Start: 2021-03-16 | End: 2023-06-05

## 2023-06-05 RX ORDER — B-COMPLEX WITH VITAMIN C
100 TABLET ORAL DAILY
Refills: 0 | Status: DISCONTINUED | COMMUNITY
Start: 2021-03-16 | End: 2023-06-05

## 2023-06-05 RX ORDER — FOLIC ACID/VIT B COMPLEX AND C 0.8 MG
TABLET ORAL
Qty: 90 | Refills: 0 | Status: ACTIVE | COMMUNITY
Start: 2023-02-14

## 2023-06-05 RX ORDER — OSTOMY SUPPLY 2 3/4"
EACH MISCELLANEOUS
Qty: 1 | Refills: 0 | Status: DISCONTINUED | COMMUNITY
Start: 2021-11-22 | End: 2023-06-05

## 2023-06-05 RX ORDER — CALCITRIOL 0.5 UG/1
0.5 CAPSULE, LIQUID FILLED ORAL
Qty: 90 | Refills: 1 | Status: DISCONTINUED | COMMUNITY
Start: 2022-05-24 | End: 2023-06-05

## 2023-06-05 RX ORDER — MONTELUKAST 10 MG/1
10 TABLET, FILM COATED ORAL DAILY
Qty: 90 | Refills: 0 | Status: DISCONTINUED | COMMUNITY
Start: 2022-05-03 | End: 2023-06-05

## 2023-06-05 RX ORDER — RALOXIFENE HYDROCHLORIDE 60 MG/1
60 TABLET, FILM COATED ORAL DAILY
Refills: 0 | Status: DISCONTINUED | COMMUNITY
Start: 2021-03-16 | End: 2023-06-05

## 2023-06-05 NOTE — PHYSICAL EXAM
[2+] : left 2+ [Calm] : calm [de-identified] : Well-appearing  [de-identified] : EOMI, anicteric, right chest permcath site is clean, dry [FreeTextEntry1] : palpable thrill overlying fistula.\par dopper radial, ulnar, palmar arch signals on left hand. [de-identified] : incision well healed. [de-identified] : A&Ox4

## 2023-06-05 NOTE — ASSESSMENT
[FreeTextEntry1] : RILEY MEJÍA is a 73 year old female presents for follow up.\par \par > ESRD on HD\par - via right chest permcath. \par - s/p left upper arm fistula creation 2/24/2023. \par - s/p left arm cephalic vein superficialization 5/3/2023.\par - mild steal symptoms. hand not threatened. recommend hand exercises, keep hand warm. \par - patient may start using left arm fistula for hemodialysis. patient to return to office for permcath removal when ready.

## 2023-06-05 NOTE — HISTORY OF PRESENT ILLNESS
[FreeTextEntry1] : + PMH: HTN, HLD, DM, ESRD, CAD, afib \par + PSH: s/p CABG x3 (11/9/2022), s/p left arm AVF (2022)\par + FH: non-contributory\par + SH: Denies smoking or etoh use\par + Aller: Sulfa medications\par \par PCP is Dr. Talha Koehler. \par \par 1/30/2023 - Pt presents for follow up visit. Patient with history of ESRD on HD via right IJ permcath. Also s/p left arm radiocephalic fistula creation. Postoperatively, patient underwent CABG and fistula thrombosed around this time. Fistula has never been used for HD. Denies any left hand pain, numbness, or weakness. \par \par 2/24/2023–left brachiocephalic fistula creation\par \par 3/6/2023–Patient presents for follow-up visit. Since the surgery, patient has had mild intermittent numbness of the first and second digits of the left hand. The symptoms are slightly improving. Denies pain in the left hand. She has been using a hand exercise device but finds it bulky and ordered a stress ball at my recommendation. \par \par 4/5/2023 - Pt presents for follow up visit. She reports that she is doing well. Decreased numbness in her hand and continues to do hand exercises. Continues to get HD on T,T,S via permcath.  [de-identified] : 5/3/2023 -superficialization of left arm brachiocephalic fistula.\par \par 6/5/2023–Patient presents for follow-up visit. Reports intermittent mild numbness of her left fingertips, particularly on the left third digit. She continues to do her hand exercises. There is no pain overlying the left arm fistula. Incision is well healed.

## 2023-06-19 ENCOUNTER — LABORATORY RESULT (OUTPATIENT)
Age: 73
End: 2023-06-19

## 2023-06-19 ENCOUNTER — APPOINTMENT (OUTPATIENT)
Dept: FAMILY MEDICINE | Facility: CLINIC | Age: 73
End: 2023-06-19
Payer: MEDICARE

## 2023-06-19 VITALS
DIASTOLIC BLOOD PRESSURE: 72 MMHG | TEMPERATURE: 98 F | BODY MASS INDEX: 33.42 KG/M2 | WEIGHT: 177 LBS | SYSTOLIC BLOOD PRESSURE: 126 MMHG | HEIGHT: 61 IN

## 2023-06-19 PROCEDURE — 36415 COLL VENOUS BLD VENIPUNCTURE: CPT

## 2023-06-19 PROCEDURE — 99214 OFFICE O/P EST MOD 30 MIN: CPT | Mod: 25

## 2023-06-19 RX ORDER — PREDNISONE 20 MG/1
20 TABLET ORAL
Qty: 18 | Refills: 0 | Status: DISCONTINUED | COMMUNITY
Start: 2022-10-09 | End: 2023-06-19

## 2023-06-20 LAB
ALBUMIN SERPL ELPH-MCNC: 4 G/DL
ALP BLD-CCNC: 109 U/L
ALT SERPL-CCNC: 10 U/L
ANION GAP SERPL CALC-SCNC: 21 MMOL/L
AST SERPL-CCNC: 13 U/L
BILIRUB SERPL-MCNC: 0.2 MG/DL
BUN SERPL-MCNC: 57 MG/DL
CALCIUM SERPL-MCNC: 9.3 MG/DL
CHLORIDE SERPL-SCNC: 95 MMOL/L
CHOLEST SERPL-MCNC: 235 MG/DL
CO2 SERPL-SCNC: 23 MMOL/L
CREAT SERPL-MCNC: 6.95 MG/DL
EGFR: 6 ML/MIN/1.73M2
ESTIMATED AVERAGE GLUCOSE: 123 MG/DL
GLUCOSE SERPL-MCNC: 121 MG/DL
HBA1C MFR BLD HPLC: 5.9 %
HDLC SERPL-MCNC: 38 MG/DL
LDLC SERPL CALC-MCNC: NORMAL MG/DL
NONHDLC SERPL-MCNC: 197 MG/DL
POTASSIUM SERPL-SCNC: 4.9 MMOL/L
PROT SERPL-MCNC: 6.6 G/DL
SODIUM SERPL-SCNC: 139 MMOL/L
TRIGL SERPL-MCNC: 408 MG/DL
TSH SERPL-ACNC: 1.27 UIU/ML

## 2023-07-26 ENCOUNTER — APPOINTMENT (OUTPATIENT)
Dept: VASCULAR SURGERY | Facility: CLINIC | Age: 73
End: 2023-07-26
Payer: MEDICARE

## 2023-07-26 PROCEDURE — 93990 DOPPLER FLOW TESTING: CPT

## 2023-07-28 ENCOUNTER — TRANSCRIPTION ENCOUNTER (OUTPATIENT)
Age: 73
End: 2023-07-28

## 2023-08-02 ENCOUNTER — NON-APPOINTMENT (OUTPATIENT)
Age: 73
End: 2023-08-02

## 2023-08-04 ENCOUNTER — OUTPATIENT (OUTPATIENT)
Dept: OUTPATIENT SERVICES | Facility: HOSPITAL | Age: 73
LOS: 1 days | Discharge: HOME | End: 2023-08-04
Payer: MEDICARE

## 2023-08-04 VITALS
TEMPERATURE: 98 F | OXYGEN SATURATION: 98 % | HEIGHT: 64 IN | DIASTOLIC BLOOD PRESSURE: 58 MMHG | SYSTOLIC BLOOD PRESSURE: 112 MMHG | HEART RATE: 78 BPM | WEIGHT: 189.82 LBS | RESPIRATION RATE: 18 BRPM

## 2023-08-04 DIAGNOSIS — I10 ESSENTIAL (PRIMARY) HYPERTENSION: ICD-10-CM

## 2023-08-04 DIAGNOSIS — Z95.1 PRESENCE OF AORTOCORONARY BYPASS GRAFT: ICD-10-CM

## 2023-08-04 DIAGNOSIS — Z01.818 ENCOUNTER FOR OTHER PREPROCEDURAL EXAMINATION: ICD-10-CM

## 2023-08-04 DIAGNOSIS — Z95.1 PRESENCE OF AORTOCORONARY BYPASS GRAFT: Chronic | ICD-10-CM

## 2023-08-04 DIAGNOSIS — Z98.890 OTHER SPECIFIED POSTPROCEDURAL STATES: ICD-10-CM

## 2023-08-04 DIAGNOSIS — N18.6 END STAGE RENAL DISEASE: ICD-10-CM

## 2023-08-04 DIAGNOSIS — Z01.812 ENCOUNTER FOR PREPROCEDURAL LABORATORY EXAMINATION: ICD-10-CM

## 2023-08-04 DIAGNOSIS — E11.9 TYPE 2 DIABETES MELLITUS WITHOUT COMPLICATIONS: ICD-10-CM

## 2023-08-04 DIAGNOSIS — K21.9 GASTRO-ESOPHAGEAL REFLUX DISEASE WITHOUT ESOPHAGITIS: ICD-10-CM

## 2023-08-04 DIAGNOSIS — Z90.710 ACQUIRED ABSENCE OF BOTH CERVIX AND UTERUS: Chronic | ICD-10-CM

## 2023-08-04 DIAGNOSIS — Z98.891 HISTORY OF UTERINE SCAR FROM PREVIOUS SURGERY: Chronic | ICD-10-CM

## 2023-08-04 LAB
ALBUMIN SERPL ELPH-MCNC: 3.5 G/DL — SIGNIFICANT CHANGE UP (ref 3.3–5)
ALP SERPL-CCNC: 116 U/L — SIGNIFICANT CHANGE UP (ref 40–120)
ALT FLD-CCNC: 18 U/L — SIGNIFICANT CHANGE UP (ref 12–78)
ANION GAP SERPL CALC-SCNC: 7 MMOL/L — SIGNIFICANT CHANGE UP (ref 5–17)
ANISOCYTOSIS BLD QL: SIGNIFICANT CHANGE UP
AST SERPL-CCNC: 14 U/L — LOW (ref 15–37)
BASOPHILS # BLD AUTO: 0.04 K/UL — SIGNIFICANT CHANGE UP (ref 0–0.2)
BASOPHILS NFR BLD AUTO: 0.4 % — SIGNIFICANT CHANGE UP (ref 0–2)
BILIRUB SERPL-MCNC: 0.3 MG/DL — SIGNIFICANT CHANGE UP (ref 0.2–1.2)
BUN SERPL-MCNC: 34 MG/DL — HIGH (ref 7–23)
CALCIUM SERPL-MCNC: 9.2 MG/DL — SIGNIFICANT CHANGE UP (ref 8.5–10.1)
CHLORIDE SERPL-SCNC: 101 MMOL/L — SIGNIFICANT CHANGE UP (ref 96–108)
CO2 SERPL-SCNC: 32 MMOL/L — HIGH (ref 22–31)
CREAT SERPL-MCNC: 5.78 MG/DL — HIGH (ref 0.5–1.3)
DACRYOCYTES BLD QL SMEAR: SLIGHT — SIGNIFICANT CHANGE UP
EGFR: 7 ML/MIN/1.73M2 — LOW
EOSINOPHIL # BLD AUTO: 0.1 K/UL — SIGNIFICANT CHANGE UP (ref 0–0.5)
EOSINOPHIL NFR BLD AUTO: 1.1 % — SIGNIFICANT CHANGE UP (ref 0–6)
GLUCOSE SERPL-MCNC: 131 MG/DL — HIGH (ref 70–99)
HCT VFR BLD CALC: 37.4 % — SIGNIFICANT CHANGE UP (ref 34.5–45)
HGB BLD-MCNC: 11.6 G/DL — SIGNIFICANT CHANGE UP (ref 11.5–15.5)
IMM GRANULOCYTES NFR BLD AUTO: 0.3 % — SIGNIFICANT CHANGE UP (ref 0–0.9)
LYMPHOCYTES # BLD AUTO: 3.03 K/UL — SIGNIFICANT CHANGE UP (ref 1–3.3)
LYMPHOCYTES # BLD AUTO: 32 % — SIGNIFICANT CHANGE UP (ref 13–44)
MACROCYTES BLD QL: SIGNIFICANT CHANGE UP
MANUAL SMEAR VERIFICATION: SIGNIFICANT CHANGE UP
MCHC RBC-ENTMCNC: 31 G/DL — LOW (ref 32–36)
MCHC RBC-ENTMCNC: 34.8 PG — HIGH (ref 27–34)
MCV RBC AUTO: 112.3 FL — HIGH (ref 80–100)
MONOCYTES # BLD AUTO: 0.95 K/UL — HIGH (ref 0–0.9)
MONOCYTES NFR BLD AUTO: 10 % — SIGNIFICANT CHANGE UP (ref 2–14)
NEUTROPHILS # BLD AUTO: 5.31 K/UL — SIGNIFICANT CHANGE UP (ref 1.8–7.4)
NEUTROPHILS NFR BLD AUTO: 56.2 % — SIGNIFICANT CHANGE UP (ref 43–77)
NRBC # BLD: 0 /100 WBCS — SIGNIFICANT CHANGE UP (ref 0–0)
PLAT MORPH BLD: NORMAL — SIGNIFICANT CHANGE UP
PLATELET # BLD AUTO: 236 K/UL — SIGNIFICANT CHANGE UP (ref 150–400)
PLATELET COUNT - ESTIMATE: NORMAL — SIGNIFICANT CHANGE UP
POIKILOCYTOSIS BLD QL AUTO: SLIGHT — SIGNIFICANT CHANGE UP
POTASSIUM SERPL-MCNC: 3.8 MMOL/L — SIGNIFICANT CHANGE UP (ref 3.5–5.3)
POTASSIUM SERPL-SCNC: 3.8 MMOL/L — SIGNIFICANT CHANGE UP (ref 3.5–5.3)
PROT SERPL-MCNC: 7.7 GM/DL — SIGNIFICANT CHANGE UP (ref 6–8.3)
RBC # BLD: 3.33 M/UL — LOW (ref 3.8–5.2)
RBC # FLD: 14.4 % — SIGNIFICANT CHANGE UP (ref 10.3–14.5)
RBC BLD AUTO: SIGNIFICANT CHANGE UP
SODIUM SERPL-SCNC: 140 MMOL/L — SIGNIFICANT CHANGE UP (ref 135–145)
SPHEROCYTES BLD QL SMEAR: SLIGHT — SIGNIFICANT CHANGE UP
WBC # BLD: 9.46 K/UL — SIGNIFICANT CHANGE UP (ref 3.8–10.5)
WBC # FLD AUTO: 9.46 K/UL — SIGNIFICANT CHANGE UP (ref 3.8–10.5)

## 2023-08-04 PROCEDURE — 93010 ELECTROCARDIOGRAM REPORT: CPT

## 2023-08-04 RX ORDER — ACETAMINOPHEN 500 MG
2 TABLET ORAL
Qty: 0 | Refills: 0 | DISCHARGE

## 2023-08-04 NOTE — H&P PST ADULT - ATTENDING COMMENTS
Left arm fistula malfunction, plan for fistulogram with intervention. Risks, benefits, and alternatives of the procedure were discussed with the patient. All questions were answered. She agrees to proceed with the procedure. Risks discussed included but were not limited to risk of bleeding, infection, need for additional procedures, damage to surrounding structure.s

## 2023-08-04 NOTE — H&P PST ADULT - MUSCULOSKELETAL
details… no calf tenderness ROM intact/normal gait/strength 5/5 bilateral upper extremities/strength 5/5 bilateral lower extremities negative

## 2023-08-04 NOTE — H&P PST ADULT - CARDIOVASCULAR
details… regular rate and rhythm/S1 S2 present/no gallops/no rub/no murmur/pedal edema regular rate and rhythm/S1 S2 present/no murmur

## 2023-08-04 NOTE — H&P PST ADULT - PROBLEM SELECTOR PLAN 4
Pt instructed to take meds as prescribed  Pt to HOLD all diabetic meds + Accu-Chek ordered STAT for day of surgery

## 2023-08-04 NOTE — H&P PST ADULT - HISTORY OF PRESENT ILLNESS
72F pmh cad s/p CABG x3 11-, htn, DM, ESRD on HD here for PST for scheduled left arm av fistula superficialization on 4/18/23 with     This patient denies any fever, cough, sob, flu like symptoms or travel outside of the [place] in the past 30 days      72y/o female with medical h/o cad s/p CABG x3 11-, htn, T2DM, ESRD on dialysis,  here for PST for scheduled left arm av fistula superficialization on 8/10/2023 with     This patient denies any fever, cough, sob, flu like symptoms or travel outside of the [place] in the past 30 days

## 2023-08-04 NOTE — H&P PST ADULT - NEGATIVE GENERAL GENITOURINARY SYMPTOMS
no hematuria/no dysuria no hematuria/no renal colic/no flank pain L/no flank pain R/no bladder infections/no incontinence/no dysuria/no urinary hesitancy/normal urinary frequency

## 2023-08-04 NOTE — H&P PST ADULT - NEGATIVE CARDIOVASCULAR SYMPTOMS
no chest pain/no palpitations/no dyspnea on exertion no chest pain/no palpitations/no dyspnea on exertion/no orthopnea/no paroxysmal nocturnal dyspnea/no peripheral edema

## 2023-08-04 NOTE — H&P PST ADULT - PROBLEM SELECTOR PLAN 1
Pre-op instructions given. Pt verbalized understanding  Chlorhexidine wash instructions given  Pt instructed to hold all NSAIDs + herbal supplements/vitamins 7 days before surgery  Pt to HOLD all diabetic meds + Accu-Chek ordered STAT for day of surgery  Pending: lab result  Pending: Cardiology clearance for h/o CABGx 3 (nov 2023)

## 2023-08-04 NOTE — H&P PST ADULT - OTHER CARE PROVIDERS
[facility] on [facility]

## 2023-08-04 NOTE — H&P PST ADULT - WHEN WAS YOUR LAST VACCINATION? YEAR
2023       No Pcp Neede      Patient: Shanta Ling   YOB: 1936   Date of Visit: 2023       Dear  Pcp:    Thank you for referring Shanta Ling to me for evaluation. Below are my notes for this visit with her.    If you have questions, please do not hesitate to call me. I look forward to following your patient along with you.      Sincerely,        Shanti Dave MD        CC: No Recipients  Shanti Dave MD  2023 11:03 AM  Sign when Signing Visit  Patient: Shanta Ling  MRN: [id number]  : 1936    Referring  Physician   Pcp, No  No address on file  None       Chief Complaint   Patient presents with   • Follow-up   • Office Visit     ANNUAL F/U,PT FEELING FINE, NO C/O       HPI :  The patient returns today for a follow up visit.  She is doing well, has no complaints, remains active without significant limitations.  Her blood pressure has been low normal at home, it is always slightly elevated when she comes to the office, he has \"whitecoat syndrome\".  She recently had colonoscopy for a 5-year follow-up, history of colon cancer.  There were no polyps.  She had an upper endoscopy as well and it was normal.  Patient is compliant with medications.  There has been no change in her general health.    Problem List           ICD-10-CM Noted       Cardiology Problems    Hyperlipidemia E78.5 3/26/2019        White coat syndrome with diagnosis of hypertension I10 3/26/2019    Overview Signed 2021 10:47 AM by Shanti Dave MD     Edema with Amlodipine 10 mg QD                 Past Medical History:   Diagnosis Date   • Anxiety    • Colon cancer (CMD)    • Colon polyps    • Degenerative arthritis of lumbar spine    • Glaucoma    • Hyponatremia    • Obesity      Past Surgical History:   Procedure Laterality Date   • Cataract extraction, bilateral     • Colectomy     • Colonoscopy diagnostic  2022    diverticulosis, int hemorrhoids   • Esophagogastroduodenoscopy  transoral flex w/bx single or mult  07/05/2022   • Hand surgery     • Hysterectomy       Family History   Problem Relation Age of Onset   • Hypertension Mother    • Congestive Heart Failure Father    • Hypertension Father      ALLERGIES:  No Known Allergies  Current Outpatient Medications   Medication Sig Dispense Refill   • metoPROLOL succinate (TOPROL-XL) 50 MG 24 hr tablet Take 1 tablet by mouth daily. 90 tablet 3   • simvastatin (ZOCOR) 10 MG tablet Take 1 tablet by mouth every evening. 90 tablet 3   • lisinopril-hydroCHLOROthiazide (ZESTORETIC) 20-12.5 MG per tablet Take 1 tablet by mouth daily. 90 tablet 3   • omeprazole (PrilOSEC) 20 MG capsule Take 1 capsule by mouth daily. 90 capsule 3   • Multiple Vitamins-Minerals (VITAMIN - THERAPEUTIC MULTIVITAMINS W/MINERALS) tablet      • dorzolamide (TRUSOPT) 2 % ophthalmic solution INSTILL 1 DROP IN THE LEFT EYE TWICE DAILY.       No current facility-administered medications for this visit.     Social History     Tobacco Use   • Smoking status: Never   • Smokeless tobacco: Never   Substance Use Topics   • Alcohol use: No   • Drug use: No       Review of Systems   HENT: Negative.    Eyes: Negative.    Respiratory: Negative.    Cardiovascular: Negative.    Gastrointestinal: Negative.    Endocrine: Negative.    Genitourinary: Negative.    Skin: Negative.    Allergic/Immunologic: Negative.    Hematological: Negative.    Psychiatric/Behavioral: Negative.    All other systems reviewed and are negative.       Visit Vitals  BP (!) 166/80 (BP Location: LUE - Left upper extremity, Patient Position: Standing, Cuff Size: Regular)   Pulse 67   Resp 16   Ht 5' 2\" (1.575 m)   Wt 72.6 kg (160 lb)   SpO2 96%   BMI 29.26 kg/m²     Physical Exam  Vitals and nursing note reviewed.   Constitutional:       Appearance: She is well-developed.   HENT:      Head: Normocephalic.      Nose: Nose normal.   Eyes:      Conjunctiva/sclera: Conjunctivae normal.      Pupils: Pupils are equal, round,  and reactive to light.   Cardiovascular:      Rate and Rhythm: Normal rate and regular rhythm.      Pulses:           Carotid pulses are 2+ on the right side and 2+ on the left side.       Radial pulses are 2+ on the right side and 2+ on the left side.        Femoral pulses are 2+ on the right side and 2+ on the left side.       Popliteal pulses are 2+ on the right side and 2+ on the left side.        Dorsalis pedis pulses are 2+ on the right side and 2+ on the left side.        Posterior tibial pulses are 2+ on the right side and 2+ on the left side.      Heart sounds: Normal heart sounds, S1 normal and S2 normal.   Pulmonary:      Effort: Pulmonary effort is normal.      Breath sounds: Normal breath sounds.   Abdominal:      General: Bowel sounds are normal.      Palpations: Abdomen is soft.   Musculoskeletal:         General: Normal range of motion.      Cervical back: Normal range of motion and neck supple.   Skin:     General: Skin is warm and dry.   Neurological:      Mental Status: She is alert and oriented to person, place, and time.      Deep Tendon Reflexes: Reflexes are normal and symmetric.   Psychiatric:         Behavior: Behavior normal.         Thought Content: Thought content normal.         Judgment: Judgment normal.          Laboratory Data -blood draw today, will review       ECG  Results for orders placed or performed in visit on 04/20/23   Electrocardiogram 12-Lead    Impression    Twelve-lead EKG is normal   Results for orders placed or performed in visit on 04/07/22   Electrocardiogram 12-Lead    Impression    12 lead EKG is normal       Assessment/Plan:     White coat syndrome with diagnosis of hypertension  - Patient has normal blood pressure at home, this morning it was 100/60 mmHg.  -All medications refilled.  CPM.    Hyperlipidemia  - On low-dose simvastatin for primary prevention with acceptable results of the past.  Blood work is being repeated today    History of colon cancer  -  Colonoscopy in July 2022 is normal.  Dr. Robison is following      Orders Placed This Encounter   • Lipid Panel With Reflex   • Comprehensive Metabolic Panel   • CBC with Automated Differential   • CBC with Automated Differential (performable only)   • Electrocardiogram 12-Lead   • metoPROLOL succinate (TOPROL-XL) 50 MG 24 hr tablet   • simvastatin (ZOCOR) 10 MG tablet   • lisinopril-hydroCHLOROthiazide (ZESTORETIC) 20-12.5 MG per tablet   • omeprazole (PrilOSEC) 20 MG capsule       No follow-ups on file.     Patient prefers to follow-up with PCP from now on since I am retiring retiring    This note was created using the Dragon voice recognition system. Errors in content may be related to improper recognition of the system. Effort to review and correct the note has been made but irregularities may still be present.       2021

## 2023-08-04 NOTE — H&P PST ADULT - GASTROINTESTINAL
normal/soft/nontender/nondistended/normal active bowel sounds details… soft/nontender/nondistended/normal active bowel sounds

## 2023-08-07 ENCOUNTER — APPOINTMENT (OUTPATIENT)
Dept: CARDIOLOGY | Facility: CLINIC | Age: 73
End: 2023-08-07
Payer: MEDICARE

## 2023-08-07 VITALS — SYSTOLIC BLOOD PRESSURE: 110 MMHG | DIASTOLIC BLOOD PRESSURE: 72 MMHG

## 2023-08-07 VITALS
SYSTOLIC BLOOD PRESSURE: 129 MMHG | HEART RATE: 97 BPM | BODY MASS INDEX: 35.68 KG/M2 | WEIGHT: 189 LBS | DIASTOLIC BLOOD PRESSURE: 77 MMHG | HEIGHT: 61 IN | OXYGEN SATURATION: 97 %

## 2023-08-07 DIAGNOSIS — Z01.810 ENCOUNTER FOR PREPROCEDURAL CARDIOVASCULAR EXAMINATION: ICD-10-CM

## 2023-08-07 PROCEDURE — 99214 OFFICE O/P EST MOD 30 MIN: CPT

## 2023-08-07 NOTE — HISTORY OF PRESENT ILLNESS
[FreeTextEntry1] : 73F with CAD s/p recent CABG, pAF, HTN, HLD who presents for follow up  Off all BP meds, takes midodrine with HD BP drop after AVF was created Pending fistulogram with possible intervention, needs cardiac eval prior No CP, no dyspnea, no lightheadedness Now walking with a walker, doing better overall  Last labs notable for elevated TG  HISTORY:  Pt had AVF placed on RUE in October 2022 anticipation for HD Pt was admitted to [facility] in late October 22 with MAR on CKD, uremia, metabolic derangements in the setting of COVID Urgent HD via permacath Also with large pericardial effusion s/p drainage Onset of AFib, placed on amio and AC, AC discontinued secondary to anemia and GIB. 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  ECG: SR with nonspecific ST changes (PST) TTE 2/23/23:  1. The left ventricular systolic function is normal with an ejection fraction of 71 %. There is mild (Grade 1) left ventricular diastolic dysfunction, with normal filling pressure. There are no regional wall motion abnormalities seen. 2. The left ventricular wall thickness is mildly increased. There is normal LV mass and concentric remodeling. 3. Normal right ventricular size, with normal wall thickness and normal systolic function. 4. The left atrium is normal in size. 5. Mild calcification of the aortic valve leaflets. Mild aortic stenosis. 6. Mild mitral valve leaflet calcification.  Cath 11/2/22:  -Left main artery: Angiography shows no disease.   -Proximal left anterior descending: There is a 50 % stenosis. Mid left anterior descending: There is an 80 % stenosis. First diagonal: There is a 90 % stenosis.   -Ostial circumflex: There is a 90 % stenosis. Distal circumflex: Angiography shows moderate atherosclerosis. -Mid right coronary artery: There is a 90 % stenosis.    Asa 81mg Atorvastatin 40mg  Midodrine    Januvia

## 2023-08-07 NOTE — DISCUSSION/SUMMARY
[FreeTextEntry1] : S/p CABG: Doing well. No symptoms. Normal LV function  Cont asa Cont lipitor 40mg, off gemfibrozil  Hypertriglyceridemia: Cont statin, consider fibrate. Watch carbs. Fish oil recommended by PCP. COnsider Vascepa   HTN: BP now consistently low. Off all meds. Gets midodrine with HD   PAF: Brief while in hospital in the setting of MAR on CKD, COVID, large pericardial effusion. Subsequent ECG's have been SR. Developed GIB from AC  Cont asa, Toprol Holding on AC for now. Can consider Watchman but hold off given no further evidence of AFib  ESRD- On HD via permacath, pending fistulogram  Pericardial effusion- likely uremic in the setting of acute kidney failure requiring HD. Recent echo showing resolution of effusion  Pending fistulogram with possible intervention. No evidence of ongoing ischemia, arrhythmia, valvular heart disease or decompensated heart failure.  This patient is optimized from a cardiac standpoint for this low to intermediate risk surgery.  No further cardiac testing is necessary prior to surgery.  RV 4M

## 2023-08-09 ENCOUNTER — TRANSCRIPTION ENCOUNTER (OUTPATIENT)
Age: 73
End: 2023-08-09

## 2023-08-10 ENCOUNTER — APPOINTMENT (OUTPATIENT)
Dept: INTERVENTIONAL RADIOLOGY/VASCULAR | Facility: HOSPITAL | Age: 73
End: 2023-08-10

## 2023-08-10 ENCOUNTER — TRANSCRIPTION ENCOUNTER (OUTPATIENT)
Age: 73
End: 2023-08-10

## 2023-08-10 ENCOUNTER — OUTPATIENT (OUTPATIENT)
Dept: OUTPATIENT SERVICES | Facility: HOSPITAL | Age: 73
LOS: 1 days | Discharge: HOME | End: 2023-08-10
Payer: MEDICARE

## 2023-08-10 DIAGNOSIS — Z98.891 HISTORY OF UTERINE SCAR FROM PREVIOUS SURGERY: Chronic | ICD-10-CM

## 2023-08-10 DIAGNOSIS — Z95.1 PRESENCE OF AORTOCORONARY BYPASS GRAFT: Chronic | ICD-10-CM

## 2023-08-10 DIAGNOSIS — Z90.710 ACQUIRED ABSENCE OF BOTH CERVIX AND UTERUS: Chronic | ICD-10-CM

## 2023-08-10 DIAGNOSIS — N18.6 END STAGE RENAL DISEASE: ICD-10-CM

## 2023-08-10 LAB
GLUCOSE BLDC GLUCOMTR-MCNC: 112 MG/DL — HIGH (ref 70–99)
POTASSIUM SERPL-MCNC: 4.8 MMOL/L — SIGNIFICANT CHANGE UP (ref 3.5–5.3)
POTASSIUM SERPL-SCNC: 4.8 MMOL/L — SIGNIFICANT CHANGE UP (ref 3.5–5.3)

## 2023-08-10 PROCEDURE — 76937 US GUIDE VASCULAR ACCESS: CPT | Mod: 26

## 2023-08-10 PROCEDURE — 36902 INTRO CATH DIALYSIS CIRCUIT: CPT | Mod: LT

## 2023-08-10 NOTE — ASU DISCHARGE PLAN (ADULT/PEDIATRIC) - ASU DC SPECIAL INSTRUCTIONSFT
You had an fistulogram of your left AV fistula with Dr. Kandice Galeano on 8/10/23.    You had a 8mm balloon angioplasty. Stricture was secondary to hematoma related to AVF access.    - You may shower in 24 hours. No soaking or swimming until the site is completely healed.  - Keep the area covered and dry for the next 48 hours.  - Do not perform any heavy lifting for the next few days or until the site is healed.  - You may resume your normal diet.  - You may resume your normal medications however you should wait 48 hours before restarting aspirin, plavix, or blood thinners.  - It is normal to experience some pain over the site for the next few days. You may take apply ice to the area (20 minutes on, 20 minutes off) and take Tylenol for that pain. Do not take more frequently than every 6 hours and do not exceed more than 3000mg of Tylenol in a 24 hour period.    Notify your primary physician and/or Interventional Radiology IMMEDIATELY if you experience any of the following       - Fever of 100.4F or 38C       - Chills or Rigors/ Shakes       - Swelling and/or Redness in the area around the biopsy site       - Worsening Pain       - Blood soaked bandages or worsening bleeding       - Lightheadedness and/or dizziness upon standing       - Chest Pain/ Tightness       - Shortness of Breath       - Difficulty walking    If you have a problem that you believe requires IMMEDIATE attention, please go to your NEAREST Emergency Room. If you believe your problem can safely wait until you speak to a physician, please call Vascular Surgery for any concerns.

## 2023-08-10 NOTE — BRIEF OPERATIVE NOTE - OPERATION/FINDINGS
Left arteriovenous fistulogram with 8mm balloon angioplasty. Stricture secondary to hematoma related to AVF access. 20cc contrast administered. See full operative report.

## 2023-09-11 ENCOUNTER — APPOINTMENT (OUTPATIENT)
Dept: VASCULAR SURGERY | Facility: CLINIC | Age: 73
End: 2023-09-11
Payer: MEDICARE

## 2023-09-11 VITALS
HEIGHT: 61 IN | DIASTOLIC BLOOD PRESSURE: 78 MMHG | HEART RATE: 91 BPM | TEMPERATURE: 98 F | WEIGHT: 188 LBS | OXYGEN SATURATION: 100 % | BODY MASS INDEX: 35.5 KG/M2 | SYSTOLIC BLOOD PRESSURE: 121 MMHG

## 2023-09-11 PROCEDURE — 93990 DOPPLER FLOW TESTING: CPT

## 2023-09-11 PROCEDURE — 99212 OFFICE O/P EST SF 10 MIN: CPT

## 2023-09-12 NOTE — DATA REVIEWED
Pt cancelled appt for 9/13/23
[FreeTextEntry1] : 4/3/2023 - LUKANDACE AVF duplex\par There is a patent brachiocephalic fistula in the left upper arm without evidence of flow restrictive lesions.  The subclavian vein remains patent.  The volume flow is 1499 mL/min.  The fistula measures 11.2 mm, 6.4 mm, 6.2 mm. Depth 1 cm in the usable segment of the fistula. \par ----------------------------------------------\par 1/23/2023 - BUE vein mapping\par Veins of adequate caliber in the bilateral upper extremities.

## 2023-09-13 ENCOUNTER — TRANSCRIPTION ENCOUNTER (OUTPATIENT)
Age: 73
End: 2023-09-13

## 2023-09-14 ENCOUNTER — OUTPATIENT (OUTPATIENT)
Dept: OUTPATIENT SERVICES | Facility: HOSPITAL | Age: 73
LOS: 1 days | Discharge: HOME | End: 2023-09-14
Payer: MEDICARE

## 2023-09-14 ENCOUNTER — APPOINTMENT (OUTPATIENT)
Dept: INTERVENTIONAL RADIOLOGY/VASCULAR | Facility: HOSPITAL | Age: 73
End: 2023-09-14

## 2023-09-14 ENCOUNTER — TRANSCRIPTION ENCOUNTER (OUTPATIENT)
Age: 73
End: 2023-09-14

## 2023-09-14 VITALS
OXYGEN SATURATION: 100 % | HEART RATE: 82 BPM | RESPIRATION RATE: 16 BRPM | TEMPERATURE: 98 F | SYSTOLIC BLOOD PRESSURE: 145 MMHG | DIASTOLIC BLOOD PRESSURE: 81 MMHG

## 2023-09-14 VITALS
OXYGEN SATURATION: 100 % | RESPIRATION RATE: 20 BRPM | SYSTOLIC BLOOD PRESSURE: 157 MMHG | HEART RATE: 90 BPM | DIASTOLIC BLOOD PRESSURE: 93 MMHG | TEMPERATURE: 97 F

## 2023-09-14 DIAGNOSIS — Z98.891 HISTORY OF UTERINE SCAR FROM PREVIOUS SURGERY: Chronic | ICD-10-CM

## 2023-09-14 DIAGNOSIS — Z90.710 ACQUIRED ABSENCE OF BOTH CERVIX AND UTERUS: Chronic | ICD-10-CM

## 2023-09-14 DIAGNOSIS — N18.5 CHRONIC KIDNEY DISEASE, STAGE 5: ICD-10-CM

## 2023-09-14 DIAGNOSIS — Z95.1 PRESENCE OF AORTOCORONARY BYPASS GRAFT: Chronic | ICD-10-CM

## 2023-09-14 LAB — GLUCOSE BLDC GLUCOMTR-MCNC: 120 MG/DL — HIGH (ref 70–99)

## 2023-09-14 PROCEDURE — 37236 OPEN/PERQ PLACE STENT 1ST: CPT | Mod: AS

## 2023-09-14 PROCEDURE — 36903 INTRO CATH DIALYSIS CIRCUIT: CPT

## 2023-09-14 PROCEDURE — 76937 US GUIDE VASCULAR ACCESS: CPT | Mod: 26

## 2023-09-14 NOTE — ASU DISCHARGE PLAN (ADULT/PEDIATRIC) - "IF YOU OR YOUR GUARDIAN/FAMILY IS A SMOKER, IT IS IMPORTANT FOR YOUR HEALTH TO STOP SMOKING. PLEASE BE AWARE THAT SECOND HAND SMOKE IS ALSO HARMFUL."
Transplant Consult / Transfer of Care    Date:  8/17/2021    Subjective: Lethargic, minimal subjective obtained.    HPI:  Lindsey Levin is a 44 year old female with a PMHx significant for EtOH Cirrhosis. Her cirrhosis is complicated by hepatic encephalopathy.     Other PMHx is significant for anxiety and opioid dependence.     Patient being admitted to [facility] 8/16/21 due to confusion. Hepatology was asked to see the patient in consult / transfer of care for recommendations regarding cirrhosis. patient says that she is getting more forgetful and loses track of what doing.  Patient gives examples like she would forget when her birthday is or what her locker combination is .  Patient feels she is not as sharp and feels she is getting more confused progressively over the past week. Reports some SOB over the past week. Denies F/C, CP, abdominal pain or N/V/D/C.    Objective:    Vitals:    08/17/21 1017   BP: (!) 178/86   Pulse: 68   Resp: 20   Temp: 98.1 °F (36.7 °C)       Physical Exam  General- Lethargic in NAD. Calm/cooperative. Appears acute on chronically ill.   Skin- Warm and dry to touch. No rashes/lesions. No jaundice.   Eyes- + scleral icterus. EOMs intact.   CV- S1S2, RRR. No murmurs, rubs or gallops to auscultation.  Pulmonary- Clear breath sounds to auscultation. On RA. Normal Respiratory Effort.  Abdomen- Round, soft, non-tender, and non-distended with palpation. + BS.  Extremities- + PP (radial/pedal). No erythema. No edema.   Neuro-  No focal deficits. Strength and sensation grossly intact. Speech clear but slow. No asterixis. Falls asleep.  Psych- RADHA    Imaging/Diagnostics: Reviewed    Assessment/Plan:  1. Acute EtOH Hepatitis with underlying EtOH Cirrhosis. MELD-NA 18   -- Last EtOH use PTA. EtOH abstinence encouraged.   -- Continue MVI, folate, and thiamine.   -- [place] DF 41. Continue holding off Prednisolone given improvement in Tbili and INR  -- INR improved to 1.9, continue Vitamin K.  -- CIWA per  attending.    -- Patient is not a transplant candidate at this time due to active EtOH use.     2. Hepatic Encephalopathy. Grade 1-2   -- HE is chronic, secondary to cirrhosis, and without hepatic coma  -- Continue Lactulose TID and Rifaximin 550 mg BID  -- Pan-culture to rule out infection.  -- Avoid narcotics or BZDs if possible.     3. Portal HTN / Esophageal Varices   -- Last EGD 10/16/2020 unremarkable.    -- Due for repeat EGD In 1-2 years.     4. HCC Screening   -- AFP negative    -- Due to screening 4phase CT, can obtain non-urgently.     5. Seizure Activity. Keppra. Continuous vEEG. Likely EtOH withdrawal.     Jim Damon PA-C  Abdominal Transplant & Hepatology  Pager [phone]  8/17/2021    After 3:00 PM please call the answering service and page the [facility] on call for the service.       Statement Selected

## 2023-09-14 NOTE — ASU DISCHARGE PLAN (ADULT/PEDIATRIC) - CARE PROVIDER_API CALL
Kandice Galeano  Vascular Surgery  [address], [address]  [place] [address]  Phone: [phone]  Fax: [phone]  Follow Up Time:

## 2023-09-14 NOTE — ASU DISCHARGE PLAN (ADULT/PEDIATRIC) - NS MD DC FALL RISK RISK
For information on Fall & Injury Prevention, visit: [email or web address] OR  [email or web address] OR  https://www.cdc.gov/steadi/patient.html

## 2023-09-14 NOTE — BRIEF OPERATIVE NOTE - NSICDXBRIEFPROCEDURE_GEN_ALL_CORE_FT
PROCEDURES:  Fistulogram, dialysis 14-Sep-2023 10:08:43  Torie Lane  Insertion, stent, AV fistula 14-Sep-2023 10:10:22  Torie Lane

## 2023-10-16 ENCOUNTER — APPOINTMENT (OUTPATIENT)
Dept: VASCULAR SURGERY | Facility: CLINIC | Age: 73
End: 2023-10-16
Payer: MEDICARE

## 2023-10-16 VITALS
HEART RATE: 104 BPM | WEIGHT: 188 LBS | HEIGHT: 61 IN | SYSTOLIC BLOOD PRESSURE: 138 MMHG | OXYGEN SATURATION: 98 % | DIASTOLIC BLOOD PRESSURE: 86 MMHG | BODY MASS INDEX: 35.5 KG/M2 | TEMPERATURE: 97.2 F

## 2023-10-16 PROCEDURE — 36589 REMOVAL TUNNELED CV CATH: CPT | Mod: RT

## 2023-10-24 ENCOUNTER — APPOINTMENT (OUTPATIENT)
Dept: FAMILY MEDICINE | Facility: CLINIC | Age: 73
End: 2023-10-24
Payer: MEDICARE

## 2023-10-24 VITALS
WEIGHT: 188 LBS | HEIGHT: 61 IN | HEART RATE: 81 BPM | DIASTOLIC BLOOD PRESSURE: 69 MMHG | SYSTOLIC BLOOD PRESSURE: 142 MMHG | TEMPERATURE: 97.5 F | BODY MASS INDEX: 35.5 KG/M2 | RESPIRATION RATE: 16 BRPM | OXYGEN SATURATION: 100 %

## 2023-10-24 DIAGNOSIS — Z00.00 ENCOUNTER FOR GENERAL ADULT MEDICAL EXAMINATION W/OUT ABNORMAL FINDINGS: ICD-10-CM

## 2023-10-24 PROCEDURE — 36415 COLL VENOUS BLD VENIPUNCTURE: CPT

## 2023-10-24 PROCEDURE — G0439: CPT

## 2023-10-25 LAB
ALBUMIN SERPL ELPH-MCNC: 4.3 G/DL
ALP BLD-CCNC: 129 U/L
ALT SERPL-CCNC: 12 U/L
ANION GAP SERPL CALC-SCNC: 30 MMOL/L
AST SERPL-CCNC: 12 U/L
BILIRUB SERPL-MCNC: 0.2 MG/DL
BUN SERPL-MCNC: 87 MG/DL
CALCIUM SERPL-MCNC: 8.5 MG/DL
CHLORIDE SERPL-SCNC: 93 MMOL/L
CHOLEST SERPL-MCNC: 226 MG/DL
CO2 SERPL-SCNC: 20 MMOL/L
CREAT SERPL-MCNC: 10.45 MG/DL
EGFR: 4 ML/MIN/1.73M2
ESTIMATED AVERAGE GLUCOSE: 117 MG/DL
GLUCOSE SERPL-MCNC: 117 MG/DL
HBA1C MFR BLD HPLC: 5.7 %
HCT VFR BLD CALC: 33 %
HDLC SERPL-MCNC: 33 MG/DL
HGB BLD-MCNC: 10.7 G/DL
LDLC SERPL CALC-MCNC: 124 MG/DL
MCHC RBC-ENTMCNC: 32.4 GM/DL
MCHC RBC-ENTMCNC: 34.9 PG
MCV RBC AUTO: 107.5 FL
NONHDLC SERPL-MCNC: 193 MG/DL
PLATELET # BLD AUTO: 205 K/UL
POTASSIUM SERPL-SCNC: 4.6 MMOL/L
PROT SERPL-MCNC: 7.2 G/DL
RBC # BLD: 3.07 M/UL
RBC # FLD: 13.6 %
SODIUM SERPL-SCNC: 143 MMOL/L
TRIGL SERPL-MCNC: 388 MG/DL
TSH SERPL-ACNC: 1.09 UIU/ML
WBC # FLD AUTO: 10.48 K/UL

## 2023-12-04 ENCOUNTER — APPOINTMENT (OUTPATIENT)
Dept: CARDIOLOGY | Facility: CLINIC | Age: 73
End: 2023-12-04
Payer: MEDICARE

## 2023-12-04 VITALS
HEIGHT: 61 IN | BODY MASS INDEX: 35.3 KG/M2 | DIASTOLIC BLOOD PRESSURE: 68 MMHG | WEIGHT: 187 LBS | SYSTOLIC BLOOD PRESSURE: 118 MMHG | HEART RATE: 109 BPM | OXYGEN SATURATION: 98 %

## 2023-12-04 PROCEDURE — 99214 OFFICE O/P EST MOD 30 MIN: CPT

## 2024-01-01 NOTE — PROCEDURE NOTE - NSCATHTYPE_GEN_A_CORE
CVC
There are no Wet Read(s) to document.
14 Kinyarwanda x 19cm tip to cuff tunneled dialysis catheter tunneled through right chest/Dialysis

## 2024-01-20 ENCOUNTER — INPATIENT (INPATIENT)
Facility: HOSPITAL | Age: 74
LOS: 1 days | Discharge: HOME | End: 2024-01-22
Attending: SURGERY | Admitting: SURGERY
Payer: MEDICARE

## 2024-01-20 VITALS
SYSTOLIC BLOOD PRESSURE: 148 MMHG | HEART RATE: 90 BPM | TEMPERATURE: 97 F | RESPIRATION RATE: 16 BRPM | DIASTOLIC BLOOD PRESSURE: 75 MMHG | OXYGEN SATURATION: 100 %

## 2024-01-20 DIAGNOSIS — Z95.1 PRESENCE OF AORTOCORONARY BYPASS GRAFT: Chronic | ICD-10-CM

## 2024-01-20 DIAGNOSIS — T82.9XXA UNSPECIFIED COMPLICATION OF CARDIAC AND VASCULAR PROSTHETIC DEVICE, IMPLANT AND GRAFT, INITIAL ENCOUNTER: ICD-10-CM

## 2024-01-20 DIAGNOSIS — Z90.710 ACQUIRED ABSENCE OF BOTH CERVIX AND UTERUS: Chronic | ICD-10-CM

## 2024-01-20 DIAGNOSIS — Z98.891 HISTORY OF UTERINE SCAR FROM PREVIOUS SURGERY: Chronic | ICD-10-CM

## 2024-01-20 LAB
ALBUMIN SERPL ELPH-MCNC: 4 G/DL — SIGNIFICANT CHANGE UP (ref 3.3–5)
ALP SERPL-CCNC: 74 U/L — SIGNIFICANT CHANGE UP (ref 40–120)
ALT FLD-CCNC: 13 U/L — SIGNIFICANT CHANGE UP (ref 4–33)
ANION GAP SERPL CALC-SCNC: 20 MMOL/L — HIGH (ref 7–14)
APTT BLD: 28.9 SEC — SIGNIFICANT CHANGE UP (ref 24.5–35.6)
AST SERPL-CCNC: 20 U/L — SIGNIFICANT CHANGE UP (ref 4–32)
BASE EXCESS BLDV CALC-SCNC: 4.4 MMOL/L — HIGH (ref -2–3)
BASOPHILS # BLD AUTO: 0.04 K/UL — SIGNIFICANT CHANGE UP (ref 0–0.2)
BASOPHILS NFR BLD AUTO: 0.5 % — SIGNIFICANT CHANGE UP (ref 0–2)
BILIRUB SERPL-MCNC: 0.2 MG/DL — SIGNIFICANT CHANGE UP (ref 0.2–1.2)
BLD GP AB SCN SERPL QL: NEGATIVE — SIGNIFICANT CHANGE UP
BLOOD GAS VENOUS COMPREHENSIVE RESULT: SIGNIFICANT CHANGE UP
BUN SERPL-MCNC: 59 MG/DL — HIGH (ref 7–23)
CALCIUM SERPL-MCNC: 9.3 MG/DL — SIGNIFICANT CHANGE UP (ref 8.4–10.5)
CHLORIDE BLDV-SCNC: 103 MMOL/L — SIGNIFICANT CHANGE UP (ref 96–108)
CHLORIDE SERPL-SCNC: 97 MMOL/L — LOW (ref 98–107)
CO2 BLDV-SCNC: 31.8 MMOL/L — HIGH (ref 22–26)
CO2 SERPL-SCNC: 26 MMOL/L — SIGNIFICANT CHANGE UP (ref 22–31)
CREAT SERPL-MCNC: 9.1 MG/DL — HIGH (ref 0.5–1.3)
EGFR: 4 ML/MIN/1.73M2 — LOW
EOSINOPHIL # BLD AUTO: 0.08 K/UL — SIGNIFICANT CHANGE UP (ref 0–0.5)
EOSINOPHIL NFR BLD AUTO: 0.9 % — SIGNIFICANT CHANGE UP (ref 0–6)
GAS PNL BLDV: 141 MMOL/L — SIGNIFICANT CHANGE UP (ref 136–145)
GLUCOSE BLDC GLUCOMTR-MCNC: 120 MG/DL — HIGH (ref 70–99)
GLUCOSE BLDC GLUCOMTR-MCNC: 144 MG/DL — HIGH (ref 70–99)
GLUCOSE BLDV-MCNC: 89 MG/DL — SIGNIFICANT CHANGE UP (ref 70–99)
GLUCOSE SERPL-MCNC: 97 MG/DL — SIGNIFICANT CHANGE UP (ref 70–99)
HCO3 BLDV-SCNC: 30 MMOL/L — HIGH (ref 22–29)
HCT VFR BLD CALC: 24.8 % — LOW (ref 34.5–45)
HCT VFR BLDA CALC: 25 % — LOW (ref 34.5–46.5)
HGB BLD CALC-MCNC: 8.4 G/DL — LOW (ref 11.7–16.1)
HGB BLD-MCNC: 8 G/DL — LOW (ref 11.5–15.5)
IANC: 5.48 K/UL — SIGNIFICANT CHANGE UP (ref 1.8–7.4)
IMM GRANULOCYTES NFR BLD AUTO: 0.5 % — SIGNIFICANT CHANGE UP (ref 0–0.9)
INR BLD: 1.05 RATIO — SIGNIFICANT CHANGE UP (ref 0.85–1.18)
LACTATE BLDV-MCNC: 1.5 MMOL/L — SIGNIFICANT CHANGE UP (ref 0.5–2)
LYMPHOCYTES # BLD AUTO: 2.39 K/UL — SIGNIFICANT CHANGE UP (ref 1–3.3)
LYMPHOCYTES # BLD AUTO: 27.6 % — SIGNIFICANT CHANGE UP (ref 13–44)
MAGNESIUM SERPL-MCNC: 2.2 MG/DL — SIGNIFICANT CHANGE UP (ref 1.6–2.6)
MCHC RBC-ENTMCNC: 32.3 GM/DL — SIGNIFICANT CHANGE UP (ref 32–36)
MCHC RBC-ENTMCNC: 36.7 PG — HIGH (ref 27–34)
MCV RBC AUTO: 113.8 FL — HIGH (ref 80–100)
MONOCYTES # BLD AUTO: 0.63 K/UL — SIGNIFICANT CHANGE UP (ref 0–0.9)
MONOCYTES NFR BLD AUTO: 7.3 % — SIGNIFICANT CHANGE UP (ref 2–14)
NEUTROPHILS # BLD AUTO: 5.48 K/UL — SIGNIFICANT CHANGE UP (ref 1.8–7.4)
NEUTROPHILS NFR BLD AUTO: 63.2 % — SIGNIFICANT CHANGE UP (ref 43–77)
NRBC # BLD: 0 /100 WBCS — SIGNIFICANT CHANGE UP (ref 0–0)
NRBC # FLD: 0 K/UL — SIGNIFICANT CHANGE UP (ref 0–0)
PCO2 BLDV: 51 MMHG — SIGNIFICANT CHANGE UP (ref 39–52)
PH BLDV: 7.38 — SIGNIFICANT CHANGE UP (ref 7.32–7.43)
PHOSPHATE SERPL-MCNC: 4.6 MG/DL — HIGH (ref 2.5–4.5)
PLATELET # BLD AUTO: 266 K/UL — SIGNIFICANT CHANGE UP (ref 150–400)
PO2 BLDV: 27 MMHG — SIGNIFICANT CHANGE UP (ref 25–45)
POTASSIUM BLDV-SCNC: 5 MMOL/L — SIGNIFICANT CHANGE UP (ref 3.5–5.1)
POTASSIUM SERPL-MCNC: 5 MMOL/L — SIGNIFICANT CHANGE UP (ref 3.5–5.3)
POTASSIUM SERPL-SCNC: 5 MMOL/L — SIGNIFICANT CHANGE UP (ref 3.5–5.3)
PROT SERPL-MCNC: 7.4 G/DL — SIGNIFICANT CHANGE UP (ref 6–8.3)
PROTHROM AB SERPL-ACNC: 11.7 SEC — SIGNIFICANT CHANGE UP (ref 9.5–13)
RBC # BLD: 2.18 M/UL — LOW (ref 3.8–5.2)
RBC # FLD: 13.8 % — SIGNIFICANT CHANGE UP (ref 10.3–14.5)
RH IG SCN BLD-IMP: POSITIVE — SIGNIFICANT CHANGE UP
SAO2 % BLDV: 25.1 % — LOW (ref 67–88)
SODIUM SERPL-SCNC: 143 MMOL/L — SIGNIFICANT CHANGE UP (ref 135–145)
WBC # BLD: 8.66 K/UL — SIGNIFICANT CHANGE UP (ref 3.8–10.5)
WBC # FLD AUTO: 8.66 K/UL — SIGNIFICANT CHANGE UP (ref 3.8–10.5)

## 2024-01-20 PROCEDURE — 99223 1ST HOSP IP/OBS HIGH 75: CPT

## 2024-01-20 PROCEDURE — 99285 EMERGENCY DEPT VISIT HI MDM: CPT

## 2024-01-20 RX ORDER — DEXTROSE 50 % IN WATER 50 %
25 SYRINGE (ML) INTRAVENOUS ONCE
Refills: 0 | Status: DISCONTINUED | OUTPATIENT
Start: 2024-01-20 | End: 2024-01-22

## 2024-01-20 RX ORDER — GLUCAGON INJECTION, SOLUTION 0.5 MG/.1ML
1 INJECTION, SOLUTION SUBCUTANEOUS ONCE
Refills: 0 | Status: DISCONTINUED | OUTPATIENT
Start: 2024-01-20 | End: 2024-01-22

## 2024-01-20 RX ORDER — RALOXIFENE HYDROCHLORIDE 60 MG/1
1 TABLET, COATED ORAL
Qty: 0 | Refills: 0 | DISCHARGE

## 2024-01-20 RX ORDER — SITAGLIPTIN 50 MG/1
1 TABLET, FILM COATED ORAL
Qty: 0 | Refills: 0 | DISCHARGE

## 2024-01-20 RX ORDER — MIDODRINE HYDROCHLORIDE 2.5 MG/1
15 TABLET ORAL
Refills: 0 | Status: DISCONTINUED | OUTPATIENT
Start: 2024-01-20 | End: 2024-01-22

## 2024-01-20 RX ORDER — MULTIVIT-MIN/FERROUS GLUCONATE 9 MG/15 ML
1 LIQUID (ML) ORAL
Refills: 0 | DISCHARGE

## 2024-01-20 RX ORDER — INSULIN LISPRO 100/ML
VIAL (ML) SUBCUTANEOUS EVERY 6 HOURS
Refills: 0 | Status: DISCONTINUED | OUTPATIENT
Start: 2024-01-20 | End: 2024-01-22

## 2024-01-20 RX ORDER — ATORVASTATIN CALCIUM 80 MG/1
1 TABLET, FILM COATED ORAL
Refills: 0 | DISCHARGE

## 2024-01-20 RX ORDER — DEXTROSE 50 % IN WATER 50 %
15 SYRINGE (ML) INTRAVENOUS ONCE
Refills: 0 | Status: DISCONTINUED | OUTPATIENT
Start: 2024-01-20 | End: 2024-01-22

## 2024-01-20 RX ORDER — SODIUM CHLORIDE 9 MG/ML
1000 INJECTION, SOLUTION INTRAVENOUS
Refills: 0 | Status: DISCONTINUED | OUTPATIENT
Start: 2024-01-20 | End: 2024-01-22

## 2024-01-20 RX ORDER — SODIUM ZIRCONIUM CYCLOSILICATE 10 G/10G
10 POWDER, FOR SUSPENSION ORAL ONCE
Refills: 0 | Status: COMPLETED | OUTPATIENT
Start: 2024-01-20 | End: 2024-01-20

## 2024-01-20 RX ORDER — ATORVASTATIN CALCIUM 80 MG/1
40 TABLET, FILM COATED ORAL AT BEDTIME
Refills: 0 | Status: DISCONTINUED | OUTPATIENT
Start: 2024-01-20 | End: 2024-01-22

## 2024-01-20 RX ORDER — FENOFIBRATE,MICRONIZED 130 MG
1 CAPSULE ORAL
Refills: 0 | DISCHARGE

## 2024-01-20 RX ORDER — CALCIUM ACETATE 667 MG
2 TABLET ORAL
Refills: 0 | DISCHARGE

## 2024-01-20 RX ORDER — RALOXIFENE HYDROCHLORIDE 60 MG/1
1 TABLET, COATED ORAL
Refills: 0 | DISCHARGE

## 2024-01-20 RX ORDER — DEXTROSE 50 % IN WATER 50 %
12.5 SYRINGE (ML) INTRAVENOUS ONCE
Refills: 0 | Status: DISCONTINUED | OUTPATIENT
Start: 2024-01-20 | End: 2024-01-22

## 2024-01-20 RX ORDER — HEPARIN SODIUM 5000 [USP'U]/ML
5000 INJECTION INTRAVENOUS; SUBCUTANEOUS EVERY 8 HOURS
Refills: 0 | Status: DISCONTINUED | OUTPATIENT
Start: 2024-01-20 | End: 2024-01-22

## 2024-01-20 RX ORDER — ASPIRIN/CALCIUM CARB/MAGNESIUM 324 MG
81 TABLET ORAL DAILY
Refills: 0 | Status: DISCONTINUED | OUTPATIENT
Start: 2024-01-20 | End: 2024-01-22

## 2024-01-20 RX ADMIN — HEPARIN SODIUM 5000 UNIT(S): 5000 INJECTION INTRAVENOUS; SUBCUTANEOUS at 21:48

## 2024-01-20 RX ADMIN — SODIUM ZIRCONIUM CYCLOSILICATE 10 GRAM(S): 10 POWDER, FOR SUSPENSION ORAL at 19:56

## 2024-01-20 RX ADMIN — ATORVASTATIN CALCIUM 40 MILLIGRAM(S): 80 TABLET, FILM COATED ORAL at 21:48

## 2024-01-20 NOTE — H&P ADULT - NSHPLABSRESULTS_GEN_ALL_CORE
LABS:                        8.0    8.66  )-----------( 266      ( 20 Jan 2024 14:30 )             24.8     01-20    143  |  97<L>  |  59<H>  ----------------------------<  97  5.0   |  26  |  9.10<H>    Ca    9.3      20 Jan 2024 14:30  Phos  4.6     01-20  Mg     2.20     01-20    TPro  7.4  /  Alb  4.0  /  TBili  0.2  /  DBili  x   /  AST  20  /  ALT  13  /  AlkPhos  74  01-20    Lactate:  01-20 @ 14:30  1.5    PT/INR - ( 20 Jan 2024 14:30 )   PT: 11.7 sec;   INR: 1.05 ratio         PTT - ( 20 Jan 2024 14:30 )  PTT:28.9 sec          Urinalysis Basic - ( 20 Jan 2024 14:30 )    Color: x / Appearance: x / SG: x / pH: x  Gluc: 97 mg/dL / Ketone: x  / Bili: x / Urobili: x   Blood: x / Protein: x / Nitrite: x   Leuk Esterase: x / RBC: x / WBC x   Sq Epi: x / Non Sq Epi: x / Bacteria: x        IMAGING:

## 2024-01-20 NOTE — H&P ADULT - HIV OFFER
cellulitis and right colon mass cellulitis and right colon mass cellulitis and right colon mass cellulitis and right colon mass cellulitis and right colon mass cellulitis and right colon mass cellulitis and right colon mass cellulitis and right colon mass cellulitis and right colon mass cellulitis and right colon mass cellulitis and right colon mass cellulitis and right colon mass cellulitis and right colon mass cellulitis and right colon mass cellulitis and right colon mass cellulitis and right colon mass cellulitis and right colon mass cellulitis and right colon mass cellulitis and right colon mass cellulitis and right colon mass cellulitis and right colon mass cellulitis and right colon mass cellulitis and right colon mass cellulitis and right colon mass cellulitis and right colon mass cellulitis and right colon mass cellulitis and right colon mass cellulitis and right colon mass cellulitis and right colon mass cellulitis and right colon mass cellulitis and right colon mass cellulitis and right colon mass cellulitis and right colon mass cellulitis and right colon mass cellulitis and right colon mass cellulitis and right colon mass cellulitis and right colon mass cellulitis and right colon mass cellulitis and right colon mass cellulitis and right colon mass cellulitis and right colon mass cellulitis and right colon mass cellulitis and right colon mass Opt out cellulitis and right colon mass cellulitis and right colon mass cellulitis and right colon mass cellulitis and right colon mass cellulitis and right colon mass cellulitis and right colon mass cellulitis and right colon mass cellulitis and right colon mass cellulitis and right colon mass cellulitis and right colon mass cellulitis and right colon mass cellulitis and right colon mass cellulitis and right colon mass cellulitis and right colon mass

## 2024-01-20 NOTE — ED PROVIDER NOTE - CLINICAL SUMMARY MEDICAL DECISION MAKING FREE TEXT BOX
73 year old female with hx of CAD s/p CABG x3, HTN, T2DM, ESRD on dialysis Re comes into the ED for eval of inability to use AV fistula for HD today. She last had a full session of dialysis on 1/18 and was supposed to get a session today but they were unable to pass a cath into the AV fistula today and she did not get any HD today. On exam, AV fistula has a palpable thrill. Pt does not appear fluid overloaded. ECG is NSR and not concerning. Will check labs, VA duplex of LUE, vascular consult. Dispo pending work up, reeval, and vascular recs.

## 2024-01-20 NOTE — ED PROVIDER NOTE - PROGRESS NOTE DETAILS
Pavel Hughes PGY2:  Paged vascular surg for consult Catalina Nuñez MD (PGY3): patient evaluated by vascular team. no need for emergent HD.

## 2024-01-20 NOTE — CHART NOTE - NSCHARTNOTEFT_GEN_A_CORE
AVF patent on imaging. HD nurse contacted for possible HD tonight. HD nurse evaluated AVF while patient in ED and does not think will be able to do HD through fistula and will defer HD. Nephrology aware.

## 2024-01-20 NOTE — CONSULT NOTE ADULT - SUBJECTIVE AND OBJECTIVE BOX
Vascular Surgery Consult  Consulting surgical team: Vascular  Consulting attending: Froylan    HPI:  73 year old female with hx of CAD s/p CABG x3, HTN, T2DM, ESRD on dialysis T//S comes into the ED for eval of inability to use AV fistula for HD today. She last had a full session of dialysis on  and was supposed to get a session today but they were unable to access the fistula today. She called her vascular surgery office and was advised to come to the ER at [facility]. She states she had some mild pain and swelling to the left upper extremity since -. In the past the pt has had multiple fistulograms and has required stenting for the fistula (23). Pt reports she was supposed to get an US of the LUE as well as a fistulogram over the next 2-3 days. Currently in the ED she denies any pain, recent fevers/chills, chest pain, SOB, feeling fluid overloaded, palpitations, lower extremity swelling/pain.    PAST MEDICAL HISTORY:  HTN (hypertension)    HLD (hyperlipidemia)    DM (diabetes mellitus)    ESRD on dialysis    S/P CABG x 3    H/O: hysterectomy    H/O:     Uterine cancer        PAST SURGICAL HISTORY:  H/O:     S/P CABG x 3    H/O: hysterectomy        MEDICATIONS:      ALLERGIES:  sulfa drugs (Rash)      VITALS & I/Os:  Vital Signs Last 24 Hrs  T(C): 36.2 (2024 12:26), Max: 36.2 (2024 12:26)  T(F): 97.1 (2024 12:26), Max: 97.1 (2024 12:26)  HR: 90 (2024 12:26) (90 - 90)  BP: 148/75 (2024 12:26) (148/75 - 148/75)  BP(mean): --  RR: 16 (2024 12:26) (16 - 16)  SpO2: 100% (2024 12:26) (100% - 100%)    Parameters below as of 2024 12:26  Patient On (Oxygen Delivery Method): room air        I&O's Summary      PHYSICAL EXAM:  General: No acute distress  Respiratory: Nonlabored  Cardiovascular: RRR  Extremities: LUE brachiocephalic fistula, palpable thrill, palpable bilateral radial pulses, no neurosensory deficits    LABS:                        8.0    8.66  )-----------( 266      ( 2024 14:30 )             24.8         143  |  97<L>  |  59<H>  ----------------------------<  97  5.0   |  26  |  9.10<H>    Ca    9.3      2024 14:30  Phos  4.6       Mg     2.20         TPro  7.4  /  Alb  4.0  /  TBili  0.2  /  DBili  x   /  AST    /  ALT  13  /  AlkPhos  74      Lactate:  20 @ 14:30  1.5    PT/INR - ( 2024 14:30 )   PT: 11.7 sec;   INR: 1.05 ratio         PTT - ( 2024 14:30 )  PTT:28.9 sec          Urinalysis Basic - ( 2024 14:30 )    Color: x / Appearance: x / SG: x / pH: x  Gluc: 97 mg/dL / Ketone: x  / Bili: x / Urobili: x   Blood: x / Protein: x / Nitrite: x   Leuk Esterase: x / RBC: x / WBC x   Sq Epi: x / Non Sq Epi: x / Bacteria: x        IMAGING:

## 2024-01-20 NOTE — ED ADULT NURSE NOTE - NSFALLHARMRISKINTERV_ED_ALL_ED

## 2024-01-20 NOTE — ED ADULT NURSE NOTE - NSFALLRISKFACTORS_ED_ALL_ED
ASA 81mg/Coagulation: Bleeding disorder either through use of anticoagulants or underlying clinical condition(s)

## 2024-01-20 NOTE — ED ADULT NURSE NOTE - OBJECTIVE STATEMENT
Sadie RN: 73 year old female received to 10a, AAOx4 ambulatory. Phx ESRD on dialysis (Tu/Th/Sat), DM (fs in triage 85). pt reporting to ED for clogged AVF to left upper arm. pt unable to have dialysis today. Last completed dialysis session on Thursday. pt denies pain discomfort at this time admitting to pain to AVF on thursday. thrill noted to AVF. respirations even and unlabored. pt denies headache, dizziness, chest pain, shortness of breath, palpitations, n/v/d, BLLE edema. bed in lowest position, family at bedside. report given to primary rn Nilam.

## 2024-01-20 NOTE — H&P ADULT - ASSESSMENT
73 year old female with hx of CAD s/p CABG x3, HTN, T2DM, ESRD on dialysis T/Th/S comes into the ED for eval of inability to use AV fistula for HD today. Concern for possible thrombosis of AVF. Pending LUE fistula duplex.    Plan:  - admit to Dr. Kandice Galeano  - nephrology consult   - pending LUE fistula duplex, if thrombosed will plan for thrombectomy tomorrow  - med rec to be completed  - Diabetic diet, npo at midnight if planning for OR      Discussed with vascular surgery fellow on behalf of Dr. Galeano    Vascular Surgery [id number]   73 year old female with hx of CAD s/p CABG x3, HTN, T2DM, ESRD on dialysis T/Th/S comes into the ED for eval of inability to use AV fistula for HD today. Concern for possible thrombosis of AVF. Pending LUE fistula duplex.    Plan:  - admit to Dr. Kandice Galeano  - nephrology consult   - pending LUE fistula duplex, if thrombosed will plan for thrombectomy  - med rec to be completed  - Diabetic diet, npo at midnight if planning for OR      Discussed with vascular surgery fellow on behalf of Dr. Galeano    Vascular Surgery [id number]

## 2024-01-20 NOTE — ED PROVIDER NOTE - PHYSICAL EXAMINATION
GENERAL: Not in acute distress, non-toxic appearing  HEAD: normocephalic, atraumatic  HEENT: EOMI, normal conjunctiva, oral mucosa moist, neck supple  CARDIAC: regular rate and rhythm, normal S1 and S2,  no appreciable murmurs, + AV fistula to LUE with palpable thrill  PULM: clear to ascultation bilaterally, no crackles, rales, rhonchi, or wheezing  GI: abdomen nondistended, soft, nontender, no guarding or rebound tenderness  NEURO: alert and oriented x 3, normal speech, gait normal, no gross neurologic deficit  MSK: No visible deformities, no peripheral edema, calf tenderness/redness/swelling  SKIN: No visible rashes, dry, well-perfused  PSYCH: appropriate mood and affect

## 2024-01-20 NOTE — ED ADULT TRIAGE NOTE - CHIEF COMPLAINT QUOTE
states" I was sent here from my HD center for clogged fistula in my LUE." Last HD on Thursday " h/o ESRD on HD Tue-Thu-Sat . DM, Fs 85.

## 2024-01-20 NOTE — ED PROVIDER NOTE - OBJECTIVE STATEMENT
73 year old female with hx of CAD s/p CABG x3, HTN, T2DM, ESRD on dialysis Re comes into the ED for eval of inability to use AV fistula for HD today. She last had a full session of dialysis on 1/18 and was supposed to get a session today but they were unable to pass a cath into the AV fistula today and she did not get any HD today. She called her vascular surgeon Dr. Galeano who advised the Pt come into the ED at [facility]. She states she had some mild pain and swelling to the left upper extremity since 1/18-19. Pt reports she was supposed to get an US of the LUE as well as a fistulogram over the next 2-3 days. Currently in the ED she denies any pain, recent fevers/chills, chest pain, SOB, feeling fluid overloaded, palpitations, lower extremity swelling/pain.

## 2024-01-20 NOTE — H&P ADULT - NSHPPHYSICALEXAM_GEN_ALL_CORE
General: No acute distress  Respiratory: Nonlabored  Cardiovascular: RRR  Extremities: LUE brachiocephalic fistula, palpable thrill, palpable bilateral radial pulses, no neurosensory deficits

## 2024-01-20 NOTE — ED ADULT NURSE REASSESSMENT NOTE - NS ED NURSE REASSESS COMMENT FT1
Report received from RN Nilam. Pt resting in stretcher, A&O x 4, offers no complaints of pain or discomfort at this time. RR equal and unlabored, VSS, safety maintained, comfort provided, Dialysis RN at bedside for evaluation, awaiting bed placement at this time.

## 2024-01-20 NOTE — CONSULT NOTE ADULT - ASSESSMENT
73 year old female with hx of CAD s/p CABG x3, HTN, T2DM, ESRD on dialysis T/Th/S comes into the ED for eval of inability to use AV fistula for HD today.     -duplex of fistula  -plan pending duplex    Discussed w/ fellow on behalf of Dr. Galeano  Vascular, [id number]

## 2024-01-20 NOTE — CONSULT NOTE ADULT - SUBJECTIVE AND OBJECTIVE BOX
Patient is a 73y old  Female who presents with a chief complaint of     HPI:  73 year old female with hx of CAD s/p CABG x3, HTN, T2DM, ESRD on dialysis T//S comes into the ED for eval of inability to use AV fistula for HD today. She last had a full session of dialysis on  and was supposed to get a session today but they were unable to access the fistula today. She called her vascular surgery office and was advised to come to the ER at [facility]. She states she had some mild pain and swelling to the left upper extremity since -. In the past the pt has had multiple fistulograms and has required stenting for the fistula (23). Pt reports she was supposed to get an US of the LUE as well as a fistulogram over the next 2-3 days. Currently in the ED she denies any pain, recent fevers/chills, chest pain, SOB, feeling fluid overloaded, palpitations, lower extremity swelling/pain. (2024 16:12)      PAST MEDICAL & SURGICAL HISTORY:  HTN (hypertension)      HLD (hyperlipidemia)      DM (diabetes mellitus)      ESRD on dialysis      Uterine cancer      H/O:       S/P CABG x 3      H/O: hysterectomy          MEDICATIONS  (STANDING):  aspirin enteric coated 81 milliGRAM(s) Oral daily  atorvastatin 40 milliGRAM(s) Oral at bedtime  dextrose 5%. 1000 milliLiter(s) (50 mL/Hr) IV Continuous <Continuous>  dextrose 5%. 1000 milliLiter(s) (100 mL/Hr) IV Continuous <Continuous>  dextrose 50% Injectable 25 Gram(s) IV Push once  dextrose 50% Injectable 12.5 Gram(s) IV Push once  dextrose 50% Injectable 25 Gram(s) IV Push once  glucagon  Injectable 1 milliGRAM(s) IntraMuscular once  heparin   Injectable 5000 Unit(s) SubCutaneous every 8 hours  insulin lispro (ADMELOG) corrective regimen sliding scale   SubCutaneous every 6 hours  midodrine. 15 milliGRAM(s) Oral <User Schedule>      Allergies    sulfa drugs (Rash)    Intolerances        SOCIAL HISTORY:  Denies ETOh,Smoking,     FAMILY HISTORY:  FH: kidney disease        REVIEW OF SYSTEMS:  CONSTITUTIONAL: No weakness, fevers or chills  EYES/ENT: No visual changes;  No vertigo or throat pain   NECK: No pain or stiffness  RESPIRATORY: No cough, wheezing, hemoptysis; No shortness of breath  CARDIOVASCULAR: No chest pain or palpitations  GASTROINTESTINAL: No abdominal or epigastric pain. No nausea, vomiting, or hematemesis; No diarrhea or constipation. No melena or hematochezia.  GENITOURINARY: No dysuria, frequency or hematuria  NEUROLOGICAL: No numbness or weakness  SKIN: No itching, burning, rashes, or lesions   All other review of systems is negative unless indicated above.    VITAL:  T(C): , Max: 36.7 (24 @ 18:49)  T(F): , Max: 98.1 (24 @ 18:49)  HR: 75 (24 @ 18:49)  BP: 136/69 (24 @ 18:49)  BP(mean): --  RR: 17 (24 @ 18:49)  SpO2: 100% (24 @ 18:49)  Wt(kg): --    PHYSICAL EXAM:  Constitutional: NAD, Alert  HEENT: NCAT, MMM  Neck: Supple, No JVD  Respiratory: CTA-b/l  Cardiovascular: RRR s1s2, no m/r/g  Gastrointestinal: BS+, soft, NT/ND  Extremities: No peripheral edema b/l  Neurological: no focal deficits; strength grossly intact  Back: no CVAT b/l  Skin: No rashes, no nevi    LABS:                        8.0    8.66  )-----------( 266      ( 2024 14:30 )             24.8     Na(143)/K(5.0)/Cl(97)/HCO3(26)/BUN(59)/Cr(9.10)Glu(97)/Ca(9.3)/Mg(2.20)/PO4(4.6)     @ 14:30    Urinalysis Basic - ( 2024 14:30 )    Color: x / Appearance: x / SG: x / pH: x  Gluc: 97 mg/dL / Ketone: x  / Bili: x / Urobili: x   Blood: x / Protein: x / Nitrite: x   Leuk Esterase: x / RBC: x / WBC x   Sq Epi: x / Non Sq Epi: x / Bacteria: x            IMAGING:    ASSESSMENT:  73 year old female with hx of CAD s/p CABG x3, HTN, T2DM, ESRD on dialysis T/Th/S comes into the ED for eval of inability to use AV fistula for HD today, AVF malfunction ,    ESRD -TTS  volume- acceptable  vascular- AVf malfunction , duplex patent     RECOMMEND:  -dialysis tomorrow   -renal diet       Thank you for involving [facility] in this patient's care.    With warm regards    [facility]  Office: [phone]      Thank you for involving [facility] in this patient's care.    With warm regards    [facility]  Office: [phone]             Patient is a 73y old  Female who presents with a chief complaint of     HPI:  73 year old female with hx of CAD s/p CABG x3, HTN, T2DM, ESRD on dialysis T//S comes into the ED for eval of inability to use AV fistula for HD today. She last had a full session of dialysis on  and was supposed to get a session today but they were unable to access the fistula today. She called her vascular surgery office and was advised to come to the ER at [facility]. She states she had some mild pain and swelling to the left upper extremity since -. In the past the pt has had multiple fistulograms and has required stenting for the fistula (23). Pt reports she was supposed to get an US of the LUE as well as a fistulogram over the next 2-3 days. Currently in the ED she denies any pain, recent fevers/chills, chest pain, SOB, feeling fluid overloaded, palpitations, lower extremity swelling/pain. (2024 16:12)      PAST MEDICAL & SURGICAL HISTORY:  HTN (hypertension)      HLD (hyperlipidemia)      DM (diabetes mellitus)      ESRD on dialysis      Uterine cancer      H/O:       S/P CABG x 3      H/O: hysterectomy          MEDICATIONS  (STANDING):  aspirin enteric coated 81 milliGRAM(s) Oral daily  atorvastatin 40 milliGRAM(s) Oral at bedtime  dextrose 5%. 1000 milliLiter(s) (50 mL/Hr) IV Continuous <Continuous>  dextrose 5%. 1000 milliLiter(s) (100 mL/Hr) IV Continuous <Continuous>  dextrose 50% Injectable 25 Gram(s) IV Push once  dextrose 50% Injectable 12.5 Gram(s) IV Push once  dextrose 50% Injectable 25 Gram(s) IV Push once  glucagon  Injectable 1 milliGRAM(s) IntraMuscular once  heparin   Injectable 5000 Unit(s) SubCutaneous every 8 hours  insulin lispro (ADMELOG) corrective regimen sliding scale   SubCutaneous every 6 hours  midodrine. 15 milliGRAM(s) Oral <User Schedule>      Allergies    sulfa drugs (Rash)    Intolerances        SOCIAL HISTORY:  Denies ETOh,Smoking,     FAMILY HISTORY:  FH: kidney disease        REVIEW OF SYSTEMS:  CONSTITUTIONAL: No weakness, fevers or chills  EYES/ENT: No visual changes;  No vertigo or throat pain   NECK: No pain or stiffness  RESPIRATORY: No cough, wheezing, hemoptysis; No shortness of breath  CARDIOVASCULAR: No chest pain or palpitations  GASTROINTESTINAL: No abdominal or epigastric pain. No nausea, vomiting, or hematemesis; No diarrhea or constipation. No melena or hematochezia.  GENITOURINARY: No dysuria, frequency or hematuria  NEUROLOGICAL: No numbness or weakness  SKIN: No itching, burning, rashes, or lesions   All other review of systems is negative unless indicated above.    VITAL:  T(C): , Max: 36.7 (24 @ 18:49)  T(F): , Max: 98.1 (24 @ 18:49)  HR: 75 (24 @ 18:49)  BP: 136/69 (24 @ 18:49)  BP(mean): --  RR: 17 (24 @ 18:49)  SpO2: 100% (24 @ 18:49)  Wt(kg): --    PHYSICAL EXAM:  Constitutional: NAD, Alert  HEENT: NCAT, MMM  Neck: Supple, No JVD  Respiratory: CTA-b/l  Cardiovascular: RRR s1s2, no m/r/g  Gastrointestinal: BS+, soft, NT/ND  Extremities: No peripheral edema b/l  Neurological: no focal deficits; strength grossly intact  Back: no CVAT b/l  Skin: No rashes, no nevi    LABS:                        8.0    8.66  )-----------( 266      ( 2024 14:30 )             24.8     Na(143)/K(5.0)/Cl(97)/HCO3(26)/BUN(59)/Cr(9.10)Glu(97)/Ca(9.3)/Mg(2.20)/PO4(4.6)     @ 14:30    Urinalysis Basic - ( 2024 14:30 )    Color: x / Appearance: x / SG: x / pH: x  Gluc: 97 mg/dL / Ketone: x  / Bili: x / Urobili: x   Blood: x / Protein: x / Nitrite: x   Leuk Esterase: x / RBC: x / WBC x   Sq Epi: x / Non Sq Epi: x / Bacteria: x            IMAGING:    ASSESSMENT:  73 year old female with hx of CAD s/p CABG x3, HTN, T2DM, ESRD on dialysis T/Th/S comes into the ED for eval of inability to use AV fistula for HD today, AVF malfunction ,    ESRD -TTS  volume- acceptable  vascular- AVf malfunction , duplex patent     RECOMMEND:  -dialysis tomorrow   -vascular - already schedule for fistulogram next week , anterior part of AVF low flow  dr Goodwin and team aware ,   -lokelma 10 g once given   -renal diet       Thank you for involving [facility] in this patient's care.    With warm regards    [facility]  Office: [phone]      Thank you for involving [facility] in this patient's care.    With warm regards    [facility]  Office: [phone]

## 2024-01-20 NOTE — H&P ADULT - ATTENDING COMMENTS
ESRD on HD  issue at HD today with concern for thrombosed access    on exam, palpable thrill at antecubital fossa.    will obtain fistula duplex for evaluation  may need intervention during admission

## 2024-01-20 NOTE — H&P ADULT - HISTORY OF PRESENT ILLNESS
73 year old female with hx of CAD s/p CABG x3, HTN, T2DM, ESRD on dialysis T/Th/S comes into the ED for eval of inability to use AV fistula for HD today. She last had a full session of dialysis on 1/18 and was supposed to get a session today but they were unable to access the fistula today. She called her vascular surgery office and was advised to come to the ER at [facility]. She states she had some mild pain and swelling to the left upper extremity since 1/18-19. In the past the pt has had multiple fistulograms and has required stenting for the fistula (9/14/23). Pt reports she was supposed to get an US of the LUE as well as a fistulogram over the next 2-3 days. Currently in the ED she denies any pain, recent fevers/chills, chest pain, SOB, feeling fluid overloaded, palpitations, lower extremity swelling/pain.

## 2024-01-21 LAB
A1C WITH ESTIMATED AVERAGE GLUCOSE RESULT: 5.6 % — SIGNIFICANT CHANGE UP (ref 4–5.6)
ANION GAP SERPL CALC-SCNC: 20 MMOL/L — HIGH (ref 7–14)
BUN SERPL-MCNC: 68 MG/DL — HIGH (ref 7–23)
CALCIUM SERPL-MCNC: 8.8 MG/DL — SIGNIFICANT CHANGE UP (ref 8.4–10.5)
CHLORIDE SERPL-SCNC: 97 MMOL/L — LOW (ref 98–107)
CO2 SERPL-SCNC: 24 MMOL/L — SIGNIFICANT CHANGE UP (ref 22–31)
CREAT SERPL-MCNC: 10.65 MG/DL — HIGH (ref 0.5–1.3)
DIALYSIS INSTRUMENT RESULT - HEPATITIS B SURFACE ANTIGEN: NEGATIVE — SIGNIFICANT CHANGE UP
EGFR: 4 ML/MIN/1.73M2 — LOW
ESTIMATED AVERAGE GLUCOSE: 114 — SIGNIFICANT CHANGE UP
GLUCOSE BLDC GLUCOMTR-MCNC: 118 MG/DL — HIGH (ref 70–99)
GLUCOSE BLDC GLUCOMTR-MCNC: 122 MG/DL — HIGH (ref 70–99)
GLUCOSE BLDC GLUCOMTR-MCNC: 156 MG/DL — HIGH (ref 70–99)
GLUCOSE BLDC GLUCOMTR-MCNC: 163 MG/DL — HIGH (ref 70–99)
GLUCOSE SERPL-MCNC: 106 MG/DL — HIGH (ref 70–99)
HCT VFR BLD CALC: 23.4 % — LOW (ref 34.5–45)
HGB BLD-MCNC: 7.4 G/DL — LOW (ref 11.5–15.5)
MAGNESIUM SERPL-MCNC: 2.2 MG/DL — SIGNIFICANT CHANGE UP (ref 1.6–2.6)
MCHC RBC-ENTMCNC: 31.6 GM/DL — LOW (ref 32–36)
MCHC RBC-ENTMCNC: 36.1 PG — HIGH (ref 27–34)
MCV RBC AUTO: 114.1 FL — HIGH (ref 80–100)
NRBC # BLD: 0 /100 WBCS — SIGNIFICANT CHANGE UP (ref 0–0)
NRBC # FLD: 0 K/UL — SIGNIFICANT CHANGE UP (ref 0–0)
PHOSPHATE SERPL-MCNC: 5.4 MG/DL — HIGH (ref 2.5–4.5)
PLATELET # BLD AUTO: 223 K/UL — SIGNIFICANT CHANGE UP (ref 150–400)
POTASSIUM SERPL-MCNC: 4.6 MMOL/L — SIGNIFICANT CHANGE UP (ref 3.5–5.3)
POTASSIUM SERPL-SCNC: 4.6 MMOL/L — SIGNIFICANT CHANGE UP (ref 3.5–5.3)
RBC # BLD: 2.05 M/UL — LOW (ref 3.8–5.2)
RBC # FLD: 13.8 % — SIGNIFICANT CHANGE UP (ref 10.3–14.5)
SODIUM SERPL-SCNC: 141 MMOL/L — SIGNIFICANT CHANGE UP (ref 135–145)
WBC # BLD: 6.27 K/UL — SIGNIFICANT CHANGE UP (ref 3.8–10.5)
WBC # FLD AUTO: 6.27 K/UL — SIGNIFICANT CHANGE UP (ref 3.8–10.5)

## 2024-01-21 PROCEDURE — 99232 SBSQ HOSP IP/OBS MODERATE 35: CPT

## 2024-01-21 RX ORDER — CHLORHEXIDINE GLUCONATE 213 G/1000ML
1 SOLUTION TOPICAL DAILY
Refills: 0 | Status: DISCONTINUED | OUTPATIENT
Start: 2024-01-21 | End: 2024-01-22

## 2024-01-21 RX ADMIN — Medication 1: at 18:42

## 2024-01-21 RX ADMIN — Medication 81 MILLIGRAM(S): at 11:24

## 2024-01-21 RX ADMIN — HEPARIN SODIUM 5000 UNIT(S): 5000 INJECTION INTRAVENOUS; SUBCUTANEOUS at 14:53

## 2024-01-21 RX ADMIN — HEPARIN SODIUM 5000 UNIT(S): 5000 INJECTION INTRAVENOUS; SUBCUTANEOUS at 06:33

## 2024-01-21 NOTE — PROGRESS NOTE ADULT - ASSESSMENT
73 year old female with hx of CAD s/p CABG x3, HTN, T2DM, ESRD on dialysis T/Th/S comes into the ED for eval of inability to use AV fistula for HD today, AVF malfunction ,    ESRD -TTS  volume- acceptable  vascular- AVF malfunction , duplex patent   Lytes acceptable s/p Lokelma     RECOMMEND:  -dialysis today  -vascular - already schedule for fistulogram next week   -renal diet       Felicia Ceja NP  [facility]  Office: [phone] 73 year old female with hx of CAD s/p CABG x3, HTN, T2DM, ESRD on dialysis T/Th/S comes into the ED for eval of inability to use AV fistula for HD today, AVF malfunction ,    ESRD -TTS  volume- acceptable  vascular- AVF malfunction , duplex patent   Lytes acceptable s/p Lokelma     RECOMMEND:  -dialysis today  -vascular - already schedule for fistulogram next week   -renal diet   - continue current medications  - dose medications for intermittent HD.      Felicia Ceja NP  [facility]  Office: [phone]

## 2024-01-21 NOTE — PROGRESS NOTE ADULT - ASSESSMENT
73 year old female with hx of CAD s/p CABG x3, HTN, T2DM, ESRD on dialysis T/Th/S comes into the ED for eval of inability to use AV fistula for HD today. Concern for possible thrombosis of AVF. Pending LUE fistula duplex.    Plan:  - Nephrology consulted, discussed importance of HD today. Will f/u   - LUE fistula duplex shows patent AVF  - ASA/SQH  - Home meds  - Scheduled for fistulagram this week    Vascular Surgery [id number]

## 2024-01-21 NOTE — PROGRESS NOTE ADULT - ATTENDING COMMENTS
ESRD on HD  issue at HD today with concern for thrombosed access    on exam, palpable thrill at antecubital fossa.    fistula duplex w/o issues  plan for hd today

## 2024-01-21 NOTE — PROGRESS NOTE ADULT - SUBJECTIVE AND OBJECTIVE BOX
Nephrology Progress Note    Patient is a 73y female with    Allergies:  sulfa drugs (Rash)    Hospital Medications:   MEDICATIONS  (STANDING):  aspirin enteric coated 81 milliGRAM(s) Oral daily  atorvastatin 40 milliGRAM(s) Oral at bedtime  dextrose 5%. 1000 milliLiter(s) (50 mL/Hr) IV Continuous <Continuous>  dextrose 5%. 1000 milliLiter(s) (100 mL/Hr) IV Continuous <Continuous>  dextrose 50% Injectable 25 Gram(s) IV Push once  dextrose 50% Injectable 12.5 Gram(s) IV Push once  dextrose 50% Injectable 25 Gram(s) IV Push once  glucagon  Injectable 1 milliGRAM(s) IntraMuscular once  heparin   Injectable 5000 Unit(s) SubCutaneous every 8 hours  insulin lispro (ADMELOG) corrective regimen sliding scale   SubCutaneous every 6 hours  midodrine. 15 milliGRAM(s) Oral <User Schedule>        VITALS:  T(F): 98.5 (01-21-24 @ 05:30), Max: 98.5 (01-21-24 @ 05:30)  HR: 82 (01-21-24 @ 05:30)  BP: 127/55 (01-21-24 @ 05:30)  RR: 16 (01-21-24 @ 05:30)  SpO2: 100% (01-21-24 @ 05:30)        PHYSICAL EXAM:  Constitutional: NAD  Neck: No JVD  Respiratory: CTAB, no wheezes, rales or rhonchi  Cardiovascular: S1, S2, RRR  Gastrointestinal: BS+, soft, NT/ND  Extremities: No cyanosis or clubbing. No peripheral edema  Neurological: A/O x 3  : No CVA tenderness. No ritter.   Skin: No rashes  Vascular Access:    LABS:  01-21    141  |  97<L>  |  68<H>  ----------------------------<  106<H>  4.6   |  24  |  10.65<H>    Ca    8.8      21 Jan 2024 06:00  Phos  5.4     01-21  Mg     2.20     01-21    TPro  7.4  /  Alb  4.0  /  TBili  0.2  /  DBili      /  AST  20  /  ALT  13  /  AlkPhos  74  01-20                          7.4    6.27  )-----------( 223      ( 21 Jan 2024 06:00 )             23.4                Nephrology Progress Note    Patient is a 73y seen in the ED, comfortable, no shortness of breath    Allergies:  sulfa drugs (Rash)    Hospital Medications:   MEDICATIONS  (STANDING):  aspirin enteric coated 81 milliGRAM(s) Oral daily  atorvastatin 40 milliGRAM(s) Oral at bedtime  dextrose 5%. 1000 milliLiter(s) (50 mL/Hr) IV Continuous <Continuous>  dextrose 5%. 1000 milliLiter(s) (100 mL/Hr) IV Continuous <Continuous>  dextrose 50% Injectable 25 Gram(s) IV Push once  dextrose 50% Injectable 12.5 Gram(s) IV Push once  dextrose 50% Injectable 25 Gram(s) IV Push once  glucagon  Injectable 1 milliGRAM(s) IntraMuscular once  heparin   Injectable 5000 Unit(s) SubCutaneous every 8 hours  insulin lispro (ADMELOG) corrective regimen sliding scale   SubCutaneous every 6 hours  midodrine. 15 milliGRAM(s) Oral <User Schedule>        VITALS:  T(F): 98.5 (01-21-24 @ 05:30), Max: 98.5 (01-21-24 @ 05:30)  HR: 82 (01-21-24 @ 05:30)  BP: 127/55 (01-21-24 @ 05:30)  RR: 16 (01-21-24 @ 05:30)  SpO2: 100% (01-21-24 @ 05:30)        PHYSICAL EXAM:  Constitutional: NAD  Neck: No JVD  Respiratory: CTAB, no wheezes, rales or rhonchi  Cardiovascular: S1, S2, RRR  Gastrointestinal: BS+, soft, NT/ND  Extremities: No cyanosis or clubbing. No peripheral edema  Neurological: A/O x 3  : No CVA tenderness. No ritter.   Skin: No rashes  Vascular Access: AVF, + thrill    LABS:  01-21    141  |  97<L>  |  68<H>  ----------------------------<  106<H>  4.6   |  24  |  10.65<H>    Ca    8.8      21 Jan 2024 06:00  Phos  5.4     01-21  Mg     2.20     01-21    TPro  7.4  /  Alb  4.0  /  TBili  0.2  /  DBili      /  AST  20  /  ALT  13  /  AlkPhos  74  01-20                          7.4    6.27  )-----------( 223      ( 21 Jan 2024 06:00 )             23.4

## 2024-01-21 NOTE — PATIENT PROFILE ADULT - FUNCTIONAL ASSESSMENT - BASIC MOBILITY 6.
3-calculated by average/Not able to assess (calculate score using [facility] averaging method)

## 2024-01-21 NOTE — PATIENT PROFILE ADULT - FALL HARM RISK - HARM RISK INTERVENTIONS

## 2024-01-21 NOTE — PROGRESS NOTE ADULT - SUBJECTIVE AND OBJECTIVE BOX
Surgery Progress Note    Overnight events: None     SUBJECTIVE: Pt seen and examined at bedside in AM by surgical team. Patient comfortable and in no-apparent distress.     Vital Signs Last 24 Hrs  T(C): 36.9 (21 Jan 2024 05:30), Max: 36.9 (21 Jan 2024 05:30)  T(F): 98.5 (21 Jan 2024 05:30), Max: 98.5 (21 Jan 2024 05:30)  HR: 82 (21 Jan 2024 05:30) (75 - 90)  BP: 127/55 (21 Jan 2024 05:30) (127/55 - 148/75)  BP(mean): --  RR: 16 (21 Jan 2024 05:30) (16 - 17)  SpO2: 100% (21 Jan 2024 05:30) (100% - 100%)    Parameters below as of 20 Jan 2024 22:00  Patient On (Oxygen Delivery Method): room air        Physical Exam:  General Appearance: Appears well, NAD  Respiratory: No labored breathing  CV: Pulse regularly present  Abdomen: Soft, NTND  Extremities: Warm, well perfused   - LUE: AVF with strong palpable thrill       LABS:                        7.4    6.27  )-----------( 223      ( 21 Jan 2024 06:00 )             23.4     01-21    141  |  97<L>  |  68<H>  ----------------------------<  106<H>  4.6   |  24  |  10.65<H>    Ca    8.8      21 Jan 2024 06:00  Phos  5.4     01-21  Mg     2.20     01-21    TPro  7.4  /  Alb  4.0  /  TBili  0.2  /  DBili  x   /  AST  20  /  ALT  13  /  AlkPhos  74  01-20    PT/INR - ( 20 Jan 2024 14:30 )   PT: 11.7 sec;   INR: 1.05 ratio         PTT - ( 20 Jan 2024 14:30 )  PTT:28.9 sec  Urinalysis Basic - ( 21 Jan 2024 06:00 )    Color: x / Appearance: x / SG: x / pH: x  Gluc: 106 mg/dL / Ketone: x  / Bili: x / Urobili: x   Blood: x / Protein: x / Nitrite: x   Leuk Esterase: x / RBC: x / WBC x   Sq Epi: x / Non Sq Epi: x / Bacteria: x        INs and OUTs:

## 2024-01-22 ENCOUNTER — TRANSCRIPTION ENCOUNTER (OUTPATIENT)
Age: 74
End: 2024-01-22

## 2024-01-22 ENCOUNTER — APPOINTMENT (OUTPATIENT)
Dept: VASCULAR SURGERY | Facility: CLINIC | Age: 74
End: 2024-01-22

## 2024-01-22 VITALS
OXYGEN SATURATION: 87 % | SYSTOLIC BLOOD PRESSURE: 107 MMHG | HEART RATE: 87 BPM | RESPIRATION RATE: 18 BRPM | DIASTOLIC BLOOD PRESSURE: 41 MMHG | TEMPERATURE: 98 F

## 2024-01-22 LAB
ANION GAP SERPL CALC-SCNC: 16 MMOL/L — HIGH (ref 7–14)
BUN SERPL-MCNC: 28 MG/DL — HIGH (ref 7–23)
CALCIUM SERPL-MCNC: 9 MG/DL — SIGNIFICANT CHANGE UP (ref 8.4–10.5)
CHLORIDE SERPL-SCNC: 98 MMOL/L — SIGNIFICANT CHANGE UP (ref 98–107)
CO2 SERPL-SCNC: 28 MMOL/L — SIGNIFICANT CHANGE UP (ref 22–31)
CREAT SERPL-MCNC: 5.91 MG/DL — HIGH (ref 0.5–1.3)
EGFR: 7 ML/MIN/1.73M2 — LOW
GLUCOSE BLDC GLUCOMTR-MCNC: 113 MG/DL — HIGH (ref 70–99)
GLUCOSE BLDC GLUCOMTR-MCNC: 115 MG/DL — HIGH (ref 70–99)
GLUCOSE BLDC GLUCOMTR-MCNC: 99 MG/DL — SIGNIFICANT CHANGE UP (ref 70–99)
GLUCOSE SERPL-MCNC: 105 MG/DL — HIGH (ref 70–99)
HBV CORE AB SER-ACNC: SIGNIFICANT CHANGE UP
HBV SURFACE AB SER-ACNC: <3 MIU/ML — LOW
HBV SURFACE AG SER-ACNC: SIGNIFICANT CHANGE UP
HCT VFR BLD CALC: 24.3 % — LOW (ref 34.5–45)
HCV AB S/CO SERPL IA: 0.07 S/CO — SIGNIFICANT CHANGE UP (ref 0–0.99)
HCV AB SERPL-IMP: SIGNIFICANT CHANGE UP
HGB BLD-MCNC: 7.9 G/DL — LOW (ref 11.5–15.5)
MAGNESIUM SERPL-MCNC: 2.1 MG/DL — SIGNIFICANT CHANGE UP (ref 1.6–2.6)
MCHC RBC-ENTMCNC: 32.5 GM/DL — SIGNIFICANT CHANGE UP (ref 32–36)
MCHC RBC-ENTMCNC: 36.1 PG — HIGH (ref 27–34)
MCV RBC AUTO: 111 FL — HIGH (ref 80–100)
MRSA PCR RESULT.: DETECTED
NRBC # BLD: 0 /100 WBCS — SIGNIFICANT CHANGE UP (ref 0–0)
NRBC # FLD: 0 K/UL — SIGNIFICANT CHANGE UP (ref 0–0)
PHOSPHATE SERPL-MCNC: 4 MG/DL — SIGNIFICANT CHANGE UP (ref 2.5–4.5)
PLATELET # BLD AUTO: 214 K/UL — SIGNIFICANT CHANGE UP (ref 150–400)
POTASSIUM SERPL-MCNC: 4 MMOL/L — SIGNIFICANT CHANGE UP (ref 3.5–5.3)
POTASSIUM SERPL-SCNC: 4 MMOL/L — SIGNIFICANT CHANGE UP (ref 3.5–5.3)
RBC # BLD: 2.19 M/UL — LOW (ref 3.8–5.2)
RBC # FLD: 13.6 % — SIGNIFICANT CHANGE UP (ref 10.3–14.5)
S AUREUS DNA NOSE QL NAA+PROBE: DETECTED
SODIUM SERPL-SCNC: 142 MMOL/L — SIGNIFICANT CHANGE UP (ref 135–145)
WBC # BLD: 6.27 K/UL — SIGNIFICANT CHANGE UP (ref 3.8–10.5)
WBC # FLD AUTO: 6.27 K/UL — SIGNIFICANT CHANGE UP (ref 3.8–10.5)

## 2024-01-22 PROCEDURE — 99231 SBSQ HOSP IP/OBS SF/LOW 25: CPT

## 2024-01-22 PROCEDURE — 99238 HOSP IP/OBS DSCHRG MGMT 30/<: CPT

## 2024-01-22 RX ORDER — MIDODRINE HYDROCHLORIDE 2.5 MG/1
3 TABLET ORAL
Qty: 0 | Refills: 0 | DISCHARGE

## 2024-01-22 RX ADMIN — HEPARIN SODIUM 5000 UNIT(S): 5000 INJECTION INTRAVENOUS; SUBCUTANEOUS at 00:14

## 2024-01-22 RX ADMIN — Medication 81 MILLIGRAM(S): at 12:00

## 2024-01-22 RX ADMIN — ATORVASTATIN CALCIUM 40 MILLIGRAM(S): 80 TABLET, FILM COATED ORAL at 00:14

## 2024-01-22 NOTE — PROGRESS NOTE ADULT - ASSESSMENT
73 year old female with hx of CAD s/p CABG x3, HTN, T2DM, ESRD on dialysis T/Th/S comes into the ED for eval of inability to use AV fistula for HD today. Concern for possible thrombosis of AVF. Duplex done showing patent AVF. HD done overnight using AVF with no difficulty.     Plan:  - diet as tolerated  - Home meds  - ASA/SQH  - dispo planning         Vascular Surgery   [id number]

## 2024-01-22 NOTE — DISCHARGE NOTE PROVIDER - NSDCFUSCHEDAPPT_GEN_ALL_CORE_FT
Talha Koehler  [facility] Physician [facility]  [facility] [address]   Scheduled Appointment: 02/05/2024    Wilmer Quigley  [facility]  CARDIOLOGY [address]   Scheduled Appointment: 04/15/2024

## 2024-01-22 NOTE — DISCHARGE NOTE PROVIDER - NSDCFUADDINST_GEN_ALL_CORE_FT
ACTIVITY: No heavy lifting or straining. Otherwise, you may return to your usual level of physical activity. If you are taking narcotic pain medication DO NOT drive a car, operate machinery or make important decisions.  DIET: Return to your usual diet.  NOTIFY YOUR SURGEON IF YOU HAVE: any bleeding that does not stop, any pus draining from your wound(s), any fever (over 100.4 F) persistent nausea/vomiting, or if your pain is not controlled on your discharge pain medications, unable to urinate.  Please follow up with your primary care physician in one week regarding your hospitalization, bring copies of your discharge paperwork.  Please follow up with your surgeon, Dr. Galeano as an outpatient, please call to schedule appointment

## 2024-01-22 NOTE — DISCHARGE NOTE PROVIDER - CARE PROVIDER_API CALL
Kandice Galeano  Vascular Surgery  [address], [address]  [place] [address]  Phone: [phone]  Fax: [phone]  Follow Up Time: 1 week

## 2024-01-22 NOTE — PROGRESS NOTE ADULT - SUBJECTIVE AND OBJECTIVE BOX
Surgery Progress Note    Overnight events: None     SUBJECTIVE: Pt seen and examined at bedside in AM by surgical team. Patient comfortable and in no-apparent distress.     MEDICATIONS  (STANDING):  aspirin enteric coated 81 milliGRAM(s) Oral daily  atorvastatin 40 milliGRAM(s) Oral at bedtime  chlorhexidine 2% Cloths 1 Application(s) Topical daily  dextrose 5%. 1000 milliLiter(s) (50 mL/Hr) IV Continuous <Continuous>  dextrose 5%. 1000 milliLiter(s) (100 mL/Hr) IV Continuous <Continuous>  dextrose 50% Injectable 25 Gram(s) IV Push once  dextrose 50% Injectable 12.5 Gram(s) IV Push once  dextrose 50% Injectable 25 Gram(s) IV Push once  glucagon  Injectable 1 milliGRAM(s) IntraMuscular once  heparin   Injectable 5000 Unit(s) SubCutaneous every 8 hours  insulin lispro (ADMELOG) corrective regimen sliding scale   SubCutaneous every 6 hours  midodrine. 15 milliGRAM(s) Oral <User Schedule>    MEDICATIONS  (PRN):  dextrose Oral Gel 15 Gram(s) Oral once PRN Blood Glucose LESS THAN 70 milliGRAM(s)/deciliter      Vital Signs Last 24 Hrs  T(C): 36.7 (22 Jan 2024 05:05), Max: 36.9 (22 Jan 2024 00:01)  T(F): 98 (22 Jan 2024 05:05), Max: 98.4 (22 Jan 2024 00:01)  HR: 85 (22 Jan 2024 05:05) (76 - 96)  BP: 124/63 (22 Jan 2024 05:05) (110/54 - 133/64)  BP(mean): --  RR: 18 (22 Jan 2024 05:05) (16 - 18)  SpO2: 100% (22 Jan 2024 05:05) (100% - 100%)    Parameters below as of 22 Jan 2024 05:05  Patient On (Oxygen Delivery Method): room air      I&O's Summary    21 Jan 2024 07:01  -  22 Jan 2024 07:00  --------------------------------------------------------  IN: 160 mL / OUT: 2400 mL / NET: -2240 mL      Physical Exam:  General Appearance: Appears well, NAD  Respiratory: No labored breathing  CV: Pulse regularly present  Abdomen: Soft, NTND  Extremities: Warm, well perfused   - LUE: AVF with strong palpable thrill       LABS:                                    7.9    6.27  )-----------( 214      ( 22 Jan 2024 05:30 )             24.3     01-22    142  |  98  |  28<H>  ----------------------------<  105<H>  4.0   |  28  |  5.91<H>    Ca    9.0      22 Jan 2024 05:30  Phos  4.0     01-22  Mg     2.10     01-22    TPro  7.4  /  Alb  4.0  /  TBili  0.2  /  DBili  x   /  AST  20  /  ALT  13  /  AlkPhos  74  01-20

## 2024-01-22 NOTE — PROGRESS NOTE ADULT - SUBJECTIVE AND OBJECTIVE BOX
Nephrology Progress Note     comfortable, no shortness of breath  tolerated dialysis well , AVF no issues   Allergies:  sulfa drugs (Rash)    Hospital Medications:   MEDICATIONS  (STANDING):  aspirin enteric coated 81 milliGRAM(s) Oral daily  atorvastatin 40 milliGRAM(s) Oral at bedtime  dextrose 5%. 1000 milliLiter(s) (50 mL/Hr) IV Continuous <Continuous>  dextrose 5%. 1000 milliLiter(s) (100 mL/Hr) IV Continuous <Continuous>  dextrose 50% Injectable 25 Gram(s) IV Push once  dextrose 50% Injectable 12.5 Gram(s) IV Push once  dextrose 50% Injectable 25 Gram(s) IV Push once  glucagon  Injectable 1 milliGRAM(s) IntraMuscular once  heparin   Injectable 5000 Unit(s) SubCutaneous every 8 hours  insulin lispro (ADMELOG) corrective regimen sliding scale   SubCutaneous every 6 hours  midodrine. 15 milliGRAM(s) Oral <User Schedule>        VITALS:  T(F): 98.5 (01-21-24 @ 05:30), Max: 98.5 (01-21-24 @ 05:30)  HR: 82 (01-21-24 @ 05:30)  BP: 127/55 (01-21-24 @ 05:30)  RR: 16 (01-21-24 @ 05:30)  SpO2: 100% (01-21-24 @ 05:30)        PHYSICAL EXAM:  Constitutional: NAD  Neck: No JVD  Respiratory: CTAB, no wheezes, rales or rhonchi  Cardiovascular: S1, S2, RRR  Gastrointestinal: BS+, soft, NT/ND  Extremities: No cyanosis or clubbing. No peripheral edema  Neurological: A/O x 3  : No CVA tenderness. No ritter.   Skin: No rashes  Vascular Access: AVF, + thrill    LABS:  01-21    141  |  97<L>  |  68<H>  ----------------------------<  106<H>  4.6   |  24  |  10.65<H>    Ca    8.8      21 Jan 2024 06:00  Phos  5.4     01-21  Mg     2.20     01-21    TPro  7.4  /  Alb  4.0  /  TBili  0.2  /  DBili      /  AST  20  /  ALT  13  /  AlkPhos  74  01-20                          7.4    6.27  )-----------( 223      ( 21 Jan 2024 06:00 )             23.4

## 2024-01-22 NOTE — DISCHARGE NOTE PROVIDER - NSDCCPCAREPLAN_GEN_ALL_CORE_FT
PRINCIPAL DISCHARGE DIAGNOSIS  Diagnosis: Complication of AV dialysis fistula  Assessment and Plan of Treatment:

## 2024-01-22 NOTE — DISCHARGE NOTE PROVIDER - HOSPITAL COURSE
73 year old female with hx of CAD s/p CABG x3, HTN, T2DM, ESRD on dialysis T/Th/S comes into the ED for eval of inability to use AV fistula for HD today. She last had a full session of dialysis on 1/18 and was supposed to get a session today but they were unable to access the fistula today. She called her vascular surgery office and was advised to come to the ER at [facility]. She states she had some mild pain and swelling to the left upper extremity since 1/18-19. In the past the pt has had multiple fistulograms and has required stenting for the fistula (9/14/23). Pt reports she was supposed to get an US of the LUE as well as a fistulogram over the next 2-3 days. Currently in the ED she denies any pain, recent fevers/chills, chest pain, SOB, feeling fluid overloaded, palpitations, lower extremity swelling/pain.    Patient was admitted to the vascular surgery service and had a duplex of the fistula, which revealed:   < from: VA Duplex Arterial Upper Ext Ltd, Left (01.20.24 @ 15:21) >  Patent left upper extremity brachiocephalic arteriovenous fistula. Patent inflow and outflow vessels. Patent stent noted.    Patient was then dialyzed without issue. She remained HD stable throughout and there were no problems with access site.     Patient stable for discharge to home and will resume home dialysis schedule.

## 2024-01-22 NOTE — DISCHARGE NOTE PROVIDER - NSDCMRMEDTOKEN_GEN_ALL_CORE_FT
aspirin 81 mg oral delayed release tablet: 1 tab(s) orally once a day  atorvastatin 40 mg oral tablet: 1 tab(s) orally once a day (at bedtime)  calcium acetate 667 mg oral tablet: 2 tab(s) orally 3 times a day (with meals)  fenofibrate 160 mg oral tablet: 1 tab(s) orally once a day  Januvia 50 mg oral tablet: 1 tab(s) orally once a day  midodrine 5 mg oral tablet: 3 tab(s) orally 3 times a week with hemodialysis  raloxifene 60 mg oral tablet: 1 tab(s) orally once a day (in the morning)

## 2024-01-22 NOTE — PROGRESS NOTE ADULT - ASSESSMENT
73 year old female with hx of CAD s/p CABG x3, HTN, T2DM, ESRD on dialysis T/Th/S comes into the ED for eval of inability to use AV fistula for HD today, AVF malfunction ,    ESRD -TTS  volume- acceptable  vascular- AVF malfunction , duplex patent   Lytes acceptable s/p Lokelma     RECOMMEND:  -dialysis tolerated , AVF no issues   -vascular on board   -renal diet   - continue current medications  - dose medications for intermittent HD.        Emydmitriy Velazco  [facility]  Office: [phone]   73 year old female with hx of CAD s/p CABG x3, HTN, T2DM, ESRD on dialysis T/Th/S comes into the ED for eval of inability to use AV fistula for HD today, AVF malfunction ,    ESRD -TTS  volume- acceptable  vascular- AVF malfunction , duplex patent   Lytes acceptable s/p Lokelma     RECOMMEND:  -dialysis tolerated , AVF no issues   -vascular on board --no procedure   -renal diet   - continue current medications  - dose medications for intermittent HD.  -Dc planning     [facility] Juan A  [facility]  Office: [phone]

## 2024-01-25 ENCOUNTER — APPOINTMENT (OUTPATIENT)
Dept: INTERVENTIONAL RADIOLOGY/VASCULAR | Facility: HOSPITAL | Age: 74
End: 2024-01-25

## 2024-02-05 ENCOUNTER — APPOINTMENT (OUTPATIENT)
Dept: FAMILY MEDICINE | Facility: CLINIC | Age: 74
End: 2024-02-05
Payer: MEDICARE

## 2024-02-05 ENCOUNTER — LABORATORY RESULT (OUTPATIENT)
Age: 74
End: 2024-02-05

## 2024-02-05 VITALS
HEART RATE: 86 BPM | BODY MASS INDEX: 35.5 KG/M2 | HEIGHT: 61 IN | SYSTOLIC BLOOD PRESSURE: 122 MMHG | OXYGEN SATURATION: 100 % | DIASTOLIC BLOOD PRESSURE: 74 MMHG | TEMPERATURE: 97.9 F | WEIGHT: 188 LBS | RESPIRATION RATE: 16 BRPM

## 2024-02-05 DIAGNOSIS — E11.9 TYPE 2 DIABETES MELLITUS W/OUT COMPLICATIONS: ICD-10-CM

## 2024-02-05 PROCEDURE — 36415 COLL VENOUS BLD VENIPUNCTURE: CPT

## 2024-02-05 PROCEDURE — 99214 OFFICE O/P EST MOD 30 MIN: CPT

## 2024-02-06 LAB
ALBUMIN SERPL ELPH-MCNC: 4.4 G/DL
ALP BLD-CCNC: 96 U/L
ALT SERPL-CCNC: 15 U/L
ANION GAP SERPL CALC-SCNC: 28 MMOL/L
AST SERPL-CCNC: 19 U/L
BASOPHILS # BLD AUTO: 0.05 K/UL
BASOPHILS NFR BLD AUTO: 0.5 %
BILIRUB SERPL-MCNC: 0.2 MG/DL
BUN SERPL-MCNC: 57 MG/DL
CALCIUM SERPL-MCNC: 9.1 MG/DL
CHLORIDE SERPL-SCNC: 94 MMOL/L
CHOLEST SERPL-MCNC: 293 MG/DL
CO2 SERPL-SCNC: 20 MMOL/L
CREAT SERPL-MCNC: 7.5 MG/DL
EGFR: 5 ML/MIN/1.73M2
EOSINOPHIL # BLD AUTO: 0.14 K/UL
EOSINOPHIL NFR BLD AUTO: 1.5 %
GLUCOSE SERPL-MCNC: 132 MG/DL
HCT VFR BLD CALC: 32.5 %
HDLC SERPL-MCNC: 38 MG/DL
HGB BLD-MCNC: 10.5 G/DL
IMM GRANULOCYTES NFR BLD AUTO: 0.2 %
LDLC SERPL CALC-MCNC: 150 MG/DL
LYMPHOCYTES # BLD AUTO: 2.15 K/UL
LYMPHOCYTES NFR BLD AUTO: 23.2 %
MAN DIFF?: NORMAL
MCHC RBC-ENTMCNC: 32.3 GM/DL
MCHC RBC-ENTMCNC: 36.8 PG
MCV RBC AUTO: 114 FL
MONOCYTES # BLD AUTO: 0.54 K/UL
MONOCYTES NFR BLD AUTO: 5.8 %
NEUTROPHILS # BLD AUTO: 6.36 K/UL
NEUTROPHILS NFR BLD AUTO: 68.8 %
NONHDLC SERPL-MCNC: 255 MG/DL
PLATELET # BLD AUTO: 296 K/UL
POTASSIUM SERPL-SCNC: 4.3 MMOL/L
PROT SERPL-MCNC: 7.1 G/DL
RBC # BLD: 2.85 M/UL
RBC # FLD: 15.5 %
SODIUM SERPL-SCNC: 142 MMOL/L
TRIGL SERPL-MCNC: 547 MG/DL
TSH SERPL-ACNC: 0.81 UIU/ML
WBC # FLD AUTO: 9.26 K/UL

## 2024-02-12 ENCOUNTER — APPOINTMENT (OUTPATIENT)
Dept: VASCULAR SURGERY | Facility: CLINIC | Age: 74
End: 2024-02-12
Payer: MEDICARE

## 2024-02-12 VITALS
OXYGEN SATURATION: 100 % | DIASTOLIC BLOOD PRESSURE: 80 MMHG | WEIGHT: 188 LBS | TEMPERATURE: 97.5 F | SYSTOLIC BLOOD PRESSURE: 142 MMHG | HEIGHT: 61 IN | HEART RATE: 104 BPM | BODY MASS INDEX: 35.5 KG/M2

## 2024-02-12 PROCEDURE — 99213 OFFICE O/P EST LOW 20 MIN: CPT

## 2024-02-12 NOTE — ASSESSMENT
[FreeTextEntry1] : RILEY MEJÍA is a 73 year old female presents for follow up.  > ESRD on HD - s/p left upper arm fistula creation 2/24/2023. - s/p left arm cephalic vein superficialization 5/3/2023 - s/p left arm cephalic vein angioplasty 8/10/2023, stent placement in 9/14/2023 - s/p right permcath removal 10/16/2023.  - recent hospitalization for inability to access AVF but no issues at hospital. duplex during hospitalization also with patent fistula. No further issues since discharge.  - Will plan for 3 month fistula duplex for close surveillance.

## 2024-02-12 NOTE — HISTORY OF PRESENT ILLNESS
[FreeTextEntry1] : + PMH: HTN, HLD, DM, ESRD, CAD, afib  + PSH: s/p CABG x3 (11/9/2022), s/p left arm AVF (2022) + FH: non-contributory + SH: Denies smoking or etoh use + Aller: Sulfa medications  PCP is Dr. Talha Koehler.   1/30/2023 - Pt presents for follow up visit. Patient with history of ESRD on HD via right IJ permcath. Also s/p left arm radiocephalic fistula creation. Postoperatively, patient underwent CABG and fistula thrombosed around this time. Fistula has never been used for HD. Denies any left hand pain, numbness, or weakness.   2/24/2023-left brachiocephalic fistula creation  3/6/2023-Patient presents for follow-up visit. Since the surgery, patient has had mild intermittent numbness of the first and second digits of the left hand. The symptoms are slightly improving. Denies pain in the left hand. She has been using a hand exercise device but finds it bulky and ordered a stress ball at my recommendation.   4/5/2023 - Pt presents for follow up visit. She reports that she is doing well. Decreased numbness in her hand and continues to do hand exercises. Continues to get HD on T,T,S via permcath.   5/3/2023 -superficialization of left arm brachiocephalic fistula.  6/5/2023-Patient presents for follow-up visit. Reports intermittent mild numbness of her left fingertips, particularly on the left third digit. She continues to do her hand exercises. There is no pain overlying the left arm fistula. Incision is well healed.   8/10/2023-left arm fistulogram with balloon angioplasty of outflow cephalic vein.  9/11/2023-patient with recurrent issues with dialysis.  Pulling out clots during dialysis sessions.  Currently using PermCath for dialysis Mondays, Wednesdays, and Fridays.  9/14/2023 - Left arm fistulogram and stent 10/16/2023 - Pt presents for follow up and permcath removal. Has been undergoing HD via left arm AVF without issues. Still has intermittent left fingertip numbness at nighttime and morning but not persistent. Continues to do hand exercises.  [de-identified] : 2/12/2024 - Presents for follow up. Recent hospitalization due to inability to HD at center via left arm fistula. Duplex at hospital without issues and underwent HD without issues at hospital. Patient states since leaving hospital, no issues w/ AVF access and is undergoing HD without issues. Reports bilateral first finger pain but was told by PCP this is arthritis. Left hand numbness during HD continues to improve.

## 2024-02-12 NOTE — DATA REVIEWED
[FreeTextEntry1] : 1/20/2024 - left arm fistula duplex patent brachiocephalic fistula.  ------------------------------------------- 9/11/2023-left arm fistula duplex Patent brachiocephalic fistula with measurements of greater than 6 mm in diameter throughout its length.  In the proximal portion of the fistula, there appears to be a dissection flap.  -------------------------------------------- 4/3/2023 - LUE AVF duplex There is a patent brachiocephalic fistula in the left upper arm without evidence of flow restrictive lesions.  The subclavian vein remains patent.  The volume flow is 1499 mL/min.  The fistula measures 11.2 mm, 6.4 mm, 6.2 mm. Depth 1 cm in the usable segment of the fistula.  ---------------------------------------------- 1/23/2023 - BUE vein mapping Veins of adequate caliber in the bilateral upper extremities.

## 2024-02-12 NOTE — PHYSICAL EXAM
[2+] : left 2+ [Calm] : calm [de-identified] : Well-appearing  [de-identified] : EOMI, anicteric, right chest permcath site is clean, dry [FreeTextEntry1] : palpable thrill overlying fistula.  [de-identified] : incision well healed. [de-identified] : A&Ox4

## 2024-04-08 ENCOUNTER — APPOINTMENT (OUTPATIENT)
Dept: FAMILY MEDICINE | Facility: CLINIC | Age: 74
End: 2024-04-08
Payer: MEDICARE

## 2024-04-08 VITALS
BODY MASS INDEX: 35.5 KG/M2 | DIASTOLIC BLOOD PRESSURE: 67 MMHG | HEART RATE: 114 BPM | HEIGHT: 61 IN | RESPIRATION RATE: 16 BRPM | WEIGHT: 188 LBS | OXYGEN SATURATION: 98 % | TEMPERATURE: 98.3 F | SYSTOLIC BLOOD PRESSURE: 106 MMHG

## 2024-04-08 DIAGNOSIS — J32.9 CHRONIC SINUSITIS, UNSPECIFIED: ICD-10-CM

## 2024-04-08 PROCEDURE — 99213 OFFICE O/P EST LOW 20 MIN: CPT

## 2024-04-08 NOTE — PHYSICAL EXAM
[Normal] : affect was normal and insight and judgment were intact [de-identified] : maxillary sinus tenderness

## 2024-04-08 NOTE — HISTORY OF PRESENT ILLNESS
[FreeTextEntry8] : 74 yo F PMHx CAD (s/p CABG), ESRD (on dialysis) presenting for productive cough, nasal congestion for the past three days. she takes flonase for allergic rhinitis however has not improved her symptoms. had history of pneumonia in the past and is afraid it would turn into pneumonia. did not check for COVID. no recent travel or sick contacts. she otherwise feels well. her last dialysis session was Saturday is due tomorrow. she reports always feels a little weak.  no dyspnea or dizziness, palpitations.

## 2024-04-08 NOTE — REVIEW OF SYSTEMS
[Hoarseness] : hoarseness [Nasal Discharge] : nasal discharge [Sore Throat] : sore throat [Postnasal Drip] : postnasal drip [Negative] : Heme/Lymph

## 2024-04-08 NOTE — PLAN
[FreeTextEntry1] : continue hot teas/soups Flonase  advised to start doxycycline if doesn't improve in nest 3 days or gets worse.  advised to test rapid COVID and isolate if positive

## 2024-04-15 ENCOUNTER — APPOINTMENT (OUTPATIENT)
Dept: CARDIOLOGY | Facility: CLINIC | Age: 74
End: 2024-04-15
Payer: MEDICARE

## 2024-04-15 ENCOUNTER — NON-APPOINTMENT (OUTPATIENT)
Age: 74
End: 2024-04-15

## 2024-04-15 VITALS
DIASTOLIC BLOOD PRESSURE: 82 MMHG | SYSTOLIC BLOOD PRESSURE: 131 MMHG | WEIGHT: 164 LBS | BODY MASS INDEX: 30.99 KG/M2 | HEART RATE: 71 BPM | OXYGEN SATURATION: 98 %

## 2024-04-15 VITALS
BODY MASS INDEX: 35.52 KG/M2 | HEART RATE: 106 BPM | SYSTOLIC BLOOD PRESSURE: 124 MMHG | DIASTOLIC BLOOD PRESSURE: 74 MMHG | OXYGEN SATURATION: 94 % | WEIGHT: 188 LBS

## 2024-04-15 VITALS — WEIGHT: 188 LBS | BODY MASS INDEX: 35.52 KG/M2

## 2024-04-15 DIAGNOSIS — E78.1 PURE HYPERGLYCERIDEMIA: ICD-10-CM

## 2024-04-15 PROCEDURE — G2211 COMPLEX E/M VISIT ADD ON: CPT

## 2024-04-15 PROCEDURE — 99214 OFFICE O/P EST MOD 30 MIN: CPT

## 2024-04-15 PROCEDURE — 93000 ELECTROCARDIOGRAM COMPLETE: CPT

## 2024-04-15 RX ORDER — FENOFIBRATE 160 MG/1
160 TABLET ORAL
Qty: 90 | Refills: 1 | Status: DISCONTINUED | COMMUNITY
Start: 2023-12-04 | End: 2024-04-15

## 2024-04-15 NOTE — PHYSICAL EXAM
[Well Developed] : well developed [Well Nourished] : well nourished [No Acute Distress] : no acute distress [Normal Conjunctiva] : normal conjunctiva [Normal Venous Pressure] : normal venous pressure [No Carotid Bruit] : no carotid bruit [Normal S1, S2] : normal S1, S2 [No Rub] : no rub [No Gallop] : no gallop [Clear Lung Fields] : clear lung fields [Good Air Entry] : good air entry [No Respiratory Distress] : no respiratory distress  [Soft] : abdomen soft [Non Tender] : non-tender [No Masses/organomegaly] : no masses/organomegaly [Normal Bowel Sounds] : normal bowel sounds [Normal Gait] : normal gait [No Edema] : no edema [No Cyanosis] : no cyanosis [No Clubbing] : no clubbing [No Varicosities] : no varicosities [No Rash] : no rash [No Skin Lesions] : no skin lesions [Moves all extremities] : moves all extremities [No Focal Deficits] : no focal deficits [Normal Speech] : normal speech [Alert and Oriented] : alert and oriented [Normal memory] : normal memory [de-identified] : 2/6 systolic murmur USB

## 2024-04-15 NOTE — HISTORY OF PRESENT ILLNESS
[FreeTextEntry1] : 73F with CAD s/p recent CABG, pAF, HTN, HLD who presents for follow up  Tolerating HD TTS  Off all BP meds, takes midodrine with HD. Feels tired after sessions. BP drop after AVF was created No CP, no dyspnea, no lightheadedness Now walking with a cane instead of a walker Fenofibrate was d/c'ed by PCP due to itching, Last TG > 500, statin increased   HISTORY:  Pt had AVF placed on RUE in October 2022 anticipation for HD Pt was admitted to [facility] in late October 22 with MAR on CKD, uremia, metabolic derangements in the setting of COVID Urgent HD via permacath Also with large pericardial effusion s/p drainage Onset of AFib, placed on amio and AC, AC discontinued secondary to anemia and GIB. 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  ECG: Sinus tachycardia @ 105 TTE 2/23/23:  1. The left ventricular systolic function is normal with an ejection fraction of 71 %. There is mild (Grade 1) left ventricular diastolic dysfunction, with normal filling pressure. There are no regional wall motion abnormalities seen. 2. The left ventricular wall thickness is mildly increased. There is normal LV mass and concentric remodeling. 3. Normal right ventricular size, with normal wall thickness and normal systolic function. 4. The left atrium is normal in size. 5. Mild calcification of the aortic valve leaflets. Mild aortic stenosis. 6. Mild mitral valve leaflet calcification.  Cath 11/2/22:  -Left main artery: Angiography shows no disease.   -Proximal left anterior descending: There is a 50 % stenosis. Mid left anterior descending: There is an 80 % stenosis. First diagonal: There is a 90 % stenosis.   -Ostial circumflex: There is a 90 % stenosis. Distal circumflex: Angiography shows moderate atherosclerosis. -Mid right coronary artery: There is a 90 % stenosis.    Asa 81mg Atorvastatin 80mg (Adding Vascepa 1g BID)  Midodrine  Januvia

## 2024-04-15 NOTE — DISCUSSION/SUMMARY
[EKG obtained to assist in diagnosis and management of assessed problem(s)] : EKG obtained to assist in diagnosis and management of assessed problem(s) [FreeTextEntry1] : S/p CABG: Doing well. No symptoms. Normal LV function  Cont asa Cont lipitor 80mg  Hypertriglyceridemia: TG high, LDL borderline, cont statin. Watch carbs. Had been on fenofibrate in the past. Stopped due to adverse reaction (pruritis). Improved off fibrate but TG > 500. Will start Vascepa. Lipitor was increased  HTN: BP now consistently low. Off all meds. Gets midodrine with HD   PAF: Brief while in hospital in the setting of MAR on CKD, COVID, large pericardial effusion. Subsequent ECG's have been SR. Developed GIB from AC  Cont asa, Toprol Holding on AC for now. Can consider Watchman but hold off given no further evidence of AFib  ESRD- On HD via AVF  Pericardial effusion- likely uremic in the setting of acute kidney failure requiring HD. Recent echo showing resolution of effusion  RV 4M

## 2024-04-27 RX ORDER — ICOSAPENT ETHYL 1000 MG/1
1 CAPSULE ORAL
Qty: 360 | Refills: 3 | Status: ACTIVE | COMMUNITY
Start: 2024-04-15 | End: 1900-01-01

## 2024-05-03 ENCOUNTER — RX RENEWAL (OUTPATIENT)
Age: 74
End: 2024-05-03

## 2024-05-03 RX ORDER — ATORVASTATIN CALCIUM 80 MG/1
80 TABLET, FILM COATED ORAL
Qty: 90 | Refills: 0 | Status: ACTIVE | COMMUNITY
Start: 2021-03-16 | End: 1900-01-01

## 2024-05-06 RX ORDER — SITAGLIPTIN 50 MG/1
50 TABLET, FILM COATED ORAL
Qty: 90 | Refills: 0 | Status: ACTIVE | COMMUNITY
Start: 2021-03-16 | End: 1900-01-01

## 2024-05-13 ENCOUNTER — APPOINTMENT (OUTPATIENT)
Dept: VASCULAR SURGERY | Facility: CLINIC | Age: 74
End: 2024-05-13
Payer: MEDICARE

## 2024-05-13 VITALS
HEIGHT: 61 IN | SYSTOLIC BLOOD PRESSURE: 164 MMHG | OXYGEN SATURATION: 100 % | TEMPERATURE: 98.2 F | HEART RATE: 90 BPM | WEIGHT: 188 LBS | DIASTOLIC BLOOD PRESSURE: 82 MMHG | RESPIRATION RATE: 17 BRPM | BODY MASS INDEX: 35.5 KG/M2

## 2024-05-13 PROCEDURE — 99213 OFFICE O/P EST LOW 20 MIN: CPT

## 2024-05-13 PROCEDURE — 93990 DOPPLER FLOW TESTING: CPT

## 2024-05-13 RX ORDER — DOXYCYCLINE HYCLATE 100 MG/1
100 CAPSULE ORAL
Qty: 10 | Refills: 0 | Status: DISCONTINUED | COMMUNITY
Start: 2024-04-08 | End: 2024-05-13

## 2024-05-13 RX ORDER — CYCLOBENZAPRINE HYDROCHLORIDE 5 MG/1
5 TABLET, FILM COATED ORAL 3 TIMES DAILY
Qty: 30 | Refills: 0 | Status: DISCONTINUED | COMMUNITY
Start: 2023-11-06 | End: 2024-05-13

## 2024-05-13 NOTE — DATA REVIEWED
[FreeTextEntry1] : 5/13/2024 - Left arm fistula duplex patent left brachiocephalic fistula  ------------------------------------------ 1/20/2024 - left arm fistula duplex patent brachiocephalic fistula.  ------------------------------------------- 9/11/2023-left arm fistula duplex Patent brachiocephalic fistula with measurements of greater than 6 mm in diameter throughout its length.  In the proximal portion of the fistula, there appears to be a dissection flap.  -------------------------------------------- 4/3/2023 - LUE AVF duplex There is a patent brachiocephalic fistula in the left upper arm without evidence of flow restrictive lesions.  The subclavian vein remains patent.  The volume flow is 1499 mL/min.  The fistula measures 11.2 mm, 6.4 mm, 6.2 mm. Depth 1 cm in the usable segment of the fistula.  ---------------------------------------------- 1/23/2023 - BUE vein mapping Veins of adequate caliber in the bilateral upper extremities.

## 2024-05-13 NOTE — HISTORY OF PRESENT ILLNESS
[FreeTextEntry1] : + PMH: HTN, HLD, DM, ESRD, CAD, afib  + PSH: s/p CABG x3 (11/9/2022), s/p left arm AVF (2022) + FH: non-contributory + SH: Denies smoking or etoh use + Aller: Sulfa medications  PCP is Dr. Talha Koehler.   1/30/2023 - Pt presents for follow up visit. Patient with history of ESRD on HD via right IJ permcath. Also s/p left arm radiocephalic fistula creation. Postoperatively, patient underwent CABG and fistula thrombosed around this time. Fistula has never been used for HD. Denies any left hand pain, numbness, or weakness.   2/24/2023-left brachiocephalic fistula creation  3/6/2023-Patient presents for follow-up visit. Since the surgery, patient has had mild intermittent numbness of the first and second digits of the left hand. The symptoms are slightly improving. Denies pain in the left hand. She has been using a hand exercise device but finds it bulky and ordered a stress ball at my recommendation.   4/5/2023 - Pt presents for follow up visit. She reports that she is doing well. Decreased numbness in her hand and continues to do hand exercises. Continues to get HD on T,T,S via permcath.   5/3/2023 -superficialization of left arm brachiocephalic fistula.  6/5/2023-Patient presents for follow-up visit. Reports intermittent mild numbness of her left fingertips, particularly on the left third digit. She continues to do her hand exercises. There is no pain overlying the left arm fistula. Incision is well healed.   8/10/2023-left arm fistulogram with balloon angioplasty of outflow cephalic vein.  9/11/2023-patient with recurrent issues with dialysis.  Pulling out clots during dialysis sessions.  Currently using PermCath for dialysis Mondays, Wednesdays, and Fridays.  9/14/2023 - Left arm fistulogram and stent 10/16/2023 - Pt presents for follow up and permcath removal. Has been undergoing HD via left arm AVF without issues. Still has intermittent left fingertip numbness at nighttime and morning but not persistent. Continues to do hand exercises.  [de-identified] : 2/12/2024 - Presents for follow up. Recent hospitalization due to inability to HD at center via left arm fistula. Duplex at hospital without issues and underwent HD without issues at hospital. Patient states since leaving hospital, no issues w/ AVF access and is undergoing HD without issues. Reports bilateral first finger pain but was told by PCP this is arthritis. Left hand numbness during HD continues to improve.   5/13/2024 - Doing well since last visit. no issues during HD. Left fifth finger numbness during HD only. Continues hand exercises. Going to be evaluated for kidney transplant.

## 2024-05-13 NOTE — ASSESSMENT
[FreeTextEntry1] : RILEY MEJÍA is a 73 year old female presents for follow up.  > ESRD on HD - s/p left upper arm fistula creation 2/24/2023. - s/p left arm cephalic vein superficialization 5/3/2023 - s/p left arm cephalic vein angioplasty 8/10/2023, stent placement in 9/14/2023 - s/p right permcath removal 10/16/2023.  - doing well without issues.  - follow up in six months for reevaluation.

## 2024-05-13 NOTE — PHYSICAL EXAM
[2+] : left 2+ [Calm] : calm [de-identified] : Well-appearing  [de-identified] : EOMI, anicteric, right chest permcath site is clean, dry [FreeTextEntry1] : palpable thrill overlying fistula.  [de-identified] : incision well healed. [de-identified] : A&Ox4

## 2024-05-19 ENCOUNTER — LABORATORY RESULT (OUTPATIENT)
Age: 74
End: 2024-05-19

## 2024-05-19 ENCOUNTER — APPOINTMENT (OUTPATIENT)
Dept: FAMILY MEDICINE | Facility: CLINIC | Age: 74
End: 2024-05-19
Payer: MEDICARE

## 2024-05-19 VITALS
OXYGEN SATURATION: 95 % | DIASTOLIC BLOOD PRESSURE: 69 MMHG | HEIGHT: 61 IN | BODY MASS INDEX: 35.5 KG/M2 | TEMPERATURE: 98 F | RESPIRATION RATE: 17 BRPM | SYSTOLIC BLOOD PRESSURE: 148 MMHG | HEART RATE: 92 BPM | WEIGHT: 188 LBS

## 2024-05-19 DIAGNOSIS — F41.9 ANXIETY DISORDER, UNSPECIFIED: ICD-10-CM

## 2024-05-19 DIAGNOSIS — R26.81 UNSTEADINESS ON FEET: ICD-10-CM

## 2024-05-19 DIAGNOSIS — N18.9 CHRONIC KIDNEY DISEASE, UNSPECIFIED: ICD-10-CM

## 2024-05-19 PROCEDURE — 36415 COLL VENOUS BLD VENIPUNCTURE: CPT

## 2024-05-19 PROCEDURE — G2211 COMPLEX E/M VISIT ADD ON: CPT

## 2024-05-19 PROCEDURE — 99214 OFFICE O/P EST MOD 30 MIN: CPT

## 2024-05-20 LAB
ALBUMIN SERPL ELPH-MCNC: 4.4 G/DL
ALP BLD-CCNC: 120 U/L
ALT SERPL-CCNC: 19 U/L
ANION GAP SERPL CALC-SCNC: 20 MMOL/L
AST SERPL-CCNC: 24 U/L
BASOPHILS # BLD AUTO: 0.05 K/UL
BASOPHILS NFR BLD AUTO: 0.5 %
BILIRUB SERPL-MCNC: 0.3 MG/DL
BUN SERPL-MCNC: 42 MG/DL
CALCIUM SERPL-MCNC: 9.5 MG/DL
CHLORIDE SERPL-SCNC: 94 MMOL/L
CHOLEST SERPL-MCNC: 224 MG/DL
CO2 SERPL-SCNC: 25 MMOL/L
CREAT SERPL-MCNC: 5.89 MG/DL
EGFR: 7 ML/MIN/1.73M2
EOSINOPHIL # BLD AUTO: 0.16 K/UL
EOSINOPHIL NFR BLD AUTO: 1.7 %
ESTIMATED AVERAGE GLUCOSE: 108 MG/DL
GLUCOSE SERPL-MCNC: 150 MG/DL
HBA1C MFR BLD HPLC: 5.4 %
HCT VFR BLD CALC: 36.1 %
HDLC SERPL-MCNC: 32 MG/DL
HGB BLD-MCNC: 11.1 G/DL
IMM GRANULOCYTES NFR BLD AUTO: 0.5 %
LDLC SERPL CALC-MCNC: 107 MG/DL
LYMPHOCYTES # BLD AUTO: 2.33 K/UL
LYMPHOCYTES NFR BLD AUTO: 24.6 %
MAN DIFF?: NORMAL
MCHC RBC-ENTMCNC: 30.7 GM/DL
MCHC RBC-ENTMCNC: 34.8 PG
MCV RBC AUTO: 113.2 FL
MONOCYTES # BLD AUTO: 0.66 K/UL
MONOCYTES NFR BLD AUTO: 7 %
NEUTROPHILS # BLD AUTO: 6.23 K/UL
NEUTROPHILS NFR BLD AUTO: 65.7 %
NONHDLC SERPL-MCNC: 192 MG/DL
PLATELET # BLD AUTO: 270 K/UL
POTASSIUM SERPL-SCNC: 4.3 MMOL/L
PROT SERPL-MCNC: 7.1 G/DL
RBC # BLD: 3.19 M/UL
RBC # FLD: 16 %
SODIUM SERPL-SCNC: 139 MMOL/L
TRIGL SERPL-MCNC: 498 MG/DL
TSH SERPL-ACNC: 1.58 UIU/ML
WBC # FLD AUTO: 9.48 K/UL

## 2024-05-21 ENCOUNTER — NON-APPOINTMENT (OUTPATIENT)
Age: 74
End: 2024-05-21

## 2024-05-22 ENCOUNTER — LABORATORY RESULT (OUTPATIENT)
Age: 74
End: 2024-05-22

## 2024-05-22 ENCOUNTER — APPOINTMENT (OUTPATIENT)
Dept: TRANSPLANT | Facility: CLINIC | Age: 74
End: 2024-05-22
Payer: COMMERCIAL

## 2024-05-22 ENCOUNTER — APPOINTMENT (OUTPATIENT)
Dept: NEPHROLOGY | Facility: CLINIC | Age: 74
End: 2024-05-22
Payer: COMMERCIAL

## 2024-05-22 VITALS
RESPIRATION RATE: 17 BRPM | OXYGEN SATURATION: 97 % | DIASTOLIC BLOOD PRESSURE: 71 MMHG | WEIGHT: 189 LBS | HEART RATE: 95 BPM | HEIGHT: 61 IN | TEMPERATURE: 97.3 F | SYSTOLIC BLOOD PRESSURE: 149 MMHG | BODY MASS INDEX: 35.68 KG/M2

## 2024-05-22 DIAGNOSIS — Z01.818 ENCOUNTER FOR OTHER PREPROCEDURAL EXAMINATION: ICD-10-CM

## 2024-05-22 PROCEDURE — 99205 OFFICE O/P NEW HI 60 MIN: CPT

## 2024-05-22 NOTE — HISTORY OF PRESENT ILLNESS
[TextBox_42] : 74 year old female with ESRD on HD for evaluation for kidney transplant. Cause of ESRD - DM Nephrologist- Dr Reyes on IHD since  HD unit - [facility] in [place]  PMH: ESRD on HD since , ( on Midodrine 15 mg on IHD days ) DM, HTN, CAD, Uterine cancer 15 years ago s/p total ALIX PSH: CABG x3 in , ALIX, , LUE AVF  Social: lives with  and 36yo son Denies tob, drugs; +occasional etoh FMH: Maternal aunts with unknown malignancy. No kidney disease  Functional status- ambulates with a cane to climb steps

## 2024-05-22 NOTE — PHYSICAL EXAM
[No Acute Distress] : no acute distress [Sclera Anicteric] : sclera anicteric [Clear to Auscultation] : clear to auscultation [Breathing Comfortably on RA] : breathing comfortably on room air [Normal Rate] : normal rate [Regular Rhythm] : regular rhythm [Soft] : soft [Non-tender] : non-tender [In Left Arm] : fistula/graft in left arm [] : right dorsalis pedis palpable [Normal] : normal [TextBox_25] : obese [TextBox_34] : No edema or ulcers b/l LE [TextBox_86] : No inguinal lymphadenopathy

## 2024-05-22 NOTE — HISTORY OF PRESENT ILLNESS
[Cardiac History] : cardiac history [Blood Transfusion] : prior blood transfusion [Diabetes] : diabetes [Neuropathy] : neuropathy [Previous Kidney Transplant] : no previous kidney transplant [Hx of DVT/Thrombosis/Miscarriage] : no history of dvt/thrombosis/miscarriage [Retinopathy] : no retinopathy [Insulin] : no insulin treatment [TextBox_16] : 2022 [TextBox_20] : 2022 [TextBox_24] : 10 years [TextBox_28] : 6 years [TextBox_30] : 1/2 block and gets weak [de-identified] : 74F with ESRD on HD for evaluation for kidney transplant. Patient attributes renal failure to infection, developing it after having pneumonia a few years ago.  HD - TTS [facility] in [place] Living Donor - Maybe brother    PMH: ESRD on HD, DM, HTN, CAD, Uterine cancer PSH: CABG x3 (), ALIX, , LUE AVF Meds: See list. Includes Januvia, Labetalol, Losartan,  ASA, Midodrine 15mg HD days Allergies: Sulfa Social: lives with  and 36yo son Denies tob, drugs; +occasional etoh FMH: Maternal aunts with unknown malignancy No kidney disease    ROS:  Cardiac - no MI, CHF; + CAD s/p CABG x3 Pulmonary- no h/o COPD, Asthma. +pneumonia in  Infections- no h/o TB, HIV, Hepatitis.  Heme- no h/o malignancy or bleeding disorders. No DVT/PE Neuro- no h/o CVA or seizures. Sensitization events- + previous blood transfusions; No previous transplant No infections or hospitalizations in the last few months  - No UTI or Kidney stones. Makes small amount of urine GI - No melena or blood per rectum. Last colonoscopy 10 years ago

## 2024-05-22 NOTE — ASSESSMENT
[Good candidate] : a good candidate. We should proceed with our protocol for evaluation for kidney transplantation. [FreeTextEntry1] : 73yo with obesity, CAD Needs cardiology eval, had CABG x3 2 years ago CT abd/pelvis needs colonoscopy Routine transplant workup

## 2024-05-22 NOTE — PHYSICAL EXAM
[General Appearance - Alert] : alert [General Appearance - In No Acute Distress] : in no acute distress [Sclera] : the sclera and conjunctiva were normal [PERRL With Normal Accommodation] : pupils were equal in size, round, and reactive to light [Extraocular Movements] : extraocular movements were intact [Outer Ear] : the ears and nose were normal in appearance [Oropharynx] : the oropharynx was normal [Neck Appearance] : the appearance of the neck was normal [Neck Cervical Mass (___cm)] : no neck mass was observed [Jugular Venous Distention Increased] : there was no jugular-venous distention [Thyroid Diffuse Enlargement] : the thyroid was not enlarged [Thyroid Nodule] : there were no palpable thyroid nodules [Auscultation Breath Sounds / Voice Sounds] : lungs were clear to auscultation bilaterally [Heart Rate And Rhythm] : heart rate was normal and rhythm regular [Heart Sounds] : normal S1 and S2 [Heart Sounds Gallop] : no gallops [Murmurs] : no murmurs [Heart Sounds Pericardial Friction Rub] : no pericardial rub [Full Pulse] : the pedal pulses are present [Edema] : there was no peripheral edema [Bowel Sounds] : normal bowel sounds [Abdomen Soft] : soft [Abdomen Tenderness] : non-tender [Abdomen Mass (___ Cm)] : no abdominal mass palpated [Nail Clubbing] : no clubbing  or cyanosis of the fingernails [Musculoskeletal - Swelling] : no joint swelling seen [Motor Tone] : muscle strength and tone were normal [Skin Color & Pigmentation] : normal skin color and pigmentation [Skin Turgor] : normal skin turgor [] : no rash [Normal] : normal [Deep Tendon Reflexes (DTR)] : deep tendon reflexes were 2+ and symmetric [Sensation] : the sensory exam was normal to light touch and pinprick [No Focal Deficits] : no focal deficits

## 2024-05-22 NOTE — REASON FOR VISIT
[Initial] : an initial visit for [End-Stage Renal Disease] : end-stage renal disease [Kidney Transplant Evaluation] : kidney transplant evaluation [Spouse] : spouse [FreeTextEntry2] : Dr. Timmons

## 2024-05-22 NOTE — REVIEW OF SYSTEMS
[Sclera anicteric] : sclera anicteric [Fever] : no fever [Wears glasses] : does not wear glasses [Chest Pain] : no chest pain [SOB] : no shortness of breath [Abdominal Pain] : no abdominal pain [Dysuria] : no dysuria [Hematuria] : no hematuria

## 2024-05-22 NOTE — PLAN
[FreeTextEntry1] : 1. ESRD on IHD since 2021:  Ms. RILEY MEJÍA  is a marginal candidate candidate for kidney transplantation  . Her functional status seems quite limited, is on midodrine 15 mg on HD days.  2.Cardiac risk: echo, stress test and cardiac evaluation  3. Cancer screening: colonoscopy, Mammo, Pap  4. ID: Serology for acute and chronic viral infections. Screening for latent TB 5. Imaging: CT chest, CT A/P 6.Diabetes Mellitus: check HbA1c   I have personally discussed the risks and benefits of transplantation and patient attended transplant education class where the following was disclosed:   Reviewed factors affecting survival and morbidity while on dialysis, the transplant wait list and reviewed shelli-operative and long-term risk factors affecting outcome in kidney transplantation.    One year SRTR outcomes for national and [facility] were discussed in regards to patient survival and graft survival after transplantation.    Details of transplant surgery, including complications were discussed. Immunosuppression and complications including infection including life threatening sepsis and opportunistic infections, malignancy and new onset diabetes were discussed.    Benefits of live donor transplantation as well as variability in wait times across regions and multiple listing were discussed.  KDPI >85% and PHS high risk criteria donors were discussed.  HCV kidney transplantation was discussed.   Will proceed with completing/ updating work up and listing for transplant/ live donor transplant once work up is reviewed and found to be acceptable by multidisciplinary listing committee.

## 2024-05-22 NOTE — ASSESSMENT
[FreeTextEntry1] : 1. she was unable to get on to the exam table- needs frailty testing  2. on midodrine 15 mg on HD days

## 2024-05-27 ENCOUNTER — NON-APPOINTMENT (OUTPATIENT)
Age: 74
End: 2024-05-27

## 2024-05-28 LAB
ABO + RH PNL BLD: NORMAL
ALBUMIN SERPL ELPH-MCNC: 4.5 G/DL
ALP BLD-CCNC: 118 U/L
ALT SERPL-CCNC: 18 U/L
ANION GAP SERPL CALC-SCNC: 22 MMOL/L
AST SERPL-CCNC: 21 U/L
BASOPHILS # BLD AUTO: 0.07 K/UL
BASOPHILS NFR BLD AUTO: 0.9 %
BILIRUB SERPL-MCNC: 0.3 MG/DL
BUN SERPL-MCNC: 48 MG/DL
C PEPTIDE SERPL-MCNC: 20.1 NG/ML
CALCIUM SERPL-MCNC: 9.2 MG/DL
CHLORIDE SERPL-SCNC: 95 MMOL/L
CHOLEST SERPL-MCNC: 233 MG/DL
CMV IGG SERPL QL: <0.2 U/ML
CMV IGG SERPL-IMP: NEGATIVE
CO2 SERPL-SCNC: 23 MMOL/L
COVID-19 NUCLEOCAPSID  GAM ANTIBODY INTERPRETATION: POSITIVE
COVID-19 SPIKE DOMAIN ANTIBODY INTERPRETATION: POSITIVE
CREAT SERPL-MCNC: 7 MG/DL
EBV EA AB SER IA-ACNC: >150 U/ML
EBV EA AB TITR SER IF: POSITIVE
EBV EA IGG SER QL IA: >600 U/ML
EBV EA IGG SER-ACNC: POSITIVE
EBV EA IGM SER IA-ACNC: NEGATIVE
EBV PATRN SPEC IB-IMP: NORMAL
EBV VCA IGG SER IA-ACNC: >750 U/ML
EBV VCA IGM SER QL IA: <10 U/ML
EGFR: 6 ML/MIN/1.73M2
EOSINOPHIL # BLD AUTO: 0.07 K/UL
EOSINOPHIL NFR BLD AUTO: 0.9 %
EPSTEIN-BARR VIRUS CAPSID ANTIGEN IGG: POSITIVE
ESTIMATED AVERAGE GLUCOSE: 114 MG/DL
GLUCOSE SERPL-MCNC: 132 MG/DL
HAV IGM SER QL: NONREACTIVE
HBA1C MFR BLD HPLC: 5.6 %
HBV CORE IGG+IGM SER QL: NONREACTIVE
HBV SURFACE AB SER QL: NONREACTIVE
HBV SURFACE AB SERPL IA-ACNC: <3 MIU/ML
HBV SURFACE AG SER QL: NONREACTIVE
HCG SERPL-MCNC: 2 MIU/ML
HCT VFR BLD CALC: 33.4 %
HCV AB SER QL: NONREACTIVE
HCV S/CO RATIO: 0.08 S/CO
HDLC SERPL-MCNC: 33 MG/DL
HEPATITIS A IGG ANTIBODY: NONREACTIVE
HGB BLD-MCNC: 10.4 G/DL
HIV1+2 AB SPEC QL IA.RAPID: NONREACTIVE
HSV 1+2 IGG SER IA-IMP: NEGATIVE
HSV 1+2 IGG SER IA-IMP: POSITIVE
HSV1 IGG SER QL: 3.54 INDEX
HSV2 IGG SER QL: 0.05 INDEX
LDLC SERPL CALC-MCNC: 113 MG/DL
LYMPHOCYTES # BLD AUTO: 2.3 K/UL
LYMPHOCYTES NFR BLD AUTO: 27.9 %
M TB IFN-G BLD-IMP: NEGATIVE
MAGNESIUM SERPL-MCNC: 2.5 MG/DL
MAN DIFF?: NORMAL
MCHC RBC-ENTMCNC: 31.1 GM/DL
MCHC RBC-ENTMCNC: 35 PG
MCV RBC AUTO: 112.5 FL
MONOCYTES # BLD AUTO: 0.82 K/UL
MONOCYTES NFR BLD AUTO: 9.9 %
NEUTROPHILS # BLD AUTO: 4.98 K/UL
NEUTROPHILS NFR BLD AUTO: 60.4 %
NONHDLC SERPL-MCNC: 200 MG/DL
PHOSPHATE SERPL-MCNC: 4.8 MG/DL
PLATELET # BLD AUTO: 275 K/UL
POTASSIUM SERPL-SCNC: 4.3 MMOL/L
PROT SERPL-MCNC: 7.2 G/DL
QUANTIFERON TB PLUS MITOGEN MINUS NIL: >10 IU/ML
QUANTIFERON TB PLUS NIL: 0.02 IU/ML
QUANTIFERON TB PLUS TB1 MINUS NIL: 0.06 IU/ML
QUANTIFERON TB PLUS TB2 MINUS NIL: 0.05 IU/ML
RBC # BLD: 2.97 M/UL
RBC # FLD: 15.9 %
RUBV IGG FLD-ACNC: 20 INDEX
RUBV IGG SER-IMP: POSITIVE
SARS-COV-2 AB SERPL IA-ACNC: >250 U/ML
SARS-COV-2 AB SERPL QL IA: 88 INDEX
SODIUM SERPL-SCNC: 140 MMOL/L
T GONDII AB SER-IMP: POSITIVE
T GONDII IGG SER QL: 132 IU/ML
T PALLIDUM AB SER QL IA: NEGATIVE
TRIGL SERPL-MCNC: 500 MG/DL
VZV AB TITR SER: POSITIVE
VZV IGG SER IF-ACNC: 2872 INDEX
WBC # FLD AUTO: 8.25 K/UL

## 2024-06-05 ENCOUNTER — APPOINTMENT (OUTPATIENT)
Dept: CT IMAGING | Facility: CLINIC | Age: 74
End: 2024-06-05
Payer: COMMERCIAL

## 2024-06-05 ENCOUNTER — OUTPATIENT (OUTPATIENT)
Dept: OUTPATIENT SERVICES | Facility: HOSPITAL | Age: 74
LOS: 1 days | End: 2024-06-05
Payer: COMMERCIAL

## 2024-06-05 ENCOUNTER — RESULT REVIEW (OUTPATIENT)
Age: 74
End: 2024-06-05

## 2024-06-05 DIAGNOSIS — Z98.891 HISTORY OF UTERINE SCAR FROM PREVIOUS SURGERY: Chronic | ICD-10-CM

## 2024-06-05 DIAGNOSIS — Z01.818 ENCOUNTER FOR OTHER PREPROCEDURAL EXAMINATION: ICD-10-CM

## 2024-06-05 DIAGNOSIS — Z90.710 ACQUIRED ABSENCE OF BOTH CERVIX AND UTERUS: Chronic | ICD-10-CM

## 2024-06-05 DIAGNOSIS — Z95.1 PRESENCE OF AORTOCORONARY BYPASS GRAFT: Chronic | ICD-10-CM

## 2024-06-05 PROCEDURE — 71260 CT THORAX DX C+: CPT | Mod: 26

## 2024-06-05 PROCEDURE — 74177 CT ABD & PELVIS W/CONTRAST: CPT | Mod: 26

## 2024-06-05 PROCEDURE — 74177 CT ABD & PELVIS W/CONTRAST: CPT

## 2024-06-05 PROCEDURE — 71260 CT THORAX DX C+: CPT

## 2024-06-06 ENCOUNTER — RX RENEWAL (OUTPATIENT)
Age: 74
End: 2024-06-06

## 2024-06-06 RX ORDER — PANTOPRAZOLE 40 MG/1
40 TABLET, DELAYED RELEASE ORAL
Qty: 90 | Refills: 1 | Status: ACTIVE | COMMUNITY
Start: 2023-02-14 | End: 1900-01-01

## 2024-06-12 ENCOUNTER — APPOINTMENT (OUTPATIENT)
Dept: CARDIOLOGY | Facility: CLINIC | Age: 74
End: 2024-06-12
Payer: COMMERCIAL

## 2024-06-12 ENCOUNTER — NON-APPOINTMENT (OUTPATIENT)
Age: 74
End: 2024-06-12

## 2024-06-12 VITALS
OXYGEN SATURATION: 95 % | BODY MASS INDEX: 35.5 KG/M2 | HEART RATE: 89 BPM | RESPIRATION RATE: 17 BRPM | SYSTOLIC BLOOD PRESSURE: 123 MMHG | WEIGHT: 188 LBS | DIASTOLIC BLOOD PRESSURE: 79 MMHG | HEIGHT: 61 IN

## 2024-06-12 DIAGNOSIS — Z99.2 END STAGE RENAL DISEASE: ICD-10-CM

## 2024-06-12 DIAGNOSIS — N18.6 END STAGE RENAL DISEASE: ICD-10-CM

## 2024-06-12 DIAGNOSIS — Z01.818 ENCOUNTER FOR OTHER PREPROCEDURAL EXAMINATION: ICD-10-CM

## 2024-06-12 DIAGNOSIS — E78.5 HYPERLIPIDEMIA, UNSPECIFIED: ICD-10-CM

## 2024-06-12 DIAGNOSIS — I48.0 PAROXYSMAL ATRIAL FIBRILLATION: ICD-10-CM

## 2024-06-12 DIAGNOSIS — Z95.1 PRESENCE OF AORTOCORONARY BYPASS GRAFT: ICD-10-CM

## 2024-06-12 DIAGNOSIS — I10 ESSENTIAL (PRIMARY) HYPERTENSION: ICD-10-CM

## 2024-06-12 PROCEDURE — G2211 COMPLEX E/M VISIT ADD ON: CPT

## 2024-06-12 PROCEDURE — 99214 OFFICE O/P EST MOD 30 MIN: CPT

## 2024-06-12 PROCEDURE — 93000 ELECTROCARDIOGRAM COMPLETE: CPT | Mod: NC

## 2024-06-12 NOTE — REASON FOR VISIT
[Other: ____] : [unfilled] [Other: _____] : [unfilled] [FreeTextEntry1] : on HD since 10/2021 started during hospitalization, T-R-S at [facility] HD in [place], left upper arm AV fistula.   CAD status post CABG 2022 (LIMA-LAD, SVG-OM, OM) Large pericardial effusion s/p drainage AF on amio and AC, AC discontinued secondary to anemia and GIB  HTN, now low on HD HLD chronic, on Vascepa (, total cholesterol 233, HDL 33,  mg/dL)  DM2 diagnosed many years on, never on insulin, no home testing, A1c 5.6%.   Surgical history includes CABG,  section, hysterectomy, cataract surgery.   Her father is . He  of emphysema, he had DM2.  in his 60's. Her mother is . She  on old age in her 90's. She has one brother. He has glaucoma.  She is . She has one son.  Previously she worked at a school as a lunch aide.   She does not exercise. She is seen today walking with a walker. She can walk for 0.5 block. She cannot climb stairs. No chest discomfort, shortness of breath, palpitations, lightheadedness or syncope.

## 2024-06-19 NOTE — PHYSICAL EXAM
[Well Developed] : well developed [Well Nourished] : well nourished [No Acute Distress] : no acute distress [Obese] : obese [Normal Conjunctiva] : normal conjunctiva [Normal Venous Pressure] : normal venous pressure [No Carotid Bruit] : no carotid bruit [Normal S1, S2] : normal S1, S2 [No Murmur] : no murmur [No Rub] : no rub [No Gallop] : no gallop [Clear Lung Fields] : clear lung fields [Good Air Entry] : good air entry [No Respiratory Distress] : no respiratory distress  [Soft] : abdomen soft [Non Tender] : non-tender [No Masses/organomegaly] : no masses/organomegaly [Normal Bowel Sounds] : normal bowel sounds [Normal Gait] : normal gait [No Edema] : no edema [No Cyanosis] : no cyanosis [No Clubbing] : no clubbing [No Varicosities] : no varicosities [No Rash] : no rash [No Skin Lesions] : no skin lesions [Moves all extremities] : moves all extremities [No Focal Deficits] : no focal deficits [Normal Speech] : normal speech [Alert and Oriented] : alert and oriented [Normal memory] : normal memory [de-identified] : left upper arm AV fistula +/+

## 2024-06-19 NOTE — HISTORY OF PRESENT ILLNESS
[FreeTextEntry1] : Deann Mckinley presents today for pretransplant cardiac evaluation prior to potential renal transplant.   She is a 74-year-old with known cardiac risk factors of coronary artery disease (CAD status post CABG 2022 (LIMA-LAD, SVG-OM, OM), chronic hypertension, dyslipidemia (on Vascepa, statin: , total cholesterol 233, HDL 33,  mg/dL), diabetes mellitus (DM2 diagnosed many years on, never on insulin, no home testing, A1c 5.6%), and end stage renal disease on hemodialysis (on HD since 10/2021 started during hospitalization, T-R-S at [facility] HD in [place], left upper arm AV fistula).  Her history also includes a large pericardial effusion s/p drainage, paroxysmal atrial fibrillation (started on amiodarone and oral anticoagulation, AC discontinued secondary to anemia and GIB). Surgical history includes CABG,  section, hysterectomy, cataract surgery.   Her father is . He  of emphysema, he had DM2.  in his 60's. Her mother is . She  on old age in her 90's. She has one brother. He has glaucoma.  She is . She has one son.  Previously she worked at a school as a lunch aide.   She does not exercise. She is seen today walking with a walker. She can walk for 0.5 block. She cannot climb stairs. No chest discomfort, shortness of breath, palpitations, lightheadedness or syncope.  Cardiologist: Wilmer Quigley MD.

## 2024-06-19 NOTE — CARDIOLOGY SUMMARY
[de-identified] : 6/12/2024. Sinus rhythm at 89 BPM, normal axis, normal intervals and morphologies, normal R-wave progression.  [de-identified] : 2/23/2023. TTE.  1. The left ventricular systolic function is normal with an ejection fraction of 71 %. There is mild (Grade 1) left ventricular diastolic dysfunction, with normal filling pressure. There are no regional wall motion abnormalities seen. 2. The left ventricular wall thickness is mildly increased. There is normal LV mass and concentric remodeling. 3. Normal right ventricular size, with normal wall thickness and normal systolic function. 4. The left atrium is normal in size. 5. Mild calcification of the aortic valve leaflets. Mild aortic stenosis. 6. Mild mitral valve leaflet calcification.

## 2024-06-19 NOTE — DISCUSSION/SUMMARY
[EKG obtained to assist in diagnosis and management of assessed problem(s)] : EKG obtained to assist in diagnosis and management of assessed problem(s) [FreeTextEntry1] : She is a 74-year-old who presents today for pretransplant cardiac evaluation prior to potential renal transplant with known cardiac risk factors as above.  Her blood pressure today is 123/79 mm Hg. Continue current medications, midodrine prior to dialysis.  Most recent lipid profile: , total cholesterol 233, HDL 33,  mg/dL. Continue atorvastatin 80 mg daily and Vascepa.   She will schedule an echocardiogram for structural heart evaluation. A nuclear stress test will evaluate for any evidence of inducible ischemia. She will require a pharmacologic study as she has unsteady gait and relies on a walker.   Follow up pending results.

## 2024-06-19 NOTE — END OF VISIT
[FreeTextEntry3] : I, Mitchell Terrell MD, personally performed the evaluation and management (E/M) services for this new patient. That E/M includes conducting the clinically appropriate initial history &/or exam, assessing all conditions, and establishing the plan of care. Today, Malissa Boone NP was here to observe my evaluation and management service for this patient & follow plan of care established by me going forward. [Time Spent: ___ minutes] : I have spent [unfilled] minutes of time on the encounter.

## 2024-07-05 ENCOUNTER — APPOINTMENT (OUTPATIENT)
Dept: FAMILY MEDICINE | Facility: CLINIC | Age: 74
End: 2024-07-05
Payer: MEDICARE

## 2024-07-05 VITALS
HEART RATE: 90 BPM | RESPIRATION RATE: 16 BRPM | BODY MASS INDEX: 35.5 KG/M2 | HEIGHT: 61 IN | SYSTOLIC BLOOD PRESSURE: 168 MMHG | TEMPERATURE: 98.1 F | OXYGEN SATURATION: 97 % | WEIGHT: 188 LBS | DIASTOLIC BLOOD PRESSURE: 87 MMHG

## 2024-07-05 DIAGNOSIS — J32.9 CHRONIC SINUSITIS, UNSPECIFIED: ICD-10-CM

## 2024-07-05 PROCEDURE — 99213 OFFICE O/P EST LOW 20 MIN: CPT

## 2024-07-17 ENCOUNTER — APPOINTMENT (OUTPATIENT)
Dept: INFECTIOUS DISEASE | Facility: CLINIC | Age: 74
End: 2024-07-17

## 2024-07-29 ENCOUNTER — RX RENEWAL (OUTPATIENT)
Age: 74
End: 2024-07-29

## 2024-07-31 ENCOUNTER — APPOINTMENT (OUTPATIENT)
Dept: CARDIOLOGY | Facility: CLINIC | Age: 74
End: 2024-07-31
Payer: COMMERCIAL

## 2024-07-31 PROCEDURE — 93015 CV STRESS TEST SUPVJ I&R: CPT

## 2024-07-31 PROCEDURE — 93306 TTE W/DOPPLER COMPLETE: CPT

## 2024-07-31 PROCEDURE — A9500: CPT

## 2024-07-31 PROCEDURE — 78452 HT MUSCLE IMAGE SPECT MULT: CPT

## 2024-08-01 DIAGNOSIS — R94.39 ABNORMAL RESULT OF OTHER CARDIOVASCULAR FUNCTION STUDY: ICD-10-CM

## 2024-08-02 ENCOUNTER — APPOINTMENT (OUTPATIENT)
Dept: GASTROENTEROLOGY | Facility: CLINIC | Age: 74
End: 2024-08-02
Payer: MEDICARE

## 2024-08-02 VITALS
WEIGHT: 189 LBS | OXYGEN SATURATION: 95 % | HEART RATE: 106 BPM | BODY MASS INDEX: 32.27 KG/M2 | DIASTOLIC BLOOD PRESSURE: 70 MMHG | SYSTOLIC BLOOD PRESSURE: 134 MMHG | TEMPERATURE: 97.9 F | HEIGHT: 64 IN

## 2024-08-02 DIAGNOSIS — E13.9 OTHER SPECIFIED DIABETES MELLITUS W/OUT COMPLICATIONS: ICD-10-CM

## 2024-08-02 DIAGNOSIS — Z86.39 PERSONAL HISTORY OF OTHER ENDOCRINE, NUTRITIONAL AND METABOLIC DISEASE: ICD-10-CM

## 2024-08-02 DIAGNOSIS — I10 ESSENTIAL (PRIMARY) HYPERTENSION: ICD-10-CM

## 2024-08-02 DIAGNOSIS — Z99.2 END STAGE RENAL DISEASE: ICD-10-CM

## 2024-08-02 DIAGNOSIS — Z86.79 PERSONAL HISTORY OF OTHER DISEASES OF THE CIRCULATORY SYSTEM: ICD-10-CM

## 2024-08-02 DIAGNOSIS — Z12.11 ENCOUNTER FOR SCREENING FOR MALIGNANT NEOPLASM OF COLON: ICD-10-CM

## 2024-08-02 DIAGNOSIS — Z83.3 FAMILY HISTORY OF DIABETES MELLITUS: ICD-10-CM

## 2024-08-02 DIAGNOSIS — N18.6 END STAGE RENAL DISEASE: ICD-10-CM

## 2024-08-02 DIAGNOSIS — E11.9 TYPE 2 DIABETES MELLITUS W/OUT COMPLICATIONS: ICD-10-CM

## 2024-08-02 PROCEDURE — 99214 OFFICE O/P EST MOD 30 MIN: CPT

## 2024-08-02 RX ORDER — RALOXIFENE HYDROCHLORIDE 60 MG/1
60 TABLET, FILM COATED ORAL
Refills: 0 | Status: ACTIVE | COMMUNITY

## 2024-08-02 NOTE — HISTORY OF PRESENT ILLNESS
[FreeTextEntry1] : 74-year-old diabetic woman with ESRD, HTN and HLD referred for a screening colonoscopy.  The patient is on dialysis for ESRD and is being evaluated for a renal transplant.  Her most recent colonoscopy was on September 6, 2022.  However, the procedure was terminated due to significant diverticulosis.  She had no previous complete colonoscopy.  She denies rectal bleeding, melena or hematemesis.  The patient has a history of coronary artery disease and is status post CABG and atrial fibrillation.

## 2024-08-02 NOTE — PHYSICAL EXAM
[Alert] : alert [Normal Voice/Communication] : normal voice/communication [Healthy Appearing] : healthy appearing [No Acute Distress] : no acute distress [Obese (BMI >= 30)] : obese (BMI >= 30) [Sclera] : the sclera and conjunctiva were normal [Normal Lips/Gums] : the lips and gums were normal [Hearing Threshold Finger Rub Not [place]] : hearing was normal [Oropharynx] : the oropharynx was normal [Normal Appearance] : the appearance of the neck was normal [No Neck Mass] : no neck mass was observed [No Respiratory Distress] : no respiratory distress [No Acc Muscle Use] : no accessory muscle use [Respiration, Rhythm And Depth] : normal respiratory rhythm and effort [Auscultation Breath Sounds / Voice Sounds] : lungs were clear to auscultation bilaterally [Heart Rate And Rhythm] : heart rate was normal and rhythm regular [Normal S1, S2] : normal S1 and S2 [Murmurs] : no murmurs [None] : no edema [Bowel Sounds] : normal bowel sounds [Abdomen Tenderness] : non-tender [No Masses] : no abdominal mass palpated [Abdomen Soft] : soft [] : no hepatosplenomegaly [Cervical Lymph Nodes Enlarged Posterior Bilaterally] : no posterior cervical lymphadenopathy [Supraclavicular Lymph Nodes Enlarged Bilaterally] : no supraclavicular lymphadenopathy [No CVA Tenderness] : no CVA  tenderness [Abnormal Walk] : normal gait [Normal] : oriented to person, place, and time [Oriented To Time, Place, And Person] : oriented to person, place, and time [Normal Affect] : the affect was normal [Normal Mood] : the mood was normal [de-identified] : ambulates with a walker

## 2024-08-02 NOTE — CONSULT LETTER
[facility] 12/13/2021   [Dear  ___] : Dear  [unfilled], [Consult Letter:] : I had the pleasure of evaluating your patient, [unfilled]. [Please see my note below.] : Please see my note below. [Consult Closing:] : Thank you very much for allowing me to participate in the care of this patient.  If you have any questions, please do not hesitate to contact me. [Sincerely,] : Sincerely, [FreeTextEntry3] : Mahesh Glil MD, FACG

## 2024-08-02 NOTE — ASSESSMENT
[FreeTextEntry1] : Colon cancer screening.  Review of the patient's previous incomplete colonoscopy due to extensive diverticulosis proceeding with a colonoscopy would pose a significant risk.  She is therefore referred for a virtual colonoscopy.  This was discussed with the patient.  She understands and all questions were answered.

## 2024-08-05 ENCOUNTER — NON-APPOINTMENT (OUTPATIENT)
Age: 74
End: 2024-08-05

## 2024-08-12 ENCOUNTER — APPOINTMENT (OUTPATIENT)
Dept: CARDIOLOGY | Facility: CLINIC | Age: 74
End: 2024-08-12
Payer: MEDICARE

## 2024-08-12 VITALS
WEIGHT: 189 LBS | OXYGEN SATURATION: 97 % | SYSTOLIC BLOOD PRESSURE: 140 MMHG | HEART RATE: 90 BPM | DIASTOLIC BLOOD PRESSURE: 70 MMHG | HEIGHT: 64 IN | BODY MASS INDEX: 32.27 KG/M2

## 2024-08-12 DIAGNOSIS — Z95.1 PRESENCE OF AORTOCORONARY BYPASS GRAFT: ICD-10-CM

## 2024-08-12 DIAGNOSIS — R94.39 ABNORMAL RESULT OF OTHER CARDIOVASCULAR FUNCTION STUDY: ICD-10-CM

## 2024-08-12 DIAGNOSIS — I48.0 PAROXYSMAL ATRIAL FIBRILLATION: ICD-10-CM

## 2024-08-12 DIAGNOSIS — I10 ESSENTIAL (PRIMARY) HYPERTENSION: ICD-10-CM

## 2024-08-12 PROCEDURE — 99214 OFFICE O/P EST MOD 30 MIN: CPT

## 2024-08-12 PROCEDURE — G2211 COMPLEX E/M VISIT ADD ON: CPT

## 2024-08-12 NOTE — HISTORY OF PRESENT ILLNESS
[FreeTextEntry1] : 74F with CAD s/p recent CABG, pAF, HTN, HLD who presents for follow up  Pt being worked up for kidney transplant TTE, NST as below, NST notable for anterior, anterolateral, and inferolateral ischemia, pending [facility] No CP, no dyspnea, no lightheadedness Now walking with a cane instead of a walker Now on Vascepa for TG > 500, reportedly had blood work done with last HD session  BP drop after AVF was created Tolerating HD TTS  Off all BP meds, takes midodrine with HD.  HISTORY:  Pt had AVF placed on RUE in October 2022 anticipation for HD Pt was admitted to [facility] in late October 22 with MAR on CKD, uremia, metabolic derangements in the setting of COVID Urgent HD via permacath Also with large pericardial effusion s/p drainage Onset of AFib, placed on amio and AC, AC discontinued secondary to anemia and GIB. 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  ECG: Sinus tachycardia @ 105 TTE 7/2024:  1. Technically difficult image quality. 2. Left ventricular systolic function is normal with an ejection fraction of 65 % by Carcamo's method of disks. 3. Compared to the transthoracic echocardiogram performed on 10/23/2023, there have been no significant interval changes.  Cath 11/2/22:  -Left main artery: Angiography shows no disease.   -Proximal left anterior descending: There is a 50 % stenosis. Mid left anterior descending: There is an 80 % stenosis. First diagonal: There is a 90 % stenosis.   -Ostial circumflex: There is a 90 % stenosis. Distal circumflex: Angiography shows moderate atherosclerosis. -Mid right coronary artery: There is a 90 % stenosis.    NST 2024:  Conclusions: 1. Myocardial Perfusion: Abnormal. 2. Qualitative Perfusion: - medium-sized, moderate defect(s) in the inferolateral wall that is reversible consistent with ischemia. - medium-sized, mild to moderate defect(s) in the anterior and anterolateral wall that is reversible consistent with ischemia. 3. The post stress left ventricular EF is 62 %. The stress end diastolic volume is 85 ml and systolic volume is 32 ml. 4. Hypokinesis of the inferolateral wall. Dyskinesis of the septal motion consistent with cardiac surgery wall.  Asa 81mg Atorvastatin 80mg Vascepa 1g BID  Midodrine  Januvia

## 2024-08-12 NOTE — DISCUSSION/SUMMARY
[FreeTextEntry1] : S/p CABG: Doing well. No symptoms. Normal LV function. Nuclear stress test with ischemia, pending [facility]  Cont asa Cont lipitor 80mg  Hypertriglyceridemia: TG high, LDL borderline, cont statin. Watch carbs. Had been on fenofibrate in the past. Stopped due to adverse reaction (pruritis). Improved off fibrate but TG > 500. Now on Vascepta, trend lipids   HTN: BP now better. Off all meds. Gets midodrine with HD   PAF: Brief while in hospital in the setting of MAR on CKD, COVID, large pericardial effusion. Subsequent ECG's have been SR. Developed GIB from AC  Cont asa, Toprol Holding on AC for now. Can consider Watchman but hold off given no further evidence of AFib  ESRD- On HD via AVF  Pericardial effusion- likely uremic in the setting of acute kidney failure requiring HD. Recent echo showing resolution of effusion  RV 3M

## 2024-08-12 NOTE — PHYSICAL EXAM
[Well Developed] : well developed [Well Nourished] : well nourished [No Acute Distress] : no acute distress [Normal Conjunctiva] : normal conjunctiva [Normal Venous Pressure] : normal venous pressure [No Carotid Bruit] : no carotid bruit [Normal S1, S2] : normal S1, S2 [No Rub] : no rub [No Gallop] : no gallop [Clear Lung Fields] : clear lung fields [Good Air Entry] : good air entry [No Respiratory Distress] : no respiratory distress  [Non Tender] : non-tender [Soft] : abdomen soft [No Masses/organomegaly] : no masses/organomegaly [Normal Bowel Sounds] : normal bowel sounds [Normal Gait] : normal gait [No Edema] : no edema [No Cyanosis] : no cyanosis [No Clubbing] : no clubbing [No Varicosities] : no varicosities [No Rash] : no rash [No Skin Lesions] : no skin lesions [Moves all extremities] : moves all extremities [No Focal Deficits] : no focal deficits [Normal Speech] : normal speech [Alert and Oriented] : alert and oriented [Normal memory] : normal memory [de-identified] : 2/6 systolic murmur USB

## 2024-08-13 ENCOUNTER — NON-APPOINTMENT (OUTPATIENT)
Age: 74
End: 2024-08-13

## 2024-08-16 ENCOUNTER — TRANSCRIPTION ENCOUNTER (OUTPATIENT)
Age: 74
End: 2024-08-16

## 2024-08-22 PROBLEM — I25.10 ATHEROSCLEROTIC HEART DISEASE OF NATIVE CORONARY ARTERY WITHOUT ANGINA PECTORIS: Chronic | Status: ACTIVE | Noted: 2024-08-16

## 2024-08-26 ENCOUNTER — APPOINTMENT (OUTPATIENT)
Dept: CT IMAGING | Facility: CLINIC | Age: 74
End: 2024-08-26
Payer: MEDICARE

## 2024-08-26 ENCOUNTER — OUTPATIENT (OUTPATIENT)
Dept: OUTPATIENT SERVICES | Facility: HOSPITAL | Age: 74
LOS: 1 days | End: 2024-08-26
Payer: MEDICARE

## 2024-08-26 DIAGNOSIS — Z00.8 ENCOUNTER FOR OTHER GENERAL EXAMINATION: ICD-10-CM

## 2024-08-26 DIAGNOSIS — Z98.891 HISTORY OF UTERINE SCAR FROM PREVIOUS SURGERY: Chronic | ICD-10-CM

## 2024-08-26 DIAGNOSIS — Z98.49 CATARACT EXTRACTION STATUS, UNSPECIFIED EYE: Chronic | ICD-10-CM

## 2024-08-26 DIAGNOSIS — Z95.1 PRESENCE OF AORTOCORONARY BYPASS GRAFT: Chronic | ICD-10-CM

## 2024-08-26 PROCEDURE — 74263 CT COLONOGRAPHY SCREENING: CPT

## 2024-08-26 PROCEDURE — 74263 CT COLONOGRAPHY SCREENING: CPT | Mod: 26

## 2024-08-27 ENCOUNTER — APPOINTMENT (OUTPATIENT)
Dept: FAMILY MEDICINE | Facility: CLINIC | Age: 74
End: 2024-08-27

## 2024-09-06 DIAGNOSIS — Z12.11 ENCOUNTER FOR SCREENING FOR MALIGNANT NEOPLASM OF COLON: ICD-10-CM

## 2024-09-10 ENCOUNTER — RX RENEWAL (OUTPATIENT)
Age: 74
End: 2024-09-10

## 2024-09-11 ENCOUNTER — OUTPATIENT (OUTPATIENT)
Dept: OUTPATIENT SERVICES | Facility: HOSPITAL | Age: 74
LOS: 1 days | End: 2024-09-11
Payer: MEDICARE

## 2024-09-11 DIAGNOSIS — Z95.1 PRESENCE OF AORTOCORONARY BYPASS GRAFT: Chronic | ICD-10-CM

## 2024-09-11 DIAGNOSIS — Z98.49 CATARACT EXTRACTION STATUS, UNSPECIFIED EYE: Chronic | ICD-10-CM

## 2024-09-11 DIAGNOSIS — Z98.891 HISTORY OF UTERINE SCAR FROM PREVIOUS SURGERY: Chronic | ICD-10-CM

## 2024-09-11 DIAGNOSIS — Z90.710 ACQUIRED ABSENCE OF BOTH CERVIX AND UTERUS: Chronic | ICD-10-CM

## 2024-09-11 PROCEDURE — 82962 GLUCOSE BLOOD TEST: CPT

## 2024-09-11 PROCEDURE — 80048 BASIC METABOLIC PNL TOTAL CA: CPT

## 2024-09-11 PROCEDURE — 85027 COMPLETE CBC AUTOMATED: CPT

## 2024-09-11 PROCEDURE — 93005 ELECTROCARDIOGRAM TRACING: CPT

## 2024-09-11 PROCEDURE — 36415 COLL VENOUS BLD VENIPUNCTURE: CPT

## 2024-09-12 ENCOUNTER — RX RENEWAL (OUTPATIENT)
Age: 74
End: 2024-09-12

## 2024-09-13 ENCOUNTER — NON-APPOINTMENT (OUTPATIENT)
Age: 74
End: 2024-09-13

## 2024-09-20 DIAGNOSIS — R94.39 ABNORMAL RESULT OF OTHER CARDIOVASCULAR FUNCTION STUDY: ICD-10-CM

## 2024-09-20 NOTE — PROGRESS NOTE ADULT - PROVIDER SPECIALTY LIST ADULT
09/20/2024  Joseph Millard is a 12 y.o., male.      Pre-op Assessment    I have reviewed the Patient Summary Reports.    I have reviewed the NPO Status.      Review of Systems  Anesthesia Hx:  No problems with previous Anesthesia   Neg history of prior surgery.          Denies Family Hx of Anesthesia complications.    Denies Personal Hx of Anesthesia complications.                    Social:  No Alcohol Use, Non-Smoker       EENT/Dental:   Mucocele           Cardiovascular:  Cardiovascular Normal Exercise tolerance: good                                           Hepatic/GI:     GERD      Gerd          Endocrine:  Endocrine Normal                Physical Exam  General: Well nourished, Cooperative, Alert and Oriented    Airway:  Mouth Opening: Normal  TM Distance: Normal  Tongue: Normal  Neck ROM: Normal ROM    Dental:  Intact    Chest/Lungs:  Normal Respiratory Rate    Heart:  Rate: Normal        Anesthesia Plan  Type of Anesthesia, risks & benefits discussed:    Anesthesia Type: Gen ETT  Intra-op Monitoring Plan: Standard ASA Monitors  Induction:  IV  Informed Consent: Informed consent signed with the Patient representative and all parties understand the risks and agree with anesthesia plan.  All questions answered.   ASA Score: 2    Ready For Surgery From Anesthesia Perspective.     .       Nephrology

## 2024-09-23 ENCOUNTER — NON-APPOINTMENT (OUTPATIENT)
Age: 74
End: 2024-09-23

## 2024-09-23 DIAGNOSIS — R19.5 OTHER FECAL ABNORMALITIES: ICD-10-CM

## 2024-09-23 RX ORDER — POLYETHYLENE GLYCOL 3350, SODIUM CHLORIDE, SODIUM BICARBONATE AND POTASSIUM CHLORIDE WITH LEMON FLAVOR 420; 11.2; 5.72; 1.48 G/4L; G/4L; G/4L; G/4L
420 POWDER, FOR SOLUTION ORAL
Qty: 4000 | Refills: 0 | Status: ACTIVE | COMMUNITY
Start: 2024-09-23 | End: 1900-01-01

## 2024-10-01 ENCOUNTER — NON-APPOINTMENT (OUTPATIENT)
Age: 74
End: 2024-10-01

## 2024-10-04 ENCOUNTER — TRANSCRIPTION ENCOUNTER (OUTPATIENT)
Age: 74
End: 2024-10-04

## 2024-10-08 ENCOUNTER — APPOINTMENT (OUTPATIENT)
Dept: FAMILY MEDICINE | Facility: CLINIC | Age: 74
End: 2024-10-08
Payer: MEDICARE

## 2024-10-08 VITALS
WEIGHT: 188 LBS | OXYGEN SATURATION: 96 % | BODY MASS INDEX: 32.1 KG/M2 | HEART RATE: 105 BPM | SYSTOLIC BLOOD PRESSURE: 124 MMHG | DIASTOLIC BLOOD PRESSURE: 50 MMHG | HEIGHT: 64 IN | TEMPERATURE: 97.3 F | RESPIRATION RATE: 16 BRPM

## 2024-10-08 DIAGNOSIS — J98.01 ACUTE BRONCHOSPASM: ICD-10-CM

## 2024-10-08 DIAGNOSIS — I10 ESSENTIAL (PRIMARY) HYPERTENSION: ICD-10-CM

## 2024-10-08 DIAGNOSIS — E11.9 TYPE 2 DIABETES MELLITUS W/OUT COMPLICATIONS: ICD-10-CM

## 2024-10-08 DIAGNOSIS — N18.6 END STAGE RENAL DISEASE: ICD-10-CM

## 2024-10-08 DIAGNOSIS — Z99.2 END STAGE RENAL DISEASE: ICD-10-CM

## 2024-10-08 PROCEDURE — 99213 OFFICE O/P EST LOW 20 MIN: CPT

## 2024-10-08 RX ORDER — PREDNISONE 10 MG/1
10 TABLET ORAL
Qty: 18 | Refills: 0 | Status: ACTIVE | COMMUNITY
Start: 2024-10-08 | End: 1900-01-01

## 2024-10-08 RX ORDER — FUROSEMIDE 20 MG/1
20 TABLET ORAL DAILY
Qty: 30 | Refills: 0 | Status: ACTIVE | COMMUNITY
Start: 2024-10-08 | End: 1900-01-01

## 2024-10-25 ENCOUNTER — TRANSCRIPTION ENCOUNTER (OUTPATIENT)
Age: 74
End: 2024-10-25

## 2024-10-25 ENCOUNTER — OUTPATIENT (OUTPATIENT)
Dept: OUTPATIENT SERVICES | Facility: HOSPITAL | Age: 74
LOS: 1 days | End: 2024-10-25
Payer: MEDICARE

## 2024-10-25 VITALS
DIASTOLIC BLOOD PRESSURE: 71 MMHG | HEART RATE: 97 BPM | OXYGEN SATURATION: 98 % | RESPIRATION RATE: 17 BRPM | TEMPERATURE: 98 F | SYSTOLIC BLOOD PRESSURE: 167 MMHG

## 2024-10-25 VITALS
RESPIRATION RATE: 16 BRPM | OXYGEN SATURATION: 95 % | TEMPERATURE: 98 F | DIASTOLIC BLOOD PRESSURE: 80 MMHG | HEART RATE: 96 BPM | SYSTOLIC BLOOD PRESSURE: 185 MMHG

## 2024-10-25 DIAGNOSIS — Z98.891 HISTORY OF UTERINE SCAR FROM PREVIOUS SURGERY: Chronic | ICD-10-CM

## 2024-10-25 DIAGNOSIS — Z95.1 PRESENCE OF AORTOCORONARY BYPASS GRAFT: Chronic | ICD-10-CM

## 2024-10-25 DIAGNOSIS — Z90.710 ACQUIRED ABSENCE OF BOTH CERVIX AND UTERUS: Chronic | ICD-10-CM

## 2024-10-25 DIAGNOSIS — R94.39 ABNORMAL RESULT OF OTHER CARDIOVASCULAR FUNCTION STUDY: ICD-10-CM

## 2024-10-25 DIAGNOSIS — Z98.49 CATARACT EXTRACTION STATUS, UNSPECIFIED EYE: Chronic | ICD-10-CM

## 2024-10-25 LAB
ANION GAP SERPL CALC-SCNC: 14 MMOL/L — SIGNIFICANT CHANGE UP (ref 5–17)
BUN SERPL-MCNC: 24 MG/DL — HIGH (ref 7–23)
CALCIUM SERPL-MCNC: 9 MG/DL — SIGNIFICANT CHANGE UP (ref 8.4–10.5)
CHLORIDE SERPL-SCNC: 99 MMOL/L — SIGNIFICANT CHANGE UP (ref 96–108)
CO2 SERPL-SCNC: 27 MMOL/L — SIGNIFICANT CHANGE UP (ref 22–31)
CREAT SERPL-MCNC: 4.96 MG/DL — HIGH (ref 0.5–1.3)
EGFR: 9 ML/MIN/1.73M2 — LOW
GLUCOSE SERPL-MCNC: 204 MG/DL — HIGH (ref 70–99)
HCT VFR BLD CALC: 28.5 % — LOW (ref 34.5–45)
HGB BLD-MCNC: 8.6 G/DL — LOW (ref 11.5–15.5)
MCHC RBC-ENTMCNC: 30.2 GM/DL — LOW (ref 32–36)
MCHC RBC-ENTMCNC: 32.6 PG — SIGNIFICANT CHANGE UP (ref 27–34)
MCV RBC AUTO: 108 FL — HIGH (ref 80–100)
NRBC # BLD: 0 /100 WBCS — SIGNIFICANT CHANGE UP (ref 0–0)
PLATELET # BLD AUTO: 241 K/UL — SIGNIFICANT CHANGE UP (ref 150–400)
POTASSIUM SERPL-MCNC: 3.6 MMOL/L — SIGNIFICANT CHANGE UP (ref 3.5–5.3)
POTASSIUM SERPL-SCNC: 3.6 MMOL/L — SIGNIFICANT CHANGE UP (ref 3.5–5.3)
RBC # BLD: 2.64 M/UL — LOW (ref 3.8–5.2)
RBC # FLD: 17.2 % — HIGH (ref 10.3–14.5)
SODIUM SERPL-SCNC: 140 MMOL/L — SIGNIFICANT CHANGE UP (ref 135–145)
WBC # BLD: 7.66 K/UL — SIGNIFICANT CHANGE UP (ref 3.8–10.5)
WBC # FLD AUTO: 7.66 K/UL — SIGNIFICANT CHANGE UP (ref 3.8–10.5)

## 2024-10-25 PROCEDURE — 93005 ELECTROCARDIOGRAM TRACING: CPT

## 2024-10-25 PROCEDURE — C9600: CPT | Mod: LD

## 2024-10-25 PROCEDURE — 92928 PRQ TCAT PLMT NTRAC ST 1 LES: CPT | Mod: LD

## 2024-10-25 PROCEDURE — 80048 BASIC METABOLIC PNL TOTAL CA: CPT

## 2024-10-25 PROCEDURE — 82962 GLUCOSE BLOOD TEST: CPT

## 2024-10-25 PROCEDURE — 93010 ELECTROCARDIOGRAM REPORT: CPT

## 2024-10-25 PROCEDURE — C1894: CPT

## 2024-10-25 PROCEDURE — C1769: CPT

## 2024-10-25 PROCEDURE — C1874: CPT

## 2024-10-25 PROCEDURE — 99152 MOD SED SAME PHYS/QHP 5/>YRS: CPT

## 2024-10-25 PROCEDURE — C1725: CPT

## 2024-10-25 PROCEDURE — 93010 ELECTROCARDIOGRAM REPORT: CPT | Mod: 76,77

## 2024-10-25 PROCEDURE — 93454 CORONARY ARTERY ANGIO S&I: CPT | Mod: 26,78

## 2024-10-25 PROCEDURE — C1887: CPT

## 2024-10-25 PROCEDURE — 85027 COMPLETE CBC AUTOMATED: CPT

## 2024-10-25 PROCEDURE — 93454 CORONARY ARTERY ANGIO S&I: CPT | Mod: 78

## 2024-10-25 RX ORDER — ACETAMINOPHEN 500 MG
975 TABLET ORAL ONCE
Refills: 0 | Status: COMPLETED | OUTPATIENT
Start: 2024-10-25 | End: 2024-10-25

## 2024-10-25 RX ORDER — ALBUTEROL 90 MCG
2 AEROSOL (GRAM) INHALATION EVERY 6 HOURS
Refills: 0 | Status: ACTIVE | OUTPATIENT
Start: 2024-10-25 | End: 2025-09-23

## 2024-10-25 RX ORDER — BUDESONIDE AND FORMOTEROL FUMARATE DIHYDRATE 80; 4.5 UG/1; UG/1
2 AEROSOL RESPIRATORY (INHALATION)
Refills: 0 | DISCHARGE

## 2024-10-25 RX ORDER — GLUCAGON INJECTION, SOLUTION 1 MG/.2ML
1 INJECTION, SOLUTION SUBCUTANEOUS ONCE
Refills: 0 | Status: ACTIVE | OUTPATIENT
Start: 2024-10-25 | End: 2025-09-23

## 2024-10-25 RX ORDER — ALBUTEROL 90 MCG
2 AEROSOL (GRAM) INHALATION
Refills: 0 | DISCHARGE

## 2024-10-25 RX ORDER — PANTOPRAZOLE SODIUM 40 MG/1
40 TABLET, DELAYED RELEASE ORAL
Refills: 0 | Status: ACTIVE | OUTPATIENT
Start: 2024-10-25 | End: 2025-09-23

## 2024-10-25 RX ORDER — MIDODRINE HYDROCHLORIDE 2.5 MG/1
15 TABLET ORAL THREE TIMES A DAY
Refills: 0 | Status: ACTIVE | OUTPATIENT
Start: 2024-10-25 | End: 2025-09-23

## 2024-10-25 RX ORDER — CLOPIDOGREL 75 MG/1
75 TABLET ORAL DAILY
Refills: 0 | Status: ACTIVE | OUTPATIENT
Start: 2024-10-25 | End: 2025-09-23

## 2024-10-25 RX ORDER — ASPIRIN/MAG CARB/ALUMINUM AMIN 325 MG
81 TABLET ORAL DAILY
Refills: 0 | Status: ACTIVE | OUTPATIENT
Start: 2024-10-25 | End: 2025-09-23

## 2024-10-25 RX ORDER — INSULIN LISPRO 100/ML
VIAL (ML) SUBCUTANEOUS AT BEDTIME
Refills: 0 | Status: ACTIVE | OUTPATIENT
Start: 2024-10-25 | End: 2025-09-23

## 2024-10-25 RX ORDER — CALCIUM ACETATE 667 MG/1
1334 CAPSULE ORAL
Refills: 0 | Status: ACTIVE | OUTPATIENT
Start: 2024-10-25 | End: 2025-09-23

## 2024-10-25 RX ORDER — FENTANYL CITRAT/DEXTROSE 5%/PF 1250MCG/50
12.5 PATIENT CONTROLLED ANALGESIA SYRINGE INTRAVENOUS ONCE
Refills: 0 | Status: DISCONTINUED | OUTPATIENT
Start: 2024-10-25 | End: 2024-10-25

## 2024-10-25 RX ORDER — INSULIN LISPRO 100/ML
VIAL (ML) SUBCUTANEOUS
Refills: 0 | Status: ACTIVE | OUTPATIENT
Start: 2024-10-25 | End: 2025-09-23

## 2024-10-25 RX ADMIN — CALCIUM ACETATE 1334 MILLIGRAM(S): 667 CAPSULE ORAL at 16:22

## 2024-10-25 RX ADMIN — Medication 12.5 MICROGRAM(S): at 11:29

## 2024-10-25 RX ADMIN — Medication 12.5 MICROGRAM(S): at 11:12

## 2024-10-25 RX ADMIN — Medication 975 MILLIGRAM(S): at 13:30

## 2024-10-25 RX ADMIN — Medication 975 MILLIGRAM(S): at 11:50

## 2024-10-25 NOTE — CHART NOTE - NSCHARTNOTEFT_GEN_A_CORE
persistent chest pain 5/10,   Repeat EKG showed RBBB     Evaluated by Dr. Lynn and took her in to lab for review.   No changes in stent, pt reports her pain is slightly improving    Continue to monitor  RFA sheath to remove in 13:40   Plan to D/C home 21:00   continue ASA/Plavix and Statin    Cardiac Rehab (Post PCI):           ****** Reasons for NO Cardiac rehab referral rx:              Patient Refused                        Other: (provide details): difficult to travel with 3 days of dialysis

## 2024-10-25 NOTE — ASU DISCHARGE PLAN (ADULT/PEDIATRIC) - ASU DC SPECIAL INSTRUCTIONSFT
Wound Care:   the day AFTER your procedure remove bandage GENTLY, and clean using  mild soap and warm water stream, pat dry. Leave the area OPEN to air. YOU MAY SHOWER 24 hour after procedure.   DO NOT apply lotions, creams, ointments, powder, perfumes to the puncture site.  DO NOT SOAK the site for 1 week (no tub bath, no swimming, etc.)  Check  your groin and /or wrist daily. A small amount of bruising and sourness are normal.     ACTIVITY: for 24 hours   If your procedure was done through the GRO[facility]: for the NEXT 5 DAYS  - Limit climbing stairs, DO NOT soak in bathtub or pool  - no strenuous activities, pushing, pulling, straining  - Do not lift anything 10lbs or heavier     MEDICATION:   Take your medications as explained (see discharge paperwork)   If you received a STENT, you will be taking antiplatelet medications to KEEP YOUR STENT OPEN (Aspirin, Plavix).  Take as prescribed, DO NOT STOP taking them without consulting with your cardiologist first.     Follow heart healthy diet recommended by your doctor, if you smoke STOP SMOKING (may call [phone] for center of tobacco control if you need assistance)     CALL your doctor to make appointment in 2 WEEKS     ***CALL YOUR DOCTOR***  if you experience: fever, chills, body aches, or severe pain, swelling, redness, heat or yellow discharge at incision site  If you experience Bleeding or excruciating pain at the procedural site, swelling (golf ball size) at your procedural site  If you experience CHEST PAIN  If you experience extremity numbness, tingling, temperature change (of your procedural site)   If you are unable to reach your doctor, you may contact:   -Cardiology Office at [facility] at [phone] or [facility] [phone]- [facility] [phone]

## 2024-10-25 NOTE — CHART NOTE - NSCHARTNOTEFT_GEN_A_CORE
c/o 5/10 chest discomfort started with PCI  no sig EKG changes  BP stable     Fentanyl 12.5mg IVP x1 dose given     Continue to monitor  EKG if pain worse   continue ASA/Plavix and statin  RFA sheath due ot 12:30pm

## 2024-10-25 NOTE — ASU DISCHARGE PLAN (ADULT/PEDIATRIC) - FINANCIAL ASSISTANCE
[facility] provides services at a reduced cost to those who are determined to be eligible through [facility]’s financial assistance program. Information regarding [facility]’s financial assistance program can be found by going to [email or web address] or by calling [phone].

## 2024-10-25 NOTE — DISCHARGE NOTE NURSING/CASE MANAGEMENT/SOCIAL WORK - FINANCIAL ASSISTANCE
[facility] provides services at a reduced cost to those who are determined to be eligible through [facility]’s financial assistance program. Information regarding [facility]’s financial assistance program can be found by going to [email or web address] or by calling [phone].

## 2024-10-25 NOTE — ASU PATIENT PROFILE, ADULT - NSICDXPASTMEDICALHX_GEN_ALL_CORE_FT
PAST MEDICAL HISTORY:  CAD (coronary artery disease)     DM (diabetes mellitus)     ESRD on dialysis     HLD (hyperlipidemia)     HTN (hypertension)     Uterine cancer

## 2024-10-25 NOTE — ASU PATIENT PROFILE, ADULT - FALL HARM RISK - PATIENT NEEDS ASSISTANCE
Cardiology Daily Progress Note    Assessment/Plan  Overview of  Current Cardiology Problems and Plans:    PSVT: This morning patient had 1 episode of of PSVT, rate of 170 bpm, short lasting, seen on tele review. BB on hold d/t episodes of sinus bradycardia with rates in 30's. K+ 3.6, replaced. Mg++ 2.1. Will continue to monitor closely.     Sinus Bradycardia: HR had improved to range 60's with PVCs. Clonidine patch was discontinued and coreg remains on hold. Remains asymptomatic.   Baseline rhythm sinus with normal [place] QRS and QTc intervals with bradycardia however upon ambulation heart rate response to activity is not blunted and patient is not symptomatic.  At this point we will order 30-day event monitor upon discharge to rule out if patient has underlying sick sinus syndrome or develop unusual bradycardia secondary to YFN at night, in that case patient may require dual-chamber pacemaker otherwise,  Close watch for progression of AV block.  Not a candidate for dual-chamber pacemaker at this point as he is asymptomatic.    Bladder Mass: Urology planning for cystoscopy with transurethral resection of bladder tumor for tomorrow. Cleared for surgery from cardiac standpoint at moderate risk.     Postop Anemia: H/H 7.9/27.0 today. Previously received 1 unit PRBCs.    S/p Extended right hemicolectomy with removal of large tumor, 08/01/2022     Postop:  Up in chair.  Covington catheter has been removed, able to void on own.   H/H 7.9/27.0 today.    Neurologically intact.  No chest pain or shortness of breath.    Hypertension: Range 122//84. On hydralazine and losartan.      Coronary artery disease with previous bypass surgery: Recent catheterization in March 2022 demonstrates patent LIMA and patent vein graft to the right coronary artery.  Native vessels are 100% occluded. Currently no symptoms of angina. Continue medical therapy. Nonspecific T wave changes on the EKG.       CKD, probably hypertensive nephrosclerosis,  single kidney, reduce ARB.     Obesity, YFN risk  Consider IV Venofer for postop anemia or blood loss anemia, MCV is low.  Obtain 2 d echo    Subjective  No chest pain or shortness of breath.     Objective  Vital signs in last 24 hours:  Temp:  [97.7 °F (36.5 °C)-98.2 °F (36.8 °C)] 97.7 °F (36.5 °C)  Heart Rate:  [52-76] 76  Resp:  [16-18] 18  BP: (122-148)/(65-84) 134/84    Intake/Output last 3 shifts:  I/O last 3 completed shifts:  In: 2500 [P.O.:2150; Blood:350]  Out: 1750 [Urine:1750]  Intake/Output this shift:  I/O this shift:  In: -   Out: 200 [Urine:200]     Physical Exam    Constitutional: Alert and oriented, No acute distress, Morbid obesity.  Hemoglobin 7.9.  Patient did not feel PSVT, had no tachypalpitations.  Cardiovascular: Normal rate, Regular rhythm, No edema. Tele: SR with PVCs, rate in 60's.  1 episode of of PSVT seen on tele review this morning. Remains asymptomatic.  Midline sternotomy incision well-healed, no definite carotid bruits.  Respiratory: Non-labored respirations, No wheezing.  GI: Abdomen soft, s/p hemicolectomy.  Bowel sounds are hypoactive.  : Covington catheter has been removed, voiding on own.  Musculoskeletal: Sitting in chair at bedside, moving all four extremities.   Integument:  Warm, Dry.   Neuro: Alert, Oriented.  Psych: Appropriate mood and affect, Cooperative.     Medications:  Current Facility-Administered Medications   Medication Dose Route Frequency Provider Last Rate Last Admin   • meclizine (ANTIVERT) tablet 25 mg  25 mg Oral TID Caryl Trinidad CNP   25 mg at 08/04/22 0837   • pneumococcal 20-valent vaccine (PREVNAR 20) injection 0.5 mL  0.5 mL Intramuscular Once Pool Bloom MD       • losartan (COZAAR) tablet 50 mg  50 mg Oral Daily Luis Stevens MD   50 mg at 08/04/22 0837   • melatonin tablet 3 mg  3 mg Oral Nightly Lori Pozo MD   3 mg at 08/03/22 2047   • nicotine (NICODERM) 7 MG/24HR patch 1 patch  1 patch Transdermal Daily Lori Pozo MD        • acetaminophen (TYLENOL) tablet 1,000 mg  1,000 mg Oral 3 times per day Chloe Rene PA-C   1,000 mg at 08/04/22 0524   • sodium chloride (PF) 0.9 % injection 2 mL  2 mL Intracatheter 2 times per day Chloe Rene PA-C   2 mL at 08/04/22 0846   • enoxaparin (LOVENOX) injection 40 mg  40 mg Subcutaneous Daily Chloe Rene PA-C   40 mg at 08/04/22 0837   • gabapentin (NEURONTIN) capsule 300 mg  300 mg Oral 3 times per day Chloe Rene PA-C   300 mg at 08/04/22 0524   • alvimopan (ENTEREG) capsule 12 mg  12 mg Oral 2 times per day Chloe Rene PA-C   12 mg at 08/04/22 0837   • atorvastatin (LIPITOR) tablet 40 mg  40 mg Oral Daily Sana Mohsin, DO   40 mg at 08/04/22 0837   • [Held by provider] carvedilol (COREG) tablet 6.25 mg  6.25 mg Oral BID WC Sana Mohsin, DO       • hydrALAZINE (APRESOLINE) tablet 100 mg  100 mg Oral TID Sana Mohsin, DO   100 mg at 08/04/22 0837   • fluticasone (FLONASE) 50 MCG/ACT nasal spray 1 spray  1 spray Each Nare Daily Sana Mohsin, DO   1 spray at 08/04/22 0845   • tamsulosin (FLOMAX) capsule 0.4 mg  0.4 mg Oral Daily Sana Mohsin, DO   0.4 mg at 08/04/22 0837     Current Facility-Administered Medications   Medication Dose Route Frequency Provider Last Rate Last Admin   • sodium chloride 0.9% infusion   Intravenous Continuous PRN Caryl Trinidad CNP       • lactated ringers infusion   Intravenous Continuous Chloe Rene PA-C 100 mL/hr at 08/04/22 0524 New Bag at 08/04/22 0524     Current Facility-Administered Medications   Medication Dose Route Frequency Provider Last Rate Last Admin   • sodium chloride 0.9% infusion   Intravenous Continuous PRN Caryl Trinidad CNP       • sodium chloride 0.9 % flush bag 25 mL  25 mL Intravenous PRN Chloe Rene PA-C       • oxyCODONE (IMM REL) (ROXICODONE) tablet 5 mg  5 mg Oral Q4H PRN Chloe Rene PA-C   5 mg at 08/01/22 2037   • HYDROmorphone (DILAUDID) injection 0.3 mg  0.3 mg Intravenous Q1H PRN Chloe Rene PA-C        • ondansetron (ZOFRAN ODT) disintegrating tablet 4 mg  4 mg Oral Q12H PRN Chloe Rene PA-C        Or   • ondansetron (ZOFRAN) injection 4 mg  4 mg Intravenous Q12H PRN Chloe Rene PA-C       • hydrALAZINE (APRESOLINE) injection 5 mg  5 mg Intravenous Q6H PRN Carin Jefferson MD            Laboratory:  Recent Results (from the past 24 hour(s))   2019 Novel Coronavirus (SARS-CoV-2)    Collection Time: 08/03/22 11:53 AM   Result Value Ref Range    SARS-CoV-2 by PCR Not Detected Not Detected / Detected / Inhibitor Present    Isolation Guidelines       Do not use this test result as the sole decision-maker for discontinuation of isolation.   Clinical evaluation should be considered for other respiratory illness requiring transmission-based isolation.    -    No fever (<99.0 F/37.2 C) for at least 24 hours without the use of fever-reducing medications    AND  -    Respiratory symptoms have improved or resolved (e.g. cough, shortness of breath)     AND  -    COVID-19 negative test    See COVID-19 Deisolation Resource Guide      Procedural Notes       Negative for SARS-CoV-2 (2019-nCoV) nucleic acid in the specimen, consider other viruses.    A negative result does not preclude Coronavirus (COVID-19) infection and should not be used as sole basis for treatment or patient management decisions.  Negative results must be combined with clinical observations, patient history, and epidemiological information.    This result was obtained by TMA based method and analysis by fluorescent probes designed for the qualitative detected of the SARS-CoV-2 (2019-nCoV) nucleic acid.  Performance characteristics for the test has been determined by "[employer] and are incorporated as part of FDA Emergency Use Authorization (EUA).    This test was performed by [employer] using the FDA cleared Emergency Use Authorization (EUA) Aptima SARS-CoV-2 (2019-nCoV) from [employer].  This laboratory is certified under the Clinical Laboratory  Improvement Amendments (CLIA) as qualified to perform high complexity clinical laboratory testing.    COVID-19 Test Results - What you need to know and do       Basic Metabolic Panel    Collection Time: 08/03/22 12:29 PM   Result Value Ref Range    Fasting Status      Sodium 144 135 - 145 mmol/L    Potassium 4.1 3.4 - 5.1 mmol/L    Chloride 115 (H) 97 - 110 mmol/L    Carbon Dioxide 22 21 - 32 mmol/L    Anion Gap 11 7 - 19 mmol/L    Glucose 147 (H) 70 - 99 mg/dL    BUN 27 (H) 6 - 20 mg/dL    Creatinine 1.46 (H) 0.67 - 1.17 mg/dL    Glomerular Filtration Rate 53 (L) >=60    BUN/ Creatinine Ratio 18 7 - 25    Calcium 8.4 8.4 - 10.2 mg/dL   Hemoglobin and Hematocrit    Collection Time: 08/03/22 12:29 PM   Result Value Ref Range    HGB 8.6 (L) 13.0 - 17.0 g/dL    HCT 29.5 (L) 39.0 - 51.0 %   Basic Metabolic Panel    Collection Time: 08/04/22  5:28 AM   Result Value Ref Range    Fasting Status      Sodium 145 135 - 145 mmol/L    Potassium 3.6 3.4 - 5.1 mmol/L    Chloride 115 (H) 97 - 110 mmol/L    Carbon Dioxide 24 21 - 32 mmol/L    Anion Gap 10 7 - 19 mmol/L    Glucose 93 70 - 99 mg/dL    BUN 20 6 - 20 mg/dL    Creatinine 1.36 (H) 0.67 - 1.17 mg/dL    Glomerular Filtration Rate 58 (L) >=60    BUN/ Creatinine Ratio 15 7 - 25    Calcium 8.4 8.4 - 10.2 mg/dL   Magnesium    Collection Time: 08/04/22  5:28 AM   Result Value Ref Range    Magnesium 2.1 1.7 - 2.4 mg/dL   Phosphorus    Collection Time: 08/04/22  5:28 AM   Result Value Ref Range    Phosphorus 3.4 2.4 - 4.7 mg/dL   CBC with Automated Differential (performable only)    Collection Time: 08/04/22  5:28 AM   Result Value Ref Range    WBC 8.0 4.2 - 11.0 K/mcL    RBC 3.68 (L) 4.50 - 5.90 mil/mcL    HGB 7.9 (L) 13.0 - 17.0 g/dL    HCT 27.0 (L) 39.0 - 51.0 %    MCV 73.4 (L) 78.0 - 100.0 fl    MCH 21.5 (L) 26.0 - 34.0 pg    MCHC 29.3 (L) 32.0 - 36.5 g/dL    RDW-CV 22.2 (H) 11.0 - 15.0 %    RDW-SD 58.9 (H) 39.0 - 50.0 fL     140 - 450 K/mcL    NRBC 0 <=0 /100 WBC     Neutrophil, Percent 70 %    Lymphocytes, Percent 18 %    Mono, Percent 7 %    Eosinophils, Percent 3 %    Basophils, Percent 0 %    Immature Granulocytes 2 %    Absolute Neutrophils 5.6 1.8 - 7.7 K/mcL    Absolute Lymphocytes 1.5 1.0 - 4.0 K/mcL    Absolute Monocytes 0.6 0.3 - 0.9 K/mcL    Absolute Eosinophils  0.2 0.0 - 0.5 K/mcL    Absolute Basophils 0.0 0.0 - 0.3 K/mcL    Absolute Immmature Granulocytes 0.1 0.0 - 0.2 K/mcL        Luis Stevens MD [facility]  Yen Millan, CNP  8/4/2022  10:43 AM         Walking

## 2024-10-25 NOTE — ASU DISCHARGE PLAN (ADULT/PEDIATRIC) - CARE PROVIDER_API CALL
Mitchell Terrell  Cardiology  [address]  [place] [address]  Phone: [phone]  Fax: [phone]  Follow Up Time: 2 weeks   Mitchell Terrell  Cardiology  [address]  [place] [address]  Phone: [phone]  Fax: [phone]  Follow Up Time: 2 weeks    Wilmer Quigley  Cardiovascular Disease  [address] [place] [address]  Phone: [phone]  Fax: [phone]  Follow Up Time: 2 weeks

## 2024-10-25 NOTE — DISCHARGE NOTE NURSING/CASE MANAGEMENT/SOCIAL WORK - PATIENT PORTAL LINK FT
You can access the FollowMyHealth Patient Portal offered by [facility] by registering at the following website: [email or web address]. By joining [employer]’s FollowMyHealth portal, you will also be able to view your health information using other applications (apps) compatible with our system.

## 2024-10-25 NOTE — DISCHARGE NOTE NURSING/CASE MANAGEMENT/SOCIAL WORK - NSDCPEFALRISK_GEN_ALL_CORE
For information on Fall & Injury Prevention, visit: [email or web address] OR  [email or web address] OR  https://www.cdc.gov/steadi/patient.html

## 2024-10-25 NOTE — ASU PATIENT PROFILE, ADULT - NSICDXPASTSURGICALHX_GEN_ALL_CORE_FT
PAST SURGICAL HISTORY:  H/O:      H/O: hysterectomy     History of cataract surgery     S/P CABG x 3

## 2024-10-25 NOTE — H&P CARDIOLOGY - HISTORY OF PRESENT ILLNESS
This is a 74 year old  female with hx of HTN, CAD status post CABG 11/9/2022 (LIMA-LAD, SVG-OM, OM), dyslipidemia, diabetes mellitus (DM2 diagnosed many years on, never on insulin, no home testing, A1c 5.6%), and end stage renal disease on hemodialysis (on HD since 10/2021 started during hospitalization T/Th/Sat at [facility] HD in [place], left upper arm AV fistula), Hx of large pericardial effusion s/p drainage, paroxysmal atrial fibrillation (was on amiodarone and oral anticoagulation, AC discontinued secondary to anemia and GIB).  She was recently seen for pre-transplant cardiac evaluation prior to potential renal transplant and had an abnormal stress and underwent PCI DHARA to LCx 8/16/24 and presents today for staged PCI to LAD.  Denies dizziness, diaphoresis, palpitations, nausea, vomiting, peripheral edema, recent weight gain, or syncope.      Cardiologist: Dr. Mitchell Terrell    < from: Cardiac Catheterization (08.16.24 @ 10:13) >  Indications:               Abnormal stress test   CCS Class II     Lab Visit Indication:      suspected CAD   PCI Status:                elective     Conclusions:   Successful PCI to the LCx.     DAPT for 3-6 months.   Plan LAD staged PCI in 1-2 weeks   Aggressive medical therapy and risk factor modification     < end of copied text >  < from: Cardiac Catheterization (08.16.24 @ 10:13) >  Coronary Angiography   The coronary circulation is right dominant.      Patient: RILEY MEJÍA           MRN: [id number]  Study Date: 08/16/2024   10:13 AM      Page 1 of 5      LM   Left main artery: Angiography shows no disease.      LAD   Proximal left anterior descending: There is a 70 % stenosis. First  diagonal: There is a 70 % stenosis.    CX   Proximal circumflex: There is an 80 % stenosis.      RCA   Mid right coronary artery: There is a 70 % stenosis.      Graft Angiography   LIMA graft to Distal left anterior descending:Angiography shows  severe atherosclerosis.  SVG graft to Circumflex: Angiography shows complete occlusion.    SVG graft to Right posterior descending artery: Angiography shows mild  atherosclerosis.      < end of copied text >   This is a 74 year old  female with hx of HTN, CAD status post CABG 11/9/2022 (LIMA-LAD, SVG-OM, OM), Dyslipidemia, Diabetes mellitus (DM2 diagnosed many years on, never on insulin, no home testing, A1c 5.6%), and end stage renal disease on hemodialysis (on HD since 10/2021 started during hospitalization T/Th/Sat at [facility] in [place], left upper arm AV fistula), Hx of large pericardial effusion s/p drainage, paroxysmal atrial fibrillation (was on amiodarone and oral anticoagulation, AC discontinued secondary to anemia and GIB).  She was recently seen for pre-transplant cardiac evaluation prior to potential renal transplant and had an abnormal stress and underwent PCI DHARA to LCx 8/16/24 and presents today for staged PCI to LAD.  Denies dizziness, diaphoresis, palpitations, nausea, vomiting, peripheral edema, recent weight gain, or syncope.      Cardiologist: Dr. Mitchell Terrell    < from: Cardiac Catheterization (08.16.24 @ 10:13) >  Indications:               Abnormal stress test   CCS Class II     Lab Visit Indication:      suspected CAD   PCI Status:                elective     Conclusions:   Successful PCI to the LCx.     DAPT for 3-6 months.   Plan LAD staged PCI in 1-2 weeks   Aggressive medical therapy and risk factor modification     < end of copied text >  < from: Cardiac Catheterization (08.16.24 @ 10:13) >  Coronary Angiography   The coronary circulation is right dominant.      Patient: RILEY MEJÍA           MRN: [id number]  Study Date: 08/16/2024   10:13 AM      Page 1 of 5      LM   Left main artery: Angiography shows no disease.      LAD   Proximal left anterior descending: There is a 70 % stenosis. First  diagonal: There is a 70 % stenosis.    CX   Proximal circumflex: There is an 80 % stenosis.      RCA   Mid right coronary artery: There is a 70 % stenosis.      Graft Angiography   LIMA graft to Distal left anterior descending:Angiography shows  severe atherosclerosis.  SVG graft to Circumflex: Angiography shows complete occlusion.    SVG graft to Right posterior descending artery: Angiography shows mild  atherosclerosis.      < end of copied text >

## 2024-10-25 NOTE — ASU DISCHARGE PLAN (ADULT/PEDIATRIC) - PROVIDER TOKENS
PROVIDER:[TOKEN:[[id number]],FOLLOWUP:[2 weeks]] PROVIDER:[TOKEN:[[id number]],FOLLOWUP:[2 weeks]],PROVIDER:[TOKEN:[[id number]],FOLLOWUP:[2 weeks]]

## 2024-10-25 NOTE — CHART NOTE - NSCHARTNOTEFT_GEN_A_CORE
Removal of Femoral Sheath YVETTE Hoffman    Pulses in the (right) lower extremity are (palpable). The patient was placed in the supine position. The insertion site was identified and the sutures were removed per protocol.  The _6___ [place]oc[place] femoral sheath was then removed. Direct pressure was applied for  ____20__ minutes.     Monitoring of the (right) groin and both lower extremities including neuro-vascular checks and vital signs every 15 minutes x 4, then every 30 minutes x 2, then every 1 hour x 2 and the every 4 hours was ordered.    Complications: No hematoma, no bleed    Comments:    Education: medication adherence: in particular DAPT adherence with rational, groin care, signs and symptoms of groin complications reviewed with pt who verbalizes understanding. All questions answered.       Fermin Miller NP

## 2024-10-25 NOTE — ASU DISCHARGE PLAN (ADULT/PEDIATRIC) - NS MD DC FALL RISK RISK
For information on Fall & Injury Prevention, visit: [email or web address] OR  [email or web address] OR  https://www.cdc.gov/steadi/patient.html

## 2024-10-25 NOTE — ASU PATIENT PROFILE, ADULT - FALL HARM RISK - HARM RISK INTERVENTIONS

## 2024-11-18 ENCOUNTER — APPOINTMENT (OUTPATIENT)
Dept: VASCULAR SURGERY | Facility: CLINIC | Age: 74
End: 2024-11-18
Payer: MEDICARE

## 2024-11-18 ENCOUNTER — APPOINTMENT (OUTPATIENT)
Dept: CARDIOLOGY | Facility: CLINIC | Age: 74
End: 2024-11-18
Payer: MEDICARE

## 2024-11-18 ENCOUNTER — NON-APPOINTMENT (OUTPATIENT)
Age: 74
End: 2024-11-18

## 2024-11-18 VITALS
OXYGEN SATURATION: 97 % | HEART RATE: 104 BPM | BODY MASS INDEX: 32.1 KG/M2 | HEIGHT: 64 IN | SYSTOLIC BLOOD PRESSURE: 148 MMHG | DIASTOLIC BLOOD PRESSURE: 84 MMHG | WEIGHT: 188 LBS

## 2024-11-18 VITALS
OXYGEN SATURATION: 97 % | DIASTOLIC BLOOD PRESSURE: 70 MMHG | WEIGHT: 188 LBS | BODY MASS INDEX: 32.1 KG/M2 | HEIGHT: 64 IN | SYSTOLIC BLOOD PRESSURE: 142 MMHG | HEART RATE: 107 BPM

## 2024-11-18 VITALS — SYSTOLIC BLOOD PRESSURE: 138 MMHG | DIASTOLIC BLOOD PRESSURE: 80 MMHG

## 2024-11-18 DIAGNOSIS — Z98.61 ATHEROSCLEROTIC HEART DISEASE OF NATIVE CORONARY ARTERY W/OUT ANGINA PECTORIS: ICD-10-CM

## 2024-11-18 DIAGNOSIS — N18.6 END STAGE RENAL DISEASE: ICD-10-CM

## 2024-11-18 DIAGNOSIS — I10 ESSENTIAL (PRIMARY) HYPERTENSION: ICD-10-CM

## 2024-11-18 DIAGNOSIS — Z95.1 PRESENCE OF AORTOCORONARY BYPASS GRAFT: ICD-10-CM

## 2024-11-18 DIAGNOSIS — I48.0 PAROXYSMAL ATRIAL FIBRILLATION: ICD-10-CM

## 2024-11-18 DIAGNOSIS — Z99.2 END STAGE RENAL DISEASE: ICD-10-CM

## 2024-11-18 DIAGNOSIS — I25.10 ATHEROSCLEROTIC HEART DISEASE OF NATIVE CORONARY ARTERY W/OUT ANGINA PECTORIS: ICD-10-CM

## 2024-11-18 PROCEDURE — 93000 ELECTROCARDIOGRAM COMPLETE: CPT

## 2024-11-18 PROCEDURE — 99214 OFFICE O/P EST MOD 30 MIN: CPT

## 2024-11-18 PROCEDURE — 99213 OFFICE O/P EST LOW 20 MIN: CPT

## 2024-11-18 PROCEDURE — 93990 DOPPLER FLOW TESTING: CPT

## 2024-11-18 PROCEDURE — G2211 COMPLEX E/M VISIT ADD ON: CPT

## 2024-11-18 RX ORDER — CLOPIDOGREL 75 MG/1
75 TABLET, FILM COATED ORAL
Refills: 0 | Status: ACTIVE | COMMUNITY

## 2025-01-01 ENCOUNTER — RX RENEWAL (OUTPATIENT)
Age: 75
End: 2025-01-01

## 2025-01-13 ENCOUNTER — NON-APPOINTMENT (OUTPATIENT)
Age: 75
End: 2025-01-13

## 2025-01-31 ENCOUNTER — RX RENEWAL (OUTPATIENT)
Age: 75
End: 2025-01-31

## 2025-02-02 ENCOUNTER — NON-APPOINTMENT (OUTPATIENT)
Age: 75
End: 2025-02-02

## 2025-02-11 ENCOUNTER — RX RENEWAL (OUTPATIENT)
Age: 75
End: 2025-02-11

## 2025-02-20 DIAGNOSIS — M25.561 PAIN IN RIGHT KNEE: ICD-10-CM

## 2025-02-21 ENCOUNTER — APPOINTMENT (OUTPATIENT)
Dept: ORTHOPEDIC SURGERY | Facility: CLINIC | Age: 75
End: 2025-02-21
Payer: MEDICARE

## 2025-02-21 ENCOUNTER — NON-APPOINTMENT (OUTPATIENT)
Age: 75
End: 2025-02-21

## 2025-02-21 VITALS — BODY MASS INDEX: 33.97 KG/M2 | WEIGHT: 199 LBS | HEIGHT: 64 IN

## 2025-02-21 DIAGNOSIS — S82.024A NONDISPLACED LONGITUDINAL FRACTURE OF RIGHT PATELLA, INITIAL ENCOUNTER FOR CLOSED FRACTURE: ICD-10-CM

## 2025-02-21 DIAGNOSIS — M17.11 UNILATERAL PRIMARY OSTEOARTHRITIS, RIGHT KNEE: ICD-10-CM

## 2025-02-21 PROCEDURE — 20610 DRAIN/INJ JOINT/BURSA W/O US: CPT | Mod: RT

## 2025-02-21 PROCEDURE — 99204 OFFICE O/P NEW MOD 45 MIN: CPT | Mod: 25

## 2025-02-21 PROCEDURE — 73564 X-RAY EXAM KNEE 4 OR MORE: CPT | Mod: RT

## 2025-02-24 ENCOUNTER — APPOINTMENT (OUTPATIENT)
Dept: CARDIOLOGY | Facility: CLINIC | Age: 75
End: 2025-02-24
Payer: MEDICARE

## 2025-02-24 VITALS
HEART RATE: 97 BPM | BODY MASS INDEX: 33.97 KG/M2 | SYSTOLIC BLOOD PRESSURE: 148 MMHG | WEIGHT: 199 LBS | OXYGEN SATURATION: 98 % | HEIGHT: 64 IN | DIASTOLIC BLOOD PRESSURE: 62 MMHG

## 2025-02-24 PROCEDURE — 99214 OFFICE O/P EST MOD 30 MIN: CPT

## 2025-03-04 NOTE — ASU DISCHARGE PLAN (ADULT/PEDIATRIC) - PLEASE INDICATE TEMPERATURE IN FAHRENHEIT OR CELSIUS
Patient presents to the clinic today for concussion testing.  He was accompanied by no one.  Testing completed without incident.  See the next encounter with Dr. Chavez for testing results.    Electronically signed: CLAUDE Franco, 3/4/2025   100.4

## 2025-03-07 ENCOUNTER — APPOINTMENT (OUTPATIENT)
Dept: CARDIOLOGY | Facility: CLINIC | Age: 75
End: 2025-03-07
Payer: MEDICARE

## 2025-03-07 ENCOUNTER — NON-APPOINTMENT (OUTPATIENT)
Age: 75
End: 2025-03-07

## 2025-03-07 VITALS
DIASTOLIC BLOOD PRESSURE: 70 MMHG | OXYGEN SATURATION: 98 % | HEIGHT: 64 IN | HEART RATE: 97 BPM | WEIGHT: 199 LBS | BODY MASS INDEX: 33.97 KG/M2 | SYSTOLIC BLOOD PRESSURE: 132 MMHG

## 2025-03-07 DIAGNOSIS — I48.0 PAROXYSMAL ATRIAL FIBRILLATION: ICD-10-CM

## 2025-03-07 DIAGNOSIS — I25.10 ATHEROSCLEROTIC HEART DISEASE OF NATIVE CORONARY ARTERY W/OUT ANGINA PECTORIS: ICD-10-CM

## 2025-03-07 DIAGNOSIS — I10 ESSENTIAL (PRIMARY) HYPERTENSION: ICD-10-CM

## 2025-03-07 DIAGNOSIS — Z98.61 ATHEROSCLEROTIC HEART DISEASE OF NATIVE CORONARY ARTERY W/OUT ANGINA PECTORIS: ICD-10-CM

## 2025-03-07 DIAGNOSIS — Z95.1 PRESENCE OF AORTOCORONARY BYPASS GRAFT: ICD-10-CM

## 2025-03-07 PROCEDURE — 93246 EXT ECG>7D<15D RECORDING: CPT

## 2025-03-07 PROCEDURE — G2211 COMPLEX E/M VISIT ADD ON: CPT

## 2025-03-07 PROCEDURE — 93000 ELECTROCARDIOGRAM COMPLETE: CPT | Mod: 59

## 2025-03-07 PROCEDURE — 99214 OFFICE O/P EST MOD 30 MIN: CPT

## 2025-03-12 ENCOUNTER — APPOINTMENT (OUTPATIENT)
Dept: ORTHOPEDIC SURGERY | Facility: CLINIC | Age: 75
End: 2025-03-12
Payer: MEDICARE

## 2025-03-12 PROCEDURE — 20610 DRAIN/INJ JOINT/BURSA W/O US: CPT | Mod: RT

## 2025-03-17 ENCOUNTER — APPOINTMENT (OUTPATIENT)
Dept: ORTHOPEDIC SURGERY | Facility: CLINIC | Age: 75
End: 2025-03-17
Payer: MEDICARE

## 2025-03-17 VITALS — BODY MASS INDEX: 31.24 KG/M2 | WEIGHT: 183 LBS | HEIGHT: 64 IN

## 2025-03-17 DIAGNOSIS — S82.024D NONDISPLACED LONGITUDINAL FRACTURE OF RIGHT PATELLA, SUBSEQUENT ENCOUNTER FOR CLOSED FRACTURE WITH ROUTINE HEALING: ICD-10-CM

## 2025-03-17 PROCEDURE — 99213 OFFICE O/P EST LOW 20 MIN: CPT

## 2025-03-17 PROCEDURE — 73564 X-RAY EXAM KNEE 4 OR MORE: CPT | Mod: RT

## 2025-03-17 PROCEDURE — G2211 COMPLEX E/M VISIT ADD ON: CPT

## 2025-03-19 ENCOUNTER — APPOINTMENT (OUTPATIENT)
Dept: ORTHOPEDIC SURGERY | Facility: CLINIC | Age: 75
End: 2025-03-19
Payer: MEDICARE

## 2025-03-19 PROCEDURE — 20610 DRAIN/INJ JOINT/BURSA W/O US: CPT | Mod: RT

## 2025-03-21 NOTE — ED ADULT NURSE NOTE - CHIEF COMPLAINT
Upon chart review, patient noted to be inpatient. Transition team to continue to  monitor.  Ely Fuchs RN   [phone]     The patient is a 73y Female complaining of

## 2025-03-26 ENCOUNTER — APPOINTMENT (OUTPATIENT)
Dept: ORTHOPEDIC SURGERY | Facility: CLINIC | Age: 75
End: 2025-03-26
Payer: MEDICARE

## 2025-03-26 DIAGNOSIS — M17.11 UNILATERAL PRIMARY OSTEOARTHRITIS, RIGHT KNEE: ICD-10-CM

## 2025-03-26 PROCEDURE — 20610 DRAIN/INJ JOINT/BURSA W/O US: CPT | Mod: RT

## 2025-03-31 PROCEDURE — 93248 EXT ECG>7D<15D REV&INTERPJ: CPT

## 2025-03-31 RX ORDER — METOPROLOL SUCCINATE 25 MG/1
25 TABLET, EXTENDED RELEASE ORAL DAILY
Qty: 90 | Refills: 1 | Status: ACTIVE | COMMUNITY
Start: 2025-03-31 | End: 1900-01-01

## 2025-03-31 RX ORDER — APIXABAN 2.5 MG/1
2.5 TABLET, FILM COATED ORAL
Qty: 180 | Refills: 1 | Status: ACTIVE | COMMUNITY
Start: 2025-03-31 | End: 1900-01-01

## 2025-04-15 ENCOUNTER — NON-APPOINTMENT (OUTPATIENT)
Age: 75
End: 2025-04-15

## 2025-05-12 ENCOUNTER — APPOINTMENT (OUTPATIENT)
Dept: VASCULAR SURGERY | Facility: CLINIC | Age: 75
End: 2025-05-12
Payer: MEDICARE

## 2025-05-12 VITALS
OXYGEN SATURATION: 96 % | RESPIRATION RATE: 16 BRPM | HEART RATE: 94 BPM | TEMPERATURE: 98 F | HEIGHT: 64 IN | DIASTOLIC BLOOD PRESSURE: 74 MMHG | WEIGHT: 188 LBS | SYSTOLIC BLOOD PRESSURE: 145 MMHG | BODY MASS INDEX: 32.1 KG/M2

## 2025-05-12 DIAGNOSIS — Z99.2 END STAGE RENAL DISEASE: ICD-10-CM

## 2025-05-12 DIAGNOSIS — N18.6 END STAGE RENAL DISEASE: ICD-10-CM

## 2025-05-12 PROCEDURE — 93990 DOPPLER FLOW TESTING: CPT

## 2025-05-12 PROCEDURE — 99214 OFFICE O/P EST MOD 30 MIN: CPT

## 2025-05-19 ENCOUNTER — APPOINTMENT (OUTPATIENT)
Dept: CARDIOLOGY | Facility: CLINIC | Age: 75
End: 2025-05-19
Payer: MEDICARE

## 2025-05-19 VITALS
BODY MASS INDEX: 32.27 KG/M2 | HEIGHT: 64 IN | HEART RATE: 79 BPM | SYSTOLIC BLOOD PRESSURE: 136 MMHG | DIASTOLIC BLOOD PRESSURE: 62 MMHG | WEIGHT: 189 LBS | OXYGEN SATURATION: 97 %

## 2025-05-19 DIAGNOSIS — I48.0 PAROXYSMAL ATRIAL FIBRILLATION: ICD-10-CM

## 2025-05-19 DIAGNOSIS — I10 ESSENTIAL (PRIMARY) HYPERTENSION: ICD-10-CM

## 2025-05-19 DIAGNOSIS — Z95.1 PRESENCE OF AORTOCORONARY BYPASS GRAFT: ICD-10-CM

## 2025-05-19 DIAGNOSIS — I25.10 ATHEROSCLEROTIC HEART DISEASE OF NATIVE CORONARY ARTERY W/OUT ANGINA PECTORIS: ICD-10-CM

## 2025-05-19 DIAGNOSIS — Z98.61 ATHEROSCLEROTIC HEART DISEASE OF NATIVE CORONARY ARTERY W/OUT ANGINA PECTORIS: ICD-10-CM

## 2025-05-19 PROCEDURE — 99214 OFFICE O/P EST MOD 30 MIN: CPT

## 2025-05-19 PROCEDURE — G2211 COMPLEX E/M VISIT ADD ON: CPT

## 2025-05-20 ENCOUNTER — TRANSCRIPTION ENCOUNTER (OUTPATIENT)
Age: 75
End: 2025-05-20

## 2025-05-30 ENCOUNTER — OUTPATIENT (OUTPATIENT)
Dept: OUTPATIENT SERVICES | Facility: HOSPITAL | Age: 75
LOS: 1 days | End: 2025-05-30

## 2025-05-30 VITALS
HEIGHT: 64 IN | TEMPERATURE: 98 F | RESPIRATION RATE: 17 BRPM | OXYGEN SATURATION: 97 % | WEIGHT: 188.05 LBS | SYSTOLIC BLOOD PRESSURE: 135 MMHG | DIASTOLIC BLOOD PRESSURE: 82 MMHG | HEART RATE: 90 BPM

## 2025-05-30 DIAGNOSIS — I10 ESSENTIAL (PRIMARY) HYPERTENSION: ICD-10-CM

## 2025-05-30 DIAGNOSIS — Z01.818 ENCOUNTER FOR OTHER PREPROCEDURAL EXAMINATION: ICD-10-CM

## 2025-05-30 DIAGNOSIS — T82.858A STENOSIS OF OTHER VASCULAR PROSTHETIC DEVICES, IMPLANTS AND GRAFTS, INITIAL ENCOUNTER: ICD-10-CM

## 2025-05-30 DIAGNOSIS — Z95.1 PRESENCE OF AORTOCORONARY BYPASS GRAFT: Chronic | ICD-10-CM

## 2025-05-30 DIAGNOSIS — N18.6 END STAGE RENAL DISEASE: ICD-10-CM

## 2025-05-30 DIAGNOSIS — I48.91 UNSPECIFIED ATRIAL FIBRILLATION: Chronic | ICD-10-CM

## 2025-05-30 DIAGNOSIS — I25.10 ATHEROSCLEROTIC HEART DISEASE OF NATIVE CORONARY ARTERY WITHOUT ANGINA PECTORIS: ICD-10-CM

## 2025-05-30 DIAGNOSIS — Z98.891 HISTORY OF UTERINE SCAR FROM PREVIOUS SURGERY: Chronic | ICD-10-CM

## 2025-05-30 DIAGNOSIS — Z98.49 CATARACT EXTRACTION STATUS, UNSPECIFIED EYE: Chronic | ICD-10-CM

## 2025-05-30 DIAGNOSIS — Z90.710 ACQUIRED ABSENCE OF BOTH CERVIX AND UTERUS: Chronic | ICD-10-CM

## 2025-05-30 DIAGNOSIS — I48.91 UNSPECIFIED ATRIAL FIBRILLATION: ICD-10-CM

## 2025-05-30 DIAGNOSIS — Z95.5 PRESENCE OF CORONARY ANGIOPLASTY IMPLANT AND GRAFT: Chronic | ICD-10-CM

## 2025-05-30 DIAGNOSIS — Z98.890 OTHER SPECIFIED POSTPROCEDURAL STATES: Chronic | ICD-10-CM

## 2025-05-30 DIAGNOSIS — E78.5 HYPERLIPIDEMIA, UNSPECIFIED: ICD-10-CM

## 2025-05-30 LAB
A1C WITH ESTIMATED AVERAGE GLUCOSE RESULT: 6.7 % — HIGH (ref 4–5.6)
ALBUMIN SERPL ELPH-MCNC: 3.4 G/DL — SIGNIFICANT CHANGE UP (ref 3.3–5)
ALP SERPL-CCNC: 106 U/L — SIGNIFICANT CHANGE UP (ref 40–120)
ALT FLD-CCNC: 22 U/L — SIGNIFICANT CHANGE UP (ref 12–78)
ANION GAP SERPL CALC-SCNC: 6 MMOL/L — SIGNIFICANT CHANGE UP (ref 5–17)
APTT BLD: 30.3 SEC — SIGNIFICANT CHANGE UP (ref 26.1–36.8)
AST SERPL-CCNC: 17 U/L — SIGNIFICANT CHANGE UP (ref 15–37)
BASOPHILS # BLD AUTO: 0.05 K/UL — SIGNIFICANT CHANGE UP (ref 0–0.2)
BASOPHILS NFR BLD AUTO: 0.7 % — SIGNIFICANT CHANGE UP (ref 0–2)
BILIRUB SERPL-MCNC: 0.3 MG/DL — SIGNIFICANT CHANGE UP (ref 0.2–1.2)
BLD GP AB SCN SERPL QL: SIGNIFICANT CHANGE UP
BUN SERPL-MCNC: 39 MG/DL — HIGH (ref 7–23)
CALCIUM SERPL-MCNC: 9 MG/DL — SIGNIFICANT CHANGE UP (ref 8.5–10.1)
CHLORIDE SERPL-SCNC: 99 MMOL/L — SIGNIFICANT CHANGE UP (ref 96–108)
CO2 SERPL-SCNC: 30 MMOL/L — SIGNIFICANT CHANGE UP (ref 22–31)
CREAT SERPL-MCNC: 5.82 MG/DL — HIGH (ref 0.5–1.3)
EGFR: 7 ML/MIN/1.73M2 — LOW
EGFR: 7 ML/MIN/1.73M2 — LOW
EOSINOPHIL # BLD AUTO: 0.13 K/UL — SIGNIFICANT CHANGE UP (ref 0–0.5)
EOSINOPHIL NFR BLD AUTO: 1.7 % — SIGNIFICANT CHANGE UP (ref 0–6)
ESTIMATED AVERAGE GLUCOSE: 146 MG/DL — HIGH (ref 68–114)
GLUCOSE SERPL-MCNC: 90 MG/DL — SIGNIFICANT CHANGE UP (ref 70–99)
HCT VFR BLD CALC: 29.9 % — LOW (ref 34.5–45)
HGB BLD-MCNC: 9.6 G/DL — LOW (ref 11.5–15.5)
IMM GRANULOCYTES NFR BLD AUTO: 0.4 % — SIGNIFICANT CHANGE UP (ref 0–0.9)
INR BLD: 1.27 RATIO — HIGH (ref 0.85–1.16)
LYMPHOCYTES # BLD AUTO: 2.2 K/UL — SIGNIFICANT CHANGE UP (ref 1–3.3)
LYMPHOCYTES # BLD AUTO: 28.9 % — SIGNIFICANT CHANGE UP (ref 13–44)
MCHC RBC-ENTMCNC: 32.1 G/DL — SIGNIFICANT CHANGE UP (ref 32–36)
MCHC RBC-ENTMCNC: 33.7 PG — SIGNIFICANT CHANGE UP (ref 27–34)
MCV RBC AUTO: 104.9 FL — HIGH (ref 80–100)
MONOCYTES # BLD AUTO: 0.79 K/UL — SIGNIFICANT CHANGE UP (ref 0–0.9)
MONOCYTES NFR BLD AUTO: 10.4 % — SIGNIFICANT CHANGE UP (ref 2–14)
NEUTROPHILS # BLD AUTO: 4.41 K/UL — SIGNIFICANT CHANGE UP (ref 1.8–7.4)
NEUTROPHILS NFR BLD AUTO: 57.9 % — SIGNIFICANT CHANGE UP (ref 43–77)
NRBC BLD AUTO-RTO: 0 /100 WBCS — SIGNIFICANT CHANGE UP (ref 0–0)
PLATELET # BLD AUTO: 269 K/UL — SIGNIFICANT CHANGE UP (ref 150–400)
POTASSIUM SERPL-MCNC: 4.2 MMOL/L — SIGNIFICANT CHANGE UP (ref 3.5–5.3)
POTASSIUM SERPL-SCNC: 4.2 MMOL/L — SIGNIFICANT CHANGE UP (ref 3.5–5.3)
PROT SERPL-MCNC: 7.7 GM/DL — SIGNIFICANT CHANGE UP (ref 6–8.3)
PROTHROM AB SERPL-ACNC: 14.3 SEC — HIGH (ref 9.9–13.4)
RBC # BLD: 2.85 M/UL — LOW (ref 3.8–5.2)
RBC # FLD: 15.8 % — HIGH (ref 10.3–14.5)
SODIUM SERPL-SCNC: 135 MMOL/L — SIGNIFICANT CHANGE UP (ref 135–145)
WBC # BLD: 7.61 K/UL — SIGNIFICANT CHANGE UP (ref 3.8–10.5)
WBC # FLD AUTO: 7.61 K/UL — SIGNIFICANT CHANGE UP (ref 3.8–10.5)

## 2025-05-30 RX ORDER — APIXABAN 2.5 MG/1
1 TABLET, FILM COATED ORAL
Refills: 0 | DISCHARGE

## 2025-05-30 RX ORDER — METOPROLOL SUCCINATE 50 MG/1
1 TABLET, EXTENDED RELEASE ORAL
Refills: 0 | DISCHARGE

## 2025-05-30 RX ORDER — MONTELUKAST SODIUM 10 MG/1
1 TABLET ORAL
Refills: 0 | DISCHARGE

## 2025-05-30 NOTE — H&P PST ADULT - PROBLEM SELECTOR PLAN 6
Preop instructions provided including NPO status. Hibiclens wash for infection control. Patient aware to stop NSAID, OTC herbals  for 7-10 days, needs to be accompanied  by adult upon discharge.  Patient verbalized understanding  Needs medical and cardiology clearance  anesthesiologist to review pst labs, ekg, medical clearances and optimization for surgery continue aspirin 81 mg  continue meds

## 2025-05-30 NOTE — H&P PST ADULT - PROBLEM SELECTOR PLAN 2
continue meds continue meds  Dialysis schedule T,Th, Sat.  Pt to go for dialysis day before surgery 6/4. (per pt, already scheduled at 2:30pm)

## 2025-05-30 NOTE — H&P PST ADULT - NSICDXPASTMEDICALHX_GEN_ALL_CORE_FT
Fax received from [employer]. They are wanting to confirm that pt should be taking carvedilol BID not TID.     Message reviewed by Dr. Adan Platt, per VO pt should be doing BID dosing. Fax sent back to pharmacy with change     Medication updated in chart    PAST MEDICAL HISTORY:  Afib     CAD (coronary artery disease)     DM (diabetes mellitus)     ESRD on dialysis     HLD (hyperlipidemia)     HTN (hypertension)     Uterine cancer

## 2025-05-30 NOTE — H&P PST ADULT - PROBLEM SELECTOR PLAN 5
continue aspirin 81 mg  continue meds continue meds  Per pt she was advised by her cardiologist and Dr. Galeano to hold eliquis 2 days before surgery.  continue aspirin 81 mg

## 2025-05-30 NOTE — H&P PST ADULT - PROBLEM SELECTOR PLAN 7
Preop instructions provided including NPO status. Hibiclens wash for infection control. Patient aware to stop NSAID, OTC herbals  for 7-10 days, needs to be accompanied  by adult upon discharge.  Patient verbalized understanding  Needs medical and cardiology clearance  anesthesiologist to review pst labs, ekg, medical clearances and optimization for surgery

## 2025-05-30 NOTE — H&P PST ADULT - ASSESSMENT
74 y/o female with DM, CAD s/p cabg x3 (), s/p pci stent placement x2 (2024, 10/25/2024), Afib on eliquis, HTN, HLD, ESRD on hemodialysis via left arm AV fistula here for presurgical examination for planned Left arm fistulogram on 2025 with Dr. Galeano.   Denies recent travels in the past 30 days. No fever, SOB, cough, flu like symptoms or body rash- covid screen   CAPRINI SCORE    AGE RELATED RISK FACTORS                                                             [ ] Age 41-60 years                                            (1 Point)  x[ ] Age: 61-74 years                                           (2 Points)                 [ ] Age= 75 years                                                (3 Points)             DISEASE RELATED RISK FACTORS                                                       [x ] Edema in the lower extremities                 (1 Point)                     [ ] Varicose veins                                               (1 Point)                                 [x ] BMI > 25 Kg/m2                                            (1 Point)                                  [ ] Serious infection (ie PNA)                            (1 Point)                     [ ] Lung disease ( COPD, Emphysema)            (1 Point)                                                                          [ ] Acute myocardial infarction                         (1 Point)                  [ ] Congestive heart failure (in the previous month)  (1 Point)         [ ] Inflammatory bowel disease                            (1 Point)                  [ ] Central venous access, PICC or Port               (2 points)       (within the last month)                                                                [ ] Stroke (in the previous month)                        (5 Points)    [x ] Previous or present malignancy                       (2 points)                                                                                                                                                         HEMATOLOGY RELATED FACTORS                                                         [ ] Prior episodes of VTE                                     (3 Points)                     [ ] Positive family history for VTE                      (3 Points)                  [ ] Prothrombin 10919 A                                     (3 Points)                     [ ] Factor V Leiden                                                (3 Points)                        [ ] Lupus anticoagulants                                      (3 Points)                                                           [ ] Anticardiolipin antibodies                              (3 Points)                                                       [ ] High homocysteine in the blood                   (3 Points)                                             [ ] Other congenital or acquired thrombophilia      (3 Points)                                                [ ] Heparin induced thrombocytopenia                  (3 Points)                                        MOBILITY RELATED FACTORS  [ ] Bed rest                                                         (1 Point)  [ ] Plaster cast                                                    (2 points)  [ ] Bed bound for more than 72 hours           (2 Points)    GENDER SPECIFIC FACTORS  [ ] Pregnancy or had a baby within the last month   (1 Point)  [ ] Post-partum < 6 weeks                                   (1 Point)  [ ] Hormonal therapy  or oral contraception   (1 Point)  [ ] History of pregnancy complications              (1 point)  [ ] Unexplained or recurrent              (1 Point)    OTHER RISK FACTORS                                           (1 Point)  [ ] BMI >40, smoking, diabetes requiring insulin, chemotherapy  blood transfusions and length of surgery over 2 hours    SURGERY RELATED RISK FACTORS  [ ]  Section within the last month     (1 Point)  [ ] Minor surgery                                                  (1 Point)  [ ] Arthroscopic surgery                                       (2 Points)  [x ] Planned major surgery lasting more            (2 Points)      than 45 minutes     [ ] Elective hip or knee joint replacement       (5 points)       surgery                                                TRAUMA RELATED RISK FACTORS  [ ] Fracture of the hip, pelvis, or leg                       (5 Points)  [ ] Spinal cord injury resulting in paralysis             (5 points)       (in the previous month)    [ ] Paralysis  (less than 1 month)                             (5 Points)  [ ] Multiple Trauma within 1 month                        (5 Points)    Total Score [     8   ]    Caprini Score 0-2: Low Risk, NO VTE prophylaxis required for most patients, encourage ambulation  Caprini Score 3-6: Moderate Risk , pharmacologic VTE prophylaxis is indicated for most patients (in the absence of contraindications)  Caprini Score Greater than or =7: High risk, pharmocologic VTE prophylaxis indicated for most patients (in the absence of contraindications)

## 2025-05-30 NOTE — H&P PST ADULT - NSICDXPASTSURGICALHX_GEN_ALL_CORE_FT
PAST SURGICAL HISTORY:  H/O:      H/O: hysterectomy     History of cataract surgery     S/P arteriovenous (AV) fistula creation     S/P CABG x 3     S/P coronary artery stent placement

## 2025-05-30 NOTE — H&P PST ADULT - HISTORY OF PRESENT ILLNESS
74 y/o female with DM, CAD s/p cabg x3 (2022), s/p pci stent placement x2 (8/2024, 10/25/2024), Afib on eliquis, HTN, HLD, ESRD on hemodialysis via left arm AV fistula here for presurgical examination for planned Left arm fistulogram on 6/5/2025 with Dr. Galeano.   Denies recent travels in the past 30 days. No fever, SOB, cough, flu like symptoms or body rash- covid screen

## 2025-05-30 NOTE — H&P PST ADULT - CARDIOVASCULAR
regular rate and rhythm/S1 S2 present/murmur/pedal edema details… palpable thrill on Left arm av fistula. dry and intact/regular rate and rhythm/S1 S2 present/murmur/pedal edema

## 2025-06-02 ENCOUNTER — APPOINTMENT (OUTPATIENT)
Dept: FAMILY MEDICINE | Facility: CLINIC | Age: 75
End: 2025-06-02
Payer: MEDICARE

## 2025-06-02 VITALS — DIASTOLIC BLOOD PRESSURE: 62 MMHG | SYSTOLIC BLOOD PRESSURE: 136 MMHG

## 2025-06-02 VITALS — DIASTOLIC BLOOD PRESSURE: 62 MMHG | SYSTOLIC BLOOD PRESSURE: 147 MMHG

## 2025-06-02 VITALS
RESPIRATION RATE: 16 BRPM | OXYGEN SATURATION: 97 % | HEART RATE: 86 BPM | TEMPERATURE: 98 F | HEIGHT: 64 IN | WEIGHT: 184 LBS | BODY MASS INDEX: 31.41 KG/M2

## 2025-06-02 DIAGNOSIS — Z01.818 ENCOUNTER FOR OTHER PREPROCEDURAL EXAMINATION: ICD-10-CM

## 2025-06-02 PROCEDURE — 99213 OFFICE O/P EST LOW 20 MIN: CPT

## 2025-06-05 ENCOUNTER — TRANSCRIPTION ENCOUNTER (OUTPATIENT)
Age: 75
End: 2025-06-05

## 2025-06-05 ENCOUNTER — APPOINTMENT (OUTPATIENT)
Dept: INTERVENTIONAL RADIOLOGY/VASCULAR | Facility: HOSPITAL | Age: 75
End: 2025-06-05

## 2025-06-05 ENCOUNTER — OUTPATIENT (OUTPATIENT)
Dept: OUTPATIENT SERVICES | Facility: HOSPITAL | Age: 75
LOS: 1 days | End: 2025-06-05
Payer: MEDICARE

## 2025-06-05 VITALS
OXYGEN SATURATION: 97 % | SYSTOLIC BLOOD PRESSURE: 154 MMHG | HEART RATE: 83 BPM | RESPIRATION RATE: 14 BRPM | DIASTOLIC BLOOD PRESSURE: 75 MMHG

## 2025-06-05 VITALS
TEMPERATURE: 98 F | OXYGEN SATURATION: 100 % | SYSTOLIC BLOOD PRESSURE: 156 MMHG | RESPIRATION RATE: 20 BRPM | DIASTOLIC BLOOD PRESSURE: 89 MMHG | HEART RATE: 83 BPM

## 2025-06-05 DIAGNOSIS — Z95.1 PRESENCE OF AORTOCORONARY BYPASS GRAFT: Chronic | ICD-10-CM

## 2025-06-05 DIAGNOSIS — Z95.5 PRESENCE OF CORONARY ANGIOPLASTY IMPLANT AND GRAFT: Chronic | ICD-10-CM

## 2025-06-05 DIAGNOSIS — Z98.891 HISTORY OF UTERINE SCAR FROM PREVIOUS SURGERY: Chronic | ICD-10-CM

## 2025-06-05 DIAGNOSIS — Z90.710 ACQUIRED ABSENCE OF BOTH CERVIX AND UTERUS: Chronic | ICD-10-CM

## 2025-06-05 DIAGNOSIS — Z98.49 CATARACT EXTRACTION STATUS, UNSPECIFIED EYE: Chronic | ICD-10-CM

## 2025-06-05 DIAGNOSIS — Z98.890 OTHER SPECIFIED POSTPROCEDURAL STATES: Chronic | ICD-10-CM

## 2025-06-05 PROBLEM — I48.91 UNSPECIFIED ATRIAL FIBRILLATION: Chronic | Status: ACTIVE | Noted: 2025-05-30

## 2025-06-05 LAB
ANION GAP SERPL CALC-SCNC: 11 MMOL/L — SIGNIFICANT CHANGE UP (ref 5–17)
BLD GP AB SCN SERPL QL: SIGNIFICANT CHANGE UP
BUN SERPL-MCNC: 26 MG/DL — HIGH (ref 7–23)
CALCIUM SERPL-MCNC: 9.1 MG/DL — SIGNIFICANT CHANGE UP (ref 8.5–10.1)
CHLORIDE SERPL-SCNC: 96 MMOL/L — SIGNIFICANT CHANGE UP (ref 96–108)
CO2 SERPL-SCNC: 29 MMOL/L — SIGNIFICANT CHANGE UP (ref 22–31)
CREAT SERPL-MCNC: 5.27 MG/DL — HIGH (ref 0.5–1.3)
EGFR: 8 ML/MIN/1.73M2 — LOW
EGFR: 8 ML/MIN/1.73M2 — LOW
GLUCOSE BLDC GLUCOMTR-MCNC: 113 MG/DL — HIGH (ref 70–99)
GLUCOSE BLDC GLUCOMTR-MCNC: 129 MG/DL — HIGH (ref 70–99)
GLUCOSE SERPL-MCNC: 136 MG/DL — HIGH (ref 70–99)
POTASSIUM SERPL-MCNC: 3.7 MMOL/L — SIGNIFICANT CHANGE UP (ref 3.5–5.3)
POTASSIUM SERPL-SCNC: 3.7 MMOL/L — SIGNIFICANT CHANGE UP (ref 3.5–5.3)
SODIUM SERPL-SCNC: 136 MMOL/L — SIGNIFICANT CHANGE UP (ref 135–145)

## 2025-06-05 PROCEDURE — 76937 US GUIDE VASCULAR ACCESS: CPT | Mod: 26

## 2025-06-05 PROCEDURE — 36902 INTRO CATH DIALYSIS CIRCUIT: CPT | Mod: LT

## 2025-06-05 NOTE — ASU DISCHARGE PLAN (ADULT/PEDIATRIC) - FINANCIAL ASSISTANCE
[facility] provides services at a reduced cost to those who are determined to be eligible through [facility]’s financial assistance program. Information regarding [facility]’s financial assistance program can be found by going to [email or web address] or by calling [phone].

## 2025-06-05 NOTE — ASU DISCHARGE PLAN (ADULT/PEDIATRIC) - NS MD DC FALL RISK RISK
For information on Fall & Injury Prevention, visit: [email or web address] OR  [email or web address] OR  https://www.cdc.gov/steadi/patient.html

## 2025-06-05 NOTE — BRIEF OPERATIVE NOTE - OPERATION/FINDINGS
left arm fistulogram and angioplasty of outflow stenosis with [id number] balloon MUSTANG and [id number] balloon MUSTANG.

## 2025-06-05 NOTE — ASU DISCHARGE PLAN (ADULT/PEDIATRIC) - CARE PROVIDER_API CALL
Kandice Galeano  Vascular Surgery  [address], [address]  [place] [address]  Phone: [phone]  Fax: [phone]  Follow Up Time: 2 weeks

## 2025-06-18 ENCOUNTER — APPOINTMENT (OUTPATIENT)
Dept: ORTHOPEDIC SURGERY | Facility: CLINIC | Age: 75
End: 2025-06-18

## 2025-07-07 ENCOUNTER — APPOINTMENT (OUTPATIENT)
Dept: VASCULAR SURGERY | Facility: CLINIC | Age: 75
End: 2025-07-07
Payer: MEDICARE

## 2025-07-07 VITALS
OXYGEN SATURATION: 94 % | HEIGHT: 64 IN | BODY MASS INDEX: 31.41 KG/M2 | SYSTOLIC BLOOD PRESSURE: 145 MMHG | WEIGHT: 184 LBS | DIASTOLIC BLOOD PRESSURE: 69 MMHG | TEMPERATURE: 98.3 F | HEART RATE: 82 BPM

## 2025-07-07 PROCEDURE — 99213 OFFICE O/P EST LOW 20 MIN: CPT

## 2025-08-11 ENCOUNTER — APPOINTMENT (OUTPATIENT)
Dept: CARDIOLOGY | Facility: CLINIC | Age: 75
End: 2025-08-11
Payer: MEDICARE

## 2025-08-11 VITALS
HEIGHT: 64 IN | OXYGEN SATURATION: 97 % | DIASTOLIC BLOOD PRESSURE: 66 MMHG | WEIGHT: 184 LBS | SYSTOLIC BLOOD PRESSURE: 134 MMHG | HEART RATE: 89 BPM | BODY MASS INDEX: 31.41 KG/M2

## 2025-08-11 DIAGNOSIS — Z98.61 ATHEROSCLEROTIC HEART DISEASE OF NATIVE CORONARY ARTERY W/OUT ANGINA PECTORIS: ICD-10-CM

## 2025-08-11 DIAGNOSIS — I10 ESSENTIAL (PRIMARY) HYPERTENSION: ICD-10-CM

## 2025-08-11 DIAGNOSIS — Z95.1 PRESENCE OF AORTOCORONARY BYPASS GRAFT: ICD-10-CM

## 2025-08-11 DIAGNOSIS — I25.10 ATHEROSCLEROTIC HEART DISEASE OF NATIVE CORONARY ARTERY W/OUT ANGINA PECTORIS: ICD-10-CM

## 2025-08-11 DIAGNOSIS — I48.0 PAROXYSMAL ATRIAL FIBRILLATION: ICD-10-CM

## 2025-08-11 PROCEDURE — G2211 COMPLEX E/M VISIT ADD ON: CPT

## 2025-08-11 PROCEDURE — 99214 OFFICE O/P EST MOD 30 MIN: CPT

## (undated) DEVICE — SUCTION YANKAUER NO CONTROL VENT

## (undated) DEVICE — SUT PROLENE 5-0 36" RB-1

## (undated) DEVICE — DRAPE TOWEL BLUE 17" X 24"

## (undated) DEVICE — DRSG TEGADERM 2.5X3"

## (undated) DEVICE — DRSG PREVENA PEEL & PLACE KIT 20CM

## (undated) DEVICE — SUT PROLENE 6-0 30" C-1

## (undated) DEVICE — PACK AV FISTULA

## (undated) DEVICE — Device

## (undated) DEVICE — SUT PROLENE 4-0 36" RB-1

## (undated) DEVICE — STABILIZER HAND ASSISTANT ATTACHMENT W STABLESOFT 2L

## (undated) DEVICE — VESSEL LOOP ASPEN MAXI YELLOW

## (undated) DEVICE — TUBING TRUWAVE PRESSURE MALE/FEMALE 72"

## (undated) DEVICE — STABILIZER HAND ASSISTANT ATTACHMENT W STABLESOFT 2S

## (undated) DEVICE — SUT SILK 2-0 18" SH (POP-OFF)

## (undated) DEVICE — SENSOR MYOCARDIAL TEMP 15MM

## (undated) DEVICE — GLV 6.5 PROTEXIS (WHITE)

## (undated) DEVICE — SOL IRR POUR NS 0.9% 1000ML

## (undated) DEVICE — SUT PROLENE 7-0 24" BV175-6

## (undated) DEVICE — DRSG COBAN 6"

## (undated) DEVICE — TUBING SUCTION 20FT

## (undated) DEVICE — SYR LUER LOK 1CC

## (undated) DEVICE — SYR ASEPTO

## (undated) DEVICE — SOL NORMOSOL-R PH7.4 1000ML

## (undated) DEVICE — SAW BLADE MICROAIRE STERNUM 1X34X9.4MM

## (undated) DEVICE — VESSEL LOOP MAXI-RED  0.120" X 16"

## (undated) DEVICE — PACK CARDIAC YELLOW

## (undated) DEVICE — SUT SOFSILK 2-0 18" TIES

## (undated) DEVICE — CONNECTOR CARDIAC 1:1 FOR HUBLESS DRAINS

## (undated) DEVICE — BIOMET DRILL DRIVER HI TORQUE

## (undated) DEVICE — PACK ORTHO

## (undated) DEVICE — MINI DOPPLER PROBE

## (undated) DEVICE — VESSEL LOOP MINI-BLUE 0.075" X 16"

## (undated) DEVICE — SUT SOFSILK 4-0 24" CV-15

## (undated) DEVICE — GOWN TRIMAX LG

## (undated) DEVICE — SUT POLYSORB 2-0 30" V-20 UNDYED

## (undated) DEVICE — SPONGE PEANUT AUTO COUNT

## (undated) DEVICE — SUT MONOCRYL 4-0 18" PS-2

## (undated) DEVICE — SUCTION CATH ARGYLE DELEE TIP 10FR CHIMNEY VALVE COIL PACKED

## (undated) DEVICE — MULTIPLE PERFUSION SET FEMALE 1 INLET LEG W 4 LEGS 15" (BLUE/RED)

## (undated) DEVICE — DRAPE SLUSH / WARMER 44 X 66"

## (undated) DEVICE — SUT SILK 2-0 12-18"

## (undated) DEVICE — VESSEL LOOP MAXI-BLUE 0.120" X 16"

## (undated) DEVICE — DRSG OPSITE 2.5 X 2"

## (undated) DEVICE — DRAPE LIGHT HANDLE COVER (BLUE)

## (undated) DEVICE — SUT VICRYL PLUS 4-0 18" PS-2 UNDYED

## (undated) DEVICE — CHEST DRAIN PLEUR-EVAC DRY/WET ADULT-PEDS SINGLE (QUICK)

## (undated) DEVICE — WARMING BLANKET LOWER ADULT

## (undated) DEVICE — SOL INJ NS 0.9% 500ML 1-PORT

## (undated) DEVICE — VESSEL LOOP KEY SURG MAXI BLUE 2.4X1.2MM

## (undated) DEVICE — POSITIONER FOAM EGG CRATE ULNAR 2PCS (PINK)

## (undated) DEVICE — CLAMP BULLDOG MIDI 45 DEGREE (GREEN) DISP

## (undated) DEVICE — BAG DECANTER 2

## (undated) DEVICE — BULLDOG SPRING CLIP LATIS/LATIS 6MM 1/2 FORCE (BLUE)

## (undated) DEVICE — SYR LUER LOK 20CC

## (undated) DEVICE — MEDTRONIC CLEARVIEW BLOWER MISTER KIT W TUBING SET

## (undated) DEVICE — STOPCOCK 3 WAY W SWIVEL MALE LUER LOCK

## (undated) DEVICE — PACING CABLE (BLUE) ATRIAL TEMP SCREW DOWN 12FT

## (undated) DEVICE — PACK UNIVERSAL CARDIAC

## (undated) DEVICE — TOURNIQUET SET TOURNIKWIK 12FR (4 TUBES, 1 SNARE) 7.5"

## (undated) DEVICE — SUMP PERICARDIAL 20FR 1/4" ADULT

## (undated) DEVICE — DRAPE 1/2 SHEET 40X57"

## (undated) DEVICE — SUT ETHIBOND 2-0 36" SH

## (undated) DEVICE — DRSG STERISTRIPS 0.5 X 4"

## (undated) DEVICE — CLAMP BULLDOG MIDI STRAIGHT (YELLOW) DISP

## (undated) DEVICE — DRSG OPSITE 13.75 X 4"

## (undated) DEVICE — SOL IRR POUR H2O 250ML

## (undated) DEVICE — MEDICATION LABELS W MARKER

## (undated) DEVICE — FRA-ESU BOVIE FORCE TRIAD T7J19745DX: Type: DURABLE MEDICAL EQUIPMENT

## (undated) DEVICE — BLADE SURGICAL #11 CARBON

## (undated) DEVICE — SUT SILK 3-0 18" TIES

## (undated) DEVICE — STOPCOCK 4-WAY NYLON MALE LL ADAPTER LG BORE

## (undated) DEVICE — ELCTR REM POLYHESIVE ADULT PT RETURN 15FT

## (undated) DEVICE — BLOWER MISTER AXIUS WITH IV SET

## (undated) DEVICE — DRSG ACE BANDAGE 6"

## (undated) DEVICE — GLV 6 PROTEXIS (WHITE)

## (undated) DEVICE — CLAMP BULLDOG MINI STRAIGHT (GREEN) DISP

## (undated) DEVICE — SUT SILK 4-0 18" TIES

## (undated) DEVICE — VENODYNE/SCD SLEEVE CALF LARGE

## (undated) DEVICE — GLV 8 PROTEXIS (CREAM) MICRO

## (undated) DEVICE — VESSEL LOOP ASPEN MINI YELLOW

## (undated) DEVICE — GOWN TRIMAX XXL

## (undated) DEVICE — SUT VICRYL 1 36" CTX UNDYED

## (undated) DEVICE — SOL IRR POUR NS 0.9% 500ML

## (undated) DEVICE — DRAPE INSTRUMENT POUCH 6.75" X 11"

## (undated) DEVICE — BIOMET BLADE STERNALOCK XP

## (undated) DEVICE — STAPLER SKIN VISI-STAT 35 WIDE

## (undated) DEVICE — SYR LUER LOK 10CC

## (undated) DEVICE — VENODYNE/SCD SLEEVE CALF MEDIUM

## (undated) DEVICE — BLADE SCALPEL SAFETYLOCK #10

## (undated) DEVICE — SPECIMEN CONTAINER 100ML

## (undated) DEVICE — DRAPE EXTREMITY 87" X 128.5"

## (undated) DEVICE — TUBING IV SET MICROCLAVE ADAPTER

## (undated) DEVICE — DRSG DERMABOND PRINEO 60CM

## (undated) DEVICE — SYR LUER LOK 30CC

## (undated) DEVICE — BLADE SCALPEL SAFETYLOCK #11

## (undated) DEVICE — DRAPE IOBAN 33" X 23"

## (undated) DEVICE — DRSG CURITY GAUZE SPONGE 4 X 4" 12-PLY

## (undated) DEVICE — BLADE SCALPEL SAFETYLOCK #15

## (undated) DEVICE — NDL HYPO SAFE 18G X 1.5" (PINK)

## (undated) DEVICE — PHRENIC NERVE PAD MEDIUM

## (undated) DEVICE — SUT PROLENE 6-0 4-30" C-1

## (undated) DEVICE — SUT SOFSILK 0 30" TIES

## (undated) DEVICE — SUT POLYSORB 3-0 30" V-20 UNDYED

## (undated) DEVICE — PACK CUSTOM W/INSPIRE OXYGENATOR

## (undated) DEVICE — TUBING KIT FAST START ATF 40

## (undated) DEVICE — GLV 7 PROTEXIS (WHITE)

## (undated) DEVICE — DRSG KLING 6"

## (undated) DEVICE — SUT SILK 5-0 60" TIES

## (undated) DEVICE — SUT PROLENE 6-0 4-30" BV-1

## (undated) DEVICE — SUT PROLENE 7-0 24" BV-1

## (undated) DEVICE — BLADE SURGICAL #15 CARBON

## (undated) DEVICE — ADAPTOR DLP "Y" FEMALE ON SINGLE LEG 3.5" X 10"

## (undated) DEVICE — FILTER REINFUSION FOR SALVAGED BLOOD DISP

## (undated) DEVICE — SUT BLUNT SZ 5

## (undated) DEVICE — PACING CABLE (BROWN) A/V TEMP SCREW DOWN 12FT

## (undated) DEVICE — CATH IV SAFE INSYTE 24G X 3/4" (YELLOW)

## (undated) DEVICE — ELCTR GROUNDING PAD ADULT COVIDIEN

## (undated) DEVICE — SUT BIOSYN 4-0 18" P-12

## (undated) DEVICE — DRSG TEGADERM 6"X8"

## (undated) DEVICE — FOLEY TRAY 16FR 5CC LF LUBRISIL ADVANCE TEMP CLOSED

## (undated) DEVICE — PREP DURAPREP 26CC

## (undated) DEVICE — SUT SILK 2-0 18" TIES

## (undated) DEVICE — SOL INJ LR 1000ML

## (undated) DEVICE — WARMING BLANKET FULL UNDERBODY

## (undated) DEVICE — SUT PLEDGET SOFT LARGE 3/8" X 3/16" X 1/16" X6

## (undated) DEVICE — GLV 7 PROTEXIS (CREAM) MICRO

## (undated) DEVICE — STOPCOCK 4-WAY (BLUE) DISCOFIX SPIN-LOCK CONNECTOR

## (undated) DEVICE — GETINGE VASOVIEW 7 ENDOSCOPIC VESSEL HARVESTING SYSTEM

## (undated) DEVICE — SUT PROLENE 3-0 36" SH

## (undated) DEVICE — FRA-ESU BOVIE FORCE TRIAD T7J19717DX: Type: DURABLE MEDICAL EQUIPMENT

## (undated) DEVICE — ELCTR BOVIE TIP BLADE MEGADYNE E-Z CLEAN 6.5" (LONG)

## (undated) DEVICE — SUCTION CATH SAFE-T-VAC 12FR

## (undated) DEVICE — SUT SOFSILK 4-0 18" TIES

## (undated) DEVICE — SUT SILK 4-0 17-18"

## (undated) DEVICE — PREP CHLORAPREP HI-LITE ORANGE 26ML

## (undated) DEVICE — SOL IRR BAG NS 0.9% 3000ML

## (undated) DEVICE — CATH IV SAFE INSYTE 14G X 1.75" (ORANGE)

## (undated) DEVICE — SUT PROLENE 4-0 36" SH

## (undated) DEVICE — SYR LUER LOK 50CC

## (undated) DEVICE — SUT VICRYL 3-0 27" SH UNDYED